# Patient Record
Sex: MALE | Race: WHITE | Employment: FULL TIME | URBAN - METROPOLITAN AREA
[De-identification: names, ages, dates, MRNs, and addresses within clinical notes are randomized per-mention and may not be internally consistent; named-entity substitution may affect disease eponyms.]

---

## 2017-02-09 ENCOUNTER — ANESTHESIA (OUTPATIENT)
Dept: CARDIOTHORACIC SURGERY | Age: 57
DRG: 219 | End: 2017-02-09
Payer: COMMERCIAL

## 2017-02-09 ENCOUNTER — APPOINTMENT (OUTPATIENT)
Dept: GENERAL RADIOLOGY | Age: 57
DRG: 219 | End: 2017-02-09
Attending: NURSE PRACTITIONER
Payer: COMMERCIAL

## 2017-02-09 ENCOUNTER — ANESTHESIA EVENT (OUTPATIENT)
Dept: CARDIOTHORACIC SURGERY | Age: 57
DRG: 219 | End: 2017-02-09
Payer: COMMERCIAL

## 2017-02-09 ENCOUNTER — APPOINTMENT (OUTPATIENT)
Dept: CT IMAGING | Age: 57
DRG: 219 | End: 2017-02-09
Attending: EMERGENCY MEDICINE
Payer: COMMERCIAL

## 2017-02-09 ENCOUNTER — HOSPITAL ENCOUNTER (INPATIENT)
Age: 57
LOS: 18 days | Discharge: HOME HEALTH | DRG: 219 | End: 2017-02-27
Attending: EMERGENCY MEDICINE | Admitting: THORACIC SURGERY (CARDIOTHORACIC VASCULAR SURGERY)
Payer: COMMERCIAL

## 2017-02-09 DIAGNOSIS — I71.03 AORTIC DISSECTION, THORACOABDOMINAL (HCC): Primary | ICD-10-CM

## 2017-02-09 PROBLEM — I71.010 ASCENDING AORTIC DISSECTION: Status: ACTIVE | Noted: 2017-02-09

## 2017-02-09 LAB
ALBUMIN SERPL BCP-MCNC: 2.9 G/DL (ref 3.5–5)
ALBUMIN SERPL BCP-MCNC: 3.7 G/DL (ref 3.5–5)
ALBUMIN/GLOB SERPL: 1.4 {RATIO} (ref 1.1–2.2)
ALBUMIN/GLOB SERPL: 1.5 {RATIO} (ref 1.1–2.2)
ALP SERPL-CCNC: 44 U/L (ref 45–117)
ALP SERPL-CCNC: 65 U/L (ref 45–117)
ALT SERPL-CCNC: 45 U/L (ref 12–78)
ALT SERPL-CCNC: 55 U/L (ref 12–78)
ANION GAP BLD CALC-SCNC: 11 MMOL/L (ref 5–15)
ANION GAP BLD CALC-SCNC: 15 MMOL/L (ref 5–15)
APTT PPP: 25.1 SEC (ref 22.1–32.5)
ARTERIAL PATENCY WRIST A: ABNORMAL
AST SERPL W P-5'-P-CCNC: 103 U/L (ref 15–37)
AST SERPL W P-5'-P-CCNC: 27 U/L (ref 15–37)
ATRIAL RATE: 71 BPM
BASE DEFICIT BLDV-SCNC: 6 MMOL/L
BASOPHILS # BLD AUTO: 0 K/UL (ref 0–0.1)
BASOPHILS # BLD AUTO: 0 K/UL (ref 0–0.1)
BASOPHILS # BLD: 0 % (ref 0–1)
BASOPHILS # BLD: 0 % (ref 0–1)
BDY SITE: ABNORMAL
BILIRUB SERPL-MCNC: 0.9 MG/DL (ref 0.2–1)
BILIRUB SERPL-MCNC: 1.1 MG/DL (ref 0.2–1)
BUN SERPL-MCNC: 14 MG/DL (ref 6–20)
BUN SERPL-MCNC: 16 MG/DL (ref 6–20)
BUN/CREAT SERPL: 11 (ref 12–20)
BUN/CREAT SERPL: 11 (ref 12–20)
CALCIUM SERPL-MCNC: 7.3 MG/DL (ref 8.5–10.1)
CALCIUM SERPL-MCNC: 8.4 MG/DL (ref 8.5–10.1)
CALCULATED P AXIS, ECG09: 57 DEGREES
CALCULATED R AXIS, ECG10: 11 DEGREES
CALCULATED T AXIS, ECG11: 32 DEGREES
CHLORIDE SERPL-SCNC: 106 MMOL/L (ref 97–108)
CHLORIDE SERPL-SCNC: 110 MMOL/L (ref 97–108)
CK SERPL-CCNC: 102 U/L (ref 39–308)
CO2 SERPL-SCNC: 20 MMOL/L (ref 21–32)
CO2 SERPL-SCNC: 23 MMOL/L (ref 21–32)
CREAT SERPL-MCNC: 1.25 MG/DL (ref 0.7–1.3)
CREAT SERPL-MCNC: 1.5 MG/DL (ref 0.7–1.3)
DIAGNOSIS, 93000: NORMAL
DIFFERENTIAL METHOD BLD: ABNORMAL
EOSINOPHIL # BLD: 0 K/UL (ref 0–0.4)
EOSINOPHIL # BLD: 0.1 K/UL (ref 0–0.4)
EOSINOPHIL NFR BLD: 0 % (ref 0–7)
EOSINOPHIL NFR BLD: 1 % (ref 0–7)
ERYTHROCYTE [DISTWIDTH] IN BLOOD BY AUTOMATED COUNT: 12.5 % (ref 11.5–14.5)
ERYTHROCYTE [DISTWIDTH] IN BLOOD BY AUTOMATED COUNT: 12.5 % (ref 11.5–14.5)
EST. AVERAGE GLUCOSE BLD GHB EST-MCNC: 103 MG/DL
GAS FLOW.O2 O2 DELIVERY SYS: ABNORMAL L/MIN
GAS FLOW.O2 SETTING OXYMISER: 15 BPM
GLOBULIN SER CALC-MCNC: 1.9 G/DL (ref 2–4)
GLOBULIN SER CALC-MCNC: 2.7 G/DL (ref 2–4)
GLUCOSE BLD STRIP.AUTO-MCNC: 100 MG/DL (ref 65–100)
GLUCOSE BLD STRIP.AUTO-MCNC: 113 MG/DL (ref 65–100)
GLUCOSE BLD STRIP.AUTO-MCNC: 114 MG/DL (ref 65–100)
GLUCOSE BLD STRIP.AUTO-MCNC: 125 MG/DL (ref 65–100)
GLUCOSE SERPL-MCNC: 109 MG/DL (ref 65–100)
GLUCOSE SERPL-MCNC: 130 MG/DL (ref 65–100)
HBA1C MFR BLD: 5.2 % (ref 4.2–6.3)
HCO3 BLDV-SCNC: 20.9 MMOL/L (ref 23–28)
HCT VFR BLD AUTO: 35.3 % (ref 36.6–50.3)
HCT VFR BLD AUTO: 43.1 % (ref 36.6–50.3)
HGB BLD-MCNC: 12.2 G/DL (ref 12.1–17)
HGB BLD-MCNC: 15.1 G/DL (ref 12.1–17)
INR PPP: 1 (ref 0.9–1.1)
INR PPP: 1.3 (ref 0.9–1.1)
LYMPHOCYTES # BLD AUTO: 31 % (ref 12–49)
LYMPHOCYTES # BLD AUTO: 4 % (ref 12–49)
LYMPHOCYTES # BLD: 0.5 K/UL (ref 0.8–3.5)
LYMPHOCYTES # BLD: 3.1 K/UL (ref 0.8–3.5)
MAGNESIUM SERPL-MCNC: 2.3 MG/DL (ref 1.6–2.4)
MCH RBC QN AUTO: 31.4 PG (ref 26–34)
MCH RBC QN AUTO: 31.7 PG (ref 26–34)
MCHC RBC AUTO-ENTMCNC: 34.6 G/DL (ref 30–36.5)
MCHC RBC AUTO-ENTMCNC: 35 G/DL (ref 30–36.5)
MCV RBC AUTO: 90.4 FL (ref 80–99)
MCV RBC AUTO: 91 FL (ref 80–99)
MONOCYTES # BLD: 0.8 K/UL (ref 0–1)
MONOCYTES # BLD: 1 K/UL (ref 0–1)
MONOCYTES NFR BLD AUTO: 8 % (ref 5–13)
MONOCYTES NFR BLD AUTO: 8 % (ref 5–13)
NEUTS SEG # BLD: 11.1 K/UL (ref 1.8–8)
NEUTS SEG # BLD: 6 K/UL (ref 1.8–8)
NEUTS SEG NFR BLD AUTO: 60 % (ref 32–75)
NEUTS SEG NFR BLD AUTO: 88 % (ref 32–75)
O2/TOTAL GAS SETTING VFR VENT: 100 %
P-R INTERVAL, ECG05: 156 MS
PCO2 BLDV: 44.5 MMHG (ref 41–51)
PEEP RESPIRATORY: 5 CMH2O
PH BLDV: 7.28 [PH] (ref 7.32–7.42)
PLATELET # BLD AUTO: 106 K/UL (ref 150–400)
PLATELET # BLD AUTO: 205 K/UL (ref 150–400)
PO2 BLDV: 55 MMHG (ref 25–40)
POTASSIUM SERPL-SCNC: 3.5 MMOL/L (ref 3.5–5.1)
POTASSIUM SERPL-SCNC: 4.3 MMOL/L (ref 3.5–5.1)
PROT SERPL-MCNC: 4.8 G/DL (ref 6.4–8.2)
PROT SERPL-MCNC: 6.4 G/DL (ref 6.4–8.2)
PROTHROMBIN TIME: 10.5 SEC (ref 9–11.1)
PROTHROMBIN TIME: 12.8 SEC (ref 9–11.1)
Q-T INTERVAL, ECG07: 410 MS
QRS DURATION, ECG06: 94 MS
QTC CALCULATION (BEZET), ECG08: 445 MS
RBC # BLD AUTO: 3.88 M/UL (ref 4.1–5.7)
RBC # BLD AUTO: 4.77 M/UL (ref 4.1–5.7)
RBC MORPH BLD: ABNORMAL
SAO2 % BLDV: 84 % (ref 65–88)
SERVICE CMNT-IMP: ABNORMAL
SERVICE CMNT-IMP: NORMAL
SODIUM SERPL-SCNC: 141 MMOL/L (ref 136–145)
SODIUM SERPL-SCNC: 144 MMOL/L (ref 136–145)
SPECIMEN TYPE: ABNORMAL
THERAPEUTIC RANGE,PTTT: NORMAL SECS (ref 58–77)
TOTAL RESP. RATE, ITRR: 15
TROPONIN I SERPL-MCNC: <0.04 NG/ML
TSH SERPL DL<=0.05 MIU/L-ACNC: 2.5 UIU/ML (ref 0.36–3.74)
VENTILATION MODE VENT: ABNORMAL
VENTRICULAR RATE, ECG03: 71 BPM
VT SETTING VENT: 600 ML
WBC # BLD AUTO: 10 K/UL (ref 4.1–11.1)
WBC # BLD AUTO: 12.6 K/UL (ref 4.1–11.1)

## 2017-02-09 PROCEDURE — 77030018835 HC SOL IRR LR ICUM -A: Performed by: THORACIC SURGERY (CARDIOTHORACIC VASCULAR SURGERY)

## 2017-02-09 PROCEDURE — 74011250636 HC RX REV CODE- 250/636: Performed by: NURSE PRACTITIONER

## 2017-02-09 PROCEDURE — 74011000250 HC RX REV CODE- 250: Performed by: NURSE PRACTITIONER

## 2017-02-09 PROCEDURE — 74011000258 HC RX REV CODE- 258: Performed by: EMERGENCY MEDICINE

## 2017-02-09 PROCEDURE — 93041 RHYTHM ECG TRACING: CPT

## 2017-02-09 PROCEDURE — 74011250636 HC RX REV CODE- 250/636

## 2017-02-09 PROCEDURE — 77030020263 HC SOL INJ SOD CL0.9% LFCR 1000ML: Performed by: THORACIC SURGERY (CARDIOTHORACIC VASCULAR SURGERY)

## 2017-02-09 PROCEDURE — 96375 TX/PRO/DX INJ NEW DRUG ADDON: CPT

## 2017-02-09 PROCEDURE — 77030019702 HC WRP THER MENM -C: Performed by: THORACIC SURGERY (CARDIOTHORACIC VASCULAR SURGERY)

## 2017-02-09 PROCEDURE — 74011636320 HC RX REV CODE- 636/320: Performed by: EMERGENCY MEDICINE

## 2017-02-09 PROCEDURE — P9035 PLATELET PHERES LEUKOREDUCED: HCPCS | Performed by: NURSE PRACTITIONER

## 2017-02-09 PROCEDURE — 77030012407 HC DRN WND BARD -B: Performed by: THORACIC SURGERY (CARDIOTHORACIC VASCULAR SURGERY)

## 2017-02-09 PROCEDURE — 82550 ASSAY OF CK (CPK): CPT | Performed by: EMERGENCY MEDICINE

## 2017-02-09 PROCEDURE — 77030018846 HC SOL IRR STRL H20 ICUM -A: Performed by: THORACIC SURGERY (CARDIOTHORACIC VASCULAR SURGERY)

## 2017-02-09 PROCEDURE — 76010000116 HC CV SURG 5 TO 5.5 HR: Performed by: THORACIC SURGERY (CARDIOTHORACIC VASCULAR SURGERY)

## 2017-02-09 PROCEDURE — 74011000250 HC RX REV CODE- 250

## 2017-02-09 PROCEDURE — 77030008771 HC TU NG SALEM SUMP -A: Performed by: ANESTHESIOLOGY

## 2017-02-09 PROCEDURE — 84443 ASSAY THYROID STIM HORMONE: CPT | Performed by: NURSE PRACTITIONER

## 2017-02-09 PROCEDURE — 77030002524 HC INSTR CLMP FGRTY EDWD -B: Performed by: THORACIC SURGERY (CARDIOTHORACIC VASCULAR SURGERY)

## 2017-02-09 PROCEDURE — 65610000003 HC RM ICU SURGICAL

## 2017-02-09 PROCEDURE — 77030010516 HC APPL HEMA CLP TELE -B: Performed by: THORACIC SURGERY (CARDIOTHORACIC VASCULAR SURGERY)

## 2017-02-09 PROCEDURE — 74011250636 HC RX REV CODE- 250/636: Performed by: THORACIC SURGERY (CARDIOTHORACIC VASCULAR SURGERY)

## 2017-02-09 PROCEDURE — 83036 HEMOGLOBIN GLYCOSYLATED A1C: CPT | Performed by: NURSE PRACTITIONER

## 2017-02-09 PROCEDURE — 74011000250 HC RX REV CODE- 250: Performed by: THORACIC SURGERY (CARDIOTHORACIC VASCULAR SURGERY)

## 2017-02-09 PROCEDURE — 77030013798 HC KT TRNSDUC PRSSR EDWD -B: Performed by: THORACIC SURGERY (CARDIOTHORACIC VASCULAR SURGERY)

## 2017-02-09 PROCEDURE — 70450 CT HEAD/BRAIN W/O DYE: CPT

## 2017-02-09 PROCEDURE — 74011000272 HC RX REV CODE- 272: Performed by: THORACIC SURGERY (CARDIOTHORACIC VASCULAR SURGERY)

## 2017-02-09 PROCEDURE — 86923 COMPATIBILITY TEST ELECTRIC: CPT | Performed by: NURSE PRACTITIONER

## 2017-02-09 PROCEDURE — 99285 EMERGENCY DEPT VISIT HI MDM: CPT

## 2017-02-09 PROCEDURE — 76060000041 HC ANESTHESIA 5 TO 5.5 HR: Performed by: THORACIC SURGERY (CARDIOTHORACIC VASCULAR SURGERY)

## 2017-02-09 PROCEDURE — 30233P1 TRANSFUSION OF NONAUTOLOGOUS FROZEN RED CELLS INTO PERIPHERAL VEIN, PERCUTANEOUS APPROACH: ICD-10-PCS | Performed by: THORACIC SURGERY (CARDIOTHORACIC VASCULAR SURGERY)

## 2017-02-09 PROCEDURE — 77030010389 HC WRE ATR PACE AEMC -B: Performed by: THORACIC SURGERY (CARDIOTHORACIC VASCULAR SURGERY)

## 2017-02-09 PROCEDURE — 74011000258 HC RX REV CODE- 258: Performed by: NURSE PRACTITIONER

## 2017-02-09 PROCEDURE — P9059 PLASMA, FRZ BETWEEN 8-24HOUR: HCPCS | Performed by: THORACIC SURGERY (CARDIOTHORACIC VASCULAR SURGERY)

## 2017-02-09 PROCEDURE — 77030007667 HC INSRT SUT HLD MEDT -B: Performed by: THORACIC SURGERY (CARDIOTHORACIC VASCULAR SURGERY)

## 2017-02-09 PROCEDURE — C1751 CATH, INF, PER/CENT/MIDLINE: HCPCS

## 2017-02-09 PROCEDURE — 77030010821: Performed by: THORACIC SURGERY (CARDIOTHORACIC VASCULAR SURGERY)

## 2017-02-09 PROCEDURE — 77030010505 HC ADH BIOGLU CRYO -F: Performed by: THORACIC SURGERY (CARDIOTHORACIC VASCULAR SURGERY)

## 2017-02-09 PROCEDURE — C1768 GRAFT, VASCULAR: HCPCS | Performed by: THORACIC SURGERY (CARDIOTHORACIC VASCULAR SURGERY)

## 2017-02-09 PROCEDURE — 041L0JH BYPASS LEFT FEMORAL ARTERY TO RIGHT FEMORAL ARTERY WITH SYNTHETIC SUBSTITUTE, OPEN APPROACH: ICD-10-PCS

## 2017-02-09 PROCEDURE — 80053 COMPREHEN METABOLIC PANEL: CPT | Performed by: EMERGENCY MEDICINE

## 2017-02-09 PROCEDURE — 77030020256 HC SOL INJ NACL 0.9%  500ML: Performed by: THORACIC SURGERY (CARDIOTHORACIC VASCULAR SURGERY)

## 2017-02-09 PROCEDURE — 85390 FIBRINOLYSINS SCREEN I&R: CPT | Performed by: THORACIC SURGERY (CARDIOTHORACIC VASCULAR SURGERY)

## 2017-02-09 PROCEDURE — 83735 ASSAY OF MAGNESIUM: CPT | Performed by: NURSE PRACTITIONER

## 2017-02-09 PROCEDURE — 77030011235 HC DRN PERCRD SUMP MEDT -B: Performed by: THORACIC SURGERY (CARDIOTHORACIC VASCULAR SURGERY)

## 2017-02-09 PROCEDURE — P9047 ALBUMIN (HUMAN), 25%, 50ML: HCPCS

## 2017-02-09 PROCEDURE — 77030006247 HC LD PCMKR MYOCRD BPLR TEMP MEDT -B: Performed by: THORACIC SURGERY (CARDIOTHORACIC VASCULAR SURGERY)

## 2017-02-09 PROCEDURE — 74011250636 HC RX REV CODE- 250/636: Performed by: EMERGENCY MEDICINE

## 2017-02-09 PROCEDURE — 36430 TRANSFUSION BLD/BLD COMPNT: CPT

## 2017-02-09 PROCEDURE — 02RF0JZ REPLACEMENT OF AORTIC VALVE WITH SYNTHETIC SUBSTITUTE, OPEN APPROACH: ICD-10-PCS | Performed by: THORACIC SURGERY (CARDIOTHORACIC VASCULAR SURGERY)

## 2017-02-09 PROCEDURE — 71010 XR CHEST PORT: CPT

## 2017-02-09 PROCEDURE — 77030008684 HC TU ET CUF COVD -B: Performed by: ANESTHESIOLOGY

## 2017-02-09 PROCEDURE — 86900 BLOOD TYPING SEROLOGIC ABO: CPT | Performed by: NURSE PRACTITIONER

## 2017-02-09 PROCEDURE — 77030004870 HC CATH CV SWN EDWD -C

## 2017-02-09 PROCEDURE — 77030002933 HC SUT MCRYL J&J -A: Performed by: THORACIC SURGERY (CARDIOTHORACIC VASCULAR SURGERY)

## 2017-02-09 PROCEDURE — 77030019576 HC CAUT BTRY -A: Performed by: THORACIC SURGERY (CARDIOTHORACIC VASCULAR SURGERY)

## 2017-02-09 PROCEDURE — 85025 COMPLETE CBC W/AUTO DIFF WBC: CPT | Performed by: EMERGENCY MEDICINE

## 2017-02-09 PROCEDURE — 93005 ELECTROCARDIOGRAM TRACING: CPT

## 2017-02-09 PROCEDURE — 74174 CTA ABD&PLVS W/CONTRAST: CPT

## 2017-02-09 PROCEDURE — 74011250637 HC RX REV CODE- 250/637: Performed by: NURSE PRACTITIONER

## 2017-02-09 PROCEDURE — 71275 CT ANGIOGRAPHY CHEST: CPT

## 2017-02-09 PROCEDURE — 5A1221Z PERFORMANCE OF CARDIAC OUTPUT, CONTINUOUS: ICD-10-PCS | Performed by: THORACIC SURGERY (CARDIOTHORACIC VASCULAR SURGERY)

## 2017-02-09 PROCEDURE — 77030027887 HC CATH PERF VENT MEDT -B: Performed by: THORACIC SURGERY (CARDIOTHORACIC VASCULAR SURGERY)

## 2017-02-09 PROCEDURE — P9045 ALBUMIN (HUMAN), 5%, 250 ML: HCPCS | Performed by: NURSE PRACTITIONER

## 2017-02-09 PROCEDURE — 77030002986 HC SUT PROL J&J -A: Performed by: THORACIC SURGERY (CARDIOTHORACIC VASCULAR SURGERY)

## 2017-02-09 PROCEDURE — 80053 COMPREHEN METABOLIC PANEL: CPT | Performed by: NURSE PRACTITIONER

## 2017-02-09 PROCEDURE — 88305 TISSUE EXAM BY PATHOLOGIST: CPT | Performed by: THORACIC SURGERY (CARDIOTHORACIC VASCULAR SURGERY)

## 2017-02-09 PROCEDURE — 74011000272 HC RX REV CODE- 272

## 2017-02-09 PROCEDURE — C1894 INTRO/SHEATH, NON-LASER: HCPCS

## 2017-02-09 PROCEDURE — 85347 COAGULATION TIME ACTIVATED: CPT | Performed by: THORACIC SURGERY (CARDIOTHORACIC VASCULAR SURGERY)

## 2017-02-09 PROCEDURE — 85384 FIBRINOGEN ACTIVITY: CPT | Performed by: THORACIC SURGERY (CARDIOTHORACIC VASCULAR SURGERY)

## 2017-02-09 PROCEDURE — 84484 ASSAY OF TROPONIN QUANT: CPT | Performed by: EMERGENCY MEDICINE

## 2017-02-09 PROCEDURE — 77030011640 HC PAD GRND REM COVD -A: Performed by: THORACIC SURGERY (CARDIOTHORACIC VASCULAR SURGERY)

## 2017-02-09 PROCEDURE — 77030003020 HC SUT TICRN COVD -A: Performed by: THORACIC SURGERY (CARDIOTHORACIC VASCULAR SURGERY)

## 2017-02-09 PROCEDURE — 77030005537 HC CATH URETH BARD -A: Performed by: THORACIC SURGERY (CARDIOTHORACIC VASCULAR SURGERY)

## 2017-02-09 PROCEDURE — 74011000250 HC RX REV CODE- 250: Performed by: EMERGENCY MEDICINE

## 2017-02-09 PROCEDURE — 77030033069 HC RLD QLC SGL COR-KNOT LSIS -B: Performed by: THORACIC SURGERY (CARDIOTHORACIC VASCULAR SURGERY)

## 2017-02-09 PROCEDURE — 77030018836 HC SOL IRR NACL ICUM -A: Performed by: THORACIC SURGERY (CARDIOTHORACIC VASCULAR SURGERY)

## 2017-02-09 PROCEDURE — 96365 THER/PROPH/DIAG IV INF INIT: CPT

## 2017-02-09 PROCEDURE — 74011000258 HC RX REV CODE- 258

## 2017-02-09 PROCEDURE — 36415 COLL VENOUS BLD VENIPUNCTURE: CPT | Performed by: NURSE PRACTITIONER

## 2017-02-09 PROCEDURE — 77030013797 HC KT TRNSDUC PRSSR EDWD -A

## 2017-02-09 PROCEDURE — 77030031139 HC SUT VCRL2 J&J -A: Performed by: THORACIC SURGERY (CARDIOTHORACIC VASCULAR SURGERY)

## 2017-02-09 PROCEDURE — 77030012390 HC DRN CHST BTL GTNG -B: Performed by: THORACIC SURGERY (CARDIOTHORACIC VASCULAR SURGERY)

## 2017-02-09 PROCEDURE — 82962 GLUCOSE BLOOD TEST: CPT

## 2017-02-09 PROCEDURE — 74011636637 HC RX REV CODE- 636/637

## 2017-02-09 PROCEDURE — 77030034936 HC DEV MIN COR-KNOT KT LSIS -F: Performed by: THORACIC SURGERY (CARDIOTHORACIC VASCULAR SURGERY)

## 2017-02-09 PROCEDURE — 77030003029 HC SUT VCRL J&J -B: Performed by: THORACIC SURGERY (CARDIOTHORACIC VASCULAR SURGERY)

## 2017-02-09 PROCEDURE — 30233K1 TRANSFUSION OF NONAUTOLOGOUS FROZEN PLASMA INTO PERIPHERAL VEIN, PERCUTANEOUS APPROACH: ICD-10-PCS | Performed by: THORACIC SURGERY (CARDIOTHORACIC VASCULAR SURGERY)

## 2017-02-09 PROCEDURE — 85576 BLOOD PLATELET AGGREGATION: CPT | Performed by: THORACIC SURGERY (CARDIOTHORACIC VASCULAR SURGERY)

## 2017-02-09 PROCEDURE — 85730 THROMBOPLASTIN TIME PARTIAL: CPT | Performed by: NURSE PRACTITIONER

## 2017-02-09 PROCEDURE — 77030019579 HC CBL PACE DISP REMG -B: Performed by: THORACIC SURGERY (CARDIOTHORACIC VASCULAR SURGERY)

## 2017-02-09 PROCEDURE — 94002 VENT MGMT INPAT INIT DAY: CPT

## 2017-02-09 PROCEDURE — 74011636637 HC RX REV CODE- 636/637: Performed by: NURSE PRACTITIONER

## 2017-02-09 PROCEDURE — 77030011255 HC DSG AQUACEL AG BMS -A: Performed by: THORACIC SURGERY (CARDIOTHORACIC VASCULAR SURGERY)

## 2017-02-09 PROCEDURE — 77030020089 HC KT PERC FEM ART MEDT -C: Performed by: THORACIC SURGERY (CARDIOTHORACIC VASCULAR SURGERY)

## 2017-02-09 PROCEDURE — 82803 BLOOD GASES ANY COMBINATION: CPT

## 2017-02-09 PROCEDURE — 77030002973 HC SUT PLEDG CV SFT OVL TELE -B: Performed by: THORACIC SURGERY (CARDIOTHORACIC VASCULAR SURGERY)

## 2017-02-09 PROCEDURE — 77030020269 HC MISC IMPL: Performed by: THORACIC SURGERY (CARDIOTHORACIC VASCULAR SURGERY)

## 2017-02-09 PROCEDURE — 85610 PROTHROMBIN TIME: CPT | Performed by: EMERGENCY MEDICINE

## 2017-02-09 PROCEDURE — 77030026438 HC STYL ET INTUB CARD -A: Performed by: ANESTHESIOLOGY

## 2017-02-09 PROCEDURE — 77030010507 HC ADH SKN DERMBND J&J -B: Performed by: THORACIC SURGERY (CARDIOTHORACIC VASCULAR SURGERY)

## 2017-02-09 PROCEDURE — 77030003010 HC SUT SURG STL J&J -B: Performed by: THORACIC SURGERY (CARDIOTHORACIC VASCULAR SURGERY)

## 2017-02-09 DEVICE — GRAFT HEMSHLD WVN STR 8MMX30CM -- HEMASHIELD PLATINUM: Type: IMPLANTABLE DEVICE | Site: GROIN | Status: FUNCTIONAL

## 2017-02-09 DEVICE — FELT SURG W6XL6IN THK1.65MM PTFE FOR THE REP OF SEPT DEFCT: Type: IMPLANTABLE DEVICE | Site: AORTIC VALVE | Status: FUNCTIONAL

## 2017-02-09 RX ORDER — BACITRACIN 500 UNIT/G
1 PACKET (EA) TOPICAL AS NEEDED
Status: DISCONTINUED | OUTPATIENT
Start: 2017-02-09 | End: 2017-02-27 | Stop reason: HOSPADM

## 2017-02-09 RX ORDER — CEFAZOLIN SODIUM IN 0.9 % NACL 2 G/50 ML
2 INTRAVENOUS SOLUTION, PIGGYBACK (ML) INTRAVENOUS EVERY 6 HOURS
Status: COMPLETED | OUTPATIENT
Start: 2017-02-10 | End: 2017-02-11

## 2017-02-09 RX ORDER — FACIAL-BODY WIPES
10 EACH TOPICAL DAILY PRN
Status: DISCONTINUED | OUTPATIENT
Start: 2017-02-09 | End: 2017-02-27 | Stop reason: HOSPADM

## 2017-02-09 RX ORDER — SUFENTANIL CITRATE 50 UG/ML
INJECTION EPIDURAL; INTRAVENOUS
Status: DISCONTINUED | OUTPATIENT
Start: 2017-02-09 | End: 2017-02-09 | Stop reason: HOSPADM

## 2017-02-09 RX ORDER — HYDROMORPHONE HYDROCHLORIDE 1 MG/ML
INJECTION, SOLUTION INTRAMUSCULAR; INTRAVENOUS; SUBCUTANEOUS
Status: COMPLETED
Start: 2017-02-09 | End: 2017-02-09

## 2017-02-09 RX ORDER — DEXMEDETOMIDINE HYDROCHLORIDE 4 UG/ML
INJECTION, SOLUTION INTRAVENOUS
Status: DISCONTINUED | OUTPATIENT
Start: 2017-02-09 | End: 2017-02-09 | Stop reason: HOSPADM

## 2017-02-09 RX ORDER — INSULIN GLARGINE 100 [IU]/ML
1-50 INJECTION, SOLUTION SUBCUTANEOUS
Status: COMPLETED | OUTPATIENT
Start: 2017-02-09 | End: 2017-02-12

## 2017-02-09 RX ORDER — FENTANYL CITRATE 50 UG/ML
INJECTION, SOLUTION INTRAMUSCULAR; INTRAVENOUS AS NEEDED
Status: DISCONTINUED | OUTPATIENT
Start: 2017-02-09 | End: 2017-02-09 | Stop reason: HOSPADM

## 2017-02-09 RX ORDER — NICARDIPINE HYDROCHLORIDE 0.2 MG/ML
INJECTION INTRAVENOUS
Status: DISCONTINUED | OUTPATIENT
Start: 2017-02-09 | End: 2017-02-09 | Stop reason: HOSPADM

## 2017-02-09 RX ORDER — SODIUM CHLORIDE 0.9 % (FLUSH) 0.9 %
5-10 SYRINGE (ML) INJECTION AS NEEDED
Status: DISCONTINUED | OUTPATIENT
Start: 2017-02-09 | End: 2017-02-09

## 2017-02-09 RX ORDER — HYDROMORPHONE HYDROCHLORIDE 1 MG/ML
1 INJECTION, SOLUTION INTRAMUSCULAR; INTRAVENOUS; SUBCUTANEOUS
Status: DISCONTINUED | OUTPATIENT
Start: 2017-02-09 | End: 2017-02-09

## 2017-02-09 RX ORDER — INSULIN LISPRO 100 [IU]/ML
INJECTION, SOLUTION INTRAVENOUS; SUBCUTANEOUS
Status: DISCONTINUED | OUTPATIENT
Start: 2017-02-09 | End: 2017-02-13

## 2017-02-09 RX ORDER — SODIUM CHLORIDE 9 MG/ML
9 INJECTION, SOLUTION INTRAVENOUS CONTINUOUS
Status: DISCONTINUED | OUTPATIENT
Start: 2017-02-09 | End: 2017-02-12

## 2017-02-09 RX ORDER — MUPIROCIN 20 MG/G
OINTMENT TOPICAL 2 TIMES DAILY
Status: COMPLETED | OUTPATIENT
Start: 2017-02-09 | End: 2017-02-14

## 2017-02-09 RX ORDER — DESMOPRESSIN ACETATE 4 UG/ML
INJECTION, SOLUTION INTRAVENOUS; SUBCUTANEOUS AS NEEDED
Status: DISCONTINUED | OUTPATIENT
Start: 2017-02-09 | End: 2017-02-09 | Stop reason: HOSPADM

## 2017-02-09 RX ORDER — SUCCINYLCHOLINE CHLORIDE 20 MG/ML
INJECTION INTRAMUSCULAR; INTRAVENOUS AS NEEDED
Status: DISCONTINUED | OUTPATIENT
Start: 2017-02-09 | End: 2017-02-09

## 2017-02-09 RX ORDER — HEPARIN SODIUM 1000 [USP'U]/ML
2000 INJECTION, SOLUTION INTRAVENOUS; SUBCUTANEOUS ONCE
Status: DISPENSED | OUTPATIENT
Start: 2017-02-09 | End: 2017-02-10

## 2017-02-09 RX ORDER — NALOXONE HYDROCHLORIDE 0.4 MG/ML
0.4 INJECTION, SOLUTION INTRAMUSCULAR; INTRAVENOUS; SUBCUTANEOUS AS NEEDED
Status: DISCONTINUED | OUTPATIENT
Start: 2017-02-09 | End: 2017-02-12

## 2017-02-09 RX ORDER — MAGNESIUM SULFATE 100 %
4 CRYSTALS MISCELLANEOUS AS NEEDED
Status: DISCONTINUED | OUTPATIENT
Start: 2017-02-09 | End: 2017-02-27 | Stop reason: HOSPADM

## 2017-02-09 RX ORDER — ACETAMINOPHEN 325 MG/1
650 TABLET ORAL
Status: DISCONTINUED | OUTPATIENT
Start: 2017-02-09 | End: 2017-02-12

## 2017-02-09 RX ORDER — AMIODARONE HYDROCHLORIDE 200 MG/1
400 TABLET ORAL EVERY 12 HOURS
Status: DISCONTINUED | OUTPATIENT
Start: 2017-02-10 | End: 2017-02-10

## 2017-02-09 RX ORDER — SODIUM CHLORIDE 9 MG/ML
250 INJECTION, SOLUTION INTRAVENOUS AS NEEDED
Status: DISCONTINUED | OUTPATIENT
Start: 2017-02-09 | End: 2017-02-12

## 2017-02-09 RX ORDER — SUCCINYLCHOLINE CHLORIDE 20 MG/ML
INJECTION INTRAMUSCULAR; INTRAVENOUS AS NEEDED
Status: DISCONTINUED | OUTPATIENT
Start: 2017-02-09 | End: 2017-02-09 | Stop reason: HOSPADM

## 2017-02-09 RX ORDER — LANOLIN ALCOHOL/MO/W.PET/CERES
400 CREAM (GRAM) TOPICAL EVERY 12 HOURS
Status: DISCONTINUED | OUTPATIENT
Start: 2017-02-10 | End: 2017-02-12

## 2017-02-09 RX ORDER — MORPHINE SULFATE 4 MG/ML
4 INJECTION, SOLUTION INTRAMUSCULAR; INTRAVENOUS
Status: DISCONTINUED | OUTPATIENT
Start: 2017-02-09 | End: 2017-02-12

## 2017-02-09 RX ORDER — HYDROMORPHONE HYDROCHLORIDE 1 MG/ML
0.5 INJECTION, SOLUTION INTRAMUSCULAR; INTRAVENOUS; SUBCUTANEOUS
Status: COMPLETED | OUTPATIENT
Start: 2017-02-09 | End: 2017-02-09

## 2017-02-09 RX ORDER — SODIUM CHLORIDE 9 MG/ML
250 INJECTION, SOLUTION INTRAVENOUS AS NEEDED
Status: DISCONTINUED | OUTPATIENT
Start: 2017-02-09 | End: 2017-02-11 | Stop reason: HOSPADM

## 2017-02-09 RX ORDER — CEFAZOLIN SODIUM 1 G/3ML
INJECTION, POWDER, FOR SOLUTION INTRAMUSCULAR; INTRAVENOUS AS NEEDED
Status: DISCONTINUED | OUTPATIENT
Start: 2017-02-09 | End: 2017-02-09 | Stop reason: HOSPADM

## 2017-02-09 RX ORDER — DEXTROSE 50 % IN WATER (D50W) INTRAVENOUS SYRINGE
12.5-25 AS NEEDED
Status: DISCONTINUED | OUTPATIENT
Start: 2017-02-09 | End: 2017-02-12

## 2017-02-09 RX ORDER — PROPOFOL 10 MG/ML
INJECTION, EMULSION INTRAVENOUS AS NEEDED
Status: DISCONTINUED | OUTPATIENT
Start: 2017-02-09 | End: 2017-02-09 | Stop reason: HOSPADM

## 2017-02-09 RX ORDER — MORPHINE SULFATE 2 MG/ML
2 INJECTION, SOLUTION INTRAMUSCULAR; INTRAVENOUS
Status: DISCONTINUED | OUTPATIENT
Start: 2017-02-09 | End: 2017-02-12

## 2017-02-09 RX ORDER — SODIUM CHLORIDE 0.9 % (FLUSH) 0.9 %
10 SYRINGE (ML) INJECTION AS NEEDED
Status: DISCONTINUED | OUTPATIENT
Start: 2017-02-09 | End: 2017-02-27 | Stop reason: HOSPADM

## 2017-02-09 RX ORDER — CHLORHEXIDINE GLUCONATE 1.2 MG/ML
10 RINSE ORAL 2 TIMES DAILY
Status: DISPENSED | OUTPATIENT
Start: 2017-02-09 | End: 2017-02-14

## 2017-02-09 RX ORDER — ROCURONIUM BROMIDE 10 MG/ML
INJECTION, SOLUTION INTRAVENOUS AS NEEDED
Status: DISCONTINUED | OUTPATIENT
Start: 2017-02-09 | End: 2017-02-09 | Stop reason: HOSPADM

## 2017-02-09 RX ORDER — GUAIFENESIN 100 MG/5ML
81 LIQUID (ML) ORAL DAILY
Status: DISCONTINUED | OUTPATIENT
Start: 2017-02-10 | End: 2017-02-27 | Stop reason: HOSPADM

## 2017-02-09 RX ORDER — SODIUM CHLORIDE 9 MG/ML
INJECTION, SOLUTION INTRAVENOUS
Status: DISCONTINUED | OUTPATIENT
Start: 2017-02-09 | End: 2017-02-09 | Stop reason: HOSPADM

## 2017-02-09 RX ORDER — CEFAZOLIN SODIUM 1 G/3ML
1 INJECTION, POWDER, FOR SOLUTION INTRAMUSCULAR; INTRAVENOUS ONCE
Status: COMPLETED | OUTPATIENT
Start: 2017-02-09 | End: 2017-02-09

## 2017-02-09 RX ORDER — HEPARIN SODIUM 1000 [USP'U]/ML
INJECTION, SOLUTION INTRAVENOUS; SUBCUTANEOUS AS NEEDED
Status: DISCONTINUED | OUTPATIENT
Start: 2017-02-09 | End: 2017-02-09 | Stop reason: HOSPADM

## 2017-02-09 RX ORDER — SODIUM CHLORIDE 0.9 % (FLUSH) 0.9 %
10 SYRINGE (ML) INJECTION EVERY 24 HOURS
Status: DISCONTINUED | OUTPATIENT
Start: 2017-02-09 | End: 2017-02-20

## 2017-02-09 RX ORDER — AMIODARONE HYDROCHLORIDE 150 MG/3ML
INJECTION, SOLUTION INTRAVENOUS AS NEEDED
Status: DISCONTINUED | OUTPATIENT
Start: 2017-02-09 | End: 2017-02-09 | Stop reason: HOSPADM

## 2017-02-09 RX ORDER — SODIUM CHLORIDE 0.9 % (FLUSH) 0.9 %
10 SYRINGE (ML) INJECTION
Status: COMPLETED | OUTPATIENT
Start: 2017-02-09 | End: 2017-02-09

## 2017-02-09 RX ORDER — SODIUM CHLORIDE, SODIUM LACTATE, POTASSIUM CHLORIDE, CALCIUM CHLORIDE 600; 310; 30; 20 MG/100ML; MG/100ML; MG/100ML; MG/100ML
INJECTION, SOLUTION INTRAVENOUS
Status: DISCONTINUED | OUTPATIENT
Start: 2017-02-09 | End: 2017-02-09 | Stop reason: HOSPADM

## 2017-02-09 RX ORDER — SODIUM CHLORIDE 0.9 % (FLUSH) 0.9 %
10 SYRINGE (ML) INJECTION EVERY 8 HOURS
Status: DISCONTINUED | OUTPATIENT
Start: 2017-02-09 | End: 2017-02-27 | Stop reason: HOSPADM

## 2017-02-09 RX ORDER — ESMOLOL HYDROCHLORIDE 10 MG/ML
500 INJECTION INTRAVENOUS ONCE
Status: COMPLETED | OUTPATIENT
Start: 2017-02-09 | End: 2017-02-09

## 2017-02-09 RX ORDER — INSULIN LISPRO 100 [IU]/ML
INJECTION, SOLUTION INTRAVENOUS; SUBCUTANEOUS
Status: DISCONTINUED | OUTPATIENT
Start: 2017-02-10 | End: 2017-02-12

## 2017-02-09 RX ORDER — ESMOLOL HYDROCHLORIDE 10 MG/ML
50-200 INJECTION, SOLUTION INTRAVENOUS
Status: DISCONTINUED | OUTPATIENT
Start: 2017-02-09 | End: 2017-02-09

## 2017-02-09 RX ORDER — OXYCODONE AND ACETAMINOPHEN 5; 325 MG/1; MG/1
2 TABLET ORAL
Status: DISCONTINUED | OUTPATIENT
Start: 2017-02-09 | End: 2017-02-15

## 2017-02-09 RX ORDER — ALBUMIN HUMAN 50 G/1000ML
12.5 SOLUTION INTRAVENOUS
Status: COMPLETED | OUTPATIENT
Start: 2017-02-09 | End: 2017-02-10

## 2017-02-09 RX ORDER — POTASSIUM CHLORIDE 29.8 MG/ML
20 INJECTION INTRAVENOUS
Status: ACTIVE | OUTPATIENT
Start: 2017-02-09 | End: 2017-02-10

## 2017-02-09 RX ORDER — HEPARIN SODIUM 1000 [USP'U]/ML
INJECTION, SOLUTION INTRAVENOUS; SUBCUTANEOUS AS NEEDED
Status: DISCONTINUED | OUTPATIENT
Start: 2017-02-09 | End: 2017-02-11 | Stop reason: HOSPADM

## 2017-02-09 RX ORDER — ALBUTEROL SULFATE 0.83 MG/ML
2.5 SOLUTION RESPIRATORY (INHALATION)
Status: DISCONTINUED | OUTPATIENT
Start: 2017-02-09 | End: 2017-02-19

## 2017-02-09 RX ORDER — MIDAZOLAM HYDROCHLORIDE 1 MG/ML
1 INJECTION, SOLUTION INTRAMUSCULAR; INTRAVENOUS
Status: DISCONTINUED | OUTPATIENT
Start: 2017-02-09 | End: 2017-02-10

## 2017-02-09 RX ORDER — CEFAZOLIN SODIUM IN 0.9 % NACL 2 G/50 ML
2 INTRAVENOUS SOLUTION, PIGGYBACK (ML) INTRAVENOUS
Status: DISCONTINUED | OUTPATIENT
Start: 2017-02-09 | End: 2017-02-09

## 2017-02-09 RX ORDER — ESMOLOL HYDROCHLORIDE 10 MG/ML
INJECTION, SOLUTION INTRAVENOUS
Status: DISCONTINUED | OUTPATIENT
Start: 2017-02-09 | End: 2017-02-09 | Stop reason: HOSPADM

## 2017-02-09 RX ORDER — PROPOFOL 10 MG/ML
5-50 VIAL (ML) INTRAVENOUS
Status: DISCONTINUED | OUTPATIENT
Start: 2017-02-09 | End: 2017-02-10

## 2017-02-09 RX ORDER — SODIUM CHLORIDE 450 MG/100ML
10 INJECTION, SOLUTION INTRAVENOUS CONTINUOUS
Status: DISCONTINUED | OUTPATIENT
Start: 2017-02-09 | End: 2017-02-12

## 2017-02-09 RX ORDER — SODIUM CHLORIDE 0.9 % (FLUSH) 0.9 %
5-10 SYRINGE (ML) INJECTION AS NEEDED
Status: DISCONTINUED | OUTPATIENT
Start: 2017-02-09 | End: 2017-02-27 | Stop reason: HOSPADM

## 2017-02-09 RX ORDER — SODIUM CHLORIDE 0.9 % (FLUSH) 0.9 %
5-10 SYRINGE (ML) INJECTION EVERY 8 HOURS
Status: DISCONTINUED | OUTPATIENT
Start: 2017-02-09 | End: 2017-02-23

## 2017-02-09 RX ORDER — ESMOLOL HYDROCHLORIDE 10 MG/ML
1000 INJECTION INTRAVENOUS ONCE
Status: COMPLETED | OUTPATIENT
Start: 2017-02-09 | End: 2017-02-09

## 2017-02-09 RX ORDER — ASPIRIN 81 MG/1
81 TABLET ORAL DAILY
Status: ON HOLD | COMMUNITY
End: 2021-07-09

## 2017-02-09 RX ORDER — MAGNESIUM SULFATE 1 G/100ML
1 INJECTION INTRAVENOUS AS NEEDED
Status: DISCONTINUED | OUTPATIENT
Start: 2017-02-09 | End: 2017-02-12

## 2017-02-09 RX ORDER — FAMOTIDINE 20 MG/1
20 TABLET, FILM COATED ORAL EVERY 12 HOURS
Status: DISCONTINUED | OUTPATIENT
Start: 2017-02-10 | End: 2017-02-10

## 2017-02-09 RX ORDER — AMOXICILLIN 250 MG
1 CAPSULE ORAL 2 TIMES DAILY
Status: DISCONTINUED | OUTPATIENT
Start: 2017-02-10 | End: 2017-02-12

## 2017-02-09 RX ORDER — SODIUM CHLORIDE 0.9 % (FLUSH) 0.9 %
20 SYRINGE (ML) INJECTION AS NEEDED
Status: DISCONTINUED | OUTPATIENT
Start: 2017-02-09 | End: 2017-02-27 | Stop reason: HOSPADM

## 2017-02-09 RX ORDER — ONDANSETRON 2 MG/ML
4 INJECTION INTRAMUSCULAR; INTRAVENOUS
Status: DISCONTINUED | OUTPATIENT
Start: 2017-02-09 | End: 2017-02-27 | Stop reason: HOSPADM

## 2017-02-09 RX ORDER — SODIUM CHLORIDE 0.9 % (FLUSH) 0.9 %
5-10 SYRINGE (ML) INJECTION EVERY 8 HOURS
Status: DISCONTINUED | OUTPATIENT
Start: 2017-02-09 | End: 2017-02-09

## 2017-02-09 RX ORDER — LIDOCAINE HYDROCHLORIDE 20 MG/ML
INJECTION, SOLUTION EPIDURAL; INFILTRATION; INTRACAUDAL; PERINEURAL AS NEEDED
Status: DISCONTINUED | OUTPATIENT
Start: 2017-02-09 | End: 2017-02-09 | Stop reason: HOSPADM

## 2017-02-09 RX ORDER — VECURONIUM BROMIDE FOR INJECTION 1 MG/ML
INJECTION, POWDER, LYOPHILIZED, FOR SOLUTION INTRAVENOUS AS NEEDED
Status: DISCONTINUED | OUTPATIENT
Start: 2017-02-09 | End: 2017-02-09 | Stop reason: HOSPADM

## 2017-02-09 RX ADMIN — VECURONIUM BROMIDE FOR INJECTION 10 MG: 1 INJECTION, POWDER, LYOPHILIZED, FOR SOLUTION INTRAVENOUS at 15:24

## 2017-02-09 RX ADMIN — IOPAMIDOL 100 ML: 755 INJECTION, SOLUTION INTRAVENOUS at 13:58

## 2017-02-09 RX ADMIN — PROPOFOL 50 MG: 10 INJECTION, EMULSION INTRAVENOUS at 18:38

## 2017-02-09 RX ADMIN — SUCCINYLCHOLINE CHLORIDE 140 MG: 20 INJECTION INTRAMUSCULAR; INTRAVENOUS at 15:16

## 2017-02-09 RX ADMIN — CEFAZOLIN SODIUM 2 G: 1 INJECTION, POWDER, FOR SOLUTION INTRAMUSCULAR; INTRAVENOUS at 15:43

## 2017-02-09 RX ADMIN — ESMOLOL HYDROCHLORIDE 50 MCG/KG/MIN: 10 INJECTION, SOLUTION INTRAVENOUS at 16:02

## 2017-02-09 RX ADMIN — ESMOLOL HYDROCHLORIDE 50.4 MG: 10 INJECTION, SOLUTION INTRAVENOUS at 14:11

## 2017-02-09 RX ADMIN — SODIUM CHLORIDE 1000 ML: 900 INJECTION, SOLUTION INTRAVENOUS at 14:00

## 2017-02-09 RX ADMIN — ESMOLOL HYDROCHLORIDE 100.7 MG: 10 INJECTION, SOLUTION INTRAVENOUS at 14:40

## 2017-02-09 RX ADMIN — ROCURONIUM BROMIDE 5 MG: 10 INJECTION, SOLUTION INTRAVENOUS at 15:16

## 2017-02-09 RX ADMIN — FENTANYL CITRATE 200 MCG: 50 INJECTION, SOLUTION INTRAMUSCULAR; INTRAVENOUS at 18:27

## 2017-02-09 RX ADMIN — HYDROMORPHONE HYDROCHLORIDE 1 MG: 1 INJECTION, SOLUTION INTRAMUSCULAR; INTRAVENOUS; SUBCUTANEOUS at 14:07

## 2017-02-09 RX ADMIN — SODIUM CHLORIDE 100 ML: 900 INJECTION, SOLUTION INTRAVENOUS at 13:58

## 2017-02-09 RX ADMIN — DESMOPRESSIN ACETATE 30 MCG: 4 INJECTION, SOLUTION INTRAVENOUS; SUBCUTANEOUS at 19:03

## 2017-02-09 RX ADMIN — NICARDIPINE HYDROCHLORIDE 10 MG/HR: 0.2 INJECTION INTRAVENOUS at 15:52

## 2017-02-09 RX ADMIN — SUFENTANIL CITRATE 0.3 MCG/KG/HR: 50 INJECTION EPIDURAL; INTRAVENOUS at 15:03

## 2017-02-09 RX ADMIN — SODIUM CHLORIDE: 9 INJECTION, SOLUTION INTRAVENOUS at 15:01

## 2017-02-09 RX ADMIN — Medication 2 MG: at 23:40

## 2017-02-09 RX ADMIN — Medication 10 ML: at 23:07

## 2017-02-09 RX ADMIN — HEPARIN SODIUM 40000 UNITS: 1000 INJECTION, SOLUTION INTRAVENOUS; SUBCUTANEOUS at 15:58

## 2017-02-09 RX ADMIN — CEFAZOLIN SODIUM 2 G: 1 INJECTION, POWDER, FOR SOLUTION INTRAMUSCULAR; INTRAVENOUS at 19:42

## 2017-02-09 RX ADMIN — SODIUM CHLORIDE 2 UNITS/HR: 900 INJECTION, SOLUTION INTRAVENOUS at 20:30

## 2017-02-09 RX ADMIN — SODIUM CHLORIDE, SODIUM LACTATE, POTASSIUM CHLORIDE, CALCIUM CHLORIDE: 600; 310; 30; 20 INJECTION, SOLUTION INTRAVENOUS at 15:03

## 2017-02-09 RX ADMIN — AMIODARONE HYDROCHLORIDE 150 MG: 150 INJECTION, SOLUTION INTRAVENOUS at 18:21

## 2017-02-09 RX ADMIN — FENTANYL CITRATE 50 MCG: 50 INJECTION, SOLUTION INTRAMUSCULAR; INTRAVENOUS at 19:33

## 2017-02-09 RX ADMIN — VECURONIUM BROMIDE FOR INJECTION 10 MG: 1 INJECTION, POWDER, LYOPHILIZED, FOR SOLUTION INTRAVENOUS at 17:10

## 2017-02-09 RX ADMIN — CEFAZOLIN 2 G: 1 INJECTION, POWDER, FOR SOLUTION INTRAMUSCULAR; INTRAVENOUS; PARENTERAL at 21:00

## 2017-02-09 RX ADMIN — PROPOFOL 100 MG: 10 INJECTION, EMULSION INTRAVENOUS at 15:38

## 2017-02-09 RX ADMIN — FAMOTIDINE 20 MG: 10 INJECTION, SOLUTION INTRAVENOUS at 23:09

## 2017-02-09 RX ADMIN — PROPOFOL 25 MCG/KG/MIN: 10 INJECTION, EMULSION INTRAVENOUS at 21:09

## 2017-02-09 RX ADMIN — FENTANYL CITRATE 250 MCG: 50 INJECTION, SOLUTION INTRAMUSCULAR; INTRAVENOUS at 15:16

## 2017-02-09 RX ADMIN — PROPOFOL 50 MG: 10 INJECTION, EMULSION INTRAVENOUS at 18:28

## 2017-02-09 RX ADMIN — CHLORHEXIDINE GLUCONATE 10 ML: 1.2 RINSE ORAL at 23:10

## 2017-02-09 RX ADMIN — Medication 10 ML: at 13:58

## 2017-02-09 RX ADMIN — HEPARIN SODIUM 20000 UNITS: 1000 INJECTION, SOLUTION INTRAVENOUS; SUBCUTANEOUS at 16:12

## 2017-02-09 RX ADMIN — DEXMEDETOMIDINE HYDROCHLORIDE 0.3 MCG/KG/HR: 4 INJECTION, SOLUTION INTRAVENOUS at 18:24

## 2017-02-09 RX ADMIN — SODIUM CHLORIDE 10 ML/HR: 450 INJECTION, SOLUTION INTRAVENOUS at 20:59

## 2017-02-09 RX ADMIN — ESMOLOL HYDROCHLORIDE 100 MCG/KG/MIN: 10 INJECTION INTRAVENOUS at 14:15

## 2017-02-09 RX ADMIN — ALBUMIN (HUMAN) 12.5 G: 12.5 INJECTION, SOLUTION INTRAVENOUS at 23:06

## 2017-02-09 RX ADMIN — PROPOFOL 200 MG: 10 INJECTION, EMULSION INTRAVENOUS at 15:16

## 2017-02-09 RX ADMIN — MUPIROCIN: 20 OINTMENT TOPICAL at 23:09

## 2017-02-09 RX ADMIN — HYDROMORPHONE HYDROCHLORIDE 1 MG: 1 INJECTION, SOLUTION INTRAMUSCULAR; INTRAVENOUS; SUBCUTANEOUS at 13:37

## 2017-02-09 RX ADMIN — FENTANYL CITRATE 250 MCG: 50 INJECTION, SOLUTION INTRAMUSCULAR; INTRAVENOUS at 15:51

## 2017-02-09 RX ADMIN — LIDOCAINE HYDROCHLORIDE 100 MG: 20 INJECTION, SOLUTION EPIDURAL; INFILTRATION; INTRACAUDAL; PERINEURAL at 15:16

## 2017-02-09 NOTE — PROGRESS NOTES
Spiritual Care Assessment/Progress Notes    Alexander Prince [id number]  [id number]    1960  61 y.o.  male    Patient Telephone Number: [phone] (home)   Holiness Affiliation: Unknown   Language: English   No emergency contact information on file. Patient Active Problem List    Diagnosis Date Noted    Ascending aortic dissection ([facility] [address] 02/09/2017    Family history of colon cancer     Seborrheic dermatitis 06/08/2010    History of colonoscopy 06/08/2010    Family history of early CAD 06/08/2010    Family history of diabetes mellitus (DM) 06/08/2010        Date: 2/9/2017       Level of Holiness/Spiritual Activity:  []         Involved in sean tradition/spiritual practice    []         Not involved in sean tradition/spiritual practice  [x]         Spiritually oriented    []         Claims no spiritual orientation    []         seeking spiritual identity  []         Feels alienated from Yazidism practice/tradition  []         Feels angry about Yazidism practice/tradition  [x]         Spirituality is a resource for coping at this time.   []         Not able to assess due to medical condition    Services Provided Today:  [x]         crisis intervention    []         reading Scriptures  [x]         spiritual assessment    [x]         prayer  [x]         empathic listening/emotional support  []         rites and rituals (cite in comments)  []         life review     []         Yazidism support  []         theological development   []         advocacy  []         ethical dialog     []         blessing  []         bereavement support    [x]         support to family  []         anticipatory grief support   []         help with AMD  []         spiritual guidance    []         meditation      Spiritual Care Needs  [x]         Emotional Support  [x]         Spiritual/Holiness Care  [x]         Loss/Adjustment  []         Advocacy/Referral                /Ethics  []         No needs expressed at this time  []         Other: (note in               comments)  Spiritual Care Plan  [x]         Follow up visits with               pt/family  []         Provide materials  []         Schedule sacraments  []         Contact Community               Clergy  []         Follow up as needed  []         Other: (note in               comments)       Initial spiritual assessment for family of patient in ER26. Paged to visit by Nurse Supervisor Angie Ramirez to support family of Mr. Candi Dakins. Daughter Leilani Encinas is nurse at [employer] [place]. Family was gathered in quiet room---Wife Bashir Baum, daughter Leilani Encinas, son West Conley, and [employer]. Family has solid spiritual foundation and are actively involved at [facility]. Chuyita Matute was immediately notified, as were extended family members. Maintained pastoral presence while family gathered medical information from clinical staff and sought to fully comprehend Mr. Bayron Monae condition. Provided practical and spiritual support during lengthy visit, including prayer. Noted solid intra-family communication and mutual recognition and support. Understandably shaken by the sudden event--give Mr. Bayron Monae generally good health. Assisted son [place] Conley in registering with volunteer desk in order to be notified of surgery updates. [facility]'s Staff  (Eli Sevilla Patient Care Specialist)   Paging Service [facility]([phone])

## 2017-02-09 NOTE — ANESTHESIA PROCEDURE NOTES
Arterial Line Placement    Start time: 2/9/2017 2:33 PM  End time: 2/9/2017 2:37 PM  Performed by: Sofi Rain  Authorized by: Yoli JONES     Pre-Procedure  Indications:  Arterial pressure monitoring  Preanesthetic Checklist: patient identified, risks and benefits discussed, patient being monitored and patient being monitored      Procedure:   Prep:  Chlorhexidine  Seldinger Technique?: Yes    Orientation:  Right  Location:  Radial artery  Catheter size:  20 G  Number of attempts:  1  Cont Cardiac Output Sensor: No      Assessment:   Post-procedure:  Line secured and sterile dressing applied  Patient Tolerance:  Patient tolerated the procedure well with no immediate complications

## 2017-02-09 NOTE — PROGRESS NOTES
Cardiac Surgery Care Coordinator-Met with the family of Treasure Burkett, introduced role of the Cardiac Surgery Co- Nurse. Reviewed plan of care and day of surgery expectations. Provided family with a contact number and an update from OR. Encouraged family to verbalize and emotional support given. Communicated patient information with nurses in CVICU.    Danitza Pizarro RN

## 2017-02-09 NOTE — ANESTHESIA PROCEDURE NOTES
Central Line and Pulmonary Artery Catheter Placement    Start time: 2/9/2017 2:44 PM  End time: 2/9/2017 2:55 PM  Performed by: Derek Vasquez  Authorized by: Derek Vasquez     Indications: vascular access, central pressure monitoring and need for vasopressors  Preanesthetic Checklist: patient identified, risks and benefits discussed, site marked, patient being monitored and timeout performed      Pre-procedure: All elements of maximal sterile barrier technique followed?  Yes    Maximal barrier precautions followed, 2% Chlorhexidine for cutaneous antisepsis, Hand hygiene performed prior to catheter insertion and Ultrasound guidance    Sterile Ultrasound Technique followed?: Yes          Procedure:   Prep:  Chlorhexidine  Location:  Internal jugular  Orientation:  Right  Patient position:  Flat  Catheter type:  Double lumen  Catheter size:  9 Fr  Catheter length:  20 cm  Number of attempts:  1  Successful placement: Yes      Assessment:   Post-procedure:  Catheter secured, sterile dressing applied and sterile dressing with CHG applied  Assessment:  Blood return through all ports and free fluid flow  Insertion:  Uncomplicated  Patient tolerance:  Patient tolerated the procedure well with no immediate complications  9 Fr MAC and 8 Fr CCO PAC

## 2017-02-09 NOTE — CONSULTS
CSS   History and Physical    Subjective:      Susanna Vasquez is a 61 y.o. male who was referred for cardiac evaluation of aortic dissection, referred by Dr. Mitzi Barksdale in the ED. Pt's PMH includes seborrheic dermatitis who presents via EMS accompanied by wife with c/o chest pain. Pt had sudden onset of CP starting early this afternoon. Pt also c/o of right leg pain and up into R hip region, including a \"numb\" feeling, and severe headache. Pt denies radiation of pain, SOB, orthopnea, syncope. Pt last ate breakfast today. Pt denies any recent health issues, or history of hypertension. Never smoked, only social etoh use. Family history of early CAD. Pt was started on esmolol drip in the ER. Pt on exam seems to have trouble with his speech, \"jumbling words\" prior to narcotic administration for pain in leg. Full history was not obtained due to urgency of case, Anesthesia at bedside to place lines. Cardiac Testing  CT chest/abd/pelvis 2/9/17:   1. Type I aortic dissection extending from the root of the aorta at the aortic  valve to the common iliac arteries bilaterally. 2. 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 3. Mesenteric and renal arteries patent and probably supplied by the false  lumen. 4. 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 Past Medical History   Diagnosis Date    Family history of colon cancer     Family history of diabetes mellitus (DM) 6/8/2010    Family history of early CAD 6/8/2010    Seborrheic dermatitis 6/8/2010     History reviewed. No pertinent past surgical history.    Social History   Substance Use Topics    Smoking status: Never Smoker    Smokeless tobacco: Never Used    Alcohol use Yes      Comment: social      Family History   Problem Relation Age of Onset    Diabetes Mother     Heart Disease Mother     Cancer Father      colon    Hypertension Sister     Stroke Maternal Grandfather      Prior to Admission medications    Medication Sig Start Date End Date Taking? Authorizing Provider   aspirin delayed-release 81 mg tablet Take 81 mg by mouth daily. Yes Historical Provider   MULTIVITAMINS (MULTIVITAMIN PO) Take 1 Tab by mouth daily. Yes Historical Provider       No Known Allergies    Review of Systems:   Consititutional: Denies fever or chills. Eyes:  Denies use of glasses or vision problems(cataracts). ENT:  Denies hearing or swallowing difficulty. CV: Denies claudication. ++ CP   Resp: Denies dyspnea, productive cough. : Denies dialysis or kidney problems. GI: Denies ulcers, esophageal strictures, liver problems. M/S: Denies joint or bone problems, or implanted artificial hardware. Skin: Denies varicose veins, edema. Neuro: Denies strokes, or TIAs. Psych: Denies anxiety or depression. Endocrine: Denies thyroid problems or diabetes. Heme/Lymphatic: Denies easy bruising or lymphedema. Objective:     VS:    Visit Vitals    /50    Pulse 70    Temp 97.6 °F (36.4 °C)    Resp 15    Ht 5' 11\" (1.803 m)    Wt 222 lb (100.7 kg)    SpO2 100%    BMI 30.96 kg/m2         Physical Exam:    General appearance: alert, cooperative, no distress. Interactive, jumbled speech  Head: normocephalic, without obvious abnormality; atraumatic  Eyes: conjunctivae/corneas clear; EOM's intact. Nose: nares normal; no drainage. Neck: no carotid bruit and no JVD  Lungs: clear to auscultation bilaterally  Heart: regular rate and rhythm; no murmur  Abdomen: soft, non-tender; bowel sounds normal  Extremities: moves all extremities; ++ pain to R LE   Skin: Skin color normal; No varicose veins or edema. R leg cool to touch  Neurologic: Grossly normal  -- interactive.       Labs:   Recent Labs      02/09/17   1249   WBC  10.0   HGB  15.1   HCT  43.1   PLT  205   NA  141   K  3.5   BUN  14   CREA  1.25   GLU  130*   INR  1.0     Assessment:     Active Problems:    Ascending aortic dissection ([facility] [address] (2/9/2017)        Plan:   1. Type A aortic dissection:  Place lines, type and cross. Emergent case to the OR w/ Dr. Prashanth Marquez.        Signed By: Bailey Dodson NP     February 9, 2017

## 2017-02-09 NOTE — ED TRIAGE NOTES
Triage Note: Pt arrives via EMS for sudden onset of chest pain and R leg pain/numbness. Currently pt is complaining of 2/10 chest pain and 10/10 R leg pain from his hip down.

## 2017-02-09 NOTE — PROGRESS NOTES
Admission Medication Reconciliation:    Information obtained from: Wife    Significant PMH/Disease States:   Past Medical History   Diagnosis Date    Family history of colon cancer     Family history of diabetes mellitus (DM) 6/8/2010    Family history of early CAD 6/8/2010    Seborrheic dermatitis 6/8/2010       Chief Complaint for this Admission:    Chief Complaint   Patient presents with    Chest Pain         Allergies:  Review of patient's allergies indicates no known allergies. Prior to Admission Medications:   Prior to Admission Medications   Prescriptions Last Dose Informant Patient Reported? Taking? MULTIVITAMINS (MULTIVITAMIN PO) 2/9/2017 at 6am  Yes Yes   Sig: Take 1 Tab by mouth daily. aspirin delayed-release 81 mg tablet 2/9/2017 at 6am  Yes Yes   Sig: Take 81 mg by mouth daily. Facility-Administered Medications: None         Comments/Recommendations: Updated PTA meds and confirmed NKDA. 1. Deleted betamethasone lotion  2. Updated directions for aspirin and multivitamin.

## 2017-02-09 NOTE — IP AVS SNAPSHOT
[address] [facility] [address] [phone] Patient: Jany Cao MRN: [id number] DVM:0/3/2789 You are allergic to the following No active allergies Recent Documentation Height  
  
  
  
  
  
 1.803 m Unresulted Labs Order Current Status SAMPLE TO BLOOD BANK In process About your hospitalization You were admitted on:  February 9, 2017 You last received care in the:  [facility] 4 CV SERVICES UNIT You were discharged on:  February 27, 2017 Unit phone number:  [phone] Why you were hospitalized Your primary diagnosis was:  Not on File Your diagnoses also included:  Ascending Aortic Dissection (Hcc) Providers Seen During Your Hospitalizations Provider Role Specialty Primary office phone Ed Oden DO Attending Provider Emergency Medicine [phone] Aster Carreno MD Attending Provider Cardiothoracic Surgery [phone] Your Primary Care Physician (PCP) Primary Care Physician Office Phone Office Fax BRYON Judd [phone] ** None ** Follow-up Information Follow up With Details Comments Contact Info Lynn Powers 227 On 2/28/2017 skilled nursing and physical therapy, wound care and PT/INR drawn by noon, please call if you have not heard from agency by 10 am on day after hospital discharge [address] [facility] [address] 
[phone] Alcides Wells MD   222 [facility] 57 
[phone] [facility] - [facility] On 3/6/2017 11 AM [address] [place] [address], [address] [place] [id number] [phone] [facility] - [facility] On 3/14/2017 1:15 PM [address] [place] [address], [address] [place] [id number] 
[phone] Glenn Gardner MD Go on 3/13/2017 9:30 AM Iraida Tavera 16[address] 
[phone] Your Appointments Monday March 06, 2017 11:00 AM EST POST OP with Taras Nice MD  
[facility] - [address] [place] [address] ([facility]) [address] [address] [place] [phone]  
[phone] Tuesday March 14, 2017  1:15 PM EDT  
POST OP with Taras Nice MD  
[facility] - [address] [place] [address] ([facility]) [address] [address] [place] [phone]  
[phone] Current Discharge Medication List  
  
START taking these medications Dose & Instructions Dispensing Information Comments Morning Noon Evening Bedtime  
 enoxaparin 120 mg/0.8 mL injection Commonly known as:  LOVENOX Your next dose is: Today Dose:  105 mg  
105 mg by SubCUTAneous route every twelve (12) hours every twelve (12) hours. Quantity:  10 Syringe Refills:  1  
     
   
   
   
  
  
 furosemide 40 mg tablet Commonly known as:  LASIX Your next dose is: Today Dose:  80 mg Take 2 Tabs by mouth two (2) times a day. Quantity:  60 Tab Refills:  1  
     
   
   
  
   
  
 levoFLOXacin 500 mg tablet Commonly known as:  Charlcie Vicky Your next dose is:  Tomorrow Dose:  500 mg Take 1 Tab by mouth every twenty-four (24) hours for 8 days. Quantity:  8 Tab Refills:  0  
     
   
  
   
   
  
 metoprolol tartrate 25 mg tablet Commonly known as:  LOPRESSOR Your next dose is: Today Dose:  25 mg Take 1 Tab by mouth two (2) times a day. Quantity:  60 Tab Refills:  1  
     
   
   
  
   
  
 metroNIDAZOLE 500 mg tablet Commonly known as:  FLAGYL Your next dose is: Today Dose:  500 mg Take 1 Tab by mouth three (3) times daily for 8 days. Quantity:  24 Tab Refills:  0  
     
   
   
   
  
  
 potassium chloride SR 20 mEq tablet Commonly known as:  K-TAB Your next dose is:  Tomorrow Dose:  60 mEq Take 3 Tabs by mouth two (2) times a day. Quantity:  90 Tab Refills:  1 senna-docusate 8.6-50 mg per tablet Commonly known as:  Rena Valle Your next dose is: Today Dose:  1 Tab Take 1 Tab by mouth two (2) times a day. Quantity:  30 Tab Refills:  0  
     
   
   
  
   
  
 traMADol 50 mg tablet Commonly known as:  ULTRAM  
   
 Dose:   mg Take 1-2 Tabs by mouth every four (4) hours as needed. Max Daily Amount: 600 mg. Indications: Pain Quantity:  40 Tab Refills:  0  
     
   
   
   
  
 warfarin 2.5 mg tablet Commonly known as:  COUMADIN Your next dose is: Today Take 2.5 mg daily by mouth at dinner time. Your daily dose will be determined by MD/NP/PA team.  
 Quantity:  30 Tab Refills:  11 CONTINUE these medications which have NOT CHANGED Dose & Instructions Dispensing Information Comments Morning Noon Evening Bedtime  
 aspirin delayed-release 81 mg tablet Your next dose is:  Tomorrow Dose:  81 mg Take 81 mg by mouth daily. Refills:  0 MULTIVITAMIN PO Your next dose is:  Tomorrow Dose:  1 Tab Take 1 Tab by mouth daily. Refills:  0 Where to Get Your Medications These medications were sent to [facility] [address] [place] [address],  [address] Hours:  24-hours Phone:  [phone]  
  enoxaparin 120 mg/0.8 mL injection  
 furosemide 40 mg tablet  
 levoFLOXacin 500 mg tablet  
 metoprolol tartrate 25 mg tablet  
 metroNIDAZOLE 500 mg tablet  
 potassium chloride SR 20 mEq tablet  
 senna-docusate 8.6-50 mg per tablet  
 warfarin 2.5 mg tablet Information on where to get these meds will be given to you by the nurse or doctor. ! Ask your nurse or doctor about these medications  
  traMADol 50 mg tablet Discharge Instructions   [facility] 
 
 [address] [place] [address] [place] [address]                           [facility], [place] [address] [address] Office- [phone]  Fax- [phone]       Office- [phone]  Fax- [phone] 
_____________________________________________________________ Dr. Juan F Coelho P      Suly Rojas CSA DESTINEE Romo PA-C, PA-C Name:Shawn Funk Surgery & Date: Procedure(s): FASCIOTOMY RIGHT  LEG ANTERIOR COMPARTMENT Discharge Date: 2/27/17 MEDICATIONS: 
Please refer to your After Visit Summary for your medication list. If you do not have a prescription for a new medication, you may purchase the medication over the counter. DO NOT TAKE ANY MEDICATIONS THAT ARE NOT ON THAT LIST INR TARGET- 2.5-3 INR LEVEL [facility] per Dr Angelic Singh office NP/PA ###take 2.5 mg Coumadin by mouth at dinner time. Your INR will be checked by [address] [place] [address] before Noon on 2/28/17. INSTRUCTIONS: 
NO SMOKING OR TOBACCO PRODUCTS Follow all the instructions in your discharge book You may shower. Wash all incisions twice daily with mild soap and water. No lotions, ointments or powder. Call the office immediately for any redness, swelling, or drainage from your incision. Take your temperature daily and call for a temperature of 101 degrees or higher or for any symptoms that make you think you have and infection. Weigh yourself each morning. Call if you gain more than 5 pounds in 48 hours. Use the incentive spirometer 6-8 times a day-10 breaths each time. Use a pillow or your bear to splint your breastbone when coughing or sneezing. If you feel your breast bone clicking or popping, notify the office immediately. Walk several hundred feet several times daily. DIET Eat an American Heart Association diet. If you are having trouble with your appetite, eat what you can. Try eating small, frequent meals throughout the day. ACTIVITY 
NO DRIVINGyou will be evaluated to drive at your follow up visit. Increase your activity by walking several times a day. Stay out of bed most of the day. When sitting, keep your legs elevated. You may ride in a car, but you must get out every hour and walk around. If you ride in a car with an airbag that can not be switched off, put the seat ALL the way back or ride in the back seat. NO LIFTING MORE THAN 5 POUND FOR 1 MONTH, THEN YOU CAN INCREASE TO NO MORE THAN 10 LBS FOR THE 2nd MONTH AND NO MORE THAN 15 LBS FOR THE 3rd MONTH.  YOU WILL NO LONGER HAVE ANY LIFTING RESTRICTIONS AFTER 3 MONTHS. FOLLOW UP 
1. Your first follow up appointment will be on 3/6 at 11am. Our office is located in [address] [place] [address] on floor G-5. Your second follow up appointment will be in four weeks, on 3/14 at 1:15pm. Please call our office at [phone] if you are unable to make either one of these appointments. 2. You will be receiving a call before your 5 day appointment to begin cardiac rehab. They are located in the [address] [place] [address] on [facility]. Their phone number is [phone]. Please call if you have not been contacted 2-3 weeks after discharge from the hospital. 
3. We will make an appointment with your cardiologist at your last appointment. 4. Consult you primary care physician regarding your influenza &  
pneumovax vaccines. 5.   Please bring all medications with you to your appointment. Signature:___________________________________________________ Wound Care / Dressing Changes Daily to right lateral lower leg fasciotomy: - Remove old dressing, moistening with saline as needed - Clean with Normal Saline - Spray with Carrasyn spray - Apply moist gauze impregnated with Carrasyn gel keeping within wound margins - Cover with fluffed dry gauze - Top with ABD dressings 
 - Secure with roll stretch gauze (Belkys) and tape - Apply foot brace and velcro on foot not mid lower leg. 
  
 
 
 
 
 
 
   
 
 
 
Discharge Instructions Attachments/References WARFARIN (BY MOUTH) (ENGLISH) WARFARIN SAFETY TIPS (ENGLISH) Discharge Orders None [facility] Announcement We are excited to announce that we are making your provider's discharge notes available to you in Holy[employer]. You will see these notes when they are completed and signed by the physician that discharged you from your recent hospital stay. If you have any questions or concerns about any information you see in HolyTransaction, please call the Health Information Department where you were seen or reach out to your Primary Care Provider for more information about your plan of care. Introducing \[facility] [place] [facility]\"" & HEALTH SERVICES! [facility] introduces HolyTransaction patient portal. Now you can access parts of your medical record, email your doctor's office, and request medication refills online. 1. In your internet browser, go to [email or web address] 2. Click on the First Time User? Click Here link in the Sign In box. You will see the New Member Sign Up page. 3. Enter your [employer]Transaction Access Code exactly as it appears below. You will not need to use this code after youve completed the sign-up process. If you do not sign up before the expiration date, you must request a new code. · Holy[employer] Access Code: [email or web address] Expires: 5/10/2017  1:14 PM 
 
4. Enter the last four digits of your Social Security Number (xxxx) and Date of Birth (mm/dd/yyyy) as indicated and click Submit. You will be taken to the next sign-up page. 5. Create a [employer]Transaction ID. This will be your [employer]Transaction login ID and cannot be changed, so think of one that is secure and easy to remember. 6. Create a [employer] password. You can change your password at any time. 7. Enter your Password Reset Question and Answer. This can be used at a later time if you forget your password. 8. Enter your e-mail address. You will receive e-mail notification when new information is available in [address] 9. Click Sign Up. You can now view and download portions of your medical record. 10. Click the Download Summary menu link to download a portable copy of your medical information. If you have questions, please visit the Frequently Asked Questions section of the [employer] website. Remember, [employer] is NOT to be used for urgent needs. For medical emergencies, dial 911. Now available from your iPhone and Android! General Information Please provide this summary of care documentation to your next provider. Patient Signature:  ____________________________________________________________ Date:  ____________________________________________________________  
  
[facility] Provider Signature:  ____________________________________________________________ Date:  ____________________________________________________________ More Information Warfarin (By mouth) Warfarin (WAR-far-in) Prevents and treats blood clots. May lower the risk of serious complications after a heart attack. This medicine is a blood thinner. Brand Name(s):Coumadin, [employer] There may be other brand names for this medicine. When This Medicine Should Not Be Used: This medicine is not right for everyone. Do not use it if you had an allergic reaction to warfarin, if you are pregnant, or if you have health problems that could cause bleeding. How to Use This Medicine:  
Tablet · Take your medicine as directed. Your dose may need to be changed several times to find what works best for you. · This medicine should come with a Medication Guide.  Ask your pharmacist for a copy if you do not have one. · Missed dose: Take a dose as soon as you remember. If it is almost time for your next dose, wait until then and take a regular dose. Do not take extra medicine to make up for a missed dose. · Store the medicine in a closed container at room temperature, away from heat, moisture, and direct light. Drugs and Foods to Avoid: Ask your doctor or pharmacist before using any other medicine, including over-the-counter medicines, vitamins, and herbal products. · Many medicines and foods can affect how warfarin works and may affect the PT/INR test results. Tell your doctor before you start or stop any medicine, especially the following: ¨ Ginkgo, echinacea, goldenseal, or Clara's wort ¨ Another blood thinner, including apixaban, argatroban, bivalirudin, cilostazol, clopidogrel, dabigatran, desirudin, dipyridamole, heparin, lepirudin, prasugrel, rivaroxaban, ticlopidine ¨ Medicine to treat depression or anxiety, including citalopram, desvenlafaxine, duloxetine, escitalopram, fluoxetine, fluvoxamine, milnacipran, paroxetine, sertraline, venlafaxine, vilazodone ¨ Medicine to treat an infection ¨ NSAID pain or arthritis medicine, including aspirin, celecoxib, diclofenac, diflunisal, fenoprofen, ibuprofen, indomethacin, ketoprofen, ketorolac, mefenamic acid, naproxen, oxaprozin, piroxicam, sulindac. Check labels for over-the-counter medicines to find out if they contain an NSAID. ¨ Steroid medicine, including dexamethasone, hydrocortisone, methylprednisolone, prednisolone, prednisone · Warfarin works best if you eat about the same amount of vitamin K every day. Foods high in vitamin K include asparagus, broccoli, brussels sprouts, cabbage, green leafy vegetables, plums, rhubarb, and canola oil. Talk to your doctor before you make changes to your normal diet. Warnings While Using This Medicine: · It is not safe to take this medicine during pregnancy.  It could harm an unborn baby. Tell your doctor right away if you become pregnant. Use an effective form of birth control to keep from getting pregnant during treatment and for at least 1 month after your last dose. · Tell your doctor if you are breastfeeding, or if you have kidney disease, liver disease, diabetes, heart disease, heart failure, high blood pressure, an infection, a stomach ulcer, or protein C deficiency. Also tell your doctor if you had recent surgery or an injury, or a history of stroke, anemia, severe bleeding or bruising, or problems caused by heparin use. · This medicine may cause the following problems: ¨ Bleeding, which may be life-threatening ¨ Gangrene (skin or tissue damage) ¨ Purple toes syndrome · You must have a PT/INR blood test while you use this medicine to check how well your blood is clotting. Your doctor will use the test results to make sure the medicine is working properly. Keep all appointments for the PT/INR blood tests. · You may bleed or bruise more easily with warfarin. To prevent injury or cuts, do not play rough sports, be careful with sharp objects, and brush and floss your teeth gently. Evone Bread your nose gently, and do not pick your nose. · Carry an ID card or wear a medical alert bracelet to let emergency caregivers know that you use warfarin. · Tell any doctor or dentist who treats you that you are using this medicine. You may need to stop using this medicine several days before you have surgery or medical tests. · Keep all medicine out of the reach of children. Never share your medicine with anyone. Possible Side Effects While Using This Medicine:  
Call your doctor right away if you notice any of these side effects: · Allergic reaction: Itching or hives, swelling in your face or hands, swelling or tingling in your mouth or throat, chest tightness, trouble breathing · Bleeding from your gums or nose, coughing up blood, heavy monthly periods · Bleeding that does not stop, bruising, or weakness · Dizziness, fainting, or lightheadedness · Pain, brown or black skin, or skin that is cool to the touch · Purple toes or feet, or new pain in your leg, foot, or toes · Red or dark brown urine, or red or black, tarry stools · Vomiting blood or material that looks like coffee grounds If you notice other side effects that you think are caused by this medicine, tell your doctor. Call your doctor for medical advice about side effects. You may report side effects to FDA at 2-162-DFR-6081 © 2016 [address] is for End User's use only and may not be sold, redistributed or otherwise used for commercial purposes. The above information is an  only. It is not intended as medical advice for individual conditions or treatments. Talk to your doctor, nurse or pharmacist before following any medical regimen to see if it is safe and effective for you. Taking Warfarin Safely: Care Instructions Your Care Instructions Warfarin is a medicine that you take to prevent blood clots. It is often called a blood thinner. Doctors give warfarin (such as Coumadin) to reduce the risk of blood clots. You may be at risk for blood clots if you have atrial fibrillation or deep vein thrombosis. Some other health problems may also put you at risk. Warfarin slows the amount of time it takes for your blood to clot. It can cause bleeding problems. Even if you've been taking warfarin for a while, it's important to know how to take it safely. Foods and other medicines can affect the way warfarin works. Some can make warfarin work too well. This can cause bleeding problems. And some can make it work poorly, so that it does not prevent blood clots very well. You will need regular blood tests to check how long it takes for your blood to form a clot.  This test is called a PT or prothrombin time test. The result of the test is called an INR level. Depending on the test results, your doctor or anticoagulation clinic may adjust your dose of warfarin. Follow-up care is a key part of your treatment and safety. Be sure to make and go to all appointments, and call your doctor if you are having problems. It's also a good idea to know your test results and keep a list of the medicines you take. How can you care for yourself at home? Take warfarin safely · Take your warfarin at the same time each day. · If you miss a dose of warfarin, don't take an extra dose to make up for it. Your doctor can tell you exactly what to do so you don't take too much or too little. · Wear medical alert jewelry that lets others know that you take warfarin. You can buy this at most drugstores. · Don't take warfarin if you are pregnant or planning to get pregnant. Talk to your doctor about how you can prevent getting pregnant while you are taking it. · Don't change your dose or stop taking warfarin unless your doctor tells you to. Effects of medicines and food on warfarin · Don't start or stop taking any medicines, vitamins, or natural remedies unless you first talk to your doctor. Many medicines can affect how warfarin works. These include aspirin and other pain relievers, over-the-counter medicines, multivitamins, dietary supplements, and herbal products. · Tell all of your doctors and pharmacists that you take warfarin. Some prescription medicines can affect how warfarin works. · Keep the amount of vitamin K in your diet about the same from day to day. Do not suddenly eat a lot more or a lot less food that is rich in vitamin K than you usually do. Vitamin K affects how warfarin works and how your blood clots. Talk with your doctor before making big changes in your diet. Vitamin K is in many foods, such as: 
¨ Leafy greens, such as kale, cabbage, spinach, Swiss chard, and lettuce. ¨ Canola and soybean oils. ¨ Green vegetables, such as asparagus, broccoli, and V[place]aman sprouts. ¨ Vegetable drinks, green tea leaves, and some dietary supplement drinks. · Avoid cranberry juice and other cranberry products. They can increase the effects of warfarin. · Limit your use of alcohol. Avoid bleeding by preventing falls and injuries · Wear slippers or shoes with nonskid soles. · Remove throw rugs and clutter. · Rearrange furniture and electrical cords to keep them out of walking paths. · Keep stairways, porches, and outside walkways well lit. Use night-lights in hallways and bathrooms. · Be extra careful when you work with sharp tools or knives. When should you call for help? Call 911 anytime you think you may need emergency care. For example, call if: 
· You have a sudden, severe headache that is different from past headaches. Call your doctor now or seek immediate medical care if: 
· You have any abnormal bleeding, such as: 
¨ Nosebleeds. ¨ Vaginal bleeding that is different (heavier, more frequent, at a different time of the month) than what you are used to. ¨ Bloody or black stools, or rectal bleeding. ¨ Bloody or pink urine. Watch closely for changes in your health, and be sure to contact your doctor if you have any problems. Where can you learn more? Go to http://drew-avery.info/. Enter S396 in the search box to learn more about \"Taking Warfarin Safely: Care Instructions. \" Current as of: January 27, 2016 Content Version: 11.1 © 4725-0875 [employer]. Care instructions adapted under license by [employer] (which disclaims liability or warranty for this information). If you have questions about a medical condition or this instruction, always ask your healthcare professional. [place] [address] any warranty or liability for your use of this information.

## 2017-02-09 NOTE — ED PROVIDER NOTES
HPI Comments: 61 y.o. male with past medical history significant for seborrheic dermatitis who presents via EMS accompanied by wife with chief complaint of chest pain. Pt had sudden onset of CP when on the phone with his boss. Wife reports that she heard the EMS report that the pt had been c/o chest tightening and no mention of radiation to jaw or back. Pt has also been c/o his right leg being \"hugely\" painful per wife. Pt has never had similar pain. Pt's wife reports that she last saw the pt well when she left for work this morning. He has just now begun having difficulty with speech while in the ED bed. Pt currently reports CP, \"a little\" abdominal pain, numbness, and a \"really bad\" HA. No alcohol use today. No allergies and no other personal past medical hx. There are no other acute medical concerns at this time. Social hx: Never smoker. Social alcohol use. Significant FMHx: Early CAD, early, DM, and colon cancer. PCP: Ashlyn Pearson MD    Full history, physical exam, and ROS unable to be obtained due to: AMS and critical illness. Note written by Gilbert Valdivia, as dictated by [facility] DO 1:30 PM    The history is provided by the patient and the spouse. Past Medical History:   Diagnosis Date    Family history of colon cancer     Family history of diabetes mellitus (DM) 6/8/2010    Family history of early CAD 6/8/2010    Seborrheic dermatitis 6/8/2010       History reviewed. No pertinent past surgical history. Family History:   Problem Relation Age of Onset    Diabetes Mother     Heart Disease Mother     Cancer Father      colon    Hypertension Sister     Stroke Maternal Grandfather        Social History     Social History    Marital status:      Spouse name: N/A    Number of children: N/A    Years of education: N/A     Occupational History    Not on file.      Social History Main Topics    Smoking status: Never Smoker    Smokeless tobacco: Never Used    Alcohol use Yes      Comment: social    Drug use: No    Sexual activity: Not on file     Other Topics Concern    Not on file     Social History Narrative         ALLERGIES: Review of patient's allergies indicates no known allergies. Review of Systems   Unable to perform ROS: Acuity of condition       Vitals:    02/09/17 1314   BP: 139/75   Pulse: 67   Resp: 22   Temp: 97.6 °F (36.4 °C)   SpO2: 100%   Weight: 100.7 kg (222 lb)   Height: 5' 11\" (1.803 m)            Physical Exam   Constitutional: He appears well-developed and well-nourished. He appears distressed. Extremely uncomfortable. Moaning. HENT:   Head: Normocephalic. Nose: Nose normal.   Mouth/Throat: Oropharynx is clear and moist.   Eyes: Conjunctivae are normal. Pupils are equal, round, and reactive to light. Neck: No tracheal deviation present. Cardiovascular: Normal rate, regular rhythm, normal heart sounds and intact distal pulses. Pulmonary/Chest: Effort normal and breath sounds normal.   Abdominal: Soft. There is no tenderness. Musculoskeletal:   RLE cool. Can't find a femoral/popliteal/DP/PT pulse on palpation. Neurological: He is alert. Had some trouble with finding words and slurred speech. Seems to move all 4 ext. Skin: Skin is warm and dry. He is not diaphoretic. MDM  Number of Diagnoses or Management Options  Critical Care  Total time providing critical care: 30-74 minutes        45 minutes      ED Course       Procedures        ED EKG interpretation: # 1  Rhythm: normal sinus rhythm; and regular . Rate (approx.): 70; Axis: normal; P wave: normal; QRS interval: normal ; ST/T wave: normal;  Lots of artifact. Needs to be repeated. This EKG was interpreted by Chirag Dodd DO,ED Provider. ED EKG interpretation: # 2  Rhythm: normal sinus rhythm; and regular . Rate (approx.): 71;  Axis: normal; P wave: normal; QRS interval: normal ; ST/T wave: normal; This EKG was interpreted by Kennedy Dodd DO [place],ED Provider. PROGRESS NOTE:  1:59 PM  Rivera Storey DO reviewed CT head and chest/abd/pelvis images in the CT suite. Have paged cardiothoracic surgery. CONSULT NOTE:  2:07 PM Rivera Storey DO spoke with Dr. Thaddeus Euceda, Consult for Cardiothoracic surgery. Discussed available diagnostic tests and clinical findings. He is in agreement with care plans as outlined. Dr. Thaddeus Euceda is on his way to evaluate the pt in the ED.     2:17 PM  Rivera Stoery DO discussed results of CT with Dr. Luiza Hoffmann. aggressive BP mgmt with esmolol  Going emergently to the OR  I have discussed critical illness with the wife and daughter (she is a nurse here at [facility])  I accompanied the pt to CT and reviewed the images there. Pt is critically ill        Recent Results (from the past 12 hour(s))   EKG, 12 LEAD, INITIAL    Collection Time: 02/09/17 12:46 PM   Result Value Ref Range    Ventricular Rate 71 BPM    Atrial Rate 71 BPM    P-R Interval 156 ms    QRS Duration 94 ms    Q-T Interval 410 ms    QTC Calculation (Bezet) 445 ms    Calculated P Axis 57 degrees    Calculated R Axis 11 degrees    Calculated T Axis 32 degrees    Diagnosis Normal sinus rhythm  No previous ECGs available      CBC WITH AUTOMATED DIFF    Collection Time: 02/09/17 12:49 PM   Result Value Ref Range    WBC 10.0 4.1 - 11.1 K/uL    RBC 4.77 4.10 - 5.70 M/uL    HGB 15.1 12.1 - 17.0 g/dL    HCT 43.1 36.6 - 50.3 %    MCV 90.4 80.0 - 99.0 FL    MCH 31.7 26.0 - 34.0 PG    MCHC 35.0 30.0 - 36.5 g/dL    RDW 12.5 11.5 - 14.5 %    PLATELET 900 575 - 897 K/uL    NEUTROPHILS 60 32 - 75 %    LYMPHOCYTES 31 12 - 49 %    MONOCYTES 8 5 - 13 %    EOSINOPHILS 1 0 - 7 %    BASOPHILS 0 0 - 1 %    ABS. NEUTROPHILS 6.0 1.8 - 8.0 K/UL    ABS. LYMPHOCYTES 3.1 0.8 - 3.5 K/UL    ABS. MONOCYTES 0.8 0.0 - 1.0 K/UL    ABS. EOSINOPHILS 0.1 0.0 - 0.4 K/UL    ABS.  BASOPHILS 0.0 0.0 - 0.1 K/UL   METABOLIC PANEL, COMPREHENSIVE    Collection Time: 02/09/17 12:49 PM   Result Value Ref Range    Sodium 141 136 - 145 mmol/L    Potassium 3.5 3.5 - 5.1 mmol/L    Chloride 106 97 - 108 mmol/L    CO2 20 (L) 21 - 32 mmol/L    Anion gap 15 5 - 15 mmol/L    Glucose 130 (H) 65 - 100 mg/dL    BUN 14 6 - 20 MG/DL    Creatinine 1.25 0.70 - 1.30 MG/DL    BUN/Creatinine ratio 11 (L) 12 - 20      GFR est AA >60 >60 ml/min/1.73m2    GFR est non-AA 60 (L) >60 ml/min/1.73m2    Calcium 8.4 (L) 8.5 - 10.1 MG/DL    Bilirubin, total 0.9 0.2 - 1.0 MG/DL    ALT (SGPT) 45 12 - 78 U/L    AST (SGOT) 27 15 - 37 U/L    Alk.  phosphatase 65 45 - 117 U/L    Protein, total 6.4 6.4 - 8.2 g/dL    Albumin 3.7 3.5 - 5.0 g/dL    Globulin 2.7 2.0 - 4.0 g/dL    A-G Ratio 1.4 1.1 - 2.2     TROPONIN I    Collection Time: 02/09/17 12:49 PM   Result Value Ref Range    Troponin-I, Qt. <0.04 <0.05 ng/mL   CK W/ REFLX CKMB    Collection Time: 02/09/17 12:49 PM   Result Value Ref Range     39 - 308 U/L   PROTHROMBIN TIME + INR    Collection Time: 02/09/17 12:49 PM   Result Value Ref Range    INR 1.0 0.9 - 1.1      Prothrombin time 10.5 9.0 - 11.1 sec   ECG RHYTHM ANALYSIS ADULT    Collection Time: 02/09/17  1:30 PM   Result Value Ref Range    Ventricular Rate 70 BPM    Atrial Rate 70 BPM    P-R Interval 168 ms    QRS Duration 96 ms    Q-T Interval 398 ms    QTC Calculation (Bezet) 429 ms    Calculated R Axis -103 degrees    Calculated T Axis -109 degrees    Diagnosis       Normal sinus rhythm  Inferior infarct , age undetermined  When compared with ECG of 09-FEB-2017 12:46,  MANUAL COMPARISON REQUIRED, DATA IS UNCONFIRMED     TYPE & CROSSMATCH    Collection Time: 02/09/17  1:59 PM   Result Value Ref Range    Crossmatch Expiration 02/12/2017     ABO/Rh(D) A POSITIVE     Antibody screen NEG     Unit number [id number]     Blood component type RC LR AS1     Unit division 00     Status of unit REL FROM [facility]     Crossmatch result Not required     Unit number [id number]     Blood component type RC LR AS3,2     Unit division 00 Status of unit REL FROM [facility]     Crossmatch result Not required     Unit number [id number]     Blood component type RC LR AS3     Unit division 00     Status of unit ALLOCATED     Crossmatch result Compatible     Unit number [id number]     Blood component type RC LR AS3     Unit division 00     Status of unit ALLOCATED     Crossmatch result Compatible     Unit number [id number]     Blood component type RC LR AS3     Unit division 00     Status of unit ISSUED     Crossmatch result Compatible     Unit number [id number]     Blood component type RC LR AS3     Unit division 00     Status of unit ISSUED     Crossmatch result Compatible    PLATELETS, ALLOCATE    Collection Time: 02/09/17  2:30 PM   Result Value Ref Range    Unit number [id number]     Blood component type PLTPH LR,1     Unit division 00     Status of unit ALLOCATED

## 2017-02-09 NOTE — ED NOTES
MD at bedside. Pt has noted dysphasia. Pt not speaking in full sentences. Pt's wife reports to MD that this is the first episode of dysphasia. Pt c/o chest pain and is restless and also c/o right leg pain. Pt requesting to reposition on stretcher.

## 2017-02-09 NOTE — ANESTHESIA PREPROCEDURE EVALUATION
Anesthetic History   No history of anesthetic complications            Review of Systems / Medical History  Patient summary reviewed, nursing notes reviewed and pertinent labs reviewed    Pulmonary  Within defined limits                 Neuro/Psych   Within defined limits           Cardiovascular                  Exercise tolerance: <4 METS  Comments: [place] type A aortic dissection   GI/Hepatic/Renal  Within defined limits              Endo/Other  Within defined limits           Other Findings              Physical Exam    Airway  Mallampati: III  TM Distance: 4 - 6 cm  Neck ROM: normal range of motion   Mouth opening: Normal     Cardiovascular    Rhythm: regular  Rate: normal         Dental    Dentition: Lower dentition intact, Upper dentition intact and Caps/crowns     Pulmonary  Breath sounds clear to auscultation               Abdominal  GI exam deferred       Other Findings            Anesthetic Plan    ASA: 4, emergent  Anesthesia type: general    Monitoring Plan: Arterial line, BIS, CVP, [place]-Ronaldo and JOSE    Post procedure ventilation   Induction: Intravenous  Anesthetic plan and risks discussed with: Patient and Family

## 2017-02-09 NOTE — PERIOP NOTES
Patient interviewed in ED ROOM 32 with Wife and Daughter present, Patient voiced complaints of pain in Right leg and chest. Patient identifiers verified with name and date of birth. Procedure verified with patient and wife. Consent forms verified. Allergies verified. Patient informed of procedure and plan of care. Questions answered with review. Patient's wife and daughter voiced understanding of procedure and plan of care. Patient arrived to the operating room via stretcher. All wheels locked and patient transferred to the OR table with the assistance of four people. MTRE warming wrap present on OR table. Temp set at 37 C and monitored by perfusion. Unit # [id number]. After the induction of anesthesia and intubation, a 16 fr temp sensing coburn catheter was placed without difficulty. 10 ml of sterile water was used to inflate the balloon. Clear, yellow urine return noted. Urine output monitored by anesthesia. Fluids:   0.9 % Sodium Chloride:   PRN irrigation    Sterile water:   1000 ml in splash basin for instrument care.

## 2017-02-09 NOTE — ED NOTES
Pt reports CP 4/10 and reports pain as \"numb\". Pt reports \"burning\" starting at right hip and descending to right foot, pain is worse at foot. PMS intact. Pt denies SOB or difficulty breathing. Pt denies cardiac history. Pt denies back pain. Pt reports right knee pain and pain is worse with movement. Pt denies injury and there is no obvious deformity.

## 2017-02-10 ENCOUNTER — APPOINTMENT (OUTPATIENT)
Dept: GENERAL RADIOLOGY | Age: 57
DRG: 219 | End: 2017-02-10
Attending: NURSE PRACTITIONER
Payer: COMMERCIAL

## 2017-02-10 LAB
ABO + RH BLD: NORMAL
ALBUMIN SERPL BCP-MCNC: 3.4 G/DL (ref 3.5–5)
ALBUMIN/GLOB SERPL: 1.6 {RATIO} (ref 1.1–2.2)
ALP SERPL-CCNC: 43 U/L (ref 45–117)
ALT SERPL-CCNC: 80 U/L (ref 12–78)
ANION GAP BLD CALC-SCNC: 9 MMOL/L (ref 5–15)
AST SERPL W P-5'-P-CCNC: 237 U/L (ref 15–37)
ATRIAL RATE: 50 BPM
ATRIAL RATE: 70 BPM
ATRIAL RATE: 89 BPM
BILIRUB SERPL-MCNC: 1.9 MG/DL (ref 0.2–1)
BLD PROD TYP BPU: NORMAL
BLOOD GROUP ANTIBODIES SERPL: NORMAL
BPU ID: NORMAL
BUN SERPL-MCNC: 19 MG/DL (ref 6–20)
BUN/CREAT SERPL: 13 (ref 12–20)
CALCIUM SERPL-MCNC: 8 MG/DL (ref 8.5–10.1)
CALCULATED P AXIS, ECG09: 56 DEGREES
CALCULATED P AXIS, ECG09: 73 DEGREES
CALCULATED R AXIS, ECG10: -103 DEGREES
CALCULATED R AXIS, ECG10: -27 DEGREES
CALCULATED R AXIS, ECG10: 44 DEGREES
CALCULATED T AXIS, ECG11: -109 DEGREES
CALCULATED T AXIS, ECG11: 15 DEGREES
CALCULATED T AXIS, ECG11: 82 DEGREES
CHLORIDE SERPL-SCNC: 111 MMOL/L (ref 97–108)
CO2 SERPL-SCNC: 23 MMOL/L (ref 21–32)
CREAT SERPL-MCNC: 1.45 MG/DL (ref 0.7–1.3)
CROSSMATCH RESULT,%XM: NORMAL
DIAGNOSIS, 93000: NORMAL
ERYTHROCYTE [DISTWIDTH] IN BLOOD BY AUTOMATED COUNT: 12.9 % (ref 11.5–14.5)
GLOBULIN SER CALC-MCNC: 2.1 G/DL (ref 2–4)
GLUCOSE BLD STRIP.AUTO-MCNC: 103 MG/DL (ref 65–100)
GLUCOSE BLD STRIP.AUTO-MCNC: 109 MG/DL (ref 65–100)
GLUCOSE BLD STRIP.AUTO-MCNC: 111 MG/DL (ref 65–100)
GLUCOSE BLD STRIP.AUTO-MCNC: 111 MG/DL (ref 65–100)
GLUCOSE BLD STRIP.AUTO-MCNC: 115 MG/DL (ref 65–100)
GLUCOSE BLD STRIP.AUTO-MCNC: 115 MG/DL (ref 65–100)
GLUCOSE BLD STRIP.AUTO-MCNC: 119 MG/DL (ref 65–100)
GLUCOSE BLD STRIP.AUTO-MCNC: 119 MG/DL (ref 65–100)
GLUCOSE BLD STRIP.AUTO-MCNC: 120 MG/DL (ref 65–100)
GLUCOSE BLD STRIP.AUTO-MCNC: 121 MG/DL (ref 65–100)
GLUCOSE BLD STRIP.AUTO-MCNC: 125 MG/DL (ref 65–100)
GLUCOSE BLD STRIP.AUTO-MCNC: 126 MG/DL (ref 65–100)
GLUCOSE BLD STRIP.AUTO-MCNC: 129 MG/DL (ref 65–100)
GLUCOSE BLD STRIP.AUTO-MCNC: 130 MG/DL (ref 65–100)
GLUCOSE BLD STRIP.AUTO-MCNC: 130 MG/DL (ref 65–100)
GLUCOSE BLD STRIP.AUTO-MCNC: 132 MG/DL (ref 65–100)
GLUCOSE BLD STRIP.AUTO-MCNC: 155 MG/DL (ref 65–100)
GLUCOSE BLD STRIP.AUTO-MCNC: 82 MG/DL (ref 65–100)
GLUCOSE BLD STRIP.AUTO-MCNC: 90 MG/DL (ref 65–100)
GLUCOSE SERPL-MCNC: 110 MG/DL (ref 65–100)
HCT VFR BLD AUTO: 33.1 % (ref 36.6–50.3)
HGB BLD-MCNC: 11.3 G/DL (ref 12.1–17)
MAGNESIUM SERPL-MCNC: 2.3 MG/DL (ref 1.6–2.4)
MCH RBC QN AUTO: 31 PG (ref 26–34)
MCHC RBC AUTO-ENTMCNC: 34.1 G/DL (ref 30–36.5)
MCV RBC AUTO: 90.9 FL (ref 80–99)
P-R INTERVAL, ECG05: 162 MS
P-R INTERVAL, ECG05: 168 MS
P-R INTERVAL, ECG05: 324 MS
PLATELET # BLD AUTO: 88 K/UL (ref 150–400)
POTASSIUM SERPL-SCNC: 3.9 MMOL/L (ref 3.5–5.1)
PROT SERPL-MCNC: 5.5 G/DL (ref 6.4–8.2)
Q-T INTERVAL, ECG07: 398 MS
Q-T INTERVAL, ECG07: 402 MS
Q-T INTERVAL, ECG07: 482 MS
QRS DURATION, ECG06: 194 MS
QRS DURATION, ECG06: 92 MS
QRS DURATION, ECG06: 96 MS
QTC CALCULATION (BEZET), ECG08: 366 MS
QTC CALCULATION (BEZET), ECG08: 429 MS
QTC CALCULATION (BEZET), ECG08: 586 MS
RBC # BLD AUTO: 3.64 M/UL (ref 4.1–5.7)
SERVICE CMNT-IMP: ABNORMAL
SERVICE CMNT-IMP: NORMAL
SERVICE CMNT-IMP: NORMAL
SODIUM SERPL-SCNC: 143 MMOL/L (ref 136–145)
SPECIMEN EXP DATE BLD: NORMAL
STATUS OF UNIT,%ST: NORMAL
UNIT DIVISION, %UDIV: 0
VENTRICULAR RATE, ECG03: 50 BPM
VENTRICULAR RATE, ECG03: 70 BPM
VENTRICULAR RATE, ECG03: 89 BPM
WBC # BLD AUTO: 9.8 K/UL (ref 4.1–11.1)

## 2017-02-10 PROCEDURE — 97530 THERAPEUTIC ACTIVITIES: CPT

## 2017-02-10 PROCEDURE — 77030036589

## 2017-02-10 PROCEDURE — 71010 XR CHEST PORT: CPT

## 2017-02-10 PROCEDURE — 65610000003 HC RM ICU SURGICAL

## 2017-02-10 PROCEDURE — 36415 COLL VENOUS BLD VENIPUNCTURE: CPT | Performed by: NURSE PRACTITIONER

## 2017-02-10 PROCEDURE — P9045 ALBUMIN (HUMAN), 5%, 250 ML: HCPCS | Performed by: NURSE PRACTITIONER

## 2017-02-10 PROCEDURE — 74011250636 HC RX REV CODE- 250/636: Performed by: NURSE PRACTITIONER

## 2017-02-10 PROCEDURE — 93005 ELECTROCARDIOGRAM TRACING: CPT

## 2017-02-10 PROCEDURE — 80053 COMPREHEN METABOLIC PANEL: CPT | Performed by: NURSE PRACTITIONER

## 2017-02-10 PROCEDURE — 97535 SELF CARE MNGMENT TRAINING: CPT

## 2017-02-10 PROCEDURE — 94003 VENT MGMT INPAT SUBQ DAY: CPT

## 2017-02-10 PROCEDURE — 74011000258 HC RX REV CODE- 258: Performed by: THORACIC SURGERY (CARDIOTHORACIC VASCULAR SURGERY)

## 2017-02-10 PROCEDURE — 97165 OT EVAL LOW COMPLEX 30 MIN: CPT

## 2017-02-10 PROCEDURE — 74011000258 HC RX REV CODE- 258: Performed by: NURSE PRACTITIONER

## 2017-02-10 PROCEDURE — 77030027138 HC INCENT SPIROMETER -A

## 2017-02-10 PROCEDURE — 74011636637 HC RX REV CODE- 636/637: Performed by: NURSE PRACTITIONER

## 2017-02-10 PROCEDURE — 85027 COMPLETE CBC AUTOMATED: CPT | Performed by: NURSE PRACTITIONER

## 2017-02-10 PROCEDURE — 74011250637 HC RX REV CODE- 250/637: Performed by: NURSE PRACTITIONER

## 2017-02-10 PROCEDURE — 83735 ASSAY OF MAGNESIUM: CPT | Performed by: NURSE PRACTITIONER

## 2017-02-10 PROCEDURE — 74011250636 HC RX REV CODE- 250/636: Performed by: THORACIC SURGERY (CARDIOTHORACIC VASCULAR SURGERY)

## 2017-02-10 PROCEDURE — 82962 GLUCOSE BLOOD TEST: CPT

## 2017-02-10 PROCEDURE — 74011000250 HC RX REV CODE- 250: Performed by: THORACIC SURGERY (CARDIOTHORACIC VASCULAR SURGERY)

## 2017-02-10 RX ORDER — PANTOPRAZOLE SODIUM 40 MG/1
40 TABLET, DELAYED RELEASE ORAL
Status: DISCONTINUED | OUTPATIENT
Start: 2017-02-10 | End: 2017-02-12

## 2017-02-10 RX ORDER — ALBUMIN HUMAN 50 G/1000ML
12.5 SOLUTION INTRAVENOUS ONCE
Status: COMPLETED | OUTPATIENT
Start: 2017-02-10 | End: 2017-02-10

## 2017-02-10 RX ORDER — POTASSIUM CHLORIDE 29.8 MG/ML
20 INJECTION INTRAVENOUS ONCE
Status: COMPLETED | OUTPATIENT
Start: 2017-02-10 | End: 2017-02-10

## 2017-02-10 RX ORDER — WARFARIN SODIUM 5 MG/1
5 TABLET ORAL ONCE
Status: COMPLETED | OUTPATIENT
Start: 2017-02-10 | End: 2017-02-10

## 2017-02-10 RX ADMIN — Medication 10 ML: at 21:39

## 2017-02-10 RX ADMIN — INSULIN LISPRO 2 UNITS: 100 INJECTION, SOLUTION INTRAVENOUS; SUBCUTANEOUS at 12:49

## 2017-02-10 RX ADMIN — SODIUM CHLORIDE 0.7 MCG/KG/HR: 900 INJECTION, SOLUTION INTRAVENOUS at 05:25

## 2017-02-10 RX ADMIN — Medication 2 MG: at 02:35

## 2017-02-10 RX ADMIN — CHLORHEXIDINE GLUCONATE 10 ML: 1.2 RINSE ORAL at 08:36

## 2017-02-10 RX ADMIN — MUPIROCIN: 20 OINTMENT TOPICAL at 19:52

## 2017-02-10 RX ADMIN — AMIODARONE HYDROCHLORIDE 400 MG: 200 TABLET ORAL at 08:55

## 2017-02-10 RX ADMIN — OXYCODONE HYDROCHLORIDE AND ACETAMINOPHEN 1 TABLET: 5; 325 TABLET ORAL at 15:46

## 2017-02-10 RX ADMIN — Medication 10 ML: at 05:39

## 2017-02-10 RX ADMIN — Medication 10 ML: at 15:18

## 2017-02-10 RX ADMIN — SODIUM CHLORIDE 1.8 UNITS/HR: 900 INJECTION, SOLUTION INTRAVENOUS at 06:56

## 2017-02-10 RX ADMIN — ALBUMIN (HUMAN) 12.5 G: 12.5 INJECTION, SOLUTION INTRAVENOUS at 01:19

## 2017-02-10 RX ADMIN — CEFAZOLIN 2 G: 1 INJECTION, POWDER, FOR SOLUTION INTRAMUSCULAR; INTRAVENOUS; PARENTERAL at 01:19

## 2017-02-10 RX ADMIN — DOCUSATE SODIUM AND SENNOSIDES 1 TABLET: 8.6; 5 TABLET, FILM COATED ORAL at 17:56

## 2017-02-10 RX ADMIN — OXYCODONE HYDROCHLORIDE AND ACETAMINOPHEN 1 TABLET: 5; 325 TABLET ORAL at 21:38

## 2017-02-10 RX ADMIN — MUPIROCIN: 20 OINTMENT TOPICAL at 08:36

## 2017-02-10 RX ADMIN — CEFAZOLIN 2 G: 1 INJECTION, POWDER, FOR SOLUTION INTRAMUSCULAR; INTRAVENOUS; PARENTERAL at 20:28

## 2017-02-10 RX ADMIN — CEFAZOLIN 2 G: 1 INJECTION, POWDER, FOR SOLUTION INTRAMUSCULAR; INTRAVENOUS; PARENTERAL at 08:36

## 2017-02-10 RX ADMIN — AMIODARONE HYDROCHLORIDE 0.5 MG/MIN: 50 INJECTION, SOLUTION INTRAVENOUS at 04:53

## 2017-02-10 RX ADMIN — ALBUMIN (HUMAN) 12.5 G: 12.5 INJECTION, SOLUTION INTRAVENOUS at 15:15

## 2017-02-10 RX ADMIN — PANTOPRAZOLE SODIUM 40 MG: 40 TABLET, DELAYED RELEASE ORAL at 09:08

## 2017-02-10 RX ADMIN — ALBUMIN (HUMAN) 12.5 G: 12.5 INJECTION, SOLUTION INTRAVENOUS at 11:16

## 2017-02-10 RX ADMIN — ALBUMIN (HUMAN) 12.5 G: 12.5 INJECTION, SOLUTION INTRAVENOUS at 04:10

## 2017-02-10 RX ADMIN — CEFAZOLIN 2 G: 1 INJECTION, POWDER, FOR SOLUTION INTRAMUSCULAR; INTRAVENOUS; PARENTERAL at 14:08

## 2017-02-10 RX ADMIN — Medication 4 MG: at 05:25

## 2017-02-10 RX ADMIN — POTASSIUM CHLORIDE 20 MEQ: 400 INJECTION, SOLUTION INTRAVENOUS at 06:19

## 2017-02-10 RX ADMIN — Medication 400 MG: at 21:38

## 2017-02-10 RX ADMIN — PROPOFOL 25 MCG/KG/MIN: 10 INJECTION, EMULSION INTRAVENOUS at 03:32

## 2017-02-10 RX ADMIN — DOCUSATE SODIUM AND SENNOSIDES 1 TABLET: 8.6; 5 TABLET, FILM COATED ORAL at 08:57

## 2017-02-10 RX ADMIN — ASPIRIN 81 MG CHEWABLE TABLET 81 MG: 81 TABLET CHEWABLE at 08:56

## 2017-02-10 RX ADMIN — Medication 400 MG: at 08:57

## 2017-02-10 RX ADMIN — OXYCODONE HYDROCHLORIDE AND ACETAMINOPHEN 2 TABLET: 5; 325 TABLET ORAL at 08:55

## 2017-02-10 RX ADMIN — PHENYLEPHRINE HYDROCHLORIDE 10 MCG/MIN: 10 INJECTION INTRAVENOUS at 12:15

## 2017-02-10 RX ADMIN — OXYCODONE HYDROCHLORIDE AND ACETAMINOPHEN 1 TABLET: 5; 325 TABLET ORAL at 18:18

## 2017-02-10 RX ADMIN — WARFARIN SODIUM 5 MG: 5 TABLET ORAL at 17:56

## 2017-02-10 RX ADMIN — Medication 10 ML: at 22:59

## 2017-02-10 NOTE — ANESTHESIA POSTPROCEDURE EVALUATION
Post-Anesthesia Evaluation and Assessment    Patient: Wyatt Cook MRN: [id number]  SSN: [id number]    YOB: 1960  Age: 61 y.o. Sex: male       Cardiovascular Function/Vital Signs  Visit Vitals    BP (!) 93/39 (BP Patient Position: Sitting)    Pulse 91    Temp 37.4 °C (99.4 °F)    Resp 15    Ht 5' 11\" (1.803 m)    Wt 98 kg (216 lb 0.8 oz)    SpO2 98%    BMI 30.13 kg/m2       Patient is status post general anesthesia for Procedure(s):  ASCENDING AORTIC DISSECTION, AORTIC ROOT REPLACEMENT, 29MM MECHANICAL VALVE, FEM-FEM BYPASS BY DR Rafiq Boyer. ECC, JOSE BY DR LEO. .    Nausea/Vomiting: None    Postoperative hydration reviewed and adequate. Pain:  Pain Scale 1: Numeric (0 - 10) (02/10/17 1000)  Pain Intensity 1: 0 (02/10/17 1000)   Managed    Neurological Status:   Neuro  Neurologic State: Drowsy (02/10/17 0800)  Orientation Level: Oriented to person;Oriented to place;Oriented to situation (02/10/17 0800)  Cognition: Appropriate decision making; Appropriate for age attention/concentration; Appropriate safety awareness; Follows commands (02/10/17 0800)  Speech: Clear (02/10/17 0800)  Assessment L Pupil: Round;Sluggish (02/10/17 0800)  Size L Pupil (mm): 3 (02/10/17 0800)  Assessment R Pupil: Round;Sluggish (02/10/17 0800)  Size R Pupil (mm): 3 (02/10/17 0800)  LUE Motor Response: Purposeful (02/10/17 0800)  LLE Motor Response: Purposeful (02/10/17 0800)  RUE Motor Response: Purposeful (02/10/17 0800)  RLE Motor Response: Purposeful (02/10/17 0800)   At baseline    Mental Status and Level of Consciousness: Arousable    Pulmonary Status:   O2 Device: Nasal cannula (02/10/17 1200)   Adequate oxygenation and airway patent    Complications related to anesthesia: None    Post-anesthesia assessment completed.  No concerns    Signed By: Jeri Acosta MD     February 10, 2017

## 2017-02-10 NOTE — PROGRESS NOTES
Physical Therapy    Attempted to see patient x2 toay. Initial attempt RN reported pt has requested to nap and f/u later. Followed up with patient in pm [facility], entered room and began family education and then MD came in to discuss and educate patient on the surgery. Will follow up with patient as time allows.      Thank Jeff Jorge PT,DPT

## 2017-02-10 NOTE — OP NOTES
[address] [facility] [address], [address]   OP NOTE       Name:  Soha Avila   MR#:  [id number]   :  1960   Account #:  [de-identified]    Surgery Date:  02/10/2017   Date of Adm:  2017       PREOPERATIVE DIAGNOSIS: Acute [place] A, DeBakey type 2   ascending and descending aortic dissection. POSTOPERATIVE DIAGNOSIS: Acute [place] A, Debakey type 2   ascending and descending aortic dissection. PROCEDURES PERFORMED:   1. Aortic root replacement using a 29 mm mechanical valved conduit,   CPT code 07381.   2. Left groin exploration and cutdown for institution of cardiopulmonary   Bypass through the left common femoral artery, CPT code 03168.   3. Right groin cut down for exposure of the right common femoral   artery for a femoral to femoral 8-mm bypass graft, to be dictated by   vascular surgeon on-call, Ruba Maxwell. COMPLICATIONS: None. SPECIMENS REMOVED: None. ANESTHESIA: General endotracheal anesthesia. TOTAL CARDIOPULMONARY BYPASS TIME: 120 minutes. TOTAL CROSS-CLAMP TIME: 105 minutes. ESTIMATED BLOOD LOSS: 50 mL. ASSISTANT: Bobo Gabriel. DESCRIPTION OF PROCEDURE: The patient is a very pleasant 61  year-old gentleman who presented with acute chest pain to the   emergency room today and was found to have a [place] type A,   DeBakey type 2 ascending aortic dissection that dissected down to the   level of his common iliac arteries. The patient is now being brought to   the operating room to undergo ascending aortic dissection repair. The   patient was brought to the operating room and had a right radial A line   placed without complication. He had a [place]-Ronaldo catheter placed   without complication. The patient had general endotracheal anesthesia   without complication. Next, the patient had his chest, abdomen and   lower extremities prepped and draped in the usual sterile fashion.  A   cutdown incision was made on the left groin and eventually accessed   this with a 21-Serbian arterial perfuser. It was hooked up to   cardiopulmonary bypass circuit. A midline incision was made, cautery   dissection used to dissect down to the sternal bone. The sternal bone   was opened with a sternal saw and left pleural space entered and the   pericardium was opened. Pericardial sutures were applied. We then   gave an appropriate heparin and IVC and SVC cannulas were placed   for venous drainage. A retrograde cardioplegia catheter was also   placed. We also placed a vent through the right superior pulmonary   vein. We underwent cardiopulmonary bypass and clamped the most   distal portion of the ascending aorta and gave 850 mL of cardioplegia. Heart went quiescent after approximately 400 mL. We then opened up   the dissected aorta and the aortic sinuses were extremely dilated out,   measuring about 6 cm. Given the severe nature of the aortic root   aneurysm, we decided to replace the valve and resect the aneurysm   with replacement using a 29 mm mechanical valved conduit. Tri-Con 2-  0 sutures with pledgets were placed circumferentially around the aortic   valve annulus, were brought out through the valve conduit, and tied it   in. We then reconstructed the distal aorta using 2 felt pledgets and   glue. We then used a running 4-0 Prolene suture to tie this in. We then   de-aired appropriately, came off cardiopulmonary bypass, placed A   and V wires as well as Michael drains. Of note, the patient had a very   weak pulse on the right side and was complaining of right leg pain. I   performed a cut down on the right groin to expose the common femoral   artery. Eventually Dr. Orlando Arevalo came in and performed a femoral to   femoral bypass graft using 8-mm Hemashield graft. Vicryl sutures used   to close both groins and Vicryl sutures used to close the subcutaneous   tissue at the site of the sternotomy. Overall, the patient tolerated the   procedure well.  The patient was transferred to intensive care unit in   stable condition. I was present for the entire procedure.         MD Lily Henao / Quyen.Cromwell   D:  02/10/2017   11:06   T:  02/10/2017   11:41   Job #:  [id number]

## 2017-02-10 NOTE — PROGRESS NOTES
Vascular Surgery  --seen earlier this am  --both feet look good  --right foot with palpable pulses; dorsiflexion a little weak  --will eventually need PT eval to assist with this

## 2017-02-10 NOTE — PROCEDURES
[address]   JOSE       Name:  Harriett Meigs   MR#:  [id number]   :  1960   Account #:  [de-identified]    Date of Procedure:     Date of Adm:  2017       DATE: 2017     PROCEDURE PERFORMED: Diagnostic intraoperative   transesophageal echocardiogram.    DEMOGRAPHIC INFORMATION: The patient is a 27-year-old   gentleman who presented to Dr. Darlene Daniel for an emergency type A   aortic dissection repair and possible aortic valve replacement. TECHNIQUE: Multiplane transesophageal echo probe was easily   placed following the induction of general endotracheal anesthesia. The   echocardiographic modalities employed include 2-dimensional   transesophageal echo, color-flow Doppler, pulse-wave Doppler, and   continuous-wave Doppler. ECHOCARDIOGRAM EXAMINATION      AORTA: The ascending aorta, aortic root, and ascending aorta are   aneurysmal, measuring 6 cm in maximum diameter. The aortic arch   and descending aorta are normal in size. There is an extensive   [place] type A dissection, which begins in the aortic root, just distal to   the aortic valve, and extends throughout the aortic arch   and descending thoracic aorta, as far as I am able to see with the   transesophageal echo. There is a large entry point into the dissection in   the aortic root. There is a second reentry point noted approximately at   the level of the diaphragm in the thoracic descending aorta. VALVES      AORTIC VALVE: The aortic annulus is dilated. There is no stenosis. There is severe aortic insufficiency with normal leaflet morphology. MITRAL VALVE: The mitral annulus is normal. There is no stenosis. There is trace to mild mitral regurgitation with normal leaflet   morphology and motion. TRICUSPID VALVE: The tricuspid annulus is normal. There is mild   tricuspid regurgitation with normal leaflet morphology and motion.     ATRIA: The right and left atrium are both normal in size. There is no   evidence of smoke, thrombus, or tumor. The left atrial appendage is   free of clot. The interatrial septum is normal.    VENTRICLES: The interventricular septum is normal.    RIGHT VENTRICLE: The right ventricular cavity size is mildly dilated. There is no hypertrophy or thrombus, and there is normal right   ventricular systolic function. LEFT VENTRICLE: The left ventricular cavity size is normal. There is   no hypertrophy or thrombus, and there is normal left ventricular systolic   function. Ejection fraction is approximately 55% to 60%. REGIONAL FUNCTION: There is are no regional wall motion   abnormalities. PERICARDIUM: Normal.     PLEURA: Normal.     POSTINTERVENTION FOLLOWUP STUDY: The intervention   performed is a 29 mm bileaflet mechanical valve conduit with coronary   reimplantation. There is normal mechanical valve function. Both leaflets   are seen functioning well. There are small washing jets noted. There is   a peak gradient of 5, mean gradient of 2 across the valve.  There are   no other changes in the postintervention echo exam.        MD ISIDRO Gee / DANE   D:  02/09/2017   20:27   T:  02/10/2017   01:24   Job #:  [id number]

## 2017-02-10 NOTE — CARDIO/PULMONARY
Cardiac Wellness: Valve education folder to bedside. Reinforced IS use, use of bear/pillow to splint, and 5 lb wt lifiting restriction. Will follow for further teaching.

## 2017-02-10 NOTE — PROGRESS NOTES
2010 patient arrived to unit with surgical team  2015 report received from [place], [place]; Dr. Trace Moreland, anesthesiologist and [facility]; patient to stay intubated over night with plans for extubation in the morning; keep systolic BP <478 with MAP >65 for fem/fem perfusion; two of FFP hung per orders; patient on amio/insulin/precedex, with vent setting AC 15 Vt 600 and 5 PEEP per CRNA;  FiO2 currently 100%, will wean per VIA once hooked up (currently calibrating); BLE warm with palpable femoral, pedal and posterior tibial pulses all palpable, bilat groins soft and incisions intact    Primary Nurse Jaime Vora RN and [place] , RN performed a dual skin assessment on this patient Impairment noted- see wound doc flow sheet (sternal incision, x2 CT exit site, bilat groin access sites; all C/D/I)  Elvin score is 10    2045 paced currently DDD paced at 86 (settings from OR); underlying rhythm slow sinus rhythm in the 50s with a significant 1st degree AVB; attempts to a-pace result in innapropriate pacing due to large 1st degree block; set back to DD pacing  2105 family back to see patient, time for questions and concerns to be heard   2145 patient switched to propofol from precedex to remain intubated  2200 first via result: pO2 575, FiO2 reduced to 75%  2215 pO2 per VIA: 314, FiO2 decreased to 55%  2245 pO2 211, FiO2 down to 40%  2306 1st albumin hung for CVP 3, decreasing UOP, decreasing CI from 3 to 2.4 and BP in the 90s/60  0030 during turning/ chg bath, patient woke up; moved all extremities and opened eyes; did not follow commands; prn morphine administered and patient calmed back down  0120 2nd albumin hung for CVP 2, and decreasing BP/ CI and UOP  0245 during turning patient again woke up, moving all extremities; once again did not follow commands to squeeze RN's hand  0410 CVP 2, PAD 10, CI 2.1 and urine darkening in color; 3rd albumin hung  0425 CVP 4, PAD 12, CI 3.1 after albumin  0500 transition back to precedex started by beginning precedex gtt at 0.7 and reducing propofol to 15  0540 underlying rhythm check reveals sinus mayo with a 1st degree AVB, rate in the 40s  0600 on 0.7 precedex and 5 propofol, patient waking up, able to move all extremities and responding to commands to move toes/ hands; nodding yes to questions; vent rate cut to 8 to prepare for possible extubation  0700 propofol stopped, precedex cut to 0.2; RT placed patient on CPAP mode for SBT; patient wide awake, following all commands  0725 CI dropped to 1.9 as patient is more awake/ moving around in bed  0730 patient extubated without issue, A/O x4, moving all extremities, VSS    Bedside and Verbal shift change report given to [facility] RN by Basia Jaramillo RN. Report included the following information SBAR, Kardex, Intake/Output, MAR, Accordion, Recent Results and Cardiac Rhythm paced. Call bell and personal items within patient's reach. Problem: Ventilator Management  Goal: *Adequate oxygenation and ventilation  Outcome: Progressing Towards Goal  FiO2 quickly weaned down to 40% per ABG readouts; pO2 maintained >100 on 5 PEEP and FiO2 40%;  SvO2 >60  Goal: *Patient maintains clear airway/free of aspiration  Outcome: Progressing Towards Goal  Patency of ETT maintained  Goal: *Absence of infection signs and symptoms  Outcome: Progressing Towards Goal  No s/s of infection at this time  Goal: *Normal spontaneous ventilation  Outcome: Progressing Towards Goal  Sedated with propofol, not breathing over vent at this time    Problem: Falls - Risk of  Goal: *Absence of falls  Outcome: Progressing Towards Goal  Bed in low and locked position; call bell and personal belongings within reach, side rails up x3, nonskid footwear, patient intubated, restrained and sedated and not a fall risk at this time  Goal: *Knowledge of fall prevention  Outcome: Progressing Towards Goal  Unable to provide education on fall prevention due to sedation; will reassess as appropriate    Problem: Pressure Ulcer - Risk of  Goal: *Prevention of pressure ulcer  Outcome: Progressing Towards Goal  Pressure ulcer risk assessment tool in use; Patient turned q2, pillows and pressure reducing mattress in use; blood glucose monitored per glucostabilizer; bony prominences monitored for breakdown        Problem:  Aortic Aneurysm Repair: Day of Surgery (Initiate SCIP Measures for Post-Op Care)  Goal: *Hemodynamically stable  Outcome: Progressing Towards Goal  Hemodynamics and VS monitored per protocol; SvO2 >60 abd CI >2 since admission to unit; CVP 2-8 x2 albumin given for CVP < 4 and decreased UOP; PA pressures low 20s/teens; ABP maintained <870 systolic  Goal: *Lungs clear or at baseline  Outcome: Progressing Towards Goal  Lungs clear, diminished in the bases  Goal: *Stable cardiac rhythm  Outcome: Progressing Towards Goal  DDD paced at 86; underlying rhythm a slow sinus rhythm with a first degree AVB  Goal: *Afebrile  Outcome: Progressing Towards Goal  Afebrile  Goal: *Follows simple commands post anesthesia  Outcome: Not Progressing Towards Goal  Woke up once from under propofol, did not follow any commands  Goal: *Orients easily following extubation  Outcome: Not Progressing Towards Goal  Still intubated  Goal: *Optimal pain control at patients stated goal  Outcome: Progressing Towards Goal  Behavioral pain scale in use; prn morphine administered as needed  Goal: *Absence of infection signs and symptoms  Outcome: Progressing Towards Goal  No s/s of infection noted at this time  Goal: *Absence of signs and symptoms of DVT  Outcome: Progressing Towards Goal  LE monitored for signs of malperfusion

## 2017-02-10 NOTE — PROGRESS NOTES
Problem: Self Care Deficits Care Plan (Adult)  Goal: *Acute Goals and Plan of Care (Insert Text)  Occupational Therapy Goals  Initiated 2/10/2017  1. Patient will perform ADLs standing 5 mins without fatigue or LOB with modified independence within 7 day(s). 2. Patient will perform lower body ADLs with modified independence within 7 day(s). 3. Patient will perform bathing with modified independence within 7 day(s). 4. Patient will perform toilet transfers with modified independence within 7 day(s). 5. Patient will perform all aspects of toileting with modified independence within 7 day(s). 6. Patient will participate in cardiac/sternal upper extremity therapeutic exercise/activities to increase independence with ADLs with modified independence for 5 minutes within 7 day(s). OCCUPATIONAL THERAPY EVALUATION  Patient: Natanael Salas (3 y.o. male)  Date: 2/10/2017  Primary Diagnosis: unknown  Ascending aortic dissection (HCC)  Procedure(s) (LRB):  ASCENDING AORTIC DISSECTION, AORTIC ROOT REPLACEMENT, 29MM MECHANICAL VALVE, FEM-FEM BYPASS BY DR Tonio Crowder. ECC, JOSE BY DR LEO.  (N/A) 1 Day Post-Op   Precautions:   Sternal      ASSESSMENT :  Based on the objective data described below, the patient presents with independence to total A ADLs. ADLs limited by active ROM (B UEs and R LE), strength(B UEs and R LE), sternal precautions, cardiopulmonary tolerance (initial orthostatic hypotension, 2L NC) and edema R LE. Education completed. Patient to discharge home with A.     Recommend with nursing patient to complete as able in order to maintain strength, endurance and independence: ADLs with supervision/setup, OOB to chair 3x/day and mobilizing to the bathroom for toileting with 1 assist. Thank you for your assistance. Patient will benefit from skilled intervention to address the above impairments.   Patients rehabilitation potential is considered to be Good  Factors which may influence rehabilitation potential include:   [X]             None noted  [ ]             Mental ability/status  [ ]             Medical condition  [ ]             Home/family situation and support systems  [ ]             Safety awareness  [ ]             Pain tolerance/management  [ ]             Other:        PLAN :  Recommendations and Planned Interventions:  [X]               Self Care Training                  [X]        Therapeutic Activities  [X]               Functional Mobility Training    [ ]        Cognitive Retraining  [X]               Therapeutic Exercises           [X]        Endurance Activities  [X]               Balance Training                   [ ]        Neuromuscular Re-Education  [ ]               Visual/Perceptual Training     [X]   Home Safety Training  [X]               Patient Education                 [X]        Family Training/Education  [ ]               Other (comment):     Frequency/Duration: Patient will be followed by occupational therapy 5 times a week to address goals. Discharge Recommendations: None  Further Equipment Recommendations for Discharge: none noted       SUBJECTIVE:   Patient stated I work out all of the time so I will be fine.       OBJECTIVE DATA SUMMARY:   HISTORY:   Past Medical History   Diagnosis Date    Family history of colon cancer      Family history of diabetes mellitus (DM) 6/8/2010    Family history of early CAD 6/8/2010    Seborrheic dermatitis 6/8/2010   History reviewed. No pertinent past surgical history. 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    Expanded or extensive additional review of patient history:      Home Situation  Home Environment: Private residence  One/Two Story Residence: Two story  Living Alone: No  Support Systems: Child(ronaldo), Family member(s), Friends \ neighbors, Spouse/Significant Other/Partner  Patient Expects to be Discharged to[employer] residence  Current DME Used/Available at Home: None  Tub or Shower Type: Shower  [X]  Right hand dominant             [ ]  Left hand dominant     EXAMINATION OF PERFORMANCE DEFICITS:  Cognitive/Behavioral Status:  Neurologic State: Drowsy  Orientation Level: Oriented to person;Oriented to place;Oriented to situation  Cognition: Appropriate decision making; Appropriate for age attention/concentration; Appropriate safety awareness; Follows commands  Perception: Appears intact  Perseveration: No perseveration noted  Safety/Judgement: Awareness of environment;Good awareness of safety precautions  Skin: intact as seen   Edema: intact as seen  Vision/Perceptual:                           Acuity: Impaired near vision; Impaired far vision    Corrective Lenses: Contacts  Range of Motion:  Shoulder flexion 90*  AROM: Generally decreased, functional                       Strength:  -3/5 during ADLs not formally tested  Strength: Generally decreased, functional              Coordination:  Coordination: Within functional limits  Fine Motor Skills-Upper: Left Intact; Right Intact    Gross Motor Skills-Upper: Left Intact; Right Intact  Tone & Sensation:        Sensation: Impaired (R thumb & 1st digit numb baseline; shin tingling R)                       Balance:  Sitting: Intact; Without support     Functional Mobility and Transfers for ADLs:  Bed Mobility:        Transfers:        ADL Assessment:  Feeding: Independent     Oral Facial Hygiene/Grooming: Supervision sitting, setup on tray     Bathing: Total assistance     Upper Body Dressing: Total assistance infer setup     Lower Body Dressing: Maximum assistance tailor sitting fair, R LE limiting     Toileting: Total assistance                 ADL Intervention and task modifications:     Patient instructed and indicated understanding the benefits of maintaining activity tolerance, functional mobility, and independence with self care tasks during acute stay  to ensure safe return home and to baseline. Encouraged patient to increase frequency and duration OOB, be out of bed for all meals, perform daily ADLs (as approved by RN/MD regarding bathing etc), and performing functional mobility to/from bathroom. Patient instructed no asymmetrical reaching over head to ensure B UEs when shoulders >90*, prevent deep bending and valsalva maneuvers. May have to adjust home setup to increase ease with items closer to waist height, don clothing tailor sitting and don all clothing while sitting prior to standing. Increase activity tolerance for home, work, and sexual intercourse by pacing self with increasing the arm exercises, sitting duration, frequency OOB, walking, standing, ADLs. Instructed and indicated understanding of s/s of too much activity, how to respond to s/s safely. Cognitive Retraining  Safety/Judgement: Awareness of environment;Good awareness of safety precautions     Therapeutic Exercise:  Patient instructed on the benefits and demonstrated cardiac exercises while sitting with supervision. Instructed and indicated understanding on how to progress reps and sets against gravity, working up to 5 lbs and so on based on surgeon clearance for more weight in prep for basic and instrumental ADL. Can use household items for weights.       CARDIAC   EXERCISE    Sets    Reps    Active  Active Assist    Passive  Self ROM    Comments    Shoulder flexion  1  5   [X]                            [ ]                             [ ]                             [ ]                                 Shoulder abduction  1  5  [X]                             [ ]                             [ ]                             [ ]                                 Scapular elevation  1  5  [X]                             [ ]                              [ ]                             [ ]                                 Scapular retraction  1  5  [X]                             [ ] [ ]                             [ ]                                 Trunk rotation      [X]                             [ ]                             [ ]                             [ ]                                 Trunk sidebending      [X]                             [ ]                              [ ]                             [ ]                                               Pain:  Pain Scale 1: Numeric (0 - 10)  Pain Intensity 1: 4  Pain Location 1: Chest  Pain Orientation 1: Anterior  Pain Description 1: Aching  Pain Intervention(s) 1: Medication (see MAR)  Activity Tolerance:   initial orthostatic hypotension, 2L NC  Please refer to the flowsheet for vital signs taken during this treatment. After treatment:   [X] Patient left in no apparent distress sitting up in bed/chair  [ ] Patient left in no apparent distress in bed  [X] Call bell left within reach  [X] Nursing notified  [ ] Caregiver present  [ ] Bed alarm activated      COMMUNICATION/EDUCATION:   The patients plan of care was discussed with: Physical Therapist and Registered Nurse.  [X] Home safety education was provided and the patient/caregiver indicated understanding. [X] Patient/family have participated as able in goal setting and plan of care. [X] Patient/family agree to work toward stated goals and plan of care. [ ] Patient understands intent and goals of therapy, but is neutral about his/her participation. [ ] Patient is unable to participate in goal setting and plan of care. This patients plan of care is appropriate for delegation to [facility].      Thank you for this referral.  Gabe Watson  Time Calculation: 25 mins

## 2017-02-10 NOTE — PROGRESS NOTES
[facility] ICU Progress Note    Admit Date: 2017  POD:  1 Day Post-Op    Procedure:  Procedure(s):  ASCENDING AORTIC DISSECTION, AORTIC ROOT REPLACEMENT, 29MM MECHANICAL VALVE, FEM-FEM BYPASS BY DR Erich Mckenna. ECC, JOSE BY DR LEO. Subjective:   Pt seen with Dr. Annamaria Hercules. On amio 0.5, precedex, insulin and propofol. Wean vent overnight, ready to extubate this morning. Tmax 99.3. Interactive & appropriate. Paced AAI -- mayo 40-50s underneath. Objective:   Vitals:  Blood pressure 102/52, pulse 91, temperature 99.4 °F (37.4 °C), resp. rate 16, height 5' 11\" (1.803 m), weight 216 lb 0.8 oz (98 kg), SpO2 99 %. Temp (24hrs), Av °F (36.7 °C), Min:96.5 °F (35.8 °C), Max:99.4 °F (37.4 °C)    Hemodynamics:   CO: CO (l/min): 7 l/min   CI: CI (l/min/m2): 3.2 l/min/m2   CVP: CVP (mmHg): 4 mmHg (02/10/17 0900)   SVR: SVR (dyne*sec)/cm5: 895 (dyne*sec)/cm5 (02/10/17 5331)   PAP Systolic: PAP Systolic: 19 (94/96/89 6600)   PAP Diastolic: PAP Diastolic: 12 (50/37/86 9676)   PVR:     SV02: SVO2 (%): 66 % (02/10/17 0900)   SCV02:      EKG/Rhythm:  SB 40-50s     Extubation Date / Time: 17 at 0730 am     CT Output: 480 ml     Ventilator:  Ventilator Volumes  Vt Set (ml): 600 ml (02/10/17 0600)  Vt Exhaled (Machine Breath) (ml): 700 ml (02/10/17 0600)  Vt Spont (ml): 829 ml (02/10/17 07)  Ve Observed (l/min): 10 l/min (02/10/17 0600)    Oxygen Therapy:  Oxygen Therapy  O2 Sat (%): 99 % (02/10/17 0900)  Pulse via Oximetry: 89 beats per minute (02/10/17 0900)  O2 Device: Nasal cannula (02/10/17 0800)  O2 Flow Rate (L/min): 3 l/min (02/10/17 0800)  FIO2 (%): 40 % (02/10/17 0722)    CXR:  The cardiomediastinal silhouette is stable. There is no new airspace disease.       Admission Weight: Last Weight   Weight: 222 lb (100.7 kg) Weight: 216 lb 0.8 oz (98 kg)     Intake / Output / Drain:  Current Shift: 02/10 0701 - 02/10 1900  In: 336.3 [P.O.:240;  I.V.:96.3]  Out: 170 [Urine:120; Drains:50]  Last 24 hrs.: Intake/Output Summary (Last 24 hours) at 02/10/17 0950  Last data filed at 02/10/17 0900   Gross per 24 hour   Intake          6580.19 ml   Output             2690 ml   Net          3890.19 ml       EXAM:  General:  Awake, in no obvious distress. Lungs:   Clear to auscultation bilaterally. Incision:  Drs C/D/I. Heart:  Regular rate and rhythm, S1, S2 normal, no murmur, click, rub or gallop. Abdomen:   Soft, non-tender. Bowel sounds normal. No masses,  No organomegaly. Extremities:  No edema. PPP. Neurologic:  Gross motor and sensory apparatus intact. Labs:   Recent Labs      02/10/17   0905   02/10/17   0348   174   WBC   --    --   9.8   --   12.6*   HGB   --    --   11.3*   --   12.2   HCT   --    --   33.1*   --   35.3*   PLT   --    --   88*   --   106*   NA   --    --   143   --   144   K   --    --   3.9   --   4.3   BUN   --    --   19   --   16   CREA   --    --   1.45*   --   1.50*   GLU   --    --   110*   --   109*   GLUCPOC  115*   < >   --    < >   --    INR   --    --    --    --   1.3*    < > = values in this interval not displayed. Assessment:     Active Problems:    Ascending aortic dissection (HCC) (2017)      Overview: AORTIC ROOT REPLACEMENT USING A 29 MM ST. TOSHA VALVED CONDUIT, ECC VIA       LEFT FEMORAL ARTERIAL CANNULATION      2. LEFT TO RIGHT FEM FEM BYPASS GRAFT         Plan/Recommendations/Medical Decision Makin. S/p Aortic root replacement w/ AVR mechanical valve & L to R fem fem bypass graft: Strict BP control SBP < 120. On asa, will start coumadin tonight. Start coumadin education. 2. Atelectasis: Wean oxygen for 02 sat> 92%. Increase IS and activity as tolerated. 3. Postop anemia s/t acute blood loss: Monitor H/H and CT output. 4. Thrombocytopenia:  Cont asa, change pepcid to protonix. Monitor. 5. Elevated transaminases:  Monitor.    6. Bradycardia: Hold further amio. Monitor. 7. Dispo: PT/OT eval.  Remain in CVI today.      Signed By: Pepe Parkinson NP

## 2017-02-10 NOTE — BRIEF OP NOTE
BRIEF OP NOTE    Pre-Op Diagnosis: ASCENDING AORTIC DISSECTION    Post-Op Diagnosis: ASCENDING AORTIC DISSECTION     Procedure: 1. AORTIC ROOT REPLACEMENT USING A 29 MM ST. TOSHA VALVED CONDUIT, ECC VIA LEFT FEMORAL ARTERIAL CANNULATION   2. LEFT TO RIGHT FEM FEM BYPASS GRAFT   3. JOSE BY DR. Nazanin Nielson      Surgeon:  Samantha Madden MD,  Den Tavares MD    Assistant(s): Marcela Osler, SA    Perfusion/Cross Clamp Times: 120\"/105\"    Anesthesia: General     Infusions: Amiodarone, precedex, insulin,     Estimated Blood Loss: 400 ml    Cell Saver: 900 ml    Specimens:   ID Type Source Tests Collected by Time Destination   1 : AORTIC VALVE AND AORTA Fresh Aorta  Warren June MD 2/9/2017 1821 Pathology       Drains and pacing wires: 2 atrial wires, 1 bipolar ventricular wire, 2 william drains    Complications: none    Findings: ACUTE TYPE I AORTIC DISSECTION    Implants:   Implant Name Type Inv.  Item Serial No.  Lot No. LRB No. Used Action   PATCH VASC FLT 1.65MM [id number] --  - SN/A  PATCH VASC FLT 1.65MM [id number] --  N/A [employer] [id number] N/A 1 Implanted   [facility] MAST[employer] HP SERIES VALVED GRAFT Other  [id number] 229 [facility] N/A N/A 1 Implanted   GRAFT [facility] W[place]N STR [id number] -- Raejean Ra - [id number]   GRAFT HEMSHLD W[place]N STR [id number] -- HEMASHIELD PLATINUM [id number] GETINGE AB MAQUET CARDIOVASCLR [id number] Right 1 Implanted     Blood products in OR include: 1 platelet pack  To CVICU in stable condtion  Marcela Osler  2/9/2017  9:02 PM

## 2017-02-10 NOTE — PROGRESS NOTES
0800 - Bedside report received from FILIPE Jane  0900 - PRN Percocet given for pain. 0930 - Pt underlying rhythm 50's with 1st degree. Amio gtt stopped per Dr. Lamont Liang. 1115 - CVP - 6, SBP - 80's, updated Mordecai Jeans, NP. Orders to give 1 Albumin. 1200 - Pt underlying rhythm 60-70's. Chris restarted at 10 mcg/min  1500 - Orders received by Mordecai Jeans, NP to give 1 albumin. 1515 - Chris gtt stopped. 2000 - Bedside shift change report given to Leigh Dela Cruz RN (oncoming nurse) by Tracy Diane RN (offgoing nurse). Report included the following information SBAR, Kardex, Procedure Summary, Intake/Output, MAR, Recent Results and Cardiac Rhythm NSR.

## 2017-02-10 NOTE — PROGRESS NOTES
Cardiac Surgery Care Coordinator- Met with Rao Cardenas wife, sisters, daughter and son. Reviewed plan of care and day of surgery expectations for the family. Answered multiple questions and offered emotional support. Family information provided to the OR and CVICU nurses. Will continue to follow.  Bill Mejía RN

## 2017-02-10 NOTE — OP NOTES
[address] [place] [facility] [address] [place] [address], [address]   OP NOTE       Name:  Liya Tuttle   MR#:  [id number]   :  1960   Account #:  [de-identified]    Surgery Date:  2017   Date of Adm:  2017       POSTOPERATIVE DIAGNOSIS: Ischemic right leg. POSTOPERATIVE DIAGNOSIS: Ischemic right leg. PROCEDURES PERFORMED:  Left-to-right femoral-femoral bypass. SURGEON: Saray Dodson. Day Palmer MD.    ANESTHESIA: General anesthesia. COMPLICATIONS: None. ESTIMATED BLOOD LOSS: Minimal.    SPECIMENS REMOVED: None. INDICATIONS FOR PROCEDURE: The patient is a 79-year-old male   with an ascending aortic dissection undergoing valve replacement and   arch replacement and repair of the dissection. He was noted to have no   right femoral pulse and decision was made to perform a fem-fem   bypass coincident with the root replacement. DESCRIPTION OF PROCEDURE: The patient remained in a   recumbent position. Groins were opened and the common femoral   artery exposed bilaterally. An 8-mm Dacron graft was sewn end-to-side   to the left common femoral artery with 4-0 Prolene suture. It was then   tunneled subcutaneously and sewed end-to-side to the right common   femoral artery. Flow was restored through the graft. There was   excellent flow throughout, palpable pulse present in the right groin and   hemostasis controlled. Wounds were irrigated thoroughly and closed in   multiple layers. He tolerated this portion of the  well and was   transferred to the ICU in critical, but stable condition.         Elsie Olson MD      MW / Azeb Pump   D:  02/10/2017   08:50   T:  02/10/2017   10:12   Job #:  [id number]

## 2017-02-11 ENCOUNTER — ANESTHESIA EVENT (OUTPATIENT)
Dept: SURGERY | Age: 57
DRG: 219 | End: 2017-02-11
Payer: COMMERCIAL

## 2017-02-11 ENCOUNTER — APPOINTMENT (OUTPATIENT)
Dept: GENERAL RADIOLOGY | Age: 57
DRG: 219 | End: 2017-02-11
Attending: NURSE PRACTITIONER
Payer: COMMERCIAL

## 2017-02-11 ENCOUNTER — ANESTHESIA (OUTPATIENT)
Dept: SURGERY | Age: 57
DRG: 219 | End: 2017-02-11
Payer: COMMERCIAL

## 2017-02-11 LAB
ALBUMIN SERPL BCP-MCNC: 3.3 G/DL (ref 3.5–5)
ALBUMIN/GLOB SERPL: 1.6 {RATIO} (ref 1.1–2.2)
ALP SERPL-CCNC: 35 U/L (ref 45–117)
ALT SERPL-CCNC: 95 U/L (ref 12–78)
ANION GAP BLD CALC-SCNC: 9 MMOL/L (ref 5–15)
AST SERPL W P-5'-P-CCNC: 456 U/L (ref 15–37)
BILIRUB SERPL-MCNC: 1 MG/DL (ref 0.2–1)
BUN SERPL-MCNC: 26 MG/DL (ref 6–20)
BUN/CREAT SERPL: 17 (ref 12–20)
CALCIUM SERPL-MCNC: 8 MG/DL (ref 8.5–10.1)
CHLORIDE SERPL-SCNC: 104 MMOL/L (ref 97–108)
CO2 SERPL-SCNC: 23 MMOL/L (ref 21–32)
CREAT SERPL-MCNC: 1.57 MG/DL (ref 0.7–1.3)
ERYTHROCYTE [DISTWIDTH] IN BLOOD BY AUTOMATED COUNT: 13.3 % (ref 11.5–14.5)
GLOBULIN SER CALC-MCNC: 2.1 G/DL (ref 2–4)
GLUCOSE BLD STRIP.AUTO-MCNC: 101 MG/DL (ref 65–100)
GLUCOSE BLD STRIP.AUTO-MCNC: 104 MG/DL (ref 65–100)
GLUCOSE BLD STRIP.AUTO-MCNC: 105 MG/DL (ref 65–100)
GLUCOSE BLD STRIP.AUTO-MCNC: 107 MG/DL (ref 65–100)
GLUCOSE BLD STRIP.AUTO-MCNC: 116 MG/DL (ref 65–100)
GLUCOSE BLD STRIP.AUTO-MCNC: 118 MG/DL (ref 65–100)
GLUCOSE BLD STRIP.AUTO-MCNC: 123 MG/DL (ref 65–100)
GLUCOSE BLD STRIP.AUTO-MCNC: 127 MG/DL (ref 65–100)
GLUCOSE BLD STRIP.AUTO-MCNC: 128 MG/DL (ref 65–100)
GLUCOSE BLD STRIP.AUTO-MCNC: 128 MG/DL (ref 65–100)
GLUCOSE BLD STRIP.AUTO-MCNC: 142 MG/DL (ref 65–100)
GLUCOSE BLD STRIP.AUTO-MCNC: 180 MG/DL (ref 65–100)
GLUCOSE BLD STRIP.AUTO-MCNC: 95 MG/DL (ref 65–100)
GLUCOSE BLD STRIP.AUTO-MCNC: 97 MG/DL (ref 65–100)
GLUCOSE SERPL-MCNC: 120 MG/DL (ref 65–100)
HCT VFR BLD AUTO: 29.2 % (ref 36.6–50.3)
HGB BLD-MCNC: 9.9 G/DL (ref 12.1–17)
INR PPP: 1.2 (ref 0.9–1.1)
MAGNESIUM SERPL-MCNC: 2.4 MG/DL (ref 1.6–2.4)
MCH RBC QN AUTO: 31.3 PG (ref 26–34)
MCHC RBC AUTO-ENTMCNC: 33.9 G/DL (ref 30–36.5)
MCV RBC AUTO: 92.4 FL (ref 80–99)
PLATELET # BLD AUTO: 86 K/UL (ref 150–400)
POTASSIUM SERPL-SCNC: 4.1 MMOL/L (ref 3.5–5.1)
PROT SERPL-MCNC: 5.4 G/DL (ref 6.4–8.2)
PROTHROMBIN TIME: 12.1 SEC (ref 9–11.1)
RBC # BLD AUTO: 3.16 M/UL (ref 4.1–5.7)
SERVICE CMNT-IMP: ABNORMAL
SERVICE CMNT-IMP: NORMAL
SERVICE CMNT-IMP: NORMAL
SODIUM SERPL-SCNC: 136 MMOL/L (ref 136–145)
WBC # BLD AUTO: 15.6 K/UL (ref 4.1–11.1)

## 2017-02-11 PROCEDURE — 76010000138 HC OR TIME 0.5 TO 1 HR: Performed by: SURGERY

## 2017-02-11 PROCEDURE — 74011250636 HC RX REV CODE- 250/636

## 2017-02-11 PROCEDURE — 76210000006 HC OR PH I REC 0.5 TO 1 HR: Performed by: SURGERY

## 2017-02-11 PROCEDURE — 0KNS0ZZ RELEASE RIGHT LOWER LEG MUSCLE, OPEN APPROACH: ICD-10-PCS | Performed by: SURGERY

## 2017-02-11 PROCEDURE — 74011250636 HC RX REV CODE- 250/636: Performed by: NURSE PRACTITIONER

## 2017-02-11 PROCEDURE — 77010033678 HC OXYGEN DAILY

## 2017-02-11 PROCEDURE — 77030026438 HC STYL ET INTUB CARD -A: Performed by: ANESTHESIOLOGY

## 2017-02-11 PROCEDURE — 74011000250 HC RX REV CODE- 250

## 2017-02-11 PROCEDURE — 51798 US URINE CAPACITY MEASURE: CPT

## 2017-02-11 PROCEDURE — 85027 COMPLETE CBC AUTOMATED: CPT | Performed by: NURSE PRACTITIONER

## 2017-02-11 PROCEDURE — 65610000003 HC RM ICU SURGICAL

## 2017-02-11 PROCEDURE — 36415 COLL VENOUS BLD VENIPUNCTURE: CPT | Performed by: NURSE PRACTITIONER

## 2017-02-11 PROCEDURE — 74011250636 HC RX REV CODE- 250/636: Performed by: SURGERY

## 2017-02-11 PROCEDURE — 80053 COMPREHEN METABOLIC PANEL: CPT | Performed by: NURSE PRACTITIONER

## 2017-02-11 PROCEDURE — 74011636637 HC RX REV CODE- 636/637: Performed by: NURSE PRACTITIONER

## 2017-02-11 PROCEDURE — 77030011640 HC PAD GRND REM COVD -A: Performed by: SURGERY

## 2017-02-11 PROCEDURE — 82962 GLUCOSE BLOOD TEST: CPT

## 2017-02-11 PROCEDURE — 85610 PROTHROMBIN TIME: CPT | Performed by: NURSE PRACTITIONER

## 2017-02-11 PROCEDURE — 76060000032 HC ANESTHESIA 0.5 TO 1 HR: Performed by: SURGERY

## 2017-02-11 PROCEDURE — 74011250637 HC RX REV CODE- 250/637: Performed by: NURSE PRACTITIONER

## 2017-02-11 PROCEDURE — 77030008684 HC TU ET CUF COVD -B: Performed by: ANESTHESIOLOGY

## 2017-02-11 PROCEDURE — 83735 ASSAY OF MAGNESIUM: CPT | Performed by: NURSE PRACTITIONER

## 2017-02-11 PROCEDURE — 74011000258 HC RX REV CODE- 258: Performed by: NURSE PRACTITIONER

## 2017-02-11 PROCEDURE — 71010 XR CHEST PORT: CPT

## 2017-02-11 PROCEDURE — 74011250637 HC RX REV CODE- 250/637: Performed by: THORACIC SURGERY (CARDIOTHORACIC VASCULAR SURGERY)

## 2017-02-11 PROCEDURE — 77030018836 HC SOL IRR NACL ICUM -A: Performed by: SURGERY

## 2017-02-11 RX ORDER — WARFARIN SODIUM 5 MG/1
5 TABLET ORAL ONCE
Status: DISCONTINUED | OUTPATIENT
Start: 2017-02-11 | End: 2017-02-11

## 2017-02-11 RX ORDER — MIDAZOLAM HYDROCHLORIDE 1 MG/ML
INJECTION, SOLUTION INTRAMUSCULAR; INTRAVENOUS AS NEEDED
Status: DISCONTINUED | OUTPATIENT
Start: 2017-02-11 | End: 2017-02-11 | Stop reason: HOSPADM

## 2017-02-11 RX ORDER — SUCCINYLCHOLINE CHLORIDE 20 MG/ML
INJECTION INTRAMUSCULAR; INTRAVENOUS AS NEEDED
Status: DISCONTINUED | OUTPATIENT
Start: 2017-02-11 | End: 2017-02-11 | Stop reason: HOSPADM

## 2017-02-11 RX ORDER — ROCURONIUM BROMIDE 10 MG/ML
INJECTION, SOLUTION INTRAVENOUS AS NEEDED
Status: DISCONTINUED | OUTPATIENT
Start: 2017-02-11 | End: 2017-02-11 | Stop reason: HOSPADM

## 2017-02-11 RX ORDER — OXYCODONE AND ACETAMINOPHEN 5; 325 MG/1; MG/1
1 TABLET ORAL
Status: DISCONTINUED | OUTPATIENT
Start: 2017-02-11 | End: 2017-02-12

## 2017-02-11 RX ORDER — PHENYLEPHRINE HCL IN 0.9% NACL 0.4MG/10ML
SYRINGE (ML) INTRAVENOUS AS NEEDED
Status: DISCONTINUED | OUTPATIENT
Start: 2017-02-11 | End: 2017-02-11 | Stop reason: HOSPADM

## 2017-02-11 RX ORDER — ONDANSETRON 2 MG/ML
INJECTION INTRAMUSCULAR; INTRAVENOUS AS NEEDED
Status: DISCONTINUED | OUTPATIENT
Start: 2017-02-11 | End: 2017-02-11 | Stop reason: HOSPADM

## 2017-02-11 RX ORDER — FENTANYL CITRATE 50 UG/ML
INJECTION, SOLUTION INTRAMUSCULAR; INTRAVENOUS AS NEEDED
Status: DISCONTINUED | OUTPATIENT
Start: 2017-02-11 | End: 2017-02-11 | Stop reason: HOSPADM

## 2017-02-11 RX ORDER — PROPOFOL 10 MG/ML
INJECTION, EMULSION INTRAVENOUS AS NEEDED
Status: DISCONTINUED | OUTPATIENT
Start: 2017-02-11 | End: 2017-02-11 | Stop reason: HOSPADM

## 2017-02-11 RX ORDER — CEFAZOLIN SODIUM IN 0.9 % NACL 2 G/50 ML
2 INTRAVENOUS SOLUTION, PIGGYBACK (ML) INTRAVENOUS
Status: COMPLETED | OUTPATIENT
Start: 2017-02-11 | End: 2017-02-11

## 2017-02-11 RX ORDER — SODIUM CHLORIDE 9 MG/ML
INJECTION, SOLUTION INTRAVENOUS
Status: DISCONTINUED | OUTPATIENT
Start: 2017-02-11 | End: 2017-02-11 | Stop reason: HOSPADM

## 2017-02-11 RX ORDER — LIDOCAINE HYDROCHLORIDE 20 MG/ML
INJECTION, SOLUTION EPIDURAL; INFILTRATION; INTRACAUDAL; PERINEURAL AS NEEDED
Status: DISCONTINUED | OUTPATIENT
Start: 2017-02-11 | End: 2017-02-11 | Stop reason: HOSPADM

## 2017-02-11 RX ADMIN — Medication 80 MCG: at 10:55

## 2017-02-11 RX ADMIN — Medication 80 MCG: at 11:05

## 2017-02-11 RX ADMIN — PROPOFOL 150 MG: 10 INJECTION, EMULSION INTRAVENOUS at 10:51

## 2017-02-11 RX ADMIN — LIDOCAINE HYDROCHLORIDE 40 MG: 20 INJECTION, SOLUTION EPIDURAL; INFILTRATION; INTRACAUDAL; PERINEURAL at 10:51

## 2017-02-11 RX ADMIN — CHLORHEXIDINE GLUCONATE 10 ML: 1.2 RINSE ORAL at 08:53

## 2017-02-11 RX ADMIN — SODIUM CHLORIDE 2.6 UNITS/HR: 900 INJECTION, SOLUTION INTRAVENOUS at 11:33

## 2017-02-11 RX ADMIN — ROCURONIUM BROMIDE 5 MG: 10 INJECTION, SOLUTION INTRAVENOUS at 10:51

## 2017-02-11 RX ADMIN — MUPIROCIN: 20 OINTMENT TOPICAL at 08:45

## 2017-02-11 RX ADMIN — ASPIRIN 81 MG CHEWABLE TABLET 81 MG: 81 TABLET CHEWABLE at 08:43

## 2017-02-11 RX ADMIN — SODIUM CHLORIDE 9 ML/HR: 900 INJECTION, SOLUTION INTRAVENOUS at 06:03

## 2017-02-11 RX ADMIN — Medication 10 ML: at 06:25

## 2017-02-11 RX ADMIN — Medication 400 MG: at 08:49

## 2017-02-11 RX ADMIN — SODIUM CHLORIDE 2.1 UNITS/HR: 900 INJECTION, SOLUTION INTRAVENOUS at 13:44

## 2017-02-11 RX ADMIN — SODIUM CHLORIDE: 9 INJECTION, SOLUTION INTRAVENOUS at 10:39

## 2017-02-11 RX ADMIN — DOCUSATE SODIUM AND SENNOSIDES 1 TABLET: 8.6; 5 TABLET, FILM COATED ORAL at 08:44

## 2017-02-11 RX ADMIN — Medication 400 MG: at 21:12

## 2017-02-11 RX ADMIN — SUCCINYLCHOLINE CHLORIDE 140 MG: 20 INJECTION INTRAMUSCULAR; INTRAVENOUS at 10:51

## 2017-02-11 RX ADMIN — INSULIN LISPRO 2 UNITS: 100 INJECTION, SOLUTION INTRAVENOUS; SUBCUTANEOUS at 09:14

## 2017-02-11 RX ADMIN — MUPIROCIN: 20 OINTMENT TOPICAL at 18:55

## 2017-02-11 RX ADMIN — CHLORHEXIDINE GLUCONATE 10 ML: 1.2 RINSE ORAL at 18:55

## 2017-02-11 RX ADMIN — MIDAZOLAM HYDROCHLORIDE 2 MG: 1 INJECTION, SOLUTION INTRAMUSCULAR; INTRAVENOUS at 10:39

## 2017-02-11 RX ADMIN — Medication 10 ML: at 06:26

## 2017-02-11 RX ADMIN — Medication 80 MCG: at 10:59

## 2017-02-11 RX ADMIN — ONDANSETRON 4 MG: 2 INJECTION INTRAMUSCULAR; INTRAVENOUS at 11:10

## 2017-02-11 RX ADMIN — Medication 80 MCG: at 10:53

## 2017-02-11 RX ADMIN — Medication 10 ML: at 21:12

## 2017-02-11 RX ADMIN — OXYCODONE HYDROCHLORIDE AND ACETAMINOPHEN 1 TABLET: 5; 325 TABLET ORAL at 02:14

## 2017-02-11 RX ADMIN — CEFAZOLIN 2 G: 1 INJECTION, POWDER, FOR SOLUTION INTRAMUSCULAR; INTRAVENOUS; PARENTERAL at 02:25

## 2017-02-11 RX ADMIN — Medication 10 ML: at 21:13

## 2017-02-11 RX ADMIN — CEFAZOLIN 2 G: 1 INJECTION, POWDER, FOR SOLUTION INTRAMUSCULAR; INTRAVENOUS; PARENTERAL at 10:56

## 2017-02-11 RX ADMIN — OXYCODONE HYDROCHLORIDE AND ACETAMINOPHEN 1 TABLET: 5; 325 TABLET ORAL at 08:44

## 2017-02-11 RX ADMIN — PANTOPRAZOLE SODIUM 40 MG: 40 TABLET, DELAYED RELEASE ORAL at 08:43

## 2017-02-11 RX ADMIN — FENTANYL CITRATE 50 MCG: 50 INJECTION, SOLUTION INTRAMUSCULAR; INTRAVENOUS at 10:51

## 2017-02-11 RX ADMIN — SODIUM CHLORIDE 10 ML/HR: 450 INJECTION, SOLUTION INTRAVENOUS at 07:22

## 2017-02-11 NOTE — PROGRESS NOTES
Problem: Falls - Risk of  Goal: *Knowledge of fall prevention  Outcome: Progressing Towards Goal  Pt. Knows not to get out of bed without assistance. Will monitor closely due to pain medication in the OR. Problem: Pressure Ulcer - Risk of  Goal: *Prevention of pressure ulcer  Outcome: Progressing Towards Goal  Turn and reposition Q2hr    Problem:  Aortic Aneurysm Repair: Post Op Day 2  Goal: *Hemodynamically stable  Outcome: Progressing Towards Goal  SWAN discontinued before OR for fasciotomy   Goal: *Lungs clear or at baseline  Outcome: Progressing Towards Goal  RA prior to surgery, 2L NC while drowsy after OR procedure  Goal: *Optimal pain control at patients stated goal  Outcome: Progressing Towards Goal  Percocet for pain control  Goal: *Demonstrates progressive activity  Outcome: Not Progressing Towards Goal  Bedrest due to fasciotomy on R anterior lower leg  Goal: *Tolerating diet  Outcome: Progressing Towards Goal  Was NPO due to surgery today

## 2017-02-11 NOTE — PROGRESS NOTES
Problem:  Aortic Aneurysm Repair: Post-Op Day 1  Goal: *Optimal pain control at patients stated goal  Outcome: Progressing Towards Goal  PRN Percocet Q4hr

## 2017-02-11 NOTE — PROGRESS NOTES
Bedside and Verbal shift change report given to Mary Castillo (oncoming nurse) . Report included the following information SBAR, Kardex, OR Summary, Intake/Output, MAR, Recent Results and Cardiac Rhythm SR with first degree AV block and ST elevation.

## 2017-02-11 NOTE — PROGRESS NOTES
Attempted follow up visit with patient post surgery at least three times. Was not able to speak with patient due to clinical activity taking place in room. Will follow as able/needed. CIARAN Dominguez

## 2017-02-11 NOTE — PERIOP NOTES
TRANSFER - OUT REPORT:    Verbal report given to nurse on Alexandra Porras  being transferred to CVICU 35 for routine progression of care       Report consisted of patients Situation, Background, Assessment and   Recommendations(SBAR). Time Pre op antibiotic given:1056  Anesthesia Stop time: 7161    Information from the following report(s) OR Summary, Procedure Summary, Intake/Output, MAR, Accordion and Recent Results was reviewed with the receiving nurse. Opportunity for questions and clarification was provided. Is the patient on 02? YES       L/Min 1   nasal cannula    Is the patient on a monitor? YES    Is the nurse transporting with the patient? YES    Surgical Waiting Area notified of patient's transfer from PACU? YES      The following personal items collected during your admission accompanied patient upon transfer:   Dental Appliance: Dental Appliances: None  Vision: Visual Aid: At bedside  Hearing Aid:    Jewelry: Jewelry: None  Clothing: Clothing: None  Other Valuables:  Other Valuables: None  Valuables sent to safe:

## 2017-02-11 NOTE — PROGRESS NOTES
Doing well  NSR BP stable  C/O right foot numbness and mild pain in foot  Has good PT and DP palpable pulses  Plan to remove lines and transfer to stepdown

## 2017-02-11 NOTE — PERIOP NOTES
TRANSFER - OUT REPORT:    Verbal report given to Sandra on Judy Liang  being transferred to CVICU 35 for routine post - op       Report consisted of patients Situation, Background, Assessment and   Recommendations(SBAR). Time Pre op antibiotic given:1056  Anesthesia Stop OVUD:4518    Information from the following report(s) OR Summary, Procedure Summary, Intake/Output, MAR, Accordion and Recent Results was reviewed with the receiving nurse. Opportunity for questions and clarification was provided. Is the patient on 02? YES       L/Min 2   nasal cannula    Is the patient on a monitor? YES    Is the nurse transporting with the patient? YES    Surgical Waiting Area notified of patient's transfer from PACU? YES      The following personal items collected during your admission accompanied patient upon transfer:   Dental Appliance: Dental Appliances: None  Vision: Visual Aid: At bedside  Hearing Aid:    Jewelry: Jewelry: None  Clothing: Clothing: None  Other Valuables:  Other Valuables: None  Valuables sent to safe:

## 2017-02-11 NOTE — PERIOP NOTES
Glucose Stabilizer continuous. Patient insulin gtt adjusted in CVICU prior to roll out.  Arrived in pacu for preop

## 2017-02-11 NOTE — PROGRESS NOTES
Problem: Aortic Aneurysm Repair: Post Op Day 2  Goal: *Afebrile  Outcome: Not Progressing Towards Goal  Variance: Patient Condition  Comments: Low grade temp tonight, by pulmonary artery catheter  Goal: *Demonstrates progressive activity  Outcome: Not Progressing Towards Goal  Variance: Patient Condition  Comments: Unable to get OOB today due to scheduled right lower leg fasciotomy later today.

## 2017-02-11 NOTE — ANESTHESIA POSTPROCEDURE EVALUATION
Post-Anesthesia Evaluation and Assessment    Patient: Treasure Burkett MRN: [id number]  SSN: [id number]    YOB: 1960  Age: 61 y.o. Sex: male       Cardiovascular Function/Vital Signs  Visit Vitals    /59 (BP 1 Location: Left arm, BP Patient Position: At rest;Supine)    Pulse 83    Temp 37.4 °C (99.3 °F)    Resp 16    Ht 5' 11\" (1.803 m)    Wt 98.8 kg (217 lb 13 oz)    SpO2 96%    BMI 30.38 kg/m2       Patient is status post general anesthesia for Procedure(s):  FASCIOTOMY RIGHT  LEG ANTERIOR COMPARTMENT. Nausea/Vomiting: None    Postoperative hydration reviewed and adequate. Pain:  Pain Scale 1: Numeric (0 - 10) (02/11/17 1600)  Pain Intensity 1: 0 (02/11/17 1600)   Managed    Neurological Status:   Neuro (WDL): Within Defined Limits (02/11/17 1150)  Neuro  Neurologic State: Drowsy (02/11/17 1242)  Orientation Level: Oriented X4 (02/11/17 1242)  Cognition: Decreased attention/concentration (02/11/17 1242)  Speech: Clear (02/11/17 1150)  Assessment L Pupil: Round;Brisk (02/11/17 0800)  Size L Pupil (mm): 3 (02/11/17 0800)  Assessment R Pupil: Brisk;Round (02/11/17 0800)  Size R Pupil (mm): 3 (02/11/17 0800)  LUE Motor Response: Purposeful (02/11/17 1200)  LLE Motor Response: Purposeful (02/11/17 1200)  RUE Motor Response: Purposeful (02/11/17 1200)  RLE Motor Response: Purposeful (02/11/17 1200)   At baseline    Mental Status and Level of Consciousness: Arousable    Pulmonary Status:   O2 Device: Nasal cannula (02/11/17 1600)   Adequate oxygenation and airway patent    Complications related to anesthesia: None    Post-anesthesia assessment completed.  No concerns    Signed By: Caryl Sim MD     February 11, 2017

## 2017-02-11 NOTE — PROGRESS NOTES
Physical Therapy    Pt currently down in OR for R LE fasciotomy. Will hold patient and follow up with him tomorrow.     Thank Syed Taylor PT,DPT

## 2017-02-11 NOTE — PROGRESS NOTES
Vascular  Nice right DP pulse but total foot drop on the right   Anterior compartment tender  Needs fasciotomy  Will post for later today as he just ate

## 2017-02-11 NOTE — ANESTHESIA PREPROCEDURE EVALUATION
Anesthetic History   No history of anesthetic complications            Review of Systems / Medical History  Patient summary reviewed, nursing notes reviewed and pertinent labs reviewed    Pulmonary  Within defined limits                 Neuro/Psych   Within defined limits           Cardiovascular              CAD and PAD    Exercise tolerance: >4 METS  Comments: asc aortic dissection   GI/Hepatic/Renal  Within defined limits              Endo/Other  Within defined limits           Other Findings            Physical Exam    Airway  Mallampati: II  TM Distance: 4 - 6 cm  Neck ROM: normal range of motion   Mouth opening: Normal     Cardiovascular  Regular rate and rhythm,  S1 and S2 normal,  no murmur, click, rub, or gallop             Dental  No notable dental hx       Pulmonary  Breath sounds clear to auscultation               Abdominal  GI exam deferred       Other Findings            Anesthetic Plan    ASA: 3, emergent  Anesthesia type: general    Monitoring Plan: Arterial line and CVP      Induction: Intravenous  Anesthetic plan and risks discussed with: Patient

## 2017-02-11 NOTE — BRIEF OP NOTE
BRIEF OPERATIVE NOTE    Date of Procedure: 2/11/2017   Preoperative Diagnosis: right foot drop  Postoperative Diagnosis: *compartment syndrome right anterior compartment *    Procedure(s):  FASCIOTOMY RIGHT ANTERIOR COMPARTMENT  Surgeon(s) and Role:     Jacob Kennedy MD - Primary            Surgical Staff:  Circ-1: Herma Fleischer, RN  Scrub RN-1: Marleen Helton RN  Surg Asst-1: Naima Santos  Event Time In   Incision Start 1100   Incision Close 1103     Anesthesia: General   Estimated Blood Loss: *negligible**  Specimens: * None *   Findings: **ischemic muscle anterior compartment*   Complications: **none*  Implants: * No implants in log *

## 2017-02-11 NOTE — PROGRESS NOTES
0800:  Patient sitting in bed, needing pain medication and eating breakfast.  R foot is edematous and is numb and cold. Pulses are palpable. 0845:  Dr. Aleida Canales at bedside assessing pulses. Scheduled a R lower leg anterior fasciotomy. 0915:  Dr. Chelsea Patel at bedside to talk to family. 1000:  Down to OR with OR team on monitor. SWAN removed before transfer. 1235:  Arrived on unit with OR team on monitor. 2L NC, A-line, VSS, fasciotomy dressing on R leg clean, dry and intact. +1 pulses on R foot, +1 edema. Range of motion has improved, extremity warm. Dysphagia screen done. 1243:  Primary Nurse Inga Islas, FILIPE and Rosa Torres RN performed a dual skin assessment on this patient Impairment noted in wounds. Elvin score is 15    1845:  MAC and A-line d/c'd. Temporary pacer wires capped and taped to chest.    2000:  Bedside and Verbal shift change report given to Jodee Sheppard RN by Marko Severin, RN. Report included the following information SBAR, Kardex, Intake/Output, MAR and Recent Results.

## 2017-02-11 NOTE — ROUTINE PROCESS
Patient: Nba Patterson MRN: [id number]  SSN: [id number]   YOB: 1960  Age: 61 y.o. Sex: male     Patient is status post Procedure(s):  FASCIOTOMY RIGHT  LEG ANTERIOR COMPARTMENT. Surgeon(s) and Role:     * Gera Atkins MD - Primary    Local/Dose/Irrigation:                Double Lumen 02/09/17 Right Internal jugular (Active)   Central Line Being Utilized Yes 2/11/2017 10:19 AM   Criteria for Appropriate Use Limited/no vessel suitable for conventional peripheral access 2/11/2017 10:19 AM   Site Assessment Clean, dry, & intact 2/11/2017 10:19 AM   Infiltration Assessment 0 2/11/2017  8:00 AM   Affected Extremity/Extremities Color distal to insertion site pink (or appropriate for race) 2/11/2017  8:00 AM   Date of Last Dressing Change 02/09/17 2/10/2017  8:00 PM   Dressing Status Clean, dry, & intact 2/11/2017  8:00 AM   Dressing Type Disk with Chlorhexadine Gluconate (CHG); Transparent;Tape 2/11/2017  8:00 AM   Action Taken Open ports on tubing capped 2/11/2017  8:00 AM   Proximal Hub Color/Line Status White; Infusing 2/11/2017 10:19 AM   Positive Blood Return (Medial Site) Yes 2/10/2017  8:00 PM   Distal Hub Color/Line Status Capped;Brown 2/11/2017 10:19 AM   Positive Blood Return (Lateral Site) Yes 2/11/2017  8:00 AM   Alcohol Cap Used Yes 2/11/2017 10:19 AM        Peripheral IV 02/09/17 Left Antecubital (Active)   Site Assessment Clean, dry, & intact 2/11/2017 10:19 AM   Phlebitis Assessment 0 2/11/2017 10:19 AM   Infiltration Assessment 0 2/11/2017 10:19 AM   Dressing Status Clean, dry, & intact; Occlusive 2/11/2017 10:19 AM   Dressing Type Tape;Transparent 2/11/2017 10:19 AM   Hub Color/Line Status Green;Capped 2/11/2017 10:19 AM   Action Taken Open ports on tubing capped 2/11/2017 10:19 AM   Alcohol Cap Used Yes 2/11/2017 10:19 AM       Peripheral IV 02/09/17 Right Antecubital (Active)   Site Assessment Clean, dry, & intact 2/11/2017 10:19 AM   Phlebitis Assessment 0 2/11/2017 10:19 AM Infiltration Assessment 0 2/11/2017 10:19 AM   Dressing Status Clean, dry, & intact; Occlusive 2/11/2017 10:19 AM   Dressing Type Transparent;Tape 2/11/2017 10:19 AM   Hub Color/Line Status Pink;Capped 2/11/2017 10:19 AM   Action Taken Open ports on tubing capped 2/11/2017 10:19 AM   Alcohol Cap Used Yes 2/11/2017 10:19 AM       Peripheral IV 02/09/17 Left Hand (Active)   Site Assessment Clean, dry, & intact 2/11/2017 10:19 AM   Phlebitis Assessment 0 2/11/2017 10:19 AM   Infiltration Assessment 0 2/11/2017 10:19 AM   Dressing Status Clean, dry, & intact; Occlusive 2/11/2017 10:19 AM   Dressing Type Tape;Transparent 2/11/2017 10:19 AM   Hub Color/Line Status Green;Capped 2/11/2017 10:19 AM   Action Taken Open ports on tubing capped 2/11/2017 10:19 AM   Alcohol Cap Used Yes 2/11/2017 10:19 AM      Arterial Line 02/09/17 Right Radial artery (Active)   Site Assessment Clean, dry, & intact 2/11/2017 10:19 AM   Dressing Status Clean, dry, & intact 2/11/2017 10:19 AM   Dressing Type Tape;Transparent 2/11/2017 10:19 AM   Line Status Intact and in place 2/11/2017 10:19 AM   Treatment Arm board on;Zeroed or re-zeroed 2/11/2017  8:00 AM   Affected Extremity/Extremities Color distal to insertion site pink (or appropriate for race) 2/11/2017 10:19 AM        Taylor Hernandez #1 02/09/17 Lower;Mid Chest (Active)   Site Assessment Other (Comment) 2/11/2017  8:00 AM   Dressing Status Clean, dry, & intact 2/11/2017  8:00 AM   Drainage Description Serosanguinous 2/11/2017  8:00 AM   Michael Drain Airleak No 2/11/2017  8:00 AM   Status Patent;Draining;Suction (specify 2/11/2017  8:00 AM   Y Connector Used Yes 2/11/2017  8:00 AM       Ami Soaicha #2 02/09/17 Lower;Mid Chest (Active)   Site Assessment Other (Comment) 2/11/2017  8:00 AM   Dressing Status Clean, dry, & intact 2/11/2017  8:00 AM   Drainage Description Serosanguinous 2/11/2017  8:00 AM   Michael Drain Airleak No 2/11/2017  8:00 AM   Status Patent;Draining;Suction (specify 2/11/2017 8:00 AM   Y Connector Used Yes 2/11/2017  8:00 AM   Drainage Chamber Level (ml) 1350 ml 2/11/2017 10:19 AM   Output (ml) 25 ml 2/11/2017 10:19 AM      Airway - Endotracheal Tube 02/11/17 Oral (Active)       Airway - Endotracheal Tube 02/11/17 (Active)                   Dressing/Packing:  Wound Leg Right-DRESSING TYPE: 4 x 4;ABD pad; Other (Comment) (medipore tape) (02/11/17 1100)  Wound Chest Mid-DRESSING TYPE: 4 x 4;Non-adherent (02/11/17 0800)  Wound Chest Lower;Mid-DRESSING TYPE: 4 x 4;Non-adherent (02/11/17 0800)  Wound Groin Right-DRESSING TYPE: Topical skin adhesive/glue (02/11/17 0800)  Wound Groin Left-DRESSING TYPE: Topical skin adhesive/glue (02/11/17 0800)  Splint/Cast:  ]    Other:

## 2017-02-11 NOTE — OP NOTES
[address]   [facility] [address] [place] [address], [address]   OP NOTE       Name:  Marguerite Blas   MR#:  [id number]   :  1960   Account #:  [de-identified]    Surgery Date:  2017   Date of Adm:  2017       PREOPERATIVE DIAGNOS:     POSTOPERATIVE DIAGNOSIS: Footdrop on the right. PROCEDURES PERFORMED: Right anterior compartment   fasciotomy. SURGEON: Chidi [place]. Debra Huose MD    ESTIMATED BLOOD LOSS: Negligible. COMPLICATIONS: None. ANESTHESIA: General.    SPECIMENS REMOVED: None. INDICATIONS: A 24-year-old gentleman admitted emergently on the    with a type 1 aortic dissection. He underwent a sleeve repair of his   proximal ascending aorta by Dr. Thaddeus Euceda. Dr. Mattie Jeronimo, at the conclusion of   the procedure, because of no femoral pulse on the right, did a left-to-  right femoral-femoral bypass. He has had a booming pulses in his right   foot. Apparently, he had minimal amount of problems with dorsiflexion   in his right foot yesterday. This morning, he had a complete footdrop. His anterior compartment was quite tight. It was opted to proceed with   urgent anterior compartment fasciotomy. PROCEDURE: After informed consent, the patient was taken to the   operating room. After general anesthesia, a thorough time-out was   done. After prepping and draping, an incision was made longitudinally   in the anterior compartment through skin and fascia. Long scissors   were used to go to the knee proximally and to the ankle distally. The   muscle sprung out and was ischemic-appearing, but looked better   quickly thereafter, there was very little bleeding. Hemostasis was   obtained. The wound was packed with moistened normal saline and   redressed. The patient tolerated the procedure well and was taken   back to the intensive care unit in guarded condition.         Rachel Carpenter MD      [facility] / [facility] JOSE MIGUEL   D:  2017   11:13   T:  2017   11:29   Job #:  [id number]

## 2017-02-12 ENCOUNTER — APPOINTMENT (OUTPATIENT)
Dept: GENERAL RADIOLOGY | Age: 57
DRG: 219 | End: 2017-02-12
Attending: NURSE PRACTITIONER
Payer: COMMERCIAL

## 2017-02-12 LAB
ALBUMIN SERPL BCP-MCNC: 3 G/DL (ref 3.5–5)
ALBUMIN/GLOB SERPL: 1.3 {RATIO} (ref 1.1–2.2)
ALP SERPL-CCNC: 53 U/L (ref 45–117)
ALT SERPL-CCNC: 67 U/L (ref 12–78)
ANION GAP BLD CALC-SCNC: 10 MMOL/L (ref 5–15)
APTT PPP: 29.6 SEC (ref 22.1–32.5)
APTT PPP: 36.1 SEC (ref 22.1–32.5)
AST SERPL W P-5'-P-CCNC: 307 U/L (ref 15–37)
ATRIAL RATE: 357 BPM
BASOPHILS # BLD AUTO: 0 K/UL (ref 0–0.1)
BASOPHILS # BLD: 0 % (ref 0–1)
BILIRUB SERPL-MCNC: 1.1 MG/DL (ref 0.2–1)
BLD PROD TYP BPU: NORMAL
BLD PROD TYP BPU: NORMAL
BPU ID: NORMAL
BPU ID: NORMAL
BUN SERPL-MCNC: 25 MG/DL (ref 6–20)
BUN/CREAT SERPL: 20 (ref 12–20)
CALCIUM SERPL-MCNC: 7.8 MG/DL (ref 8.5–10.1)
CALCULATED R AXIS, ECG10: 36 DEGREES
CALCULATED T AXIS, ECG11: 17 DEGREES
CHLORIDE SERPL-SCNC: 104 MMOL/L (ref 97–108)
CO2 SERPL-SCNC: 24 MMOL/L (ref 21–32)
CREAT SERPL-MCNC: 1.24 MG/DL (ref 0.7–1.3)
DIAGNOSIS, 93000: NORMAL
EOSINOPHIL # BLD: 0 K/UL (ref 0–0.4)
EOSINOPHIL NFR BLD: 0 % (ref 0–7)
ERYTHROCYTE [DISTWIDTH] IN BLOOD BY AUTOMATED COUNT: 13.3 % (ref 11.5–14.5)
ERYTHROCYTE [DISTWIDTH] IN BLOOD BY AUTOMATED COUNT: 13.4 % (ref 11.5–14.5)
GLOBULIN SER CALC-MCNC: 2.4 G/DL (ref 2–4)
GLUCOSE BLD STRIP.AUTO-MCNC: 102 MG/DL (ref 65–100)
GLUCOSE BLD STRIP.AUTO-MCNC: 104 MG/DL (ref 65–100)
GLUCOSE BLD STRIP.AUTO-MCNC: 111 MG/DL (ref 65–100)
GLUCOSE BLD STRIP.AUTO-MCNC: 116 MG/DL (ref 65–100)
GLUCOSE BLD STRIP.AUTO-MCNC: 119 MG/DL (ref 65–100)
GLUCOSE BLD STRIP.AUTO-MCNC: 125 MG/DL (ref 65–100)
GLUCOSE BLD STRIP.AUTO-MCNC: 148 MG/DL (ref 65–100)
GLUCOSE BLD STRIP.AUTO-MCNC: 212 MG/DL (ref 65–100)
GLUCOSE BLD STRIP.AUTO-MCNC: 92 MG/DL (ref 65–100)
GLUCOSE BLD STRIP.AUTO-MCNC: 94 MG/DL (ref 65–100)
GLUCOSE BLD STRIP.AUTO-MCNC: 95 MG/DL (ref 65–100)
GLUCOSE BLD STRIP.AUTO-MCNC: 97 MG/DL (ref 65–100)
GLUCOSE BLD STRIP.AUTO-MCNC: 97 MG/DL (ref 65–100)
GLUCOSE BLD STRIP.AUTO-MCNC: 98 MG/DL (ref 65–100)
GLUCOSE SERPL-MCNC: 91 MG/DL (ref 65–100)
HCT VFR BLD AUTO: 28.5 % (ref 36.6–50.3)
HCT VFR BLD AUTO: 28.9 % (ref 36.6–50.3)
HGB BLD-MCNC: 9.4 G/DL (ref 12.1–17)
HGB BLD-MCNC: 9.7 G/DL (ref 12.1–17)
LYMPHOCYTES # BLD AUTO: 10 % (ref 12–49)
LYMPHOCYTES # BLD: 1.2 K/UL (ref 0.8–3.5)
MAGNESIUM SERPL-MCNC: 2.2 MG/DL (ref 1.6–2.4)
MCH RBC QN AUTO: 30.9 PG (ref 26–34)
MCH RBC QN AUTO: 31.3 PG (ref 26–34)
MCHC RBC AUTO-ENTMCNC: 33 G/DL (ref 30–36.5)
MCHC RBC AUTO-ENTMCNC: 33.6 G/DL (ref 30–36.5)
MCV RBC AUTO: 93.2 FL (ref 80–99)
MCV RBC AUTO: 93.8 FL (ref 80–99)
MONOCYTES # BLD: 1.6 K/UL (ref 0–1)
MONOCYTES NFR BLD AUTO: 13 % (ref 5–13)
NEUTS SEG # BLD: 9.4 K/UL (ref 1.8–8)
NEUTS SEG NFR BLD AUTO: 77 % (ref 32–75)
PLATELET # BLD AUTO: 90 K/UL (ref 150–400)
PLATELET # BLD AUTO: 99 K/UL (ref 150–400)
POTASSIUM SERPL-SCNC: 4.3 MMOL/L (ref 3.5–5.1)
PROT SERPL-MCNC: 5.4 G/DL (ref 6.4–8.2)
Q-T INTERVAL, ECG07: 428 MS
QRS DURATION, ECG06: 94 MS
QTC CALCULATION (BEZET), ECG08: 420 MS
RBC # BLD AUTO: 3.04 M/UL (ref 4.1–5.7)
RBC # BLD AUTO: 3.1 M/UL (ref 4.1–5.7)
SERVICE CMNT-IMP: ABNORMAL
SERVICE CMNT-IMP: NORMAL
SODIUM SERPL-SCNC: 138 MMOL/L (ref 136–145)
STATUS OF UNIT,%ST: NORMAL
STATUS OF UNIT,%ST: NORMAL
THERAPEUTIC RANGE,PTTT: ABNORMAL SECS (ref 58–77)
THERAPEUTIC RANGE,PTTT: NORMAL SECS (ref 58–77)
UNIT DIVISION, %UDIV: 0
UNIT DIVISION, %UDIV: 0
VENTRICULAR RATE, ECG03: 58 BPM
WBC # BLD AUTO: 12.3 K/UL (ref 4.1–11.1)
WBC # BLD AUTO: 13.8 K/UL (ref 4.1–11.1)

## 2017-02-12 PROCEDURE — 93005 ELECTROCARDIOGRAM TRACING: CPT

## 2017-02-12 PROCEDURE — 65660000000 HC RM CCU STEPDOWN

## 2017-02-12 PROCEDURE — 80053 COMPREHEN METABOLIC PANEL: CPT | Performed by: NURSE PRACTITIONER

## 2017-02-12 PROCEDURE — 77010033678 HC OXYGEN DAILY

## 2017-02-12 PROCEDURE — G8979 MOBILITY GOAL STATUS: HCPCS | Performed by: PHYSICAL THERAPIST

## 2017-02-12 PROCEDURE — G8978 MOBILITY CURRENT STATUS: HCPCS | Performed by: PHYSICAL THERAPIST

## 2017-02-12 PROCEDURE — 85730 THROMBOPLASTIN TIME PARTIAL: CPT | Performed by: THORACIC SURGERY (CARDIOTHORACIC VASCULAR SURGERY)

## 2017-02-12 PROCEDURE — 97116 GAIT TRAINING THERAPY: CPT | Performed by: PHYSICAL THERAPIST

## 2017-02-12 PROCEDURE — 97530 THERAPEUTIC ACTIVITIES: CPT | Performed by: PHYSICAL THERAPIST

## 2017-02-12 PROCEDURE — 83735 ASSAY OF MAGNESIUM: CPT | Performed by: NURSE PRACTITIONER

## 2017-02-12 PROCEDURE — 85027 COMPLETE CBC AUTOMATED: CPT | Performed by: NURSE PRACTITIONER

## 2017-02-12 PROCEDURE — 74011250637 HC RX REV CODE- 250/637: Performed by: THORACIC SURGERY (CARDIOTHORACIC VASCULAR SURGERY)

## 2017-02-12 PROCEDURE — L4396 STATIC OR DYNAMI AFO PRE CST: HCPCS

## 2017-02-12 PROCEDURE — 36415 COLL VENOUS BLD VENIPUNCTURE: CPT | Performed by: NURSE PRACTITIONER

## 2017-02-12 PROCEDURE — 74011250636 HC RX REV CODE- 250/636: Performed by: THORACIC SURGERY (CARDIOTHORACIC VASCULAR SURGERY)

## 2017-02-12 PROCEDURE — 97162 PT EVAL MOD COMPLEX 30 MIN: CPT | Performed by: PHYSICAL THERAPIST

## 2017-02-12 PROCEDURE — 85025 COMPLETE CBC W/AUTO DIFF WBC: CPT | Performed by: THORACIC SURGERY (CARDIOTHORACIC VASCULAR SURGERY)

## 2017-02-12 PROCEDURE — 74011250637 HC RX REV CODE- 250/637: Performed by: NURSE PRACTITIONER

## 2017-02-12 PROCEDURE — 77030018836 HC SOL IRR NACL ICUM -A

## 2017-02-12 PROCEDURE — 71010 XR CHEST PORT: CPT

## 2017-02-12 PROCEDURE — 74011636637 HC RX REV CODE- 636/637: Performed by: NURSE PRACTITIONER

## 2017-02-12 PROCEDURE — 82962 GLUCOSE BLOOD TEST: CPT

## 2017-02-12 RX ORDER — FUROSEMIDE 20 MG/1
20 TABLET ORAL DAILY
Status: DISCONTINUED | OUTPATIENT
Start: 2017-02-13 | End: 2017-02-15

## 2017-02-12 RX ORDER — HEPARIN SODIUM 5000 [USP'U]/ML
4000 INJECTION, SOLUTION INTRAVENOUS; SUBCUTANEOUS ONCE
Status: COMPLETED | OUTPATIENT
Start: 2017-02-12 | End: 2017-02-12

## 2017-02-12 RX ORDER — METOPROLOL TARTRATE 25 MG/1
25 TABLET, FILM COATED ORAL EVERY 12 HOURS
Status: DISCONTINUED | OUTPATIENT
Start: 2017-02-12 | End: 2017-02-13

## 2017-02-12 RX ORDER — HEPARIN SODIUM 10000 [USP'U]/100ML
10-25 INJECTION, SOLUTION INTRAVENOUS
Status: DISCONTINUED | OUTPATIENT
Start: 2017-02-12 | End: 2017-02-15

## 2017-02-12 RX ORDER — WARFARIN SODIUM 5 MG/1
5 TABLET ORAL ONCE
Status: COMPLETED | OUTPATIENT
Start: 2017-02-12 | End: 2017-02-12

## 2017-02-12 RX ADMIN — OXYCODONE HYDROCHLORIDE AND ACETAMINOPHEN 1 TABLET: 5; 325 TABLET ORAL at 16:39

## 2017-02-12 RX ADMIN — HEPARIN SODIUM AND DEXTROSE 10 UNITS/KG/HR: 10000; 5 INJECTION INTRAVENOUS at 09:19

## 2017-02-12 RX ADMIN — DOCUSATE SODIUM AND SENNOSIDES 1 TABLET: 8.6; 5 TABLET, FILM COATED ORAL at 08:48

## 2017-02-12 RX ADMIN — MUPIROCIN: 20 OINTMENT TOPICAL at 08:57

## 2017-02-12 RX ADMIN — Medication 10 ML: at 22:10

## 2017-02-12 RX ADMIN — CHLORHEXIDINE GLUCONATE 10 ML: 1.2 RINSE ORAL at 18:00

## 2017-02-12 RX ADMIN — WARFARIN SODIUM 5 MG: 5 TABLET ORAL at 16:32

## 2017-02-12 RX ADMIN — OXYCODONE HYDROCHLORIDE AND ACETAMINOPHEN 2 TABLET: 5; 325 TABLET ORAL at 22:09

## 2017-02-12 RX ADMIN — OXYCODONE HYDROCHLORIDE AND ACETAMINOPHEN 1 TABLET: 5; 325 TABLET ORAL at 04:00

## 2017-02-12 RX ADMIN — INSULIN GLARGINE 11 UNITS: 100 INJECTION, SOLUTION SUBCUTANEOUS at 12:40

## 2017-02-12 RX ADMIN — ASPIRIN 81 MG CHEWABLE TABLET 81 MG: 81 TABLET CHEWABLE at 08:48

## 2017-02-12 RX ADMIN — METOPROLOL TARTRATE 25 MG: 25 TABLET ORAL at 14:03

## 2017-02-12 RX ADMIN — Medication 10 ML: at 08:48

## 2017-02-12 RX ADMIN — Medication 10 ML: at 14:03

## 2017-02-12 RX ADMIN — Medication 400 MG: at 08:48

## 2017-02-12 RX ADMIN — MUPIROCIN: 20 OINTMENT TOPICAL at 18:00

## 2017-02-12 RX ADMIN — PANTOPRAZOLE SODIUM 40 MG: 40 TABLET, DELAYED RELEASE ORAL at 08:02

## 2017-02-12 RX ADMIN — HEPARIN SODIUM AND DEXTROSE 14 UNITS/KG/HR: 10000; 5 INJECTION INTRAVENOUS at 18:12

## 2017-02-12 RX ADMIN — OXYCODONE HYDROCHLORIDE AND ACETAMINOPHEN 1 TABLET: 5; 325 TABLET ORAL at 10:50

## 2017-02-12 RX ADMIN — Medication 10 ML: at 00:07

## 2017-02-12 RX ADMIN — INSULIN LISPRO 2 UNITS: 100 INJECTION, SOLUTION INTRAVENOUS; SUBCUTANEOUS at 12:39

## 2017-02-12 RX ADMIN — Medication 10 ML: at 08:49

## 2017-02-12 RX ADMIN — METOPROLOL TARTRATE 25 MG: 25 TABLET ORAL at 22:09

## 2017-02-12 RX ADMIN — HEPARIN SODIUM 4000 UNITS: 5000 INJECTION, SOLUTION INTRAVENOUS; SUBCUTANEOUS at 18:12

## 2017-02-12 RX ADMIN — CHLORHEXIDINE GLUCONATE 10 ML: 1.2 RINSE ORAL at 08:49

## 2017-02-12 NOTE — PROGRESS NOTES
2000 rcd report from [address] is asleep easily arousable, AOX3  Denies pain at this time  Chest dressings dry and intact  Rt leg dressing dry and intact, pulses present   Unable to perform dorsiflexionrt foot    0444 pt went to slow Afib HR 50's on monitor, w/ occasional > 0.2 sec pauses  BP 'T systolic  AOX3 denies any dizziness  12L EKG done    0550 Dr Angela Salmon called and notified of above w/ order  Pt connected to temp pacemaker on VVI rate 50 MA 10        0800 Bedside and Verbal shift change report given to [address] [place] [address] (oncoming nurse) by Christina Burgess (offgoing nurse). Report included the following information SBAR, Kardex, OR Summary, Procedure Summary, Intake/Output, MAR, Recent Results and Cardiac Rhythm 1st degree AVB.

## 2017-02-12 NOTE — PROGRESS NOTES
I have read and agree with Cira Lay RN's documentation and care. I have reviewed the MAR and all flow sheets associated with Patient's care today.

## 2017-02-12 NOTE — PROGRESS NOTES
0730: Bedside and Verbal shift change report given to DEREK RN (oncoming nurse) by FILIPE Davis (offgoing nurse). Report included the following information SBAR, Kardex, Intake/Output, MAR, Recent Results, Med Rec Status and Cardiac Rhythm NSR with 1st degree AVB. [facility] Confer with Denice Mendiola MD regarding low platelet count of 90 prior to initiating heparin gtt. Orders received to initiate drip. Also spoke with MD about 1st degree AVB. MD aware. No new EKG needed. No new orders received. 0919: Heparin gtt initiated. 1050: 1 tab of Percocet given for 2/10 pain. 1105: Spoke with Denice Mendiola MD regarding pt voiding tea-colored urine, when previously it was more domi. No new orders received. 1145: Pt's R leg fasciotomy dressing site changed. Moderate sanguinous drainage noted at site. Site looks healthy with red tissue. Cleaned site with sterile water. Covered site using wet to dry gauze, abd pads, and elian wrap. Will continue to monitor. 1205: Spoke with Katie Hurley MD regarding pt's weight bearing status and activity restrictions. Received orders that patient can get out of bed with assistance as tolerated and weight bearing as tolerated as well. PT here to work with patient. 1240: 11 units of Lantus given to wean pt off of insulin gtt. Pt has eaten greater than 50% of both breakfast and lunch. 1440: Insulin gtt stopped and dced. 1600: TRANSFER - OUT REPORT:     Natanael Salas  being transferred to CVSU (unit) for routine progression of care       Report consisted of patients Situation, Background, Assessment and   Recommendations(SBAR). Information from the following report(s) SBAR, Kardex, Intake/Output, MAR, Recent Results, Med Rec Status and Cardiac Rhythm NSR with 1st degree AVB was reviewed with the receiving nurse.     Lines:   Peripheral IV 02/09/17 Left Antecubital (Active)   Site Assessment Clean, dry, & intact 2/12/2017  3:00 PM   Phlebitis Assessment 0 2/12/2017  3:00 PM   Infiltration Assessment 0 2/12/2017  3:00 PM   Dressing Status Clean, dry, & intact 2/12/2017  3:00 PM   Dressing Type Transparent;Tape 2/12/2017  3:00 PM   Hub Color/Line Status Green;Capped 2/12/2017  3:00 PM   Action Taken Open ports on tubing capped 2/12/2017  3:00 PM   Alcohol Cap Used Yes 2/12/2017  3:00 PM       Peripheral IV 02/09/17 Right Antecubital (Active)   Site Assessment Clean, dry, & intact 2/12/2017  3:00 PM   Phlebitis Assessment 0 2/12/2017  3:00 PM   Infiltration Assessment 0 2/12/2017  3:00 PM   Dressing Status Clean, dry, & intact 2/12/2017  3:00 PM   Dressing Type Transparent;Tape 2/12/2017  3:00 PM   Hub Color/Line Status Pink; Infusing;Flushed;Patent 2/12/2017  3:00 PM   Action Taken Open ports on tubing capped 2/12/2017  3:00 PM   Alcohol Cap Used Yes 2/12/2017  3:00 PM       Peripheral IV 02/09/17 Left Hand (Active)   Site Assessment Clean, dry, & intact 2/12/2017  3:00 PM   Phlebitis Assessment 0 2/12/2017  3:00 PM   Infiltration Assessment 0 2/12/2017  3:00 PM   Dressing Status Clean, dry, & intact 2/12/2017  3:00 PM   Dressing Type Transparent;Tape 2/12/2017  3:00 PM   Hub Color/Line Status Green;Capped;Flushed;Patent 2/12/2017  3:00 PM   Action Taken Open ports on tubing capped 2/12/2017  3:00 PM   Alcohol Cap Used Yes 2/12/2017  3:00 PM        Opportunity for questions and clarification was provided. Patient transported with:   Monitor  Registered Nurse   8837: Transferred pt over with PCT on monitor. This RN followed pt onto CVSU and remained his nurse. Tele applied. VSS. 1930: Bedside and Verbal shift change report given to Jorge Barlow RN (oncoming nurse) by FILIPE REED (offgoing nurse). Report included the following information SBAR, Kardex, Intake/Output, MAR, Recent Results, Med Rec Status and Cardiac Rhythm NSR with 1st degree AVB. Problem: Falls - Risk of  Goal: *Absence of falls  Outcome: Progressing Towards Goal  Pt's bed in low/locked position.  All personal items and call bell within reach. Side rails up x 3. Goal: *Knowledge of fall prevention  Outcome: Progressing Towards Goal  Pt aware to call out when in need of assistance. Problem: Pressure Ulcer - Risk of  Goal: *Prevention of pressure ulcer  Outcome: Progressing Towards Goal  Pt turned and repositioned q2h for pressure ulcer prevention. Problem: Aortic Aneurysm Repair: Post Op Day 3  Goal: *Hemodynamically stable  Outcome: Progressing Towards Goal  Pt's VSS. Pt in NSR in the 70s with 1st degree AVB (patient's baseline). Goal: *Lungs clear or at baseline  Outcome: Progressing Towards Goal  Pt's lungs are clear to auscultation bilaterally in the upper lobes but diminished in the lower bases. Goal: *Afebrile  Outcome: Not Progressing Towards Goal  Pt had low grade fever overnight. Pt afebrile at 97.9 currently. Goal: *Optimal pain control at patients stated goal  Outcome: Progressing Towards Goal  Pt's pain controlled with PRN percocet. Goal: *Absence of signs and symptoms of DVT  Outcome: Progressing Towards Goal  Pt started on heparin gtt. No s/sx of DVT noted.

## 2017-02-12 NOTE — PROGRESS NOTES
Problem: Mobility Impaired (Adult and Pediatric)  Goal: *Acute Goals and Plan of Care (Insert Text)  Physical Therapy Goals  Initiated 2/12/2017  1. Patient will move from supine to sit and sit to supine in bed with modified independence within 7 day(s). 2. Patient will transfer from bed to chair and chair to bed with modified independence using the least restrictive device within 7 day(s). 3. Patient will perform sit to stand with modified independence within 7 day(s). 4. Patient will ambulate with independence for 300 feet with the least restrictive device within 7 day(s). 5. Patient will ascend/descend 5 stairs with 1 handrail(s) with supervision/set-up within 7 day(s). PHYSICAL THERAPY EVALUATION  Patient: Susanna Vasquez (89 y.o. male)  Date: 2/12/2017  Primary Diagnosis: unknown  Ascending aortic dissection (HCC)  right foot drop  Procedure(s) (LRB):  FASCIOTOMY RIGHT  LEG ANTERIOR COMPARTMENT (Right) 1 Day Post-Op   Precautions:   WBAT, Sternal, Fall      ASSESSMENT :  Based on the objective data described below, the patient presents with significantly impaired functional mobility but with excellent motivation. Patient has surgery for ascending aortic dissection repair and then left to right femoral femoral bypass graft. Patient then had to have emergent right anterior compartment fasciotomy. Of note, patient currently has 2/5 R dorsiflexion strength. Patient is cleared for OOB and is WBAT RLE with sternal precautions. Patients daughter a [facility] nurse is present during evaluation. Patient requires min A for supine to sit via log roll technique. Patient required mod A for sit to stand x 2 reps and min A for stand to sit x 2 reps. Patient was able to weight shift laterally and A-P with CGA. Patient ambulates few steps forward and few steps backwards repeatedly with minimal assistance, decreased step length/clearance, RLE foot drop, decreased stance time RLE.   Patient reports tingling and numbness sensation with pain 2/10 in RLE. Patient able to perform pre-gait activities as well such as target stepping with LLE for weight shifting over RLE and attempted R heel taps to encourage RLE DF during knee extension. Patient educated on sternal precautions and verbalizes understanding. Patient is highly motivated and was completely independent prior to admission. Anticipate excellent progress with skilled PT services. .     Patient will benefit from skilled intervention to address the above impairments. Patients rehabilitation potential is considered to be Excellent  Factors which may influence rehabilitation potential include:   [X]         None noted  [ ]         Mental ability/status  [ ]         Medical condition  [ ]         Home/family situation and support systems  [ ]         Safety awareness  [ ]         Pain tolerance/management  [ ]         Other:        PLAN :  Recommendations and Planned Interventions:  [X]           Bed Mobility Training             [X]    Neuromuscular Re-Education  [X]           Transfer Training                   [X]    Orthotic/Prosthetic Training  [X]           Gait Training                         [ ]    Modalities  [X]           Therapeutic Exercises           [ ]    Edema Management/Control  [X]           Therapeutic Activities            [X]    Patient and Family Training/Education  [ ]           Other (comment):     Frequency/Duration: Patient will be followed by physical therapy  daily to address goals. Discharge Recommendations: To Be Determined  Further Equipment Recommendations for Discharge: none       SUBJECTIVE:   Patient stated it doesn't hurt as bad as i thought it would.       OBJECTIVE DATA SUMMARY:   HISTORY:    Past Medical History   Diagnosis Date    Family history of colon cancer      Family history of diabetes mellitus (DM) 6/8/2010    Family history of early CAD 6/8/2010    Seborrheic dermatitis 6/8/2010   History reviewed.  No pertinent past surgical history. Prior Level of Function/Home Situation: independent and active  Personal factors and/or comorbidities impacting plan of care:      Home Situation  Home Environment: Private residence  One/Two Story Residence: Two story  Living Alone: No  Support Systems: Child(ronaldo), Spouse/Significant Other/Partner, Friends \ neighbors, Family member(s)  Patient Expects to be Discharged to[de-identified] Private residence  Current DME Used/Available at Home: None  Tub or Shower Type: Shower     EXAMINATION/PRESENTATION/DECISION MAKING:   Critical Behavior:  Neurologic State: Alert  Orientation Level: Oriented X4  Cognition: Appropriate decision making, Appropriate for age attention/concentration, Appropriate safety awareness, Follows commands  Safety/Judgement: Awareness of environment, Good awareness of safety precautions     Strength:    Strength:  (2/5 R dorsiflexion, otherwise WNL)     Functional Mobility:  Bed Mobility:  Rolling: Additional time;Contact guard assistance  Supine to Sit: Minimum assistance; Additional time        Transfers:  Sit to Stand: Moderate assistance; Additional time  Stand to Sit: Minimum assistance                       Balance:      Ambulation/Gait Training:  Distance (ft):  (few steps forward/backwards over and over)     Ambulation - Level of Assistance: Minimal assistance     Gait Description (WDL): Exceptions to WDL  Gait Abnormalities: Foot drop        Base of Support: Shift to left  Stance: Right decreased     Step Length: Left shortened;Right shortened              Therapeutic Exercises:   Reviewed RLE therex such as seated marching, seated long arc quad and 5 reps ankle pumps every couple of hours throughout the day      Functional Measure:  Functional Outcome Measures/Special Tests:  Functional Reach  Trial 1: 0 inches  Trial 2: 0 inches  Trial 3: 0 inches  Average: 0        G codes:   In compliance with CMSs Claims Based Outcome Reporting, the following G-code set was chosen for this patient based on their primary functional limitation being treated: The outcome measure chosen to determine the severity of the functional limitation was the functional reach with a score of 0 inches which was correlated with the impairment scale. · Mobility - Walking and Moving Around:               - CURRENT STATUS:    CN - 100% impaired, limited or restricted               - GOAL STATUS:           CL - 60%-79% impaired, limited or restricted               - D/C STATUS:                       ---------------To be determined---------------      Physical Therapy Evaluation Charge Determination   History Examination Presentation Decision-Making   MEDIUM  Complexity : 1-2 comorbidities / personal factors will impact the outcome/ POC  HIGH Complexity : 4+ Standardized tests and measures addressing body structure, function, activity limitation and / or participation in recreation  MEDIUM Complexity : Evolving with changing characteristics  MEDIUM Complexity : FOTO score of 26-74      Based on the above components, the patient evaluation is determined to be of the following complexity level: MEDIUM     Pain:  Pain Scale 1: Numeric (0 - 10)  Pain Intensity 1: 0  Pain Location 1: Chest;Incisional  Pain Orientation 1: Anterior  Pain Description 1: Aching  Pain Intervention(s) 1: Medication (see MAR)  Activity Tolerance:   SpO2 93% on room air  HR 90's bpm with activity  BP readings during PT: 113/56, 128/56, 125/62  Please refer to the flowsheet for vital signs taken during this treatment. After treatment:   [X]         Patient left in no apparent distress sitting up in chair  [ ]         Patient left in no apparent distress in bed  [X]         Call bell left within reach  [X]         Nursing notified  [X]         Caregiver present  [ ]         Bed alarm activated      COMMUNICATION/EDUCATION:   The patients plan of care was discussed with: Registered Nurse and Rehabilitation Attendant.   [X] Fall prevention education was provided and the patient/caregiver indicated understanding. [X]         Patient/family have participated as able in goal setting and plan of care. [ ]         Patient/family agree to work toward stated goals and plan of care. [ ]         Patient understands intent and goals of therapy, but is neutral about his/her participation. [ ]         Patient is unable to participate in goal setting and plan of care.      Thank you for this referral.  Renata Perez, PT, DPT   Time Calculation: 40 mins

## 2017-02-12 NOTE — PROGRESS NOTES
Comfortable brief episode of atrial fibrillation  Now in NSR  Some movement of R foot  Will start heparin and coumadin

## 2017-02-13 LAB
ALBUMIN SERPL BCP-MCNC: 2.8 G/DL (ref 3.5–5)
ALBUMIN/GLOB SERPL: 1 {RATIO} (ref 1.1–2.2)
ALP SERPL-CCNC: 105 U/L (ref 45–117)
ALT SERPL-CCNC: 66 U/L (ref 12–78)
ANION GAP BLD CALC-SCNC: 10 MMOL/L (ref 5–15)
APTT PPP: 47.6 SEC (ref 22.1–32.5)
APTT PPP: 48.9 SEC (ref 22.1–32.5)
APTT PPP: 49.6 SEC (ref 22.1–32.5)
AST SERPL W P-5'-P-CCNC: 159 U/L (ref 15–37)
ATRIAL RATE: 88 BPM
BASOPHILS # BLD AUTO: 0 K/UL (ref 0–0.1)
BASOPHILS # BLD: 0 % (ref 0–1)
BILIRUB SERPL-MCNC: 1 MG/DL (ref 0.2–1)
BUN SERPL-MCNC: 29 MG/DL (ref 6–20)
BUN/CREAT SERPL: 24 (ref 12–20)
CALCIUM SERPL-MCNC: 7.9 MG/DL (ref 8.5–10.1)
CALCULATED R AXIS, ECG10: 45 DEGREES
CALCULATED T AXIS, ECG11: 26 DEGREES
CHLORIDE SERPL-SCNC: 100 MMOL/L (ref 97–108)
CO2 SERPL-SCNC: 25 MMOL/L (ref 21–32)
CREAT SERPL-MCNC: 1.2 MG/DL (ref 0.7–1.3)
DIAGNOSIS, 93000: NORMAL
EOSINOPHIL # BLD: 0.1 K/UL (ref 0–0.4)
EOSINOPHIL NFR BLD: 1 % (ref 0–7)
ERYTHROCYTE [DISTWIDTH] IN BLOOD BY AUTOMATED COUNT: 13 % (ref 11.5–14.5)
GLOBULIN SER CALC-MCNC: 2.8 G/DL (ref 2–4)
GLUCOSE BLD STRIP.AUTO-MCNC: 111 MG/DL (ref 65–100)
GLUCOSE SERPL-MCNC: 121 MG/DL (ref 65–100)
HCT VFR BLD AUTO: 28 % (ref 36.6–50.3)
HGB BLD-MCNC: 9.1 G/DL (ref 12.1–17)
INR PPP: 1.3 (ref 0.9–1.1)
LYMPHOCYTES # BLD AUTO: 14 % (ref 12–49)
LYMPHOCYTES # BLD: 1.7 K/UL (ref 0.8–3.5)
MAGNESIUM SERPL-MCNC: 2.3 MG/DL (ref 1.6–2.4)
MCH RBC QN AUTO: 30.5 PG (ref 26–34)
MCHC RBC AUTO-ENTMCNC: 32.5 G/DL (ref 30–36.5)
MCV RBC AUTO: 94 FL (ref 80–99)
MONOCYTES # BLD: 1.9 K/UL (ref 0–1)
MONOCYTES NFR BLD AUTO: 15 % (ref 5–13)
NEUTS SEG # BLD: 8.8 K/UL (ref 1.8–8)
NEUTS SEG NFR BLD AUTO: 70 % (ref 32–75)
PLATELET # BLD AUTO: 102 K/UL (ref 150–400)
POTASSIUM SERPL-SCNC: 4 MMOL/L (ref 3.5–5.1)
PROT SERPL-MCNC: 5.6 G/DL (ref 6.4–8.2)
PROTHROMBIN TIME: 13.3 SEC (ref 9–11.1)
Q-T INTERVAL, ECG07: 390 MS
QRS DURATION, ECG06: 102 MS
QTC CALCULATION (BEZET), ECG08: 471 MS
RBC # BLD AUTO: 2.98 M/UL (ref 4.1–5.7)
SERVICE CMNT-IMP: ABNORMAL
SODIUM SERPL-SCNC: 135 MMOL/L (ref 136–145)
THERAPEUTIC RANGE,PTTT: ABNORMAL SECS (ref 58–77)
VENTRICULAR RATE, ECG03: 88 BPM
WBC # BLD AUTO: 12.5 K/UL (ref 4.1–11.1)

## 2017-02-13 PROCEDURE — 74011250636 HC RX REV CODE- 250/636: Performed by: THORACIC SURGERY (CARDIOTHORACIC VASCULAR SURGERY)

## 2017-02-13 PROCEDURE — 97110 THERAPEUTIC EXERCISES: CPT

## 2017-02-13 PROCEDURE — 85730 THROMBOPLASTIN TIME PARTIAL: CPT | Performed by: THORACIC SURGERY (CARDIOTHORACIC VASCULAR SURGERY)

## 2017-02-13 PROCEDURE — 74011250637 HC RX REV CODE- 250/637: Performed by: THORACIC SURGERY (CARDIOTHORACIC VASCULAR SURGERY)

## 2017-02-13 PROCEDURE — 36415 COLL VENOUS BLD VENIPUNCTURE: CPT | Performed by: THORACIC SURGERY (CARDIOTHORACIC VASCULAR SURGERY)

## 2017-02-13 PROCEDURE — 80053 COMPREHEN METABOLIC PANEL: CPT | Performed by: NURSE PRACTITIONER

## 2017-02-13 PROCEDURE — 93005 ELECTROCARDIOGRAM TRACING: CPT

## 2017-02-13 PROCEDURE — 85610 PROTHROMBIN TIME: CPT | Performed by: NURSE PRACTITIONER

## 2017-02-13 PROCEDURE — 97535 SELF CARE MNGMENT TRAINING: CPT

## 2017-02-13 PROCEDURE — 82962 GLUCOSE BLOOD TEST: CPT

## 2017-02-13 PROCEDURE — 74011250637 HC RX REV CODE- 250/637: Performed by: NURSE PRACTITIONER

## 2017-02-13 PROCEDURE — 65660000000 HC RM CCU STEPDOWN

## 2017-02-13 PROCEDURE — 83735 ASSAY OF MAGNESIUM: CPT | Performed by: NURSE PRACTITIONER

## 2017-02-13 PROCEDURE — 85025 COMPLETE CBC W/AUTO DIFF WBC: CPT | Performed by: THORACIC SURGERY (CARDIOTHORACIC VASCULAR SURGERY)

## 2017-02-13 PROCEDURE — 97116 GAIT TRAINING THERAPY: CPT

## 2017-02-13 RX ORDER — ACETAMINOPHEN 325 MG/1
650 TABLET ORAL
Status: DISCONTINUED | OUTPATIENT
Start: 2017-02-13 | End: 2017-02-17

## 2017-02-13 RX ORDER — SENNOSIDES 8.6 MG/1
1 TABLET ORAL DAILY
Status: DISCONTINUED | OUTPATIENT
Start: 2017-02-13 | End: 2017-02-13

## 2017-02-13 RX ORDER — HEPARIN SODIUM 1000 [USP'U]/ML
2000 INJECTION, SOLUTION INTRAVENOUS; SUBCUTANEOUS ONCE
Status: COMPLETED | OUTPATIENT
Start: 2017-02-13 | End: 2017-02-13

## 2017-02-13 RX ORDER — FACIAL-BODY WIPES
10 EACH TOPICAL DAILY PRN
Status: DISCONTINUED | OUTPATIENT
Start: 2017-02-13 | End: 2017-02-13 | Stop reason: SDUPTHER

## 2017-02-13 RX ORDER — HYDRALAZINE HYDROCHLORIDE 20 MG/ML
20 INJECTION INTRAMUSCULAR; INTRAVENOUS
Status: DISCONTINUED | OUTPATIENT
Start: 2017-02-13 | End: 2017-02-27 | Stop reason: HOSPADM

## 2017-02-13 RX ORDER — WARFARIN SODIUM 5 MG/1
5 TABLET ORAL ONCE
Status: COMPLETED | OUTPATIENT
Start: 2017-02-13 | End: 2017-02-13

## 2017-02-13 RX ORDER — PANTOPRAZOLE SODIUM 40 MG/1
40 TABLET, DELAYED RELEASE ORAL
Status: DISCONTINUED | OUTPATIENT
Start: 2017-02-13 | End: 2017-02-27 | Stop reason: HOSPADM

## 2017-02-13 RX ORDER — AMOXICILLIN 250 MG
1 CAPSULE ORAL 2 TIMES DAILY
Status: DISCONTINUED | OUTPATIENT
Start: 2017-02-14 | End: 2017-02-21

## 2017-02-13 RX ADMIN — HEPARIN SODIUM AND DEXTROSE 16 UNITS/KG/HR: 10000; 5 INJECTION INTRAVENOUS at 04:25

## 2017-02-13 RX ADMIN — MUPIROCIN: 20 OINTMENT TOPICAL at 09:03

## 2017-02-13 RX ADMIN — CHLORHEXIDINE GLUCONATE 10 ML: 1.2 RINSE ORAL at 09:02

## 2017-02-13 RX ADMIN — Medication 10 ML: at 21:11

## 2017-02-13 RX ADMIN — OXYCODONE HYDROCHLORIDE AND ACETAMINOPHEN 2 TABLET: 5; 325 TABLET ORAL at 21:10

## 2017-02-13 RX ADMIN — METOPROLOL TARTRATE 25 MG: 25 TABLET ORAL at 09:02

## 2017-02-13 RX ADMIN — PANTOPRAZOLE SODIUM 40 MG: 40 TABLET, DELAYED RELEASE ORAL at 11:07

## 2017-02-13 RX ADMIN — WARFARIN SODIUM 5 MG: 5 TABLET ORAL at 17:59

## 2017-02-13 RX ADMIN — ASPIRIN 81 MG CHEWABLE TABLET 81 MG: 81 TABLET CHEWABLE at 08:58

## 2017-02-13 RX ADMIN — CHLORHEXIDINE GLUCONATE 10 ML: 1.2 RINSE ORAL at 18:00

## 2017-02-13 RX ADMIN — Medication 10 ML: at 17:59

## 2017-02-13 RX ADMIN — ACETAMINOPHEN 650 MG: 325 TABLET, FILM COATED ORAL at 19:49

## 2017-02-13 RX ADMIN — MUPIROCIN: 20 OINTMENT TOPICAL at 18:00

## 2017-02-13 RX ADMIN — FUROSEMIDE 20 MG: 20 TABLET ORAL at 09:01

## 2017-02-13 RX ADMIN — HEPARIN SODIUM 2000 UNITS: 1000 INJECTION, SOLUTION INTRAVENOUS; SUBCUTANEOUS at 12:42

## 2017-02-13 RX ADMIN — Medication 10 ML: at 07:08

## 2017-02-13 RX ADMIN — HEPARIN SODIUM 2000 UNITS: 1000 INJECTION, SOLUTION INTRAVENOUS; SUBCUTANEOUS at 03:53

## 2017-02-13 RX ADMIN — Medication 10 ML: at 21:10

## 2017-02-13 NOTE — PROGRESS NOTES
Cardiac Surgery Care Coordinator- Met with Jill Reardon his wife and daughter,  reviewed plan of care and discussed potential discharge plan. Reinforced sternal precautions and encouraged continued use of the incentive spirometer. Reviewed goals for the day and emphasized the importance of increased activity to meet discharge goals. Discussed potential psychological impact and offered emotional support for patient and family. Will continue to follow for educational and emotional needs.  Christiana Nesbitt RN

## 2017-02-13 NOTE — CARDIO/PULMONARY
Cardiac Wellness: Valve surgery education folder at bedside. Met with Susan Chaidez and his wife to review cardiac surgery post discharge instructions and to discuss participation in [address] [place] [address].    Educated using teach back method. Reviewed 5-lb weight lifting restrictions, use of bear for sternal support, daily weights and temperatures, showering restrictions, signs and symptoms of infection at surgery sites, daily walking and arm exercises, and use of incentive spirometer. Susan Chaidez was able to demonstrate proper use of incentive spirometer, achieving 1700  ml. Smoking history assessed. Patient is a non smoker. Encouraged heart healthy, low sodium diet. Placed a  red reminder bracelet and reviewed when to call surgeons office. Discussed Cardiac Wellness Program format and encouraged participation. Due to the lower leg fasciotomy will check with CTS team as to the timing of enrollment to Cardiac Wellness  . General questions answered. Susan Chaidez  and his wife verbalized understanding.      Josseline Azul RN

## 2017-02-13 NOTE — PROGRESS NOTES
Problem: Self Care Deficits Care Plan (Adult)  Goal: *Acute Goals and Plan of Care (Insert Text)  Occupational Therapy Goals  Initiated 2/10/2017  1. Patient will perform ADLs standing 5 mins without fatigue or LOB with modified independence within 7 day(s). 2. Patient will perform lower body ADLs with modified independence within 7 day(s). 3. Patient will perform bathing with modified independence within 7 day(s). 4. Patient will perform toilet transfers with modified independence within 7 day(s). 5. Patient will perform all aspects of toileting with modified independence within 7 day(s). 6. Patient will participate in cardiac/sternal upper extremity therapeutic exercise/activities to increase independence with ADLs with modified independence for 5 minutes within 7 day(s). OCCUPATIONAL THERAPY TREATMENT  Patient: Gavino Sandhu (1 y.o. male)  Date: 2/13/2017  Diagnosis: unknown  Ascending aortic dissection (HCC)  right foot drop <principal problem not specified>  Procedure(s) (LRB):  FASCIOTOMY RIGHT  LEG ANTERIOR COMPARTMENT (Right) 2 Days Post-Op  Precautions: WBAT, Sternal, Fall      ASSESSMENT:  Patient progressing in all areas with overall setup to min A ADLs. POD#2 R LE fasciotomy. ADLs limited by active ROM UEs and R LE, standing tolerance, balance, sensation R LE, sternal precautions, pain management, lines and leads as patient still has external pacemaker and chest tube. Patient to discharge home with wife A PRN and daughter who was a [employer] nurse night shift a long time ago. Next session: shower transfer. Recommend with nursing patient to complete as able in order to maintain strength, endurance and independence: ADLs with supervision/setup, OOB to chair 3x/day and mobilizing to the bathroom for toileting with 1 assist. Thank you for your assistance.       Progression toward goals:  [X]       Improving appropriately and progressing toward goals  [ ]       Improving slowly and progressing toward goals  [ ]       Not making progress toward goals and plan of care will be adjusted       PLAN:  Patient continues to benefit from skilled intervention to address the above impairments. Continue treatment per established plan of care. Discharge Recommendations:  None  Further Equipment Recommendations for Discharge:  None noted       SUBJECTIVE:   Patient stated I do not remember even meeting you. .. Oh yeah I remember telling someone about my shower, must have been you then.  Patient with talkative and upbeat nature. OBJECTIVE DATA SUMMARY:   Cognitive/Behavioral Status:  Neurologic State: Alert  Orientation Level: Oriented X4  Cognition: Appropriate decision making; Appropriate for age attention/concentration; Appropriate safety awareness; Follows commands              Functional Mobility and Transfers for ADLs:  Bed Mobility:  Rolling: Supervision  Sit to Supine: Supervision (instruction technique, bed flat)  Scooting: Supervision (to edge of chair, edge of bed and in bed bridging & scoot)     Transfers:  Sit to Stand: Minimum assistance (chair, slight LOB, self corrected)  Bed to Chair: Supervision  Toilet Transfer : Supervision (per report and infer from chair)     Balance:  Sitting: Intact; Without support  Standing: Impaired; Without support  Standing - Static: Good  Standing - Dynamic : Fair (slight sway, self corrected)     ADL Intervention:                                Lower Body Dressing Assistance  Socks: Supervision/set-up (tailor sitting, sitting in chair) Patient noted SOB. Instruction pacing. Patient demonstrated reaching in cabinets high with supervision additional cue B UEs reaching >90* and not to reach to bottom drawer 2* SOB noted with knee height drawer.       Toileting  Bladder Hygiene: Supervision/set-up  Bowel Hygiene: Supervision/set-up (mock) instruction can use wet wipes if pain limiting  Clothing Management: Supervision/set-up (mock)   Wife and daughter present so all education provided 2* patient with minimal recall from evaluation. Patient instructed no asymmetrical reaching over head to ensure B UEs when shoulders >90*, prevent deep bending and valsalva maneuvers. May have to adjust home setup to increase ease with items closer to waist height, don clothing tailor sitting and don all clothing while sitting prior to standing. Patient demonstrated lower body dressing via tailor sitting with supervision. Increase activity tolerance for home, work, and sexual intercourse by pacing self with increasing the arm exercises, sitting duration, frequency OOB, walking, standing, ADLs. Instructed and indicated understanding of s/s of too much activity, how to respond to s/s safely. Patient instruction shower transfer on how to perform safely at home 2* R LE weakness and sternal precautions. Instruction on benefits increasing exercises from sitting to then standing in prep for standing ADLs like showering. Asked about shower chair, instruction can shower then sit on commode to rest before dressing. Neuro Re-Education:           Therapeutic Exercises:   Patient instructed on the benefits and demonstrated cardiac exercises while 10 with supervision to breath while supine, v gravity. Instructed and indicated understanding on how to progress reps and sets against gravity, working up to 5 lbs and so on based on surgeon clearance for more weight in prep for basic and instrumental ADL. Can use household items for weights. Daughter noted page in cardiac book.       CARDIAC   EXERCISE    Sets    Reps    Active  Active Assist    Passive  Self ROM    Comments    Shoulder flexion  1  10   [X]                            [ ]                             [ ]                             [ ]                                 Shoulder abduction  1  10 [X]                             [ ]                             [ ]                             [ ]                                 Scapular elevation  1 10  [X]                             [ ]                              [ ]                             [ ]                                 Scapular retraction  1    [X]                             [ ]                             [ ]                             [ ]                                 Trunk rotation  1  5 [X]                             [ ]                             [ ]                             [ ]                                 Trunk sidebending  1    [X]                             [ ]                              [ ]                             [ ]                                             Pain:  Pain Scale 1: Numeric (0 - 10)  Pain Intensity 1: 1  Pain Location 1: Incisional  Pain Orientation 1: Anterior  Pain Description 1: Aching  Pain Intervention(s) 1: Declines  Activity Tolerance:   VSS HR 80  Please refer to the flowsheet for vital signs taken during this treatment.   After treatment:   [ ] Patient left in no apparent distress sitting up in chair  [X] Patient left in no apparent distress in bed  [X] Call bell left within reach  [X] Nursing notified  [X] Caregiver present  [ ] Bed alarm activated      COMMUNICATION/COLLABORATION:   The patients plan of care was discussed with: Physical Therapist and Registered Nurse     Holden Carney  Time Calculation: 40 mins

## 2017-02-13 NOTE — PROGRESS NOTES
Problem: Mobility Impaired (Adult and Pediatric)  Goal: *Acute Goals and Plan of Care (Insert Text)  Physical Therapy Goals  Initiated 2/12/2017  1. Patient will move from supine to sit and sit to supine in bed with modified independence within 7 day(s). 2. Patient will transfer from bed to chair and chair to bed with modified independence using the least restrictive device within 7 day(s). 3. Patient will perform sit to stand with modified independence within 7 day(s). 4. Patient will ambulate with independence for 300 feet with the least restrictive device within 7 day(s). 5. Patient will ascend/descend 5 stairs with 1 handrail(s) with supervision/set-up within 7 day(s). PHYSICAL THERAPY TREATMENT  Patient: Alexandra Porras (11 y.o. male)  Date: 2/13/2017  Diagnosis: unknown  Ascending aortic dissection (HCC)  right foot drop <principal problem not specified>  Procedure(s) (LRB):  FASCIOTOMY RIGHT  LEG ANTERIOR COMPARTMENT (Right) 2 Days Post-Op  Precautions: WBAT, Sternal, Fall      ASSESSMENT:  Pt recd in supine, agreeable to therapy. Pt continues to present with 0/5 on R anterior tib. He tries to activate DF but tends to press into knee extension. Min assist for bed mobility this afternoon mostly for ensuring R foot cleared foot board. Pt able to stand with min assist and gait trained x50ft CGA with R circumduction, and R foot drag. Worked on target stepping to improve accuracy of placement of R foot which he completed with fair accuracy (tends to abduct and slide foot to target). Worked on heel taps onto target on the R but with no activation noted. Pt returned to supine with all needs in reach per patients request. Pt provided with a PRAFO boot to prevent PF contractures occurring. Wear schedule placed in patients room and patient, family and RN educated on importance of wearing boot throughout the day.  At this time would avoid prolong splinting to allow adequate wound healing, too early to tell how much function he will regain in the R, suspect as his pain decreases with muscle activation and healing he will progress well. He will most likely benefit from outpatient rehab at discharge. Progression toward goals:  [X]    Improving appropriately and progressing toward goals  [ ]    Improving slowly and progressing toward goals  [ ]    Not making progress toward goals and plan of care will be adjusted       PLAN:  Patient continues to benefit from skilled intervention to address the above impairments. Continue treatment per established plan of care. Discharge Recommendations:  Outpatient  Further Equipment Recommendations for Discharge:  tbd       SUBJECTIVE:   Patient stated I feel all right, it just hurts. Emil Dominguez      OBJECTIVE DATA SUMMARY:   Critical Behavior:  Neurologic State: Alert  Orientation Level: Oriented X4  Cognition: Appropriate decision making, Appropriate for age attention/concentration, Appropriate safety awareness, Follows commands  Safety/Judgement: Awareness of environment, Good awareness of safety precautions  Functional Mobility Training:  Bed Mobility:  Rolling: Supervision     Sit to Supine: Minimum assistance; Additional time (with LE and additional time)  Scooting: Supervision        Transfers:  Sit to Stand: Minimum assistance  Stand to Sit: Contact guard assistance        Bed to Chair: Supervision                    Balance:  Sitting: Intact; Without support  Standing: Impaired; Without support  Standing - Static: Good  Standing - Dynamic : Fair (slight sway, self corrected)  Ambulation/Gait Training:  Distance (ft): 50 Feet (ft)  Assistive Device: Gait belt  Ambulation - Level of Assistance: Contact guard assistance     Gait Description (WDL): Exceptions to WDL  Gait Abnormalities: Decreased step clearance; Foot drop  Right Side Weight Bearing: As tolerated                 Swing Pattern: Right asymmetrical         Neuro Re-Education:  Pre gait activities as above        Pain:  Pain Scale 1: Numeric (0 - 10)  Pain Intensity 1: 0  Pain Location 1: Incisional  Pain Orientation 1: Anterior  Pain Description 1: Aching  Pain Intervention(s) 1: Declines  Activity Tolerance:   good  Please refer to the flowsheet for vital signs taken during this treatment.   After treatment:   [X]    Patient left in no apparent distress sitting up in chair  [ ]    Patient left in no apparent distress in bed  [X]    Call bell left within reach  [X]    Nursing notified  [X]    Caregiver present  [ ]    Bed alarm activated      COMMUNICATION/COLLABORATION:   The patients plan of care was discussed with: Registered Nurse     Rodney Padgett, PT   Time Calculation: 28 mins

## 2017-02-13 NOTE — PROGRESS NOTES
Vascular  VSS  hgb 9  BP and pulse stable  Feet cool but has signals  Needs food, out of bed, laxative  To telemetry

## 2017-02-13 NOTE — PROGRESS NOTES
2330: Bedside shift change report given to [address] [place] [address] [address] (oncoming nurse) by Robert Dhaliwal (offgoing nurse). Report included the following information SBAR, Procedure Summary, Intake/Output, MAR, Recent Results and Cardiac Rhythm NSR with 1st degree AVB. 0354: VSS. Pt ambulated with 2 assist to bathroom. Voided in toilet x1. Pt ambulated back to bed, readjusted in bed for comfort. R lower leg dressing moderately saturated,changed per order. Pt tolerated well. Lights dimmed, new bag heparin hung. Will continue to monitor. 0645: Pt ambulated to bathroom, voided in commode. Returned to chair. CHG bath completed, linen changed. Sternotomy open to air, swabbed with CHG. R leg dressing changed since it was saturated. 0730: Bedside shift change report given to [address] [place] [address] (oncoming nurse) [facility] [address] [facility] Report included the following information SBAR, Procedure Summary, Intake/Output, MAR, Recent Results and Cardiac Rhythm NSR with 1st degree AVB.

## 2017-02-13 NOTE — PROGRESS NOTES
[facility] ICU Progress Note    Admit Date: 2017  POD:  4 Day Post-Op    Procedure:  Procedure(s):  ASCENDING AORTIC DISSECTION, AORTIC ROOT REPLACEMENT, 29MM MECHANICAL VALVE, FEM-FEM BYPASS BY DR Gallito Edmondson. ECC, JOSE BY DR LEO. Subjective:   Pt seen with Dr. Lamont Liang. Afebrile, on RA. On heparin gtt. Pacer on backup. In SR 90s. Drs to R leg CDI - some dorsiflexion. Objective:   Vitals:  Blood pressure 127/59, pulse 89, temperature 98 °F (36.7 °C), resp. rate 18, height 5' 11\" (1.803 m), weight 217 lb 9.5 oz (98.7 kg), SpO2 99 %. Temp (24hrs), Av.4 °F (36.9 °C), Min:98 °F (36.7 °C), Max:98.9 °F (37.2 °C)    EKG/Rhythm:  SR    CT Output: 150 ml    Oxygen Therapy:  Oxygen Therapy  O2 Sat (%): 99 % (17 0843)  Pulse via Oximetry: 82 beats per minute (17 1500)  O2 Device: Room air (17 0354)  O2 Flow Rate (L/min): 1 l/min (17 0810)  FIO2 (%): 40 % (02/10/17 0722)    CXR:  None today       Admission Weight: Last Weight   Weight: 222 lb (100.7 kg) Weight: 217 lb 9.5 oz (98.7 kg)     Intake / Output / Drain:  Current Shift:    Last 24 hrs.:     Intake/Output Summary (Last 24 hours) at 17  Last data filed at 17   Gross per 24 hour   Intake           930.61 ml   Output              545 ml   Net           385.61 ml       EXAM:  General:  Awake, in no obvious distress. Lungs:   Clear to auscultation bilaterally. Incision:  Drs C/D/I. Including R LE. Heart:  Regular rate and rhythm, S1, S2 normal, no murmur, click, rub or gallop. Abdomen:   Soft, non-tender. Bowel sounds normal. No masses,  No organomegaly. Extremities:  No edema. PPP. Neurologic:  Gross motor and sensory apparatus intact.      Labs:   Recent Labs      17   0711  17   0020   17   0410   17   0439   WBC   --   12.5*   < >  13.8*   --   15.6*   HGB   --   9.1*   < >  9.7*   -- 9.9*   HCT   --   28.0*   < >  28.9*   --   29.2*   PLT   --   102*   < >  90*   --   86*   NA   --    --    --   138   --   136   K   --    --    --   4.3   --   4.1   BUN   --    --    --   25*   --   26*   CREA   --    --    --   1.24   --   1.57*   GLU   --    --    --   91   --   120*   GLUCPOC  111*   --    < >   --    < >   --    INR   --    --    --    --    --   1.2*    < > = values in this interval not displayed. Assessment:     Active Problems:    Ascending aortic dissection (HCC) (2017)      Overview: AORTIC ROOT REPLACEMENT USING A 29 MM [employer] TOSHA VALVED CONDUIT, ECC VIA       LEFT FEMORAL ARTERIAL CANNULATION      2. LEFT TO RIGHT FEM FEM BYPASS GRAFT            Right anterior compartment fasciotomy.--Dr. Duncan Leon 17         Plan/Recommendations/Medical Decision Makin. S/p Aortic root replacement w/ AVR mechanical valve & L to R fem fem bypass graft: Strict BP control SBP < 120. On asa, cont coumadin tonight--cont Heparin gtt until INR therapeutic. Cont coumadin education. INR in am.   2. Atelectasis: Increase IS and activity as tolerated. PA/lat in am.   3. Postop anemia s/t acute blood loss: Monitor H/H and CT output. 4. Thrombocytopenia: Improving, Cont asa, change pepcid to protonix. Monitor. 5. Elevated transaminases:  Check CMP today. 6. Postop afib & Bradycardia:  SR now. No amio at this time. Cont metoprolol. Monitor. 7. HTN:  Cont metoprolol, lasix. Monitor. 8. Constipation: Add pericolace, PRN bisacodyl. 9. R compartment fasciotomy:  Vascular following, cont PT/OT. 7. Dispo: PT/OT. Dispo plan TBD. D/c CT today.      Signed By: Camille Barnes NP

## 2017-02-13 NOTE — PROGRESS NOTES
0730: Bedside shift change report given to Jonah Greene RN  (oncoming nurse) by Renata Albarran RN  (offgoing nurse). Report included the following information SBAR, Kardex, MAR, Accordion, Recent Results and Med Rec Status. NP Amari Sports aware that patient appears to be in 2 Degree Type 1. RN unable to capture heart rhythm on EKG . NP aware. Problem: Falls - Risk of  Goal: *Absence of falls  Outcome: Progressing Towards Goal  Patient ambulating with one assist without fall during shift. Patient instructed to use call bell when assistance is needed, personal belongings kept within reach    Problem: Pressure Ulcer - Risk of  Goal: *Prevention of pressure ulcer  Outcome: Progressing Towards Goal  Pt skin being assessed per unit protocol. No evidence of skin breakdown. Problem:  Aortic Aneurysm Repair: Post Op Day 4  Goal: Nutrition/Diet  Outcome: Progressing Towards Goal  Patient tolerating diet  Goal: Respiratory  Outcome: Progressing Towards Goal  Patient lungs clear and using incentive spirometry

## 2017-02-14 ENCOUNTER — APPOINTMENT (OUTPATIENT)
Dept: GENERAL RADIOLOGY | Age: 57
DRG: 219 | End: 2017-02-14
Attending: NURSE PRACTITIONER
Payer: COMMERCIAL

## 2017-02-14 ENCOUNTER — HOME HEALTH ADMISSION (OUTPATIENT)
Dept: HOME HEALTH SERVICES | Facility: HOME HEALTH | Age: 57
End: 2017-02-14
Payer: COMMERCIAL

## 2017-02-14 LAB
ALBUMIN SERPL BCP-MCNC: 2.8 G/DL (ref 3.5–5)
ALBUMIN/GLOB SERPL: 1.1 {RATIO} (ref 1.1–2.2)
ALP SERPL-CCNC: 133 U/L (ref 45–117)
ALT SERPL-CCNC: 70 U/L (ref 12–78)
ANION GAP BLD CALC-SCNC: 8 MMOL/L (ref 5–15)
APTT PPP: 42.3 SEC (ref 22.1–32.5)
APTT PPP: 72.2 SEC (ref 22.1–32.5)
APTT PPP: 75.2 SEC (ref 22.1–32.5)
AST SERPL W P-5'-P-CCNC: 127 U/L (ref 15–37)
ATRIAL RATE: 90 BPM
ATRIAL RATE: 90 BPM
ATRIAL RATE: 92 BPM
BILIRUB SERPL-MCNC: 1 MG/DL (ref 0.2–1)
BUN SERPL-MCNC: 27 MG/DL (ref 6–20)
BUN/CREAT SERPL: 22 (ref 12–20)
CALCIUM SERPL-MCNC: 7.7 MG/DL (ref 8.5–10.1)
CALCULATED P AXIS, ECG09: 27 DEGREES
CALCULATED R AXIS, ECG10: 42 DEGREES
CALCULATED R AXIS, ECG10: 55 DEGREES
CALCULATED R AXIS, ECG10: 56 DEGREES
CALCULATED T AXIS, ECG11: 35 DEGREES
CALCULATED T AXIS, ECG11: 42 DEGREES
CALCULATED T AXIS, ECG11: 43 DEGREES
CHLORIDE SERPL-SCNC: 100 MMOL/L (ref 97–108)
CO2 SERPL-SCNC: 28 MMOL/L (ref 21–32)
CREAT SERPL-MCNC: 1.22 MG/DL (ref 0.7–1.3)
DIAGNOSIS, 93000: NORMAL
GLOBULIN SER CALC-MCNC: 2.6 G/DL (ref 2–4)
GLUCOSE SERPL-MCNC: 109 MG/DL (ref 65–100)
INR PPP: 1.3 (ref 0.9–1.1)
MAGNESIUM SERPL-MCNC: 2.3 MG/DL (ref 1.6–2.4)
P-R INTERVAL, ECG05: 392 MS
P-R INTERVAL, ECG05: 424 MS
POTASSIUM SERPL-SCNC: 3.8 MMOL/L (ref 3.5–5.1)
PROT SERPL-MCNC: 5.4 G/DL (ref 6.4–8.2)
PROTHROMBIN TIME: 13.6 SEC (ref 9–11.1)
Q-T INTERVAL, ECG07: 386 MS
Q-T INTERVAL, ECG07: 390 MS
Q-T INTERVAL, ECG07: 416 MS
QRS DURATION, ECG06: 102 MS
QRS DURATION, ECG06: 90 MS
QRS DURATION, ECG06: 94 MS
QTC CALCULATION (BEZET), ECG08: 458 MS
QTC CALCULATION (BEZET), ECG08: 474 MS
QTC CALCULATION (BEZET), ECG08: 477 MS
SODIUM SERPL-SCNC: 136 MMOL/L (ref 136–145)
THERAPEUTIC RANGE,PTTT: ABNORMAL SECS (ref 58–77)
VENTRICULAR RATE, ECG03: 73 BPM
VENTRICULAR RATE, ECG03: 90 BPM
VENTRICULAR RATE, ECG03: 91 BPM

## 2017-02-14 PROCEDURE — 83735 ASSAY OF MAGNESIUM: CPT | Performed by: NURSE PRACTITIONER

## 2017-02-14 PROCEDURE — 65660000000 HC RM CCU STEPDOWN

## 2017-02-14 PROCEDURE — 74011250637 HC RX REV CODE- 250/637: Performed by: THORACIC SURGERY (CARDIOTHORACIC VASCULAR SURGERY)

## 2017-02-14 PROCEDURE — 71010 XR CHEST PORT: CPT

## 2017-02-14 PROCEDURE — 36415 COLL VENOUS BLD VENIPUNCTURE: CPT | Performed by: THORACIC SURGERY (CARDIOTHORACIC VASCULAR SURGERY)

## 2017-02-14 PROCEDURE — 77030018836 HC SOL IRR NACL ICUM -A

## 2017-02-14 PROCEDURE — 97116 GAIT TRAINING THERAPY: CPT

## 2017-02-14 PROCEDURE — 85610 PROTHROMBIN TIME: CPT | Performed by: NURSE PRACTITIONER

## 2017-02-14 PROCEDURE — 80053 COMPREHEN METABOLIC PANEL: CPT | Performed by: NURSE PRACTITIONER

## 2017-02-14 PROCEDURE — 85730 THROMBOPLASTIN TIME PARTIAL: CPT | Performed by: NURSE PRACTITIONER

## 2017-02-14 PROCEDURE — 74011250636 HC RX REV CODE- 250/636: Performed by: THORACIC SURGERY (CARDIOTHORACIC VASCULAR SURGERY)

## 2017-02-14 PROCEDURE — 97530 THERAPEUTIC ACTIVITIES: CPT

## 2017-02-14 PROCEDURE — 74011250637 HC RX REV CODE- 250/637: Performed by: NURSE PRACTITIONER

## 2017-02-14 PROCEDURE — 85730 THROMBOPLASTIN TIME PARTIAL: CPT | Performed by: THORACIC SURGERY (CARDIOTHORACIC VASCULAR SURGERY)

## 2017-02-14 RX ORDER — WARFARIN 7.5 MG/1
7.5 TABLET ORAL ONCE
Status: COMPLETED | OUTPATIENT
Start: 2017-02-14 | End: 2017-02-14

## 2017-02-14 RX ORDER — HEPARIN SODIUM 1000 [USP'U]/ML
2000 INJECTION, SOLUTION INTRAVENOUS; SUBCUTANEOUS ONCE
Status: COMPLETED | OUTPATIENT
Start: 2017-02-14 | End: 2017-02-14

## 2017-02-14 RX ADMIN — FUROSEMIDE 20 MG: 20 TABLET ORAL at 08:40

## 2017-02-14 RX ADMIN — Medication 10 ML: at 13:09

## 2017-02-14 RX ADMIN — Medication 10 ML: at 07:36

## 2017-02-14 RX ADMIN — DOCUSATE SODIUM AND SENNOSIDES 1 TABLET: 8.6; 5 TABLET, FILM COATED ORAL at 17:15

## 2017-02-14 RX ADMIN — Medication 10 ML: at 21:39

## 2017-02-14 RX ADMIN — HEPARIN SODIUM 2000 UNITS: 1000 INJECTION, SOLUTION INTRAVENOUS; SUBCUTANEOUS at 03:41

## 2017-02-14 RX ADMIN — HEPARIN SODIUM AND DEXTROSE 21 UNITS/KG/HR: 10000; 5 INJECTION INTRAVENOUS at 23:42

## 2017-02-14 RX ADMIN — WARFARIN SODIUM 7.5 MG: 7.5 TABLET ORAL at 17:15

## 2017-02-14 RX ADMIN — DOCUSATE SODIUM AND SENNOSIDES 1 TABLET: 8.6; 5 TABLET, FILM COATED ORAL at 08:40

## 2017-02-14 RX ADMIN — HEPARIN SODIUM AND DEXTROSE 21 UNITS/KG/HR: 10000; 5 INJECTION INTRAVENOUS at 11:37

## 2017-02-14 RX ADMIN — OXYCODONE HYDROCHLORIDE AND ACETAMINOPHEN 2 TABLET: 5; 325 TABLET ORAL at 21:36

## 2017-02-14 RX ADMIN — ASPIRIN 81 MG CHEWABLE TABLET 81 MG: 81 TABLET CHEWABLE at 08:40

## 2017-02-14 RX ADMIN — CHLORHEXIDINE GLUCONATE 10 ML: 1.2 RINSE ORAL at 08:39

## 2017-02-14 RX ADMIN — PANTOPRAZOLE SODIUM 40 MG: 40 TABLET, DELAYED RELEASE ORAL at 07:32

## 2017-02-14 RX ADMIN — MUPIROCIN: 20 OINTMENT TOPICAL at 08:39

## 2017-02-14 NOTE — PROGRESS NOTES
Problem: Mobility Impaired (Adult and Pediatric)  Goal: *Acute Goals and Plan of Care (Insert Text)  Physical Therapy Goals  Initiated 2/12/2017  1. Patient will move from supine to sit and sit to supine in bed with modified independence within 7 day(s). 2. Patient will transfer from bed to chair and chair to bed with modified independence using the least restrictive device within 7 day(s). 3. Patient will perform sit to stand with modified independence within 7 day(s). 4. Patient will ambulate with independence for 300 feet with the least restrictive device within 7 day(s). 5. Patient will ascend/descend 5 stairs with 1 handrail(s) with supervision/set-up within 7 day(s). PHYSICAL THERAPY TREATMENT  Patient: Henri Laboy (31 y.o. male)  Date: 2/14/2017  Diagnosis: unknown  Ascending aortic dissection (HCC)  right foot drop <principal problem not specified>  Procedure(s) (LRB):  FASCIOTOMY RIGHT  LEG ANTERIOR COMPARTMENT (Right) 3 Days Post-Op  Precautions: WBAT, Sternal, Fall      ASSESSMENT:  Pt recd sitting up in chair, dressing draining and assisted daughter (who is an RN here at [facility]) with reinforcing dressing. Once dressing completed therapist used an ACE wrap to create a DF assist for gait training. Pt gait trained x90ft with significantly improved foot clearance today with ACE DF assist. Pts gait with less circumducted gait but does have an occasional steppage gait. At this time will continue to ambulate with DF assist, and will need outpatient rehab when able. Exercise program will be provided to patient prior to DC in addition to a comprehensive stretching program to ensure ROM is maintained. Noted at end of session pts right foot very cool and pale to touch, able to palpate DP pulse but PT difficult to feel d/t swelling.  RN is aware  Progression toward goals:  [ ]    Improving appropriately and progressing toward goals  [ ]    Improving slowly and progressing toward goals  [ ]    Not making progress toward goals and plan of care will be adjusted       PLAN:  Patient continues to benefit from skilled intervention to address the above impairments. Continue treatment per established plan of care. Discharge Recommendations:  Home Health and Outpatient  Further Equipment Recommendations for Discharge:  None at this time       SUBJECTIVE:   Patient stated I feel better.       OBJECTIVE DATA SUMMARY:   Critical Behavior:  Neurologic State: Alert  Orientation Level: Oriented X4  Cognition: Appropriate decision making, Appropriate for age attention/concentration, Appropriate safety awareness  Safety/Judgement: Awareness of environment, Good awareness of safety precautions  Functional Mobility Training:  Bed Mobility:        Sit to Supine: Contact guard assistance           Transfers:  Sit to Stand: Supervision  Stand to Sit: Supervision                             Balance:  Sitting: Intact; Without support  Standing: Impaired; Without support  Standing - Static: Good  Standing - Dynamic : Fair  Ambulation/Gait Training:  Distance (ft): 90 Feet (ft)  Assistive Device: Gait belt (ace wrap DF assist)  Ambulation - Level of Assistance: Contact guard assistance     Gait Description (WDL): Exceptions to WDL  Gait Abnormalities: Steppage gait        Base of Support: Widened     Speed/Joselin: Slow     Swing Pattern: Right asymmetrical (slight steppage)              Pain:  Pain Scale 1: Numeric (0 - 10)  Pain Intensity 1: 0              Activity Tolerance:   Good  Please refer to the flowsheet for vital signs taken during this treatment.   After treatment:   [ ]    Patient left in no apparent distress sitting up in chair  [X]    Patient left in no apparent distress in bed  [X]    Call bell left within reach  [X]    Nursing notified  [X]    Caregiver present  [ ]    Bed alarm activated      COMMUNICATION/COLLABORATION:   The patients plan of care was discussed with: Registered Nurse     Kalli Champagne, PT   Time Calculation: 14 mins

## 2017-02-14 NOTE — PROGRESS NOTES
[facility] Meryl [facility]. Mr. Rod Weinberg was not available at that time. Meryl Diaz expressed relief and gratitude at her father's improved condition. Provided assurance that family members have been coping well with his surgery and recovery. Meryl Diaz requested follow up visit with her father. Will attempt to revisit as able. [facility]'s Staff  ([place] [address] Patient Care Specialist)   Paging Service [phone])

## 2017-02-14 NOTE — PROGRESS NOTES
Vascular  Strong dorsiflexion this am  Wound dressed  Continue we to dry dressings  followup in my office in two weeks to decide re wound care or [facility]

## 2017-02-14 NOTE — PROGRESS NOTES
Problem: Self Care Deficits Care Plan (Adult)  Goal: *Acute Goals and Plan of Care (Insert Text)  Occupational Therapy Goals  Initiated 2/10/2017  1. Patient will perform ADLs standing 5 mins without fatigue or LOB with modified independence within 7 day(s). 2. Patient will perform lower body ADLs with modified independence within 7 day(s). 3. Patient will perform bathing with modified independence within 7 day(s). 4. Patient will perform toilet transfers with modified independence within 7 day(s). 5. Patient will perform all aspects of toileting with modified independence within 7 day(s). 6. Patient will participate in cardiac/sternal upper extremity therapeutic exercise/activities to increase independence with ADLs with modified independence for 5 minutes within 7 day(s). OCCUPATIONAL THERAPY TREATMENT  Patient: Alexandra Porras (87 y.o. male)  Date: 2/14/2017  Diagnosis: unknown  Ascending aortic dissection (HCC)  right foot drop <principal problem not specified>  Procedure(s) (LRB):  FASCIOTOMY RIGHT  LEG ANTERIOR COMPARTMENT (Right) 3 Days Post-Op  Precautions: WBAT, Sternal, Fall      ASSESSMENT:  Patient progressing with all ADLs. ADLs limited by R foot drop, sternotomy, and cardiopulmonary tolerance. HR 7l-80 All patient education provided, patient to discharge home with wife and daughter A PRN. Discharging further skilled acute OT services at this time, goals met. Progression toward goals:  [X]       Improving appropriately and progressing toward goals  [ ]       Improving slowly and progressing toward goals  [ ]       Not making progress toward goals and plan of care will be adjusted       Discharge Recommendations:  None  Further Equipment Recommendations for Discharge:  None noted       SUBJECTIVE:   Patient stated I feel like I stood for 4 mins.  Patient stood for six, so this is great progression of perceived exertion and ability.        OBJECTIVE DATA SUMMARY:   Cognitive/Behavioral Status:  Neurologic State: Alert  Orientation Level: Oriented X4  Cognition: Appropriate decision making; Appropriate for age attention/concentration; Appropriate safety awareness              Functional Mobility and Transfers for ADLs:  Bed Mobility:        Transfers:  Sit to Stand: Supervision  Shower Transfer: Supervision     Balance:  Sitting: Intact; Without support  Standing: Impaired; Without support  Standing - Static: Good  Standing - Dynamic : Fair     ADL Intervention:     Patient received and left in chair. Patient stated how bathroom setup at home. Patient instructed L UE on bar as guide only and demonstrated shower transfer technique of up with L LE and down with R LE without further instruction. All questions answered. Instruction as to pacing to regain basic and instrumental ADLs again. Neuro Re-Education:           Therapeutic Exercises:   Patient instructed on the benefits and demonstrated cardiac exercises while standing  with supervision techniques of PLB. Instructed and indicated understanding on how to progress reps and sets against gravity, working up to 5 lbs and so on based on surgeon clearance for more weight in prep for basic and instrumental ADL. Can use household items for weights.       CARDIAC   EXERCISE    Sets    Reps    Active  Active Assist    Passive  Self ROM    Comments    Shoulder flexion  1  5   [X]                            [ ]                             [ ]                             [ ]                                 Shoulder abduction  1  5  [X]                             [ ]                             [ ]                             [ ]                                 Scapular elevation  1  5  [X]                             [ ]                              [ ]                             [ ]                                 Scapular retraction  1  5  [X]                             [ ]                             [ ] [ ]                                 Trunk rotation  1  5  [X]                             [ ]                             [ ]                             [ ]                                 Trunk sidebending  1  5  [X]                             [ ]                              [ ]                             [ ]                                             Pain:  Pain Scale 1: Numeric (0 - 10)  Pain Intensity 1: 0              Activity Tolerance:   VSS  Please refer to the flowsheet for vital signs taken during this treatment.   After treatment:   [X] Patient left in no apparent distress sitting up in chair  [ ] Patient left in no apparent distress in bed  [X] Call bell left within reach  [X] Nursing notified  [ ] Caregiver present  [ ] Bed alarm activated      COMMUNICATION/COLLABORATION:   The patients plan of care was discussed with: Physical Therapist and Registered Nurse     Doug Hernandez  Time Calculation: 36 mins

## 2017-02-14 NOTE — PROGRESS NOTES
[facility] Floor Progress Note    Admit Date: 2017  POD:  5 Day Post-Op    Procedure:  Procedure(s):  ASCENDING AORTIC DISSECTION, AORTIC ROOT REPLACEMENT, 29MM MECHANICAL VALVE, FEM-FEM BYPASS BY DR Mendel Dunlap. ECC, JOSE BY DR LEO. Subjective:   Pt seen with Dr. Clement Wade. Afebrile, on RA. On heparin gtt. Pacer on backup. EKG changes throughout day -- appears accel. Junctional 80s, then 1-2nd deg block at times, and converted to Slow A fib 50s overnight. BP stable throughout. Improved dorsiflexision to R foot/leg. Objective:   Vitals:  Blood pressure 119/60, pulse (!) 57, temperature 98.2 °F (36.8 °C), resp. rate 18, height 5' 11\" (1.803 m), weight 217 lb 2.5 oz (98.5 kg), SpO2 97 %. Temp (24hrs), Av.5 °F (36.9 °C), Min:97.7 °F (36.5 °C), Max:99.9 °F (37.7 °C)    EKG/Rhythm:  SR    CT Output: 60 ml     Oxygen Therapy:  Oxygen Therapy  O2 Sat (%): 97 % (17 0300)  Pulse via Oximetry: 82 beats per minute (17 1500)  O2 Device: Room air (17 0300)  O2 Flow Rate (L/min): 1 l/min (17 0810)  FIO2 (%): 40 % (02/10/17 0722)    CXR: Pending        Admission Weight: Last Weight   Weight: 222 lb (100.7 kg) Weight: 217 lb 2.5 oz (98.5 kg)     Intake / Output / Drain:  Current Shift:    Last 24 hrs.:     Intake/Output Summary (Last 24 hours) at 17 0815  Last data filed at 17 0300   Gross per 24 hour   Intake           269.69 ml   Output               60 ml   Net           209.69 ml       EXAM:  General:  Awake, in no obvious distress. Lungs:   Clear to auscultation bilaterally. Incision:  Drs C/D/I. Including R LE. Heart:  Regular rate and rhythm, S1, S2 normal, no murmur, click, rub or gallop. Abdomen:   Soft, non-tender. Bowel sounds normal. No masses,  No organomegaly. Extremities:  No edema. PPP. Neurologic:  Gross motor and sensory apparatus intact.      Labs:   Recent Labs 17   0258   17   0711  17   0020   WBC   --    --    --   12.5*   HGB   --    --    --   9.1*   HCT   --    --    --   28.0*   PLT   --    --    --   102*   NA  136   < >   --    --    K  3.8   < >   --    --    BUN  27*   < >   --    --    CREA  1.22   < >   --    --    GLU  109*   < >   --    --    GLUCPOC   --    --   111*   --    INR  1.3*   < >   --    --     < > = values in this interval not displayed. Assessment:     Active Problems:    Ascending aortic dissection (HCC) (2017)      Overview: AORTIC ROOT REPLACEMENT USING A 29 MM [employer]. TOSHA VALVED CONDUIT, ECC VIA       LEFT FEMORAL ARTERIAL CANNULATION      2. LEFT TO RIGHT FEM FEM BYPASS GRAFT            Right anterior compartment fasciotomy.--Dr. Nancy Weller 17         Plan/Recommendations/Medical Decision Makin. S/p Aortic root replacement w/ AVR mechanical valve & L to R fem fem bypass graft: Strict BP control SBP < 120. On asa, cont coumadin tonight--cont Heparin gtt until INR therapeutic. Cont coumadin education. INR in am.   2. Atelectasis: Increase IS and activity as tolerated. 3. Postop anemia s/t acute blood loss: Monitor H/H and CT output. No CBC overnight - check now. 4. Thrombocytopenia:  Cont asa, change pepcid to protonix. Monitor. 5. Elevated transaminases: Stable, monitor. 6. Postop afib & Varying heart block:  No rhythm meds. PRN hydralazine. Monitor. 7. HTN:  Cont  Lasix. PRN hydralazine. No amio/BB. Monitor. 8. Constipation: cont pericolace, PRN bisacodyl. 9. R compartment fasciotomy:  Vascular following, cont PT/OT. 7. Dispo: PT/OT. Dispo plan TBD. D/c CT today.      Signed By: Magalys Alvarez NP

## 2017-02-14 NOTE — PROGRESS NOTES
11:37 PTT within therapeutic range; no heparin rate change needed. 16:40 Second PTT within therapeutic range; no change to heparin rate needed. 19:30 Bedside shift change report given to [address] [place] [address] (oncoming nurse) by Joyce Fraser RN (offgoing nurse).  Report included the following information SBAR. '

## 2017-02-14 NOTE — PROGRESS NOTES
1930: Bedside shift change report given to [address] (oncoming nurse) by Jonah Greene RN (offgoing nurse). Report included the following information SBAR, Procedure Summary, Intake/Output, MAR, Recent Results and Cardiac Rhythm Second degree, type 1 AVB. 2045: MT reported to RN that pt's rhythm didn't appear to be second degree, type 1 AVB. Appeared on monitor to be type 1 AVB. EKG completed. EKG read Junctional rhythm. Will continue to monitor. 2115: Paged out to MD on call to make aware of EKG results/rhythm change. 2121: MD aware. No new orders received. 2150: MT reported pt in A Fib, rate upper 50s- lower 60s. Paged MD.    Ammy Akbar: Repaged MD.     9980: MD made aware, discussed external pacer settings. No new orders received.     0300: Labs drawn, VSS. Pt still in A Fib, pt asymptomatic. Pt stated to nurse that he felt sweaty over night and like he \"had broke a fever, when you wake up feeling better. \" temperature normal. Ambulated with 1 assist to bathroom, voided in toilet. Pt brushed teeth at sink. Returned to bed with minimal assistance. 0341: Heparin gtt rate changed. Pt without complaints at this time, lying in bed using incentive spirometer independently. 0700: Pt ambulated to bathroom, returned to chair. CHG bath completed and linen changed. Dressing on R leg changed as well. 0730: Bedside shift change report given to [address] (oncoming nurse) by [address] (offgoing nurse). Report included the following information SBAR, Procedure Summary, Intake/Output, MAR, Recent Results and Cardiac Rhythm A Fib.

## 2017-02-14 NOTE — PROGRESS NOTES
CM met with patient, wife and daughter at bedside- confirmed address and phone number on file -independent with ADL's prior to admit- [place] [address] [place]- is agreeable to home care services for SN and PT - contact number for them would be wife- Khloe Deshpande at [phone]- choice obtained for home care is [address] - referral made via connect care- answered questions of family regarding disability/FMLA etc and encouraged them to go through their [address] - family to transport upon discharge to home. ESVIN Norton     [facility] can accept patient upon discharge. ESVIN Norton      Care Management Interventions  PCP Verified by CM:  Yes  Transition of Care Consult (CM Consult): [address]: Yes  MyChart Signup: No  Discharge Durable Medical Equipment: No  Physical Therapy Consult: Yes  Occupational Therapy Consult: Yes  Speech Therapy Consult: No  Current Support Network: Lives with Spouse  Confirm Follow Up Transport: Family  Plan discussed with Pt/Family/Caregiver: Yes  Freedom of Choice Offered: Yes  Discharge Location  Discharge Placement: Home with home health

## 2017-02-14 NOTE — PROGRESS NOTES
Problem: Discharge Planning  Goal: *Discharge to safe environment  Outcome: Progressing Towards Goal  See cm notes.  ESVIN Lei

## 2017-02-14 NOTE — PROGRESS NOTES
Problem: Aortic Aneurysm Repair: Day of Surgery (Initiate SCIP Measures for Post-Op Care)  Goal: Activity/Safety  Outcome: Progressing Towards Goal  Pt participating with PT, pt gets up with assistance, up in chair several times today. Goal: *Absence of infection signs and symptoms  No signs/symptoms of infection    Problem: Aortic Aneurysm Repair: Post Op Day 5  Goal: Activity/Safety  Outcome: Progressing Towards Goal  Pt participating with OT and PT, up in chair several times a day, ambulates occasionally with assistance  Goal: Nutrition/Diet  Outcome: Progressing Towards Goal  Pt consumes at least 50 percent of meals  Goal: Respiratory  Outcome: Progressing Towards Goal  Pt using incentive spirometer independently, reaching up to 2000. Pt maintains SpO2 within normal range on room air.

## 2017-02-15 ENCOUNTER — APPOINTMENT (OUTPATIENT)
Dept: GENERAL RADIOLOGY | Age: 57
DRG: 219 | End: 2017-02-15
Attending: NURSE PRACTITIONER
Payer: COMMERCIAL

## 2017-02-15 LAB
ALBUMIN SERPL BCP-MCNC: 2.8 G/DL (ref 3.5–5)
ALBUMIN/GLOB SERPL: 1 {RATIO} (ref 1.1–2.2)
ALP SERPL-CCNC: 189 U/L (ref 45–117)
ALT SERPL-CCNC: 372 U/L (ref 12–78)
AMYLASE SERPL-CCNC: 66 U/L (ref 25–115)
ANION GAP BLD CALC-SCNC: 14 MMOL/L (ref 5–15)
ANION GAP BLD CALC-SCNC: 17 MMOL/L (ref 5–15)
APTT PPP: 76.2 SEC (ref 22.1–32.5)
AST SERPL W P-5'-P-CCNC: 473 U/L (ref 15–37)
ATRIAL RATE: 300 BPM
ATRIAL RATE: 73 BPM
BILIRUB SERPL-MCNC: 1.7 MG/DL (ref 0.2–1)
BUN SERPL-MCNC: 30 MG/DL (ref 6–20)
BUN SERPL-MCNC: 50 MG/DL (ref 6–20)
BUN/CREAT SERPL: 17 (ref 12–20)
BUN/CREAT SERPL: 18 (ref 12–20)
CALCIUM SERPL-MCNC: 7.4 MG/DL (ref 8.5–10.1)
CALCIUM SERPL-MCNC: 8.2 MG/DL (ref 8.5–10.1)
CALCULATED P AXIS, ECG09: -80 DEGREES
CALCULATED R AXIS, ECG10: 31 DEGREES
CALCULATED R AXIS, ECG10: 53 DEGREES
CALCULATED T AXIS, ECG11: 44 DEGREES
CALCULATED T AXIS, ECG11: 55 DEGREES
CHLORIDE SERPL-SCNC: 97 MMOL/L (ref 97–108)
CHLORIDE SERPL-SCNC: 97 MMOL/L (ref 97–108)
CO2 SERPL-SCNC: 18 MMOL/L (ref 21–32)
CO2 SERPL-SCNC: 21 MMOL/L (ref 21–32)
CREAT SERPL-MCNC: 1.75 MG/DL (ref 0.7–1.3)
CREAT SERPL-MCNC: 2.72 MG/DL (ref 0.7–1.3)
DIAGNOSIS, 93000: NORMAL
DIAGNOSIS, 93000: NORMAL
ERYTHROCYTE [DISTWIDTH] IN BLOOD BY AUTOMATED COUNT: 12.9 % (ref 11.5–14.5)
GLOBULIN SER CALC-MCNC: 2.9 G/DL (ref 2–4)
GLUCOSE SERPL-MCNC: 145 MG/DL (ref 65–100)
GLUCOSE SERPL-MCNC: 169 MG/DL (ref 65–100)
HCT VFR BLD AUTO: 24.1 % (ref 36.6–50.3)
HCT VFR BLD AUTO: 26.7 % (ref 36.6–50.3)
HGB BLD-MCNC: 8 G/DL (ref 12.1–17)
HGB BLD-MCNC: 9 G/DL (ref 12.1–17)
INR PPP: 1.9 (ref 0.9–1.1)
LACTATE SERPL-SCNC: 6.4 MMOL/L (ref 0.4–2)
LACTATE SERPL-SCNC: 6.7 MMOL/L (ref 0.4–2)
LACTATE SERPL-SCNC: 7 MMOL/L (ref 0.4–2)
LIPASE SERPL-CCNC: 380 U/L (ref 73–393)
MAGNESIUM SERPL-MCNC: 2.3 MG/DL (ref 1.6–2.4)
MCH RBC QN AUTO: 31.7 PG (ref 26–34)
MCHC RBC AUTO-ENTMCNC: 33.7 G/DL (ref 30–36.5)
MCV RBC AUTO: 94 FL (ref 80–99)
P-R INTERVAL, ECG05: 196 MS
PLATELET # BLD AUTO: 207 K/UL (ref 150–400)
POTASSIUM SERPL-SCNC: 4 MMOL/L (ref 3.5–5.1)
POTASSIUM SERPL-SCNC: 4.5 MMOL/L (ref 3.5–5.1)
PROT SERPL-MCNC: 5.7 G/DL (ref 6.4–8.2)
PROTHROMBIN TIME: 19.3 SEC (ref 9–11.1)
Q-T INTERVAL, ECG07: 414 MS
Q-T INTERVAL, ECG07: 420 MS
QRS DURATION, ECG06: 90 MS
QRS DURATION, ECG06: 92 MS
QTC CALCULATION (BEZET), ECG08: 462 MS
QTC CALCULATION (BEZET), ECG08: 477 MS
RBC # BLD AUTO: 2.84 M/UL (ref 4.1–5.7)
SODIUM SERPL-SCNC: 132 MMOL/L (ref 136–145)
SODIUM SERPL-SCNC: 132 MMOL/L (ref 136–145)
THERAPEUTIC RANGE,PTTT: ABNORMAL SECS (ref 58–77)
TROPONIN I SERPL-MCNC: 1.06 NG/ML
VENTRICULAR RATE, ECG03: 73 BPM
VENTRICULAR RATE, ECG03: 80 BPM
WBC # BLD AUTO: 17.2 K/UL (ref 4.1–11.1)

## 2017-02-15 PROCEDURE — 85027 COMPLETE CBC AUTOMATED: CPT | Performed by: NURSE PRACTITIONER

## 2017-02-15 PROCEDURE — 74011250636 HC RX REV CODE- 250/636: Performed by: NURSE PRACTITIONER

## 2017-02-15 PROCEDURE — 85610 PROTHROMBIN TIME: CPT | Performed by: NURSE PRACTITIONER

## 2017-02-15 PROCEDURE — 83605 ASSAY OF LACTIC ACID: CPT | Performed by: THORACIC SURGERY (CARDIOTHORACIC VASCULAR SURGERY)

## 2017-02-15 PROCEDURE — 80048 BASIC METABOLIC PNL TOTAL CA: CPT | Performed by: THORACIC SURGERY (CARDIOTHORACIC VASCULAR SURGERY)

## 2017-02-15 PROCEDURE — 74011250636 HC RX REV CODE- 250/636: Performed by: THORACIC SURGERY (CARDIOTHORACIC VASCULAR SURGERY)

## 2017-02-15 PROCEDURE — 71020 XR CHEST PA LAT: CPT

## 2017-02-15 PROCEDURE — 83690 ASSAY OF LIPASE: CPT | Performed by: THORACIC SURGERY (CARDIOTHORACIC VASCULAR SURGERY)

## 2017-02-15 PROCEDURE — 74011250637 HC RX REV CODE- 250/637: Performed by: THORACIC SURGERY (CARDIOTHORACIC VASCULAR SURGERY)

## 2017-02-15 PROCEDURE — 51798 US URINE CAPACITY MEASURE: CPT

## 2017-02-15 PROCEDURE — 85018 HEMOGLOBIN: CPT | Performed by: THORACIC SURGERY (CARDIOTHORACIC VASCULAR SURGERY)

## 2017-02-15 PROCEDURE — 74011250637 HC RX REV CODE- 250/637: Performed by: NURSE PRACTITIONER

## 2017-02-15 PROCEDURE — 93005 ELECTROCARDIOGRAM TRACING: CPT

## 2017-02-15 PROCEDURE — 83735 ASSAY OF MAGNESIUM: CPT | Performed by: NURSE PRACTITIONER

## 2017-02-15 PROCEDURE — 85730 THROMBOPLASTIN TIME PARTIAL: CPT | Performed by: NURSE PRACTITIONER

## 2017-02-15 PROCEDURE — 65660000000 HC RM CCU STEPDOWN

## 2017-02-15 PROCEDURE — 74011250636 HC RX REV CODE- 250/636: Performed by: PHYSICIAN ASSISTANT

## 2017-02-15 PROCEDURE — 87040 BLOOD CULTURE FOR BACTERIA: CPT | Performed by: PHYSICIAN ASSISTANT

## 2017-02-15 PROCEDURE — 80053 COMPREHEN METABOLIC PANEL: CPT | Performed by: NURSE PRACTITIONER

## 2017-02-15 PROCEDURE — 83605 ASSAY OF LACTIC ACID: CPT | Performed by: PHYSICIAN ASSISTANT

## 2017-02-15 PROCEDURE — 77030018836 HC SOL IRR NACL ICUM -A

## 2017-02-15 PROCEDURE — 84484 ASSAY OF TROPONIN QUANT: CPT | Performed by: THORACIC SURGERY (CARDIOTHORACIC VASCULAR SURGERY)

## 2017-02-15 PROCEDURE — 82150 ASSAY OF AMYLASE: CPT | Performed by: THORACIC SURGERY (CARDIOTHORACIC VASCULAR SURGERY)

## 2017-02-15 PROCEDURE — 74011250637 HC RX REV CODE- 250/637: Performed by: PHYSICIAN ASSISTANT

## 2017-02-15 PROCEDURE — 36415 COLL VENOUS BLD VENIPUNCTURE: CPT | Performed by: NURSE PRACTITIONER

## 2017-02-15 RX ORDER — SODIUM CHLORIDE 9 MG/ML
1000 INJECTION, SOLUTION INTRAVENOUS ONCE
Status: COMPLETED | OUTPATIENT
Start: 2017-02-15 | End: 2017-02-23

## 2017-02-15 RX ORDER — TRAMADOL HYDROCHLORIDE 50 MG/1
50-100 TABLET ORAL
Status: DISCONTINUED | OUTPATIENT
Start: 2017-02-15 | End: 2017-02-27 | Stop reason: HOSPADM

## 2017-02-15 RX ORDER — WARFARIN 7.5 MG/1
7.5 TABLET ORAL EVERY EVENING
Status: COMPLETED | OUTPATIENT
Start: 2017-02-15 | End: 2017-02-15

## 2017-02-15 RX ORDER — SODIUM CHLORIDE 9 MG/ML
1000 INJECTION, SOLUTION INTRAVENOUS CONTINUOUS
Status: DISCONTINUED | OUTPATIENT
Start: 2017-02-15 | End: 2017-02-16

## 2017-02-15 RX ORDER — SODIUM CHLORIDE 9 MG/ML
250 INJECTION, SOLUTION INTRAVENOUS ONCE
Status: COMPLETED | OUTPATIENT
Start: 2017-02-15 | End: 2017-02-23

## 2017-02-15 RX ADMIN — Medication 10 ML: at 15:10

## 2017-02-15 RX ADMIN — ONDANSETRON 4 MG: 2 INJECTION INTRAMUSCULAR; INTRAVENOUS at 03:15

## 2017-02-15 RX ADMIN — SODIUM CHLORIDE 250 ML: 900 INJECTION, SOLUTION INTRAVENOUS at 11:46

## 2017-02-15 RX ADMIN — ACETAMINOPHEN 650 MG: 325 TABLET, FILM COATED ORAL at 12:46

## 2017-02-15 RX ADMIN — ASPIRIN 81 MG CHEWABLE TABLET 81 MG: 81 TABLET CHEWABLE at 09:48

## 2017-02-15 RX ADMIN — FUROSEMIDE 20 MG: 20 TABLET ORAL at 09:48

## 2017-02-15 RX ADMIN — DOCUSATE SODIUM AND SENNOSIDES 1 TABLET: 8.6; 5 TABLET, FILM COATED ORAL at 09:48

## 2017-02-15 RX ADMIN — SODIUM CHLORIDE 1000 ML: 900 INJECTION, SOLUTION INTRAVENOUS at 12:43

## 2017-02-15 RX ADMIN — WARFARIN SODIUM 7.5 MG: 7.5 TABLET ORAL at 17:49

## 2017-02-15 RX ADMIN — SODIUM CHLORIDE 1000 ML: 900 INJECTION, SOLUTION INTRAVENOUS at 17:47

## 2017-02-15 RX ADMIN — Medication 10 ML: at 23:45

## 2017-02-15 RX ADMIN — Medication 10 ML: at 06:40

## 2017-02-15 RX ADMIN — SODIUM CHLORIDE 1000 ML: 900 INJECTION, SOLUTION INTRAVENOUS at 15:34

## 2017-02-15 RX ADMIN — ACETAMINOPHEN 650 MG: 325 TABLET, FILM COATED ORAL at 21:59

## 2017-02-15 RX ADMIN — PANTOPRAZOLE SODIUM 40 MG: 40 TABLET, DELAYED RELEASE ORAL at 06:43

## 2017-02-15 RX ADMIN — DOCUSATE SODIUM AND SENNOSIDES 1 TABLET: 8.6; 5 TABLET, FILM COATED ORAL at 17:49

## 2017-02-15 NOTE — PROGRESS NOTES
Physical Therapy    Pt currently being taken off unit for testing. Spoke with patient and wife prior to leaving. Patient reports having a \"really bad day\" and feels \"like crap\". Pt requested to not work today and follow up with him tomorrow. Will follow up with patient tomorrow.      Thank Alana Sun PT,DPT

## 2017-02-15 NOTE — PROGRESS NOTES
Cardiac Surgery Care Coordinator-  Met with Treasure Burkett and his wife. Reviewed plan of care and discussed goals for the day. Treasure Burkett has a good understanding of his plan for the day. Mr Anthony Luciano continues to complain of nausea. Reviewed PT/INR results and reinforced Coumadin education. Reinforced sternal precautions and encouraged continued use of the incentive spirometer. Encouraged Mr Anthony Luciano to rest. Will continue to follow for educational and emotional needs.  Danitza Pizarro RN

## 2017-02-15 NOTE — PROGRESS NOTES
Problem: Falls - Risk of  Goal: *Absence of falls  Outcome: Progressing Towards Goal  Call bell within reach, personal belongings in reach, bed locked and in low position. Non skid footwear in placed. Goal: *Knowledge of fall prevention  Outcome: Progressing Towards Goal  Uses the call bell appropriately to call for assistance         Problem: Pressure Ulcer - Risk of  Goal: *Prevention of pressure ulcer  Outcome: Progressing Towards Goal  Q4H skin assessed, Turns self at appropriate intervals and blood glucose controlled         Problem: Aortic Aneurysm Repair: Post Op Day 5  Goal: *Hemodynamically stable  Outcome: Progressing Towards Goal  Patient Vitals for the past 12 hrs:    Temp Pulse Resp BP SpO2   02/14/17 2027 98 °F (36.7 °C) 73 16 145/49 99 %   02/14/17 1716 99.1 °F (37.3 °C) - - - -   02/14/17 1535 99.2 °F (37.3 °C) 66 16 120/46 100 %   02/14/17 1242 - 67 - 128/46 -   02/14/17 1230 - 86 - - -   02/14/17 1142 98.4 °F (36.9 °C) 61 16 144/48 97 %         Goal: *Incisions without signs and symptoms of wound complication  Outcome: Progressing Towards Goal  Pt incision site are clean, dry, intact and free of infection    Bedside and Verbal shift change report given to Lizz Velasco RN (oncoming nurse) by Nazario Gupta RN (offgoing nurse). Report included the following information SBAR, Kardex, ED Summary, OR Summary, Procedure Summary, Intake/Output, MAR, Recent Results, Med Rec Status and Cardiac Rhythm Afib.

## 2017-02-15 NOTE — PROGRESS NOTES
9811 Bedside shift change report given to Bj Harper RN (oncoming nurse) by Fawad Murillo RN (offgoing nurse). Report included the following information SBAR, Kardex, Procedure Summary, Intake/Output, MAR, Recent Results, Med Rec Status and Cardiac Rhythm Afib/flutter. [id number] Paged Dr. Mohamud Stevens per request from Wilmer Morales PA-C to clarify need for wound vac while in hospital.  Spoke to Dr. Mohamud Stevens immediately. Per Dr. Mohamud Stevens would like for wound care to consult and determine if vac is needed. 1154 Paged wound care nurse, Divya Sweeney, to notify of Dr. Sandra Melvin request.      [id number] Critical value for lactic acid, 6.7; notified Wilmer Morales PA-C. Orders to follow. 1225 Will bolus remained 250mL NS, run 1L NS at 500mL/hr, and draw paired blood cultures. [place] Per Josette Oropeza CVICU RN, pt remains hemodynamically stable; does not need to transfer to higher level of care as discussed with Dr. STEPHANIE Andrade. Pt to receive additional 2L of NS.    1400 EKG completed by Andrea Morales RN, as rhythm appeared to change. Aflutter with variable AV block, ST elevation. Notified Dr. STEPHANIE Andrade; orders to continue fluids and monitor. 1430 D/c'd heparin gtt, per JOAQUIN Mitchell PA-C. INR therapeutic; will administer ordered Coumadin at scheduled time. 1722 Critical value for lactic acid, 7.0; paged on call for cardiac surgery specialist, Dr. Angela Andrade. 1724  Return call from Dr. Angela Andrade. Received telephone orders with readback for CT abdominal.  Dr. Angela Andrade stated he \"will call Dr. STEPHANIE Andrade. \"    [phone] Call from Dr. STEPHANIE Andrade;  Notified of critical value for lactic acid. Received telephone orders with readback to d/c CT order, administer additional 1L NS at 500ml/hr, and continue to monitor. Pt remains hemodynamically stable.   Patient Vitals for the past 8 hrs:   Temp Pulse Resp BP SpO2   02/15/17 1700 - 79 - (!) 102/37 -   02/15/17 1518 97.7 °F (36.5 °C) 75 18 110/44 100 %   02/15/17 1420 - 72 - 106/49 -   02/15/17 1412 98.1 °F (36.7 °C) 74 16 98/47 96 %   02/15/17 1112 97.6 °F (36.4 °C) 73 17 108/40 95 %       1747 Hung third 1L bag NS. Pt stated \"hes feeling better. \"  Will continue to monitor. 707 [place] to Joie CCU RN, in regards to pt's elevated lactic acid. Discussed with Cassandra Morgan that Dr. Zuleima Lin is aware of pt's status and orders were received; pt's hemodynamics are stable and we are continuing to monitor for any changes. 1930 Bedside shift change report given to José Miguel Forbes RN (oncoming nurse) by Cassandra Morgan, RN (offgoing nurse). Report included the following information SBAR, Kardex, Procedure Summary, Intake/Output, MAR, Recent Results and Med Rec Status. Problem: Falls - Risk of  Goal: *Absence of falls  Outcome: Progressing Towards Goal  Pt has remained free of falls since admission. He is up with assistance. Goal: *Knowledge of fall prevention  Outcome: Progressing Towards Goal  Pt demonstrates appropriate safety awareness. He calls for assistance when needed. Problem: Pressure Ulcer - Risk of  Goal: *Prevention of pressure ulcer  Outcome: Progressing Towards Goal  No ulcer noted. Problem: Aortic Aneurysm Repair: Discharge Outcomes  Goal: *Hemodynamically stable  Outcome: Progressing Towards Goal  Patient Vitals for the past 12 hrs:    Temp Pulse Resp BP SpO2   02/15/17 1518 97.7 °F (36.5 °C) 75 18 110/44 100 %   02/15/17 1420 - 72 - 106/49 -   02/15/17 1412 98.1 °F (36.7 °C) 74 16 98/47 96 %   02/15/17 1112 97.6 °F (36.4 °C) 73 17 108/40 95 %   02/15/17 0857 97.6 °F (36.4 °C) 78 16 106/53 100 %         Goal: *Lungs clear or at baseline  Outcome: Progressing Towards Goal  Pt's lungs are clear to auscultation. Goal: *Optimal pain control at patients stated goal  Outcome: Progressing Towards Goal  Pt has rated pain 1, on a scale of 1-10. Pain managed with PRN medications.    Goal: *Weight is stable  Outcome: Progressing Towards Goal  Last 3 Recorded Weights in this Encounter     02/13/17 0354 02/14/17 0300 02/15/17 0306   Weight: 98.7 kg (217 lb 9.5 oz) 98.5 kg (217 lb 2.5 oz) 98.6 kg (217 lb 6 oz)         Goal: *No signs and symptoms of infection or wound complications  Outcome: Progressing Towards Goal  No s/sx of wound infection or complications.

## 2017-02-15 NOTE — WOUND CARE
Wound and Skin Consult / New Consult for assessment of right lateral lower leg/ fasciotomy wound- Wound VAC vs. Wound care- reviewed chart, assessed patient and spoke with nurse, Carmelita Tomas. Patient alert and oriented asking nurse Carmelita Tomas for tylenol prior to wound assessment. Tylenol given to patient. Wife at bedside and writer explained purpose and function of wound vac if needed. Pain level 0/10 and post wound assessment 2/10. Spoke with Dr. Elizabeth Diehl and wound care approved no wound VAC. Assessment-supine in bed with head of bed elevated. 1. Removed right lower foot brace. Removed kerlix, ABD, 4x4's and moist dressing. Wound is 100% pink, slightly dry. Measurin.5cm x 4.7cm x 0.1cm / surrounding skin is intact. Swelling noted to area. Cleaned with NS, applied      Moist NS gauze impregnated with Carrasyn Gel. Applied to defined parameters of wound. Dressed with dry 4x4's,       ABD pad and elian to secure. To be changed daily. 2. Sacrum and heels are pink and ole with no opened areas. Recommendations-    1. Patient is on a [address] [place] [address] Bed for  pressure redistribution. Use only Air Flow chuxs and draw sheet under patient. 2. Reposition:  Continue to turn every 1-2hrs to reposition for pressure relief, with pillows to protect bony prominences/float heels/ needs assistance of 1 to turn. 3. Not incontinent. 4. Continue to monitor pain, nutrition and skin assessment. 5. Wound Care-       - Daily to right lateral lower leg fasciotomy: clean with NS, apply Carrasyn Gel to moist NS kerlix and place on          Defined parameters of wound. Cover with dry 4x4, ABD pad and elian to secure. Apply foot brace and velcro on         Foot not mid lower leg. Dr. Elizabeth Diehl planning follow-up visit after discharge.     Radha Christianson RN/WOCN

## 2017-02-16 ENCOUNTER — APPOINTMENT (OUTPATIENT)
Dept: CT IMAGING | Age: 57
DRG: 219 | End: 2017-02-16
Attending: SURGERY
Payer: COMMERCIAL

## 2017-02-16 LAB
ALBUMIN SERPL BCP-MCNC: 2.8 G/DL (ref 3.5–5)
ALBUMIN/GLOB SERPL: 1 {RATIO} (ref 1.1–2.2)
ALP SERPL-CCNC: 156 U/L (ref 45–117)
ALT SERPL-CCNC: 1997 U/L (ref 12–78)
AMYLASE SERPL-CCNC: 85 U/L (ref 25–115)
ANION GAP BLD CALC-SCNC: 15 MMOL/L (ref 5–15)
APTT PPP: 30.4 SEC (ref 22.1–32.5)
AST SERPL W P-5'-P-CCNC: >2000 U/L (ref 15–37)
BILIRUB SERPL-MCNC: 1.9 MG/DL (ref 0.2–1)
BUN SERPL-MCNC: 53 MG/DL (ref 6–20)
BUN/CREAT SERPL: 19 (ref 12–20)
CALCIUM SERPL-MCNC: 7.6 MG/DL (ref 8.5–10.1)
CHLORIDE SERPL-SCNC: 98 MMOL/L (ref 97–108)
CO2 SERPL-SCNC: 18 MMOL/L (ref 21–32)
CREAT SERPL-MCNC: 2.85 MG/DL (ref 0.7–1.3)
ERYTHROCYTE [DISTWIDTH] IN BLOOD BY AUTOMATED COUNT: 13.3 % (ref 11.5–14.5)
GLOBULIN SER CALC-MCNC: 2.9 G/DL (ref 2–4)
GLUCOSE SERPL-MCNC: 141 MG/DL (ref 65–100)
HCT VFR BLD AUTO: 23.7 % (ref 36.6–50.3)
HGB BLD-MCNC: 8.1 G/DL (ref 12.1–17)
INR PPP: 3.4 (ref 0.9–1.1)
LACTATE SERPL-SCNC: 4.7 MMOL/L (ref 0.4–2)
LIPASE SERPL-CCNC: 467 U/L (ref 73–393)
MCH RBC QN AUTO: 31.3 PG (ref 26–34)
MCHC RBC AUTO-ENTMCNC: 34.2 G/DL (ref 30–36.5)
MCV RBC AUTO: 91.5 FL (ref 80–99)
PLATELET # BLD AUTO: 201 K/UL (ref 150–400)
POTASSIUM SERPL-SCNC: 4.8 MMOL/L (ref 3.5–5.1)
PROT SERPL-MCNC: 5.7 G/DL (ref 6.4–8.2)
PROTHROMBIN TIME: 34.6 SEC (ref 9–11.1)
RBC # BLD AUTO: 2.59 M/UL (ref 4.1–5.7)
SODIUM SERPL-SCNC: 131 MMOL/L (ref 136–145)
THERAPEUTIC RANGE,PTTT: NORMAL SECS (ref 58–77)
WBC # BLD AUTO: 22.2 K/UL (ref 4.1–11.1)

## 2017-02-16 PROCEDURE — 85610 PROTHROMBIN TIME: CPT | Performed by: PHYSICIAN ASSISTANT

## 2017-02-16 PROCEDURE — 80053 COMPREHEN METABOLIC PANEL: CPT | Performed by: PHYSICIAN ASSISTANT

## 2017-02-16 PROCEDURE — 82150 ASSAY OF AMYLASE: CPT | Performed by: PHYSICIAN ASSISTANT

## 2017-02-16 PROCEDURE — 83690 ASSAY OF LIPASE: CPT | Performed by: PHYSICIAN ASSISTANT

## 2017-02-16 PROCEDURE — 72191 CT ANGIOGRAPH PELV W/O&W/DYE: CPT

## 2017-02-16 PROCEDURE — 74011250637 HC RX REV CODE- 250/637: Performed by: NURSE PRACTITIONER

## 2017-02-16 PROCEDURE — 74011636320 HC RX REV CODE- 636/320: Performed by: THORACIC SURGERY (CARDIOTHORACIC VASCULAR SURGERY)

## 2017-02-16 PROCEDURE — 65660000000 HC RM CCU STEPDOWN

## 2017-02-16 PROCEDURE — 83605 ASSAY OF LACTIC ACID: CPT | Performed by: PHYSICIAN ASSISTANT

## 2017-02-16 PROCEDURE — 74175 CTA ABDOMEN W/CONTRAST: CPT

## 2017-02-16 PROCEDURE — 71275 CT ANGIOGRAPHY CHEST: CPT

## 2017-02-16 PROCEDURE — 51798 US URINE CAPACITY MEASURE: CPT

## 2017-02-16 PROCEDURE — 85027 COMPLETE CBC AUTOMATED: CPT | Performed by: PHYSICIAN ASSISTANT

## 2017-02-16 PROCEDURE — 97116 GAIT TRAINING THERAPY: CPT

## 2017-02-16 PROCEDURE — 74011250637 HC RX REV CODE- 250/637: Performed by: THORACIC SURGERY (CARDIOTHORACIC VASCULAR SURGERY)

## 2017-02-16 PROCEDURE — 74011250636 HC RX REV CODE- 250/636: Performed by: PHYSICIAN ASSISTANT

## 2017-02-16 PROCEDURE — 74011000258 HC RX REV CODE- 258: Performed by: THORACIC SURGERY (CARDIOTHORACIC VASCULAR SURGERY)

## 2017-02-16 PROCEDURE — 74011250636 HC RX REV CODE- 250/636: Performed by: INTERNAL MEDICINE

## 2017-02-16 PROCEDURE — 36415 COLL VENOUS BLD VENIPUNCTURE: CPT | Performed by: PHYSICIAN ASSISTANT

## 2017-02-16 PROCEDURE — 85730 THROMBOPLASTIN TIME PARTIAL: CPT | Performed by: PHYSICIAN ASSISTANT

## 2017-02-16 RX ORDER — SODIUM CHLORIDE 0.9 % (FLUSH) 0.9 %
10 SYRINGE (ML) INJECTION
Status: COMPLETED | OUTPATIENT
Start: 2017-02-16 | End: 2017-02-16

## 2017-02-16 RX ORDER — SODIUM CHLORIDE 9 MG/ML
1000 INJECTION, SOLUTION INTRAVENOUS ONCE
Status: COMPLETED | OUTPATIENT
Start: 2017-02-16 | End: 2017-02-16

## 2017-02-16 RX ORDER — SODIUM CHLORIDE 9 MG/ML
125 INJECTION, SOLUTION INTRAVENOUS CONTINUOUS
Status: DISCONTINUED | OUTPATIENT
Start: 2017-02-16 | End: 2017-02-17

## 2017-02-16 RX ADMIN — DOCUSATE SODIUM AND SENNOSIDES 1 TABLET: 8.6; 5 TABLET, FILM COATED ORAL at 18:08

## 2017-02-16 RX ADMIN — SODIUM CHLORIDE 1000 ML: 900 INJECTION, SOLUTION INTRAVENOUS at 08:45

## 2017-02-16 RX ADMIN — PANTOPRAZOLE SODIUM 40 MG: 40 TABLET, DELAYED RELEASE ORAL at 07:08

## 2017-02-16 RX ADMIN — SODIUM CHLORIDE 100 ML: 900 INJECTION, SOLUTION INTRAVENOUS at 10:31

## 2017-02-16 RX ADMIN — Medication 10 ML: at 15:49

## 2017-02-16 RX ADMIN — ACETAMINOPHEN 650 MG: 325 TABLET, FILM COATED ORAL at 18:39

## 2017-02-16 RX ADMIN — Medication 10 ML: at 15:50

## 2017-02-16 RX ADMIN — Medication 10 ML: at 07:04

## 2017-02-16 RX ADMIN — Medication 10 ML: at 20:31

## 2017-02-16 RX ADMIN — Medication 10 ML: at 05:03

## 2017-02-16 RX ADMIN — SODIUM CHLORIDE 125 ML/HR: 900 INJECTION, SOLUTION INTRAVENOUS at 21:50

## 2017-02-16 RX ADMIN — ACETAMINOPHEN 650 MG: 325 TABLET, FILM COATED ORAL at 11:34

## 2017-02-16 RX ADMIN — Medication 10 ML: at 10:31

## 2017-02-16 RX ADMIN — IOPAMIDOL 100 ML: 755 INJECTION, SOLUTION INTRAVENOUS at 10:31

## 2017-02-16 RX ADMIN — ASPIRIN 81 MG CHEWABLE TABLET 81 MG: 81 TABLET CHEWABLE at 08:49

## 2017-02-16 RX ADMIN — ACETAMINOPHEN 650 MG: 325 TABLET, FILM COATED ORAL at 05:02

## 2017-02-16 RX ADMIN — DOCUSATE SODIUM AND SENNOSIDES 1 TABLET: 8.6; 5 TABLET, FILM COATED ORAL at 08:49

## 2017-02-16 RX ADMIN — SODIUM CHLORIDE 125 ML/HR: 900 INJECTION, SOLUTION INTRAVENOUS at 14:46

## 2017-02-16 NOTE — PROGRESS NOTES
Vascular Surgery  --findings noted re: increased WBC, Cr, lactate, AST/ALT etc.  --has some mild abdominal symptoms (hiccups/nausea) as well as some SOB on exertion  --likely has change in perfusion to possibly celiac/renals  --will order stat CTA of chest/abd/pelvis  --have discussed risks of temporary/permanent dialysis with patient/family as a result of this  --have discussed with Radiology as well

## 2017-02-16 NOTE — PROGRESS NOTES
Cardiac Surgery Care Coordinator-  Met with Luke Bolaños, his wife and and daughter,  Reviewed plan of care and discussed goals for the day. Dr Evelio Taylor at the bedside, discussed plan and answered questions. Luke Bolaños has a good understanding of his plan for the day. Reinforced sternal precautions and encouraged continued use of the incentive spirometer. Offered emotional support and encouragement. Provided reference hand outs for the family. Will continue to follow for educational and emotional needs.  Aries Martin RN

## 2017-02-16 NOTE — PROGRESS NOTES
2145: Repeat lactic, BMP, and H&H drawn. 2200: Lactic 6.4, trending downward. 2140: Pt voided 150mL urine. Has only 330mL total since 0300 this am. Team aware on dayshift of decreased urine. Will continue to monitor. 0015: Right leg dressing saturated, serous drainage. Changed per order. 0045: Pt tried to void at bedside, unable to. Bladder scan 116mL. Will continue to monitor.    0315: AM labs drawn early due to upward jump in Creat/BUN

## 2017-02-16 NOTE — PROGRESS NOTES
CSS Floor Progress Note    Admit Date: 2017  POD:  7 Day Post-Op    Procedure:  Procedure(s):  ASCENDING AORTIC DISSECTION, AORTIC ROOT REPLACEMENT, 29MM MECHANICAL VALVE, FEM-FEM BYPASS BY DR Rafiq Boyer. ECC, JOSE BY DR LOE. Subjective:   Pt seen with Dr. Bess Bellamy. Afebrile, on RA. Remains feeling poor, although better than yesterday AM. Has received 3 L NaCl since yesterday afternoon. Poor U/O. Pacer on backup. EKG appears NSR vs A flutter  BP stable. Objective:   Vitals:  Blood pressure 118/48, pulse 78, temperature 97.6 °F (36.4 °C), resp. rate 18, height 5' 11\" (1.803 m), weight 224 lb 13.9 oz (102 kg), SpO2 98 %. Temp (24hrs), Av.7 °F (36.5 °C), Min:97.6 °F (36.4 °C), Max:98.1 °F (36.7 °C)    EKG/Rhythm:  SR/Aflutter    Oxygen Therapy:  Oxygen Therapy  O2 Sat (%): 98 % (17)  Pulse via Oximetry: 82 beats per minute (17 1500)  O2 Device: Room air (17)  O2 Flow Rate (L/min): 1 l/min (17 0810)  FIO2 (%): 40 % (02/10/17 0722)    CXR: enlarged cardiac sihlouette, small bilat effusions       Admission Weight: Last Weight   Weight: 222 lb (100.7 kg) Weight: 224 lb 13.9 oz (102 kg)     Intake / Output / Drain:  Current Shift:    Last 24 hrs.:     Intake/Output Summary (Last 24 hours) at 17 0815  Last data filed at 17 0505   Gross per 24 hour   Intake          4101.67 ml   Output              455 ml   Net          3646.67 ml       EXAM:  General:  Awake, appears more comfortable, nauseated                                                                                           Lungs:   Clear to auscultation bilaterally. Incision:  Drs C/D/I. Including R LE. Heart:  Regular rate and rhythm   Abdomen:   Soft, non-tender. Bowel sounds normal.    Extremities:  No edema. PPP. Neurologic:  Gross motor and sensory apparatus intact.      Labs:   Recent Labs      17   0310   WBC  22.2*   HGB  8.1*   HCT  23.7*   PLT  201   NA  131*   K  4.8   BUN 53*   CREA  2.85*   GLU  141*   INR  3.4*        Assessment:     Active Problems:    Ascending aortic dissection (HCC) (2017)      Overview: AORTIC ROOT REPLACEMENT USING A 29 MM [facility] VALVED CONDUIT, ECC VIA       LEFT FEMORAL ARTERIAL CANNULATION      2. LEFT TO RIGHT FEM FEM BYPASS GRAFT            Right anterior compartment fasciotomy.--Dr. Jimmy Sommers 17       Plan/Recommendations/Medical Decision Makin. S/p Aortic root replacement w/ AVR mechanical valve & L to R fem fem bypass graft: Strict BP control SBP < 120. Very concerning elevated Lactates, Cr, and Liver Enzymes. Poor U/O. Will d/w vascular surgery and possibly IR for possible fenestration procedure. 2. Atelectasis: Increase IS and activity as tolerated. 3. Postop anemia s/t acute blood loss: Hgb reduced due to 3L IV hydration and poor U/O  4. Anticoagulation for Mech AVR- INR 3.4 this AM, hold coumadin today. Plts 201. Cont asa.  5. Elevated transaminases: ALT /AST > Follow   6. Renal- Elevated Cr 2.8 (1.75) Poor U/O, possible renal consult but dissection needs to be treated as primary cause  7. Postop afib & Varying heart block: Rhythm now A flutter vs NSR   8. HTN:  BP labile, unable to start any BB at this point. 9. Constipation: cont pericolace, PRN bisacodyl. 10. R compartment fasciotomy:  Vascular following, cont PT/OT  11. Nausea- on ultram prn. Continue zofram PRN. Mildly improved. 12. Dispo: PT/OT. Dispo plan TBD.       Signed By: Modesto Mccauley PA-C

## 2017-02-16 NOTE — PROGRESS NOTES
Vascular Surgery  --reviewed CT-A: has remodeled since ascending repair and most vessels now arise from true lumen (dramatic change)  --right renal with partial occlusion  --discussed risks of types of repair with patient and family; unclear if benefits outweigh risks at this point   --have consulted renal due to dye load and have asked coburn to be placed  --will follow;   --? Try keeping SBP normotensive or relaxed parameters

## 2017-02-16 NOTE — PROGRESS NOTES
Spiritual Care Partner Volunteer visited patient in [place] [id number]. on 02/16/17. Documented by:  Asotn Osei M.S., M.Div.   [facility] ([id number])

## 2017-02-16 NOTE — PROGRESS NOTES
Met wife Bashir Baum outside room 456 where Mr. Candi Dakins continues to recover from surgery. She was also accompanied by Ashley, a Spiritual Care Partner. She appeared tired and engaged in a dialogue describing Mr. Candi Dakins recent post-surgery medical challenges. Expressed a sense of gratitude mixed with worry for his well being. Shared that there are many, many people praying for her  and her family--and revealed how meaningful the spiritual and practical support has been. Expressed how humbling it has been to be taken care of by colleagues, family, and friends. Became tearful when expressing those thoughts. Created space for sharing her feelings and reflected those feelings. Offered words of comfort and assurance---both of God's care and the desire of staff to be of support to her, her , and family. Will continue to follow as able. [id number] [facility]'s Staff  (Eli [place] Patient Care Specialist)   Paging Service [phone])

## 2017-02-16 NOTE — PROGRESS NOTES
Vascular  No wound vac needed as wound already shrinking  Nauseated  Urine output down  Abdomen soft and nontender  Wbc elevated to 22K and cr up to 2.8  Concerning  Following from the periphery

## 2017-02-16 NOTE — PROGRESS NOTES
physical Therapy TREATMENT  Patient: Stephen Kidd (5 y.o. male)  Date: 2/16/2017  Diagnosis: unknown  Ascending aortic dissection (HCC)  right foot drop <principal problem not specified>  Procedure(s) (LRB):  FASCIOTOMY RIGHT  LEG ANTERIOR COMPARTMENT (Right) 5 Days Post-Op  Precautions: WBAT, Sternal, Fall    ASSESSMENT:  Had an open discussion with patient about his current situation and his feelings about the events that have occurred over the past week. Pt expressed feelings of depression and self-pitty but is trying to keep it together. Explained to patient that we have spiritual care who can come talk with him and if this feelings continue he could discuss with his doctors about starting an anti-depressant. Pt very labile throughout session which is quite different compared to his jovial behavior shortly after surgery. He was agreeable to gait train today but wanted to have a \"goal\". Pt gait trained x60ft with ace wrap as DF assist, RW (cleared by surgery), and 3 standing rest breaks d/t patient feeling \"SOB\". Pt returned to EOB and therapist had another discussion about setting goals for the remainder of his stay. Suggested he sit up in chair for all 3 meals, and tomorrow walk with RW. Pt agreeable and requested to try 2 sessions walking tomorrow with plan to progress to ambulating 3x/day on Saturday. Goals were written on white board for visual reminders. At this time pt would GREATLY BENEFIT from an off unit experience. Spoke with family and discussed possibly seeing if he could go off the unit on Saturday since it will be nice outside and try to see if he could spend some time with his granddaughter whom he is VERY close with. Pt has expressed he does not want her to come to the room at this time as he does not want her to see him in a sick role. Daughter is agreeable to this and eager to do if deemed able by medical staff. Will follow up with MD tomorrow during rounds.      Progression toward goals:  [x]    Improving appropriately and progressing toward goals  []    Improving slowly and progressing toward goals  []    Not making progress toward goals and plan of care will be adjusted     PLAN:  Patient continues to benefit from skilled intervention to address the above impairments. Continue treatment per established plan of care. Discharge Recommendations:  Outpatient  Further Equipment Recommendations for Discharge:  TBD     SUBJECTIVE:   Patient stated I am feeling a little sorry for myself.     OBJECTIVE DATA SUMMARY:   Critical Behavior:  Neurologic State: Alert  Orientation Level: Oriented X4  Cognition: Appropriate decision making, Appropriate for age attention/concentration, Appropriate safety awareness  Safety/Judgement: Awareness of environment, Good awareness of safety precautions  Functional Mobility Training:  Bed Mobility:     Supine to Sit: Moderate assistance (with trunk)  Sit to Supine: Minimum assistance (with LE)           Transfers:  Sit to Stand: Minimum assistance  Stand to Sit: Minimum assistance                             Balance:  Sitting: Intact  Standing: Impaired  Standing - Static: Good  Standing - Dynamic : Fair  Ambulation/Gait Training:  Distance (ft): 60 Feet (ft)  Assistive Device: Walker, rolling;Gait belt  Ambulation - Level of Assistance: Contact guard assistance     Gait Description (WDL): Exceptions to WDL  Gait Abnormalities: Antalgic; Foot drop              Speed/Joselin: Slow  Step Length: Right shortened;Left shortened  Swing Pattern: Right asymmetrical                 Activity Tolerance:   God however tolerate less activity compared to prior sessions    Please refer to the flowsheet for vital signs taken during this treatment.   After treatment:   []    Patient left in no apparent distress sitting up in chair  [x]    Patient left in no apparent distress in bed  [x]    Call bell left within reach  [x]    Nursing notified  [x]    Caregiver present  []    Bed alarm activated    COMMUNICATION/COLLABORATION:   The patients plan of care was discussed with: Registered Nurse    Jasper Mauro, PT   Time Calculation: 21 mins

## 2017-02-16 NOTE — CONSULTS
NEPHROLOGY CONSULT NOTE     Patient: Robel Webster MRN: [id number]  PCP: Gallito Zuniga MD   :     1960  Age:   61 y.o. Sex:  male      Referring physician: Hayley Hernandes MD  Reason for consultation: 61 y.o. male with unknown  Ascending aortic dissection ([facility] [address]  right foot drop complicated by EDUARD   Admission Date: 2017 12:39 PM  LOS: 7 days      ASSESSMENT and PLAN :   Acute Kidney Injury:  - multifactorial including ATN from renal ischemia due to dissection + contrast nephropathy and post op hypotension  - will follow up official read of CTA  - he is at risk for further EDUARD due to contrast load  - cont hydration with IV NS   - maintain nomotension, attempt to keep -120  - place coburn and strict I/Os  - no urgent need for dialysis at this time - will assess on a daily basis  - repeat CPK in AM    HTN:  - BP controlled w/o antihypertensives    Volume:  - appears stable for now  - cont IVF, reduce rate to 125cc/hr    Ascending Aortic Dissection s/p aortic valve repair and left fem-fem bypass    Right ant compartment syndrome s/p fasciotomy     Active Problems / Assessment AAActive  : Active Problems:    Ascending aortic dissection (HCC) (2017)      Overview: AORTIC ROOT REPLACEMENT USING A 29 MM [employer]HA VALVED CONDUIT, ECC VIA       LEFT FEMORAL ARTERIAL CANNULATION      2. LEFT TO RIGHT FEM FEM BYPASS GRAFT            Right anterior compartment fasciotomy.--Dr. Dontae Mcnulty 17         Subjective:   HPI: Robel Webster is a 61 y.o.  male who has been admitted to the hospital for on  for sudden onset chest pain. His CTA of his chest, a/p on  showed a type 2 ascending aortic dissection. There was complete occlusion of the right external iliac artery but the mesenteric and renal arteries were patent and probably supplied by the false lumen. He underwent aortic root replacement and left to right fem-fem bypass graft on 2/10.   He developed right anterior compartment syndrome in he leg and required a fasciotomy on 2/11/17. His Cr at baseline appears to be normal back in 2015. His Cr had been stable around 1.2 and yuki to 1.7 on 2/15 and is 2.8 today. His BPs have been under reasonable control, without significant low readings. His lactic acid was rising and he was having some abd discomfort, so a repeat CTA was done today to evaluate for progression of his dissection. Per review with vascular, he appears to have remodeled and his vessels arise now from the true lumen with partial occlusion of his right renal artery. He feels well now. No cp, sob, n/v/d, fevers or chills reported. Past Medical Hx:   Past Medical History   Diagnosis Date    Family history of colon cancer     Family history of diabetes mellitus (DM) 6/8/2010    Family history of early CAD 6/8/2010    Seborrheic dermatitis 6/8/2010        Past Surgical Hx:   History reviewed. No pertinent past surgical history. Medications:  Prior to Admission medications    Medication Sig Start Date End Date Taking? Authorizing Provider   aspirin delayed-release 81 mg tablet Take 81 mg by mouth daily. Yes Historical Provider   MULTIVITAMINS (MULTIVITAMIN PO) Take 1 Tab by mouth daily. Yes Historical Provider       No Known Allergies    Social Hx:  reports that he has never smoked. He has never used smokeless tobacco. He reports that he drinks alcohol. He reports that he does not use illicit drugs. Family History   Problem Relation Age of Onset    Diabetes Mother     Heart Disease Mother     Cancer Father      colon    Hypertension Sister     Stroke Maternal Grandfather        Review of Systems:  A twelve point review of system was performed today. Pertinent positives and negatives are mentioned in the HPI. The reminder of the ROS is negative and noncontributory.      Objective:    Vitals:    Vitals:    02/16/17 0500 02/16/17 0837 02/16/17 1005 02/16/17 1047   BP:  98/60 113/67 122/58   Pulse:  81 75 79 Resp:  18  18   Temp:  97.6 °F (36.4 °C)  97.9 °F (36.6 °C)   SpO2:  100%  98%   Weight: 102 kg (224 lb 13.9 oz)      Height:         I&O's:  02/15 0701 - 02/16 0700  In: 4101.7 [P.O.:860; I.V.:3241.7]  Out: 455 [Urine:455]  Visit Vitals    /58    Pulse 79    Temp 97.9 °F (36.6 °C)    Resp 18    Ht 5' 11\" (1.803 m)    Wt 102 kg (224 lb 13.9 oz)    SpO2 98%    BMI 31.36 kg/m2       Physical Exam:  General:Alert, No distress,   HEENT: Eyes are PERRL. EMOI   Neck:Supple,no mass palpable  Lungs : Clears to auscultation Bilaterally, Normal respiratory effort  CVS: RRR, S1 S2 normal, No rub,  1+ LE edema left, right in bandage. Abdomen: Soft, Non tender, No hepatosplenomegaly, bowel sounds present  Extremities: No cyanosis, No clubbing  Skin: No rash or lesions.   Neurologic: non focal, AAO x 3      Laboratory Results:    Lab Results   Component Value Date    BUN 53 (H) 02/16/2017     (L) 02/16/2017    K 4.8 02/16/2017    CL 98 02/16/2017    CO2 18 (L) 02/16/2017       Lab Results   Component Value Date    BUN 53 (H) 02/16/2017    BUN 50 (H) 02/15/2017    BUN 30 (H) 02/15/2017    BUN 27 (H) 02/14/2017    BUN 29 (H) 02/13/2017    K 4.8 02/16/2017    K 4.5 02/15/2017    K 4.0 02/15/2017    K 3.8 02/14/2017    K 4.0 02/13/2017       Lab Results   Component Value Date    WBC 22.2 (H) 02/16/2017    RBC 2.59 (L) 02/16/2017    HGB 8.1 (L) 02/16/2017    HCT 23.7 (L) 02/16/2017    MCV 91.5 02/16/2017    MCH 31.3 02/16/2017    RDW 13.3 02/16/2017     02/16/2017       No results found for: PTH, PHOS    Urine dipstick:   Lab Results   Component Value Date/Time    Color Yellow 04/10/2012 03:22 PM    Appearance Clear 04/10/2012 03:22 PM    Specific gravity 1.015 10/08/2009 10:52 AM    pH (UA) 6.5 04/10/2012 03:22 PM    Protein NEGATIVE  10/08/2009 10:52 AM    Glucose NEGATIVE  10/08/2009 10:52 AM    Ketone Negative 04/10/2012 03:22 PM    Bilirubin Negative 04/10/2012 03:22 PM    Urobilinogen 0.2 10/08/2009 10:52 AM    Nitrites Negative 04/10/2012 03:22 PM    Leukocyte Esterase Negative 04/10/2012 03:22 PM    Epithelial cells 0-5 10/08/2009 10:52 AM    Bacteria None seen 04/10/2012 03:22 PM    WBC 0-5 04/10/2012 03:22 PM    RBC None seen 04/10/2012 03:22 PM       I have reviewed the following: All pertinent labs, microbiology data, radiology imaging for my assessment       Thank you for allowing us to participate in the care of this patient. We will follow patient.  Please dont hesitate to call with any questions    Rocky Leo MD  2/16/2017    [address] [place] [address]

## 2017-02-17 LAB
ALBUMIN SERPL BCP-MCNC: 2.6 G/DL (ref 3.5–5)
ALBUMIN/GLOB SERPL: 0.9 {RATIO} (ref 1.1–2.2)
ALP SERPL-CCNC: 162 U/L (ref 45–117)
ALT SERPL-CCNC: 1501 U/L (ref 12–78)
ANION GAP BLD CALC-SCNC: 14 MMOL/L (ref 5–15)
APTT PPP: 32.8 SEC (ref 22.1–32.5)
AST SERPL W P-5'-P-CCNC: 674 U/L (ref 15–37)
BILIRUB SERPL-MCNC: 2.2 MG/DL (ref 0.2–1)
BUN SERPL-MCNC: 62 MG/DL (ref 6–20)
BUN/CREAT SERPL: 28 (ref 12–20)
CALCIUM SERPL-MCNC: 7.4 MG/DL (ref 8.5–10.1)
CHLORIDE SERPL-SCNC: 98 MMOL/L (ref 97–108)
CK SERPL-CCNC: 668 U/L (ref 39–308)
CO2 SERPL-SCNC: 19 MMOL/L (ref 21–32)
CREAT SERPL-MCNC: 2.22 MG/DL (ref 0.7–1.3)
ERYTHROCYTE [DISTWIDTH] IN BLOOD BY AUTOMATED COUNT: 13.7 % (ref 11.5–14.5)
GLOBULIN SER CALC-MCNC: 3 G/DL (ref 2–4)
GLUCOSE SERPL-MCNC: 124 MG/DL (ref 65–100)
HCT VFR BLD AUTO: 23.5 % (ref 36.6–50.3)
HGB BLD-MCNC: 7.8 G/DL (ref 12.1–17)
INR PPP: 5.1 (ref 0.9–1.1)
LACTATE SERPL-SCNC: 2.1 MMOL/L (ref 0.4–2)
LACTATE SERPL-SCNC: 2.7 MMOL/L (ref 0.4–2)
MAGNESIUM SERPL-MCNC: 2.9 MG/DL (ref 1.6–2.4)
MCH RBC QN AUTO: 30.4 PG (ref 26–34)
MCHC RBC AUTO-ENTMCNC: 33.2 G/DL (ref 30–36.5)
MCV RBC AUTO: 91.4 FL (ref 80–99)
PHOSPHATE SERPL-MCNC: 5.1 MG/DL (ref 2.6–4.7)
PLATELET # BLD AUTO: 239 K/UL (ref 150–400)
POTASSIUM SERPL-SCNC: 4.4 MMOL/L (ref 3.5–5.1)
PROT SERPL-MCNC: 5.6 G/DL (ref 6.4–8.2)
PROTHROMBIN TIME: 52 SEC (ref 9–11.1)
RBC # BLD AUTO: 2.57 M/UL (ref 4.1–5.7)
SODIUM SERPL-SCNC: 131 MMOL/L (ref 136–145)
THERAPEUTIC RANGE,PTTT: ABNORMAL SECS (ref 58–77)
WBC # BLD AUTO: 22.4 K/UL (ref 4.1–11.1)

## 2017-02-17 PROCEDURE — 74011000250 HC RX REV CODE- 250: Performed by: THORACIC SURGERY (CARDIOTHORACIC VASCULAR SURGERY)

## 2017-02-17 PROCEDURE — 85610 PROTHROMBIN TIME: CPT | Performed by: NURSE PRACTITIONER

## 2017-02-17 PROCEDURE — 74011250636 HC RX REV CODE- 250/636: Performed by: NURSE PRACTITIONER

## 2017-02-17 PROCEDURE — 85027 COMPLETE CBC AUTOMATED: CPT | Performed by: NURSE PRACTITIONER

## 2017-02-17 PROCEDURE — 83735 ASSAY OF MAGNESIUM: CPT | Performed by: NURSE PRACTITIONER

## 2017-02-17 PROCEDURE — 74011250637 HC RX REV CODE- 250/637: Performed by: NURSE PRACTITIONER

## 2017-02-17 PROCEDURE — 74011250636 HC RX REV CODE- 250/636: Performed by: INTERNAL MEDICINE

## 2017-02-17 PROCEDURE — 97530 THERAPEUTIC ACTIVITIES: CPT

## 2017-02-17 PROCEDURE — 77030027138 HC INCENT SPIROMETER -A

## 2017-02-17 PROCEDURE — 74011000250 HC RX REV CODE- 250: Performed by: INTERNAL MEDICINE

## 2017-02-17 PROCEDURE — 82550 ASSAY OF CK (CPK): CPT | Performed by: NURSE PRACTITIONER

## 2017-02-17 PROCEDURE — 74011000258 HC RX REV CODE- 258: Performed by: INTERNAL MEDICINE

## 2017-02-17 PROCEDURE — 84100 ASSAY OF PHOSPHORUS: CPT | Performed by: NURSE PRACTITIONER

## 2017-02-17 PROCEDURE — 36415 COLL VENOUS BLD VENIPUNCTURE: CPT | Performed by: NURSE PRACTITIONER

## 2017-02-17 PROCEDURE — 74011250637 HC RX REV CODE- 250/637: Performed by: PHYSICIAN ASSISTANT

## 2017-02-17 PROCEDURE — 83605 ASSAY OF LACTIC ACID: CPT | Performed by: THORACIC SURGERY (CARDIOTHORACIC VASCULAR SURGERY)

## 2017-02-17 PROCEDURE — 65660000000 HC RM CCU STEPDOWN

## 2017-02-17 PROCEDURE — 85730 THROMBOPLASTIN TIME PARTIAL: CPT | Performed by: NURSE PRACTITIONER

## 2017-02-17 PROCEDURE — 80053 COMPREHEN METABOLIC PANEL: CPT | Performed by: NURSE PRACTITIONER

## 2017-02-17 PROCEDURE — 97116 GAIT TRAINING THERAPY: CPT

## 2017-02-17 RX ORDER — METOPROLOL TARTRATE 25 MG/1
25 TABLET, FILM COATED ORAL 2 TIMES DAILY
Status: DISCONTINUED | OUTPATIENT
Start: 2017-02-18 | End: 2017-02-18

## 2017-02-17 RX ORDER — METOPROLOL TARTRATE 5 MG/5ML
10 INJECTION INTRAVENOUS ONCE
Status: COMPLETED | OUTPATIENT
Start: 2017-02-18 | End: 2017-02-17

## 2017-02-17 RX ADMIN — TRAMADOL HYDROCHLORIDE 50 MG: 50 TABLET, FILM COATED ORAL at 08:35

## 2017-02-17 RX ADMIN — SODIUM CHLORIDE 125 ML/HR: 900 INJECTION, SOLUTION INTRAVENOUS at 05:41

## 2017-02-17 RX ADMIN — ASPIRIN 81 MG CHEWABLE TABLET 81 MG: 81 TABLET CHEWABLE at 08:35

## 2017-02-17 RX ADMIN — Medication 10 ML: at 07:30

## 2017-02-17 RX ADMIN — Medication 10 ML: at 23:00

## 2017-02-17 RX ADMIN — METOROPROLOL TARTRATE 5 MG: 5 INJECTION, SOLUTION INTRAVENOUS at 23:46

## 2017-02-17 RX ADMIN — Medication 10 ML: at 14:00

## 2017-02-17 RX ADMIN — SODIUM CHLORIDE: 450 INJECTION, SOLUTION INTRAVENOUS at 22:12

## 2017-02-17 RX ADMIN — TRAMADOL HYDROCHLORIDE 50 MG: 50 TABLET, FILM COATED ORAL at 00:07

## 2017-02-17 RX ADMIN — DOCUSATE SODIUM AND SENNOSIDES 1 TABLET: 8.6; 5 TABLET, FILM COATED ORAL at 08:35

## 2017-02-17 RX ADMIN — DOCUSATE SODIUM AND SENNOSIDES 1 TABLET: 8.6; 5 TABLET, FILM COATED ORAL at 18:31

## 2017-02-17 RX ADMIN — TRAMADOL HYDROCHLORIDE 50 MG: 50 TABLET, FILM COATED ORAL at 23:10

## 2017-02-17 RX ADMIN — PANTOPRAZOLE SODIUM 40 MG: 40 TABLET, DELAYED RELEASE ORAL at 07:29

## 2017-02-17 RX ADMIN — SODIUM CHLORIDE: 450 INJECTION, SOLUTION INTRAVENOUS at 12:02

## 2017-02-17 RX ADMIN — HYDRALAZINE HYDROCHLORIDE 20 MG: 20 INJECTION INTRAMUSCULAR; INTRAVENOUS at 21:04

## 2017-02-17 RX ADMIN — Medication 10 ML: at 23:45

## 2017-02-17 NOTE — PROGRESS NOTES
CSS Floor Progress Note    Admit Date: 2017  POD:  7 Day Post-Op    Procedure:  Procedure(s):  ASCENDING AORTIC DISSECTION, AORTIC ROOT REPLACEMENT, 29MM MECHANICAL VALVE, FEM-FEM BYPASS BY DR Lizz Stevens. ECC, JOSE BY DR LEO. Subjective:   Pt seen with Dr. Stanton Rodriguez. Afebrile, on RA. On NS at 125 ml/hr. Feels better today- still no appetite. Objective:   Vitals:  Blood pressure 126/52, pulse 70, temperature 97.5 °F (36.4 °C), resp. rate 18, height 5' 11\" (1.803 m), weight 224 lb 13.9 oz (102 kg), SpO2 100 %. Temp (24hrs), Av.7 °F (36.5 °C), Min:97.2 °F (36.2 °C), Max:97.9 °F (36.6 °C)    EKG/Rhythm:  SR/Aflutter    Oxygen Therapy:  Oxygen Therapy  O2 Sat (%): 100 % (17 08)  Pulse via Oximetry: 82 beats per minute (17 1500)  O2 Device: Room air (17 08)  O2 Flow Rate (L/min): 1 l/min (17 0810)  FIO2 (%): 40 % (02/10/17 0722)    CXR: None today        Admission Weight: Last Weight   Weight: 222 lb (100.7 kg) Weight: 224 lb 13.9 oz (102 kg)     Intake / Output / Drain:  Current Shift:    Last 24 hrs.:     Intake/Output Summary (Last 24 hours) at 17 0832  Last data filed at 17 0340   Gross per 24 hour   Intake          1712.51 ml   Output             1600 ml   Net           112.51 ml       EXAM:  General:  Awake, appears more comfortable, nauseated                                                                                           Lungs:   Clear to auscultation bilaterally. Incision:  Drs C/D/I. Including R LE. Heart:  Regular rate and rhythm   Abdomen:   Soft, non-tender. Bowel sounds normal.    Extremities:  No edema. PPP. Neurologic:  Gross motor and sensory apparatus intact.      Labs:   Recent Labs      17   0348   WBC  22.4*   HGB  7.8*   HCT  23.5*   PLT  239   NA  131*   K  4.4   BUN  62*   CREA  2.22*   GLU  124*   INR  5.1*        Assessment:     Active Problems:    Ascending aortic dissection (HCC) (2017)      Overview: AORTIC ROOT REPLACEMENT USING A 29 MM [employer] VALVED CONDUIT, ECC VIA       LEFT FEMORAL ARTERIAL CANNULATION      2. LEFT TO RIGHT FEM FEM BYPASS GRAFT            Right anterior compartment fasciotomy.--Dr. Bruce Hui 17       Plan/Recommendations/Medical Decision Makin. S/p Aortic root replacement w/ AVR mechanical valve & L to R fem fem bypass graft: Strict BP control SBP < 120. CTA chest/abd/pelvis reviewed. No treatment at this time-- cont to monitor. Lactic down to 2.1.    2. Atelectasis: Increase IS and activity as tolerated. 3. Postop anemia s/t acute blood loss: Monitor. Could start PO iron/vit C when tolerating PO. 4. [facility]-- hold coumadin today. Plts stable. Cont asa.  5. Elevated transaminases: Improving, caution with hepatotoxic meds. Tbili 2.2.   6. EDUARD: Creat better today. Renal following. D/c coburn. 7. Postop afib & Varying heart block: Rhythm now A flutter vs NSR   8. HTN:  BP labile, unable to start any BB at this point. 9. Constipation: cont pericolace, PRN bisacodyl. 10. R compartment fasciotomy:  Vascular following, cont PT/OT  11. Nausea- on ultram prn. Continue zofram PRN. Mildly improved. 12. Dispo: PT/OT. Dispo plan TBD.       Signed By: Filemon Morrison NP

## 2017-02-17 NOTE — PROGRESS NOTES
Vascular surgery  --seen earlier this am  --not too many complaints; overall feels better than yesterday  --labs are better (other than INR)  --no change in abdominal exam (relatively benign) and still has palpable pedal pulses  --would continue observation; d/w patient and wife

## 2017-02-17 NOTE — PROGRESS NOTES
Cardiac Surgery Care Coordinator- Met with Mrs Renetta Russell, reviewed plan of care and discussed goals for the day. Mr Renetta Russell is currently sleeping. Mrs Renetta Russell is without questions at this time.  Isra Rosas RN

## 2017-02-17 NOTE — PROGRESS NOTES
Problem: Mobility Impaired (Adult and Pediatric)  Goal: *Acute Goals and Plan of Care (Insert Text)  Physical Therapy Goals  Initiated 2/12/2017  1. Patient will move from supine to sit and sit to supine in bed with modified independence within 7 day(s). 2. Patient will transfer from bed to chair and chair to bed with modified independence using the least restrictive device within 7 day(s). 3. Patient will perform sit to stand with modified independence within 7 day(s). 4. Patient will ambulate with independence for 300 feet with the least restrictive device within 7 day(s). 5. Patient will ascend/descend 5 stairs with 1 handrail(s) with supervision/set-up within 7 day(s). PHYSICAL THERAPY TREATMENT  Patient: Jany Cao (89 y.o. male)  Date: 2/17/2017  Diagnosis: unknown  Ascending aortic dissection (HCC)  right foot drop <principal problem not specified>  Procedure(s) (LRB):  FASCIOTOMY RIGHT  LEG ANTERIOR COMPARTMENT (Right) 6 Days Post-Op  Precautions: WBAT, Sternal, Fall      ASSESSMENT:  Pt continues to make improvement with therapy. Mood appears to be improved and was able to joke around today. Pt does require EXTRA time with all mobility, as he needs \"time to prepare\" himself for the afternoon. Pt min assist with supine to sit mostly for trunk righting. Transferred to the commode and urinated. Pt gait trained a total of 150ft with CGA using ACE wrap for DF assist. He required 4 standing rest breaks throughout gait training. Patient is making good progress and eager to go outside tomorrow. Will continue to follow pt. Progression toward goals:  [X]    Improving appropriately and progressing toward goals  [ ]    Improving slowly and progressing toward goals  [ ]    Not making progress toward goals and plan of care will be adjusted       PLAN:  Patient continues to benefit from skilled intervention to address the above impairments. Continue treatment per established plan of care.   Discharge Recommendations:  Outpatient  Further Equipment Recommendations for Discharge:  TBD       SUBJECTIVE:   Patient stated I really have to do this.       OBJECTIVE DATA SUMMARY:   Critical Behavior:  Neurologic State: Alert  Orientation Level: Oriented X4  Cognition: Appropriate decision making, Appropriate for age attention/concentration, Appropriate safety awareness  Safety/Judgement: Awareness of environment, Good awareness of safety precautions  Functional Mobility Training:  Bed Mobility:     Supine to Sit: Minimum assistance              Transfers:  Sit to Stand: Minimum assistance (from commode, CGA from bed)  Stand to Sit: Contact guard assistance        Bed to Chair: Contact guard assistance                    Balance:     Ambulation/Gait Training:  Distance (ft): 60 Feet (ft) (total of 150ft with 4 standing rest breaks)  Assistive Device: Gait belt;Walker, rolling  Ambulation - Level of Assistance: Contact guard assistance     Gait Description (WDL): Exceptions to WDL  Gait Abnormalities: Antalgic; Foot drop (clears foot with DF assist ACE wrap)              Speed/Joselin: Slow  Step Length: Right shortened;Left shortened  Swing Pattern: Right asymmetrical                                        Pain:  Pain Scale 1: Numeric (0 - 10)  Pain Intensity 1: 0              Activity Tolerance:   Good  Please refer to the flowsheet for vital signs taken during this treatment.   After treatment:   [X]    Patient left in no apparent distress sitting up in chair  [ ]    Patient left in no apparent distress in bed  [X]    Call bell left within reach  [X]    Nursing notified  [X]    Caregiver present  [ ]    Bed alarm activated      COMMUNICATION/COLLABORATION:   The patients plan of care was discussed with: Physician, FILIPE Lai, PT   Time Calculation: 50 mins

## 2017-02-17 NOTE — PROGRESS NOTES
Problem: Falls - Risk of  Goal: *Absence of falls  Outcome: Progressing Towards Goal  Pt up to chair with one assist. Gripper socks on. Pt aware to call for assistance with ambulation. Problem: Aortic Aneurysm Repair: Discharge Outcomes  Goal: *Stable cardiac rhythm  Outcome: Progressing Towards Goal  Pt in varies between NSR and Aflutter. Goal: *Lungs clear or at baseline  Outcome: Progressing Towards Goal  Lungs clear, diminished in bases. Goal: *Demonstrates ability to perform prescribed activity without shortness of breath or discomfort  Outcome: Progressing Towards Goal  Pt SOB with minimal activity   Goal: *Optimal pain control at patients stated goal  Outcome: Progressing Towards Goal  Pt taking tylenol on dayshift for pain, given tramadol tonight. Goal: *Verbalizes understanding and describes prescribed diet  Outcome: Progressing Towards Goal  Pt not eating much of meals. Daughter said he ate a few pieces of orange and a bite of a granola bar on dayshift. Pt has Ensures available but no interest in trying one at this time.

## 2017-02-17 NOTE — PROGRESS NOTES
2240: Received phone call from CCU RN regarding lactate levels. Per protocol needs to be q4 until <2. Will draw now and with am labs to see further trending. 0500: Received critical INR level of 5.1. Dr Blossom Russell notified, no orders given - will assess in rounds. Notified MD of trending lactate levels most recent 2.1, given order to d/c lactate labs.

## 2017-02-17 NOTE — PROGRESS NOTES
0800 Bedside shift change report given to Mary Kate Parker RN (oncoming nurse) by Abe Sunshine RN (offgoing nurse). Report included the following information SBAR, Kardex, OR Summary, Procedure Summary, Intake/Output, MAR, Recent Results, Med Rec Status and Cardiac Rhythm Aflutter. 1000 Fisher removed        Problem: Falls - Risk of  Goal: *Absence of falls  Outcome: Progressing Towards Goal  Patient is free from falls. Problem: Pressure Ulcer - Risk of  Goal: *Prevention of pressure ulcer  Outcome: Progressing Towards Goal  Patient moves side to side with ease. Problem: Aortic Aneurysm Repair: Post op Day 6  Goal: Activity/Safety  Outcome: Progressing Towards Goal  Patient practices sternal precaution and is free from falls. Goal: Diagnostic Test/Procedures  Outcome: Progressing Towards Goal  Patients labs, VS, and rhythm are being monitored. Goal: Nutrition/Diet  Outcome: Not Progressing Towards Goal  Patient appetite is small. Family brought in a smoothie for him this morning. Goal: Respiratory  Outcome: Progressing Towards Goal  Patient is on room air.

## 2017-02-17 NOTE — PROGRESS NOTES
NUTRITION- DIETETIC tECHnICIAN    Pt seen for:       [x]                  Rescreen  []                  Food preferences/tolerances  []                  Food Allergies  []                  PO intake check  []                  Supplements  []                  Diet order clarification  []                  Education  []                  Other     Rescreen:    []                  Not at Nutrition Risk, rescreen per screening protocol  [x]                  May be at increased nutrition risk secondary to declining intake - see below for intervention         SUBJECTIVE/OBJECTIVE:     Information obtained from:  Chart      Diet:  Regular Cardiac NCS    Intake: unsatisfactory    Patient Vitals for the past 100 hrs:   % Diet Eaten   02/16/17 1222 0 %   02/16/17 0837 0 %   02/15/17 1911 10 %   02/15/17 1412 5 %   02/15/17 0857 5 %   02/14/17 1903 100 %   02/14/17 1247 50 %   02/14/17 0820 50 %   02/13/17 1818 50 %   02/13/17 1241 50 %       Weight Changes: Wt Readings from Last 3 Encounters:   02/17/17 104.9 kg (231 lb 4.2 oz)   05/12/15 90.2 kg (198 lb 12.8 oz)   10/01/13 87.1 kg (192 lb)     Problems Identified      [x]                  Patient sleeping soundly when visited and no family present. Note intake is declining x 2 days.   Suggest adding Glucerna with meals to help with kcal/protein intake while po is sub optimal.      PLAN:     [x]                   Continue current diet and encourage intake  [x]                   Suggest adding Glucerna with all meals as per above   [x]                   Monitor intake and tolerance to supplements         Brittany Gutierrez DTR

## 2017-02-18 LAB
ALBUMIN SERPL BCP-MCNC: 2.6 G/DL (ref 3.5–5)
ALBUMIN/GLOB SERPL: 0.9 {RATIO} (ref 1.1–2.2)
ALP SERPL-CCNC: 188 U/L (ref 45–117)
ALT SERPL-CCNC: 1187 U/L (ref 12–78)
ANION GAP BLD CALC-SCNC: 12 MMOL/L (ref 5–15)
ANION GAP BLD CALC-SCNC: 12 MMOL/L (ref 5–15)
AST SERPL W P-5'-P-CCNC: 479 U/L (ref 15–37)
BILIRUB SERPL-MCNC: 2.9 MG/DL (ref 0.2–1)
BUN SERPL-MCNC: 68 MG/DL (ref 6–20)
BUN SERPL-MCNC: 69 MG/DL (ref 6–20)
BUN/CREAT SERPL: 40 (ref 12–20)
BUN/CREAT SERPL: 41 (ref 12–20)
CALCIUM SERPL-MCNC: 7.3 MG/DL (ref 8.5–10.1)
CALCIUM SERPL-MCNC: 7.5 MG/DL (ref 8.5–10.1)
CHLORIDE SERPL-SCNC: 95 MMOL/L (ref 97–108)
CHLORIDE SERPL-SCNC: 96 MMOL/L (ref 97–108)
CO2 SERPL-SCNC: 19 MMOL/L (ref 21–32)
CO2 SERPL-SCNC: 22 MMOL/L (ref 21–32)
CREAT SERPL-MCNC: 1.7 MG/DL (ref 0.7–1.3)
CREAT SERPL-MCNC: 1.7 MG/DL (ref 0.7–1.3)
ERYTHROCYTE [DISTWIDTH] IN BLOOD BY AUTOMATED COUNT: 14 % (ref 11.5–14.5)
GLOBULIN SER CALC-MCNC: 3 G/DL (ref 2–4)
GLUCOSE SERPL-MCNC: 105 MG/DL (ref 65–100)
GLUCOSE SERPL-MCNC: 110 MG/DL (ref 65–100)
HCT VFR BLD AUTO: 23.1 % (ref 36.6–50.3)
HGB BLD-MCNC: 7.7 G/DL (ref 12.1–17)
INR PPP: 4.7 (ref 0.9–1.1)
MAGNESIUM SERPL-MCNC: 3 MG/DL (ref 1.6–2.4)
MCH RBC QN AUTO: 30.9 PG (ref 26–34)
MCHC RBC AUTO-ENTMCNC: 33.3 G/DL (ref 30–36.5)
MCV RBC AUTO: 92.8 FL (ref 80–99)
PLATELET # BLD AUTO: 251 K/UL (ref 150–400)
POTASSIUM SERPL-SCNC: 4.3 MMOL/L (ref 3.5–5.1)
POTASSIUM SERPL-SCNC: 4.4 MMOL/L (ref 3.5–5.1)
PROT SERPL-MCNC: 5.6 G/DL (ref 6.4–8.2)
PROTHROMBIN TIME: 47.6 SEC (ref 9–11.1)
RBC # BLD AUTO: 2.49 M/UL (ref 4.1–5.7)
SODIUM SERPL-SCNC: 127 MMOL/L (ref 136–145)
SODIUM SERPL-SCNC: 129 MMOL/L (ref 136–145)
WBC # BLD AUTO: 25.5 K/UL (ref 4.1–11.1)

## 2017-02-18 PROCEDURE — 74011250636 HC RX REV CODE- 250/636: Performed by: NURSE PRACTITIONER

## 2017-02-18 PROCEDURE — 85027 COMPLETE CBC AUTOMATED: CPT | Performed by: NURSE PRACTITIONER

## 2017-02-18 PROCEDURE — 74011000258 HC RX REV CODE- 258: Performed by: INTERNAL MEDICINE

## 2017-02-18 PROCEDURE — 74011250637 HC RX REV CODE- 250/637: Performed by: PHYSICIAN ASSISTANT

## 2017-02-18 PROCEDURE — 80048 BASIC METABOLIC PNL TOTAL CA: CPT | Performed by: INTERNAL MEDICINE

## 2017-02-18 PROCEDURE — 83735 ASSAY OF MAGNESIUM: CPT | Performed by: NURSE PRACTITIONER

## 2017-02-18 PROCEDURE — 74011250637 HC RX REV CODE- 250/637: Performed by: NURSE PRACTITIONER

## 2017-02-18 PROCEDURE — 85610 PROTHROMBIN TIME: CPT | Performed by: NURSE PRACTITIONER

## 2017-02-18 PROCEDURE — 65660000000 HC RM CCU STEPDOWN

## 2017-02-18 PROCEDURE — 80053 COMPREHEN METABOLIC PANEL: CPT | Performed by: NURSE PRACTITIONER

## 2017-02-18 PROCEDURE — 74011000250 HC RX REV CODE- 250: Performed by: INTERNAL MEDICINE

## 2017-02-18 PROCEDURE — 36415 COLL VENOUS BLD VENIPUNCTURE: CPT | Performed by: NURSE PRACTITIONER

## 2017-02-18 RX ORDER — METOPROLOL TARTRATE 50 MG/1
50 TABLET ORAL 2 TIMES DAILY
Status: DISCONTINUED | OUTPATIENT
Start: 2017-02-18 | End: 2017-02-18

## 2017-02-18 RX ORDER — METOPROLOL TARTRATE 25 MG/1
25 TABLET, FILM COATED ORAL 2 TIMES DAILY
Status: DISCONTINUED | OUTPATIENT
Start: 2017-02-18 | End: 2017-02-27 | Stop reason: HOSPADM

## 2017-02-18 RX ADMIN — Medication 10 ML: at 23:24

## 2017-02-18 RX ADMIN — Medication 10 ML: at 07:12

## 2017-02-18 RX ADMIN — HYDRALAZINE HYDROCHLORIDE 20 MG: 20 INJECTION INTRAMUSCULAR; INTRAVENOUS at 04:26

## 2017-02-18 RX ADMIN — DOCUSATE SODIUM AND SENNOSIDES 1 TABLET: 8.6; 5 TABLET, FILM COATED ORAL at 18:00

## 2017-02-18 RX ADMIN — Medication 10 ML: at 14:00

## 2017-02-18 RX ADMIN — SODIUM CHLORIDE: 450 INJECTION, SOLUTION INTRAVENOUS at 07:11

## 2017-02-18 RX ADMIN — ASPIRIN 81 MG CHEWABLE TABLET 81 MG: 81 TABLET CHEWABLE at 09:14

## 2017-02-18 RX ADMIN — PANTOPRAZOLE SODIUM 40 MG: 40 TABLET, DELAYED RELEASE ORAL at 07:12

## 2017-02-18 RX ADMIN — DOCUSATE SODIUM AND SENNOSIDES 1 TABLET: 8.6; 5 TABLET, FILM COATED ORAL at 09:14

## 2017-02-18 RX ADMIN — METOPROLOL TARTRATE 25 MG: 25 TABLET ORAL at 18:00

## 2017-02-18 RX ADMIN — Medication 10 ML: at 04:20

## 2017-02-18 RX ADMIN — METOPROLOL TARTRATE 25 MG: 25 TABLET ORAL at 09:14

## 2017-02-18 NOTE — PROGRESS NOTES
2000: /68, trending up from 1127. Pt given hydralazine IV.   2130: BP down to 132/56 will continue to monitor. 2200: Talked to pt regarding his current job. Pt gave long story spanning from high school to now, talking about his wife and various jobs etc and became tearful. Pt said \"it's been a good run, well. .. It's not done, it's not done\". Pt spoke about stress level of current job and the need to cut back after d/c. Wife in room and also tearful but supportive. 2310: /48. Called Dr Demi Jordan, order given for 10mg metoprolol IV now and 25mg PO BID include now. 2345: BP recheck, 144/61. Will give 5mg metop IV now and recheck. 0000: /51. Pt sleeping.   0400: /66, IV hydralazine given. 0631: /56   0700: Spoke to BJ regarding metoprolol order of 50mg and desired SBP parameters. Told keep SBP <120 and metoprolol changed to 25mg. 0730: Bedside and Verbal shift change report given to Zeyad Murray (oncoming nurse) by Carlyle Mena (offgoing nurse). Report included the following information SBAR, Kardex, Intake/Output, MAR, Recent Results and Cardiac Rhythm Aflutter.

## 2017-02-18 NOTE — PROGRESS NOTES
Feels OK  Greg po => flatus  Walking in halls  Both feet warm, well-perfused, and sensate  Creat better  WBC still up  abd soft    Current supportive care  folowing

## 2017-02-18 NOTE — PROGRESS NOTES
CSS Floor Progress Note    Admit Date: 2017  POD:  8 Day Post-Op    Procedure:  Procedure(s):  ASCENDING AORTIC DISSECTION, AORTIC ROOT REPLACEMENT, 29MM MECHANICAL VALVE, FEM-FEM BYPASS BY DR Nat Bailey. ECC, JOSE BY DR LEO. Subjective:   Pt seen with Dr. Farooq Mayers. Afebrile, on RA. On NS at 125 ml/hr. Feels a little better today- still no appetite. Objective:   Vitals:  Blood pressure 113/52, pulse 80, temperature 98.6 °F (37 °C), resp. rate 16, height 5' 11\" (1.803 m), weight 236 lb 12.4 oz (107.4 kg), SpO2 96 %. Temp (24hrs), Av.8 °F (36.6 °C), Min:97.5 °F (36.4 °C), Max:98.6 °F (37 °C)    EKG/Rhythm:  SR/Aflutter    Oxygen Therapy:  Oxygen Therapy  O2 Sat (%): 96 % (17)  Pulse via Oximetry: 82 beats per minute (17 1500)  O2 Device: Room air (17)  O2 Flow Rate (L/min): 1 l/min (17 0810)  FIO2 (%): 40 % (02/10/17 0722)       Admission Weight: Last Weight   Weight: 222 lb (100.7 kg) Weight: 236 lb 12.4 oz (107.4 kg)     Intake / Output / Drain:  Current Shift:  07 -  1900  In: 463.8 [P.O.:120; I.V.:343.8]  Out: 100 [Urine:100]  Last 24 hrs.:     Intake/Output Summary (Last 24 hours) at 17 1036  Last data filed at 17 0916   Gross per 24 hour   Intake          2994.16 ml   Output             1525 ml   Net          1469.16 ml       EXAM:  General:  Awake, appears more comfortable, nauseated                                                                                           Lungs:   Clear to auscultation bilaterally. Incision:  Drs C/D/I. Including R LE. Heart:  Regular rate and rhythm   Abdomen:   Soft, non-tender. Bowel sounds normal.    Extremities:  No edema. PPP. Neurologic:  Gross motor and sensory apparatus intact.      Labs:   Recent Labs      17   0432   WBC  25.5*   HGB  7.7*   HCT  23.1*   PLT  251   NA  127*   K  4.4   BUN  68*   CREA  1.70*   GLU  110*   INR  4.7*        Assessment:     Active Problems: Ascending aortic dissection (HCC) (2017)      Overview: AORTIC ROOT REPLACEMENT USING A 29 MM [employer] TOSHA VALVED CONDUIT, ECC VIA       LEFT FEMORAL ARTERIAL CANNULATION      2. LEFT TO RIGHT FEM FEM BYPASS GRAFT            Right anterior compartment fasciotomy.--Dr. Mila Heard 17       Plan/Recommendations/Medical Decision Makin. S/p Aortic root replacement w/ AVR mechanical valve & L to R fem fem bypass graft: Strict BP control SBP < 120. Start BB  2. Atelectasis: Increase IS and activity as tolerated. 3. Postop anemia s/t acute blood loss: Monitor. Could start PO iron/vit C when tolerating PO. 4. Anticoagulation for Mech AVR- INR 4.7-- hold coumadin today. Plts stable. Cont asa.  5. Elevated transaminases: Improving, caution with hepatotoxic meds. Tbili 2.9.   6. EDUADR: Creat better today. Renal following. D/c coburn. 7. Postop afib & Varying heart block: Rhythm now A flutter vs NSR   8. HTN:  BP labile, start low dose BB. 9. Constipation: cont pericolace, PRN bisacodyl. 10. R compartment fasciotomy:  Vascular following, cont PT/OT  11. Nausea- on ultram prn. Continue zofram PRN. Mildly improved. 12. Dispo: PT/OT. Dispo plan TBD.       Signed By: Rosa M Kendall PA-C

## 2017-02-18 NOTE — PROGRESS NOTES
Spoke with primary PT who reported that she does plan to come to the hospital today, is coordinating with pt's nurse for a planned outing outside today with pt and pt's nurse. Per that conversation, this PT will defer PT session for the day to the primary PT.  Thank you, Peter Munson, PT

## 2017-02-18 NOTE — PROGRESS NOTES
Nephrology Progress Note  Stephen Kidd  Date of Admission : 2/9/2017    CC: Follow up for EDUARD       Assessment and Plan     Acute Kidney Injury:  - multifactorial including ATN from renal ischemia due to dissection + contrast nephropathy and post op hypotension  - appears to have renal infarctions on the left, 20% of cortex; right appears ok on CTA from 2/16  - Cr improving  - Cont IVF for now    Hyponatremia - unclear why sodium decreased some on isotonic IVF  -recheck, further evaluation if persistently low    HTN:  - BP controlled w/o antihypertensives     Acidosis:  -stable  -continuet bicarb drip    Volume:  -I<O, continue present IVF and follow     Ascending Aortic Dissection s/p aortic valve repair and left fem-fem bypass     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 Good UOP. I<O ~ 1200 cc    Current Medications: all current  Medications have been eviewed in EPIC  Review of Systems: Pertinent items are noted in HPI. Objective:  Vitals:    Vitals:    02/18/17 0000 02/18/17 0400 02/18/17 0631 02/18/17 0916   BP: 106/51 135/66 127/56 113/52   Pulse: 73 70 78 80   Resp:  16 16   Temp:  97.6 °F (36.4 °C)  98.6 °F (37 °C)   SpO2:  97%  96%   Weight:   107.4 kg (236 lb 12.4 oz)    Height:         Intake and Output:  02/18 0701 - 02/18 1900  In: 463.8 [P.O.:120; I.V.:343.8]  Out: 100 [Urine:100]  02/16 1901 - 02/18 0700  In: 56941.5 [P.O.:320;  I.V.:47827.5]  Out: 0146 [Urine:2775]    Physical Examination:  General: NAD,Conversant   Neck:  Supple, no mass  Resp:  Lungs CTA B/L, no wheezing , normal respiratory effort  CV:  RRR,  no murmur or rub, left LE edema, R in bandage  GI:  Soft, NT, + Bowel sounds, no hepatosplenomegaly  Neurologic:  Non focal  Psych:             AAO x 3 appropriate affect   Skin:  No Rash      [x]    High complexity decision making was performed  [x]    Patient is at high-risk of decompensation with multiple organ involvement    Lab Data Personally Reviewed: I have reviewed all the pertinent labs, microbiology data and radiology studies during assessment.     Recent Labs      02/18/17 0432 02/17/17 0348 02/16/17   0310   NA  127*  131*  131*   K  4.4  4.4  4.8   CL  96*  98  98   CO2  19*  19*  18*   GLU  110*  124*  141*   BUN  68*  62*  53*   CREA  1.70*  2.22*  2.85*   CA  7.5*  7.4*  7.6*   MG  3.0*  2.9*   --    PHOS   --   5.1*   --    ALB  2.6*  2.6*  2.8*   SGOT  479*  674*  >2000*   ALT  1187*  1501*  1997*   INR  4.7*  5.1*  3.4*     Recent Labs      02/18/17 0432 02/17/17 0348 02/16/17 0310   WBC  25.5*  22.4*  22.2*   HGB  7.7*  7.8*  8.1*   HCT  23.1*  23.5*  23.7*   PLT  251  239  201     No results found for: [facility]  Lab Results   Component Value Date/Time    Culture result: NO GROWTH 3 DAYS 02/15/2017 01:35 PM     Recent Results (from the past 24 hour(s))   PROTHROMBIN TIME + INR    Collection Time: 02/18/17  4:32 AM   Result Value Ref Range    INR 4.7 (HH) 0.9 - 1.1      Prothrombin time 47.6 (H) 9.0 - 11.1 sec   CBC W/O DIFF    Collection Time: 02/18/17  4:32 AM   Result Value Ref Range    WBC 25.5 (H) 4.1 - 11.1 K/uL    RBC 2.49 (L) 4.10 - 5.70 M/uL    HGB 7.7 (L) 12.1 - 17.0 g/dL    HCT 23.1 (L) 36.6 - 50.3 %    MCV 92.8 80.0 - 99.0 FL    MCH 30.9 26.0 - 34.0 PG    MCHC 33.3 30.0 - 36.5 g/dL    RDW 14.0 11.5 - 14.5 %    PLATELET 977 204 - 337 K/uL   METABOLIC PANEL, COMPREHENSIVE    Collection Time: 02/18/17  4:32 AM   Result Value Ref Range    Sodium 127 (L) 136 - 145 mmol/L    Potassium 4.4 3.5 - 5.1 mmol/L    Chloride 96 (L) 97 - 108 mmol/L    CO2 19 (L) 21 - 32 mmol/L    Anion gap 12 5 - 15 mmol/L    Glucose 110 (H) 65 - 100 mg/dL    BUN 68 (H) 6 - 20 MG/DL    Creatinine 1.70 (H) 0.70 - 1.30 MG/DL    BUN/Creatinine ratio 40 (H) 12 - 20      GFR est AA 51 (L) >60 ml/min/1.73m2    GFR est non-AA 42 (L) >60 ml/min/1.73m2    Calcium 7.5 (L) 8.5 - 10.1 MG/DL    Bilirubin, total 2.9 (H) 0.2 - 1.0 MG/DL    ALT (SGPT) 1187 (H) 12 - 78 U/L    AST (SGOT) 479 (H) 15 - 37 U/L    Alk. phosphatase 188 (H) 45 - 117 U/L    Protein, total 5.6 (L) 6.4 - 8.2 g/dL    Albumin 2.6 (L) 3.5 - 5.0 g/dL    Globulin 3.0 2.0 - 4.0 g/dL    A-G Ratio 0.9 (L) 1.1 - 2.2     MAGNESIUM    Collection Time: 02/18/17  4:32 AM   Result Value Ref Range    Magnesium 3.0 (H) 1.6 - 2.4 mg/dL           Janelle Mcdonald MD  [facility] [address]  Phone - [phone]   Fax - [phone]  [email or web address]

## 2017-02-18 NOTE — PROGRESS NOTES
Problem: General Medical Care Plan  Goal: *Vital signs within specified parameters  Outcome: Progressing Towards Goal  BP elevated 130s-150s. Pt given IV hydralazine with little effect. IV metoprolol given to bring BP <120. PO metoprolol held for now. Will continue to monitor  Goal: *Absence of infection signs and symptoms  Outcome: Progressing Towards Goal  Sternotomy and fasciotomy sites clean, dry, and intact with no s/sxs of infection  Goal: *Optimal pain control at patients stated goal  Outcome: Progressing Towards Goal  Pt c/o of abd pressure. Ultram given with good coverage. Goal: *Fluid volume balance  Outcome: Progressing Towards Goal  Strict I/Os being monitored. Edema in arms and legs significantly increased over past 2 days. Pt says his legs feel very heavy. Goal: *Optimize nutritional status  Outcome: Progressing Towards Goal  Pt not eating much still. Bites of oranges periodically and sips of water. Goal: *Anxiety reduced or absent  Outcome: Progressing Towards Goal  Pt is calm and cooperative.

## 2017-02-18 NOTE — PROGRESS NOTES
0800  Bedside shift change report given to Viridiana Remy RN (oncoming nurse) by Kj Peralta RN (offgoing nurse). Report included the following information SBAR, Kardex, OR Summary, Procedure Summary, Intake/Output, MAR, Recent Results, Med Rec Status and Cardiac Rhythm AFlutter. 1200 Dressing changed. 1330 Patient off the floor with Holger from PT and family. 1440 Returned back to the floor. 1800 Dressing chagned        Problem: Falls - Risk of  Goal: *Absence of falls  Outcome: Progressing Towards Goal  Patient is free from falls. Problem: Pressure Ulcer - Risk of  Goal: *Prevention of pressure ulcer  Outcome: Progressing Towards Goal  Patient moves side to side with ease. Problem: Aortic Aneurysm Repair: Post op Day 6  Goal: Activity/Safety  Outcome: Progressing Towards Goal  Patient uses sternal precaution's appropriately. Goal: Diagnostic Test/Procedures  Outcome: Progressing Towards Goal  Patient's labs are being monitored. Goal: Nutrition/Diet  Outcome: Not Progressing Towards Goal  Patient has decreased appetite. Goal: Respiratory  Outcome: Progressing Towards Goal  Patient is on room air.

## 2017-02-19 LAB
ALBUMIN SERPL BCP-MCNC: 2.4 G/DL (ref 3.5–5)
ALBUMIN/GLOB SERPL: 0.8 {RATIO} (ref 1.1–2.2)
ALP SERPL-CCNC: 296 U/L (ref 45–117)
ALT SERPL-CCNC: 998 U/L (ref 12–78)
ANION GAP BLD CALC-SCNC: 10 MMOL/L (ref 5–15)
AST SERPL W P-5'-P-CCNC: 319 U/L (ref 15–37)
BILIRUB SERPL-MCNC: 2.7 MG/DL (ref 0.2–1)
BUN SERPL-MCNC: 61 MG/DL (ref 6–20)
BUN/CREAT SERPL: 37 (ref 12–20)
CALCIUM SERPL-MCNC: 7.3 MG/DL (ref 8.5–10.1)
CHLORIDE SERPL-SCNC: 95 MMOL/L (ref 97–108)
CO2 SERPL-SCNC: 26 MMOL/L (ref 21–32)
CREAT SERPL-MCNC: 1.63 MG/DL (ref 0.7–1.3)
ERYTHROCYTE [DISTWIDTH] IN BLOOD BY AUTOMATED COUNT: 14.2 % (ref 11.5–14.5)
GLOBULIN SER CALC-MCNC: 3 G/DL (ref 2–4)
GLUCOSE SERPL-MCNC: 104 MG/DL (ref 65–100)
HCT VFR BLD AUTO: 23.3 % (ref 36.6–50.3)
HGB BLD-MCNC: 7.6 G/DL (ref 12.1–17)
INR PPP: 3.5 (ref 0.9–1.1)
MAGNESIUM SERPL-MCNC: 3.2 MG/DL (ref 1.6–2.4)
MCH RBC QN AUTO: 31.1 PG (ref 26–34)
MCHC RBC AUTO-ENTMCNC: 32.6 G/DL (ref 30–36.5)
MCV RBC AUTO: 95.5 FL (ref 80–99)
PLATELET # BLD AUTO: 221 K/UL (ref 150–400)
POTASSIUM SERPL-SCNC: 4 MMOL/L (ref 3.5–5.1)
PROT SERPL-MCNC: 5.4 G/DL (ref 6.4–8.2)
PROTHROMBIN TIME: 35.4 SEC (ref 9–11.1)
RBC # BLD AUTO: 2.44 M/UL (ref 4.1–5.7)
SODIUM SERPL-SCNC: 131 MMOL/L (ref 136–145)
WBC # BLD AUTO: 22.4 K/UL (ref 4.1–11.1)

## 2017-02-19 PROCEDURE — 80053 COMPREHEN METABOLIC PANEL: CPT | Performed by: NURSE PRACTITIONER

## 2017-02-19 PROCEDURE — 85610 PROTHROMBIN TIME: CPT | Performed by: NURSE PRACTITIONER

## 2017-02-19 PROCEDURE — 65660000000 HC RM CCU STEPDOWN

## 2017-02-19 PROCEDURE — 74011250637 HC RX REV CODE- 250/637: Performed by: PHYSICIAN ASSISTANT

## 2017-02-19 PROCEDURE — 74011000258 HC RX REV CODE- 258: Performed by: INTERNAL MEDICINE

## 2017-02-19 PROCEDURE — 74011250637 HC RX REV CODE- 250/637: Performed by: NURSE PRACTITIONER

## 2017-02-19 PROCEDURE — 74011000250 HC RX REV CODE- 250: Performed by: INTERNAL MEDICINE

## 2017-02-19 PROCEDURE — 85027 COMPLETE CBC AUTOMATED: CPT | Performed by: NURSE PRACTITIONER

## 2017-02-19 PROCEDURE — 36415 COLL VENOUS BLD VENIPUNCTURE: CPT | Performed by: NURSE PRACTITIONER

## 2017-02-19 PROCEDURE — 83735 ASSAY OF MAGNESIUM: CPT | Performed by: NURSE PRACTITIONER

## 2017-02-19 PROCEDURE — 97116 GAIT TRAINING THERAPY: CPT

## 2017-02-19 RX ADMIN — SODIUM CHLORIDE: 450 INJECTION, SOLUTION INTRAVENOUS at 18:59

## 2017-02-19 RX ADMIN — PANTOPRAZOLE SODIUM 40 MG: 40 TABLET, DELAYED RELEASE ORAL at 06:50

## 2017-02-19 RX ADMIN — METOPROLOL TARTRATE 25 MG: 25 TABLET ORAL at 18:40

## 2017-02-19 RX ADMIN — METOPROLOL TARTRATE 25 MG: 25 TABLET ORAL at 08:28

## 2017-02-19 RX ADMIN — SODIUM CHLORIDE: 450 INJECTION, SOLUTION INTRAVENOUS at 02:07

## 2017-02-19 RX ADMIN — ASPIRIN 81 MG CHEWABLE TABLET 81 MG: 81 TABLET CHEWABLE at 08:28

## 2017-02-19 RX ADMIN — Medication 10 ML: at 18:59

## 2017-02-19 RX ADMIN — DOCUSATE SODIUM AND SENNOSIDES 1 TABLET: 8.6; 5 TABLET, FILM COATED ORAL at 18:40

## 2017-02-19 RX ADMIN — Medication 10 ML: at 06:30

## 2017-02-19 RX ADMIN — TRAMADOL HYDROCHLORIDE 50 MG: 50 TABLET, FILM COATED ORAL at 20:18

## 2017-02-19 RX ADMIN — TRAMADOL HYDROCHLORIDE 50 MG: 50 TABLET, FILM COATED ORAL at 02:16

## 2017-02-19 RX ADMIN — Medication 10 ML: at 22:48

## 2017-02-19 RX ADMIN — DOCUSATE SODIUM AND SENNOSIDES 1 TABLET: 8.6; 5 TABLET, FILM COATED ORAL at 08:28

## 2017-02-19 NOTE — PROGRESS NOTES
0800 Bedside shift change report given to Elie Voss RN (oncoming nurse) by Tello Croft RN (offgoing nurse). Report included the following information SBAR, Kardex, ED Summary, Procedure Summary, Intake/Output, MAR, Recent Results, Med Rec Status and Cardiac Rhythm AFlutter. 6202 Patient walled in the hallway with nurse. 1000 Patient is resting, wants to try Ensure [employer]. Problem: Falls - Risk of  Goal: *Absence of falls  Outcome: Progressing Towards Goal  Patient is free from falls. Problem: Pressure Ulcer - Risk of  Goal: *Prevention of pressure ulcer  Outcome: Progressing Towards Goal  Patient moves from side to side with ease. Problem: Aortic Aneurysm Repair: Post op Day 6  Goal: Activity/Safety  Outcome: Progressing Towards Goal  Patient ambulated in the hallway this morning. Goal: Diagnostic Test/Procedures  Outcome: Progressing Towards Goal  Patients labs, VS, and rhythm are being monitored. Labs are trending down. Goal: Nutrition/Diet  Outcome: Not Progressing Towards Goal  Patient has no appetite. Goal: Medications  Outcome: Progressing Towards Goal  Patient has knows the purpose of his medications. Goal: Respiratory  Outcome: Progressing Towards Goal  Patient is on room air. Problem: General Medical Care Plan  Goal: *Absence of infection signs and symptoms  Outcome: Progressing Towards Goal  Patient surgical site and wound are being monitored. No signs of infection.

## 2017-02-19 NOTE — PROGRESS NOTES
Problem: Mobility Impaired (Adult and Pediatric)  Goal: *Acute Goals and Plan of Care (Insert Text)  Physical Therapy Goals  Initiated 2/12/2017  1. Patient will move from supine to sit and sit to supine in bed with modified independence within 7 day(s). 2. Patient will transfer from bed to chair and chair to bed with modified independence using the least restrictive device within 7 day(s). 3. Patient will perform sit to stand with modified independence within 7 day(s). 4. Patient will ambulate with independence for 300 feet with the least restrictive device within 7 day(s). 5. Patient will ascend/descend 5 stairs with 1 handrail(s) with supervision/set-up within 7 day(s). PHYSICAL THERAPY TREATMENT  Patient: Susan Chaidez (85 y.o. male)  Date: 2/19/2017  Diagnosis: unknown  Ascending aortic dissection (HCC)  right foot drop <principal problem not specified>  Procedure(s) (LRB):  FASCIOTOMY RIGHT  LEG ANTERIOR COMPARTMENT (Right) 8 Days Post-Op  Precautions: WBAT, Sternal, Fall      ASSESSMENT:  Chart reviewed. Pt deemed appropriate for treatment by nursing staff. Pt continues to progress towards functional physical therapy goals. Pt continues to have increased scrotal swelling, causing increase GIANLUCA in standing. Pt sit to stand, CGA, adhering to sternal precautions. Initially walking with RW, however patient having difficulty keeping RLE within the base of the walker. After a standing rest break secondary to SOB (though O2>96% on RA), continued gait training without device. GIANLUCA narrowing as patient gains confidence as he is not so focused on the RW and \"how wide apart his legs are\". Though trunk sway is increased and continued decreased foot clearance on the right side, patient steady without device. Unable to get to the stairs today, but plan for stair training next session. Pt educated on safe standing exercises to perform while family is present with RW as necessary for support.  Recommend home with outpatient therapy pending progress. Progression toward goals:  [X]    Improving appropriately and progressing toward goals  [ ]    Improving slowly and progressing toward goals  [ ]    Not making progress toward goals and plan of care will be adjusted       PLAN:  Patient continues to benefit from skilled intervention to address the above impairments. Continue treatment per established plan of care. Discharge Recommendations:  outpatient  Further Equipment Recommendations for Discharge:  none       SUBJECTIVE:   Patient stated I know this is weird but can I do squats?       OBJECTIVE DATA SUMMARY:   Critical Behavior:  Neurologic State: Alert  Orientation Level: Oriented X4  Cognition: Appropriate decision making, Appropriate for age attention/concentration, Appropriate safety awareness  Safety/Judgement: Awareness of environment, Good awareness of safety precautions  Functional Mobility Training:  Transfers:  Sit to Stand: Contact guard assistance  Stand to Sit: Contact guard assistance  Bed to Chair: Contact guard assistance  Balance:  Sitting: Intact  Standing - Static: Good  Standing - Dynamic : Fair  Ambulation/Gait Training:  Distance (ft): 100 Feet (ft)  Assistive Device: Gait belt  Ambulation - Level of Assistance: Contact guard assistance  Gait Abnormalities: Trunk sway increased; Foot drop;Decreased step clearance  Base of Support: Widened (secondary to scrotal swelling)  Speed/Joselin: Slow  Step Length: Right shortened;Left shortened     Therapeutic Exercises:   Standing: mini squats, marching, hamstring curls 5 x 1  Pain:  Pain Scale 1: Numeric (0 - 10)  Pain Intensity 1: 0  Activity Tolerance: On RA. O2>93% throughout treatment  Please refer to the flowsheet for vital signs taken during this treatment.   After treatment:   [X]    Patient left in no apparent distress sitting up in chair  [ ]    Patient left in no apparent distress in bed  [X]    Call bell left within reach  [X]    Nursing notified  [X]    Caregiver present  [ ]    Bed alarm activated      COMMUNICATION/COLLABORATION:   The patients plan of care was discussed with: Registered Nurse     Rebeca Malin PT, DPT   Time Calculation: 18 mins

## 2017-02-19 NOTE — PROGRESS NOTES
CSS Floor Progress Note    Admit Date: 2017  POD:  9 Day Post-Op    Procedure:  Procedure(s):  ASCENDING AORTIC DISSECTION, AORTIC ROOT REPLACEMENT, 29MM MECHANICAL VALVE, FEM-FEM BYPASS BY DR Sandrine Tanner. ECC, JOSE BY DR LEO. Subjective:   Pt seen with Dr. Hunter Keita. Afebrile, on RA. On NS at 125 ml/hr. Feels better today- still no appetite. Objective:   Vitals:  Blood pressure 135/56, pulse 77, temperature 98 °F (36.7 °C), resp. rate 18, height 5' 11\" (1.803 m), weight 241 lb 6.5 oz (109.5 kg), SpO2 98 %. Temp (24hrs), Av °F (36.7 °C), Min:97.7 °F (36.5 °C), Max:98.3 °F (36.8 °C)    EKG/Rhythm:  SR/Aflutter    Oxygen Therapy:  Oxygen Therapy  O2 Sat (%): 98 % (17 0754)  Pulse via Oximetry: 82 beats per minute (17 1500)  O2 Device: Room air (17 0258)  O2 Flow Rate (L/min): 1 l/min (17 0810)  FIO2 (%): 40 % (02/10/17 0722)       Admission Weight: Last Weight   Weight: 222 lb (100.7 kg) Weight: 241 lb 6.5 oz (109.5 kg)     Intake / Output / Drain:  Current Shift:    Last 24 hrs.:     Intake/Output Summary (Last 24 hours) at 17 1004  Last data filed at 17 0258   Gross per 24 hour   Intake          1886.67 ml   Output             1525 ml   Net           361.67 ml       EXAM:  General:  Awake, appears more comfortable, nauseated                                                                                           Lungs:   Clear to auscultation bilaterally. Incision:  Drs C/D/I. Including R LE. Heart:  Regular rate and rhythm   Abdomen:   Softly distended , non-tender. Bowel sounds normal.    Extremities:  ++ edema. PPP. Neurologic:  Gross motor and sensory apparatus intact.      Labs:   Recent Labs      17   0238   WBC  22.4*   HGB  7.6*   HCT  23.3*   PLT  221   NA  131*   K  4.0   BUN  61*   CREA  1.63*   GLU  104*   INR  3.5*        Assessment:     Active Problems:    Ascending aortic dissection (HCC) (2017)      Overview: AORTIC ROOT REPLACEMENT USING A 29 MM [facility] VALVED CONDUIT, ECC VIA       LEFT FEMORAL ARTERIAL CANNULATION      2. LEFT TO RIGHT FEM FEM BYPASS GRAFT            Right anterior compartment fasciotomy.--Dr. Tin Perea 17       Plan/Recommendations/Medical Decision Makin. S/p Aortic root replacement w/ AVR mechanical valve & L to R fem fem bypass graft: Strict BP control SBP < 120. Start low dose BB  2. Atelectasis: Increase IS and activity as tolerated. 3. Postop anemia s/t acute blood loss: Monitor. Hgb 7.6 stable   4. Anticoagulation for Mech AVR- INR 3.5-- hold coumadin today. Plts stable. Cont asa.  5. Elevated transaminases: Improving, caution with hepatotoxic meds. Tbili 2.7, improving. 6. EDUARD: Creat better today 1.6. Renal following. Start diuresis? I>>O  7. Postop afib & Varying heart block: Rhythm now A flutter vs NSR   8. HTN:  BP labile, start low dose BB. 9. Constipation: cont pericolace, PRN bisacodyl. 10. R compartment fasciotomy:  Vascular following, cont PT/OT  11. Nausea- on ultram prn. Continue zofram PRN. Mildly improved. 12. Dispo: PT/OT. Dispo plan TBD.       Signed By: Jose Luis Davis PA-C

## 2017-02-19 NOTE — PROGRESS NOTES
1930: Bedside shift change report given to [address] [place], [place] [address] (oncoming nurse) by Andre Cordero (offgoing nurse). Report included the following information SBAR, Procedure Summary, Intake/Output, MAR, Recent Results and Cardiac Rhythm A Flutter. 2215: Hourly rounding completed, pt and wife sleeping quietly in bed. Dimmed lights. Will continue to monitor. 2324: VSS. Pt resting in bed. Alert when RN entered room. Pt had voided in urinal, emptied, returned to bedside. Pt asked for cup of ice. Leg dressing not completely saturated, will change with 3 am vitals. Will continue to monitor. 0230: New bag of bicarb started with fresh IV tubing. Dressing changed on R lower leg. Dressing saturated with serosanguinous drainage. Muscle tissue slightly dry. Dressing changed according to wound care orders. Pt given tramadol prior to. Labs obtained. Vitals signs stable. Will continue to monitor. 4098: CHG bath and linen change completed. Pt ambulated to bathroom with minimal assistance. Pt returned to chair, using electric razor to shave. Brushed teeth at sink. 0730: Bedside shift change report given to Andre Cordero (oncoming nurse) by [address] (offgoing nurse). Report included the following information SBAR, Procedure Summary, Intake/Output, MAR, Recent Results and Cardiac Rhythm A Flutter.

## 2017-02-20 ENCOUNTER — APPOINTMENT (OUTPATIENT)
Dept: ULTRASOUND IMAGING | Age: 57
DRG: 219 | End: 2017-02-20
Attending: NURSE PRACTITIONER
Payer: COMMERCIAL

## 2017-02-20 LAB
ALBUMIN SERPL BCP-MCNC: 2.5 G/DL (ref 3.5–5)
ALBUMIN/GLOB SERPL: 0.8 {RATIO} (ref 1.1–2.2)
ALP SERPL-CCNC: 301 U/L (ref 45–117)
ALT SERPL-CCNC: 813 U/L (ref 12–78)
AMYLASE SERPL-CCNC: 145 U/L (ref 25–115)
ANION GAP BLD CALC-SCNC: 10 MMOL/L (ref 5–15)
AST SERPL W P-5'-P-CCNC: 215 U/L (ref 15–37)
BACTERIA SPEC CULT: NORMAL
BILIRUB SERPL-MCNC: 2.9 MG/DL (ref 0.2–1)
BUN SERPL-MCNC: 54 MG/DL (ref 6–20)
BUN/CREAT SERPL: 39 (ref 12–20)
CALCIUM SERPL-MCNC: 7.5 MG/DL (ref 8.5–10.1)
CHLORIDE SERPL-SCNC: 92 MMOL/L (ref 97–108)
CO2 SERPL-SCNC: 27 MMOL/L (ref 21–32)
CREAT SERPL-MCNC: 1.38 MG/DL (ref 0.7–1.3)
ERYTHROCYTE [DISTWIDTH] IN BLOOD BY AUTOMATED COUNT: 14.5 % (ref 11.5–14.5)
GLOBULIN SER CALC-MCNC: 3.1 G/DL (ref 2–4)
GLUCOSE SERPL-MCNC: 109 MG/DL (ref 65–100)
HCT VFR BLD AUTO: 24 % (ref 36.6–50.3)
HGB BLD-MCNC: 7.8 G/DL (ref 12.1–17)
INR PPP: 2.8 (ref 0.9–1.1)
LACTATE SERPL-SCNC: 1.6 MMOL/L (ref 0.4–2)
LIPASE SERPL-CCNC: 1082 U/L (ref 73–393)
MAGNESIUM SERPL-MCNC: 3 MG/DL (ref 1.6–2.4)
MCH RBC QN AUTO: 31.1 PG (ref 26–34)
MCHC RBC AUTO-ENTMCNC: 32.5 G/DL (ref 30–36.5)
MCV RBC AUTO: 95.6 FL (ref 80–99)
PLATELET # BLD AUTO: 223 K/UL (ref 150–400)
POTASSIUM SERPL-SCNC: 3.8 MMOL/L (ref 3.5–5.1)
PROT SERPL-MCNC: 5.6 G/DL (ref 6.4–8.2)
PROTHROMBIN TIME: 28.5 SEC (ref 9–11.1)
RBC # BLD AUTO: 2.51 M/UL (ref 4.1–5.7)
SERVICE CMNT-IMP: NORMAL
SODIUM SERPL-SCNC: 129 MMOL/L (ref 136–145)
WBC # BLD AUTO: 23.2 K/UL (ref 4.1–11.1)

## 2017-02-20 PROCEDURE — 83690 ASSAY OF LIPASE: CPT | Performed by: NURSE PRACTITIONER

## 2017-02-20 PROCEDURE — 83605 ASSAY OF LACTIC ACID: CPT | Performed by: NURSE PRACTITIONER

## 2017-02-20 PROCEDURE — 76700 US EXAM ABDOM COMPLETE: CPT

## 2017-02-20 PROCEDURE — 36415 COLL VENOUS BLD VENIPUNCTURE: CPT | Performed by: NURSE PRACTITIONER

## 2017-02-20 PROCEDURE — 97116 GAIT TRAINING THERAPY: CPT

## 2017-02-20 PROCEDURE — 74011250637 HC RX REV CODE- 250/637: Performed by: PHYSICIAN ASSISTANT

## 2017-02-20 PROCEDURE — 83735 ASSAY OF MAGNESIUM: CPT | Performed by: NURSE PRACTITIONER

## 2017-02-20 PROCEDURE — 77030018836 HC SOL IRR NACL ICUM -A

## 2017-02-20 PROCEDURE — 74011000258 HC RX REV CODE- 258: Performed by: INTERNAL MEDICINE

## 2017-02-20 PROCEDURE — 74011250637 HC RX REV CODE- 250/637: Performed by: NURSE PRACTITIONER

## 2017-02-20 PROCEDURE — 85027 COMPLETE CBC AUTOMATED: CPT | Performed by: NURSE PRACTITIONER

## 2017-02-20 PROCEDURE — 74011000250 HC RX REV CODE- 250: Performed by: INTERNAL MEDICINE

## 2017-02-20 PROCEDURE — 65660000000 HC RM CCU STEPDOWN

## 2017-02-20 PROCEDURE — 85610 PROTHROMBIN TIME: CPT | Performed by: NURSE PRACTITIONER

## 2017-02-20 PROCEDURE — 82150 ASSAY OF AMYLASE: CPT | Performed by: NURSE PRACTITIONER

## 2017-02-20 PROCEDURE — 77030009526 HC GEL CARSYN MDII -A

## 2017-02-20 PROCEDURE — 80053 COMPREHEN METABOLIC PANEL: CPT | Performed by: NURSE PRACTITIONER

## 2017-02-20 RX ADMIN — Medication 10 ML: at 20:49

## 2017-02-20 RX ADMIN — ASPIRIN 81 MG CHEWABLE TABLET 81 MG: 81 TABLET CHEWABLE at 09:09

## 2017-02-20 RX ADMIN — Medication 10 ML: at 19:16

## 2017-02-20 RX ADMIN — METOPROLOL TARTRATE 25 MG: 25 TABLET ORAL at 09:09

## 2017-02-20 RX ADMIN — DOCUSATE SODIUM AND SENNOSIDES 1 TABLET: 8.6; 5 TABLET, FILM COATED ORAL at 19:14

## 2017-02-20 RX ADMIN — PANTOPRAZOLE SODIUM 40 MG: 40 TABLET, DELAYED RELEASE ORAL at 09:09

## 2017-02-20 RX ADMIN — Medication 10 ML: at 09:10

## 2017-02-20 RX ADMIN — TRAMADOL HYDROCHLORIDE 50 MG: 50 TABLET, FILM COATED ORAL at 20:57

## 2017-02-20 RX ADMIN — METOPROLOL TARTRATE 25 MG: 25 TABLET ORAL at 19:14

## 2017-02-20 RX ADMIN — TRAMADOL HYDROCHLORIDE 50 MG: 50 TABLET, FILM COATED ORAL at 01:12

## 2017-02-20 RX ADMIN — SODIUM CHLORIDE: 450 INJECTION, SOLUTION INTRAVENOUS at 02:43

## 2017-02-20 RX ADMIN — Medication 10 ML: at 19:18

## 2017-02-20 RX ADMIN — DOCUSATE SODIUM AND SENNOSIDES 1 TABLET: 8.6; 5 TABLET, FILM COATED ORAL at 09:10

## 2017-02-20 NOTE — PROGRESS NOTES
Vascular Surgery  --slowly improving in terms of abdomen/appetite  --edematous  --no change recommended from our end  --may benefit from wound vac to fasciotomy site

## 2017-02-20 NOTE — PROGRESS NOTES
Problem: Mobility Impaired (Adult and Pediatric)  Goal: *Acute Goals and Plan of Care (Insert Text)  Physical Therapy Goals  Goals reassessed on 2/20/2017 and all goals remain appropriate at this time, and carried over for the next 7 days. Initiated 2/12/2017  1. Patient will move from supine to sit and sit to supine in bed with modified independence within 7 day(s). 2. Patient will transfer from bed to chair and chair to bed with modified independence using the least restrictive device within 7 day(s). 3. Patient will perform sit to stand with modified independence within 7 day(s). 4. Patient will ambulate with independence for 300 feet with the least restrictive device within 7 day(s). 5. Patient will ascend/descend 5 stairs with 1 handrail(s) with supervision/set-up within 7 day(s). PHYSICAL THERAPY TREATMENT: WEEKLY REASSESSMENT  Patient: Susanna Vasquez (83 y.o. male)  Date: 2/20/2017  Diagnosis: unknown  Ascending aortic dissection (HCC)  right foot drop <principal problem not specified>  Procedure(s) (LRB):  FASCIOTOMY RIGHT  LEG ANTERIOR COMPARTMENT (Right) 9 Days Post-Op  Precautions: WBAT, Sternal, Fall      ASSESSMENT:  Pt recd sitting on commode, agreeable to therapy. This therapist placed a DF ace wrap on the R LE prior to standing from commode. Pt stood with CGA from commode. WBOS noted in standing 2* scrotal edema. Pt gait trained a total of 120' with CGA, intermittently used RW for \"rest periods\". Gait trained with a large GIANLUCA, and tended to \"waddle\". Pt returned to bed, placed in sidelying and tested for DF gravity eliminated. Pt had difficulty with this most likely d/t positioning (had hard time getting on to side as his edema in scrotum hurt when pressed on. Daughter educated and demonstrated on how to put on a DF wrap. Patient provided a DF Assist brace to progress to using when he can don shoes. Pt and family educated on how to don brace properly and how to attach to shoe.  Educated patient on how to adjust to provide appropriate tension. Will continue to re measure and adjust brace as able. Goal is for DC home sometime this week. Will complete stair trial as DC date nears. Pt able to demonstrate 2-/5 strength in DF, ironically had better movement when sitting than in sidelying. Patient's progression toward goals since last assessment: Pt continues to make good progress towards goals. He is limited by decreased endurance, and significant edema noted throughout the session. PLAN:  Goals have been updated based on progression since last assessment. Patient continues to benefit from skilled intervention to address the above impairments. Continue to follow the patient 6 times a week to address goals.   Planned Interventions:  [x              Bed Mobility Training             [x ]       Neuromuscular Re-Education  [x ]              Transfer Training                   [ ]       Orthotic/Prosthetic Training  [x ]              Gait Training                         [ ]       Modalities  [x ]              Therapeutic Exercises           [ ]       Edema Management/Control  [ x]              Therapeutic Activities            [ ]       Patient and Family Training/Education  [x ]              Other (comment):  Discharge Recommendations: Outpatient  Further Equipment Recommendations for Discharge: none       SUBJECTIVE:   Patient stated I walked yesterday      OBJECTIVE DATA SUMMARY:   Critical Behavior:  Neurologic State: Alert, Appropriate for age  Orientation Level: Oriented X4  Cognition: Appropriate decision making, Appropriate for age attention/concentration, Appropriate safety awareness  Safety/Judgement: Awareness of environment, Good awareness of safety precautions     Strength:                           Functional Mobility Training:  Bed Mobility:        Sit to Supine: Minimum assistance (with LE's, LIKE HOB elevated completely)           Transfers:  Sit to Stand: Contact guard assistance  Stand to Sit: Contact guard assistance                             Balance:     Ambulation/Gait Training:  Distance (ft):  (130)  Assistive Device: Crutches;Walker, rolling (intermittent use)  Ambulation - Level of Assistance: Contact guard assistance;Stand-by asssistance        Gait Abnormalities: Decreased step clearance; Step to gait; Shuffling gait        Base of Support: Widened     Speed/Joselin: Slow  Step Length: Right shortened;Left shortened                 Pain:  Pain Scale 1: Numeric (0 - 10)  Pain Intensity 1: 0              Activity Tolerance:   good  Please refer to the flowsheet for vital signs taken during this treatment.   After treatment:   [ ]  Patient left in no apparent distress sitting up in chair  [X]  Patient left in no apparent distress in bed  [X]  Call bell left within reach  [X]  Nursing notified  [X]  Caregiver present  [ ]  Bed alarm activated      COMMUNICATION/COLLABORATION:   The patients plan of care was discussed with: Registered Nurse     Reed Lobo, PT   Time Calculation: 27 mins

## 2017-02-20 NOTE — PROGRESS NOTES
Cardiac Surgery Care Coordinator-  Met with Jill Caron and his family reviewed plan of care and discussed goals for the day. Walnut Caron has a good understanding of his plan for the day. Reinforced sternal precautions and encouraged continued use of the incentive spirometer. He is without questions at this time   Will continue to follow for educational and emotional needs.  Christiana Nesbitt RN

## 2017-02-20 NOTE — PROGRESS NOTES
CSS Floor Progress Note    Admit Date: 2017  POD:  10 Day Post-Op    Procedure:  Procedure(s):  ASCENDING AORTIC DISSECTION, AORTIC ROOT REPLACEMENT, 29MM MECHANICAL VALVE, FEM-FEM BYPASS BY DR Holly Millard. ECC, JOSE BY DR LEO. Subjective:   Pt seen with Dr. Rock Zuniga. Afebrile, on RA. Bicarb gtt at 125ml/hr. Feels better today- still no appetite. Significant anasarca. Objective:   Vitals:  Blood pressure 112/56, pulse 66, temperature 98 °F (36.7 °C), resp. rate 18, height 5' 11\" (1.803 m), weight 242 lb 8.1 oz (110 kg), SpO2 100 %. Temp (24hrs), Av.9 °F (36.6 °C), Min:97.4 °F (36.3 °C), Max:98.2 °F (36.8 °C)    EKG/Rhythm:  SR/Aflutter    Oxygen Therapy:  Oxygen Therapy  O2 Sat (%): 100 % (17 0825)  Pulse via Oximetry: 82 beats per minute (17 1500)  O2 Device: Room air (17 0848)  O2 Flow Rate (L/min): 1 l/min (17 0810)  FIO2 (%): 40 % (02/10/17 0722)       Admission Weight: Last Weight   Weight: 222 lb (100.7 kg) Weight: 242 lb 8.1 oz (110 kg)     Intake / Output / Drain:  Current Shift:    Last 24 hrs.:     Intake/Output Summary (Last 24 hours) at 17 1002  Last data filed at 17 0244   Gross per 24 hour   Intake          2354.17 ml   Output              500 ml   Net          1854.17 ml       EXAM:  General:  Awake, appears more comfortable, nauseated                                                                                           Lungs:   Clear to auscultation bilaterally. Incision:  Drs C/D/I.   RLE dressed, but weeping significant amount of serous fluid. Heart:  Regular rate and rhythm   Abdomen:   Softly distended , non-tender. Bowel sounds normal.    Extremities:  ++ edema. PPP. Neurologic:  Gross motor and sensory apparatus intact.      Labs:   Recent Labs      17   0259   WBC  23.2*   HGB  7.8*   HCT  24.0*   PLT  223   NA  129*   K  3.8   BUN  54*   CREA  1.38*   GLU  109*   INR  2.8*        Assessment:     Active Problems: Ascending aortic dissection (HCC) (2017)      Overview: AORTIC ROOT REPLACEMENT USING A 29 MM [facility] VALVED CONDUIT, ECC VIA       LEFT FEMORAL ARTERIAL CANNULATION      2. LEFT TO RIGHT FEM FEM BYPASS GRAFT            Right anterior compartment fasciotomy.--Dr. Juli Pepe 17       Plan/Recommendations/Medical Decision Makin. S/p Aortic root replacement w/ AVR mechanical valve & L to R fem fem bypass graft: Continue low dose BB, on ASA, holding coumadin for now. 2. Atelectasis: Increase IS and activity as tolerated. 3. Postop anemia s/t acute blood loss: Monitor. Hgb 7.8 stable. Could start iron but concerned about nausea. 4. Anticoagulation for Mech AVR- INR 2.8-- hold coumadin today. Plts stable. Cont asa.  5. Elevated transaminases: Improving, caution with hepatotoxic meds. Tbili 2.9. Check abd US. 6. EDUARD: Creat better today 1.38. Renal following. Start diuresis? I>>O  7. Postop afib & Varying heart block: Rhythm now A flutter vs NSR   8. HTN:  BP labile, continue low dose BB. 9. Constipation: minimal po intake. +flatus. cont pericolace, PRN bisacodyl. 10. R compartment fasciotomy:  Vascular following, cont PT/OT  11. Nausea- on ultram prn. Continue zofram PRN. Mildly improved. 12. Nutrition - liberalize diet, calorie count. Concerned about healing with poor intake. 13. Hyponatremia - monitor. 14. Leukocytosis - afebrile. Nontoxic. Check lactate. Monitor. 15. GI/DVT prophylaxis - protonix. INR therapeutic. 16. Dispo: PT/OT. Dispo plan TBD.       Signed By: Baldo Bradford NP

## 2017-02-20 NOTE — PROGRESS NOTES
Nephrology Progress Note  Wyatt Cook  Date of Admission : 2/9/2017    CC: Follow up for EDUARD       Assessment and Plan     Acute Kidney Injury:  - multifactorial including ATN from renal ischemia due to dissection + contrast nephropathy and post op hypotension  - appears to have renal infarctions on the left, 20% of cortex; right appears ok on CTA from 2/16  - Cr improving  - d/c IVF and monitor    Hyponatremia:  - 2/2 to hypervolemia  - d/c IVF today  - repeat Na in AM    HTN:  - BP controlled w/o antihypertensives     Acidosis:  - resolved  - d/c bicarb drip    Volume:  - increasing edema  - d/c IVF today and monitor  - if Cr improving tomorrow, can start diuretics     Ascending Aortic Dissection s/p aortic valve repair and left fem-fem bypass     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 Feeling ok. More edema today. Stable UOP, Cr better. No cp, sob, n/v/d reported. Current Medications: all current  Medications have been eviewed in EPIC  Review of Systems: Pertinent items are noted in HPI. Objective:  Vitals:    Vitals:    02/19/17 2301 02/20/17 0244 02/20/17 0730 02/20/17 0825   BP: 123/54 133/55  112/56   Pulse: 75 66  66   Resp: 16 18 18   Temp: 97.9 °F (36.6 °C) 97.9 °F (36.6 °C)  98 °F (36.7 °C)   SpO2: 97% 98%  100%   Weight:   110 kg (242 lb 8.1 oz)    Height:         Intake and Output:  02/20 0701 - 02/20 1900  In: 908.3 [I.V.:908.3]  Out: 250 [Urine:250]  02/18 1901 - 02/20 0700  In: 4608.8 [P.O.:240;  I.V.:4368.8]  Out: 1100 [Urine:1100]    Physical Examination:  General: NAD,Conversant   Neck:  Supple, no mass  Resp:  Lungs CTA B/L, no wheezing , normal respiratory effort  CV:  RRR,  no murmur or rub, left LE edema, R in bandage  GI:  Soft, NT, + Bowel sounds, no hepatosplenomegaly  Neurologic:  Non focal  Psych:             AAO x 3 appropriate affect   Skin:  No Rash      [x]    High complexity decision making was performed  [x] Patient is at high-risk of decompensation with multiple organ involvement    Lab Data Personally Reviewed: I have reviewed all the pertinent labs, microbiology data and radiology studies during assessment.     Recent Labs      02/20/17   0259  02/19/17   0238  02/18/17   1100  02/18/17   0432   NA  129*  131*  129*  127*   K  3.8  4.0  4.3  4.4   CL  92*  95*  95*  96*   CO2  27  26  22  19*   GLU  109*  104*  105*  110*   BUN  54*  61*  69*  68*   CREA  1.38*  1.63*  1.70*  1.70*   CA  7.5*  7.3*  7.3*  7.5*   MG  3.0*  3.2*   --   3.0*   ALB  2.5*  2.4*   --   2.6*   SGOT  215*  319*   --   479*   ALT  813*  998*   --   1187*   INR  2.8*  3.5*   --   4.7*     Recent Labs      02/20/17   0259 02/19/17   0238  02/18/17   0432   WBC  23.2*  22.4*  25.5*   HGB  7.8*  7.6*  7.7*   HCT  24.0*  23.3*  23.1*   PLT  223  221  251     No results found for: SDES  Lab Results   Component Value Date/Time    Culture result: NO GROWTH 5 DAYS 02/15/2017 01:35 PM     Recent Results (from the past 24 hour(s))   PROTHROMBIN TIME + INR    Collection Time: 02/20/17  2:59 AM   Result Value Ref Range    INR 2.8 (H) 0.9 - 1.1      Prothrombin time 28.5 (H) 9.0 - 11.1 sec   CBC W/O DIFF    Collection Time: 02/20/17  2:59 AM   Result Value Ref Range    WBC 23.2 (H) 4.1 - 11.1 K/uL    RBC 2.51 (L) 4.10 - 5.70 M/uL    HGB 7.8 (L) 12.1 - 17.0 g/dL    HCT 24.0 (L) 36.6 - 50.3 %    MCV 95.6 80.0 - 99.0 FL    MCH 31.1 26.0 - 34.0 PG    MCHC 32.5 30.0 - 36.5 g/dL    RDW 14.5 11.5 - 14.5 %    PLATELET 892 857 - 088 K/uL   METABOLIC PANEL, COMPREHENSIVE    Collection Time: 02/20/17  2:59 AM   Result Value Ref Range    Sodium 129 (L) 136 - 145 mmol/L    Potassium 3.8 3.5 - 5.1 mmol/L    Chloride 92 (L) 97 - 108 mmol/L    CO2 27 21 - 32 mmol/L    Anion gap 10 5 - 15 mmol/L    Glucose 109 (H) 65 - 100 mg/dL    BUN 54 (H) 6 - 20 MG/DL    Creatinine 1.38 (H) 0.70 - 1.30 MG/DL    BUN/Creatinine ratio 39 (H) 12 - 20      GFR est AA >60 >60 ml/min/1.73m2 GFR est non-AA 53 (L) >60 ml/min/1.73m2    Calcium 7.5 (L) 8.5 - 10.1 MG/DL    Bilirubin, total 2.9 (H) 0.2 - 1.0 MG/DL    ALT (SGPT) 813 (H) 12 - 78 U/L    AST (SGOT) 215 (H) 15 - 37 U/L    Alk. phosphatase 301 (H) 45 - 117 U/L    Protein, total 5.6 (L) 6.4 - 8.2 g/dL    Albumin 2.5 (L) 3.5 - 5.0 g/dL    Globulin 3.1 2.0 - 4.0 g/dL    A-G Ratio 0.8 (L) 1.1 - 2.2     MAGNESIUM    Collection Time: 02/20/17  2:59 AM   Result Value Ref Range    Magnesium 3.0 (H) 1.6 - 2.4 mg/dL   LIPASE    Collection Time: 02/20/17 10:01 AM   Result Value Ref Range    Lipase 1082 (H) 73 - 393 U/L   AMYLASE    Collection Time: 02/20/17 10:01 AM   Result Value Ref Range    Amylase 145 (H) 25 - 115 U/L           Kash Martinez MD  [facility] Nephrology [facility]   [address] [facility] [address]  Phone - [phone]   Fax - [phone]  www. [facility] [employer] [facility]

## 2017-02-21 ENCOUNTER — APPOINTMENT (OUTPATIENT)
Dept: NUCLEAR MEDICINE | Age: 57
DRG: 219 | End: 2017-02-21
Attending: SURGERY
Payer: COMMERCIAL

## 2017-02-21 LAB
ALBUMIN SERPL BCP-MCNC: 2.4 G/DL (ref 3.5–5)
ALBUMIN/GLOB SERPL: 0.8 {RATIO} (ref 1.1–2.2)
ALP SERPL-CCNC: 380 U/L (ref 45–117)
ALT SERPL-CCNC: 630 U/L (ref 12–78)
ANION GAP BLD CALC-SCNC: 9 MMOL/L (ref 5–15)
AST SERPL W P-5'-P-CCNC: 131 U/L (ref 15–37)
BILIRUB SERPL-MCNC: 3 MG/DL (ref 0.2–1)
BUN SERPL-MCNC: 47 MG/DL (ref 6–20)
BUN/CREAT SERPL: 36 (ref 12–20)
CALCIUM SERPL-MCNC: 7.9 MG/DL (ref 8.5–10.1)
CHLORIDE SERPL-SCNC: 91 MMOL/L (ref 97–108)
CO2 SERPL-SCNC: 26 MMOL/L (ref 21–32)
CREAT SERPL-MCNC: 1.3 MG/DL (ref 0.7–1.3)
ERYTHROCYTE [DISTWIDTH] IN BLOOD BY AUTOMATED COUNT: 14.5 % (ref 11.5–14.5)
FERRITIN SERPL-MCNC: 664 NG/ML (ref 26–388)
GLOBULIN SER CALC-MCNC: 3.1 G/DL (ref 2–4)
GLUCOSE SERPL-MCNC: 104 MG/DL (ref 65–100)
HCT VFR BLD AUTO: 24.1 % (ref 36.6–50.3)
HGB BLD-MCNC: 7.9 G/DL (ref 12.1–17)
INR PPP: 2.4 (ref 0.9–1.1)
IRON SATN MFR SERPL: 10 % (ref 20–50)
IRON SERPL-MCNC: 25 UG/DL (ref 35–150)
MAGNESIUM SERPL-MCNC: 2.9 MG/DL (ref 1.6–2.4)
MCH RBC QN AUTO: 31.2 PG (ref 26–34)
MCHC RBC AUTO-ENTMCNC: 32.8 G/DL (ref 30–36.5)
MCV RBC AUTO: 95.3 FL (ref 80–99)
PHOSPHATE SERPL-MCNC: 3.3 MG/DL (ref 2.6–4.7)
PLATELET # BLD AUTO: 232 K/UL (ref 150–400)
POTASSIUM SERPL-SCNC: 3.9 MMOL/L (ref 3.5–5.1)
PROT SERPL-MCNC: 5.5 G/DL (ref 6.4–8.2)
PROTHROMBIN TIME: 24.8 SEC (ref 9–11.1)
RBC # BLD AUTO: 2.53 M/UL (ref 4.1–5.7)
SODIUM SERPL-SCNC: 126 MMOL/L (ref 136–145)
TIBC SERPL-MCNC: 242 UG/DL (ref 250–450)
WBC # BLD AUTO: 22.8 K/UL (ref 4.1–11.1)

## 2017-02-21 PROCEDURE — 36415 COLL VENOUS BLD VENIPUNCTURE: CPT | Performed by: NURSE PRACTITIONER

## 2017-02-21 PROCEDURE — 83735 ASSAY OF MAGNESIUM: CPT | Performed by: NURSE PRACTITIONER

## 2017-02-21 PROCEDURE — 74011000258 HC RX REV CODE- 258: Performed by: THORACIC SURGERY (CARDIOTHORACIC VASCULAR SURGERY)

## 2017-02-21 PROCEDURE — 85027 COMPLETE CBC AUTOMATED: CPT | Performed by: NURSE PRACTITIONER

## 2017-02-21 PROCEDURE — 97116 GAIT TRAINING THERAPY: CPT

## 2017-02-21 PROCEDURE — 77030009526 HC GEL CARSYN MDII -A

## 2017-02-21 PROCEDURE — 83516 IMMUNOASSAY NONANTIBODY: CPT | Performed by: NURSE PRACTITIONER

## 2017-02-21 PROCEDURE — 83540 ASSAY OF IRON: CPT | Performed by: NURSE PRACTITIONER

## 2017-02-21 PROCEDURE — 85610 PROTHROMBIN TIME: CPT | Performed by: NURSE PRACTITIONER

## 2017-02-21 PROCEDURE — 84100 ASSAY OF PHOSPHORUS: CPT | Performed by: NURSE PRACTITIONER

## 2017-02-21 PROCEDURE — 74011000258 HC RX REV CODE- 258: Performed by: NURSE PRACTITIONER

## 2017-02-21 PROCEDURE — 65660000000 HC RM CCU STEPDOWN

## 2017-02-21 PROCEDURE — 86038 ANTINUCLEAR ANTIBODIES: CPT | Performed by: NURSE PRACTITIONER

## 2017-02-21 PROCEDURE — 80074 ACUTE HEPATITIS PANEL: CPT | Performed by: NURSE PRACTITIONER

## 2017-02-21 PROCEDURE — A9537 TC99M MEBROFENIN: HCPCS

## 2017-02-21 PROCEDURE — 82103 ALPHA-1-ANTITRYPSIN TOTAL: CPT | Performed by: NURSE PRACTITIONER

## 2017-02-21 PROCEDURE — 74011250637 HC RX REV CODE- 250/637: Performed by: NURSE PRACTITIONER

## 2017-02-21 PROCEDURE — 82728 ASSAY OF FERRITIN: CPT | Performed by: NURSE PRACTITIONER

## 2017-02-21 PROCEDURE — 74011250637 HC RX REV CODE- 250/637: Performed by: PHYSICIAN ASSISTANT

## 2017-02-21 PROCEDURE — 74011250636 HC RX REV CODE- 250/636: Performed by: THORACIC SURGERY (CARDIOTHORACIC VASCULAR SURGERY)

## 2017-02-21 PROCEDURE — 80053 COMPREHEN METABOLIC PANEL: CPT | Performed by: NURSE PRACTITIONER

## 2017-02-21 PROCEDURE — 74011250636 HC RX REV CODE- 250/636: Performed by: INTERNAL MEDICINE

## 2017-02-21 PROCEDURE — 74011250636 HC RX REV CODE- 250/636: Performed by: NURSE PRACTITIONER

## 2017-02-21 RX ORDER — SODIUM CHLORIDE 0.9 % (FLUSH) 0.9 %
10 SYRINGE (ML) INJECTION
Status: COMPLETED | OUTPATIENT
Start: 2017-02-21 | End: 2017-02-21

## 2017-02-21 RX ORDER — FUROSEMIDE 10 MG/ML
40 INJECTION INTRAMUSCULAR; INTRAVENOUS DAILY
Status: DISCONTINUED | OUTPATIENT
Start: 2017-02-21 | End: 2017-02-22

## 2017-02-21 RX ORDER — FUROSEMIDE 10 MG/ML
40 INJECTION INTRAMUSCULAR; INTRAVENOUS ONCE
Status: ACTIVE | OUTPATIENT
Start: 2017-02-21 | End: 2017-02-22

## 2017-02-21 RX ORDER — SODIUM CHLORIDE 9 MG/ML
25 INJECTION, SOLUTION INTRAVENOUS
Status: COMPLETED | OUTPATIENT
Start: 2017-02-21 | End: 2017-02-23

## 2017-02-21 RX ORDER — WARFARIN 1 MG/1
1 TABLET ORAL ONCE
Status: COMPLETED | OUTPATIENT
Start: 2017-02-21 | End: 2017-02-21

## 2017-02-21 RX ORDER — LEVOFLOXACIN 5 MG/ML
750 INJECTION, SOLUTION INTRAVENOUS EVERY 24 HOURS
Status: DISCONTINUED | OUTPATIENT
Start: 2017-02-21 | End: 2017-02-21

## 2017-02-21 RX ORDER — METRONIDAZOLE 500 MG/100ML
500 INJECTION, SOLUTION INTRAVENOUS EVERY 12 HOURS
Status: DISCONTINUED | OUTPATIENT
Start: 2017-02-21 | End: 2017-02-21

## 2017-02-21 RX ORDER — WARFARIN 1 MG/1
2 TABLET ORAL ONCE
Status: DISCONTINUED | OUTPATIENT
Start: 2017-02-21 | End: 2017-02-21

## 2017-02-21 RX ADMIN — Medication 10 ML: at 14:00

## 2017-02-21 RX ADMIN — SODIUM CHLORIDE 25 ML: 900 INJECTION, SOLUTION INTRAVENOUS at 15:12

## 2017-02-21 RX ADMIN — TRAMADOL HYDROCHLORIDE 50 MG: 50 TABLET, FILM COATED ORAL at 23:16

## 2017-02-21 RX ADMIN — Medication 10 ML: at 07:11

## 2017-02-21 RX ADMIN — PIPERACILLIN SODIUM,TAZOBACTAM SODIUM 3.38 G: 3; .375 INJECTION, POWDER, FOR SOLUTION INTRAVENOUS at 12:06

## 2017-02-21 RX ADMIN — PIPERACILLIN SODIUM,TAZOBACTAM SODIUM 3.38 G: 3; .375 INJECTION, POWDER, FOR SOLUTION INTRAVENOUS at 21:08

## 2017-02-21 RX ADMIN — TRAMADOL HYDROCHLORIDE 50 MG: 50 TABLET, FILM COATED ORAL at 04:24

## 2017-02-21 RX ADMIN — METOPROLOL TARTRATE 25 MG: 25 TABLET ORAL at 08:23

## 2017-02-21 RX ADMIN — DOCUSATE SODIUM AND SENNOSIDES 1 TABLET: 8.6; 5 TABLET, FILM COATED ORAL at 08:23

## 2017-02-21 RX ADMIN — Medication 10 ML: at 21:13

## 2017-02-21 RX ADMIN — FUROSEMIDE 40 MG: 10 INJECTION, SOLUTION INTRAMUSCULAR; INTRAVENOUS at 12:06

## 2017-02-21 RX ADMIN — METOPROLOL TARTRATE 25 MG: 25 TABLET ORAL at 18:14

## 2017-02-21 RX ADMIN — PANTOPRAZOLE SODIUM 40 MG: 40 TABLET, DELAYED RELEASE ORAL at 07:11

## 2017-02-21 RX ADMIN — WARFARIN SODIUM 1 MG: 1 TABLET ORAL at 18:13

## 2017-02-21 RX ADMIN — SINCALIDE 2.17 MCG: 5 INJECTION, POWDER, LYOPHILIZED, FOR SOLUTION INTRAVENOUS at 15:00

## 2017-02-21 RX ADMIN — Medication 10 ML: at 15:12

## 2017-02-21 RX ADMIN — ASPIRIN 81 MG CHEWABLE TABLET 81 MG: 81 TABLET CHEWABLE at 08:23

## 2017-02-21 NOTE — PROGRESS NOTES
Nephrology Progress Note  Treasure Burkett  Date of Admission : 2/9/2017    CC: Follow up for EDUARD       Assessment and Plan     Acute Kidney Injury:  - multifactorial including ATN from renal ischemia due to dissection + contrast nephropathy and post op hypotension  - appears to have renal infarctions on the left, 20% of cortex; right appears ok on CTA from 2/16  - Cr improving  - start lasix 40mg IV daily for now    Hyponatremia:  - 2/2 to hypervolemia  - should improve with diuresis  - repeat Na in AM    HTN:  - BP controlled w/o antihypertensives     Acidosis:  - resolved    Hypervolemia:  - IV lasix to start today    Cholecystitis/Pancreatitis:  - plans noted for a HIDA scan today     Ascending Aortic Dissection s/p aortic valve repair and left fem-fem bypass     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 Feeling ok. More edema today. Stable UOP, Cr better. No cp, sob, n/v/d reported. Current Medications: all current  Medications have been eviewed in EPIC  Review of Systems: Pertinent items are noted in HPI.     Objective:  Vitals:    Vitals:    02/20/17 1923 02/20/17 2350 02/21/17 0318 02/21/17 0704   BP: 132/46 121/49 112/53 132/56   Pulse: 72 67 67 73   Resp: 16 16 18 15   Temp: 97.8 °F (36.6 °C) 98.2 °F (36.8 °C) 98 °F (36.7 °C) 97.7 °F (36.5 °C)   SpO2: 98% 98% 96% 97%   Weight:    108.3 kg (238 lb 12.1 oz)   Height:         Intake and Output:  02/21 0701 - 02/21 1900  In: -   Out: 225 [Urine:225]  02/19 1901 - 02/21 0700  In: 2591.3 [P.O.:360; I.V.:2231.3]  Out: 2125 [Urine:2125]    Physical Examination:  General: NAD,Conversant   Neck:  Supple, no mass  Resp:  Lungs CTA B/L, no wheezing , normal respiratory effort  CV:  RRR,  no murmur or rub, left LE edema, R in bandage  GI:  Soft, NT, + Bowel sounds, no hepatosplenomegaly  Neurologic:  Non focal  Psych:             AAO x 3 appropriate affect   Skin:  No Rash      [x]    High complexity decision making was performed  [x]    Patient is at high-risk of decompensation with multiple organ involvement    Lab Data Personally Reviewed: I have reviewed all the pertinent labs, microbiology data and radiology studies during assessment.     Recent Labs      02/21/17 0326 02/20/17 0259 02/19/17 0238   NA  126*  129*  131*   K  3.9  3.8  4.0   CL  91*  92*  95*   CO2  26  27  26   GLU  104*  109*  104*   BUN  47*  54*  61*   CREA  1.30  1.38*  1.63*   CA  7.9*  7.5*  7.3*   MG  2.9*  3.0*  3.2*   PHOS  3.3   --    --    ALB  2.4*  2.5*  2.4*   SGOT  131*  215*  319*   ALT  630*  813*  998*   INR  2.4*  2.8*  3.5*     Recent Labs      02/21/17 0326 02/20/17 0259 02/19/17 0238   WBC  22.8*  23.2*  22.4*   HGB  7.9*  7.8*  7.6*   HCT  24.1*  24.0*  23.3*   PLT  232  223  221     No results found for: SDES  Lab Results   Component Value Date/Time    Culture result: NO GROWTH 5 DAYS 02/15/2017 01:35 PM     Recent Results (from the past 24 hour(s))   PROTHROMBIN TIME + INR    Collection Time: 02/21/17  3:26 AM   Result Value Ref Range    INR 2.4 (H) 0.9 - 1.1      Prothrombin time 24.8 (H) 9.0 - 11.1 sec   MAGNESIUM    Collection Time: 02/21/17  3:26 AM   Result Value Ref Range    Magnesium 2.9 (H) 1.6 - 2.4 mg/dL   CBC W/O DIFF    Collection Time: 02/21/17  3:26 AM   Result Value Ref Range    WBC 22.8 (H) 4.1 - 11.1 K/uL    RBC 2.53 (L) 4.10 - 5.70 M/uL    HGB 7.9 (L) 12.1 - 17.0 g/dL    HCT 24.1 (L) 36.6 - 50.3 %    MCV 95.3 80.0 - 99.0 FL    MCH 31.2 26.0 - 34.0 PG    MCHC 32.8 30.0 - 36.5 g/dL    RDW 14.5 11.5 - 14.5 %    PLATELET 274 456 - 052 K/uL   METABOLIC PANEL, COMPREHENSIVE    Collection Time: 02/21/17  3:26 AM   Result Value Ref Range    Sodium 126 (L) 136 - 145 mmol/L    Potassium 3.9 3.5 - 5.1 mmol/L    Chloride 91 (L) 97 - 108 mmol/L    CO2 26 21 - 32 mmol/L    Anion gap 9 5 - 15 mmol/L    Glucose 104 (H) 65 - 100 mg/dL    BUN 47 (H) 6 - 20 MG/DL    Creatinine 1.30 0.70 - 1.30 MG/DL BUN/Creatinine ratio 36 (H) 12 - 20      GFR est AA >60 >60 ml/min/1.73m2    GFR est non-AA 57 (L) >60 ml/min/1.73m2    Calcium 7.9 (L) 8.5 - 10.1 MG/DL    Bilirubin, total 3.0 (H) 0.2 - 1.0 MG/DL    ALT (SGPT) 630 (H) 12 - 78 U/L    AST (SGOT) 131 (H) 15 - 37 U/L    Alk. phosphatase 380 (H) 45 - 117 U/L    Protein, total 5.5 (L) 6.4 - 8.2 g/dL    Albumin 2.4 (L) 3.5 - 5.0 g/dL    Globulin 3.1 2.0 - 4.0 g/dL    A-G Ratio 0.8 (L) 1.1 - 2.2     PHOSPHORUS    Collection Time: 02/21/17  3:26 AM   Result Value Ref Range    Phosphorus 3.3 2.6 - 4.7 MG/DL           nAita Carrizales MD  [place] [facility]   [address] [facility] [address]  Phone - [phone]   Fax - [phone]  [email or web address]. [email or web address]

## 2017-02-21 NOTE — CONSULTS
[address] [facility].   [address] [facility] [address]        GASTROENTEROLOGY CONSULTATION NOTE  [facility]  [phone]      NAME:  Rico Mccormick   :   1960   MRN:   [id number]       Referring Provider: Radha Dorman NP    Consult Date: 2017 10:39 AM    Chief Complaint: fatigue     History of Present Illness:  Patient is a 61 y.o. gentleman who is seen in consultation at the request of Radha Dorman for possible cholecystitis and pancreatitis. Mr Brant Carnes was admitted with ascending aortic dissection with aortic root replacement with mechanical valve and fem-fem bypass. He has been doing well but recently his WBC was increasing and he begin to show signs of sepsis. LFTs have been increasing and lipase > 1000. US suggests underlying  Liver disease with portal hypertension and possible cholecystitis. He does not have a h/o liver disease. His family has a significant h/o etoh liver disease and have  at an early age related to this. He drinks at most 2 drinks per week. There is a remote h/o possible gallbladder attack many years ago and since then he has had RUQ discomfort intermittently. He feels tight this am, no true pain. He is tolerating clears without n/v. He had a BM yesterday. PMH:  Past Medical History   Diagnosis Date    Family history of colon cancer     Family history of diabetes mellitus (DM) 2010    Family history of early CAD 2010    Seborrheic dermatitis 2010       PSH:  History reviewed. No pertinent past surgical history. Allergies:  No Known Allergies    Home Medications:  Prior to Admission Medications   Prescriptions Last Dose Informant Patient Reported? Taking? MULTIVITAMINS (MULTIVITAMIN PO) 2017 at 6am  Yes Yes   Sig: Take 1 Tab by mouth daily. aspirin delayed-release 81 mg tablet 2017 at 6am  Yes Yes   Sig: Take 81 mg by mouth daily.       Facility-Administered Medications: None       [facility] Medications:  Current Facility-Administered Medications   Medication Dose Route Frequency    levoFLOXacin (LEVAQUIN) 750 mg in D5W IVPB  750 mg IntraVENous Q24H    metroNIDAZOLE (FLAGYL) IVPB premix 500 mg  500 mg IntraVENous Q12H    warfarin (COUMADIN) tablet 2 mg  2 mg Oral ONCE    metoprolol tartrate (LOPRESSOR) tablet 25 mg  25 mg Oral BID    traMADol (ULTRAM) tablet  mg   mg Oral Q4H PRN    pantoprazole (PROTONIX) tablet 40 mg  40 mg Oral ACB    hydrALAZINE (APRESOLINE) 20 mg/mL injection 20 mg  20 mg IntraVENous Q6H PRN    Warfarin NP Dosing  1 Each Other PRN    sodium chloride (NS) flush 5-10 mL  5-10 mL IntraVENous Q8H    sodium chloride (NS) flush 5-10 mL  5-10 mL IntraVENous PRN    ondansetron (ZOFRAN) injection 4 mg  4 mg IntraVENous Q4H PRN    aspirin chewable tablet 81 mg  81 mg Oral DAILY    bisacodyl (DULCOLAX) suppository 10 mg  10 mg Rectal DAILY PRN    glucose chewable tablet 16 g  4 Tab Oral PRN    sodium chloride (NS) flush 20 mL  20 mL InterCATHeter PRN    sodium chloride (NS) flush 10 mL  10 mL InterCATHeter PRN    sodium chloride (NS) flush 10 mL  10 mL InterCATHeter Q8H    bacitracin 500 unit/gram packet 1 Packet  1 Packet Topical PRN    sodium chloride 0.9 % bolus infusion 250 mL  250 mL IntraVENous ONCE PRN       Social History:  Social History   Substance Use Topics    Smoking status: Never Smoker    Smokeless tobacco: Never Used    Alcohol use Yes      Comment: social       Family History:  Family History   Problem Relation Age of Onset    Diabetes Mother     Heart Disease Mother     Cancer Father      colon    Hypertension Sister     Stroke Maternal Grandfather        Review of Systems:  Constitutional: negative fever, negative chills, negative weight loss  Eyes:   negative visual changes  ENT:   negative sore throat, tongue or lip swelling  Respiratory:  mild dyspnea  Cards:  Chest pain from surgery  GI:   See HPI  :  negative for frequency, dysuria  Integument:  negative for rash and pruritus  Heme:  negative for easy bruising and gum/nose bleeding  Musculoskel: negative for myalgias,  back pain and muscle weakness  Neuro: negative for headaches, dizziness, vertigo  Psych:  negative for feelings of anxiety, depression     Objective:   Patient Vitals for the past 8 hrs:   BP Temp Pulse Resp SpO2 Weight   02/21/17 0704 132/56 97.7 °F (36.5 °C) 73 15 97 % 108.3 kg (238 lb 12.1 oz)   02/21/17 0318 112/53 98 °F (36.7 °C) 67 18 96 % -     02/21 0701 - 02/21 1900  In: -   Out: 225 [Urine:225]  02/19 1901 - 02/21 0700  In: 2591.3 [P.O.:360; I.V.:2231.3]  Out: 2125 [Urine:2125]    EXAM:  CONST- pleasant 61year old gentleman lying in bed   NEURO-alert and oriented x 3   HEENT-CRYSTAL, EOMI, no scleral icterus   NECK - supple, thyroid smooth non-tender, no lymphadenopathy   LUNGS-clear to ausculation, (-) wheeze   CARD-regular rate and rhythym, S1 S2   ABD-distended, tight, generalized tenderness, BS +, healing incisions   EXT-no edema, warm   PSYCH - full, not anxious     Data Review     Recent Labs      02/21/17   0326 02/20/17 0259   WBC  22.8*  23.2*   HGB  7.9*  7.8*   HCT  24.1*  24.0*   PLT  232  223     Recent Labs      02/21/17   0326  02/20/17   0259   NA  126*  129*   K  3.9  3.8   CL  91*  92*   CO2  26  27   BUN  47*  54*   CREA  1.30  1.38*   GLU  104*  109*   PHOS  3.3   --    CA  7.9*  7.5*     Recent Labs      02/21/17   0326  02/20/17   1001  02/20/17   0259   SGOT  131*   --   215*   AP  380*   --   301*   TP  5.5*   --   5.6*   ALB  2.4*   --   2.5*   GLOB  3.1   --   3.1   AML   --   145*   --    LPSE   --   1082*   --      Recent Labs      02/21/17   0326  02/20/17   0259   INR  2.4*  2.8*   PTP  24.8*  28.5*          Assessment:   · Possible cholecystitis with increased lipase and elevated WBC   · Steatosis with portal HTN seen on US- though CT scans showed normal liver on 2 different occasions.  Family h/o ETOH liver disease but he is not a drinker      Patient Active Problem List   Diagnosis Code    Seborrheic dermatitis L21.9    History of colonoscopy Z80.56    Family history of early CAD Z80.55    Family history of diabetes mellitus (DM) Z80.1    Family history of colon cancer Z80.0    Ascending aortic dissection ([facility] [address] I71.01     Plan:   · Surgery consult - agree with cardiac surgery that conservative management is best given his recent history  · Start zosyn  · Follow liver trends and lipase  · Consider repeat CT scan but kidney function has just returned to normal, MRI not an option at this time  · Full liver serologies  · NPO for now- ok for clears as tolerated after surgery sees    Thank you for the consult     Signed By: Neel Baum NP     2/21/2017  10:39 AM         GI Attending: Patient seen and examined. General Surgery team's assessment is that patient has chronic cholecystitis and would favor observation at this time. Elevated LFT's and evidence of steatosis by US. Will await results of elevated LFT's work up. If patient clinically declines and this is favored to be due to GB, we could consider percutaneous cholecystostomy tube placement by IR. Presently, he is recovering from vascular bypass for aortic dissection. His lipase is almost 3X ULN, which is concerning for chemical pancreatitis. If LFT's rise in a cholestatic pattern, we may need to proceed with MRCP. No acute indication for ERCP at this time. Will follow along with you. Handy Robb MD

## 2017-02-21 NOTE — PROGRESS NOTES
1930: Bedside shift change report given to [address] [place] [address] [address] (oncoming nurse) by Larissa Khan RN (offgoing nurse). Report included the following information SBAR, Procedure Summary, Intake/Output, MAR, Recent Results and Cardiac Rhythm A Flutter. 2030: R leg dressing has scant drainage noted, pt ok with leaving dressing intact until next time in room. Pt complained of about 3/10 pain in chest, the \"usually pressure,\" but states he thinks the pressure is less since the IV fluids were stopped. Pt given tramadol. Will continue to monitor. 2350: Pt ambulated to bathroom. Voided in urinal 250 cc and had a small bowel movement in commode. Pt returned to bed with 1 assist. Wound care dressing changed on RLE per order. Pt tolerated well. VSS. Lights dimmed for comfort. 4811: VSS. RLE dressing changed. Pt stated he was going to \"do my exercises. \" Pt appears in better spirit and motivated. 0400: Pt ambulated to bathroom to attempt a second BM. Brushed teeth standing at sink and returned to bed.    0700: Pt ambulated to bathroom, urinated in urinal. Weight obtained. Returned to chair for CHG bath and linen change. 0730: Bedside shift change report given to WILLIAMS Rivera (oncoming nurse) by Esteban Gutiérrez RN (offgoing nurse). Report included the following information SBAR, Procedure Summary, Intake/Output, MAR, Recent Results and Cardiac Rhythm A Flutter.

## 2017-02-21 NOTE — PROGRESS NOTES
NUTRITION- DIETETIC tECHnICIAN    Pt seen for:       [x]                  Rescreen  []                  Food preferences/tolerances  []                  Food Allergies  []                  PO intake check  []                  Supplements  []                  Diet order clarification  []                  Education  []                  Other     Rescreen:    []                  Not at Nutrition Risk, rescreen per screening protocol  [x]                  At Nutrition Risk- RD referral         SUBJECTIVE/OBJECTIVE:     Information obtained from:  spouse      Diet:  Regular with Ensure all meals    Intake: inadequate    Patient Vitals for the past 100 hrs:   % Diet Eaten   02/20/17 1813 40 %   02/19/17 0754 0 %   02/18/17 0916 0 %   02/17/17 1524 10 %       Weight Changes:       Wt Readings from Last 3 Encounters:   02/21/17 108.3 kg (238 lb 12.1 oz)   05/12/15 90.2 kg (198 lb 12.8 oz)   10/01/13 87.1 kg (192 lb)       Problems Identified      [x]                  Drinking the Ensure but not eating much other food    []                  Specified food preferences   []                  Dislikes supplements              []                  Allergies:   []                  Difficulty chewing      []                  Dentition    []                  Nausea/Vomiting   []                  Constipation   []                  Diarrhea    PLAN:     [x]                   Continue current diet and encourage intake  []                   Obtained/adjusted food preferences/tolerances and/or snacks options   []                   Dislikes supplements will try a substitution  []                   Modify diet for food allergies  []                   Adjust texture due to difficulty chewing   []                   Educated patient  [x]                   RD Referral  []                   Rescreen per screening protocol          Khalif Elizondo DTR

## 2017-02-21 NOTE — PROGRESS NOTES
0730 Bedside shift change report given to Adolfo Ayers, FILIPE (oncoming nurse) by Brenda Vallejo RN (offgoing nurse). Report included the following information SBAR, Kardex, ED Summary, Procedure Summary, Intake/Output, MAR, Recent Results, Med Rec Status and Cardiac Rhythm Aflutter. Jižní 80 changed per MD orders; saturated with serous fluid. 12 Pt [place] to nuclear med for scheduled procedure. Pt returned @1540. Pt stated he voided approximatley 200cc urine while [place].      1740 Received telephone order with read back from Daron Giron NP for warfarin dose, 1mg.      1745 Spoke to pharmacist, Toan Marte, regarding warfarin dosing; she will enter order for warfarin 1mg.    1930 Bedside shift change report given to Nina Pickering RN (oncoming nurse) by Darinel Crane RN (offgoing nurse). Report included the following information SBAR, Kardex, Procedure Summary, Intake/Output, MAR, Recent Results, Med Rec Status and Cardiac Rhythm Aflutter. Problem: Falls - Risk of  Goal: *Absence of falls  Outcome: Progressing Towards Goal  Pt has remained free of falls since admission. He is up with 1 assist.   Goal: *Knowledge of fall prevention  Outcome: Progressing Towards Goal  Pt demonstrates appropriate safety awareness. Problem: Pressure Ulcer - Risk of  Goal: *Prevention of pressure ulcer  Outcome: Progressing Towards Goal  No ulcer noted. Problem: Aortic Aneurysm Repair: Discharge Outcomes  Goal: *Hemodynamically stable  Outcome: Progressing Towards Goal  Patient Vitals for the past 12 hrs:    Temp Pulse Resp BP SpO2   02/21/17 0704 97.7 °F (36.5 °C) 73 15 132/56 97 %   02/21/17 0318 98 °F (36.7 °C) 67 18 112/53 96 %   02/20/17 2350 98.2 °F (36.8 °C) 67 16 121/49 98 %         Goal: *Stable cardiac rhythm  Outcome: Progressing Towards Goal  Pt is in Aflutter; pacing wires capped & tapped. Goal: *Lungs clear or at baseline  Outcome: Progressing Towards Goal  Pt lungs clear to auscultation.     Goal: *Optimal pain control at patients stated goal  Outcome: Progressing Towards Goal  Pt pain controlled with PRN medications; rating pain less than 3. Goal: *Weight is stable  Outcome: Progressing Towards Goal  Last 3 Recorded Weights in this Encounter     02/19/17 0704 02/20/17 0730 02/21/17 0704   Weight: 109.5 kg (241 lb 6.5 oz) 110 kg (242 lb 8.1 oz) 108.3 kg (238 lb 12.1 oz)         Goal: *No signs and symptoms of infection or wound complications  Outcome: Progressing Towards Goal  No s/sx of wound infection or complications.

## 2017-02-21 NOTE — CONSULTS
General Surgery Consult    Subjective:     Deidra Li is a 61 y.o.  male who is being seen per request of GI for cholecystitis found on ultrasound. Patient with current elevated LFT's, WBC, and amylase. Currently emily followed by GI, concern for underlining liver disease. Patient was admitted on February 9, 2017 for aortic dissection and underwent ascending aortic dissection with aortic root replacement with mechanical valve and fem-fem bypass. Patient denies any current nausea, vomiting, and no abdominal pain. Complaint of abdominal pressure. Patient stated that he did experience episode of nausea and vomiting last week. No issues with  bowel habit. +flatus. No heartburn/reflux. Fisher cath in place, being followed by nephrology for EDUARD. Past Medical History   Diagnosis Date    Family history of colon cancer     Family history of diabetes mellitus (DM) 6/8/2010    Family history of early CAD 6/8/2010    Seborrheic dermatitis 6/8/2010      History reviewed. No pertinent past surgical history.   Family History   Problem Relation Age of Onset    Diabetes Mother     Heart Disease Mother     Cancer Father      colon    Hypertension Sister     Stroke Maternal Grandfather       Social History   Substance Use Topics    Smoking status: Never Smoker    Smokeless tobacco: Never Used    Alcohol use Yes      Comment: social       Current Facility-Administered Medications   Medication Dose Route Frequency Provider Last Rate Last Dose    piperacillin-tazobactam (ZOSYN) 3.375 g in 0.9% sodium chloride (MBP/ADV) 100 mL  3.375 g IntraVENous Q8H Herbert Covert, NP        furosemide (LASIX) injection 40 mg  40 mg IntraVENous DAILY Anita Carrizales MD        metoprolol tartrate (LOPRESSOR) tablet 25 mg  25 mg Oral BID TERELL EastmanC   25 mg at 02/21/17 9906    traMADol (ULTRAM) tablet  mg   mg Oral Q4H PRN Reji Casiano PA-C   50 mg at 02/21/17 4503    pantoprazole (PROTONIX) tablet 40 mg  40 mg Oral ACB Ceola Comings, NP   40 mg at 02/21/17 4358    hydrALAZINE (APRESOLINE) 20 mg/mL injection 20 mg  20 mg IntraVENous Q6H PRN Ceola Comings, NP   20 mg at 02/18/17 0426    Warfarin NP Dosing  1 Each Other PRN Ceola Comings, NP        sodium chloride (NS) flush 5-10 mL  5-10 mL IntraVENous Q8H Ceola Comings, NP   10 mL at 02/21/17 0711    sodium chloride (NS) flush 5-10 mL  5-10 mL IntraVENous PRN Ceola Comings, NP        ondansetron [facility]) injection 4 mg  4 mg IntraVENous Q4H PRN Ceola Comings, NP   4 mg at 02/15/17 0315    aspirin chewable tablet 81 mg  81 mg Oral DAILY Ceola Comings, NP   81 mg at 02/21/17 2580    bisacodyl (DULCOLAX) suppository 10 mg  10 mg Rectal DAILY PRN Ceola Comings, NP        glucose chewable tablet 16 g  4 Tab Oral PRN Ceola Comings, NP        sodium chloride (NS) flush 20 mL  20 mL InterCATHeter PRN Ceola Comings, NP        sodium chloride (NS) flush 10 mL  10 mL InterCATHeter PRN Ceola Comings, NP        sodium chloride (NS) flush 10 mL  10 mL InterCATHeter Q8H Ceola Comings, NP   10 mL at 02/21/17 0711    bacitracin 500 unit/gram packet 1 Packet  1 Packet Topical PRN Ceola Comings, NP        sodium chloride 0.9 % bolus infusion 250 mL  250 mL IntraVENous ONCE PRN Ceola Comings, NP            No Known Allergies     Review of Systems:  A comprehensive review of systems was negative except for that written in the History of Present Illness. Objective:      Intake and Output:    02/21 0701 - 02/21 1900  In: -   Out: 225 [Urine:225]  02/19 1901 - 02/21 0700  In: 2591.3 [P.O.:360; I.V.:2231.3]  Out: 2125 [Urine:2125]    Physical Exam:   Visit Vitals    /52 (BP 1 Location: Left arm, BP Patient Position: Sitting)    Pulse 69    Temp 97.9 °F (36.6 °C)    Resp 16    Ht 5' 11\" (1.803 m)    Wt 238 lb 12.1 oz (108.3 kg)    SpO2 100%    BMI 33.3 kg/m2     General appearance: alert, cooperative, comfortable, no distress, appears stated age  Lungs: clear to auscultation bilaterally  Heart: regular rate and rhythm, S1, S2 normal, no murmur, click, rub or gallop  Abdomen: soft, distended, generalized tenderness with deep palpation. No point tenderness. To epigastrium or right upper quadrant. Bowel sounds present. Presence of ecchymosis/ bruising to abdomen. Previous surgical incision dry and intact. Extremities warm to touch. Data Review:   Recent Results (from the past 24 hour(s))   PROTHROMBIN TIME + INR    Collection Time: 02/21/17  3:26 AM   Result Value Ref Range    INR 2.4 (H) 0.9 - 1.1      Prothrombin time 24.8 (H) 9.0 - 11.1 sec   MAGNESIUM    Collection Time: 02/21/17  3:26 AM   Result Value Ref Range    Magnesium 2.9 (H) 1.6 - 2.4 mg/dL   CBC W/O DIFF    Collection Time: 02/21/17  3:26 AM   Result Value Ref Range    WBC 22.8 (H) 4.1 - 11.1 K/uL    RBC 2.53 (L) 4.10 - 5.70 M/uL    HGB 7.9 (L) 12.1 - 17.0 g/dL    HCT 24.1 (L) 36.6 - 50.3 %    MCV 95.3 80.0 - 99.0 FL    MCH 31.2 26.0 - 34.0 PG    MCHC 32.8 30.0 - 36.5 g/dL    RDW 14.5 11.5 - 14.5 %    PLATELET 588 837 - 486 K/uL   METABOLIC PANEL, COMPREHENSIVE    Collection Time: 02/21/17  3:26 AM   Result Value Ref Range    Sodium 126 (L) 136 - 145 mmol/L    Potassium 3.9 3.5 - 5.1 mmol/L    Chloride 91 (L) 97 - 108 mmol/L    CO2 26 21 - 32 mmol/L    Anion gap 9 5 - 15 mmol/L    Glucose 104 (H) 65 - 100 mg/dL    BUN 47 (H) 6 - 20 MG/DL    Creatinine 1.30 0.70 - 1.30 MG/DL    BUN/Creatinine ratio 36 (H) 12 - 20      GFR est AA >60 >60 ml/min/1.73m2    GFR est non-AA 57 (L) >60 ml/min/1.73m2    Calcium 7.9 (L) 8.5 - 10.1 MG/DL    Bilirubin, total 3.0 (H) 0.2 - 1.0 MG/DL    ALT (SGPT) 630 (H) 12 - 78 U/L    AST (SGOT) 131 (H) 15 - 37 U/L    Alk.  phosphatase 380 (H) 45 - 117 U/L    Protein, total 5.5 (L) 6.4 - 8.2 g/dL    Albumin 2.4 (L) 3.5 - 5.0 g/dL    Globulin 3.1 2.0 - 4.0 g/dL    A-G Ratio 0.8 (L) 1.1 - 2.2 PHOSPHORUS    Collection Time: 02/21/17  3:26 AM   Result Value Ref Range    Phosphorus 3.3 2.6 - 4.7 MG/DL         Assessment:     Active Problems:    Ascending aortic dissection (HCC) (2/9/2017)      Overview: AORTIC ROOT REPLACEMENT USING A 29 MM [facility] VALVED CONDUIT, ECC VIA       LEFT FEMORAL ARTERIAL CANNULATION      2. LEFT TO RIGHT FEM FEM BYPASS GRAFT            Right anterior compartment fasciotomy.--Dr. Carol Boswell 2/11/17        Plan:     HIDA Scan ordered to evaluate functioning of gallbladder. Preference of conservative management with recent cardiac surgery and history. Agree with start of IV Zosyn. Continue liver work up. Will discuss with Dr. Mckenna Blair. Signed By: Carlotta Christianson NP     February 21, 2017     Pt seen and examined   Has a remote of history of ruq pain about 20 years ago. Has been able to eat usually without problems  He has had elevated LFT's - so ultrasound ordered that showed thickened gallbladder. No specific RUQ complaints. Gen- Alert in NAD  Lungs- CTA  H- RRR  Abd- s/nt/nd there is ecchymosis along the chest    HIDA scan- no acute cholecystitis- GB Ef is 0    A/p Chronic cholecysitis  Does not have acute cholecystitis- He does not require intervention at this time. Would keep on a low fat diet. If he should become symptomatic may require intervention in the future  Continue GI workup of the liver.

## 2017-02-21 NOTE — PROGRESS NOTES
Day #1 of Zosyn  Indication:  Intra-abdominal infection  Current regimen:  Zosyn 3.375 gm IV q 6 hours  Abx regimen:  Zosyn  ID Following ?: NO  Frequency of BMP?: daily  Recent Labs      17   0326  17   0259  17   0238   WBC  22.8*  23.2*  22.4*   CREA  1.30  1.38*  1.63*   BUN  47*  54*  61*     Est CrCl: ~80 ml/min; UO: ? ml/kg/hr (unmeasured OP)  Temp (24hrs), Av °F (36.7 °C), Min:97.7 °F (36.5 °C), Max:98.3 °F (36.8 °C)    Cultures:   2/15 blood - NG, final    Plan:   Dose adjusted to 3.375g IV q8h (4 hour infusion) for CrCl > 20 mL/min per extended interval protocol.

## 2017-02-21 NOTE — PROGRESS NOTES
Cardiac Surgery Care Coordinator-  Met with Stephen Kidd. Reviewed plan of care and discussed goals for the day. Stephen Kidd has a good understanding of his plan for the day. Reinforced sternal precautions and encouraged continued use of the incentive spirometer. Met with Mr Ramon Freeman daughter, answered multiple questions and offered emotional support. Will continue to follow for educational and emotional needs.  Lizette Hawk RN

## 2017-02-21 NOTE — PROGRESS NOTES
CSS Floor Progress Note    Admit Date: 2017  POD:  11 Day Post-Op    Procedure:  Procedure(s):  ASCENDING AORTIC DISSECTION, AORTIC ROOT REPLACEMENT, 29MM MECHANICAL VALVE, FEM-FEM BYPASS BY DR Rafiq Boyer. ECC, JOSE BY DR LEO. Subjective:   Pt seen with Dr. Bess Bellamy. Afebrile, on RA. Feels better today- still no appetite.  + BM      Objective:   Vitals:  Blood pressure 132/56, pulse 73, temperature 97.7 °F (36.5 °C), resp. rate 15, height 5' 11\" (1.803 m), weight 238 lb 12.1 oz (108.3 kg), SpO2 97 %. Temp (24hrs), Av.9 °F (36.6 °C), Min:97.6 °F (36.4 °C), Max:98.3 °F (36.8 °C)    EKG/Rhythm:  SR/Aflutter    Oxygen Therapy:  Oxygen Therapy  O2 Sat (%): 97 % (17 0704)  Pulse via Oximetry: 82 beats per minute (17 1500)  O2 Device: Room air (17 0815)  O2 Flow Rate (L/min): 1 l/min (17 0810)  FIO2 (%): 40 % (02/10/17 0722)       ABD US: 17  1. . Gallbladder distention and sludge with pain on insonation and 9 mm thick  wall with pericholecystic fluid concerning for cholecystitis. 2. Diffuse increase in hepatic echogenicity compatible with diffuse  hepatocellular process, most commonly seen with steatosis. 3. Small ascites. 4. Small pleural effusions. 5. Diffuse increase in renal echogenicity likely reflective of medical renal  disease.   6. Pulsatile hepatopedal portal flow is reflect portal hypertension.         Admission Weight: Last Weight   Weight: 222 lb (100.7 kg) Weight: 238 lb 12.1 oz (108.3 kg)     Intake / Output / Drain:  Current Shift: 701 - 1900  In: -   Out: 225 [Urine:225]  Last 24 hrs.:     Intake/Output Summary (Last 24 hours) at 17 0915  Last data filed at 17 0704   Gross per 24 hour   Intake          1268.33 ml   Output             1850 ml   Net          -581.67 ml       EXAM:  General:  Awake, appears more comfortable,                                                                                          Lungs:   Clear to auscultation bilaterally. Incision:  Drs C/D/I.   RLE dressed, but weeping serous fluid. Heart:  Regular rate and rhythm   Abdomen:   Firm, distended , + tender on palpation. Bowel sounds hypoactive. Extremities:  ++ edema. PPP. Neurologic:  Gross motor and sensory apparatus intact. Labs:   Recent Labs      17   0326   WBC  22.8*   HGB  7.9*   HCT  24.1*   PLT  232   NA  126*   K  3.9   BUN  47*   CREA  1.30   GLU  104*   INR  2.4*        Assessment:     Active Problems:    Ascending aortic dissection (HCC) (2017)      Overview: AORTIC ROOT REPLACEMENT USING A 29 MM [employer] TOSHA VALVED CONDUIT, ECC VIA       LEFT FEMORAL ARTERIAL CANNULATION      2. LEFT TO RIGHT FEM FEM BYPASS GRAFT            Right anterior compartment fasciotomy.--Dr. Sherry Webster 17       Plan/Recommendations/Medical Decision Makin. S/p Aortic root replacement w/ AVR mechanical valve & L to R fem fem bypass graft: Continue low dose BB, on ASA, give low dose coumadin tonight. 2. Atelectasis: Increase IS and activity as tolerated. 3. Postop anemia s/t acute blood loss: Monitor. Hgb 7.9 stable. 4. Anticoagulation for Mech AVR- INR 2.4-- low dose coumadin tonight. Plts stable. Cont asa.  5. Elevated transaminases: Improving, caution with hepatotoxic meds. 6. Cholecystitis/possible pancreatitis:  Lipase > 1000,  tbili 3, alk phosp 380 -trending up,   Abd. US + cholyecystitis, can't visualize pancreas. Bowel rest-NPO except meds. Start antibiotics -- Levaquin 750 mg IV q24h and flagyl 500 mg IV q12 hrs. Consult GI. Pt high risk for surgery, would like to avoid! 6. EDUARD: Creat better today 1.3. Renal following. Start diuresis per renal today. 7. Postop afib & Varying heart block: Rhythm now A flutter vs NSR. On coumadin. 8. HTN: continue low dose BB. 9. Constipation: + BM small . 10. R compartment fasciotomy:  Vascular following, cont PT/OT. May need wound vac? 11. Nausea- on ultram prn. Continue zofram PRN. Mildly improved. 12. Nutrition - NPO for bowel rest. Concerned about healing with poor intake. 13. Hyponatremia - s/t hypovolemia, monitor. 14. Leukocytosis - afebrile. Nontoxic. Start antibiotics for cholecystitis. Monitor. 15. GI/DVT prophylaxis - protonix. INR therapeutic. 16. Dispo: PT/OT. Dispo plan TBD.       Signed By: Meseret Bo NP

## 2017-02-21 NOTE — PROGRESS NOTES
Problem: Mobility Impaired (Adult and Pediatric)  Goal: *Acute Goals and Plan of Care (Insert Text)  Physical Therapy Goals  Goals reassessed on 2/20/2017 and all goals remain appropriate at this time, and carried over for the next 7 days. Initiated 2/12/2017  1. Patient will move from supine to sit and sit to supine in bed with modified independence within 7 day(s). 2. Patient will transfer from bed to chair and chair to bed with modified independence using the least restrictive device within 7 day(s). 3. Patient will perform sit to stand with modified independence within 7 day(s). 4. Patient will ambulate with independence for 300 feet with the least restrictive device within 7 day(s). 5. Patient will ascend/descend 5 stairs with 1 handrail(s) with supervision/set-up within 7 day(s). PHYSICAL THERAPY TREATMENT     Patient: Ramirez Soto (69 y.o. male)  Date: 2/21/2017  Diagnosis: unknown  Ascending aortic dissection (HCC)  right foot drop <principal problem not specified>  Procedure(s) (LRB):  FASCIOTOMY RIGHT  LEG ANTERIOR COMPARTMENT (Right) 10 Days Post-Op  Precautions: WBAT, Sternal, Fall  Chart, physical therapy assessment, plan of care and goals were reviewed. ASSESSMENT:  Pt agreeable to treatment. Pt is limited by edema in LE and scrotum. Utilized an ace wrap DF assist due to foot drop. Pt unable to carmen AFO due to foot and calf edema. Pt was able to ambulate without A.D. And rest standing. Pt requesting to bring the rolling walker \"just incase\" Pt had a very supportive family who is performing cardiac exercises and seated exercises. Discussed with family and nurse to ambulate in the hallway  This PM. Will continue to progress as able. During gait IV came loose dripping on the floor. Notified nurse. The medicine was his antibiotic.      Progression toward goals:  [X]      Improving appropriately and progressing toward goals  [ ]      Improving slowly and progressing toward goals  [ ] Not making progress toward goals and plan of care will be adjusted       PLAN:  Patient continues to benefit from skilled intervention to address the above impairments. Continue treatment per established plan of care. Discharge Recommendations:  Outpatient  Further Equipment Recommendations for Discharge:  TBD       SUBJECTIVE:   Patient stated I need to concentrate.    The patient stated 3/3 sternal precautions. Reviewed all 3 with patient. OBJECTIVE DATA SUMMARY:   Patient mobilized on continuous portable monitor/telemetry. Critical Behavior:  Neurologic State: Alert, Appropriate for age  Orientation Level: Oriented X4  Cognition: Appropriate decision making, Appropriate for age attention/concentration, Appropriate safety awareness, Follows commands  Safety/Judgement: Awareness of environment, Good awareness of safety precautions  Functional Mobility Training:  Bed Mobility:  Log                               Transfers:  Sit to Stand: Contact guard assistance  Stand to Sit: Stand-by asssistance                             Balance:  Sitting: Intact  Standing: Impaired  Standing - Static: Good  Standing - Dynamic : Fair  Ambulation/Gait Training:  Distance (ft): 130 Feet (ft)  Assistive Device:  (Rolling walker brought but not used)  Ambulation - Level of Assistance: Stand-by asssistance;Contact guard assistance        Gait Abnormalities: Decreased step clearance        Base of Support: Widened     Speed/Joselin: Pace decreased (<100 feet/min); Slow  Step Length: Left shortened;Right shortened                               Stairs:               Therapeutic Exercises: pt is performing independently with daughter      CARDIAC  EXERCISE   Sets   Reps   Active Active Assist   Passive Self ROM   Comments   Shoulder flexion     [ ]                         [ ]                                  [ ]                                  [ ]                Shoulder abduction     [ ]                                  [ ] [ ]                                  [ ]                                      Scapular elevation     [ ]                                  [ ]                                  [ ]                                  [ ]                                      Scapular retraction     [ ]                                  [ ]                                  [ ]                                  [ ]                                      Trunk rotation     [ ]                                  [ ]                                  [ ]                                  [ ]                                      Vicki Estevez     [ ]                                  [ ]                                  [ ]                                  [ ]                                            [ ]                                  [ ]                                  [ ]                                  [ ]                                            Pain:  Pain Scale 1: Numeric (0 - 10)  Pain Intensity 1: 0  Pain Location 1: Chest  Pain Orientation 1: Anterior  Pain Description 1: Pressure  Pain Intervention(s) 1: Declines  Activity Tolerance:   Limited   Please refer to the flowsheet for vital signs taken during this treatment.   After treatment:   [ ]  Patient left in no apparent distress in chair  [X]  Patient left in no apparent distress in bed  [X]  Call bell left within reach  [X]  Nursing notified  [X]  Caregiver present  [ ]  Bed alarm activated      COMMUNICATION/COLLABORATION:   The patients plan of care was discussed with: Registered Nurse     Merary Davis PTA   Time Calculation: 19 mins

## 2017-02-21 NOTE — WOUND CARE
Wound re-assessment of right lateral lower leg/ fasciotomy wound- reviewed chart, assessed patient and spoke with nurse, Lynae Favre. Patient alert and oriented with daughter and other staff at bedside as well as physicians and NP's; patient experienced discomfort with putting sock back on that resolved;      Assessment-supine in bed with head of bed elevated.     1. Removed right lower foot brace. Removed dressing that had recently been applied; wound measured 14.8cm x 5.3cm  0.6cm, 100% moist, beefy red muscle, wound edges open, periwound skin without erythema; generalized edema from fluid overload; applied gauze impregnated with Carrasyn gel keeping within wound margins, covered with dry fluffed gauze, topped with ABD dressings, secured with roll stretch gauze and tape to secure;        Recommendations-     1. Patient is on a [address] [place] [address] Bed for  pressure redistribution. Use only Air Flow chuxs and draw sheet under patient. 2. Continue to turn every 1-2hrs to reposition for pressure relief, with pillows to protect bony prominences/float heels/ needs assistance of 1 to turn. 3. Not incontinent. 4. Continue to monitor pain, nutrition and skin assessment. 5. Wound Care-  - Daily to right lateral lower leg fasciotomy: clean with NS, apply gauze impregnated with Carrasyn gel keeping within wound margins, cover with fluffed dry gauze, top with ABD dressings, secure with roll stretch gauze and tape; Apply foot brace and velcro on; Foot not mid lower leg.     Discussed with Joie DENT and Htiesh Silva NP;     DAY Anthony, RN, [facility]  PAGER [phone] [facility]  Josias Mehta RN

## 2017-02-22 LAB
A1AT SERPL-MCNC: 247 MG/DL (ref 90–200)
ACTIN IGG SERPL-ACNC: 10 UNITS (ref 0–19)
ALBUMIN SERPL BCP-MCNC: 2.4 G/DL (ref 3.5–5)
ALBUMIN/GLOB SERPL: 0.8 {RATIO} (ref 1.1–2.2)
ALP SERPL-CCNC: 371 U/L (ref 45–117)
ALT SERPL-CCNC: 475 U/L (ref 12–78)
AMYLASE SERPL-CCNC: 133 U/L (ref 25–115)
ANA SER QL: NEGATIVE
ANION GAP BLD CALC-SCNC: 8 MMOL/L (ref 5–15)
AST SERPL W P-5'-P-CCNC: 80 U/L (ref 15–37)
BILIRUB DIRECT SERPL-MCNC: 1.6 MG/DL (ref 0–0.2)
BILIRUB SERPL-MCNC: 2.9 MG/DL (ref 0.2–1)
BUN SERPL-MCNC: 40 MG/DL (ref 6–20)
BUN/CREAT SERPL: 30 (ref 12–20)
CALCIUM SERPL-MCNC: 7.9 MG/DL (ref 8.5–10.1)
CHLORIDE SERPL-SCNC: 91 MMOL/L (ref 97–108)
CO2 SERPL-SCNC: 27 MMOL/L (ref 21–32)
CREAT SERPL-MCNC: 1.33 MG/DL (ref 0.7–1.3)
ERYTHROCYTE [DISTWIDTH] IN BLOOD BY AUTOMATED COUNT: 14.6 % (ref 11.5–14.5)
GLOBULIN SER CALC-MCNC: 3.2 G/DL (ref 2–4)
GLUCOSE SERPL-MCNC: 97 MG/DL (ref 65–100)
HAV IGM SERPL QL IA: NONREACTIVE
HBV CORE IGM SER QL: NONREACTIVE
HBV SURFACE AG SER QL: 0.11 INDEX
HBV SURFACE AG SER QL: NEGATIVE
HCT VFR BLD AUTO: 23.9 % (ref 36.6–50.3)
HCV AB SERPL QL IA: NONREACTIVE
HCV COMMENT,HCGAC: NORMAL
HGB BLD-MCNC: 7.8 G/DL (ref 12.1–17)
INR PPP: 1.8 (ref 0.9–1.1)
LIPASE SERPL-CCNC: 807 U/L (ref 73–393)
MAGNESIUM SERPL-MCNC: 2.7 MG/DL (ref 1.6–2.4)
MCH RBC QN AUTO: 30.8 PG (ref 26–34)
MCHC RBC AUTO-ENTMCNC: 32.6 G/DL (ref 30–36.5)
MCV RBC AUTO: 94.5 FL (ref 80–99)
MITOCHONDRIA M2 IGG SER-ACNC: 3.9 UNITS (ref 0–20)
PHOSPHATE SERPL-MCNC: 3.4 MG/DL (ref 2.6–4.7)
PLATELET # BLD AUTO: 234 K/UL (ref 150–400)
POTASSIUM SERPL-SCNC: 3.5 MMOL/L (ref 3.5–5.1)
PROT SERPL-MCNC: 5.6 G/DL (ref 6.4–8.2)
PROTHROMBIN TIME: 18.5 SEC (ref 9–11.1)
RBC # BLD AUTO: 2.53 M/UL (ref 4.1–5.7)
SODIUM SERPL-SCNC: 126 MMOL/L (ref 136–145)
SP1: NORMAL
SP2: NORMAL
SP3: NORMAL
WBC # BLD AUTO: 19.4 K/UL (ref 4.1–11.1)

## 2017-02-22 PROCEDURE — 77030018836 HC SOL IRR NACL ICUM -A

## 2017-02-22 PROCEDURE — 74011250636 HC RX REV CODE- 250/636: Performed by: PHYSICIAN ASSISTANT

## 2017-02-22 PROCEDURE — 74011000258 HC RX REV CODE- 258: Performed by: NURSE PRACTITIONER

## 2017-02-22 PROCEDURE — 83735 ASSAY OF MAGNESIUM: CPT | Performed by: NURSE PRACTITIONER

## 2017-02-22 PROCEDURE — 74011250637 HC RX REV CODE- 250/637: Performed by: PHYSICIAN ASSISTANT

## 2017-02-22 PROCEDURE — 85610 PROTHROMBIN TIME: CPT | Performed by: NURSE PRACTITIONER

## 2017-02-22 PROCEDURE — 74011250636 HC RX REV CODE- 250/636: Performed by: NURSE PRACTITIONER

## 2017-02-22 PROCEDURE — 36415 COLL VENOUS BLD VENIPUNCTURE: CPT | Performed by: NURSE PRACTITIONER

## 2017-02-22 PROCEDURE — 80053 COMPREHEN METABOLIC PANEL: CPT | Performed by: NURSE PRACTITIONER

## 2017-02-22 PROCEDURE — 85027 COMPLETE CBC AUTOMATED: CPT | Performed by: NURSE PRACTITIONER

## 2017-02-22 PROCEDURE — 83690 ASSAY OF LIPASE: CPT | Performed by: NURSE PRACTITIONER

## 2017-02-22 PROCEDURE — 84100 ASSAY OF PHOSPHORUS: CPT | Performed by: NURSE PRACTITIONER

## 2017-02-22 PROCEDURE — 65660000000 HC RM CCU STEPDOWN

## 2017-02-22 PROCEDURE — 82150 ASSAY OF AMYLASE: CPT | Performed by: NURSE PRACTITIONER

## 2017-02-22 PROCEDURE — 74011250636 HC RX REV CODE- 250/636: Performed by: INTERNAL MEDICINE

## 2017-02-22 PROCEDURE — 74011250637 HC RX REV CODE- 250/637: Performed by: NURSE PRACTITIONER

## 2017-02-22 PROCEDURE — 82248 BILIRUBIN DIRECT: CPT | Performed by: NURSE PRACTITIONER

## 2017-02-22 RX ORDER — ENOXAPARIN SODIUM 150 MG/ML
111 INJECTION SUBCUTANEOUS EVERY 12 HOURS
Status: DISCONTINUED | OUTPATIENT
Start: 2017-02-22 | End: 2017-02-24

## 2017-02-22 RX ORDER — WARFARIN 2.5 MG/1
2.5 TABLET ORAL EVERY EVENING
Status: COMPLETED | OUTPATIENT
Start: 2017-02-22 | End: 2017-02-22

## 2017-02-22 RX ORDER — ENOXAPARIN SODIUM 100 MG/ML
1 INJECTION SUBCUTANEOUS EVERY 24 HOURS
Status: DISCONTINUED | OUTPATIENT
Start: 2017-02-22 | End: 2017-02-22

## 2017-02-22 RX ORDER — FUROSEMIDE 10 MG/ML
40 INJECTION INTRAMUSCULAR; INTRAVENOUS 2 TIMES DAILY
Status: DISCONTINUED | OUTPATIENT
Start: 2017-02-22 | End: 2017-02-27

## 2017-02-22 RX ADMIN — Medication 10 ML: at 11:52

## 2017-02-22 RX ADMIN — ENOXAPARIN SODIUM 111 MG: 120 INJECTION SUBCUTANEOUS at 14:09

## 2017-02-22 RX ADMIN — FUROSEMIDE 40 MG: 10 INJECTION, SOLUTION INTRAMUSCULAR; INTRAVENOUS at 08:02

## 2017-02-22 RX ADMIN — Medication 10 ML: at 14:11

## 2017-02-22 RX ADMIN — PANTOPRAZOLE SODIUM 40 MG: 40 TABLET, DELAYED RELEASE ORAL at 07:10

## 2017-02-22 RX ADMIN — PIPERACILLIN SODIUM,TAZOBACTAM SODIUM 3.38 G: 3; .375 INJECTION, POWDER, FOR SOLUTION INTRAVENOUS at 14:10

## 2017-02-22 RX ADMIN — WARFARIN SODIUM 2.5 MG: 2.5 TABLET ORAL at 18:28

## 2017-02-22 RX ADMIN — ASPIRIN 81 MG CHEWABLE TABLET 81 MG: 81 TABLET CHEWABLE at 08:02

## 2017-02-22 RX ADMIN — Medication 10 ML: at 14:12

## 2017-02-22 RX ADMIN — TRAMADOL HYDROCHLORIDE 50 MG: 50 TABLET, FILM COATED ORAL at 23:25

## 2017-02-22 RX ADMIN — Medication 10 ML: at 07:10

## 2017-02-22 RX ADMIN — TRAMADOL HYDROCHLORIDE 50 MG: 50 TABLET, FILM COATED ORAL at 20:19

## 2017-02-22 RX ADMIN — FUROSEMIDE 40 MG: 10 INJECTION, SOLUTION INTRAMUSCULAR; INTRAVENOUS at 18:28

## 2017-02-22 RX ADMIN — METOPROLOL TARTRATE 25 MG: 25 TABLET ORAL at 18:29

## 2017-02-22 RX ADMIN — Medication 10 ML: at 21:10

## 2017-02-22 RX ADMIN — HYDRALAZINE HYDROCHLORIDE 20 MG: 20 INJECTION INTRAMUSCULAR; INTRAVENOUS at 16:49

## 2017-02-22 RX ADMIN — PIPERACILLIN SODIUM,TAZOBACTAM SODIUM 3.38 G: 3; .375 INJECTION, POWDER, FOR SOLUTION INTRAVENOUS at 21:10

## 2017-02-22 RX ADMIN — METOPROLOL TARTRATE 25 MG: 25 TABLET ORAL at 08:02

## 2017-02-22 RX ADMIN — PIPERACILLIN SODIUM,TAZOBACTAM SODIUM 3.38 G: 3; .375 INJECTION, POWDER, FOR SOLUTION INTRAVENOUS at 06:58

## 2017-02-22 NOTE — PROGRESS NOTES
NUTRITION COMPLETE ASSESSMENT    RECOMMENDATIONS:   1. Advance diet to cardiac per GI  2. Encourage PO intake - document % meals/supplements consumed in flowsheet  3. Daily MVI     Interventions/Plan:   Food/Nutrient Delivery:    Commercial supplement (Ensure Clear BID, Magic Cup, [employer])          Assessment:   Reason for Assessment: [x]At Nutrition Risk    Diet: Full liquids  Supplements: Ensure TID  Nutritionally Significant Medications: [x] Reviewed & Includes: lasix, protonix, zosyn, coumadin; PRN: zofran q4hr, dulcolax   Meal Intake:   Patient Vitals for the past 100 hrs:   % Diet Eaten   02/20/17 1813 40 %   02/19/17 0754 0 %     Pre-Hospitalization:       Current Hospitalization:   Appetite: Poor  PO Ability: Independent Average po intake:25-50%  Average supplements intake:        Subjective:  Pt meeting w/ GI unable to interview today. Objective:  Pt admitted for ascending aortic distention. PMHx:  seborrheic dermatitis. S/p aortic root replacement with mechanical valve on 2/9/17 and femoral bypass followed by fasciotomy. EDUARD improving. Hypervolemia, lasix rx. HIDA scan normal yesterday with chronic cholestasis - no plans for intervention at this time per surgery note. Abx started. [place] with poor appetite. Improving slightly per provide notes. On clears earlier today d/t elevated LFT's but seen by GI and diet upgraded to full liquids. RN notes show family has been bringing in some food from home. Will adjust supplements to lower fat options:   SF [place] BID (300kcal, 26g protein)   Ensure Clear (200kcal, 7g protein)   Magic Cup (290kcal, 9g protein)    Will continue to follow for PO tolerance and pt interview as able. Please continue to document % of meals and supplements consumed. Severe wt gain of 12kg since admit noted with edema present (see below).    Wt Readings from Last 10 Encounters:   02/22/17 110 kg (242 lb 8.1 oz)   05/12/15 90.2 kg (198 lb 12.8 oz)   10/01/13 87.1 kg (192 lb)   04/10/12 88.5 kg (195 lb)     Estimated Nutrition Needs:   Kcals/day: 2185 Kcals/day (2185-2365kcal)  Protein: 118 g (118-127g (1.2-1.3g/kg))  Fluid: 2185 ml (1ml/kcal)  Based On: [place] (x 1.2-1.3)  Weight Used: Other (comment) (UBW 98kg)    Pt expected to meet estimated nutrient needs:  []   Yes     [x]  No (while on clear liquids)  [] Unable to predict at this time  Nutrition Diagnosis:   1. Inadequate oral food/beverage intake related to altered GI fx, poor appetite as evidenced by nausea, less than 50% meals consumed    Goals:     Consumption of at least 3 supplements/day     Monitoring & Evaluation:    - Liquid meal replacement, Total energy intake   - Weight/weight change, GI   -      Previous Nutrition Goals Met:   N/A  Previous Recommendations:    N/A    Education & Discharge Needs:   [x] None Identified   [] Identified and addressed    [] Participated in care plan, discharge planning, and/or interdisciplinary rounds        Cultural, Confucianism and ethnic food preferences identified: None    Skin Integrity: []Intact  [x]Other: surgical wounds  Edema: []None [x]Other: 2+ genital, 1+ BLUE, 2-3+ BLLE  Last BM: 2/21  Food Allergies: [x]None []Other  Diet Restrictions: Cultural/Gnosticism Preference(s): None     Anthropometrics:    Weight Loss Metrics 2/22/2017 5/12/2015 10/1/2013 4/10/2012   Today's Wt 242 lb 8.1 oz 198 lb 12.8 oz 192 lb 195 lb   BMI 33.82 kg/m2 27.34 kg/m2 26.41 kg/m2 26.82 kg/m2      Weight Source: Standing scale (comment)  Height: 5' 11\" (180.3 cm),    Body mass index is 33.82 kg/(m^2).    ,    Usual Body Weight: 90.7 kg (200 lb),      Labs:    Lab Results   Component Value Date/Time    Sodium 126 02/22/2017 04:51 AM    Potassium 3.5 02/22/2017 04:51 AM    Chloride 91 02/22/2017 04:51 AM    CO2 27 02/22/2017 04:51 AM    Glucose 97 02/22/2017 04:51 AM    BUN 40 02/22/2017 04:51 AM    Creatinine 1.33 02/22/2017 04:51 AM    Calcium 7.9 02/22/2017 04:51 AM    Magnesium 2.7 02/22/2017 04:51 AM    Phosphorus 3.4 02/22/2017 04:51 AM    Albumin 2.4 02/22/2017 04:51 AM     Lab Results   Component Value Date/Time    Hemoglobin A1c 5.2 02/09/2017 08:34 PM     Nataliia Jeffries RD

## 2017-02-22 NOTE — PROGRESS NOTES
Nephrology Progress Note  Rico Mccormick  Date of Admission : 2/9/2017    CC: Follow up for EDUARD       Assessment and Plan     Acute Kidney Injury:  - multifactorial including ATN from renal ischemia due to dissection + contrast nephropathy and post op hypotension  - appears to have renal infarctions on the left, 20% of cortex; right appears ok on CTA from 2/16  - Cr stable  - cont diuretics for now - increased lasix to BID    Hyponatremia:  - 2/2 to hypervolemia  - should improve with diuresis  - monitor daily    HTN:  - BP controlled w/o antihypertensives     Acidosis:  - resolved    Hypervolemia:  - IV lasix BID    Cholecystitis/Pancreatitis:  - HIDA scan normal    Ascending Aortic Dissection s/p aortic valve repair and left fem-fem bypass     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 Feeling ok. Edema stable. Cr stable, UOP ok. Net neg 1.2 liters in the past 24 hours. No cp, sob, n/v/d reported. Current Medications: all current  Medications have been eviewed in EPIC  Review of Systems: Pertinent items are noted in HPI. Objective:  Vitals:    Vitals:    02/21/17 2300 02/22/17 0400 02/22/17 0600 02/22/17 0758   BP: 119/42 102/51  118/49   Pulse: 66 72  67   Resp: 16 16  16   Temp: 98.4 °F (36.9 °C) 98.2 °F (36.8 °C)  98 °F (36.7 °C)   SpO2: 99% 96%  100%   Weight:   110 kg (242 lb 8.1 oz)    Height:         Intake and Output:     02/20 1901 - 02/22 0700  In: 340 [P.O.:140;  I.V.:200]  Out: 2625 [Urine:2625]    Physical Examination:  General: NAD,Conversant   Neck:  Supple, no mass  Resp:  Lungs CTA B/L, no wheezing , normal respiratory effort  CV:  RRR,  no murmur or rub, left LE edema, R in bandage  GI:  Soft, NT, + Bowel sounds, no hepatosplenomegaly  Neurologic:  Non focal  Psych:             AAO x 3 appropriate affect   Skin:  No Rash      [x]    High complexity decision making was performed  [x]    Patient is at high-risk of decompensation with multiple organ involvement    Lab Data Personally Reviewed: I have reviewed all the pertinent labs, microbiology data and radiology studies during assessment.     Recent Labs      02/22/17 0451 02/21/17 0326 02/20/17   0259   NA  126*  126*  129*   K  3.5  3.9  3.8   CL  91*  91*  92*   CO2  27  26  27   GLU  97  104*  109*   BUN  40*  47*  54*   CREA  1.33*  1.30  1.38*   CA  7.9*  7.9*  7.5*   MG  2.7*  2.9*  3.0*   PHOS  3.4  3.3   --    ALB  2.4*  2.4*  2.5*   SGOT  80*  131*  215*   ALT  475*  630*  813*   INR  1.8*  2.4*  2.8*     Recent Labs      02/22/17 0451 02/21/17 0326 02/20/17   0259   WBC  19.4*  22.8*  23.2*   HGB  7.8*  7.9*  7.8*   HCT  23.9*  24.1*  24.0*   PLT  234  232  223     No results found for: [facility]  Lab Results   Component Value Date/Time    Culture result: NO GROWTH 5 DAYS 02/15/2017 01:35 PM     Recent Results (from the past 24 hour(s))   FERRITIN    Collection Time: 02/21/17 12:21 PM   Result Value Ref Range    Ferritin 664 (H) 26 - 388 NG/ML   IRON PROFILE    Collection Time: 02/21/17 12:21 PM   Result Value Ref Range    Iron 25 (L) 35 - 150 ug/dL    TIBC 242 (L) 250 - 450 ug/dL    Iron % saturation 10 (L) 20 - 50 %   ALPHA-1-ANTITRYPSIN, TOTAL    Collection Time: 02/21/17 12:21 PM   Result Value Ref Range    Alpha-1 Antitrypsin 247 (H) 90 - 200 mg/dL   PROTHROMBIN TIME + INR    Collection Time: 02/22/17  4:51 AM   Result Value Ref Range    INR 1.8 (H) 0.9 - 1.1      Prothrombin time 18.5 (H) 9.0 - 11.1 sec   MAGNESIUM    Collection Time: 02/22/17  4:51 AM   Result Value Ref Range    Magnesium 2.7 (H) 1.6 - 2.4 mg/dL   CBC W/O DIFF    Collection Time: 02/22/17  4:51 AM   Result Value Ref Range    WBC 19.4 (H) 4.1 - 11.1 K/uL    RBC 2.53 (L) 4.10 - 5.70 M/uL    HGB 7.8 (L) 12.1 - 17.0 g/dL    HCT 23.9 (L) 36.6 - 50.3 %    MCV 94.5 80.0 - 99.0 FL    MCH 30.8 26.0 - 34.0 PG    MCHC 32.6 30.0 - 36.5 g/dL    RDW 14.6 (H) 11.5 - 14.5 %    PLATELET 484 627 - 488 K/uL   METABOLIC PANEL, COMPREHENSIVE    Collection Time: 02/22/17  4:51 AM   Result Value Ref Range    Sodium 126 (L) 136 - 145 mmol/L    Potassium 3.5 3.5 - 5.1 mmol/L    Chloride 91 (L) 97 - 108 mmol/L    CO2 27 21 - 32 mmol/L    Anion gap 8 5 - 15 mmol/L    Glucose 97 65 - 100 mg/dL    BUN 40 (H) 6 - 20 MG/DL    Creatinine 1.33 (H) 0.70 - 1.30 MG/DL    BUN/Creatinine ratio 30 (H) 12 - 20      GFR est AA >60 >60 ml/min/1.73m2    GFR est non-AA 55 (L) >60 ml/min/1.73m2    Calcium 7.9 (L) 8.5 - 10.1 MG/DL    Bilirubin, total 2.9 (H) 0.2 - 1.0 MG/DL    ALT (SGPT) 475 (H) 12 - 78 U/L    AST (SGOT) 80 (H) 15 - 37 U/L    Alk. phosphatase 371 (H) 45 - 117 U/L    Protein, total 5.6 (L) 6.4 - 8.2 g/dL    Albumin 2.4 (L) 3.5 - 5.0 g/dL    Globulin 3.2 2.0 - 4.0 g/dL    A-G Ratio 0.8 (L) 1.1 - 2.2     PHOSPHORUS    Collection Time: 02/22/17  4:51 AM   Result Value Ref Range    Phosphorus 3.4 2.6 - 4.7 MG/DL   LIPASE    Collection Time: 02/22/17  4:51 AM   Result Value Ref Range    Lipase 807 (H) 73 - 393 U/L   AMYLASE    Collection Time: 02/22/17  4:51 AM   Result Value Ref Range    Amylase 133 (H) 25 - 115 U/L   BILIRUBIN, DIRECT    Collection Time: 02/22/17  4:51 AM   Result Value Ref Range    Bilirubin, direct 1.6 (H) 0.0 - 0.2 MG/DL           Dav OcampoMcLeod [facility]   [address] [address]  Phone - [phone]   Fax - [phone]  [email or web address]

## 2017-02-22 NOTE — PROGRESS NOTES
0730 Bedside shift change report given to Cassandra Morgan RN (oncoming nurse) by José Miguel Forbes RN (offgoing nurse). Report included the following information SBAR, Kardex, ED Summary, Procedure Summary, Intake/Output, MAR, Recent Results and Med Rec Status. 0945 R leg dressing changed per orders. 1005 Pt diet changed from NPO-bowel rest, to clear liquids. Given chicken broth and jello. Tolerated both well. 1520 R leg dressing changed per orders. 0 Pt had uneventful shift. Up in chair 2xs, ambulated in the hallway 2xs. 1600 Bedside shift change report given to Ananth Richards RN (oncoming nurse) by Cassandra Morgan RN (offgoing nurse). Report included the following information SBAR, Kardex, ED Summary, Procedure Summary, Intake/Output, MAR, Recent Results and Med Rec Status. Problem: Falls - Risk of  Goal: *Absence of falls  Outcome: Progressing Towards Goal  Pt has remained free of falls since admission. He is up with 1 assist.  Goal: *Knowledge of fall prevention  Outcome: Progressing Towards Goal  Pt demonstrates appropriate safety awareness. Problem: Pressure Ulcer - Risk of  Goal: *Prevention of pressure ulcer  Outcome: Progressing Towards Goal  No ulcer noted. Problem: Aortic Aneurysm Repair: Discharge Outcomes  Goal: *Hemodynamically stable  Outcome: Progressing Towards Goal  Patient Vitals for the past 12 hrs:    Temp Pulse Resp BP SpO2   02/22/17 1649 - 76 - 146/59 -   02/22/17 1619 98.9 °F (37.2 °C) 69 16 156/53 99 %   02/22/17 1151 98.1 °F (36.7 °C) 65 16 122/58 98 %   02/22/17 0758 98 °F (36.7 °C) 67 16 118/49 100 %         Goal: *Stable cardiac rhythm  Outcome: Progressing Towards Goal  Pt is in aflutter. Goal: *Lungs clear or at baseline  Outcome: Progressing Towards Goal  Lungs clear to auscultation bilaterally. Goal: *Optimal pain control at patients stated goal  Outcome: Progressing Towards Goal  Pt has no c/o pain.     Goal: *Weight is stable  Outcome: Progressing Towards Goal  Last 3 Recorded Weights in this Encounter     02/20/17 0730 02/21/17 0704 02/22/17 0600   Weight: 110 kg (242 lb 8.1 oz) 108.3 kg (238 lb 12.1 oz) 110 kg (242 lb 8.1 oz)         Goal: *No signs and symptoms of infection or wound complications  Outcome: Progressing Towards Goal  No s/sx of wound infection or complications.

## 2017-02-22 NOTE — PROGRESS NOTES
Subjective  No abdominal pain    Temp:  [97.6 °F (36.4 °C)-98.7 °F (37.1 °C)]   Pulse (Heart Rate):  [61-75]   BP: (102-151)/(42-59)   Resp Rate:  [15-18]   O2 Sat (%):  [96 %-100 %]   Weight:  [238 lb 12.1 oz (108.3 kg)-242 lb 8.1 oz (110 kg)]      02/20 1901 - 02/22 0700  In: 340 [P.O.:140; I.V.:200]  Out: 2625 [Urine:2625]      Objective  Gen- Alert in NAD  Lungs CTA  H- RRR  Abd- Soft mildly distended NT. Still with ecchymosis in upper abdomen and chest     Recent Results (from the past 24 hour(s))   PROTHROMBIN TIME + INR    Collection Time: 02/22/17  4:51 AM   Result Value Ref Range    INR 1.8 (H) 0.9 - 1.1      Prothrombin time 18.5 (H) 9.0 - 11.1 sec   MAGNESIUM    Collection Time: 02/22/17  4:51 AM   Result Value Ref Range    Magnesium 2.7 (H) 1.6 - 2.4 mg/dL   CBC W/O DIFF    Collection Time: 02/22/17  4:51 AM   Result Value Ref Range    WBC 19.4 (H) 4.1 - 11.1 K/uL    RBC 2.53 (L) 4.10 - 5.70 M/uL    HGB 7.8 (L) 12.1 - 17.0 g/dL    HCT 23.9 (L) 36.6 - 50.3 %    MCV 94.5 80.0 - 99.0 FL    MCH 30.8 26.0 - 34.0 PG    MCHC 32.6 30.0 - 36.5 g/dL    RDW 14.6 (H) 11.5 - 14.5 %    PLATELET 697 460 - 860 K/uL   METABOLIC PANEL, COMPREHENSIVE    Collection Time: 02/22/17  4:51 AM   Result Value Ref Range    Sodium 126 (L) 136 - 145 mmol/L    Potassium 3.5 3.5 - 5.1 mmol/L    Chloride 91 (L) 97 - 108 mmol/L    CO2 27 21 - 32 mmol/L    Anion gap 8 5 - 15 mmol/L    Glucose 97 65 - 100 mg/dL    BUN 40 (H) 6 - 20 MG/DL    Creatinine 1.33 (H) 0.70 - 1.30 MG/DL    BUN/Creatinine ratio 30 (H) 12 - 20      GFR est AA >60 >60 ml/min/1.73m2    GFR est non-AA 55 (L) >60 ml/min/1.73m2    Calcium 7.9 (L) 8.5 - 10.1 MG/DL    Bilirubin, total 2.9 (H) 0.2 - 1.0 MG/DL    ALT (SGPT) 475 (H) 12 - 78 U/L    AST (SGOT) 80 (H) 15 - 37 U/L    Alk.  phosphatase 371 (H) 45 - 117 U/L    Protein, total 5.6 (L) 6.4 - 8.2 g/dL    Albumin 2.4 (L) 3.5 - 5.0 g/dL    Globulin 3.2 2.0 - 4.0 g/dL    A-G Ratio 0.8 (L) 1.1 - 2.2     PHOSPHORUS Collection Time: 02/22/17  4:51 AM   Result Value Ref Range    Phosphorus 3.4 2.6 - 4.7 MG/DL   LIPASE    Collection Time: 02/22/17  4:51 AM   Result Value Ref Range    Lipase 807 (H) 73 - 393 U/L   AMYLASE    Collection Time: 02/22/17  4:51 AM   Result Value Ref Range    Amylase 133 (H) 25 - 115 U/L   BILIRUBIN, DIRECT    Collection Time: 02/22/17  4:51 AM   Result Value Ref Range    Bilirubin, direct 1.6 (H) 0.0 - 0.2 MG/DL         Active Problems:    Ascending aortic dissection (HCC) (2/9/2017)      Overview: AORTIC ROOT REPLACEMENT USING A 29 MM [facility] VALVED CONDUIT, ECC VIA       LEFT FEMORAL ARTERIAL CANNULATION      2. LEFT TO RIGHT FEM FEM BYPASS GRAFT            Right anterior compartment fasciotomy.--Dr. Reyna Miranda 2/11/17          Assessment & Plan  Chronic cholecystitis- no indication for cholecystectomy or cholecystostomy at this point  Will Sign off. Please call if there are any questions.

## 2017-02-22 NOTE — PROGRESS NOTES
Problem: Falls - Risk of  Goal: *Absence of falls  Outcome: Progressing Towards Goal  Pt bedside table and call bell within reach. Pt aware to call for assistance as needed. Pt wearing gripper socks. Wife at bedside. Problem: General Medical Care Plan  Goal: *Vital signs within specified parameters  Outcome: Progressing Towards Goal  SBP in 120 range. VSS  Goal: *Absence of infection signs and symptoms  Outcome: Progressing Towards Goal  Incisions TRAMAINE without s/sx of infection   Goal: *Optimal pain control at patients stated goal  Outcome: Progressing Towards Goal  Pt continues to have \"pressure\" in between chest and abd area. Ultram covering pain well   Goal: *Fluid volume balance  Outcome: Progressing Towards Goal  Pt NPO except meds  Goal: *Anxiety reduced or absent  Outcome: Progressing Towards Goal  Pt calm and cooperative. Wife, daughter, and son at bedside - very supportive.    Goal: *Progressive mobility and function (eg: ADLs)  Outcome: Progressing Towards Goal  Pt up to bathroom and walk around room with minimal assistance

## 2017-02-22 NOTE — PROGRESS NOTES
CSS Floor Progress Note    Admit Date: 2017  POD:  12 Day Post-Op    Procedure:  Procedure(s):  ASCENDING AORTIC DISSECTION, AORTIC ROOT REPLACEMENT, 29MM MECHANICAL VALVE, FEM-FEM BYPASS BY DR Yaritza Turner. ECC, JOSE BY DR LEO. Subjective:   Pt seen with Dr. Kathy Magana. Afebrile, on RA. Feels better today- appetite improving.  + BM      Objective:   Vitals:  Blood pressure 118/49, pulse 67, temperature 98 °F (36.7 °C), resp. rate 16, height 5' 11\" (1.803 m), weight 242 lb 8.1 oz (110 kg), SpO2 100 %. Temp (24hrs), Av.2 °F (36.8 °C), Min:97.9 °F (36.6 °C), Max:98.7 °F (37.1 °C)    EKG/Rhythm:  SR/Aflutter    Oxygen Therapy:  Oxygen Therapy  O2 Sat (%): 100 % (17 0758)  Pulse via Oximetry: 82 beats per minute (17 1500)  O2 Device: Room air (17 0758)  O2 Flow Rate (L/min): 1 l/min (17 0810)  FIO2 (%): 40 % (02/10/17 0722)       ABD US: 17  1. . Gallbladder distention and sludge with pain on insonation and 9 mm thick  wall with pericholecystic fluid concerning for cholecystitis. 2. Diffuse increase in hepatic echogenicity compatible with diffuse  hepatocellular process, most commonly seen with steatosis. 3. Small ascites. 4. Small pleural effusions. 5. Diffuse increase in renal echogenicity likely reflective of medical renal  disease. 6. Pulsatile hepatopedal portal flow is reflect portal hypertension.         Admission Weight: Last Weight   Weight: 222 lb (100.7 kg) Weight: 242 lb 8.1 oz (110 kg)     Intake / Output / Drain:  Current Shift:    Last 24 hrs.:     Intake/Output Summary (Last 24 hours) at 17 0849  Last data filed at 17 0700   Gross per 24 hour   Intake              220 ml   Output             1650 ml   Net            -1430 ml       EXAM:  General:  Awake, appears more comfortable,                                                                                          Lungs:   Clear to auscultation bilaterally.    Incision:  Marvin MARAVILLA/ROBERT/I.   RLE dressed, but weeping serous fluid. Heart:  Regular rate and rhythm   Abdomen:   Firm, distended , mild TTP. Bowel sounds hypoactive. Extremities:  ++ edema. PPP. Neurologic:  Gross motor and sensory apparatus intact. Labs:   Recent Labs      17   0451   WBC  19.4*   HGB  7.8*   HCT  23.9*   PLT  234   NA  126*   K  3.5   BUN  40*   CREA  1.33*   GLU  97   INR  1.8*        Assessment:     Active Problems:    Ascending aortic dissection (HCC) (2017)      Overview: AORTIC ROOT REPLACEMENT USING A 29 MM [facility] [facility] VALVED CONDUIT, ECC VIA       LEFT FEMORAL ARTERIAL CANNULATION      2. LEFT TO RIGHT FEM FEM BYPASS GRAFT            Right anterior compartment fasciotomy.--Dr. Rigo Alvarez 17       Plan/Recommendations/Medical Decision Makin. S/p Aortic root replacement w/ AVR mechanical valve & L to R fem fem bypass graft: Continue low dose BB, on ASA, continue low dose coumadin tonight. 2. Atelectasis: Increase IS and activity as tolerated. 3. Postop anemia s/t acute blood loss: Monitor. Hgb 7.9 stable. 4. Anticoagulation for Mech AVR- INR 1.8-- low dose coumadin tonight. Plts stable. Cont asa. Will bridge with lovenox. 5. Elevated transaminases: Improving, caution with hepatotoxic meds. 6. Cholecystitis/possible pancreatitis:  Lipase 807 (1082), Amylase 133 (145)  tbili 2.9 (3), alk phosp 370 (380)  Abd. US + cholyecystitis, can't visualize pancreas. Diet clears. On Levaquin 750 mg IV q24h and flagyl 500 mg IV q12 hrs. GI following. 6. EDUARD: Creat better today 1.3. Renal following. Increase diuresis per renal today. 7. Postop afib & Varying heart block: Rhythm now A flutter vs NSR. On coumadin. 8. HTN: continue low dose BB. 9. Constipation: + BM small . 10. R compartment fasciotomy:  Vascular following, cont PT/OT. 11. Nausea- on ultram prn. Continue zofram PRN. Mildly improved. 12. Nutrition - Clears. Concerned about healing with poor intake.   13. Hyponatremia - s/t hypovolemia, monitor. 14. Leukocytosis - afebrile. Nontoxic. Start antibiotics for cholecystitis. Monitor. 15. GI/DVT prophylaxis - protonix. INR 1.8, will start lovenox  16. Dispo: PT/OT. Dispo plan TBD.       Signed By: Aiden Hewitt PA-C

## 2017-02-22 NOTE — PROGRESS NOTES
[address]  [address], [address]       GI PROGRESS NOTE  Chloe Diaz, [facility]  [phone] office  [phone] NP in-hospital cell phone M-F until 4:30  After 5pm or on weekends, please call  for physician on call      NAME: Ramirez Soto   :  1960   MRN:  [id number]       Subjective:     Pt denies nausea, vomiting, abdominal pain. Tolerating clears. Hungry. Objective:     VITALS:   Last 24hrs VS reviewed since prior progress note. Most recent are:  Visit Vitals    /58 (BP 1 Location: Left arm, BP Patient Position: At rest)    Pulse 65    Temp 98.1 °F (36.7 °C)    Resp 16    Ht 5' 11\" (1.803 m)    Wt 110 kg (242 lb 8.1 oz)    SpO2 98%    BMI 33.82 kg/m2       PHYSICAL EXAM:  General: Cooperative, no acute distress, wife at bedside  Neurologic:  Alert and oriented X 3. HEENT: EOMI. Lungs:  CTA bilaterally. No wheezing  Heart:  S1 S2, regular rhythm, no murmur, mechanical click, surgical incisions  Abdomen: Soft, distended but softer, mild tender. +Bowel sounds  Extremities: ++ edema; anterior fasciotomy incision to RLE, being re-dressed  Psych:   Good insight. Not anxious or agitated.     Lab Data Reviewed:     Recent Results (from the past 24 hour(s))   PROTHROMBIN TIME + INR    Collection Time: 17  4:51 AM   Result Value Ref Range    INR 1.8 (H) 0.9 - 1.1      Prothrombin time 18.5 (H) 9.0 - 11.1 sec   MAGNESIUM    Collection Time: 17  4:51 AM   Result Value Ref Range    Magnesium 2.7 (H) 1.6 - 2.4 mg/dL   CBC W/O DIFF    Collection Time: 17  4:51 AM   Result Value Ref Range    WBC 19.4 (H) 4.1 - 11.1 K/uL    RBC 2.53 (L) 4.10 - 5.70 M/uL    HGB 7.8 (L) 12.1 - 17.0 g/dL    HCT 23.9 (L) 36.6 - 50.3 %    MCV 94.5 80.0 - 99.0 FL    MCH 30.8 26.0 - 34.0 PG    MCHC 32.6 30.0 - 36.5 g/dL    RDW 14.6 (H) 11.5 - 14.5 %    PLATELET 076 992 - 539 K/uL   METABOLIC PANEL, COMPREHENSIVE    Collection Time: 17  4:51 AM   Result Value Ref Range    Sodium 126 (L) 136 - 145 mmol/L    Potassium 3.5 3.5 - 5.1 mmol/L    Chloride 91 (L) 97 - 108 mmol/L    CO2 27 21 - 32 mmol/L    Anion gap 8 5 - 15 mmol/L    Glucose 97 65 - 100 mg/dL    BUN 40 (H) 6 - 20 MG/DL    Creatinine 1.33 (H) 0.70 - 1.30 MG/DL    BUN/Creatinine ratio 30 (H) 12 - 20      GFR est AA >60 >60 ml/min/1.73m2    GFR est non-AA 55 (L) >60 ml/min/1.73m2    Calcium 7.9 (L) 8.5 - 10.1 MG/DL    Bilirubin, total 2.9 (H) 0.2 - 1.0 MG/DL    ALT (SGPT) 475 (H) 12 - 78 U/L    AST (SGOT) 80 (H) 15 - 37 U/L    Alk. phosphatase 371 (H) 45 - 117 U/L    Protein, total 5.6 (L) 6.4 - 8.2 g/dL    Albumin 2.4 (L) 3.5 - 5.0 g/dL    Globulin 3.2 2.0 - 4.0 g/dL    A-G Ratio 0.8 (L) 1.1 - 2.2     PHOSPHORUS    Collection Time: 02/22/17  4:51 AM   Result Value Ref Range    Phosphorus 3.4 2.6 - 4.7 MG/DL   LIPASE    Collection Time: 02/22/17  4:51 AM   Result Value Ref Range    Lipase 807 (H) 73 - 393 U/L   AMYLASE    Collection Time: 02/22/17  4:51 AM   Result Value Ref Range    Amylase 133 (H) 25 - 115 U/L   BILIRUBIN, DIRECT    Collection Time: 02/22/17  4:51 AM   Result Value Ref Range    Bilirubin, direct 1.6 (H) 0.0 - 0.2 MG/DL     HIDA (2/21/17) EF 0% and no distention     Assessment:   · Elevated LFTs: could have been a stone which has passed; enzymes are trending down; pt asymptomatic     Patient Active Problem List   Diagnosis Code    Seborrheic dermatitis L21.9    History of colonoscopy Z98.890    Family history of early CAD Z80.55    Family history of diabetes mellitus (DM) Z83.3    Family history of colon cancer Z80.0    Ascending aortic dissection ([address] I71.01     Plan:   · Trend LFTs. · To consider MRCP if acute upswing in LFTs. Will be up to radiology as SJ aortic mechanical valve and conduit is MR \"conditional.\"  · Advance diet to full liquids     Signed By: Abhay Acevedo. Sean Slater NP     2/22/2017  3:35 PM         GI Attending: Agree with above. Will follow. Monitor LFT's.     Handy MOTNAÑO Zonia Liu MD

## 2017-02-22 NOTE — PROGRESS NOTES
Physical Therapy    Pt just completed ambulation around unit with wife. R foot drop continues but safe at this time. Pt requesting to rest. Will follow up with patient. DF slowly improving along with decreasing edema.      Thank Luis Alberto Mendez PT,DPT

## 2017-02-22 NOTE — PROGRESS NOTES
2000: Per Karina Clarke RN, Dr. Dennis River gave OK not to give Lasix if output enough from initial dose today. Output 850 for am shift. Will hold tonights dose. 2130: Last dressing change to R leg at 1700, now saturated onto pad. Dressing changed per order. 0730: Bedside and Verbal shift change report given to Joie (oncoming nurse) by Robert Avendaño (offgoing nurse). Report included the following information SBAR, Kardex, ED Summary, OR Summary, Intake/Output, MAR, Recent Results and Cardiac Rhythm Aflutter.

## 2017-02-23 LAB
ALBUMIN SERPL BCP-MCNC: 2.3 G/DL (ref 3.5–5)
ALBUMIN/GLOB SERPL: 0.7 {RATIO} (ref 1.1–2.2)
ALP SERPL-CCNC: 296 U/L (ref 45–117)
ALT SERPL-CCNC: 355 U/L (ref 12–78)
AMYLASE SERPL-CCNC: 108 U/L (ref 25–115)
ANION GAP BLD CALC-SCNC: 10 MMOL/L (ref 5–15)
AST SERPL W P-5'-P-CCNC: 53 U/L (ref 15–37)
BILIRUB SERPL-MCNC: 2.9 MG/DL (ref 0.2–1)
BUN SERPL-MCNC: 33 MG/DL (ref 6–20)
BUN/CREAT SERPL: 26 (ref 12–20)
CALCIUM SERPL-MCNC: 7.9 MG/DL (ref 8.5–10.1)
CHLORIDE SERPL-SCNC: 93 MMOL/L (ref 97–108)
CO2 SERPL-SCNC: 27 MMOL/L (ref 21–32)
CREAT SERPL-MCNC: 1.29 MG/DL (ref 0.7–1.3)
ERYTHROCYTE [DISTWIDTH] IN BLOOD BY AUTOMATED COUNT: 14.6 % (ref 11.5–14.5)
GLOBULIN SER CALC-MCNC: 3.1 G/DL (ref 2–4)
GLUCOSE SERPL-MCNC: 90 MG/DL (ref 65–100)
HCT VFR BLD AUTO: 23.9 % (ref 36.6–50.3)
HGB BLD-MCNC: 7.8 G/DL (ref 12.1–17)
INR PPP: 1.8 (ref 0.9–1.1)
LIPASE SERPL-CCNC: 590 U/L (ref 73–393)
MAGNESIUM SERPL-MCNC: 2.3 MG/DL (ref 1.6–2.4)
MCH RBC QN AUTO: 30.8 PG (ref 26–34)
MCHC RBC AUTO-ENTMCNC: 32.6 G/DL (ref 30–36.5)
MCV RBC AUTO: 94.5 FL (ref 80–99)
PHOSPHATE SERPL-MCNC: 3.4 MG/DL (ref 2.6–4.7)
PLATELET # BLD AUTO: 231 K/UL (ref 150–400)
POTASSIUM SERPL-SCNC: 3.3 MMOL/L (ref 3.5–5.1)
PROT SERPL-MCNC: 5.4 G/DL (ref 6.4–8.2)
PROTHROMBIN TIME: 18.4 SEC (ref 9–11.1)
RBC # BLD AUTO: 2.53 M/UL (ref 4.1–5.7)
SODIUM SERPL-SCNC: 130 MMOL/L (ref 136–145)
WBC # BLD AUTO: 15.5 K/UL (ref 4.1–11.1)

## 2017-02-23 PROCEDURE — 74011250637 HC RX REV CODE- 250/637: Performed by: NURSE PRACTITIONER

## 2017-02-23 PROCEDURE — 74011250636 HC RX REV CODE- 250/636: Performed by: NURSE PRACTITIONER

## 2017-02-23 PROCEDURE — 85610 PROTHROMBIN TIME: CPT | Performed by: NURSE PRACTITIONER

## 2017-02-23 PROCEDURE — 74011250637 HC RX REV CODE- 250/637: Performed by: PHYSICIAN ASSISTANT

## 2017-02-23 PROCEDURE — 36415 COLL VENOUS BLD VENIPUNCTURE: CPT | Performed by: NURSE PRACTITIONER

## 2017-02-23 PROCEDURE — 83690 ASSAY OF LIPASE: CPT | Performed by: NURSE PRACTITIONER

## 2017-02-23 PROCEDURE — 77030018836 HC SOL IRR NACL ICUM -A

## 2017-02-23 PROCEDURE — 84100 ASSAY OF PHOSPHORUS: CPT | Performed by: NURSE PRACTITIONER

## 2017-02-23 PROCEDURE — 85027 COMPLETE CBC AUTOMATED: CPT | Performed by: NURSE PRACTITIONER

## 2017-02-23 PROCEDURE — 82150 ASSAY OF AMYLASE: CPT | Performed by: NURSE PRACTITIONER

## 2017-02-23 PROCEDURE — 74011250636 HC RX REV CODE- 250/636: Performed by: INTERNAL MEDICINE

## 2017-02-23 PROCEDURE — 74011000258 HC RX REV CODE- 258: Performed by: NURSE PRACTITIONER

## 2017-02-23 PROCEDURE — 83735 ASSAY OF MAGNESIUM: CPT | Performed by: NURSE PRACTITIONER

## 2017-02-23 PROCEDURE — 80053 COMPREHEN METABOLIC PANEL: CPT | Performed by: NURSE PRACTITIONER

## 2017-02-23 PROCEDURE — 74011250636 HC RX REV CODE- 250/636: Performed by: PHYSICIAN ASSISTANT

## 2017-02-23 PROCEDURE — 65660000000 HC RM CCU STEPDOWN

## 2017-02-23 RX ORDER — WARFARIN 2.5 MG/1
2.5 TABLET ORAL ONCE
Status: COMPLETED | OUTPATIENT
Start: 2017-02-23 | End: 2017-02-23

## 2017-02-23 RX ADMIN — FUROSEMIDE 40 MG: 10 INJECTION, SOLUTION INTRAMUSCULAR; INTRAVENOUS at 17:32

## 2017-02-23 RX ADMIN — ASPIRIN 81 MG CHEWABLE TABLET 81 MG: 81 TABLET CHEWABLE at 09:14

## 2017-02-23 RX ADMIN — ENOXAPARIN SODIUM 111 MG: 120 INJECTION SUBCUTANEOUS at 00:34

## 2017-02-23 RX ADMIN — PIPERACILLIN SODIUM,TAZOBACTAM SODIUM 3.38 G: 3; .375 INJECTION, POWDER, FOR SOLUTION INTRAVENOUS at 14:25

## 2017-02-23 RX ADMIN — TRAMADOL HYDROCHLORIDE 50 MG: 50 TABLET, FILM COATED ORAL at 20:06

## 2017-02-23 RX ADMIN — Medication 10 ML: at 22:38

## 2017-02-23 RX ADMIN — PIPERACILLIN SODIUM,TAZOBACTAM SODIUM 3.38 G: 3; .375 INJECTION, POWDER, FOR SOLUTION INTRAVENOUS at 22:38

## 2017-02-23 RX ADMIN — FUROSEMIDE 40 MG: 10 INJECTION, SOLUTION INTRAMUSCULAR; INTRAVENOUS at 09:13

## 2017-02-23 RX ADMIN — ENOXAPARIN SODIUM 111 MG: 120 INJECTION SUBCUTANEOUS at 23:25

## 2017-02-23 RX ADMIN — METOPROLOL TARTRATE 25 MG: 25 TABLET ORAL at 09:13

## 2017-02-23 RX ADMIN — PANTOPRAZOLE SODIUM 40 MG: 40 TABLET, DELAYED RELEASE ORAL at 09:14

## 2017-02-23 RX ADMIN — METOPROLOL TARTRATE 25 MG: 25 TABLET ORAL at 17:32

## 2017-02-23 RX ADMIN — ENOXAPARIN SODIUM 111 MG: 120 INJECTION SUBCUTANEOUS at 12:43

## 2017-02-23 RX ADMIN — PIPERACILLIN SODIUM,TAZOBACTAM SODIUM 3.38 G: 3; .375 INJECTION, POWDER, FOR SOLUTION INTRAVENOUS at 05:42

## 2017-02-23 RX ADMIN — TRAMADOL HYDROCHLORIDE 50 MG: 50 TABLET, FILM COATED ORAL at 23:25

## 2017-02-23 RX ADMIN — WARFARIN SODIUM 2.5 MG: 2.5 TABLET ORAL at 17:32

## 2017-02-23 RX ADMIN — Medication 10 ML: at 05:43

## 2017-02-23 RX ADMIN — Medication 10 ML: at 14:25

## 2017-02-23 NOTE — PROGRESS NOTES
Cardiac Surgery Care Coordinator- Met with Gavino Uriarte, his wife and daughter, reviewed plan of care and offered outing. Reinforced sternal precautions and encouraged continued use of the incentive spirometer. Reviewed goals for the day and emphasized the importance of increased activity to meet discharge goals  Will continue to follow for educational and emotional needs. 1450- Escorted Mr Jaswant Gaytan outside via wheelchair on monitor, bedside nurse aware. Pt tolerated activity well. Will continue to follow.  Amandeep Reddy RN

## 2017-02-23 NOTE — PROGRESS NOTES
0045: Right leg dressing saturated, large spot on bed from drainage. Dressing changed per order. Problem: Falls - Risk of  Goal: *Absence of falls  Outcome: Progressing Towards Goal  Pt bedside table and call bell within reach. Pt aware to call for assistance as needed. Pt wearing gripper socks. Problem: Surgical Wound Care  Goal: *Non-infected Wound: Absence of infection signs and symptoms  Infection control procedures (eg: clean dressings, clean gloves, hand washing, precautions to isolate wound from contamination, sterile instruments used for wound debridement) should be implemented. Outcome: Progressing Towards Goal  No s/sx of infection to R leg. Site is very edematous and has large amounts of serous fluids requiring frequent dressing changes. 0730: Bedside and Verbal shift change report given to ADRIAN (oncoming nurse) by Abe Sunshine (offgoing nurse). Report included the following information SBAR, Kardex, ED Summary, OR Summary, Procedure Summary, Intake/Output, MAR, Accordion and Cardiac Rhythm aflutter.

## 2017-02-23 NOTE — PROGRESS NOTES
Physical Therapy    Pt currently off unit out side with staff and family. Spoke with patient and family about HEP and desensitization techniques for R foot. Handouts left in room and will educate family and patient tomorrow. Patient has ambulated in halls with family today.     Thank Dennise East PT,DPT

## 2017-02-23 NOTE — PROGRESS NOTES
1500: Patient off floor with Suzi Ulloa RN and family. Problem: Aortic Aneurysm Repair: Discharge Outcomes  Goal: *Hemodynamically stable  Outcome: Progressing Towards Goal  BP WDL of SBP <140. Goal: *Lungs clear or at baseline  Outcome: Progressing Towards Goal  Lung sounds clear on RA  Goal: *Demonstrates ability to perform prescribed activity without shortness of breath or discomfort  Outcome: Progressing Towards Goal  Pt up ambulating in hallway several times per day, several hundred feet each time. Goal: *Optimal pain control at patients stated goal  Outcome: Progressing Towards Goal  Patient reports pain is well controlled on current regimen   Goal: *No signs and symptoms of infection or wound complications  Outcome: Progressing Towards Goal  Surgical wounds are healing without s/sx of infection. Fasciotomy without s/sx of infection. 1930: Bedside shift change report given to [address] (oncoming nurse) by Anel Sommers (offgoing nurse). Report included the following information SBAR, Kardex, Procedure Summary, Intake/Output, MAR, Accordion, Recent Results and Cardiac Rhythm  .

## 2017-02-23 NOTE — PROGRESS NOTES
[facility] Floor Progress Note    Admit Date: 2017  POD:  12 Day Post-Op    Procedure:  Procedure(s):  ASCENDING AORTIC DISSECTION, AORTIC ROOT REPLACEMENT, 29MM MECHANICAL VALVE, FEM-FEM BYPASS BY DR Rafiq Boyer. ECC, JOSE BY DR LEO. Subjective:   Pt seen with Dr. Bess Bellamy. Afebrile, on RA. Feels better today- tolerating full liquid diet. Objective:   Vitals:  Blood pressure 134/62, pulse 71, temperature 98.3 °F (36.8 °C), resp. rate 18, height 5' 11\" (1.803 m), weight 240 lb 4.8 oz (109 kg), SpO2 96 %. Temp (24hrs), Av.3 °F (36.8 °C), Min:98.1 °F (36.7 °C), Max:98.9 °F (37.2 °C)    EKG/Rhythm:  SR/Aflutter    Oxygen Therapy:  Oxygen Therapy  O2 Sat (%): 96 % (17 0800)  Pulse via Oximetry: 82 beats per minute (17 1500)  O2 Device: Room air (17 0800)  O2 Flow Rate (L/min): 1 l/min (17 0810)  FIO2 (%): 40 % (02/10/17 0722)       Admission Weight: Last Weight   Weight: 222 lb (100.7 kg) Weight: 240 lb 4.8 oz (109 kg)     Intake / Output / Drain:  Current Shift:  0701 -  1900  In: 240 [P.O.:240]  Out: 150 [Urine:150]  Last 24 hrs.:     Intake/Output Summary (Last 24 hours) at 17 1033  Last data filed at 17 0945   Gross per 24 hour   Intake             1480 ml   Output             3070 ml   Net            -1590 ml       EXAM:  General:  Awake, appears more comfortable,                                                                                          Lungs:   Clear to auscultation bilaterally. Incision:  Drs C/D/I.   RLE dressed, but weeping serous fluid. Heart:  Regular rate and rhythm   Abdomen:   Firm, distended , mild TTP. Bowel sounds hypoactive. Extremities:  ++ edema. PPP. Neurologic:  Gross motor and sensory apparatus intact.      Labs:   Recent Labs      17   0501   WBC  15.5*   HGB  7.8*   HCT  23.9*   PLT  231   NA  130*   K  3.3*   BUN  33*   CREA  1.29   GLU  90   INR  1.8*        Assessment:     Active Problems:    Ascending aortic dissection ([facility] [address] (2017)      Overview: AORTIC ROOT REPLACEMENT USING A 29 MM [employer]OSHA VALVED CONDUIT, ECC VIA       LEFT FEMORAL ARTERIAL CANNULATION      2. LEFT TO RIGHT FEM FEM BYPASS GRAFT            Right anterior compartment fasciotomy.--Dr. Sahara Siegel 17       Plan/Recommendations/Medical Decision Makin. S/p Aortic root replacement w/ AVR mechanical valve & L to R fem fem bypass graft: Continue low dose BB, on ASA, continue low dose coumadin tonight. 2. Atelectasis: Increase IS and activity as tolerated. 3. Postop anemia s/t acute blood loss: Monitor. stable. 4. Anticoagulation for Mech AVR- INR 1.8-- low dose coumadin tonight. Plts stable. Cont asa. Will bridge with lovenox. 5. Elevated transaminases: Improving, caution with hepatotoxic meds. 6. Cholecystitis/possible pancreatitis:  Lipase/Amylase/LFTs/alk phosp all trending down. Tbili holding 2.9. Full liquid diet-advance to GI lite when pt ready. Cont zosyn -WBC improving. GI following. 6. EDUARD: Creat stable. Renal following. diuresis per renal today. 7. Postop afib & Varying heart block: Rhythm now A flutter vs NSR. On coumadin--bridge w/ Lovenox. 8. HTN: continue low dose BB. 9. Constipation: + BM small . 10. R compartment fasciotomy:  Vascular following, cont PT/OT. 11. Nausea-  Resolved. 12. Nutrition - Full liquid-advance to GI lite when pt ready. Concerned about healing with poor intake. Add MVI when ready. 13. Hyponatremia - s/t hypovolemia, monitor. 14. Leukocytosis - Improving--afebrile. Nontoxic. Cont antibiotics for cholecystitis. Monitor. 15. GI/DVT prophylaxis - protonix. INR 1.8, Cont lovenox  16. Dispo: PT/OT. Dispo plan TBD.       Signed By: Emilie La NP

## 2017-02-23 NOTE — PROGRESS NOTES
[address]  [address], [address]       GI PROGRESS NOTE  Anthony Cherry, [facility]  [phone] office  [phone] NP in-hospital cell phone M-F until 4:30  After 5pm or on weekends, please call  for physician on call      NAME: Rico Mccormick   :  1960   MRN:  [id number]       Subjective:     No abdominal pain, nausea. Objective:     VITALS:   Last 24hrs VS reviewed since prior progress note. Most recent are:  Visit Vitals    /62 (BP 1 Location: Left arm, BP Patient Position: At rest)    Pulse 71    Temp 98.3 °F (36.8 °C)    Resp 18    Ht 5' 11\" (1.803 m)    Wt 109 kg (240 lb 4.8 oz)    SpO2 96%    BMI 33.52 kg/m2       PHYSICAL EXAM:  General: Cooperative, no acute distress, walking unit with daughterMonty  Neurologic:  Alert and oriented X 3. HEENT: EOMI. Lungs:  CTA bilaterally. No wheezing  Heart:  S1 S2, regular rhythm, no murmur, mechanical click, surgical incisions  Abdomen: Soft, distended but softer, mild tender. +Bowel sounds  Extremities: ++ edema; anterior fasciotomy incision to RLE, dressed  Psych:   Good insight. Not anxious or agitated.     Lab Data Reviewed:     Recent Results (from the past 24 hour(s))   PROTHROMBIN TIME + INR    Collection Time: 17  5:01 AM   Result Value Ref Range    INR 1.8 (H) 0.9 - 1.1      Prothrombin time 18.4 (H) 9.0 - 11.1 sec   MAGNESIUM    Collection Time: 17  5:01 AM   Result Value Ref Range    Magnesium 2.3 1.6 - 2.4 mg/dL   CBC W/O DIFF    Collection Time: 17  5:01 AM   Result Value Ref Range    WBC 15.5 (H) 4.1 - 11.1 K/uL    RBC 2.53 (L) 4.10 - 5.70 M/uL    HGB 7.8 (L) 12.1 - 17.0 g/dL    HCT 23.9 (L) 36.6 - 50.3 %    MCV 94.5 80.0 - 99.0 FL    MCH 30.8 26.0 - 34.0 PG    MCHC 32.6 30.0 - 36.5 g/dL    RDW 14.6 (H) 11.5 - 14.5 %    PLATELET 295 735 - 489 K/uL   METABOLIC PANEL, COMPREHENSIVE    Collection Time: 17  5:01 AM   Result Value Ref Range    Sodium 130 (L) 136 - 145 mmol/L    Potassium 3.3 (L) 3.5 - 5.1 mmol/L    Chloride 93 (L) 97 - 108 mmol/L    CO2 27 21 - 32 mmol/L    Anion gap 10 5 - 15 mmol/L    Glucose 90 65 - 100 mg/dL    BUN 33 (H) 6 - 20 MG/DL    Creatinine 1.29 0.70 - 1.30 MG/DL    BUN/Creatinine ratio 26 (H) 12 - 20      GFR est AA >60 >60 ml/min/1.73m2    GFR est non-AA 57 (L) >60 ml/min/1.73m2    Calcium 7.9 (L) 8.5 - 10.1 MG/DL    Bilirubin, total 2.9 (H) 0.2 - 1.0 MG/DL    ALT (SGPT) 355 (H) 12 - 78 U/L    AST (SGOT) 53 (H) 15 - 37 U/L    Alk. phosphatase 296 (H) 45 - 117 U/L    Protein, total 5.4 (L) 6.4 - 8.2 g/dL    Albumin 2.3 (L) 3.5 - 5.0 g/dL    Globulin 3.1 2.0 - 4.0 g/dL    A-G Ratio 0.7 (L) 1.1 - 2.2     PHOSPHORUS    Collection Time: 02/23/17  5:01 AM   Result Value Ref Range    Phosphorus 3.4 2.6 - 4.7 MG/DL   LIPASE    Collection Time: 02/23/17  5:01 AM   Result Value Ref Range    Lipase 590 (H) 73 - 393 U/L   AMYLASE    Collection Time: 02/23/17  5:01 AM   Result Value Ref Range    Amylase 108 25 - 115 U/L     HIDA (2/21/17) EF 0% and no distention     Assessment:   · Elevated LFTs: could have been a stone which has passed; enzymes are trending down, except t. bili same; pt still asymptomatic     Patient Active Problem List   Diagnosis Code    Seborrheic dermatitis L21.9    History of colonoscopy Z80.56    Family history of early CAD Z80.55    Family history of diabetes mellitus (DM) Z83.3    Family history of colon cancer Z80.0    Ascending aortic dissection ([facility] [address] I71.01     Plan:   · Trend LFTs. · Continue full liquids today, can advance to GI lite if pt is ready. Signed By: Nikki Chou. Gregoria Burch NP     2/23/2017  3:35 PM               GI Attending: Agree with above. Tolerating full liquids. Would advance to GI lite tomorrow. Trend LFT's. Will sign off for now and follow along peripherally with you. Please call with questions. Handy Gutierrez MD

## 2017-02-23 NOTE — PROGRESS NOTES
NUTRITION brief         Patient Vitals for the past 100 hrs:   % Diet Eaten   02/22/17 1410 75 %   02/20/17 1813 40 %     Attempted to visit with patient today. Full RD assessment completed 2/22. Pt had multiple consulting physicians in room and then note on the door indicated pt was trying to rest and didn't want to be disturbed. Visited with RN. She reports pt is tolerating full liquid diet very well, no N/V/D and cautious to advance diet. Tolerating some of the supplements but unsure if any are preferred. Likes cream soups. He is pleasant and likes foods & supps offered thus far. Expect diet to be advanced within the next 1 day. RD plan to follow up 3/1 or sooner with consult.      Leia Ferguson, RD, MS, CDE

## 2017-02-24 LAB
ALBUMIN SERPL BCP-MCNC: 2.2 G/DL (ref 3.5–5)
ALBUMIN/GLOB SERPL: 0.7 {RATIO} (ref 1.1–2.2)
ALP SERPL-CCNC: 318 U/L (ref 45–117)
ALT SERPL-CCNC: 285 U/L (ref 12–78)
AMYLASE SERPL-CCNC: 93 U/L (ref 25–115)
ANION GAP BLD CALC-SCNC: 9 MMOL/L (ref 5–15)
AST SERPL W P-5'-P-CCNC: 44 U/L (ref 15–37)
BILIRUB SERPL-MCNC: 2.9 MG/DL (ref 0.2–1)
BUN SERPL-MCNC: 24 MG/DL (ref 6–20)
BUN/CREAT SERPL: 19 (ref 12–20)
CALCIUM SERPL-MCNC: 7.9 MG/DL (ref 8.5–10.1)
CHLORIDE SERPL-SCNC: 92 MMOL/L (ref 97–108)
CO2 SERPL-SCNC: 30 MMOL/L (ref 21–32)
CREAT SERPL-MCNC: 1.25 MG/DL (ref 0.7–1.3)
ERYTHROCYTE [DISTWIDTH] IN BLOOD BY AUTOMATED COUNT: 14.9 % (ref 11.5–14.5)
GLOBULIN SER CALC-MCNC: 3.1 G/DL (ref 2–4)
GLUCOSE SERPL-MCNC: 103 MG/DL (ref 65–100)
HCT VFR BLD AUTO: 24.6 % (ref 36.6–50.3)
HGB BLD-MCNC: 7.8 G/DL (ref 12.1–17)
INR PPP: 2 (ref 0.9–1.1)
LIPASE SERPL-CCNC: 466 U/L (ref 73–393)
MAGNESIUM SERPL-MCNC: 2 MG/DL (ref 1.6–2.4)
MCH RBC QN AUTO: 29.9 PG (ref 26–34)
MCHC RBC AUTO-ENTMCNC: 31.7 G/DL (ref 30–36.5)
MCV RBC AUTO: 94.3 FL (ref 80–99)
PHOSPHATE SERPL-MCNC: 2.9 MG/DL (ref 2.6–4.7)
PLATELET # BLD AUTO: 242 K/UL (ref 150–400)
POTASSIUM SERPL-SCNC: 2.9 MMOL/L (ref 3.5–5.1)
PROT SERPL-MCNC: 5.3 G/DL (ref 6.4–8.2)
PROTHROMBIN TIME: 20.1 SEC (ref 9–11.1)
RBC # BLD AUTO: 2.61 M/UL (ref 4.1–5.7)
SODIUM SERPL-SCNC: 131 MMOL/L (ref 136–145)
WBC # BLD AUTO: 12.6 K/UL (ref 4.1–11.1)

## 2017-02-24 PROCEDURE — 85027 COMPLETE CBC AUTOMATED: CPT | Performed by: NURSE PRACTITIONER

## 2017-02-24 PROCEDURE — 80053 COMPREHEN METABOLIC PANEL: CPT | Performed by: NURSE PRACTITIONER

## 2017-02-24 PROCEDURE — 85610 PROTHROMBIN TIME: CPT | Performed by: NURSE PRACTITIONER

## 2017-02-24 PROCEDURE — 83735 ASSAY OF MAGNESIUM: CPT | Performed by: NURSE PRACTITIONER

## 2017-02-24 PROCEDURE — 97116 GAIT TRAINING THERAPY: CPT

## 2017-02-24 PROCEDURE — 74011250637 HC RX REV CODE- 250/637: Performed by: NURSE PRACTITIONER

## 2017-02-24 PROCEDURE — 84100 ASSAY OF PHOSPHORUS: CPT | Performed by: NURSE PRACTITIONER

## 2017-02-24 PROCEDURE — 74011250636 HC RX REV CODE- 250/636: Performed by: NURSE PRACTITIONER

## 2017-02-24 PROCEDURE — 97530 THERAPEUTIC ACTIVITIES: CPT

## 2017-02-24 PROCEDURE — 65660000000 HC RM CCU STEPDOWN

## 2017-02-24 PROCEDURE — 74011250636 HC RX REV CODE- 250/636: Performed by: INTERNAL MEDICINE

## 2017-02-24 PROCEDURE — 74011250637 HC RX REV CODE- 250/637: Performed by: PHYSICIAN ASSISTANT

## 2017-02-24 PROCEDURE — 83690 ASSAY OF LIPASE: CPT | Performed by: NURSE PRACTITIONER

## 2017-02-24 PROCEDURE — 74011000258 HC RX REV CODE- 258: Performed by: NURSE PRACTITIONER

## 2017-02-24 PROCEDURE — 82150 ASSAY OF AMYLASE: CPT | Performed by: NURSE PRACTITIONER

## 2017-02-24 PROCEDURE — 36415 COLL VENOUS BLD VENIPUNCTURE: CPT | Performed by: NURSE PRACTITIONER

## 2017-02-24 RX ORDER — POTASSIUM CHLORIDE 14.9 MG/ML
10 INJECTION INTRAVENOUS
Status: DISCONTINUED | OUTPATIENT
Start: 2017-02-24 | End: 2017-02-24

## 2017-02-24 RX ORDER — WARFARIN 1 MG/1
1 TABLET ORAL ONCE
Status: COMPLETED | OUTPATIENT
Start: 2017-02-24 | End: 2017-02-24

## 2017-02-24 RX ORDER — POTASSIUM CHLORIDE 750 MG/1
40 TABLET, FILM COATED, EXTENDED RELEASE ORAL EVERY 4 HOURS
Status: COMPLETED | OUTPATIENT
Start: 2017-02-24 | End: 2017-02-24

## 2017-02-24 RX ORDER — POTASSIUM CHLORIDE 750 MG/1
40 TABLET, FILM COATED, EXTENDED RELEASE ORAL DAILY
Status: DISCONTINUED | OUTPATIENT
Start: 2017-02-24 | End: 2017-02-24

## 2017-02-24 RX ORDER — ENOXAPARIN SODIUM 150 MG/ML
111 INJECTION SUBCUTANEOUS EVERY 12 HOURS
Status: DISCONTINUED | OUTPATIENT
Start: 2017-02-24 | End: 2017-02-25

## 2017-02-24 RX ORDER — AMOXICILLIN 250 MG
1 CAPSULE ORAL 2 TIMES DAILY
Status: DISCONTINUED | OUTPATIENT
Start: 2017-02-24 | End: 2017-02-27 | Stop reason: HOSPADM

## 2017-02-24 RX ORDER — THERA TABS 400 MCG
1 TAB ORAL DAILY
Status: DISCONTINUED | OUTPATIENT
Start: 2017-02-25 | End: 2017-02-27 | Stop reason: HOSPADM

## 2017-02-24 RX ADMIN — METOPROLOL TARTRATE 25 MG: 25 TABLET ORAL at 19:07

## 2017-02-24 RX ADMIN — PIPERACILLIN SODIUM,TAZOBACTAM SODIUM 3.38 G: 3; .375 INJECTION, POWDER, FOR SOLUTION INTRAVENOUS at 15:00

## 2017-02-24 RX ADMIN — ENOXAPARIN SODIUM 111 MG: 120 INJECTION SUBCUTANEOUS at 23:48

## 2017-02-24 RX ADMIN — ASPIRIN 81 MG CHEWABLE TABLET 81 MG: 81 TABLET CHEWABLE at 08:59

## 2017-02-24 RX ADMIN — DOCUSATE SODIUM AND SENNOSIDES 1 TABLET: 8.6; 5 TABLET, FILM COATED ORAL at 19:07

## 2017-02-24 RX ADMIN — POTASSIUM CHLORIDE 40 MEQ: 750 TABLET, FILM COATED, EXTENDED RELEASE ORAL at 12:12

## 2017-02-24 RX ADMIN — Medication 10 ML: at 15:00

## 2017-02-24 RX ADMIN — POTASSIUM CHLORIDE 40 MEQ: 750 TABLET, FILM COATED, EXTENDED RELEASE ORAL at 08:59

## 2017-02-24 RX ADMIN — FUROSEMIDE 40 MG: 10 INJECTION, SOLUTION INTRAMUSCULAR; INTRAVENOUS at 08:59

## 2017-02-24 RX ADMIN — POTASSIUM CHLORIDE 40 MEQ: 750 TABLET, FILM COATED, EXTENDED RELEASE ORAL at 16:47

## 2017-02-24 RX ADMIN — FUROSEMIDE 40 MG: 10 INJECTION, SOLUTION INTRAMUSCULAR; INTRAVENOUS at 19:07

## 2017-02-24 RX ADMIN — PANTOPRAZOLE SODIUM 40 MG: 40 TABLET, DELAYED RELEASE ORAL at 05:22

## 2017-02-24 RX ADMIN — TRAMADOL HYDROCHLORIDE 50 MG: 50 TABLET, FILM COATED ORAL at 21:16

## 2017-02-24 RX ADMIN — ENOXAPARIN SODIUM 111 MG: 120 INJECTION SUBCUTANEOUS at 12:11

## 2017-02-24 RX ADMIN — WARFARIN SODIUM 1 MG: 1 TABLET ORAL at 16:47

## 2017-02-24 RX ADMIN — METOPROLOL TARTRATE 25 MG: 25 TABLET ORAL at 08:59

## 2017-02-24 RX ADMIN — PIPERACILLIN SODIUM,TAZOBACTAM SODIUM 3.38 G: 3; .375 INJECTION, POWDER, FOR SOLUTION INTRAVENOUS at 21:16

## 2017-02-24 RX ADMIN — POTASSIUM CHLORIDE 10 MEQ: 200 INJECTION, SOLUTION INTRAVENOUS at 08:55

## 2017-02-24 RX ADMIN — Medication 10 ML: at 05:22

## 2017-02-24 RX ADMIN — PIPERACILLIN SODIUM,TAZOBACTAM SODIUM 3.38 G: 3; .375 INJECTION, POWDER, FOR SOLUTION INTRAVENOUS at 05:22

## 2017-02-24 RX ADMIN — DOCUSATE SODIUM AND SENNOSIDES 1 TABLET: 8.6; 5 TABLET, FILM COATED ORAL at 08:59

## 2017-02-24 RX ADMIN — Medication 10 ML: at 21:21

## 2017-02-24 NOTE — PROGRESS NOTES
0730 Bedside shift change report given to Barby Zuleta RN (oncoming nurse) by Rodney Yun RN (offgoing nurse). Report included the following information SBAR, Kardex, ED Summary, Procedure Summary, Intake/Output, MAR, Recent Results, Med Rec Status and Cardiac Rhythm Aflutter. 1145 R leg dressing changed; wound cleaned and redressed per orders. 1900 Pt had uneventful shift. Ambulated in the hallway 1x, ambulated in room throughout the day. Pt appeared to be in good spirits throughout the day. 1930 Bedside shift change report given to Yasmine Ya RN (oncoming nurse) by Verdell Brittle., RN (offgoing nurse). Report included the following information SBAR, Kardex, Procedure Summary, Intake/Output, MAR, Recent Results, Med Rec Status and Cardiac Rhythm Aflutter. Problem: Falls - Risk of  Goal: *Absence of falls  Outcome: Progressing Towards Goal  Pt has remained free of falls since admission. He is up with 1 assist.   Goal: *Knowledge of fall prevention  Outcome: Progressing Towards Goal  Pt demonstrates appropriate safety awareness. Problem: Pressure Ulcer - Risk of  Goal: *Prevention of pressure ulcer  Outcome: Progressing Towards Goal  No ulcer noted. Problem: Aortic Aneurysm Repair: Discharge Outcomes  Goal: *Hemodynamically stable  Outcome: Progressing Towards Goal  Patient Vitals for the past 12 hrs:    Temp Pulse Resp BP SpO2   02/24/17 0854 98.3 °F (36.8 °C) 79 16 120/50 100 %   02/24/17 0524 98.5 °F (36.9 °C) 75 16 123/50 99 %   02/23/17 2300 98.7 °F (37.1 °C) 66 16 (!) 113/39 98 %         Goal: *Stable cardiac rhythm  Outcome: Progressing Towards Goal  Pt is in aflutter; asymptomatic on coumadin for DVT propalaxsis. Goal: *Lungs clear or at baseline  Outcome: Progressing Towards Goal  Pt clear to auscultation bilaterally. Goal: *Optimal pain control at patients stated goal  Outcome: Progressing Towards Goal  Pt has no c/o pain.    Goal: *Weight is stable  Outcome: Progressing Towards Goal  Last 3 Recorded Weights in this Encounter     02/22/17 0600 02/23/17 0500 02/24/17 0604   Weight: 110 kg (242 lb 8.1 oz) 109 kg (240 lb 4.8 oz) 108 kg (238 lb 1.6 oz)         Goal: *No signs and symptoms of infection or wound complications  Outcome: Progressing Towards Goal  No s/sx of wound infection or complications.

## 2017-02-24 NOTE — PROGRESS NOTES
CSS Floor Progress Note    Admit Date: 2017  POD:  13 Day Post-Op    Procedure:  Procedure(s):  ASCENDING AORTIC DISSECTION, AORTIC ROOT REPLACEMENT, 29MM MECHANICAL VALVE, FEM-FEM BYPASS BY DR Rafiq Boyer. ECC, JOSE BY DR LEO. Subjective:   Pt seen with Dr. Bess Bellamy. Pt w/o complaints, reports feels good to eat     Objective:   Vitals:  Blood pressure 129/48, pulse 68, temperature 98.9 °F (37.2 °C), resp. rate 18, height 5' 11\" (1.803 m), weight 107.6 kg (237 lb 3.4 oz), SpO2 99 %. Temp (24hrs), Av.6 °F (37 °C), Min:97.9 °F (36.6 °C), Max:99.2 °F (37.3 °C)    EKG/Rhythm: Aflutter    Oxygen Therapy:  Oxygen Therapy  O2 Sat (%): 99 % (17)  Pulse via Oximetry: 70 beats per minute (17)  O2 Device: Room air (17)  O2 Flow Rate (L/min): 1 l/min (17 0810)  FIO2 (%): 40 % (02/10/17 0722)       Admission Weight: Last Weight   Weight: 100.7 kg (222 lb) Weight: 107.6 kg (237 lb 3.4 oz)     Intake / Output / Drain:  Current Shift:  0701 -  1900  In: -   Out: 450 [Urine:450]  Last 24 hrs.:     Intake/Output Summary (Last 24 hours) at 17 1115  Last data filed at 17 0816   Gross per 24 hour   Intake              820 ml   Output             2075 ml   Net            -1255 ml       EXAM:  General:  Awake, appears more comfortable,                                                                                          Lungs:   Clear to auscultation bilaterally. Incision:  Drs C/D/I.   RLE dressed, but weeping serous fluid. Heart:  Irregular rate and rhythm, aflutter in 70's    Abdomen:   Slightly firm, distended , mild TTP. Bowel sounds normal   Extremities:  ++ edema. PPP. Neurologic:  Gross motor and sensory apparatus intact.      Labs:   Recent Labs      17   0455   WBC  11.7*   HGB  7.8*   HCT  25.0*   PLT  237   NA  131*   K  3.5   BUN  23*   CREA  1.21   GLU  93   INR  1.9*        Assessment:     Active Problems:    [facility]2017[facility]      Overview: AORTIC ROOT REPLACEMENT USING A 29 MM [employer]HA VALVED CONDUIT, ECC VIA       LEFT FEMORAL ARTERIAL CANNULATION      2. LEFT TO RIGHT FEM FEM BYPASS GRAFT            Right anterior compartment fasciotomy.--Dr. Elma Fitzpatrick 17       Plan/Recommendations/Medical Decision Makin. S/p Aortic root replacement w/ AVR mechanical valve & L to R fem fem bypass graft: INR goal 2.5-3 (s/t mechanical valve + A flutter) Continue low dose BB, on ASA, coumadin  2. Atelectasis: Increase IS and activity as tolerated. 3. Postop anemia s/t acute blood loss: Monitor. stable. 4. Anticoagulation for Mech AVR: cont coumadin tonight. Plts stable. Cont asa. D/c lovenox-discussed with Dr. Omar Barnett  5. Elevated transaminases: Improving, caution with hepatotoxic meds. 6. Cholecystitis/possible pancreatitis:  Lipase/Amylase/LFTs/alk phosp all trending down. Tbili 2.4. Tolerating diet. Switch zosyn to levaquin and flagyl. 6. EDUARD: Cr trending down, renal following. Cont diuresis  7. Postop afib & Varying heart block: In NSR vs. A flutter, rate controlled. On coumadin. 8. HTN: continue low dose BB. 9. Constipation: + BM small . Cont pericolace BID. 10. R compartment fasciotomy:  Vascular following, cont PT/OT. Wound care per orders  11. Nutrition - Advance to GI lite. Concerned about healing with poor intake. Add MVI Sat. 12. Hyponatremia - s/t hypovolemia, monitor. 13. Leukocytosis - Improving--afebrile. Nontoxic. Cont antibiotics for cholecystitis. Monitor. 14. Hypokalemia: replete per orders. Monitor. 15. GI/DVT prophylaxis - protonix, coumadin. 16. Dispo: PT/OT. Looking towards d/c home with [facility] services Monday?     Signed By: Stacey Rosenberg NP

## 2017-02-24 NOTE — PROGRESS NOTES
physical Therapy TREATMENT  Patient: Talat Mcrae (64 y.o. male)  Date: 2/24/2017  Diagnosis: unknown  Ascending aortic dissection (HCC)  right foot drop <principal problem not specified>  Procedure(s) (LRB):  FASCIOTOMY RIGHT  LEG ANTERIOR COMPARTMENT (Right) 13 Days Post-Op  Precautions: WBAT, Sternal, Fall    ASSESSMENT:  Pt recd in chair, agreeable to therapy and requesting to walk. Donned B crocs for patient and placed DF assist brace on R LE (attached to croc). Pt gait trained total of 350ft with SBA to supervision. Occasional  misstep but would recover independently. Pt did take 3 standing rest breaks. Upon return to room educated patient on desentization protocol for R foot. Began with light touch using cotton swab long ibarra proximal to distal. Pt reports increased \"tingling\" towards distal aspect of toes and plantar surface of foot. After ~5mins of cotton, progressed to soft tissue massage with lotion with deeper pressure and long strokes again distal to proximal. Pt provided handout with instructions so his wife could complete over weekend. Pt also provided HEP and reviewed with patient. Will follow up with patient on Monday to complete stairs and continue to work on HEP and desensitization techniques. Progression toward goals:  [x]    Improving appropriately and progressing toward goals  []    Improving slowly and progressing toward goals  []    Not making progress toward goals and plan of care will be adjusted     PLAN:  Patient continues to benefit from skilled intervention to address the above impairments. Continue treatment per established plan of care. Discharge Recommendations:  Outpatient  Further Equipment Recommendations for Discharge:  None     SUBJECTIVE:   Patient stated \"I feel good, I just think I was getting a little confident with my walking.     OBJECTIVE DATA SUMMARY:   Critical Behavior:  Neurologic State: Alert, Appropriate for age  Orientation Level: Oriented X4  Cognition: Appropriate decision making, Appropriate for age attention/concentration, Appropriate safety awareness, Follows commands  Safety/Judgement: Awareness of environment, Good awareness of safety precautions  Functional Mobility Training:  Bed Mobility:                    Transfers:  Sit to Stand: Stand-by asssistance; Additional time  Stand to Sit: Stand-by asssistance        Bed to Chair: Stand-by asssistance                    Balance:  Sitting: Intact  Standing: Intact; With support  Ambulation/Gait Training:  Distance (ft): 350 Feet (ft)  Assistive Device: Gait belt (Crocs with DF Assist brace)  Ambulation - Level of Assistance: Independent        Gait Abnormalities: Circumduction (on R with occasional steppage gait)        Base of Support: Widened     Speed/Joselin: Slow     Swing Pattern: Right asymmetrical (slight)        Pain:  Pain Scale 1: Numeric (0 - 10)  Pain Intensity 1: 0              Activity Tolerance:   Good  Please refer to the flowsheet for vital signs taken during this treatment.   After treatment:   [x]    Patient left in no apparent distress sitting up in chair  []    Patient left in no apparent distress in bed  [x]    Call bell left within reach  [x]    Nursing notified  []    Caregiver present  []    Bed alarm activated    COMMUNICATION/COLLABORATION:   The patients plan of care was discussed with: Registered Nurse    Keyur Armando, PT   Time Calculation: 40 mins

## 2017-02-24 NOTE — PROGRESS NOTES
Nephrology Progress Note  Jill Reardon  Date of Admission : 2/9/2017    CC: Follow up for EDUARD       Assessment and Plan     Acute Kidney Injury:  - multifactorial including ATN from renal ischemia due to dissection + contrast nephropathy and post op hypotension  - appears to have renal infarctions on the left, 20% of cortex; right appears ok on CTA from 2/16  - Cr stable  - ok to continue current diuretics    Hyponatremia:  - 2/2 to hypervolemia  - slowly improving  - daily Na levels    HTN:  - BP controlled w/o antihypertensives     Acidosis:  - resolved    Hypervolemia:  - IV lasix BID    Cholecystitis/Pancreatitis:  - HIDA scan normal    Ascending Aortic Dissection s/p aortic valve repair and left fem-fem bypass     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 Cr better. UOP stable. Swelling improving slowly. No cp, sob, n/v/d reported. Current Medications: all current  Medications have been eviewed in EPIC  Review of Systems: Pertinent items are noted in HPI.     Objective:  Vitals:    Vitals:    02/23/17 2300 02/24/17 0524 02/24/17 0604 02/24/17 0854   BP: (!) 113/39 123/50  120/50   Pulse: 66 75  79   Resp: 16 16  16   Temp: 98.7 °F (37.1 °C) 98.5 °F (36.9 °C)  98.3 °F (36.8 °C)   SpO2: 98% 99%  100%   Weight:   108 kg (238 lb 1.6 oz)    Height:   5' 11\" (1.803 m)      Intake and Output:     02/22 1901 - 02/24 0700  In: 1880 [P.O.:1580; I.V.:300]  Out: 3920 [Urine:3920]    Physical Examination:  General: NAD,Conversant   Neck:  Supple, no mass  Resp:  Lungs CTA B/L, no wheezing , normal respiratory effort  CV:  RRR,  no murmur or rub, 1-2+ b/l LE edema  GI:  Soft, NT, + Bowel sounds, no hepatosplenomegaly  Neurologic:  Non focal  Psych:             AAO x 3 appropriate affect   :                  Scrotal edema present  Skin:  No Rash      [x]    High complexity decision making was performed  [x]    Patient is at high-risk of decompensation with multiple organ involvement    Lab Data Personally Reviewed: I have reviewed all the pertinent labs, microbiology data and radiology studies during assessment.     Recent Labs      02/24/17   0530  02/23/17   0501  02/22/17   0451   NA  131*  130*  126*   K  2.9*  3.3*  3.5   CL  92*  93*  91*   CO2  30  27  27   GLU  103*  90  97   BUN  24*  33*  40*   CREA  1.25  1.29  1.33*   CA  7.9*  7.9*  7.9*   MG  2.0  2.3  2.7*   PHOS  2.9  3.4  3.4   ALB  2.2*  2.3*  2.4*   SGOT  44*  53*  80*   ALT  285*  355*  475*   INR  2.0*  1.8*  1.8*     Recent Labs      02/24/17 0530  02/23/17   0501  02/22/17   0451   WBC  12.6*  15.5*  19.4*   HGB  7.8*  7.8*  7.8*   HCT  24.6*  23.9*  23.9*   PLT  242  231  234     No results found for: [facility]  Lab Results   Component Value Date/Time    Culture result: NO GROWTH 5 DAYS 02/15/2017 01:35 PM     Recent Results (from the past 24 hour(s))   PROTHROMBIN TIME + INR    Collection Time: 02/24/17  5:30 AM   Result Value Ref Range    INR 2.0 (H) 0.9 - 1.1      Prothrombin time 20.1 (H) 9.0 - 11.1 sec   MAGNESIUM    Collection Time: 02/24/17  5:30 AM   Result Value Ref Range    Magnesium 2.0 1.6 - 2.4 mg/dL   PHOSPHORUS    Collection Time: 02/24/17  5:30 AM   Result Value Ref Range    Phosphorus 2.9 2.6 - 4.7 MG/DL   LIPASE    Collection Time: 02/24/17  5:30 AM   Result Value Ref Range    Lipase 466 (H) 73 - 393 U/L   AMYLASE    Collection Time: 02/24/17  5:30 AM   Result Value Ref Range    Amylase 93 25 - 549 U/L   METABOLIC PANEL, COMPREHENSIVE    Collection Time: 02/24/17  5:30 AM   Result Value Ref Range    Sodium 131 (L) 136 - 145 mmol/L    Potassium 2.9 (L) 3.5 - 5.1 mmol/L    Chloride 92 (L) 97 - 108 mmol/L    CO2 30 21 - 32 mmol/L    Anion gap 9 5 - 15 mmol/L    Glucose 103 (H) 65 - 100 mg/dL    BUN 24 (H) 6 - 20 MG/DL    Creatinine 1.25 0.70 - 1.30 MG/DL    BUN/Creatinine ratio 19 12 - 20      GFR est AA >60 >60 ml/min/1.73m2    GFR est non-AA 60 (L) >60 ml/min/1.73m2    Calcium 7.9 (L) 8.5 - 10.1 MG/DL    Bilirubin, total 2.9 (H) 0.2 - 1.0 MG/DL    ALT (SGPT) 285 (H) 12 - 78 U/L    AST (SGOT) 44 (H) 15 - 37 U/L    Alk. phosphatase 318 (H) 45 - 117 U/L    Protein, total 5.3 (L) 6.4 - 8.2 g/dL    Albumin 2.2 (L) 3.5 - 5.0 g/dL    Globulin 3.1 2.0 - 4.0 g/dL    A-G Ratio 0.7 (L) 1.1 - 2.2     CBC W/O DIFF    Collection Time: 02/24/17  5:30 AM   Result Value Ref Range    WBC 12.6 (H) 4.1 - 11.1 K/uL    RBC 2.61 (L) 4.10 - 5.70 M/uL    HGB 7.8 (L) 12.1 - 17.0 g/dL    HCT 24.6 (L) 36.6 - 50.3 %    MCV 94.3 80.0 - 99.0 FL    MCH 29.9 26.0 - 34.0 PG    MCHC 31.7 30.0 - 36.5 g/dL    RDW 14.9 (H) 11.5 - 14.5 %    PLATELET 481 265 - 155 K/uL           Autumn Mas MD  [facility] Nephrology [facility]   [address] [address]  Phone - [phone]   Fax - [phone]  [email or web address]

## 2017-02-24 NOTE — PROGRESS NOTES
Cardiac Surgery Care Coordinator- Met with Susan Chaidez, reviewed plan of care and discussed potential discharge day of Monday. Encouraged Mr Ward Walker to ambulate and to continue to use incentive spirometer. He is without questions or concerns at this time.  Adam Gan, RN

## 2017-02-24 NOTE — PROGRESS NOTES
CSS Floor Progress Note    Admit Date: 2017  POD:  12 Day Post-Op    Procedure:  Procedure(s):  ASCENDING AORTIC DISSECTION, AORTIC ROOT REPLACEMENT, 29MM MECHANICAL VALVE, FEM-FEM BYPASS BY DR Amy Donaldson. ECC, JOSE BY DR LEO. Subjective:   Pt seen with Dr. Savanna Oliveira. Afebrile, on RA. Feels better today- tolerating full liquid diet. Objective:   Vitals:  Blood pressure 123/50, pulse 75, temperature 98.5 °F (36.9 °C), resp. rate 16, height 5' 11\" (1.803 m), weight 238 lb 1.6 oz (108 kg), SpO2 99 %. Temp (24hrs), Av.8 °F (37.1 °C), Min:98.4 °F (36.9 °C), Max:99.2 °F (37.3 °C)    EKG/Rhythm:  SR/Aflutter    Oxygen Therapy:  Oxygen Therapy  O2 Sat (%): 99 % (17 05)  Pulse via Oximetry: 82 beats per minute (17 1500)  O2 Device: Room air (17 05)  O2 Flow Rate (L/min): 1 l/min (17 0810)  FIO2 (%): 40 % (02/10/17 0722)       Admission Weight: Last Weight   Weight: 222 lb (100.7 kg) Weight: 238 lb 1.6 oz (108 kg)     Intake / Output / Drain:  Current Shift:    Last 24 hrs.:     Intake/Output Summary (Last 24 hours) at 17 0823  Last data filed at 17 0607   Gross per 24 hour   Intake             1580 ml   Output             2250 ml   Net             -670 ml       EXAM:  General:  Awake, appears more comfortable,                                                                                          Lungs:   Clear to auscultation bilaterally. Incision:  Drs C/D/I.   RLE dressed, but weeping serous fluid. Heart:  Regular rate and rhythm   Abdomen:   Firm, distended , mild TTP. Bowel sounds hypoactive. Extremities:  ++ edema. PPP. Neurologic:  Gross motor and sensory apparatus intact.      Labs:   Recent Labs      17   0530   WBC  12.6*   HGB  7.8*   HCT  24.6*   PLT  242   NA  131*   K  2.9*   BUN  24*   CREA  1.25   GLU  103*   INR  2.0*        Assessment:     Active Problems:    Ascending aortic dissection (HCC) (2017)      Overview: AORTIC ROOT REPLACEMENT USING A 29 MM [facility] VALVED CONDUIT, ECC VIA       LEFT FEMORAL ARTERIAL CANNULATION      2. LEFT TO RIGHT FEM FEM BYPASS GRAFT            Right anterior compartment fasciotomy.--Dr. Duncan Leon 17       Plan/Recommendations/Medical Decision Makin. S/p Aortic root replacement w/ AVR mechanical valve & L to R fem fem bypass graft:INR goal 2.5-3(s/t mechanical valve + A flutter) Continue low dose BB, on ASA, continue low dose coumadin tonight. 2. Atelectasis: Increase IS and activity as tolerated. 3. Postop anemia s/t acute blood loss: Monitor. stable. 4. Anticoagulation for Mech AVR- INR 2.0-- low dose coumadin tonight. Plts stable. Cont asa. Cont lovenox. 5. Elevated transaminases: Improving, caution with hepatotoxic meds. 6. Cholecystitis/possible pancreatitis:  Lipase/Amylase/LFTs/alk phosp all trending down. Tbili holding 2.9. advance to GI lite. Cont zosyn -WBC improving. GI signed off. 6. EDUARD: Creat stable. Renal following. diuresis per renal today. 7. Postop afib & Varying heart block: In NSR vs. A flutter. On coumadin. 8. HTN: continue low dose BB. 9. Constipation: + BM small . Add pericolace BID. 10. R compartment fasciotomy:  Vascular following, cont PT/OT. 11. Nausea-  Resolved. 12. Nutrition - Advance to GI lite. Concerned about healing with poor intake. Add MVI Sat. 13. Hyponatremia - s/t hypovolemia, monitor. 14. Leukocytosis - Improving--afebrile. Nontoxic. Cont antibiotics for cholecystitis. Monitor. 15. Hypokalemia: replete per orders. Monitor. 16. GI/DVT prophylaxis - protonix, coumadin. 17. Dispo: PT/OT. Looking towards d/c home with [facility] services Monday?     Signed By: Camille Barnes NP

## 2017-02-25 LAB
ALBUMIN SERPL BCP-MCNC: 2.1 G/DL (ref 3.5–5)
ALBUMIN/GLOB SERPL: 0.6 {RATIO} (ref 1.1–2.2)
ALP SERPL-CCNC: 354 U/L (ref 45–117)
ALT SERPL-CCNC: 230 U/L (ref 12–78)
AMYLASE SERPL-CCNC: 90 U/L (ref 25–115)
ANION GAP BLD CALC-SCNC: 8 MMOL/L (ref 5–15)
AST SERPL W P-5'-P-CCNC: 40 U/L (ref 15–37)
BILIRUB SERPL-MCNC: 2.4 MG/DL (ref 0.2–1)
BUN SERPL-MCNC: 23 MG/DL (ref 6–20)
BUN/CREAT SERPL: 19 (ref 12–20)
CALCIUM SERPL-MCNC: 7.8 MG/DL (ref 8.5–10.1)
CHLORIDE SERPL-SCNC: 93 MMOL/L (ref 97–108)
CO2 SERPL-SCNC: 30 MMOL/L (ref 21–32)
CREAT SERPL-MCNC: 1.21 MG/DL (ref 0.7–1.3)
ERYTHROCYTE [DISTWIDTH] IN BLOOD BY AUTOMATED COUNT: 14.9 % (ref 11.5–14.5)
GLOBULIN SER CALC-MCNC: 3.3 G/DL (ref 2–4)
GLUCOSE SERPL-MCNC: 93 MG/DL (ref 65–100)
HCT VFR BLD AUTO: 25 % (ref 36.6–50.3)
HGB BLD-MCNC: 7.8 G/DL (ref 12.1–17)
INR PPP: 1.9 (ref 0.9–1.1)
LIPASE SERPL-CCNC: 513 U/L (ref 73–393)
MAGNESIUM SERPL-MCNC: 1.9 MG/DL (ref 1.6–2.4)
MCH RBC QN AUTO: 29.9 PG (ref 26–34)
MCHC RBC AUTO-ENTMCNC: 31.2 G/DL (ref 30–36.5)
MCV RBC AUTO: 95.8 FL (ref 80–99)
PHOSPHATE SERPL-MCNC: 2.7 MG/DL (ref 2.6–4.7)
PLATELET # BLD AUTO: 237 K/UL (ref 150–400)
POTASSIUM SERPL-SCNC: 3.5 MMOL/L (ref 3.5–5.1)
PROT SERPL-MCNC: 5.4 G/DL (ref 6.4–8.2)
PROTHROMBIN TIME: 19.1 SEC (ref 9–11.1)
RBC # BLD AUTO: 2.61 M/UL (ref 4.1–5.7)
SODIUM SERPL-SCNC: 131 MMOL/L (ref 136–145)
WBC # BLD AUTO: 11.7 K/UL (ref 4.1–11.1)

## 2017-02-25 PROCEDURE — 85610 PROTHROMBIN TIME: CPT | Performed by: NURSE PRACTITIONER

## 2017-02-25 PROCEDURE — 74011250637 HC RX REV CODE- 250/637: Performed by: NURSE PRACTITIONER

## 2017-02-25 PROCEDURE — 74011000258 HC RX REV CODE- 258: Performed by: NURSE PRACTITIONER

## 2017-02-25 PROCEDURE — 83690 ASSAY OF LIPASE: CPT | Performed by: NURSE PRACTITIONER

## 2017-02-25 PROCEDURE — 77030018836 HC SOL IRR NACL ICUM -A

## 2017-02-25 PROCEDURE — 74011250637 HC RX REV CODE- 250/637: Performed by: PHYSICIAN ASSISTANT

## 2017-02-25 PROCEDURE — 65660000000 HC RM CCU STEPDOWN

## 2017-02-25 PROCEDURE — 82150 ASSAY OF AMYLASE: CPT | Performed by: NURSE PRACTITIONER

## 2017-02-25 PROCEDURE — 84100 ASSAY OF PHOSPHORUS: CPT | Performed by: NURSE PRACTITIONER

## 2017-02-25 PROCEDURE — 74011250636 HC RX REV CODE- 250/636: Performed by: INTERNAL MEDICINE

## 2017-02-25 PROCEDURE — 83735 ASSAY OF MAGNESIUM: CPT | Performed by: NURSE PRACTITIONER

## 2017-02-25 PROCEDURE — 80053 COMPREHEN METABOLIC PANEL: CPT | Performed by: NURSE PRACTITIONER

## 2017-02-25 PROCEDURE — 85027 COMPLETE CBC AUTOMATED: CPT | Performed by: NURSE PRACTITIONER

## 2017-02-25 PROCEDURE — 74011250636 HC RX REV CODE- 250/636: Performed by: NURSE PRACTITIONER

## 2017-02-25 RX ORDER — POTASSIUM CHLORIDE 750 MG/1
20 TABLET, FILM COATED, EXTENDED RELEASE ORAL DAILY
Status: DISCONTINUED | OUTPATIENT
Start: 2017-02-25 | End: 2017-02-26

## 2017-02-25 RX ORDER — WARFARIN 2.5 MG/1
2.5 TABLET ORAL ONCE
Status: COMPLETED | OUTPATIENT
Start: 2017-02-25 | End: 2017-02-25

## 2017-02-25 RX ORDER — LEVOFLOXACIN 500 MG/1
500 TABLET, FILM COATED ORAL EVERY 24 HOURS
Status: DISCONTINUED | OUTPATIENT
Start: 2017-02-25 | End: 2017-02-27 | Stop reason: HOSPADM

## 2017-02-25 RX ORDER — METRONIDAZOLE 250 MG/1
500 TABLET ORAL 3 TIMES DAILY
Status: DISCONTINUED | OUTPATIENT
Start: 2017-02-25 | End: 2017-02-27 | Stop reason: HOSPADM

## 2017-02-25 RX ADMIN — METOPROLOL TARTRATE 25 MG: 25 TABLET ORAL at 17:15

## 2017-02-25 RX ADMIN — FUROSEMIDE 40 MG: 10 INJECTION, SOLUTION INTRAMUSCULAR; INTRAVENOUS at 17:15

## 2017-02-25 RX ADMIN — Medication 10 ML: at 07:14

## 2017-02-25 RX ADMIN — DOCUSATE SODIUM AND SENNOSIDES 1 TABLET: 8.6; 5 TABLET, FILM COATED ORAL at 17:15

## 2017-02-25 RX ADMIN — POTASSIUM CHLORIDE 20 MEQ: 750 TABLET, FILM COATED, EXTENDED RELEASE ORAL at 13:29

## 2017-02-25 RX ADMIN — METOPROLOL TARTRATE 25 MG: 25 TABLET ORAL at 09:23

## 2017-02-25 RX ADMIN — FUROSEMIDE 40 MG: 10 INJECTION, SOLUTION INTRAMUSCULAR; INTRAVENOUS at 09:23

## 2017-02-25 RX ADMIN — DOCUSATE SODIUM AND SENNOSIDES 1 TABLET: 8.6; 5 TABLET, FILM COATED ORAL at 09:23

## 2017-02-25 RX ADMIN — METRONIDAZOLE 500 MG: 250 TABLET ORAL at 17:15

## 2017-02-25 RX ADMIN — WARFARIN SODIUM 2.5 MG: 2.5 TABLET ORAL at 17:15

## 2017-02-25 RX ADMIN — THERA TABS 1 TABLET: TAB at 09:23

## 2017-02-25 RX ADMIN — METRONIDAZOLE 500 MG: 250 TABLET ORAL at 22:17

## 2017-02-25 RX ADMIN — TRAMADOL HYDROCHLORIDE 50 MG: 50 TABLET, FILM COATED ORAL at 07:14

## 2017-02-25 RX ADMIN — PIPERACILLIN SODIUM,TAZOBACTAM SODIUM 3.38 G: 3; .375 INJECTION, POWDER, FOR SOLUTION INTRAVENOUS at 07:14

## 2017-02-25 RX ADMIN — LEVOFLOXACIN 500 MG: 500 TABLET, FILM COATED ORAL at 13:28

## 2017-02-25 RX ADMIN — PANTOPRAZOLE SODIUM 40 MG: 40 TABLET, DELAYED RELEASE ORAL at 07:14

## 2017-02-25 RX ADMIN — ASPIRIN 81 MG CHEWABLE TABLET 81 MG: 81 TABLET CHEWABLE at 09:23

## 2017-02-25 RX ADMIN — Medication 10 ML: at 22:18

## 2017-02-25 RX ADMIN — Medication 10 ML: at 13:29

## 2017-02-25 RX ADMIN — TRAMADOL HYDROCHLORIDE 50 MG: 50 TABLET, FILM COATED ORAL at 20:44

## 2017-02-25 NOTE — PROGRESS NOTES
0730:  Bedside shift change report given to [address] (oncoming nurse) by Zachary Monroy (offgoing nurse). Report included the following information SBAR, Kardex, MAR and Recent Results. 2330:  Bedside shift change report given to [facility] (oncoming nurse) by [address] [place] [address],[address] (offgoing nurse). Report included the following information SBAR, Kardex, MAR and Recent Results.

## 2017-02-25 NOTE — PROGRESS NOTES
Problem: Falls - Risk of  Goal: *Absence of falls  Outcome: Progressing Towards Goal  Pt remains free of falls  Goal: *Knowledge of fall prevention  Outcome: Progressing Towards Goal  Pt calls out for assistance when needed    Problem:  Aortic Aneurysm Repair: Discharge Outcomes  Goal: *Hemodynamically stable  Outcome: Progressing Towards Goal  VSS on RA

## 2017-02-25 NOTE — PROGRESS NOTES
1930: Bedside shift change report given to Mireille Magana RN (oncoming nurse) by Herman Brown. Ivanna Heart RN (offgoing nurse). Report included the following information SBAR, Procedure Summary, Intake/Output, MAR, Recent Results and Cardiac Rhythm A Flutter. 2045: Pt and wife walking around nurses station, pt returned to room and stood at sink to shave. Informed pt that staff would change leg dressing once done with evening tasks and pt was back in bed. 2150: changed R leg dressing, saturated through bed and pad underneath. Pt tolerated dressing change well. Pain medication given. 2330: Bedside shift change report given to Alysia Thakkar1 (oncoming nurse) by [address] (offgoing nurse). Report included the following information SBAR, Procedure Summary, Intake/Output, MAR, Recent Results and Cardiac Rhythm A Flutter.

## 2017-02-25 NOTE — PROGRESS NOTES
2330: Bedside and Verbal shift change report given to Carol Cabrera RN (oncoming nurse) by Ramses Chew RN (offgoing nurse). Report included the following information SBAR, Kardex, Intake/Output, MAR, Accordion, Recent Results, Med Rec Status and Cardiac Rhythm a flutter. 2355: RN in room for vitals and assessment. R leg dressing clean, dry, and intact. Dressing not changed at this time. 0700: Dressing changed per order. 0730: Bedside and Verbal shift change report given to Matthew Guerrero RN (oncoming nurse) by Carol Cabrera RN (offgoing nurse). Report included the following information SBAR, Kardex, Procedure Summary, Intake/Output, MAR, Accordion and Recent Results.

## 2017-02-26 LAB
ALBUMIN SERPL BCP-MCNC: 2 G/DL (ref 3.5–5)
ALBUMIN/GLOB SERPL: 0.6 {RATIO} (ref 1.1–2.2)
ALP SERPL-CCNC: 338 U/L (ref 45–117)
ALT SERPL-CCNC: 192 U/L (ref 12–78)
AMYLASE SERPL-CCNC: 75 U/L (ref 25–115)
ANION GAP BLD CALC-SCNC: 9 MMOL/L (ref 5–15)
AST SERPL W P-5'-P-CCNC: 36 U/L (ref 15–37)
BILIRUB SERPL-MCNC: 1.8 MG/DL (ref 0.2–1)
BUN SERPL-MCNC: 18 MG/DL (ref 6–20)
BUN/CREAT SERPL: 17 (ref 12–20)
CALCIUM SERPL-MCNC: 7.8 MG/DL (ref 8.5–10.1)
CHLORIDE SERPL-SCNC: 95 MMOL/L (ref 97–108)
CO2 SERPL-SCNC: 29 MMOL/L (ref 21–32)
CREAT SERPL-MCNC: 1.06 MG/DL (ref 0.7–1.3)
ERYTHROCYTE [DISTWIDTH] IN BLOOD BY AUTOMATED COUNT: 14.8 % (ref 11.5–14.5)
GLOBULIN SER CALC-MCNC: 3.3 G/DL (ref 2–4)
GLUCOSE SERPL-MCNC: 92 MG/DL (ref 65–100)
HCT VFR BLD AUTO: 25.7 % (ref 36.6–50.3)
HGB BLD-MCNC: 8 G/DL (ref 12.1–17)
INR PPP: 1.6 (ref 0.9–1.1)
LIPASE SERPL-CCNC: 393 U/L (ref 73–393)
MAGNESIUM SERPL-MCNC: 1.9 MG/DL (ref 1.6–2.4)
MCH RBC QN AUTO: 29.4 PG (ref 26–34)
MCHC RBC AUTO-ENTMCNC: 31.1 G/DL (ref 30–36.5)
MCV RBC AUTO: 94.5 FL (ref 80–99)
PHOSPHATE SERPL-MCNC: 2.6 MG/DL (ref 2.6–4.7)
PLATELET # BLD AUTO: 246 K/UL (ref 150–400)
POTASSIUM SERPL-SCNC: 3.4 MMOL/L (ref 3.5–5.1)
PROT SERPL-MCNC: 5.3 G/DL (ref 6.4–8.2)
PROTHROMBIN TIME: 16.3 SEC (ref 9–11.1)
RBC # BLD AUTO: 2.72 M/UL (ref 4.1–5.7)
SODIUM SERPL-SCNC: 133 MMOL/L (ref 136–145)
WBC # BLD AUTO: 10.7 K/UL (ref 4.1–11.1)

## 2017-02-26 PROCEDURE — 84100 ASSAY OF PHOSPHORUS: CPT | Performed by: NURSE PRACTITIONER

## 2017-02-26 PROCEDURE — 85027 COMPLETE CBC AUTOMATED: CPT | Performed by: NURSE PRACTITIONER

## 2017-02-26 PROCEDURE — 80053 COMPREHEN METABOLIC PANEL: CPT | Performed by: NURSE PRACTITIONER

## 2017-02-26 PROCEDURE — 36415 COLL VENOUS BLD VENIPUNCTURE: CPT | Performed by: NURSE PRACTITIONER

## 2017-02-26 PROCEDURE — 85610 PROTHROMBIN TIME: CPT | Performed by: NURSE PRACTITIONER

## 2017-02-26 PROCEDURE — 83690 ASSAY OF LIPASE: CPT | Performed by: NURSE PRACTITIONER

## 2017-02-26 PROCEDURE — 82150 ASSAY OF AMYLASE: CPT | Performed by: NURSE PRACTITIONER

## 2017-02-26 PROCEDURE — 74011250637 HC RX REV CODE- 250/637: Performed by: NURSE PRACTITIONER

## 2017-02-26 PROCEDURE — 65660000000 HC RM CCU STEPDOWN

## 2017-02-26 PROCEDURE — 74011250636 HC RX REV CODE- 250/636: Performed by: NURSE PRACTITIONER

## 2017-02-26 PROCEDURE — 83735 ASSAY OF MAGNESIUM: CPT | Performed by: NURSE PRACTITIONER

## 2017-02-26 PROCEDURE — 77030018836 HC SOL IRR NACL ICUM -A

## 2017-02-26 PROCEDURE — 74011250637 HC RX REV CODE- 250/637: Performed by: PHYSICIAN ASSISTANT

## 2017-02-26 PROCEDURE — 74011250636 HC RX REV CODE- 250/636: Performed by: INTERNAL MEDICINE

## 2017-02-26 RX ORDER — WARFARIN SODIUM 5 MG/1
5 TABLET ORAL ONCE
Status: COMPLETED | OUTPATIENT
Start: 2017-02-26 | End: 2017-02-26

## 2017-02-26 RX ORDER — POTASSIUM CHLORIDE 750 MG/1
40 TABLET, FILM COATED, EXTENDED RELEASE ORAL DAILY
Status: DISCONTINUED | OUTPATIENT
Start: 2017-02-26 | End: 2017-02-27 | Stop reason: HOSPADM

## 2017-02-26 RX ORDER — ENOXAPARIN SODIUM 150 MG/ML
105 INJECTION SUBCUTANEOUS EVERY 12 HOURS
Status: DISCONTINUED | OUTPATIENT
Start: 2017-02-26 | End: 2017-02-27 | Stop reason: HOSPADM

## 2017-02-26 RX ADMIN — METOPROLOL TARTRATE 25 MG: 25 TABLET ORAL at 08:30

## 2017-02-26 RX ADMIN — METRONIDAZOLE 500 MG: 250 TABLET ORAL at 17:15

## 2017-02-26 RX ADMIN — THERA TABS 1 TABLET: TAB at 08:30

## 2017-02-26 RX ADMIN — TRAMADOL HYDROCHLORIDE 50 MG: 50 TABLET, FILM COATED ORAL at 20:29

## 2017-02-26 RX ADMIN — DOCUSATE SODIUM AND SENNOSIDES 1 TABLET: 8.6; 5 TABLET, FILM COATED ORAL at 17:15

## 2017-02-26 RX ADMIN — FUROSEMIDE 40 MG: 10 INJECTION, SOLUTION INTRAMUSCULAR; INTRAVENOUS at 08:30

## 2017-02-26 RX ADMIN — Medication 10 ML: at 22:48

## 2017-02-26 RX ADMIN — DOCUSATE SODIUM AND SENNOSIDES 1 TABLET: 8.6; 5 TABLET, FILM COATED ORAL at 08:30

## 2017-02-26 RX ADMIN — METOPROLOL TARTRATE 25 MG: 25 TABLET ORAL at 17:15

## 2017-02-26 RX ADMIN — PANTOPRAZOLE SODIUM 40 MG: 40 TABLET, DELAYED RELEASE ORAL at 08:31

## 2017-02-26 RX ADMIN — LEVOFLOXACIN 500 MG: 500 TABLET, FILM COATED ORAL at 12:55

## 2017-02-26 RX ADMIN — POTASSIUM CHLORIDE 20 MEQ: 750 TABLET, FILM COATED, EXTENDED RELEASE ORAL at 08:30

## 2017-02-26 RX ADMIN — TRAMADOL HYDROCHLORIDE 50 MG: 50 TABLET, FILM COATED ORAL at 01:07

## 2017-02-26 RX ADMIN — ENOXAPARIN SODIUM 105 MG: 120 INJECTION SUBCUTANEOUS at 22:48

## 2017-02-26 RX ADMIN — POTASSIUM CHLORIDE 40 MEQ: 750 TABLET, FILM COATED, EXTENDED RELEASE ORAL at 11:55

## 2017-02-26 RX ADMIN — ENOXAPARIN SODIUM 105 MG: 120 INJECTION SUBCUTANEOUS at 11:55

## 2017-02-26 RX ADMIN — METRONIDAZOLE 500 MG: 250 TABLET ORAL at 22:47

## 2017-02-26 RX ADMIN — Medication 10 ML: at 17:14

## 2017-02-26 RX ADMIN — ASPIRIN 81 MG CHEWABLE TABLET 81 MG: 81 TABLET CHEWABLE at 08:30

## 2017-02-26 RX ADMIN — FUROSEMIDE 40 MG: 10 INJECTION, SOLUTION INTRAMUSCULAR; INTRAVENOUS at 17:15

## 2017-02-26 RX ADMIN — WARFARIN SODIUM 5 MG: 5 TABLET ORAL at 17:15

## 2017-02-26 RX ADMIN — Medication 10 ML: at 07:21

## 2017-02-26 RX ADMIN — METRONIDAZOLE 500 MG: 250 TABLET ORAL at 08:30

## 2017-02-26 NOTE — PROGRESS NOTES
0000: Receive report from Miriam Montiel [address] [place] [address]. Patient resting in bed at this time. 0700: Patient with uneventful night overnight. 0730: Bedside and Verbal shift change report given to Charles Bhatti RN(oncoming nurse) by Baltazar [address] [place] [address] (offgoing nurse). Report included the following information SBAR, Kardex, Intake/Output, MAR, Recent Results, Med Rec Status and Cardiac Rhythm A-flutter.  ,

## 2017-02-26 NOTE — PROGRESS NOTES
Lovenox Daily Monitoring  Indication: AVR mechanical valve   Recent Labs      02/26/17   0533  02/25/17   0455  02/24/17   0530   HGB  8.0*  7.8*  7.8*   PLT  246  237  242   INR  1.6*  1.9*  2.0*   CREA  1.06  1.21  1.25     Date of last CBC: 2/26  Current Weight: 106 kg  Est. CrCl = 95.3 ml/min  Current Dose: 110 mg subcutaneously every 12 hours. Plan: Change to 105 mg subcutaneously every 12 hours per enoxaparin dose rounding protocol.         Brandon Bejarano, PharmD

## 2017-02-26 NOTE — PROGRESS NOTES
0800:Bedside shift change report given to Immanuel Thornton (oncoming nurse) by Baltazar DENT (offgoing nurse). Report included the following information SBAR, Kardex, Procedure Summary, Intake/Output, MAR, Recent Results, Med Rec Status, Cardiac Rhythm NSR and Alarm Parameters . 1900: Uneventful day, moderate drainage from the wound, did 3 dressing changes, educated pt on Lovenox     1930:Bedside shift change report given to [id number] [address] (oncoming nurse) by Immanuel Thornton (offgoing nurse). Report included the following information SBAR, Kardex, Intake/Output, MAR, Recent Results, Med Rec Status, Cardiac Rhythm NSR and Alarm Parameters .        Problem: Falls - Risk of  Goal: *Absence of falls  Outcome: Progressing Towards Goal  Patient remains free of falls, bed low/locked, call bell within reach   Goal: *Knowledge of fall prevention  Outcome: Progressing Towards Goal  calls out for assistance     Problem: Pressure Ulcer - Risk of  Goal: *Prevention of pressure ulcer  Outcome: Progressing Towards Goal  Turns self, no pressure ulcers noted

## 2017-02-26 NOTE — PROGRESS NOTES
CSS Floor Progress Note    Admit Date: 2017  POD:  14 Day Post-Op    Procedure:  Procedure(s):  ASCENDING AORTIC DISSECTION, AORTIC ROOT REPLACEMENT, 29MM MECHANICAL VALVE, FEM-FEM BYPASS BY DR Caitie Wharton. ECC, JOSE BY DR LEO. Subjective:   Pt seen with Dr. Elizabeth Headley. Pt w/o complaints, reports feels good to eat     Objective:   Vitals:  Blood pressure 100/47, pulse 70, temperature 98.3 °F (36.8 °C), resp. rate 16, height 5' 11\" (1.803 m), weight 106 kg (233 lb 11 oz), SpO2 100 %. Temp (24hrs), Av.5 °F (36.9 °C), Min:98 °F (36.7 °C), Max:98.7 °F (37.1 °C)    EKG/Rhythm: Aflutter    Oxygen Therapy:  Oxygen Therapy  O2 Sat (%): 100 % (17 08)  Pulse via Oximetry: 70 beats per minute (17)  O2 Device: Room air (17)  O2 Flow Rate (L/min): 1 l/min (17 0810)  FIO2 (%): 40 % (02/10/17 0722)       Admission Weight: Last Weight   Weight: 100.7 kg (222 lb) Weight: 106 kg (233 lb 11 oz)     Intake / Output / Drain:  Current Shift:  0701 -  1900  In: -   Out: 325 [Urine:325]  Last 24 hrs.:     Intake/Output Summary (Last 24 hours) at 17 1009  Last data filed at 17 8482   Gross per 24 hour   Intake              480 ml   Output             3000 ml   Net            -2520 ml       EXAM:  General:  Awake, appears more comfortable,                                                                                          Lungs:   Clear to auscultation bilaterally. Incision:  Drs C/D/I.   RLE dressed, but weeping serous fluid. Heart:  Irregular rate and rhythm, aflutter in 70's    Abdomen:   Slightly firm, distended , mild TTP. Bowel sounds normal   Extremities:  ++ edema. PPP. Neurologic:  Gross motor and sensory apparatus intact.      Labs:   Recent Labs      17   0533   WBC  10.7   HGB  8.0*   HCT  25.7*   PLT  246   NA  133*   K  3.4*   BUN  18   CREA  1.06   GLU  92   INR  1.6*        Assessment:     Active Problems:    Ascending aortic dissection (HCC) (2017)      Overview: AORTIC ROOT REPLACEMENT USING A 29 MM [facility] VALVED CONDUIT, ECC VIA       LEFT FEMORAL ARTERIAL CANNULATION      2. LEFT TO RIGHT FEM FEM BYPASS GRAFT            Right anterior compartment fasciotomy.--Dr. Mohamud Stevens 17       Plan/Recommendations/Medical Decision Makin. S/p Aortic root replacement w/ AVR mechanical valve & L to R fem fem bypass graft: INR goal 2.5-3 (s/t mechanical valve + A flutter) Continue low dose BB, on ASA, coumadin. 2. Atelectasis: Increase IS and activity as tolerated. 3. Postop anemia s/t acute blood loss: Monitor. stable. 4. Anticoagulation for Mech AVR: cont coumadin tonight. Plts stable. Cont asa. Back on lovenox to bridge since INR went down  5. Elevated transaminases: Improving, caution with hepatotoxic meds. 6. Cholecystitis/possible pancreatitis:  Lipase/Amylase/LFTs/alk phosp all trending down. Tbili 1.8. Tolerating diet. Cont levaquin and flagyl-? Course of treatment  7. EDUARD: Cr trending down, renal following. Cont diuresis  8. Postop afib & Varying heart block: In NSR vs. A flutter, rate controlled. On coumadin. 9. HTN: continue low dose BB.   10. Constipation: + BM small . Cont pericolace BID. 11. R compartment fasciotomy:  Vascular following, cont PT/OT. Wound care per orders  12. Nutrition: tolerating GI lite, cont MVI  13. Hyponatremia - s/t hypovolemia, monitor. 14. Leukocytosis: resolved. Cont antibiotics for cholecystitis. Monitor. 15. Hypokalemia: replete per orders. Monitor. 16. GI/DVT prophylaxis - protonix, coumadin. 17. Dispo: PT/OT. Looking towards d/c home with [facility] services Monday?     Signed By: Liban Rodarte NP

## 2017-02-27 ENCOUNTER — TELEPHONE ANTICOAG (OUTPATIENT)
Dept: CARDIOTHORACIC SURGERY | Age: 57
End: 2017-02-27

## 2017-02-27 VITALS
RESPIRATION RATE: 16 BRPM | WEIGHT: 230.6 LBS | HEIGHT: 71 IN | BODY MASS INDEX: 32.28 KG/M2 | TEMPERATURE: 98.8 F | HEART RATE: 70 BPM | DIASTOLIC BLOOD PRESSURE: 49 MMHG | OXYGEN SATURATION: 99 % | SYSTOLIC BLOOD PRESSURE: 139 MMHG

## 2017-02-27 LAB
ALBUMIN SERPL BCP-MCNC: 2 G/DL (ref 3.5–5)
ALBUMIN/GLOB SERPL: 0.6 {RATIO} (ref 1.1–2.2)
ALP SERPL-CCNC: 317 U/L (ref 45–117)
ALT SERPL-CCNC: 160 U/L (ref 12–78)
AMYLASE SERPL-CCNC: 68 U/L (ref 25–115)
ANION GAP BLD CALC-SCNC: 8 MMOL/L (ref 5–15)
AST SERPL W P-5'-P-CCNC: 34 U/L (ref 15–37)
BILIRUB SERPL-MCNC: 1.5 MG/DL (ref 0.2–1)
BUN SERPL-MCNC: 17 MG/DL (ref 6–20)
BUN/CREAT SERPL: 16 (ref 12–20)
CALCIUM SERPL-MCNC: 8 MG/DL (ref 8.5–10.1)
CHLORIDE SERPL-SCNC: 95 MMOL/L (ref 97–108)
CO2 SERPL-SCNC: 29 MMOL/L (ref 21–32)
CREAT SERPL-MCNC: 1.04 MG/DL (ref 0.7–1.3)
ERYTHROCYTE [DISTWIDTH] IN BLOOD BY AUTOMATED COUNT: 14.7 % (ref 11.5–14.5)
GLOBULIN SER CALC-MCNC: 3.4 G/DL (ref 2–4)
GLUCOSE SERPL-MCNC: 93 MG/DL (ref 65–100)
HCT VFR BLD AUTO: 25.7 % (ref 36.6–50.3)
HGB BLD-MCNC: 8 G/DL (ref 12.1–17)
INR PPP: 1.6 (ref 0.9–1.1)
LIPASE SERPL-CCNC: 347 U/L (ref 73–393)
MAGNESIUM SERPL-MCNC: 1.7 MG/DL (ref 1.6–2.4)
MCH RBC QN AUTO: 29.5 PG (ref 26–34)
MCHC RBC AUTO-ENTMCNC: 31.1 G/DL (ref 30–36.5)
MCV RBC AUTO: 94.8 FL (ref 80–99)
PHOSPHATE SERPL-MCNC: 2.6 MG/DL (ref 2.6–4.7)
PLATELET # BLD AUTO: 244 K/UL (ref 150–400)
POTASSIUM SERPL-SCNC: 3.4 MMOL/L (ref 3.5–5.1)
PROT SERPL-MCNC: 5.4 G/DL (ref 6.4–8.2)
PROTHROMBIN TIME: 16.5 SEC (ref 9–11.1)
RBC # BLD AUTO: 2.71 M/UL (ref 4.1–5.7)
SODIUM SERPL-SCNC: 132 MMOL/L (ref 136–145)
WBC # BLD AUTO: 9.9 K/UL (ref 4.1–11.1)

## 2017-02-27 PROCEDURE — 83690 ASSAY OF LIPASE: CPT | Performed by: NURSE PRACTITIONER

## 2017-02-27 PROCEDURE — 85027 COMPLETE CBC AUTOMATED: CPT | Performed by: NURSE PRACTITIONER

## 2017-02-27 PROCEDURE — 77030009526 HC GEL CARSYN MDII -A

## 2017-02-27 PROCEDURE — 83735 ASSAY OF MAGNESIUM: CPT | Performed by: NURSE PRACTITIONER

## 2017-02-27 PROCEDURE — 74011250637 HC RX REV CODE- 250/637: Performed by: NURSE PRACTITIONER

## 2017-02-27 PROCEDURE — 77030018836 HC SOL IRR NACL ICUM -A

## 2017-02-27 PROCEDURE — 74011250637 HC RX REV CODE- 250/637: Performed by: INTERNAL MEDICINE

## 2017-02-27 PROCEDURE — 85610 PROTHROMBIN TIME: CPT | Performed by: NURSE PRACTITIONER

## 2017-02-27 PROCEDURE — 80053 COMPREHEN METABOLIC PANEL: CPT | Performed by: NURSE PRACTITIONER

## 2017-02-27 PROCEDURE — 74011250636 HC RX REV CODE- 250/636: Performed by: NURSE PRACTITIONER

## 2017-02-27 PROCEDURE — 84100 ASSAY OF PHOSPHORUS: CPT | Performed by: NURSE PRACTITIONER

## 2017-02-27 PROCEDURE — 82150 ASSAY OF AMYLASE: CPT | Performed by: NURSE PRACTITIONER

## 2017-02-27 PROCEDURE — 36415 COLL VENOUS BLD VENIPUNCTURE: CPT | Performed by: NURSE PRACTITIONER

## 2017-02-27 PROCEDURE — 74011250637 HC RX REV CODE- 250/637: Performed by: PHYSICIAN ASSISTANT

## 2017-02-27 RX ORDER — POTASSIUM CHLORIDE 1500 MG/1
60 TABLET, FILM COATED, EXTENDED RELEASE ORAL 2 TIMES DAILY
Qty: 90 TAB | Refills: 1 | Status: ON HOLD | OUTPATIENT
Start: 2017-02-27 | End: 2017-04-04

## 2017-02-27 RX ORDER — AMOXICILLIN 250 MG
1 CAPSULE ORAL 2 TIMES DAILY
Qty: 30 TAB | Refills: 0 | Status: ON HOLD | OUTPATIENT
Start: 2017-02-27 | End: 2017-04-04

## 2017-02-27 RX ORDER — ENOXAPARIN SODIUM 150 MG/ML
105 INJECTION SUBCUTANEOUS EVERY 12 HOURS
Qty: 10 SYRINGE | Refills: 1 | Status: SHIPPED | OUTPATIENT
Start: 2017-02-27 | End: 2017-03-22

## 2017-02-27 RX ORDER — METRONIDAZOLE 500 MG/1
500 TABLET ORAL 3 TIMES DAILY
Qty: 24 TAB | Refills: 0 | Status: SHIPPED | OUTPATIENT
Start: 2017-02-27 | End: 2017-03-07

## 2017-02-27 RX ORDER — WARFARIN 2.5 MG/1
TABLET ORAL
Qty: 30 TAB | Refills: 11 | Status: SHIPPED | OUTPATIENT
Start: 2017-02-27 | End: 2017-03-09 | Stop reason: SDUPTHER

## 2017-02-27 RX ORDER — LEVOFLOXACIN 500 MG/1
500 TABLET, FILM COATED ORAL EVERY 24 HOURS
Qty: 8 TAB | Refills: 0 | Status: SHIPPED | OUTPATIENT
Start: 2017-02-27 | End: 2017-03-07

## 2017-02-27 RX ORDER — FUROSEMIDE 40 MG/1
80 TABLET ORAL 2 TIMES DAILY
Qty: 60 TAB | Refills: 1 | Status: ON HOLD | OUTPATIENT
Start: 2017-02-27 | End: 2017-04-04

## 2017-02-27 RX ORDER — METOPROLOL TARTRATE 25 MG/1
25 TABLET, FILM COATED ORAL 2 TIMES DAILY
Qty: 60 TAB | Refills: 1 | Status: ON HOLD | OUTPATIENT
Start: 2017-02-27 | End: 2021-04-21

## 2017-02-27 RX ORDER — TRAMADOL HYDROCHLORIDE 50 MG/1
50-100 TABLET ORAL
Qty: 40 TAB | Refills: 0 | Status: SHIPPED | OUTPATIENT
Start: 2017-02-27 | End: 2017-03-24 | Stop reason: SDUPTHER

## 2017-02-27 RX ORDER — FUROSEMIDE 40 MG/1
80 TABLET ORAL 2 TIMES DAILY
Status: DISCONTINUED | OUTPATIENT
Start: 2017-02-27 | End: 2017-02-27 | Stop reason: HOSPADM

## 2017-02-27 RX ADMIN — Medication 10 ML: at 14:11

## 2017-02-27 RX ADMIN — PANTOPRAZOLE SODIUM 40 MG: 40 TABLET, DELAYED RELEASE ORAL at 06:47

## 2017-02-27 RX ADMIN — POTASSIUM CHLORIDE 40 MEQ: 750 TABLET, FILM COATED, EXTENDED RELEASE ORAL at 09:30

## 2017-02-27 RX ADMIN — ENOXAPARIN SODIUM 105 MG: 120 INJECTION SUBCUTANEOUS at 12:38

## 2017-02-27 RX ADMIN — FUROSEMIDE 80 MG: 40 TABLET ORAL at 09:31

## 2017-02-27 RX ADMIN — THERA TABS 1 TABLET: TAB at 09:31

## 2017-02-27 RX ADMIN — Medication 10 ML: at 06:48

## 2017-02-27 RX ADMIN — METOPROLOL TARTRATE 25 MG: 25 TABLET ORAL at 09:31

## 2017-02-27 RX ADMIN — ASPIRIN 81 MG CHEWABLE TABLET 81 MG: 81 TABLET CHEWABLE at 09:31

## 2017-02-27 RX ADMIN — METRONIDAZOLE 500 MG: 250 TABLET ORAL at 09:55

## 2017-02-27 RX ADMIN — DOCUSATE SODIUM AND SENNOSIDES 1 TABLET: 8.6; 5 TABLET, FILM COATED ORAL at 09:31

## 2017-02-27 RX ADMIN — LEVOFLOXACIN 500 MG: 500 TABLET, FILM COATED ORAL at 12:37

## 2017-02-27 NOTE — PROGRESS NOTES
0725: Bedside shift change report given to Ngozi (oncoming nurse) by [address] (offgoing nurse). Report included the following information SBAR, Kardex, Procedure Summary, MAR, Accordion and Cardiac Rhythm NSR.     1030: Discharge orders received; Pt will be discharged on Lovenox as a bridge to Coumadin, will be providing education and handouts/videos for both Lovenox and Coumadin. Wound care orders placed for home health nurse. 1100: Temporary wires pulled by JOAQUIN Mitchell, Pt will be on bedrest until 12:30 with 3 hours of cardiac monitoring to follow before discharge. 1105: Pt's wife performed wound care dressing change per orders with the assistance/instruction of RN, as she will be performing most dressing changes after discharge. 1230: Received call from [address] [place] [address] who will be visiting Mr. Ervin Brock. Verified that Mr. & Mrs. Ervin Brock have received Lovenox, Coumadin and wound care education. Reviewed AVS discharge med list, Coumadin not on it. Spoke with James Prater NP who updated AVS list.     1450: Spoke with Dr. Stuart Faria re: discharge instructions for Mr. Ervin Brock. Follow up scheduled for 2 weeks from today with Dr. Stuart Faria, only necessary discharge instructions are for wound care at fasciotomy site. Spoke with Dr. Koki Alvarado office re: handicap parking tag.  Per his office, the patient will have to  current form at [facility] and bring it to the office to be signed by Dr. Stuart Faria

## 2017-02-27 NOTE — PROGRESS NOTES
CSS Floor Progress Note    Admit Date: 2017  POD:  15 Day Post-Op    Procedure:  Procedure(s):  ASCENDING AORTIC DISSECTION, AORTIC ROOT REPLACEMENT, 29MM MECHANICAL VALVE, FEM-FEM BYPASS BY DR Eric Jeffery. ECC, JOSE BY DR LEO. Subjective:   Pt seen with Dr. Arabella Malin. Pt w/o complaints, feels ready to go home. Tmax 99.5, on RA. Objective:   Vitals:  Blood pressure 122/53, pulse 71, temperature 98.6 °F (37 °C), resp. rate 16, height 5' 11\" (1.803 m), weight 230 lb 9.6 oz (104.6 kg), SpO2 98 %. Temp (24hrs), Av.5 °F (36.9 °C), Min:97.8 °F (36.6 °C), Max:99.5 °F (37.5 °C)    EKG/Rhythm: Aflutter    Oxygen Therapy:  Oxygen Therapy  O2 Sat (%): 98 % (17 0842)  Pulse via Oximetry: 70 beats per minute (17)  O2 Device: Room air (17 043)  O2 Flow Rate (L/min): 1 l/min (17 0810)  FIO2 (%): 40 % (02/10/17 0722)       Admission Weight: Last Weight   Weight: 222 lb (100.7 kg) Weight: 230 lb 9.6 oz (104.6 kg)     Intake / Output / Drain:  Current Shift:  0701 -  1900  In: -   Out: 400 [Urine:400]  Last 24 hrs.:     Intake/Output Summary (Last 24 hours) at 17 1008  Last data filed at 17 0846   Gross per 24 hour   Intake              700 ml   Output             1550 ml   Net             -850 ml       EXAM:  General:  Awake, appears more comfortable,                                                                                          Lungs:   Clear to auscultation bilaterally. Incision:  Drs C/D/I.   RLE dressed, but weeping serous fluid. Heart:  Irregular rate and rhythm, aflutter in 70's    Abdomen:   Slightly firm, distended , mild TTP. Bowel sounds normal   Extremities:  ++ edema. PPP. Neurologic:  Gross motor and sensory apparatus intact.      Labs:   Recent Labs      17   0434   WBC  9.9   HGB  8.0*   HCT  25.7*   PLT  244   NA  132*   K  3.4*   BUN  17   CREA  1.04   GLU  93   INR  1.6*        Assessment:     Active Problems:    Ascending aortic dissection ([facility] [address] (2017)      Overview: AORTIC ROOT REPLACEMENT USING A 29 MM [facility]HA VALVED CONDUIT, ECC VIA       LEFT FEMORAL ARTERIAL CANNULATION      2. LEFT TO RIGHT FEM FEM BYPASS GRAFT            Right anterior compartment fasciotomy.--Dr. Song Sis 17       Plan/Recommendations/Medical Decision Makin. S/p Aortic root replacement w/ AVR mechanical valve & L to R fem fem bypass graft: INR goal 2.5-3 (s/t mechanical valve + A flutter) Continue low dose BB, on ASA, coumadin. 2. Atelectasis: Increase IS and activity as tolerated. 3. Postop anemia s/t acute blood loss: Monitor. stable. 4. [facility]: cont coumadin tonight. Plts stable. Cont asa. Back on lovenox to bridge since INR went down  5. Elevated transaminases: Improving, caution with hepatotoxic meds. 6. Cholecystitis/possible pancreatitis:  Lipase/Amylase/LFTs/alk phosp all trending down. Tbili 1.5. Tolerating diet. Cont levaquin and flagyl-will give to complete 2 weeks of therapy. 7. EDUARD: Cr trending down, renal following. Cont diuresis  8. Postop afib & Varying heart block: In NSR vs. A flutter, rate controlled. On coumadin. 9. HTN: continue low dose BB.   10. Constipation: + BM small . Cont pericolace BID. 11. R compartment fasciotomy:  Vascular following, cont PT/OT. Wound care per orders  12. Nutrition: tolerating GI lite, cont MVI  13. Hyponatremia - s/t hypovolemia, monitor. 14. Leukocytosis: resolved. Cont antibiotics for cholecystitis. Monitor. 15. Hypokalemia: replete per orders. Monitor. 16. GI/DVT prophylaxis - protonix, coumadin. 17. Dispo: PT/OT. Today with [facility] services.        Signed By: Camille Barnes NP

## 2017-02-27 NOTE — CARDIO/PULMONARY
Cardiac Wellness: Valve surgery education folder at bedside. Met with Deidra Li and family to review and reinforce cardiac surgery post discharge instructions and to discuss participation in [address] [place] [address].    Educated using teach back method. Reviewed/reinforced 5-lb weight lifting restrictions, use of bear for sternal support, daily weights and temperatures, showering restrictions, signs and symptoms of infection at surgery sites, daily walking and arm exercises, and use of incentive spirometer. Discussed red reminder bracelet and reviewed when to call surgeons office. Will follow up by phone with patient after discharge for enrollment. He is for discharge to home today with [address] [place] [address] and wound care. General questions answered. Veronique Jen verbalized understanding.      Julieth Owens RN

## 2017-02-27 NOTE — PROGRESS NOTES
0700: R leg fasciotomy dressing changed per order. Small amount of serous drainage. Patient tolerating well. Patient washed himself up, required minimal assistance. Bedside and Verbal shift change report given to Erin High RN and Brittany Solis RN (oncoming nurse) by Phillip Tiwari RN (offgoing nurse). Report included the following information SBAR, Kardex, Intake/Output, MAR and Accordion.

## 2017-02-27 NOTE — PROGRESS NOTES
Cardiac Surgery Care Coordinator-  Met with Treasure Burkett and his wife,  reviewed plan of care and discharge instructions. Mr and Mrs Anthony Luciano have viewed Coumadin and Lovenox videos. Reinforced 5 lb weight restriction, sternal precautions and continued use of the incentive spirometer. Reviewed the importance of daily temp and weight monitoring, discussed incisional care and reviewed signs and symptoms of infection. Red reminder bracelet on left wrist, reviewed purpose of the bracelet and when to call the MD. Using the teach back method reviewed new medications, Treasure Burkett is able to state the name, purpose and possible side effects of Lovenox, Levaquin, Flagyl, pericolace tramadol and Coumadin. Reminded pt of appts and encouraged participation in the Cardiac Wellness and rehab program after discharge. Encouraged Treasure Burkett to verbalize and emotional support given. Treasure Burkett is without questions or concerns at this time.  Danitza Pizarro RN

## 2017-02-27 NOTE — PROGRESS NOTES
Nephrology Progress Note  Paulie Hardy  Date of Admission : 2/9/2017    CC: Follow up for EDUARD       Assessment and Plan     EDUARD:  - resolving ATN  - appears to have renal infarctions on the left, 20% of cortex; right appears ok on CTA from 2/16  - Cr better  - Increased Lasix to 80 mg BID for d/c     Hyponatremia:  - 2/2 to hypervolemia  - Increase Diuretics    Scrotal/Penile Swelling :  - discussed scrotal support w/ pt and nursing     HTN:  - BP controlled w/o antihypertensives     Cholecystitis/Pancreatitis:  - HIDA scan normal    Ascending Aortic Dissection s/p aortic valve repair and left fem-fem bypass     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 Cr better. Lost 4-5 lbs over weekend. Swelling improving slowly. No cp, sob, n/v/d reported. Current Medications: all current  Medications have been eviewed in EPIC  Review of Systems: Pertinent items are noted in HPI.     Objective:  Vitals:    Vitals:    02/26/17 1948 02/26/17 2310 02/27/17 0432 02/27/17 0731   BP: 128/42 117/41 121/49    Pulse: 70 72 70    Resp: 16 18 16    Temp: 99.5 °F (37.5 °C) 98.2 °F (36.8 °C) 98.7 °F (37.1 °C)    SpO2: 98% 98% 100%    Weight:    104.6 kg (230 lb 9.6 oz)   Height:         Intake and Output:     02/25 1901 - 02/27 0700  In: 940 [P.O.:940]  Out: 2320 [Urine:2320]    Physical Examination:  General: NAD,Conversant   Neck:  Supple, no mass  Resp:  Lungs CTA B/L, no wheezing , normal respiratory effort  CV:  RRR,  no murmur or rub, 1-2+ b/l LE edema  GI:  Soft, NT, + Bowel sounds, no hepatosplenomegaly  Neurologic:  Non focal  Psych:             AAO x 3 appropriate affect   :                  Scrotal + penile edema present  Skin:  No Rash      [x]    High complexity decision making was performed  [x]    Patient is at high-risk of decompensation with multiple organ involvement    Lab Data Personally Reviewed: I have reviewed all the pertinent labs, microbiology data and radiology studies during assessment.     Recent Labs      02/27/17 0434 02/26/17 0533 02/25/17   0455   NA  132*  133*  131*   K  3.4*  3.4*  3.5   CL  95*  95*  93*   CO2  29  29  30   GLU  93  92  93   BUN  17  18  23*   CREA  1.04  1.06  1.21   CA  8.0*  7.8*  7.8*   MG  1.7  1.9  1.9   PHOS  2.6  2.6  2.7   ALB  2.0*  2.0*  2.1*   SGOT  34  36  40*   ALT  160*  192*  230*   INR  1.6*  1.6*  1.9*     Recent Labs      02/27/17 0434 02/26/17 0533 02/25/17 0455   WBC  9.9  10.7  11.7*   HGB  8.0*  8.0*  7.8*   HCT  25.7*  25.7*  25.0*   PLT  244  246  237     No results found for: [facility]  Lab Results   Component Value Date/Time    Culture result: NO GROWTH 5 DAYS 02/15/2017 01:35 PM     Recent Results (from the past 24 hour(s))   PROTHROMBIN TIME + INR    Collection Time: 02/27/17  4:34 AM   Result Value Ref Range    INR 1.6 (H) 0.9 - 1.1      Prothrombin time 16.5 (H) 9.0 - 11.1 sec   MAGNESIUM    Collection Time: 02/27/17  4:34 AM   Result Value Ref Range    Magnesium 1.7 1.6 - 2.4 mg/dL   PHOSPHORUS    Collection Time: 02/27/17  4:34 AM   Result Value Ref Range    Phosphorus 2.6 2.6 - 4.7 MG/DL   LIPASE    Collection Time: 02/27/17  4:34 AM   Result Value Ref Range    Lipase 347 73 - 393 U/L   AMYLASE    Collection Time: 02/27/17  4:34 AM   Result Value Ref Range    Amylase 68 25 - 115 U/L   CBC W/O DIFF    Collection Time: 02/27/17  4:34 AM   Result Value Ref Range    WBC 9.9 4.1 - 11.1 K/uL    RBC 2.71 (L) 4.10 - 5.70 M/uL    HGB 8.0 (L) 12.1 - 17.0 g/dL    HCT 25.7 (L) 36.6 - 50.3 %    MCV 94.8 80.0 - 99.0 FL    MCH 29.5 26.0 - 34.0 PG    MCHC 31.1 30.0 - 36.5 g/dL    RDW 14.7 (H) 11.5 - 14.5 %    PLATELET 257 807 - 009 K/uL   METABOLIC PANEL, COMPREHENSIVE    Collection Time: 02/27/17  4:34 AM   Result Value Ref Range    Sodium 132 (L) 136 - 145 mmol/L    Potassium 3.4 (L) 3.5 - 5.1 mmol/L    Chloride 95 (L) 97 - 108 mmol/L    CO2 29 21 - 32 mmol/L    Anion gap 8 5 - 15 mmol/L    Glucose 93 65 - 100 mg/dL    BUN 17 6 - 20 MG/DL    Creatinine 1.04 0.70 - 1.30 MG/DL    BUN/Creatinine ratio 16 12 - 20      GFR est AA >60 >60 ml/min/1.73m2    GFR est non-AA >60 >60 ml/min/1.73m2    Calcium 8.0 (L) 8.5 - 10.1 MG/DL    Bilirubin, total 1.5 (H) 0.2 - 1.0 MG/DL    ALT (SGPT) 160 (H) 12 - 78 U/L    AST (SGOT) 34 15 - 37 U/L    Alk. phosphatase 317 (H) 45 - 117 U/L    Protein, total 5.4 (L) 6.4 - 8.2 g/dL    Albumin 2.0 (L) 3.5 - 5.0 g/dL    Globulin 3.4 2.0 - 4.0 g/dL    A-G Ratio 0.6 (L) 1.1 - 2.2             Loco Puente MD  [facility] [address]  Phone - [phone]   Fax - [phone]  [email or web address]

## 2017-02-27 NOTE — DISCHARGE INSTRUCTIONS
Cardiac Surgery Specialist    [address] [place] [address]                           Georges Person                  [place]anna May [address]  Office- [phone]  Fax- [phone]       Office- [phone]  Fax- [phone]  _____________________________________________________________  DESTINEE Topete Dr., Dr., PA-C, PA-C     [address] [facility]     Surgery & Date: Procedure(s):  FASCIOTOMY RIGHT  LEG ANTERIOR COMPARTMENT    Discharge Date: 2/27/17     MEDICATIONS:  Please refer to your After Visit Summary for your medication list. If you do not have a prescription for a new medication, you may purchase the medication over the counter. DO NOT TAKE ANY MEDICATIONS THAT ARE NOT ON THAT LIST    INR TARGET- 2.5-3  [facility]   INR management per Dr Jose Kaminski office NP/PA  ###take 2.5 mg Coumadin by mouth at dinner time. Your INR will be checked by [address] [place] [address] before Noon on 2/28/17. INSTRUCTIONS:  NO SMOKING OR TOBACCO PRODUCTS  Follow all the instructions in your discharge book  You may shower. Wash all incisions twice daily with mild soap and water. No lotions, ointments or powder. Call the office immediately for any redness, swelling, or drainage from your incision. Take your temperature daily and call for a temperature of 101 degrees or higher or for any symptoms that make you think you have and infection. Weigh yourself each morning. Call if you gain more than 5 pounds in 48 hours. Use the incentive spirometer 6-8 times a day-10 breaths each time. Use a pillow or your bear to splint your breastbone when coughing or sneezing. If you feel your breast bone clicking or popping, notify the office immediately.    Walk several hundred feet several times daily.    DIET  Eat an American Heart Association diet. If you are having trouble with your appetite, eat what you can. Try eating small, frequent meals throughout the day. ACTIVITY  NO DRIVING--you will be evaluated to drive at your follow up visit. Increase your activity by walking several times a day. Stay out of bed most of the day. When sitting, keep your legs elevated. You may ride in a car, but you must get out every hour and walk around. If you ride in a car with an airbag that can not be switched off, put the seat ALL the way back or ride in the back seat. NO LIFTING MORE THAN 5 POUND FOR 1 MONTH, THEN YOU CAN INCREASE TO NO MORE THAN 10 LBS FOR THE 2nd MONTH AND NO MORE THAN 15 LBS FOR THE 3rd MONTH.  YOU WILL NO LONGER HAVE ANY LIFTING RESTRICTIONS AFTER 3 MONTHS. FOLLOW UP  1. Your first follow up appointment will be on 3/6 at 11am. Our office is located in [address] [place] [address] on floor G-5. Your second follow up appointment will be in four weeks, on 3/14 at 1:15pm. Please call our office at [phone] if you are unable to make either one of these appointments. 2. You will be receiving a call before your 5 day appointment to begin cardiac rehab. They are located in the [address] [place] [address] on [facility]. Their phone number is [phone]. Please call if you have not been contacted 2-3 weeks after discharge from the hospital.  3. We will make an appointment with your cardiologist at your last appointment. 4. Consult you primary care physician regarding your influenza &   pneumovax vaccines. 5.   Please bring all medications with you to your appointment.     Signature:___________________________________________________        Wound Care / Dressing Changes  Daily to right lateral lower leg fasciotomy:    - Remove old dressing, moistening with saline as needed   - Clean with Normal Saline   - Spray with Carrasyn spray   - Apply moist gauze impregnated with Carrasyn gel keeping within wound margins   - Cover with fluffed dry gauze   - Top with ABD dressings   - Secure with roll stretch gauze (Belkys) and tape   - Apply foot brace and velcro on foot not mid lower leg.

## 2017-02-27 NOTE — DISCHARGE SUMMARY
\[facility] [place] [facility]\"" Discharge Summary     Patient ID:  Nba Patterson  [id number]  20 y.o.  1960    Admit date: 2/9/2017    Discharge date: 2/27/2017     Admitting Physician: Eloisa Stewart MD     Referring Cardiologist:  N/a     PCP:  Rose Grace MD     Admitting Diagnoses: Aortic dissection     Discharge Diagnoses:     Hospital Problems  Date Reviewed: 5/12/2015          Codes Class Noted POA    Ascending aortic dissection ([address] ICD-10-CM: I71.01  ICD-9-CM: 441.01  2/9/2017 Unknown    Overview Addendum 2/13/2017  9:05 AM by Suze Gomez NP     AORTIC ROOT REPLACEMENT USING A 29 MM ST. TOSHA VALVED CONDUIT, ECC VIA LEFT FEMORAL ARTERIAL CANNULATION  2. LEFT TO RIGHT FEM FEM BYPASS GRAFT    Right anterior compartment fasciotomy.--Dr. Vieira Covert 2/11/17                   Discharged Condition: improved    Disposition: home, see patient instructions for treatment and plan    Procedures for this admission:  Procedure(s):  FASCIOTOMY RIGHT  LEG ANTERIOR COMPARTMENT    Discharge Medications:   Current Discharge Medication List      START taking these medications    Details   enoxaparin (LOVENOX) 120 mg/0.8 mL injection 105 mg by SubCUTAneous route every twelve (12) hours every twelve (12) hours. Qty: 10 Syringe, Refills: 1      traMADol (ULTRAM) 50 mg tablet Take 1-2 Tabs by mouth every four (4) hours as needed. Max Daily Amount: 600 mg. Indications: Pain  Qty: 40 Tab, Refills: 0      senna-docusate (PERICOLACE) 8.6-50 mg per tablet Take 1 Tab by mouth two (2) times a day. Qty: 30 Tab, Refills: 0      potassium chloride SR (K-TAB) 20 mEq tablet Take 3 Tabs by mouth two (2) times a day. Qty: 90 Tab, Refills: 1      metroNIDAZOLE (FLAGYL) 500 mg tablet Take 1 Tab by mouth three (3) times daily for 8 days. Qty: 24 Tab, Refills: 0      metoprolol tartrate (LOPRESSOR) 25 mg tablet Take 1 Tab by mouth two (2) times a day.   Qty: 60 Tab, Refills: 1      levoFLOXacin (LEVAQUIN) 500 mg tablet Take 1 Tab by mouth every twenty-four (24) hours for 8 days. Qty: 8 Tab, Refills: 0      furosemide (LASIX) 40 mg tablet Take 2 Tabs by mouth two (2) times a day. Qty: 60 Tab, Refills: 1      warfarin (COUMADIN) 2.5 mg tablet Take 2.5 mg daily by mouth at dinner time. Your daily dose will be determined by MD/NP/PA team.  Qty: 30 Tab, Refills: 11         CONTINUE these medications which have NOT CHANGED    Details   aspirin delayed-release 81 mg tablet Take 81 mg by mouth daily. MULTIVITAMINS (MULTIVITAMIN PO) Take 1 Tab by mouth daily. INR TARGET-  2.5-3.0  [facility]  INR management per Dr. Jimmie Smith NP/PA   ###take 2.5 mg Coumadin by mouth at dinner time. Your INR will be checked by [id number] [place] [address] before Noon on 2/28/17. HPI:   Comfort Daily is a 61 y.o. male who was referred for cardiac evaluation of aortic dissection, referred by Dr. Davon Mclaughlin in the ED. Pt's PMH includes seborrheic dermatitis who presents via EMS accompanied by wife with c/o chest pain. Pt had sudden onset of CP starting early this afternoon. Pt also c/o of right leg pain and up into R hip region, including a \"numb\" feeling, and severe headache. Pt denies radiation of pain, SOB, orthopnea, syncope. Pt last ate breakfast today. Pt denies any recent health issues, or history of hypertension. Never smoked, only social etoh use. Family history of early CAD. Pt was started on esmolol drip in the ER. Pt on exam seems to have trouble with his speech, \"jumbling words\" prior to narcotic administration for pain in leg.      Full history was not obtained due to urgency of case, Anesthesia at bedside to place lines.      Cardiac Testing  CT chest/abd/pelvis 2/9/17: 1. Type I aortic dissection extending from the root of the aorta at the aortic  valve to the common iliac arteries bilaterally. 2. 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 3. Mesenteric and renal arteries patent and probably supplied by the false  lumen.   4. 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       Hospital Course:  Pt had aortic root replacement (mechanical valved conduit) and Left to Right Fem Fem bypass graft on 2/9/17, performed by Dr. Chavez Comes Dr. Radha Linda. See operative notes for full details. Pt was transferred to ICU in stable condition on the following drips:  Amiodarone, Insulin and Precedex. Pt required R LE fasciotomy for R anterior compartment syndrome on 2/11/17, performed by Dr. Markie Bender. See operative note for full details. Pt was transferred to ICU in stable condition. See below for specific daily plan of care. POD#15:  1. S/p Aortic root replacement w/ AVR mechanical valve & L to R fem fem bypass graft: INR goal 2.5-3 (s/t mechanical valve + A flutter) Continue low dose BB, on ASA, coumadin. 2. Atelectasis: Increase IS and activity as tolerated. 3. Postop anemia s/t acute blood loss: Monitor. stable. 4. Anticoagulation for Mech AVR: cont coumadin tonight. Plts stable. Cont asa. Back on lovenox to bridge since INR went down  5. Elevated transaminases: Improving, caution with hepatotoxic meds. 6. Cholecystitis/possible pancreatitis: Lipase/Amylase/LFTs/alk phosp all trending down. Tbili 1.5. Tolerating diet. Cont levaquin and flagyl-will give to complete 2 weeks of therapy. 7. EDUARD: Cr trending down, renal following. Cont diuresis  8. Postop afib & Varying heart block: In NSR vs. A flutter, rate controlled. On coumadin. 9. HTN: continue low dose BB.   10. Constipation: + BM small 2/26. Cont pericolace BID. 11. R compartment fasciotomy: Vascular following, cont PT/OT. Wound care per orders  12. Nutrition: tolerating GI lite, cont MVI  13. Hyponatremia - s/t hypovolemia, monitor. 14. Leukocytosis: resolved. Cont antibiotics for cholecystitis. Monitor. 15. Hypokalemia: replete per orders. Monitor.    16. GI/DVT prophylaxis - protonix, coumadin. 17. Dispo: PT/OT. Today with [facility] services.        Referral to outpatient cardiac rehab made. Discharge Vital Signs:   Visit Vitals    /53 (BP 1 Location: Left arm, BP Patient Position: Sitting; At rest)    Pulse 71    Temp 98.6 °F (37 °C)    Resp 16    Ht 5' 11\" (1.803 m)    Wt 230 lb 9.6 oz (104.6 kg)    SpO2 98%    BMI 32.16 kg/m2       Labs:   Recent Labs      02/27/17   0434   WBC  9.9   HGB  8.0*   HCT  25.7*   PLT  244   NA  132*   K  3.4*   BUN  17   CREA  1.04   GLU  93   INR  1.6*       Diagnostics: None today     Patient Instructions/Follow Up Care:  Discharge instructions were reviewed with the patient and family present. Questions were also answered at this time. Prescriptions and medications were reviewed. The patient has a follow up appointment with the Nurse Practitioner or [de-identified] Assistant on 3/6/17 at 11am and with Dr. Zuleima Lin on 3/14/17 at 115pm. The patient was also instructed to follow up with his primary care physician as needed. The patient and family were encouraged to call with any questions or concerns.        Signed:  Massimo Burrows NP  2/27/2017  10:31 AM

## 2017-02-27 NOTE — PROGRESS NOTES
Spoke with Emmy Nguyen with [facility] and patient will need PT/INR and wound care as well  - discharge to home today.  ESVIN Horta

## 2017-02-27 NOTE — PROGRESS NOTES
2330 Bedside shift change report given to Beuford Gowers (oncoming nurse) by Osman Whitaker (offgoing nurse). Report included the following information SBAR, Procedure Summary, Intake/Output, MAR and Recent Results.

## 2017-02-28 ENCOUNTER — TELEPHONE (OUTPATIENT)
Dept: CASE MANAGEMENT | Age: 57
End: 2017-02-28

## 2017-02-28 ENCOUNTER — PATIENT OUTREACH (OUTPATIENT)
Dept: FAMILY MEDICINE CLINIC | Age: 57
End: 2017-02-28

## 2017-02-28 ENCOUNTER — TELEPHONE ANTICOAG (OUTPATIENT)
Dept: CARDIOTHORACIC SURGERY | Age: 57
End: 2017-02-28

## 2017-02-28 ENCOUNTER — HOME CARE VISIT (OUTPATIENT)
Dept: SCHEDULING | Facility: HOME HEALTH | Age: 57
End: 2017-02-28
Payer: COMMERCIAL

## 2017-02-28 VITALS — TEMPERATURE: 96.8 F | OXYGEN SATURATION: 98 % | HEART RATE: 72 BPM

## 2017-02-28 LAB — INR, EXTERNAL: 1.6

## 2017-02-28 PROCEDURE — G0299 HHS/HOSPICE OF RN EA 15 MIN: HCPCS

## 2017-02-28 NOTE — TELEPHONE ENCOUNTER
Cardiac Surgery Discharge - Follow up call placed to Ramirez Soto. Reviewed plan of care after discharge and encouraged Anupama Omiding to verbalize. Discussed precautions and reviewed medications, patient without questions regarding medications. Encouraged continued use of the incentive spirometer. Confirmed follow up appts and reinforced importance of wearing red reminder bracelet. Ramirez Ducking is without questions or concerns.  Wilfredo Bishop RN

## 2017-03-01 ENCOUNTER — HOME CARE VISIT (OUTPATIENT)
Dept: SCHEDULING | Facility: HOME HEALTH | Age: 57
End: 2017-03-01
Payer: COMMERCIAL

## 2017-03-01 ENCOUNTER — TELEPHONE ANTICOAG (OUTPATIENT)
Dept: CARDIOTHORACIC SURGERY | Age: 57
End: 2017-03-01

## 2017-03-01 VITALS
HEART RATE: 71 BPM | TEMPERATURE: 98.1 F | OXYGEN SATURATION: 98 % | SYSTOLIC BLOOD PRESSURE: 115 MMHG | BODY MASS INDEX: 31.8 KG/M2 | WEIGHT: 228 LBS | DIASTOLIC BLOOD PRESSURE: 65 MMHG | RESPIRATION RATE: 16 BRPM

## 2017-03-01 LAB — INR, EXTERNAL: 1.5

## 2017-03-01 PROCEDURE — G0151 HHCP-SERV OF PT,EA 15 MIN: HCPCS

## 2017-03-01 PROCEDURE — A6248 HYDROGEL DRSG GEL FILLER: HCPCS

## 2017-03-01 PROCEDURE — A9270 NON-COVERED ITEM OR SERVICE: HCPCS

## 2017-03-01 PROCEDURE — A6260 WOUND CLEANSER ANY TYPE/SIZE: HCPCS

## 2017-03-01 PROCEDURE — G0299 HHS/HOSPICE OF RN EA 15 MIN: HCPCS

## 2017-03-01 PROCEDURE — A6252 ABSORPT DRG >16 <=48 W/O BDR: HCPCS

## 2017-03-01 PROCEDURE — A6446 CONFORM BAND S W>=3" <5"/YD: HCPCS

## 2017-03-01 RX ADMIN — ENOXAPARIN SODIUM 105 MG: 150 INJECTION SUBCUTANEOUS at 10:15

## 2017-03-01 NOTE — PROGRESS NOTES
TRAMAINE call for:  Devon Womack, 61 y.o., 1960    Patient listed on [facility] discharge list for 2/27/17 as discharge from [facility] for aortic dissection, thoraco-abdominaol. NN was able to speak to spouse Winsome Zamora), who stated, \" patient is doing as well as to be expected. He is being followed by his surgeons and [facility] at his time. Nurse is to come today to check INR, and he is receiving skilled care by them. Patient declined to follow-up with PCP at this time but appreciated the call. Patient admitted. Reference discharge summary by:Mana Iqbal NP   Nurse Practitioner              Codes Class Noted POA     Ascending aortic dissection [facility]) ICD-10-CM: I71.01  ICD-9-CM: 441.01   2/9/2017 Unknown     Overview Addendum 2/13/2017 9:05 AM by Pepe Parkinson NP       AORTIC ROOT REPLACEMENT USING A 29 MM ST. TOSHA VALVED CONDUIT, ECC VIA LEFT FEMORAL ARTERIAL CANNULATION  2. LEFT TO RIGHT FEM FEM BYPASS GRAFT     Right anterior compartment fasciotomy.--Dr. Aleida Canales 2/11/17                   Discharged Condition: improved     Disposition: home, see patient instructions for treatment and plan     Procedures for this admission: Procedure(s):  FASCIOTOMY RIGHT LEG ANTERIOR COMPARTMENT     Patient Instructions/Follow Up Care:  Discharge instructions were reviewed with the patient and family present. Questions were also answered at this time. Prescriptions and medications were reviewed. The patient has a follow up appointment with the Nurse Practitioner or [de-identified] Assistant on 3/6/17 at 11am and with Dr. Reji Deshpande on 3/14/17 at 115pm. The patient was also instructed to follow up with his primary care physician as needed. The patient and family were encouraged to call with any questions or concerns.         Signed:  Pepe Parkinson NP  2/27/2017  10:31 AM     Patient has NN contact information for questions and concerns.

## 2017-03-02 ENCOUNTER — TELEPHONE (OUTPATIENT)
Dept: CARDIAC REHAB | Age: 57
End: 2017-03-02

## 2017-03-02 ENCOUNTER — TELEPHONE ANTICOAG (OUTPATIENT)
Dept: CARDIOTHORACIC SURGERY | Age: 57
End: 2017-03-02

## 2017-03-02 ENCOUNTER — HOME CARE VISIT (OUTPATIENT)
Dept: SCHEDULING | Facility: HOME HEALTH | Age: 57
End: 2017-03-02
Payer: COMMERCIAL

## 2017-03-02 VITALS
RESPIRATION RATE: 16 BRPM | OXYGEN SATURATION: 98 % | TEMPERATURE: 96.9 F | DIASTOLIC BLOOD PRESSURE: 62 MMHG | HEART RATE: 93 BPM | SYSTOLIC BLOOD PRESSURE: 122 MMHG

## 2017-03-02 LAB — INR, EXTERNAL: 1.5

## 2017-03-02 PROCEDURE — G0299 HHS/HOSPICE OF RN EA 15 MIN: HCPCS

## 2017-03-02 PROCEDURE — A4216 STERILE WATER/SALINE, 10 ML: HCPCS

## 2017-03-02 PROCEDURE — A6248 HYDROGEL DRSG GEL FILLER: HCPCS

## 2017-03-03 ENCOUNTER — HOME CARE VISIT (OUTPATIENT)
Dept: SCHEDULING | Facility: HOME HEALTH | Age: 57
End: 2017-03-03
Payer: COMMERCIAL

## 2017-03-03 ENCOUNTER — TELEPHONE ANTICOAG (OUTPATIENT)
Dept: CARDIOTHORACIC SURGERY | Age: 57
End: 2017-03-03

## 2017-03-03 ENCOUNTER — TELEPHONE (OUTPATIENT)
Dept: CARDIOTHORACIC SURGERY | Age: 57
End: 2017-03-03

## 2017-03-03 LAB — INR, EXTERNAL: 1.4

## 2017-03-03 PROCEDURE — G0299 HHS/HOSPICE OF RN EA 15 MIN: HCPCS

## 2017-03-04 ENCOUNTER — HOME CARE VISIT (OUTPATIENT)
Dept: SCHEDULING | Facility: HOME HEALTH | Age: 57
End: 2017-03-04
Payer: COMMERCIAL

## 2017-03-04 LAB — INR, EXTERNAL: 1.4

## 2017-03-04 PROCEDURE — G0299 HHS/HOSPICE OF RN EA 15 MIN: HCPCS

## 2017-03-05 ENCOUNTER — TELEPHONE ANTICOAG (OUTPATIENT)
Dept: CARDIOTHORACIC SURGERY | Age: 57
End: 2017-03-05

## 2017-03-05 ENCOUNTER — HOME CARE VISIT (OUTPATIENT)
Dept: SCHEDULING | Facility: HOME HEALTH | Age: 57
End: 2017-03-05
Payer: COMMERCIAL

## 2017-03-05 VITALS
DIASTOLIC BLOOD PRESSURE: 60 MMHG | TEMPERATURE: 98.6 F | RESPIRATION RATE: 14 BRPM | OXYGEN SATURATION: 96 % | HEART RATE: 14 BPM | SYSTOLIC BLOOD PRESSURE: 120 MMHG

## 2017-03-05 VITALS
SYSTOLIC BLOOD PRESSURE: 118 MMHG | TEMPERATURE: 97.6 F | HEART RATE: 78 BPM | OXYGEN SATURATION: 99 % | RESPIRATION RATE: 16 BRPM | DIASTOLIC BLOOD PRESSURE: 64 MMHG

## 2017-03-05 LAB
INR, EXTERNAL: 1.4
INR, EXTERNAL: 1.6

## 2017-03-05 PROCEDURE — G0300 HHS/HOSPICE OF LPN EA 15 MIN: HCPCS

## 2017-03-06 ENCOUNTER — TELEPHONE ANTICOAG (OUTPATIENT)
Dept: CARDIOTHORACIC SURGERY | Age: 57
End: 2017-03-06

## 2017-03-06 ENCOUNTER — OFFICE VISIT (OUTPATIENT)
Dept: CARDIOTHORACIC SURGERY | Age: 57
End: 2017-03-06

## 2017-03-06 VITALS
SYSTOLIC BLOOD PRESSURE: 112 MMHG | RESPIRATION RATE: 16 BRPM | TEMPERATURE: 98.8 F | BODY MASS INDEX: 28.84 KG/M2 | HEART RATE: 83 BPM | WEIGHT: 206 LBS | HEIGHT: 71 IN | DIASTOLIC BLOOD PRESSURE: 56 MMHG | OXYGEN SATURATION: 97 %

## 2017-03-06 DIAGNOSIS — Z95.2 S/P AVR: ICD-10-CM

## 2017-03-06 DIAGNOSIS — I71.010 ASCENDING AORTIC DISSECTION: Primary | ICD-10-CM

## 2017-03-06 LAB
INR, EXTERNAL: 1.6
INR, EXTERNAL: 2

## 2017-03-06 NOTE — PROGRESS NOTES
Patient: Ramirez Soto   Age: 61 y.o. Patient Care Team:  Viv Montenegro MD as PCP - General (Family Practice)  Lazaro Flower MD as Surgeon (Cardiothoracic Surgery)    Diagnosis: The primary encounter diagnosis was Ascending aortic dissection [facility]). A diagnosis of S/P AVR was also pertinent to this visit. Problem List:   Patient Active Problem List   Diagnosis Code    Seborrheic dermatitis L21.9    History of colonoscopy Z98.890    Family history of early CAD Z80.55    Family history of diabetes mellitus (DM) Z83.3    Family history of colon cancer Z80.0    Ascending aortic dissection (HCC) I71.01    S/P AVR Z95.2        Date of Surgery:   aortic root replacement (mechanical valved conduit) and Left to Right Fem Fem bypass graft on 2/9/17, performed by Dr. Trista Mehta. R LE fasciotomy for R anterior compartment syndrome on 2/11/17, performed by Dr. Jj Barragan. HPI: Pt is here for 5 day post op follow up. Taking 30 minute walk every morning in the house, and taking afternoon walk as well. Doing more and more everyday. No pain-- using tramadol only at night. No dizziness, SOB, worsening edema. Down about 20 lbs since going home. Appetite improving, bowels moving ok. Edema has greatly improved. Current Medications:   Current Outpatient Prescriptions   Medication Sig Dispense Refill    enoxaparin (LOVENOX) 120 mg/0.8 mL injection 105 mg by SubCUTAneous route every twelve (12) hours every twelve (12) hours. 10 Syringe 1    traMADol (ULTRAM) 50 mg tablet Take 1-2 Tabs by mouth every four (4) hours as needed. Max Daily Amount: 600 mg. Indications: Pain 40 Tab 0    senna-docusate (PERICOLACE) 8.6-50 mg per tablet Take 1 Tab by mouth two (2) times a day. 30 Tab 0    potassium chloride SR (K-TAB) 20 mEq tablet Take 3 Tabs by mouth two (2) times a day. 90 Tab 1    metroNIDAZOLE (FLAGYL) 500 mg tablet Take 1 Tab by mouth three (3) times daily for 8 days.  24 Tab 0    metoprolol tartrate (LOPRESSOR) 25 mg tablet Take 1 Tab by mouth two (2) times a day. 60 Tab 1    levoFLOXacin (LEVAQUIN) 500 mg tablet Take 1 Tab by mouth every twenty-four (24) hours for 8 days. 8 Tab 0    furosemide (LASIX) 40 mg tablet Take 2 Tabs by mouth two (2) times a day. 60 Tab 1    warfarin (COUMADIN) 2.5 mg tablet Take 2.5 mg daily by mouth at dinner time. Your daily dose will be determined by MD/NP/PA team. 30 Tab 11    aspirin delayed-release 81 mg tablet Take 81 mg by mouth daily.  MULTIVITAMINS (MULTIVITAMIN PO) Take 1 Tab by mouth daily. Vitals: Blood pressure 112/56, pulse 83, temperature 98.8 °F (37.1 °C), temperature source Oral, resp. rate 16, height 5' 11\" (1.803 m), weight 206 lb (93.4 kg), SpO2 97 %. Allergies: has No Known Allergies. Physical Exam:  Wounds: clean, dry, no drainage    Lungs: clear to auscultation bilaterally    Heart: regular rate and rhythm, S1, S2 normal, no murmur, click, rub or gallop    Extremities: Mod edema LE, 2-3+pitting. PPP. Pt reports improvement in abdominal/scrotal edema. Assessment/Plan:   1. S/p Aortic root replacement w/ AVR mechanical valve & L to R fem fem bypass graft: INR goal 2.5-3 (s/t mechanical valve + A flutter) Continue low dose BB, on ASA, coumadin & lovenox. 2. Elevated transaminases: Improving, caution with hepatotoxic meds. 3. Cholecystitis/possible pancreatitis:  Tolerating diet. Cont levaquin and flagyl-will give to complete 2 weeks of therapy. 4. EDUARD: . Cont diuresis  5. Postop afib & Varying heart block: In NSR vs. A flutter, rate controlled. On coumadin. 6. HTN: continue low dose BB. 7. Constipation. Cont pericolace daily. 8. R compartment fasciotomy: Stable, see's vascular next week. Wound care.      Rehab - yes  Walking: yes  Glucometer: n/a  Antibiotic card for valves: yes

## 2017-03-07 ENCOUNTER — TELEPHONE ANTICOAG (OUTPATIENT)
Dept: CARDIOTHORACIC SURGERY | Age: 57
End: 2017-03-07

## 2017-03-07 ENCOUNTER — HOME CARE VISIT (OUTPATIENT)
Dept: SCHEDULING | Facility: HOME HEALTH | Age: 57
End: 2017-03-07
Payer: COMMERCIAL

## 2017-03-07 ENCOUNTER — HOME CARE VISIT (OUTPATIENT)
Dept: HOME HEALTH SERVICES | Facility: HOME HEALTH | Age: 57
End: 2017-03-07
Payer: COMMERCIAL

## 2017-03-07 LAB — INR, EXTERNAL: 2

## 2017-03-07 PROCEDURE — G0151 HHCP-SERV OF PT,EA 15 MIN: HCPCS

## 2017-03-07 PROCEDURE — G0300 HHS/HOSPICE OF LPN EA 15 MIN: HCPCS

## 2017-03-08 VITALS
TEMPERATURE: 98.1 F | DIASTOLIC BLOOD PRESSURE: 68 MMHG | SYSTOLIC BLOOD PRESSURE: 122 MMHG | HEART RATE: 68 BPM | RESPIRATION RATE: 16 BRPM | OXYGEN SATURATION: 98 %

## 2017-03-09 ENCOUNTER — TELEPHONE (OUTPATIENT)
Dept: CARDIOTHORACIC SURGERY | Age: 57
End: 2017-03-09

## 2017-03-09 ENCOUNTER — HOME CARE VISIT (OUTPATIENT)
Dept: SCHEDULING | Facility: HOME HEALTH | Age: 57
End: 2017-03-09
Payer: COMMERCIAL

## 2017-03-09 ENCOUNTER — TELEPHONE ANTICOAG (OUTPATIENT)
Dept: CARDIOTHORACIC SURGERY | Age: 57
End: 2017-03-09

## 2017-03-09 VITALS
HEART RATE: 84 BPM | RESPIRATION RATE: 18 BRPM | TEMPERATURE: 98.1 F | OXYGEN SATURATION: 98 % | SYSTOLIC BLOOD PRESSURE: 130 MMHG | DIASTOLIC BLOOD PRESSURE: 60 MMHG

## 2017-03-09 VITALS
DIASTOLIC BLOOD PRESSURE: 70 MMHG | HEART RATE: 66 BPM | RESPIRATION RATE: 16 BRPM | TEMPERATURE: 98 F | OXYGEN SATURATION: 98 % | SYSTOLIC BLOOD PRESSURE: 126 MMHG

## 2017-03-09 LAB — INR, EXTERNAL: 1.9

## 2017-03-09 PROCEDURE — G0151 HHCP-SERV OF PT,EA 15 MIN: HCPCS

## 2017-03-09 PROCEDURE — G0299 HHS/HOSPICE OF RN EA 15 MIN: HCPCS

## 2017-03-09 RX ORDER — WARFARIN SODIUM 5 MG/1
TABLET ORAL
Qty: 90 TAB | Refills: 1 | Status: SHIPPED | OUTPATIENT
Start: 2017-03-09 | End: 2021-07-13

## 2017-03-10 ENCOUNTER — TELEPHONE ANTICOAG (OUTPATIENT)
Dept: CARDIOTHORACIC SURGERY | Age: 57
End: 2017-03-10

## 2017-03-11 ENCOUNTER — HOME CARE VISIT (OUTPATIENT)
Dept: SCHEDULING | Facility: HOME HEALTH | Age: 57
End: 2017-03-11
Payer: COMMERCIAL

## 2017-03-11 LAB — INR, EXTERNAL: 2.6

## 2017-03-11 PROCEDURE — G0299 HHS/HOSPICE OF RN EA 15 MIN: HCPCS

## 2017-03-12 ENCOUNTER — TELEPHONE ANTICOAG (OUTPATIENT)
Dept: CARDIOTHORACIC SURGERY | Age: 57
End: 2017-03-12

## 2017-03-12 VITALS
RESPIRATION RATE: 16 BRPM | DIASTOLIC BLOOD PRESSURE: 65 MMHG | WEIGHT: 186 LBS | BODY MASS INDEX: 25.94 KG/M2 | OXYGEN SATURATION: 98 % | TEMPERATURE: 97.5 F | SYSTOLIC BLOOD PRESSURE: 130 MMHG | HEART RATE: 88 BPM

## 2017-03-13 ENCOUNTER — TELEPHONE ANTICOAG (OUTPATIENT)
Dept: CARDIOTHORACIC SURGERY | Age: 57
End: 2017-03-13

## 2017-03-13 ENCOUNTER — TELEPHONE (OUTPATIENT)
Dept: CARDIOTHORACIC SURGERY | Age: 57
End: 2017-03-13

## 2017-03-13 ENCOUNTER — HOME CARE VISIT (OUTPATIENT)
Dept: SCHEDULING | Facility: HOME HEALTH | Age: 57
End: 2017-03-13
Payer: COMMERCIAL

## 2017-03-13 VITALS
TEMPERATURE: 98.7 F | RESPIRATION RATE: 16 BRPM | DIASTOLIC BLOOD PRESSURE: 70 MMHG | SYSTOLIC BLOOD PRESSURE: 138 MMHG | HEART RATE: 98 BPM

## 2017-03-13 VITALS
SYSTOLIC BLOOD PRESSURE: 114 MMHG | OXYGEN SATURATION: 98 % | TEMPERATURE: 98.4 F | HEART RATE: 79 BPM | RESPIRATION RATE: 12 BRPM | DIASTOLIC BLOOD PRESSURE: 76 MMHG

## 2017-03-13 PROCEDURE — G0151 HHCP-SERV OF PT,EA 15 MIN: HCPCS

## 2017-03-14 ENCOUNTER — TELEPHONE ANTICOAG (OUTPATIENT)
Dept: CARDIOTHORACIC SURGERY | Age: 57
End: 2017-03-14

## 2017-03-14 ENCOUNTER — HOME CARE VISIT (OUTPATIENT)
Dept: SCHEDULING | Facility: HOME HEALTH | Age: 57
End: 2017-03-14
Payer: COMMERCIAL

## 2017-03-14 ENCOUNTER — OFFICE VISIT (OUTPATIENT)
Dept: CARDIOTHORACIC SURGERY | Age: 57
End: 2017-03-14

## 2017-03-14 VITALS
TEMPERATURE: 98.8 F | OXYGEN SATURATION: 98 % | HEART RATE: 112 BPM | DIASTOLIC BLOOD PRESSURE: 68 MMHG | SYSTOLIC BLOOD PRESSURE: 112 MMHG | RESPIRATION RATE: 16 BRPM

## 2017-03-14 VITALS
TEMPERATURE: 98.5 F | RESPIRATION RATE: 16 BRPM | DIASTOLIC BLOOD PRESSURE: 64 MMHG | OXYGEN SATURATION: 97 % | SYSTOLIC BLOOD PRESSURE: 118 MMHG | HEIGHT: 71 IN | HEART RATE: 93 BPM | BODY MASS INDEX: 25.06 KG/M2 | WEIGHT: 179 LBS

## 2017-03-14 DIAGNOSIS — Z95.2 S/P AVR: Primary | ICD-10-CM

## 2017-03-14 DIAGNOSIS — I71.010 ASCENDING AORTIC DISSECTION: ICD-10-CM

## 2017-03-14 LAB — INR, EXTERNAL: 4.3

## 2017-03-14 PROCEDURE — G0299 HHS/HOSPICE OF RN EA 15 MIN: HCPCS

## 2017-03-14 RX ORDER — MELATONIN
DAILY
COMMUNITY

## 2017-03-14 NOTE — MR AVS SNAPSHOT
Visit Information Date & Time Provider Department Dept. Phone Encounter #  
 3/14/2017  1:15 PM Deana Maradiaga MD [facility] [phone] [id number] Upcoming Health Maintenance Date Due INFLUENZA AGE 9 TO ADULT 8/1/2016 COLONOSCOPY 4/8/2021 DTaP/Tdap/Td series (2 - Td) 4/10/2022 Allergies as of 3/14/2017  Review Complete On: 3/14/2017 By: Kurtis White NP No Known Allergies Current Immunizations  Reviewed on 2/10/2017 Name Date TDAP Vaccine 4/10/2012 Not reviewed this visit You Were Diagnosed With   
  
 Codes Comments S/P AVR    -  Primary ICD-10-CM: Z95.2 ICD-9-CM: V43.3 Ascending aortic dissection (HCC)     ICD-10-CM: I71.01 
ICD-9-CM: 441.01 Vitals BP Pulse Temp Resp Height(growth percentile) Weight(growth percentile)  
 118/64 (BP 1 Location: Left arm, BP Patient Position: Sitting) 93 98.5 °F (36.9 °C) (Oral) 16 5' 11\" (1.803 m) 179 lb (81.2 kg) SpO2 BMI Smoking Status 97% 24.97 kg/m2 Never Smoker BMI and BSA Data Body Mass Index Body Surface Area 24.97 kg/m 2 2.02 m 2 Preferred Pharmacy Pharmacy Name Phone [address], [address] Your Updated Medication List  
  
   
This list is accurate as of: 3/14/17  3:03 PM.  Always use your most recent med list.  
  
  
  
  
 aspirin delayed-release 81 mg tablet Take 81 mg by mouth daily. enoxaparin 120 mg/0.8 mL injection Commonly known as:  LOVENOX  
105 mg by SubCUTAneous route every twelve (12) hours every twelve (12) hours. furosemide 40 mg tablet Commonly known as:  LASIX Take 2 Tabs by mouth two (2) times a day. metoprolol tartrate 25 mg tablet Commonly known as:  LOPRESSOR Take 1 Tab by mouth two (2) times a day. MULTIVITAMIN PO Take 1 Tab by mouth daily. potassium chloride SR 20 mEq tablet Commonly known as:  K-TAB Take 3 Tabs by mouth two (2) times a day. senna-docusate 8.6-50 mg per tablet Commonly known as:  [place] Take 1 Tab by mouth two (2) times a day. traMADol 50 mg tablet Commonly known as:  ULTRAM  
Take 1-2 Tabs by mouth every four (4) hours as needed. Max Daily Amount: 600 mg. Indications: Pain VITAMIN D3 1,000 unit tablet Generic drug:  cholecalciferol Take  by mouth daily. warfarin 5 mg tablet Commonly known as:  COUMADIN Take 15 daily by mouth at dinner time. Your daily dose will be determined by MD/NP/PA team.  
  
  
  
  
We Performed the Following AMB POC EKG ROUTINE  LEADS, INTER & REP [49072 CPT(R)] To-Do List   
 2017 10:00 AM  
  Appointment with Erik Link at [place] [address]  
  
 2017 To Be Determined Appointment with Erik Link at [place] [address]  
  
 2017 To Be Determined Appointment with Erik Link at [place] [address] Introducing [facility]! Reji Issa introduces "[employer]" patient portal. Now you can access parts of your medical record, email your doctor's office, and request medication refills online. 1. In your internet browser, go to [email or web address] 2. Click on the First Time User? Click Here link in the Sign In box. You will see the New Member Sign Up page. 3. Enter your "[employer]" Access Code exactly as it appears below. You will not need to use this code after youve completed the sign-up process. If you do not sign up before the expiration date, you must request a new code. · "[employer]" Access Code: [email or web address] Expires: 5/10/2017  2:14 PM 
 
4. Enter the last four digits of your Social Security Number (xxxx) and Date of Birth (mm/dd/yyyy) as indicated and click Submit. You will be taken to the next sign-up page. 5. Create a [employer] ID. This will be your [employer] login ID and cannot be changed, so think of one that is secure and easy to remember. 6. Create a [employer] password. You can change your password at any time. 7. Enter your Password Reset Question and Answer. This can be used at a later time if you forget your password. 8. Enter your e-mail address. You will receive e-mail notification when new information is available in [address] 9. Click Sign Up. You can now view and download portions of your medical record. 10. Click the Download Summary menu link to download a portable copy of your medical information. If you have questions, please visit the Frequently Asked Questions section of the [employer] website. Remember, [employer] is NOT to be used for urgent needs. For medical emergencies, dial 911. Now available from your iPhone and Android! Please provide this summary of care documentation to your next provider. Your primary care clinician is listed as [address] [place] [address], [facility]/s . If you have any questions after today's visit, please call [phone].

## 2017-03-14 NOTE — PROGRESS NOTES
Patient: Rajesh Ulloa   Age: 61 y.o. Patient Care Team:  Kerri Winn MD as PCP - General ([facility])  Dontrell Parson MD as Surgeon (Cardiothoracic Surgery)    Diagnosis: The primary encounter diagnosis was S/P AVR. A diagnosis of Ascending aortic dissection (HCC) was also pertinent to this visit. Problem List:   Patient Active Problem List   Diagnosis Code    Seborrheic dermatitis L21.9    History of colonoscopy Z98.890    Family history of early CAD Z80.55    Family history of diabetes mellitus (DM) Z83.3    Family history of colon cancer Z80.0    Ascending aortic dissection (HCC) I71.01    S/P AVR Z95.2        Date of Surgery:   aortic root replacement (mechanical valved conduit) and Left to Right Fem Fem bypass graft on 2/9/17, performed by Dr. Jo Smart. R LE fasciotomy for R anterior compartment syndrome on 2/11/17, performed by Dr. Carol Bowsell. HPI: Pt is here for 1 month post op follow up. Doing great. Done with [address] [place] [address], will start cardiac rehab next week. Appetite good, bowels moving. Weight stable at 179lb. Saw vascular for R LE. No issues wound care. Denies CP, SOB, dizziness or worsening edema. Feels \"heart is more regular\". Energy and strength improving. Current Medications:   Current Outpatient Prescriptions   Medication Sig Dispense Refill    cholecalciferol (VITAMIN D3) 1,000 unit tablet Take  by mouth daily.  warfarin (COUMADIN) 5 mg tablet Take 15 daily by mouth at dinner time. Your daily dose will be determined by MD/NP/PA team. 90 Tab 1    traMADol (ULTRAM) 50 mg tablet Take 1-2 Tabs by mouth every four (4) hours as needed. Max Daily Amount: 600 mg. Indications: Pain 40 Tab 0    senna-docusate (PERICOLACE) 8.6-50 mg per tablet Take 1 Tab by mouth two (2) times a day. 30 Tab 0    potassium chloride SR (K-TAB) 20 mEq tablet Take 3 Tabs by mouth two (2) times a day.  90 Tab 1    metoprolol tartrate (LOPRESSOR) 25 mg tablet Take 1 Tab by mouth two (2) times a day. 60 Tab 1    furosemide (LASIX) 40 mg tablet Take 2 Tabs by mouth two (2) times a day. 60 Tab 1    aspirin delayed-release 81 mg tablet Take 81 mg by mouth daily.  MULTIVITAMINS (MULTIVITAMIN PO) Take 1 Tab by mouth daily.  enoxaparin (LOVENOX) 120 mg/0.8 mL injection 105 mg by SubCUTAneous route every twelve (12) hours every twelve (12) hours. 10 Syringe 1       Vitals: Blood pressure 118/64, pulse 93, temperature 98.5 °F (36.9 °C), temperature source Oral, resp. rate 16, height 5' 11\" (1.803 m), weight 179 lb (81.2 kg), SpO2 97 %. Allergies: has No Known Allergies. Physical Exam:  Wounds: clean, dry, no drainage    Lungs: clear to auscultation bilaterally    Heart: regular rate and rhythm, S1, S2 normal, no murmur, click, rub or gallop    Extremities: mild LE R >L edema. PPP. Assessment/Plan:   1. S/p Aortic root replacement w/ AVR mechanical valve & L to R fem fem bypass graft: INR goal 2.5-3 (s/t mechanical valve + A flutter) Continue low dose BB, on ASA, coumadin. 2. Elevated transaminases: Improving, caution with hepatotoxic meds. 3. Cholecystitis/possible pancreatitis: Tolerating diet. Completed antibiotics. Will FU as outpt. 4. EDUARD: REduce to 80 mg PO Daily, and KCL 3 tabs daily. Monitor weight. If weight stable, can reduce to 40 mg PO daily. 5. Postop afib & Varying heart block: EKG shows continued A flutter, 90s rate controlled. On coumadin. Will hold on increasing metoprolol d/t lower BP. 6. HTN: continue low dose BB. 7. Constipation. Cont pericolace daily. 8. R compartment fasciotomy: Stable, see's vascular again next week. Wound care. 9.  FU with cardiology.      Rehab - yes  Walking: yes  Glucometer: na  Antibiotic card for valves: yes

## 2017-03-16 ENCOUNTER — TELEPHONE ANTICOAG (OUTPATIENT)
Dept: CARDIOTHORACIC SURGERY | Age: 57
End: 2017-03-16

## 2017-03-16 ENCOUNTER — HOME CARE VISIT (OUTPATIENT)
Dept: SCHEDULING | Facility: HOME HEALTH | Age: 57
End: 2017-03-16
Payer: COMMERCIAL

## 2017-03-16 LAB — INR, EXTERNAL: 3.4

## 2017-03-16 PROCEDURE — G0151 HHCP-SERV OF PT,EA 15 MIN: HCPCS

## 2017-03-17 VITALS
TEMPERATURE: 97.8 F | SYSTOLIC BLOOD PRESSURE: 128 MMHG | RESPIRATION RATE: 16 BRPM | DIASTOLIC BLOOD PRESSURE: 64 MMHG | OXYGEN SATURATION: 98 %

## 2017-03-20 ENCOUNTER — TELEPHONE (OUTPATIENT)
Dept: CARDIAC REHAB | Age: 57
End: 2017-03-20

## 2017-03-20 ENCOUNTER — TELEPHONE ANTICOAG (OUTPATIENT)
Dept: CARDIOTHORACIC SURGERY | Age: 57
End: 2017-03-20

## 2017-03-20 LAB — INR, EXTERNAL: 4.3

## 2017-03-21 ENCOUNTER — TELEPHONE (OUTPATIENT)
Dept: CARDIAC REHAB | Age: 57
End: 2017-03-21

## 2017-03-21 NOTE — TELEPHONE ENCOUNTER
3/21/2017 Cardiac Wellness: Called Mr. Talat Mcrae to remind of intake appointment on Wednesday, March 22, 2017 at 3:00PM. Confirmed appointment with patient. Provided pt with contact information for [facility]. Also, reminded pt to bring a list of current medications, a personal schedule, and to wear comfortable clothes and supportive shoes.  Daljit Luu RN

## 2017-03-22 ENCOUNTER — TELEPHONE ANTICOAG (OUTPATIENT)
Dept: CARDIOTHORACIC SURGERY | Age: 57
End: 2017-03-22

## 2017-03-22 ENCOUNTER — HOSPITAL ENCOUNTER (OUTPATIENT)
Dept: CARDIAC REHAB | Age: 57
Discharge: HOME | End: 2017-03-22
Payer: COMMERCIAL

## 2017-03-22 VITALS — WEIGHT: 182 LBS | BODY MASS INDEX: 25.48 KG/M2 | HEIGHT: 71 IN

## 2017-03-22 LAB — INR, EXTERNAL: 2.5

## 2017-03-22 PROCEDURE — 93798 PHYS/QHP OP CAR RHAB W/ECG: CPT

## 2017-03-22 NOTE — CARDIO/PULMONARY
Henri Laboy  61 y.o.  presented to cardiac wellness for orientation and exercise tolerance test today with a primary diagnosis of Ascending aortic dissection,s/p AVR; L-R fem. Fem. Bypass; right compartment fasciotomy. Henri Laboy did not have a previous cardiac history and had been in good physical condition. Cardiac risk factors include hypertension, stress and these were reviewed with Rufino Ocampo. Henri Laboy is  and lives with his wife. 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 IRMA-D, depression score, is 3 and this is considered normal.. Patient denied chest pain or SOB during 6 minute walk and was in ST with first degree AVB/ history of A. Flutter. He is on coumadin for his AVR. Henri Laboy will  exercise 2- 3 days a week in cardiac wellness. EF is 55% .     Agatha Choi RN  3/22/2017

## 2017-03-23 VITALS — BODY MASS INDEX: 25.8 KG/M2 | WEIGHT: 185 LBS

## 2017-03-24 ENCOUNTER — TELEPHONE ANTICOAG (OUTPATIENT)
Dept: CARDIOTHORACIC SURGERY | Age: 57
End: 2017-03-24

## 2017-03-24 LAB — INR, EXTERNAL: 2.3

## 2017-03-24 RX ORDER — TRAMADOL HYDROCHLORIDE 50 MG/1
50-100 TABLET ORAL
Qty: 40 TAB | Refills: 0 | Status: SHIPPED | OUTPATIENT
Start: 2017-03-24 | End: 2017-03-24 | Stop reason: SDUPTHER

## 2017-03-24 RX ORDER — TRAMADOL HYDROCHLORIDE 50 MG/1
50-100 TABLET ORAL
Qty: 40 TAB | Refills: 0 | Status: SHIPPED | OUTPATIENT
Start: 2017-03-24 | End: 2017-04-17

## 2017-03-28 ENCOUNTER — TELEPHONE ANTICOAG (OUTPATIENT)
Dept: CARDIOTHORACIC SURGERY | Age: 57
End: 2017-03-28

## 2017-03-28 LAB — INR, EXTERNAL: 3.7

## 2017-03-29 ENCOUNTER — HOSPITAL ENCOUNTER (OUTPATIENT)
Dept: CARDIAC REHAB | Age: 57
Discharge: HOME | End: 2017-03-29
Payer: COMMERCIAL

## 2017-03-29 VITALS — BODY MASS INDEX: 24.97 KG/M2 | WEIGHT: 179 LBS

## 2017-03-29 PROCEDURE — 93798 PHYS/QHP OP CAR RHAB W/ECG: CPT

## 2017-03-30 ENCOUNTER — APPOINTMENT (OUTPATIENT)
Dept: CARDIAC REHAB | Age: 57
End: 2017-03-30
Payer: COMMERCIAL

## 2017-03-31 ENCOUNTER — TELEPHONE ANTICOAG (OUTPATIENT)
Dept: CARDIOTHORACIC SURGERY | Age: 57
End: 2017-03-31

## 2017-03-31 ENCOUNTER — APPOINTMENT (OUTPATIENT)
Dept: CARDIAC REHAB | Age: 57
End: 2017-03-31
Payer: COMMERCIAL

## 2017-03-31 LAB — INR, EXTERNAL: 2.9

## 2017-04-03 ENCOUNTER — HOSPITAL ENCOUNTER (OUTPATIENT)
Dept: CARDIAC REHAB | Age: 57
Discharge: HOME | End: 2017-04-03
Payer: COMMERCIAL

## 2017-04-03 NOTE — CARDIO/PULMONARY
Pt came in for exercise today and was found to be in afib/aflutter 120's-130s. BP 92/58, asymptomatic. Spoke with Han Singletary RN for Dr. Josemanuel Trent, pt to come to office at 12:45pm today. EKG done and given to patient to take to his appointment.

## 2017-04-04 ENCOUNTER — HOSPITAL ENCOUNTER (OUTPATIENT)
Dept: NON INVASIVE DIAGNOSTICS | Age: 57
Discharge: HOME | End: 2017-04-04
Attending: INTERNAL MEDICINE | Admitting: INTERNAL MEDICINE
Payer: COMMERCIAL

## 2017-04-04 ENCOUNTER — ANESTHESIA EVENT (OUTPATIENT)
Dept: NON INVASIVE DIAGNOSTICS | Age: 57
End: 2017-04-04
Payer: COMMERCIAL

## 2017-04-04 ENCOUNTER — ANESTHESIA (OUTPATIENT)
Dept: NON INVASIVE DIAGNOSTICS | Age: 57
End: 2017-04-04
Payer: COMMERCIAL

## 2017-04-04 VITALS
RESPIRATION RATE: 22 BRPM | HEIGHT: 72 IN | TEMPERATURE: 98.9 F | DIASTOLIC BLOOD PRESSURE: 53 MMHG | WEIGHT: 178 LBS | BODY MASS INDEX: 24.11 KG/M2 | OXYGEN SATURATION: 98 % | SYSTOLIC BLOOD PRESSURE: 102 MMHG | HEART RATE: 96 BPM

## 2017-04-04 LAB — INR BLD: 3.4 (ref 0.9–1.2)

## 2017-04-04 PROCEDURE — 93005 ELECTROCARDIOGRAM TRACING: CPT

## 2017-04-04 PROCEDURE — 92960 CARDIOVERSION ELECTRIC EXT: CPT

## 2017-04-04 PROCEDURE — 85610 PROTHROMBIN TIME: CPT

## 2017-04-04 PROCEDURE — 93306 TTE W/DOPPLER COMPLETE: CPT

## 2017-04-04 PROCEDURE — 74011250636 HC RX REV CODE- 250/636

## 2017-04-04 PROCEDURE — 74011250636 HC RX REV CODE- 250/636: Performed by: INTERNAL MEDICINE

## 2017-04-04 PROCEDURE — 77030018729 HC ELECTRD DEFIB PAD CARD -B

## 2017-04-04 PROCEDURE — 76060000032 HC ANESTHESIA 0.5 TO 1 HR

## 2017-04-04 RX ORDER — PROPOFOL 10 MG/ML
INJECTION, EMULSION INTRAVENOUS AS NEEDED
Status: DISCONTINUED | OUTPATIENT
Start: 2017-04-04 | End: 2017-04-04 | Stop reason: HOSPADM

## 2017-04-04 RX ORDER — SODIUM CHLORIDE 9 MG/ML
25 INJECTION, SOLUTION INTRAVENOUS CONTINUOUS
Status: DISCONTINUED | OUTPATIENT
Start: 2017-04-04 | End: 2017-04-04 | Stop reason: HOSPADM

## 2017-04-04 RX ORDER — SODIUM CHLORIDE 0.9 % (FLUSH) 0.9 %
5-10 SYRINGE (ML) INJECTION AS NEEDED
Status: DISCONTINUED | OUTPATIENT
Start: 2017-04-04 | End: 2017-04-04

## 2017-04-04 RX ORDER — ATROPINE SULFATE 0.1 MG/ML
1 INJECTION INTRAVENOUS AS NEEDED
Status: DISCONTINUED | OUTPATIENT
Start: 2017-04-04 | End: 2017-04-04

## 2017-04-04 RX ADMIN — PROPOFOL 70 MG: 10 INJECTION, EMULSION INTRAVENOUS at 13:20

## 2017-04-04 RX ADMIN — PROPOFOL 30 MG: 10 INJECTION, EMULSION INTRAVENOUS at 13:21

## 2017-04-04 RX ADMIN — SODIUM CHLORIDE 25 ML/HR: 900 INJECTION, SOLUTION INTRAVENOUS at 12:35

## 2017-04-04 NOTE — ANESTHESIA POSTPROCEDURE EVALUATION
Post-Anesthesia Evaluation and Assessment    Patient: Jessica Bianchi MRN: [id number]  SSN: [id number]    YOB: 1960  Age: 61 y.o. Sex: male       Cardiovascular Function/Vital Signs  Visit Vitals    BP 98/43 (BP 1 Location: Left arm, BP Patient Position: At rest)    Pulse (!) 101    Temp 37.2 °C (98.9 °F)    Resp 24    Ht 5' 11.5\" (1.816 m)    Wt 80.7 kg (178 lb)    SpO2 98%    BMI 24.48 kg/m2       Patient is status post MAC anesthesia for * No procedures listed *. Nausea/Vomiting: None    Postoperative hydration reviewed and adequate. Pain:  Pain Scale 1: Numeric (0 - 10) (04/04/17 1331)  Pain Intensity 1: 0 (04/04/17 1331)   Managed    Neurological Status: At baseline    Mental Status and Level of Consciousness: Arousable    Pulmonary Status:   O2 Device: Room air (04/04/17 1331)   Adequate oxygenation and airway patent    Complications related to anesthesia: None    Post-anesthesia assessment completed.  No concerns    Signed By: Hima Miner MD     April 4, 2017

## 2017-04-04 NOTE — ANESTHESIA PREPROCEDURE EVALUATION
Anesthetic History   No history of anesthetic complications            Review of Systems / Medical History  Patient summary reviewed, nursing notes reviewed and pertinent labs reviewed    Pulmonary  Within defined limits                 Neuro/Psych   Within defined limits           Cardiovascular            Dysrhythmias : atrial fibrillation  PAD         GI/Hepatic/Renal  Within defined limits              Endo/Other  Within defined limits           Other Findings              Physical Exam    Airway  Mallampati: II  TM Distance: > 6 cm  Neck ROM: normal range of motion   Mouth opening: Normal     Cardiovascular  Regular rate and rhythm,  S1 and S2 normal,  no murmur, click, rub, or gallop             Dental    Dentition: Upper dentition intact and Lower dentition intact     Pulmonary  Breath sounds clear to auscultation               Abdominal  GI exam deferred       Other Findings            Anesthetic Plan    ASA: 3  Anesthesia type: MAC            Anesthetic plan and risks discussed with: Patient

## 2017-04-04 NOTE — IP AVS SNAPSHOT
[facility] 
[phone] Patient: Pedro Palmer MRN: [id number] WIN:[id number] You are allergic to the following No active allergies Recent Documentation Height Weight BMI Smoking Status 1.816 m 80.7 kg 24.48 kg/m2 Never Smoker Emergency Contacts Name Discharge Info Relation Home Work Mobile Charla North DISCHARGE CAREGIVER [3] Spouse [3] [phone] About your hospitalization You were admitted on:  April 4, 2017 [facility] You were discharged on:  April 4, 2017 Unit phone number:  [phone] Why you were hospitalized Your primary diagnosis was:  Not on File Providers Seen During Your Hospitalizations Provider Role Specialty Primary office phone Sujatha Morales MD Attending Provider Cardiology [phone] Your Primary Care Physician (PCP) Primary Care Physician Office Phone Office Fax BRYON Andre [phone] ** None ** Follow-up Information Follow up With Details Comments Contact Info Desi Davis MD   [address] [place] [address] 
[phone] Sujatha Morales MD In 4 weeks  [address] [facility] [address] [facility] 
[phone] Your Appointments Wednesday April 05, 2017 10:30 AM EDT PHASE II EXERCISE with [facility] [address]) [address] [facility] 25  
[phone] Please arrive 15 minutes prior to your appointment time and you will register in the [facility] 100, [address], on the first floor of the [address] Telephone: [phone] Fax: [phone] Driving directions To [facility] [place]. Building: Driving WEST on V-92, take exit 183A to [employer].  Turn left onto [facility], then turn right into [employer] Parking lot Driving EAST on Z-43, take exit [address] Turn right at the end of the exit ramp. Turn left onto [facility], then turn right into [employer] lot. Friday April 07, 2017 10:30 AM EDT PHASE II EXERCISE with [address] ([address]) [address]  
[phone] Please arrive 15 minutes prior to your appointment time and you will register in the [facility] access hospital 100, [address], on the first floor of the [address] [place] [address] Road. Telephone: [phone] Fax: [phone] Driving directions To [facility] [place]. Building: Driving WEST on X-71, take exit 183A to [employer]. Turn left onto [facility], then turn right into [employer] Parking lot Driving EAST on N-63, take exit [address] [facility] [address] Turn right at the end of the exit ramp. Turn left onto [facility], then turn right into [employer] lot. Monday April 10, 2017 10:30 AM EDT PHASE II EXERCISE with [address]) [address]  
[phone] Please arrive 15 minutes prior to your appointment time and you will register in the Critical access hospital 100, [address], on the first floor of the [address] [place] [address] Road. Telephone: [phone] Fax: [phone] Driving directions To [facility]. Building: Driving WEST on V-06, take exit 183A to [employer]. Turn left onto [facility], then turn right into [employer] Parking lot Driving EAST on L-83, take exit [address] Turn right at the end of the exit ramp. Turn left onto [facility], then turn right into [employer] lot. Wednesday April 12, 2017 10:30 AM EDT PHASE II EXERCISE with [address] ([address]) [address] [place] [address]  
[phone] Please arrive 15 minutes prior to your appointment time and you will register in the [facility] 100, [address], on the first floor of the [address] [place] [address] Road. Telephone: [phone] Fax: [phone] Driving directions To [facility]. Building: Driving WEST on M-47, take exit [address]A to [employer]. Turn left onto [facility], then turn right into [employer] Parking lot Driving EAST on V-33, take exit [address] Turn right at the end of the exit ramp. Turn left onto [facility], then turn right into [employer] lot. Friday April 14, 2017 10:30 AM EDT PHASE II EXERCISE with [address] ([address]) [address] [place] [address]  
[phone] Please arrive 15 minutes prior to your appointment time and you will register in the [facility] 100, [address], on the first floor of the [address] [place] [address] Road. Telephone: [phone] Fax: [phone] Driving directions To [facility]. Building: Driving WEST on M-22, take exit 183A to [employer]. Turn left onto [facility], then turn right into [employer] Parking lot Driving EAST on O-48, take exit [address] Turn right at the end of the exit ramp. Turn left onto [facility], then turn right into [employer] lot. Monday April 17, 2017 10:30 AM EDT PHASE II EXERCISE with [address] ([address]) [address] [place] [address]  
[phone] Please arrive 15 minutes prior to your appointment time and you will register in the [facility] 100, [address], on the first floor of the [address] [place] [address] Road. Telephone: [phone] Fax: [phone] Driving directions To [facility]. Building: Driving WEST on [address]-74, take exit [address]A to [employer]. Turn left onto [facility], then turn right into [employer] Parking lot Driving EAST on N-10, take exit 120 [facility]. Turn right at the end of the exit ramp. Turn left onto [facility], then turn right into [employer] lot. Wednesday April 19, 2017 10:30 AM EDT PHASE II EXERCISE with [address] (. [address]) [address] [place] [address]  
[phone] Please arrive 15 minutes prior to your appointment time and you will register in the [facility] 100, [address], on the first floor of the [address] [place] [address] Road. Telephone: [phone] Fax: [phone] Driving directions To [facility]. Building: Driving WEST on A-63, take exit [address]A to [employer]. Turn left onto [facility], then turn right into [employer] Parking lot Driving EAST on V-93, take exit 120 [facility]. Turn right at the end of the exit ramp. Turn left onto [facility], then turn right into [employer] lot. Friday April 21, 2017 10:30 AM EDT PHASE II EXERCISE with [address]) [address]  
[phone] Please arrive 15 minutes prior to your appointment time and you will register in the [facility] 100, [address], on the first floor of the [address] [place] [address] Road. Telephone: [phone] Fax: [phone] Driving directions To [facility] [place]. Building: Driving WEST on T-98, take exit [address]A to [employer].  Turn left onto [facility], then turn right into [employer] Parking lot Driving EAST on J-29, take exit [address] Turn right at the end of the exit ramp. Turn left onto [facility], then turn right into [employer] lot. Monday April 24, 2017 10:30 AM EDT PHASE II EXERCISE with [address] ([address]) [address] [facility] [address]  
[phone] Please arrive 15 minutes prior to your appointment time and you will register in the [facility] 100, [address], on the first floor of the [address] [place] [address] Road. Telephone: [phone] Fax: [phone] Driving directions To [facility]. Building: Driving WEST on M-15, take exit 183A to [employer]. Turn left onto [facility], then turn right into [employer] Parking lot Driving EAST on S-97, take exit [address] Turn right at the end of the exit ramp. Turn left onto [facility], then turn right into [employer] lot. Wednesday April 26, 2017 10:30 AM EDT PHASE II EXERCISE with [address] ([address]) [address] [facility] [address]  
[phone] Please arrive 15 minutes prior to your appointment time and you will register in the [facility] 100, [address], on the first floor of the [address] [place] [address] Road. Telephone: [phone] Fax: [phone] Driving directions To [facility]. Building: Driving WEST on Z-94, take exit 183A to [employer]. Turn left onto [facility], then turn right into [employer] Parking lot Driving EAST on M-84, take exit [address] Turn right at the end of the exit ramp. Turn left onto [facility], then turn right into [employer] lot. Friday April 28, 2017 10:30 AM EDT PHASE II EXERCISE with [address] ([address]) [address] [facility]  
[phone] Please arrive 15 minutes prior to your appointment time and you will register in the [facility] 100, [address], on the first floor of the [address] [place] [address] Road. Telephone: [phone] Fax: [phone] Driving directions To [facility]. Building: Driving WEST on F-11, take exit [address]A to [employer]. Turn left onto [facility], then turn right into [employer] Parking lot Driving EAST on N-47, take exit [address] Turn right at the end of the exit ramp. Turn left onto [facility], then turn right into [employer] lot. Monday May 01, 2017 10:30 AM EDT PHASE II EXERCISE with [address] ([address]) [address] [facility]  
[phone] Please arrive 15 minutes prior to your appointment time and you will register in the [facility] 100, [address], on the first floor of the [address] [place] [address] Road. Telephone: [phone] Fax: [phone] Driving directions To [facility]. Building: Driving WEST on R-25, take exit [address]A to [employer]. Turn left onto [facility], then turn right into [employer] Parking lot Driving EAST on F-76, take exit [address] Turn right at the end of the exit ramp. Turn left onto [facility], then turn right into [employer] lot. Wednesday May 03, 2017 10:30 AM EDT PHASE II EXERCISE with [address] ([address]) [address] [facility]  
[phone] Please arrive 15 minutes prior to your appointment time and you will register in the [facility] 100, [address], on the first floor of the [address] [place] [address] Road. Telephone: [phone] Fax: [phone] Driving directions To [facility]. Building: Driving WEST on Q-25, take exit 183A to [employer]. Turn left onto [facility], then turn right into [employer] Parking lot Driving EAST on N-16, take exit 120 [facility]. Turn right at the end of the exit ramp. Turn left onto [facility], then turn right into [employer] lot. Friday May 05, 2017 10:30 AM EDT PHASE II EXERCISE with [address] (. [address]) [address] [place] [address]  
[phone] Please arrive 15 minutes prior to your appointment time and you will register in the [facility] 100, [address], on the first floor of the [address] [place] [address] Road. Telephone: [phone] Fax: [phone] Driving directions To [facility]. Building: Driving WEST on O-41, take exit 183A to [employer]. Turn left onto [facility], then turn right into [employer] Parking lot Driving EAST on G-91, take exit 120 [facility]. Turn right at the end of the exit ramp. Turn left onto [facility], then turn right into [employer] lot. Monday May 08, 2017 10:30 AM EDT PHASE II EXERCISE with [address]) [address] [facility] [address]  
[phone] Please arrive 15 minutes prior to your appointment time and you will register in the [facility] 100, [address], on the first floor of the [address] [place] [address] Road. Telephone: [phone] Fax: [phone] Driving directions To [facility]. Building: Driving WEST on J-01, take exit 183A to [employer].  Turn left onto [facility], then turn right into [employer] Parking lot Driving EAST on Q-62, take exit [address] Turn right at the end of the exit ramp. Turn left onto [facility], then turn right into [employer] lot. Wednesday May 10, 2017 10:30 AM EDT PHASE II EXERCISE with [address] ([address]) [address] [facility]  
[phone] Please arrive 15 minutes prior to your appointment time and you will register in the [facility] 100, [address], on the first floor of the [address] [place] [address] Road. Telephone: [phone] Fax: [phone] Driving directions To [facility] [place]. Building: Driving WEST on J-20, take exit [address]A to [employer]. Turn left onto [facility], then turn right into [employer] Parking lot Driving EAST on N-04, take exit [address] Turn right at the end of the exit ramp. Turn left onto [facility], then turn right into [employer] lot. Friday May 12, 2017 10:30 AM EDT PHASE II EXERCISE with [address] ([address]) [address] [facility]  
[phone] Please arrive 15 minutes prior to your appointment time and you will register in the [facility] 100, [address], on the first floor of the [address] [place] [address] Road. Telephone: [phone] Fax: [phone] Driving directions To [facility] [place]. Building: Driving WEST on G-97, take exit 183A to [employer]. Turn left onto [facility], then turn right into [employer] Parking lot Driving EAST on R-66, take exit [address] Turn right at the end of the exit ramp. Turn left onto [facility], then turn right into [employer] lot. Monday May 15, 2017 10:30 AM EDT PHASE II EXERCISE with [address] ([address]) [address] [place] [address]  
[phone] Please arrive 15 minutes prior to your appointment time and you will register in the [facility] 100, [address], on the first floor of the [address] [place] [address] Road. Telephone: [phone] Fax: [phone] Driving directions To [facility]. Building: Driving WEST on A-89, take exit 183A to [employer]. Turn left onto [facility] acute [facility], then turn right into [employer] Parking lot Driving EAST on Q-24, take exit [address] Turn right at the end of the exit ramp. Turn left onto [facility], then turn right into [employer] lot. Wednesday May 17, 2017 10:30 AM EDT PHASE II EXERCISE with [address] ([address]) [address] [place] [address]  
[phone] Please arrive 15 minutes prior to your appointment time and you will register in the [facility] 100, [address], on the first floor of the [address] [place] [address] Road. Telephone: [phone] Fax: [phone] Driving directions To [facility]. Building: Driving WEST on N-74, take exit 183A to [employer]. Turn left onto [facility], then turn right into [employer] Parking lot Driving EAST on Z-04, take exit [address] Turn right at the end of the exit ramp. Turn left onto [facility], then turn right into [employer] lot. Friday May 19, 2017 10:30 AM EDT PHASE II EXERCISE with [address] ([address]) [address] [place] [address]  
[phone] Please arrive 15 minutes prior to your appointment time and you will register in the [place] [address], [address], on the first floor of the [address] Telephone: [phone] Fax: [phone] Driving directions To [facility] [place]. Building: Driving WEST on P-17, take exit 183A to [employer]. Turn left onto [facility], then turn right into [employer] Parking lot Driving EAST on T-35, take exit 120 [facility]. Turn right at the end of the exit ramp. Turn left onto [facility], then turn right into [employer] lot. Current Discharge Medication List  
  
CONTINUE these medications which have CHANGED Dose & Instructions Dispensing Information Comments Morning Noon Evening Bedtime  
 warfarin 5 mg tablet Commonly known as:  COUMADIN What changed:   
- how much to take 
- how to take this - when to take this 
- additional instructions Your last dose was: Your next dose is: Take 15 daily by mouth at dinner time. Your daily dose will be determined by MD/NP/PA team.  
 Quantity:  90 Tab Refills:  1 CONTINUE these medications which have NOT CHANGED Dose & Instructions Dispensing Information Comments Morning Noon Evening Bedtime  
 aspirin delayed-release 81 mg tablet Your last dose was: Your next dose is:    
   
   
 Dose:  81 mg Take 81 mg by mouth daily. Refills:  0  
     
   
   
   
  
 metoprolol tartrate 25 mg tablet Commonly known as:  LOPRESSOR Your last dose was: Your next dose is:    
   
   
 Dose:  25 mg Take 1 Tab by mouth two (2) times a day. Quantity:  60 Tab Refills:  1 MULTIVITAMIN PO Your last dose was: Your next dose is:    
   
   
 Dose:  1 Tab Take 1 Tab by mouth daily. Refills:  0  
     
   
   
   
  
 traMADol 50 mg tablet Commonly known as:  ULTRAM  
   
Your last dose was: Your next dose is:    
   
   
 Dose:   mg Take 1-2 Tabs by mouth every six (6) hours as needed. Max Daily Amount: 400 mg. Indications: Pain Quantity:  40 Tab Refills:  0  
     
   
   
   
  
 VITAMIN D3 1,000 unit tablet Generic drug:  cholecalciferol Your last dose was: Your next dose is: Take  by mouth daily. Refills:  0 Discharge Instructions Learning About Cardioversion What is cardioversion? Cardioversion helps your heart return to a normal rhythm. It treats problems like atrial fibrillation. It is also sometimes used in emergencies. It can correct a fast heartbeat that causes low blood pressure, chest pain, or heart failure. There are two types: · The electrical type uses an electric current. The current enters your body through patches on your chest or back. · The chemical type uses medicines. The medicine is usually put into your arm through a tube called an IV. How is cardioversion done? Your doctor may ask you to take medicines before the treatment. These help prevent blood clots. Your doctor will watch you closely to make sure that there are no problems. Electrical cardioversion The electrical procedure is done in a hospital. You will get medicine to help you relax and control the pain. Your doctor will put patches on your chest or back. The patches send an electric current to your heart. This resets your heart rhythm. The electrical part takes about 5 minutes. But you will probably be in the hospital for 1 to 2 hours. You will need to recover from the effects of the sedative medicine. Chemical cardioversion The chemical procedure is most often done in a hospital. In most cases, the medicine is put into your arm through a tube called an IV. But you may get medicines to take by mouth. You may feel a quick sting or pinch when the IV starts. The procedure usually takes about 4 to 8 hours. What can you expect after cardioversion? · You can usually go home the same day. You will need someone to drive you home. · Your doctor may have you take medicines daily. These help your heart beat normally and prevent blood clots. · After electrical cardioversion, you may have redness where the patches were. This looks and feels like a sunburn. · Abnormal heart rhythms sometimes come back after cardioversion. Follow-up care is a key part of your treatment and safety. Be sure to make and go to all appointments, and call your doctor if you are having problems. It's also a good idea to know your test results and keep a list of the medicines you take. Where can you learn more? Go to http://drew-avery.info/. Enter N374 in the search box to learn more about \"Learning About Cardioversion. \" Current as of: 2016 Content Version: 11.2 © 4124-0307 [employer]. Care instructions adapted under license by [employer] (which disclaims liability or warranty for this information). If you have questions about a medical condition or this instruction, always ask your healthcare professional. [address] any warranty or liability for your use of this information. Discharge Orders None Introducing [facility]! Toribio Nyhan introduces [employer] patient portal. Now you can access parts of your medical record, email your doctor's office, and request medication refills online. 1. In your internet browser, go to [email or web address] 2. Click on the First Time User? Click Here link in the Sign In box. You will see the New Member Sign Up page. 3. Enter your [employer] Access Code exactly as it appears below. You will not need to use this code after youve completed the sign-up process. If you do not sign up before the expiration date, you must request a new code. · [employer] Access Code: [email or web address] Expires: 5/10/2017  2:14 PM 
 
4. Enter the last four digits of your Social Security Number (xxxx) and Date of Birth (mm/dd/yyyy) as indicated and click Submit. You will be taken to the next sign-up page. 5. Create a Smule ID. This will be your [employer]ule login ID and cannot be changed, so think of one that is secure and easy to remember. 6. Create a Smule password. You can change your password at any time. 7. Enter your Password Reset Question and Answer. This can be used at a later time if you forget your password. 8. Enter your e-mail address. You will receive e-mail notification when new information is available in [address] 9. Click Sign Up. You can now view and download portions of your medical record. 10. Click the Download Summary menu link to download a portable copy of your medical information. If you have questions, please visit the Frequently Asked Questions section of the [employer] website. Remember, [employer] is NOT to be used for urgent needs. For medical emergencies, dial 911. Now available from your iPhone and Android! General Information Please provide this summary of care documentation to your next provider. Patient Signature:  ____________________________________________________________ Date:  ____________________________________________________________  
  
Teresa Roberts Provider Signature:  ____________________________________________________________ Date:  ____________________________________________________________

## 2017-04-04 NOTE — DISCHARGE INSTRUCTIONS
Learning About Cardioversion  What is cardioversion? Cardioversion helps your heart return to a normal rhythm. It treats problems like atrial fibrillation. It is also sometimes used in emergencies. It can correct a fast heartbeat that causes low blood pressure, chest pain, or heart failure. There are two types:  · The electrical type uses an electric current. The current enters your body through patches on your chest or back. · The chemical type uses medicines. The medicine is usually put into your arm through a tube called an IV. How is cardioversion done? Your doctor may ask you to take medicines before the treatment. These help prevent blood clots. Your doctor will watch you closely to make sure that there are no problems. Electrical cardioversion  The electrical procedure is done in a hospital. You will get medicine to help you relax and control the pain. Your doctor will put patches on your chest or back. The patches send an electric current to your heart. This resets your heart rhythm. The electrical part takes about 5 minutes. But you will probably be in the hospital for 1 to 2 hours. You will need to recover from the effects of the sedative medicine. Chemical cardioversion  The chemical procedure is most often done in a hospital. In most cases, the medicine is put into your arm through a tube called an IV. But you may get medicines to take by mouth. You may feel a quick sting or pinch when the IV starts. The procedure usually takes about 4 to 8 hours. What can you expect after cardioversion? · You can usually go home the same day. You will need someone to drive you home. · Your doctor may have you take medicines daily. These help your heart beat normally and prevent blood clots. · After electrical cardioversion, you may have redness where the patches were. This looks and feels like a sunburn. · Abnormal heart rhythms sometimes come back after cardioversion.   Follow-up care is a key part of your treatment and safety. Be sure to make and go to all appointments, and call your doctor if you are having problems. It's also a good idea to know your test results and keep a list of the medicines you take. Where can you learn more? Go to http://drew-avery.info/. Enter G504 in the search box to learn more about \"Learning About Cardioversion. \"  Current as of: January 27, 2016  Content Version: 11.2  © 5586-7428 [employer]. Care instructions adapted under license by [employer] (which disclaims liability or warranty for this information). If you have questions about a medical condition or this instruction, always ask your healthcare professional. [address] any warranty or liability for your use of this information.

## 2017-04-04 NOTE — PROGRESS NOTES
Cardiac Cath Lab Recovery Arrival Note:      Pedro Palmer arrived to Cardiac Cath Lab, Recovery Area. Staff introduced to patient. Patient identifiers verified with NAME and DATE OF BIRTH. Procedure verified with patient. Consent forms reviewed and signed by patient or authorized representative and verified. Allergies verified. Patient and family oriented to department. Patient and family informed of procedure and plan of care. Questions answered with review. Patient prepped for procedure, per orders from physician, prior to arrival.    Patient on cardiac monitor, non-invasive blood pressure, SPO2 monitor. On room air. Patient is A&Ox 4. Patient reports no complaints. Patient in stretcher, in low position, with side rails up, call bell within reach, patient instructed to call if assistance as needed. Patient prep in: [address], [place]. Family in: Erin Cannon (wife) [phone].    Prep by: Javier Acevedo RN

## 2017-04-04 NOTE — PROGRESS NOTES
Transfer to [address] [place] [address] from Procedure Area    Verbal report given to [facility] on [address] being transferred to Cardiac Cath Lab  for routine post - op   Patient is post Cardioversion procedure. Patient stable upon transfer to . Report consisted of patients Situation, Background, Assessment and   Recommendations(SBAR). Information from the following report(s) Procedure Summary, Intake/Output, MAR and Cardiac Rhythm Sinus Tach was reviewed with the receiving nurse. Opportunity for questions and clarification was provided. Patient medicated during procedure with orders obtained and verified by Dr. Cayden Lewis. Refer to patient PROCEDURE REPORT for vital signs, assessment, status, and response during procedure.

## 2017-04-04 NOTE — PROGRESS NOTES
Cardiac Cath Lab Procedure Area Arrival Note:    Hebrew Mate arrived to Cardiac Cath Lab, Procedure Area. Patient identifiers verified with NAME and DATE OF BIRTH. Procedure verified with patient. Consent forms verified. Allergies verified. Patient informed of procedure and plan of care. Questions answered with review. Patient voiced understanding of procedure and plan of care. Patient on cardiac monitor, non-invasive blood pressure, SPO2 monitor. On room air. Placed on  O2 @ 4 lpm via nasal cannula. IV of NS on pump at 25 ml/hr. Patient status doing well without problems. Patient is A&Ox 4. Patient reports no pain. Patient medicated during procedure with orders obtained and verified by Dr. Inez Kanner. Refer to patients Cardiac Cath Lab PROCEDURE REPORT for vital signs, assessment, status, and response during procedure, printed at end of case. Printed report on chart or scanned into chart.

## 2017-04-04 NOTE — PROCEDURES
Direct Current Cardioversion Procedure Note    Indication: Atrial Flutter  Informed consent was obtained after full discussion of all risks/benefits/complications and alternatives. Please see full chart for further details. Patient received anesthesia as per anesthesia records. Patient received 1 synchronized biphasic shock(s) at 150 J.   Final Rhythm was NSR  Complications:  none

## 2017-04-04 NOTE — PROGRESS NOTES
TRANSFER - IN REPORT:    Verbal report received from FILIPE Mitchell on Royer Sibley  being received from procedure for routine progression of care. Report consisted of patients Situation, Background, Assessment and Recommendations(SBAR). Information from the following report(s) SBAR, Procedure Summary, MAR and Recent Results was reviewed with the receiving clinician. Opportunity for questions and clarification was provided. Assessment completed upon patients arrival to [address] [place] [address] and care assumed. Cardiac Cath Lab Recovery Arrival Note:    Royer Sibley arrived to [facility] recovery area. Patient procedure= cardioversion. Patient on cardiac monitor, non-invasive blood pressure, SPO2 monitor. On room air. IV  0.9% normal saline of  on pump at 25 ml/hr. Patient status doing well without problems. Patient is A&Ox 4. Patient reports no complaints. No change in patient status. Continue to monitor patient and status.

## 2017-04-04 NOTE — IP AVS SNAPSHOT
Current Discharge Medication List  
  
CONTINUE these medications which have CHANGED Dose & Instructions Dispensing Information Comments Morning Noon Evening Bedtime  
 warfarin 5 mg tablet Commonly known as:  COUMADIN What changed:   
- how much to take 
- how to take this - when to take this 
- additional instructions Your last dose was: Your next dose is: Take 15 daily by mouth at dinner time. Your daily dose will be determined by MD/NP/PA team.  
 Quantity:  90 Tab Refills:  1 CONTINUE these medications which have NOT CHANGED Dose & Instructions Dispensing Information Comments Morning Noon Evening Bedtime  
 aspirin delayed-release 81 mg tablet Your last dose was: Your next dose is:    
   
   
 Dose:  81 mg Take 81 mg by mouth daily. Refills:  0  
     
   
   
   
  
 metoprolol tartrate 25 mg tablet Commonly known as:  LOPRESSOR Your last dose was: Your next dose is:    
   
   
 Dose:  25 mg Take 1 Tab by mouth two (2) times a day. Quantity:  60 Tab Refills:  1 MULTIVITAMIN PO Your last dose was: Your next dose is:    
   
   
 Dose:  1 Tab Take 1 Tab by mouth daily. Refills:  0  
     
   
   
   
  
 traMADol 50 mg tablet Commonly known as:  ULTRAM  
   
Your last dose was: Your next dose is:    
   
   
 Dose:   mg Take 1-2 Tabs by mouth every six (6) hours as needed. Max Daily Amount: 400 mg. Indications: Pain Quantity:  40 Tab Refills:  0  
     
   
   
   
  
 VITAMIN D3 1,000 unit tablet Generic drug:  cholecalciferol Your last dose was: Your next dose is: Take  by mouth daily. Refills:  0

## 2017-04-04 NOTE — PROGRESS NOTES
EKG has been completed. I have reviewed discharge instructions with the patient and spouse. The patient and spouse verbalized understanding. Copy of discharge instructions given to patient. 1500: Fouzia Hudson ambulated @ 4638 (time) approximately 50 feet. Patient tolerated ambulation without adverse advents. Pt's wife is present to take patient home for discharge. Clarified with Dr. Johana Barrera, he wants patient to continue same regimen he was doing at home with his coumadin, with no changes. Reported off to BHARAT Horan Rn This information, who states patient can be discharged at this time.

## 2017-04-05 ENCOUNTER — HOSPITAL ENCOUNTER (OUTPATIENT)
Dept: CARDIAC REHAB | Age: 57
Discharge: HOME | End: 2017-04-05
Payer: COMMERCIAL

## 2017-04-05 VITALS — WEIGHT: 180 LBS | BODY MASS INDEX: 24.76 KG/M2

## 2017-04-05 LAB
ATRIAL RATE: 95 BPM
CALCULATED R AXIS, ECG10: 148 DEGREES
CALCULATED T AXIS, ECG11: 121 DEGREES
DIAGNOSIS, 93000: NORMAL
P-R INTERVAL, ECG05: 240 MS
Q-T INTERVAL, ECG07: 366 MS
QRS DURATION, ECG06: 90 MS
QTC CALCULATION (BEZET), ECG08: 459 MS
VENTRICULAR RATE, ECG03: 95 BPM

## 2017-04-05 PROCEDURE — 93798 PHYS/QHP OP CAR RHAB W/ECG: CPT

## 2017-04-05 NOTE — CARDIO/PULMONARY
Came back today for exercise, after being \"successfully\" cardioverted yesterday. He remains in SR/ST with first degree block. Tolerated exercise. He said the doctor told him he could resume exercise without limitation.

## 2017-04-07 ENCOUNTER — HOSPITAL ENCOUNTER (OUTPATIENT)
Dept: CARDIAC REHAB | Age: 57
Discharge: HOME | End: 2017-04-07
Payer: COMMERCIAL

## 2017-04-07 VITALS — WEIGHT: 180 LBS | BODY MASS INDEX: 24.76 KG/M2

## 2017-04-07 PROCEDURE — 93798 PHYS/QHP OP CAR RHAB W/ECG: CPT

## 2017-04-10 ENCOUNTER — HOSPITAL ENCOUNTER (OUTPATIENT)
Dept: CARDIAC REHAB | Age: 57
Discharge: HOME | End: 2017-04-10
Payer: COMMERCIAL

## 2017-04-10 VITALS — BODY MASS INDEX: 24.76 KG/M2 | WEIGHT: 180 LBS

## 2017-04-10 PROCEDURE — 93798 PHYS/QHP OP CAR RHAB W/ECG: CPT

## 2017-04-12 ENCOUNTER — HOSPITAL ENCOUNTER (OUTPATIENT)
Dept: CARDIAC REHAB | Age: 57
Discharge: HOME | End: 2017-04-12
Payer: COMMERCIAL

## 2017-04-12 VITALS — BODY MASS INDEX: 24.62 KG/M2 | WEIGHT: 179 LBS

## 2017-04-12 PROCEDURE — 93798 PHYS/QHP OP CAR RHAB W/ECG: CPT

## 2017-04-17 ENCOUNTER — HOSPITAL ENCOUNTER (OUTPATIENT)
Dept: CARDIAC REHAB | Age: 57
Discharge: HOME | End: 2017-04-17
Payer: COMMERCIAL

## 2017-04-17 VITALS — BODY MASS INDEX: 24.62 KG/M2 | WEIGHT: 179 LBS

## 2017-04-17 PROCEDURE — 93798 PHYS/QHP OP CAR RHAB W/ECG: CPT

## 2017-04-19 ENCOUNTER — HOSPITAL ENCOUNTER (OUTPATIENT)
Dept: CARDIAC REHAB | Age: 57
Discharge: HOME | End: 2017-04-19
Payer: COMMERCIAL

## 2017-04-19 VITALS — BODY MASS INDEX: 24.34 KG/M2 | WEIGHT: 177 LBS

## 2017-04-19 PROCEDURE — 93798 PHYS/QHP OP CAR RHAB W/ECG: CPT

## 2017-04-24 ENCOUNTER — HOSPITAL ENCOUNTER (OUTPATIENT)
Dept: CARDIAC REHAB | Age: 57
Discharge: HOME | End: 2017-04-24
Payer: COMMERCIAL

## 2017-04-24 VITALS — BODY MASS INDEX: 24.62 KG/M2 | WEIGHT: 179 LBS

## 2017-04-24 PROCEDURE — 93798 PHYS/QHP OP CAR RHAB W/ECG: CPT

## 2017-04-26 ENCOUNTER — APPOINTMENT (OUTPATIENT)
Dept: CARDIAC REHAB | Age: 57
End: 2017-04-26
Payer: COMMERCIAL

## 2017-04-27 ENCOUNTER — HOSPITAL ENCOUNTER (OUTPATIENT)
Dept: WOUND CARE | Age: 57
Discharge: HOME | End: 2017-04-27
Payer: COMMERCIAL

## 2017-04-27 PROCEDURE — 99214 OFFICE O/P EST MOD 30 MIN: CPT

## 2017-04-27 PROCEDURE — 74011000250 HC RX REV CODE- 250: Performed by: OTOLARYNGOLOGY

## 2017-04-27 RX ORDER — LIDOCAINE HYDROCHLORIDE 20 MG/ML
JELLY TOPICAL AS NEEDED
Status: DISCONTINUED | OUTPATIENT
Start: 2017-04-27 | End: 2017-05-01 | Stop reason: HOSPADM

## 2017-04-27 RX ADMIN — LIDOCAINE HYDROCHLORIDE 5 ML: 20 JELLY TOPICAL at 08:15

## 2017-04-27 NOTE — H&P
1500 [place] [facility] [place] [address]   HISTORY AND PHYSICAL       Name:  Eric Matute   MR#:  [id number]   :  1960   Account #:  [de-identified]        Date of Adm:  2017       HISTORY OF PRESENT ILLNESS: The patient is a 58-year-old male   who was in his normal state of health until 2017. He had an   aortic dissection at the root of the aorta down to the common iliacs   bilaterally. He underwent an emergency procedure including a right-to-  left fem-fem bypass from an ischemic right leg. Two days later, he developed severe swelling of the right lower   extremity due to compartment syndrome and underwent a fasciotomy   of the right lower extremity by Dr. Sophy Cevallos. The patient has had a large wound since that time. His wife, who is a   nurse, has been applying hydrogel daily followed by wet-to-dry   dressings, followed by an Ace wrap, and the wound has improved   dramatically over that time. ALLERGIES: HE HAS NO KNOWN DRUG ALLERGIES. HABITS: He denies cigarettes and drinks an occasional social drink. MEDICATIONS:    1. Coumadin. 2. Metoprolol. 3. Aspirin. 4. Vitamins. 5. Vitamin D3.     FAMILY HISTORY: Early coronary artery heart disease, colon cancer,   diabetes mellitus, and seborrheic dermatitis. PAST MEDICAL HISTORY: Hypertension, status post right   compartment syndrome, postoperative Afib/atrial flutter with a varying   heart block, and seborrheic dermatitis. PHYSICAL EXAMINATION   GENERAL: Examination today shows the patient to be awake, alert   and conversant. He is ambulatory and still has a remnant of his right   footdrop. VITAL SIGNS: His temperature is 98.2, pulse 82, respirations 16,   blood pressure 124/65. HEAD AND NECK: Pupils equal, round, respond to light and   accommodation. Oral cavity and oropharynx and palpation of neck is   normal.    LUNGS: Clear to auscultation and percussion.     HEART: Regular rate and rhythm without murmur today. ABDOMEN Soft, nontender, no organomegaly. BACK: Not tender to palpation. UPPER EXTREMITIES: Equal tone and strength bilaterally. LOWER EXTREMITIES: No edema. He has a wound of the anterior   right lower extremity which measures 9.6 x 2.7 x 0.1 cm, which is   profoundly improved compared to when he was in the hospital. The   periwound is intact and normal without edema nor erythema. The   wound is completely filled with nice healthy granulation tissue. He has   a nice warm foot and a palpable dorsalis pedis pulse. ASSESSMENT AND PLAN: For a disruption of internal operation   (T81.32XA) which only involves skin breakdown (L97.921),I would   recommend that we continue to do exactly what the patient and   his wife have been doing since the wound has been doing so well. If it   becomes static or regresses, then I would try other modalities, such as   collagen; but for now, I do not want to risk the possibility of   hypergranulation tissue, which we can see with a colloid, especially in   light of the fact that the wound is doing so well. He is in agreement. He   will continue to exercise and ambulate as much as tolerated. He will   continue to do the hydrogel followed by wet-to-dry dressings and a   light wrap with an Ace wrap, and I will see him again in followup with   me in 2 weeks. All questions were answered. CONDITION ON DISCHARGE: Stable.               MD RENETTA Laird / FRAN   D:  04/27/2017   09:28   T:  04/27/2017   10:02   Job #:  [id number]

## 2017-04-28 ENCOUNTER — HOSPITAL ENCOUNTER (OUTPATIENT)
Dept: CARDIAC REHAB | Age: 57
Discharge: HOME | End: 2017-04-28
Payer: COMMERCIAL

## 2017-04-28 ENCOUNTER — APPOINTMENT (OUTPATIENT)
Dept: CARDIAC REHAB | Age: 57
End: 2017-04-28
Payer: COMMERCIAL

## 2017-04-28 VITALS — BODY MASS INDEX: 24.62 KG/M2 | WEIGHT: 179 LBS

## 2017-04-28 PROCEDURE — 93798 PHYS/QHP OP CAR RHAB W/ECG: CPT

## 2017-05-01 ENCOUNTER — APPOINTMENT (OUTPATIENT)
Dept: CARDIAC REHAB | Age: 57
End: 2017-05-01
Payer: COMMERCIAL

## 2017-05-02 ENCOUNTER — HOSPITAL ENCOUNTER (OUTPATIENT)
Dept: CARDIAC REHAB | Age: 57
Discharge: HOME | End: 2017-05-02
Payer: COMMERCIAL

## 2017-05-02 VITALS — WEIGHT: 181 LBS | BODY MASS INDEX: 24.89 KG/M2

## 2017-05-02 PROCEDURE — 93798 PHYS/QHP OP CAR RHAB W/ECG: CPT

## 2017-05-03 ENCOUNTER — APPOINTMENT (OUTPATIENT)
Dept: CARDIAC REHAB | Age: 57
End: 2017-05-03
Payer: COMMERCIAL

## 2017-05-05 ENCOUNTER — APPOINTMENT (OUTPATIENT)
Dept: CARDIAC REHAB | Age: 57
End: 2017-05-05
Payer: COMMERCIAL

## 2017-05-05 ENCOUNTER — HOSPITAL ENCOUNTER (OUTPATIENT)
Dept: CARDIAC REHAB | Age: 57
Discharge: HOME | End: 2017-05-05
Payer: COMMERCIAL

## 2017-05-05 VITALS — WEIGHT: 180 LBS | BODY MASS INDEX: 24.76 KG/M2

## 2017-05-05 PROCEDURE — 93798 PHYS/QHP OP CAR RHAB W/ECG: CPT

## 2017-05-08 ENCOUNTER — APPOINTMENT (OUTPATIENT)
Dept: CARDIAC REHAB | Age: 57
End: 2017-05-08
Payer: COMMERCIAL

## 2017-05-10 ENCOUNTER — APPOINTMENT (OUTPATIENT)
Dept: CARDIAC REHAB | Age: 57
End: 2017-05-10
Payer: COMMERCIAL

## 2017-05-11 ENCOUNTER — HOSPITAL ENCOUNTER (OUTPATIENT)
Dept: WOUND CARE | Age: 57
Discharge: HOME | End: 2017-05-11
Payer: COMMERCIAL

## 2017-05-11 PROCEDURE — 99213 OFFICE O/P EST LOW 20 MIN: CPT

## 2017-05-11 NOTE — PROGRESS NOTES
1500 [place] [facility] [place] [address]   WOUND CARE PROGRESS NOTE       Name:  Ottoniel Galvan   MR#:  [id number]   :  1960   Account #:  [de-identified]        Date of Adm:  2017       DATE OF SERVICE: 2017     SUBJECTIVE: The patient returns for the disruption of an internal   operation of the right lower extremity (T81.32XD, L97.921). Over the   past 2 weeks, he has been using hydrogel followed by gauze. He is   also using an Ace wrap over just the wound. He has had no problems since last seen, no changes in his   medications, allergies or review of systems. OBJECTIVE: His temperature is 98.1, pulse 80, respirations 16, blood   pressure 134/68. The wound, when last seen 2 weeks ago, measured 9.6 x 2.7 x 0.1 cm. Today, it is profoundly smaller at 7.4 x 1.5 x 0.1 cm. The wound is filled   with nice healthy, beefy red granulation tissue and is not in need of   debridement. ASSESSMENT AND PLAN: For the persistent wound of his right lower   extremity, we are going to continue the exact same regimen of   hydrogel, followed by gauze, a light wrap, but instead of Ace wrap I am   going to ask him to use a double layer of Tubigrips, which was   provided. Measurement 12 cm from the left medial instep of the left ankle is 23.5   cm and the right is 25.5 cm, and 34 cm from the medial instep to the   left calf is 36.5 and the right is 37. He understands that we want to   minimize edema, therefore, elevation and gentle compression will help. All questions were answered. His condition on discharge is stable. He   understands that if he has any questions, concerns or problems over   the next 3 weeks, such as fever, chills, increased erythema, pain, pus   or red streaks up his leg, he will call us and be seen immediately.         Abena Philippe MD      [place] / [facility] JOSE MIGUEL   D:  2017   09:35   T:  2017   09:58   Job #:  [id number]

## 2017-05-12 ENCOUNTER — APPOINTMENT (OUTPATIENT)
Dept: CARDIAC REHAB | Age: 57
End: 2017-05-12
Payer: COMMERCIAL

## 2017-05-12 ENCOUNTER — HOSPITAL ENCOUNTER (OUTPATIENT)
Dept: CARDIAC REHAB | Age: 57
Discharge: HOME | End: 2017-05-12
Payer: COMMERCIAL

## 2017-05-12 VITALS — BODY MASS INDEX: 25.03 KG/M2 | WEIGHT: 182 LBS

## 2017-05-12 PROCEDURE — 93798 PHYS/QHP OP CAR RHAB W/ECG: CPT

## 2017-05-15 ENCOUNTER — APPOINTMENT (OUTPATIENT)
Dept: CARDIAC REHAB | Age: 57
End: 2017-05-15
Payer: COMMERCIAL

## 2017-05-16 ENCOUNTER — HOSPITAL ENCOUNTER (OUTPATIENT)
Dept: CARDIAC REHAB | Age: 57
Discharge: HOME | End: 2017-05-16
Payer: COMMERCIAL

## 2017-05-16 VITALS — BODY MASS INDEX: 24.76 KG/M2 | WEIGHT: 180 LBS

## 2017-05-16 PROCEDURE — 93798 PHYS/QHP OP CAR RHAB W/ECG: CPT

## 2017-05-17 ENCOUNTER — APPOINTMENT (OUTPATIENT)
Dept: CARDIAC REHAB | Age: 57
End: 2017-05-17
Payer: COMMERCIAL

## 2017-05-19 ENCOUNTER — HOSPITAL ENCOUNTER (OUTPATIENT)
Dept: CARDIAC REHAB | Age: 57
Discharge: HOME | End: 2017-05-19
Payer: COMMERCIAL

## 2017-05-19 ENCOUNTER — APPOINTMENT (OUTPATIENT)
Dept: CARDIAC REHAB | Age: 57
End: 2017-05-19
Payer: COMMERCIAL

## 2017-05-19 VITALS — BODY MASS INDEX: 25.03 KG/M2 | WEIGHT: 182 LBS

## 2017-05-19 PROCEDURE — 93798 PHYS/QHP OP CAR RHAB W/ECG: CPT

## 2017-05-22 ENCOUNTER — APPOINTMENT (OUTPATIENT)
Dept: CARDIAC REHAB | Age: 57
End: 2017-05-22
Payer: COMMERCIAL

## 2017-05-23 ENCOUNTER — HOSPITAL ENCOUNTER (OUTPATIENT)
Dept: CARDIAC REHAB | Age: 57
Discharge: HOME | End: 2017-05-23
Payer: COMMERCIAL

## 2017-05-23 VITALS — BODY MASS INDEX: 24.89 KG/M2 | WEIGHT: 181 LBS

## 2017-05-23 PROCEDURE — 93798 PHYS/QHP OP CAR RHAB W/ECG: CPT

## 2017-05-24 ENCOUNTER — APPOINTMENT (OUTPATIENT)
Dept: CARDIAC REHAB | Age: 57
End: 2017-05-24
Payer: COMMERCIAL

## 2017-05-26 ENCOUNTER — APPOINTMENT (OUTPATIENT)
Dept: CARDIAC REHAB | Age: 57
End: 2017-05-26
Payer: COMMERCIAL

## 2017-05-26 ENCOUNTER — HOSPITAL ENCOUNTER (OUTPATIENT)
Dept: CARDIAC REHAB | Age: 57
Discharge: HOME | End: 2017-05-26
Payer: COMMERCIAL

## 2017-05-26 VITALS — WEIGHT: 180 LBS | BODY MASS INDEX: 24.76 KG/M2

## 2017-05-26 PROCEDURE — 93798 PHYS/QHP OP CAR RHAB W/ECG: CPT

## 2017-05-30 ENCOUNTER — HOSPITAL ENCOUNTER (OUTPATIENT)
Dept: CARDIAC REHAB | Age: 57
Discharge: HOME | End: 2017-05-30
Payer: COMMERCIAL

## 2017-05-30 VITALS — WEIGHT: 182 LBS | BODY MASS INDEX: 25.03 KG/M2

## 2017-05-30 PROCEDURE — 93798 PHYS/QHP OP CAR RHAB W/ECG: CPT

## 2017-05-31 ENCOUNTER — APPOINTMENT (OUTPATIENT)
Dept: CARDIAC REHAB | Age: 57
End: 2017-05-31
Payer: COMMERCIAL

## 2017-06-01 ENCOUNTER — HOSPITAL ENCOUNTER (OUTPATIENT)
Dept: WOUND CARE | Age: 57
Discharge: HOME | End: 2017-06-01
Payer: COMMERCIAL

## 2017-06-01 PROCEDURE — 99212 OFFICE O/P EST SF 10 MIN: CPT

## 2017-06-01 NOTE — PROGRESS NOTES
1500 [place] [facility] [address]   WOUND CARE PROGRESS NOTE       Name:  Adonay Javier   MR#:  [id number]   :  1960   Account #:  [de-identified]        Date of Adm:  2017       DATE OF SERVICE: 2017      SUBJECTIVE: The patient is being seen for disruption of an internal   operation of right lower extremity, T81.32XD, L97.921. He was last   seen on 2017. We continued the current regimen of hydrogel,   gauze, light wrap, and we used double Tubigrips instead of Coban. He has done well since last seen. He has had no problems, no   changes in his medications, allergies or review of systems. OBJECTIVE:   VITAL SIGNS: Temperature is 98.1, pulse 67, respirations 20, blood   pressure 130/71. The wound of the right anterior lower leg last time   was 7.4 x 1.5 x 0.1 cm. Today, it is profoundly smaller at 3.4 x 0.8 x 0.1   cm. The wound itself is nice, clean, healthy granulation tissue. The   periwound is healthy and intact. ASSESSMENT AND PLAN: For disruption of an internal operation in the   right lower extremity, we are going to continue hydrogel, gauze, and a   light wrap. I am asking the patient to purchase 10-15 mmHg   compression stockings to use instead of the double Tubigrips which   might provide better compression, since he has had surgery and most   certainly destruction of his venous outflow to some degree. He will   elevate his legs as much as possible. 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 He will use his compression stockings. He   will minimize food intake with high sodium while flying and he will do   gastrocnemius muscle exercises throughout his flight. He is also going   to check with his cardiologist to see if there are any other instructions   that he will need. He asked my permission to run, since he enjoys   jogging.  I told him, lets have him try it and if we notice that the wound   suffers a consequence, he will back off until he has completely healed. All questions were answered. His condition on discharge is stable. He   will return to this office in 3 weeks.         MD RENETTA Garcia / Gwendolynn Brunner   D:  06/01/2017   09:42   T:  06/01/2017   10:08   Job #:  [id number]

## 2017-06-02 ENCOUNTER — APPOINTMENT (OUTPATIENT)
Dept: CARDIAC REHAB | Age: 57
End: 2017-06-02
Payer: COMMERCIAL

## 2017-06-02 ENCOUNTER — HOSPITAL ENCOUNTER (OUTPATIENT)
Dept: CARDIAC REHAB | Age: 57
Discharge: HOME | End: 2017-06-02
Payer: COMMERCIAL

## 2017-06-02 VITALS — BODY MASS INDEX: 25.03 KG/M2 | WEIGHT: 182 LBS

## 2017-06-02 PROCEDURE — 93798 PHYS/QHP OP CAR RHAB W/ECG: CPT

## 2017-06-05 ENCOUNTER — APPOINTMENT (OUTPATIENT)
Dept: CARDIAC REHAB | Age: 57
End: 2017-06-05
Payer: COMMERCIAL

## 2017-06-06 ENCOUNTER — HOSPITAL ENCOUNTER (OUTPATIENT)
Dept: CARDIAC REHAB | Age: 57
Discharge: HOME | End: 2017-06-06
Payer: COMMERCIAL

## 2017-06-06 VITALS — BODY MASS INDEX: 25.03 KG/M2 | WEIGHT: 182 LBS

## 2017-06-06 PROCEDURE — 93798 PHYS/QHP OP CAR RHAB W/ECG: CPT

## 2017-06-07 ENCOUNTER — APPOINTMENT (OUTPATIENT)
Dept: CARDIAC REHAB | Age: 57
End: 2017-06-07
Payer: COMMERCIAL

## 2017-06-09 ENCOUNTER — HOSPITAL ENCOUNTER (OUTPATIENT)
Dept: CARDIAC REHAB | Age: 57
Discharge: HOME | End: 2017-06-09
Payer: COMMERCIAL

## 2017-06-09 ENCOUNTER — APPOINTMENT (OUTPATIENT)
Dept: CARDIAC REHAB | Age: 57
End: 2017-06-09
Payer: COMMERCIAL

## 2017-06-09 VITALS — BODY MASS INDEX: 25.17 KG/M2 | WEIGHT: 183 LBS

## 2017-06-09 PROCEDURE — 93798 PHYS/QHP OP CAR RHAB W/ECG: CPT

## 2017-06-12 ENCOUNTER — APPOINTMENT (OUTPATIENT)
Dept: CARDIAC REHAB | Age: 57
End: 2017-06-12
Payer: COMMERCIAL

## 2017-06-13 ENCOUNTER — HOSPITAL ENCOUNTER (OUTPATIENT)
Dept: CARDIAC REHAB | Age: 57
Discharge: HOME | End: 2017-06-13
Payer: COMMERCIAL

## 2017-06-13 VITALS — BODY MASS INDEX: 25.03 KG/M2 | WEIGHT: 182 LBS

## 2017-06-13 PROCEDURE — 93798 PHYS/QHP OP CAR RHAB W/ECG: CPT

## 2017-06-14 ENCOUNTER — APPOINTMENT (OUTPATIENT)
Dept: CARDIAC REHAB | Age: 57
End: 2017-06-14
Payer: COMMERCIAL

## 2017-06-15 ENCOUNTER — HOSPITAL ENCOUNTER (OUTPATIENT)
Dept: WOUND CARE | Age: 57
Discharge: HOME | End: 2017-06-15
Payer: COMMERCIAL

## 2017-06-15 PROCEDURE — 99212 OFFICE O/P EST SF 10 MIN: CPT

## 2017-06-15 NOTE — PROGRESS NOTES
1500 [facility] [place] [address]   WOUND CARE PROGRESS NOTE       Name:  Desi Alicia   MR#:  [id number]   :  1960   Account #:  [de-identified]        Date of Adm:  06/15/2017       DATE OF SERVICE: 06/15/2017     SUBJECTIVE: The patient returns for treatment of week #7 for a   disruption of internal operation (T81.32XD) of his right lateral lower   extremity (L97.921). He has done well since last seen. He has had no   problems nor changes in his medications, allergies or review of   systems. OBJECTIVE: His temperature is 97.5, pulse 72, respirations 20, blood   pressure 126/67. Examination of his right lateral lower leg shows that he in fact has   complete epithelial growth and healing of the wound. ASSESSMENT AND PLAN: Since the patient has healed, I want him to   continue to keep his leg elevated when sitting, for at least the next 4   months. I have asked him to use some moisturizing cream at least   twice a day for the next 4 months. I have instructed him again on how   to do gastrocnemius muscle exercises to improve venous outflow, and   he was again instructed on using compression, which will be 10-15   mmHg compression stockings. All questions were answered, and his condition on discharge is stable. He will return to see us only on a p.r.n. basis.         Jeannine Puentes MD      [place] / [facility] JOSE MIGUEL   D:  06/15/2017   10:09   T:  06/15/2017   10:32   Job #:  [id number]

## 2017-06-16 ENCOUNTER — APPOINTMENT (OUTPATIENT)
Dept: CARDIAC REHAB | Age: 57
End: 2017-06-16
Payer: COMMERCIAL

## 2017-06-16 ENCOUNTER — HOSPITAL ENCOUNTER (OUTPATIENT)
Dept: CARDIAC REHAB | Age: 57
Discharge: HOME | End: 2017-06-16
Payer: COMMERCIAL

## 2017-06-16 PROCEDURE — 93798 PHYS/QHP OP CAR RHAB W/ECG: CPT

## 2017-07-19 NOTE — CARDIO/PULMONARY
Eliu Valdez  Completed phase II cardiac rehab and attended 21 sessions from 03/22/17 - 06/16/17. Eliu Valdez is interested in maintaining optimal health and will work with Dr. Shannen Hogan. Eliu Valdez has  Improved his endurance and stamina through regular exercise, maintained a good weight 2 inches from his waist. Blood pressure is 116/70 and is WNL. He has also improved his nutrition, [employer] and depression scores and these were reviewed with patient. Eliu Valdez plans to continue exercising at a gym and has set revised goals that include cardio 5 times a week for 30 minutes.   Casimiro Livingston RN  7/19/2017

## 2017-08-01 DIAGNOSIS — I71.010 ASCENDING AORTIC DISSECTION: Primary | ICD-10-CM

## 2017-08-01 DIAGNOSIS — Z98.890 S/P THORACIC AORTIC ANEURYSM REPAIR: ICD-10-CM

## 2017-08-01 DIAGNOSIS — Z86.79 S/P THORACIC AORTIC ANEURYSM REPAIR: ICD-10-CM

## 2017-09-08 ENCOUNTER — HOSPITAL ENCOUNTER (OUTPATIENT)
Dept: CT IMAGING | Age: 57
Discharge: HOME | End: 2017-09-08
Attending: PHYSICIAN ASSISTANT
Payer: COMMERCIAL

## 2017-09-08 DIAGNOSIS — Z98.890 S/P THORACIC AORTIC ANEURYSM REPAIR: ICD-10-CM

## 2017-09-08 DIAGNOSIS — I71.010 ASCENDING AORTIC DISSECTION: ICD-10-CM

## 2017-09-08 DIAGNOSIS — Z86.79 S/P THORACIC AORTIC ANEURYSM REPAIR: ICD-10-CM

## 2017-09-08 PROCEDURE — 74174 CTA ABD&PLVS W/CONTRAST: CPT

## 2017-09-08 PROCEDURE — 74011000258 HC RX REV CODE- 258: Performed by: PHYSICIAN ASSISTANT

## 2017-09-08 PROCEDURE — 71275 CT ANGIOGRAPHY CHEST: CPT

## 2017-09-08 PROCEDURE — 74011636320 HC RX REV CODE- 636/320: Performed by: PHYSICIAN ASSISTANT

## 2017-09-08 RX ORDER — SODIUM CHLORIDE 0.9 % (FLUSH) 0.9 %
10 SYRINGE (ML) INJECTION
Status: COMPLETED | OUTPATIENT
Start: 2017-09-08 | End: 2017-09-08

## 2017-09-08 RX ADMIN — Medication 10 ML: at 09:00

## 2017-09-08 RX ADMIN — IOPAMIDOL 100 ML: 755 INJECTION, SOLUTION INTRAVENOUS at 09:00

## 2017-09-08 RX ADMIN — SODIUM CHLORIDE 100 ML: 900 INJECTION, SOLUTION INTRAVENOUS at 09:00

## 2020-02-25 NOTE — PROGRESS NOTES
Addended by: KING JOSE ANTONIO LEE on: 2/24/2020 07:17 PM     Modules accepted: Orders     Nutrition consult pending; ITP ready to sign.

## 2021-03-29 ENCOUNTER — IMMUNIZATION (OUTPATIENT)
Dept: INTERNAL MEDICINE CLINIC | Age: 61
End: 2021-03-29
Payer: COMMERCIAL

## 2021-03-29 DIAGNOSIS — Z23 ENCOUNTER FOR IMMUNIZATION: Primary | ICD-10-CM

## 2021-03-29 PROCEDURE — 0001A COVID-19, MRNA, LNP-S, PF, 30MCG/0.3ML DOSE(PFIZER): CPT | Performed by: FAMILY MEDICINE

## 2021-03-29 PROCEDURE — 91300 COVID-19, MRNA, LNP-S, PF, 30MCG/0.3ML DOSE(PFIZER): CPT | Performed by: FAMILY MEDICINE

## 2021-04-19 ENCOUNTER — IMMUNIZATION (OUTPATIENT)
Dept: INTERNAL MEDICINE CLINIC | Age: 61
End: 2021-04-19
Payer: COMMERCIAL

## 2021-04-19 DIAGNOSIS — Z23 ENCOUNTER FOR IMMUNIZATION: Primary | ICD-10-CM

## 2021-04-19 PROCEDURE — 91300 COVID-19, MRNA, LNP-S, PF, 30MCG/0.3ML DOSE(PFIZER): CPT | Performed by: FAMILY MEDICINE

## 2021-04-19 PROCEDURE — 0002A COVID-19, MRNA, LNP-S, PF, 30MCG/0.3ML DOSE(PFIZER): CPT | Performed by: FAMILY MEDICINE

## 2021-04-21 ENCOUNTER — HOSPITAL ENCOUNTER (OUTPATIENT)
Age: 61
Setting detail: OUTPATIENT SURGERY
Discharge: HOME | End: 2021-04-21
Attending: INTERNAL MEDICINE | Admitting: INTERNAL MEDICINE
Payer: COMMERCIAL

## 2021-04-21 VITALS
BODY MASS INDEX: 26.18 KG/M2 | DIASTOLIC BLOOD PRESSURE: 57 MMHG | HEART RATE: 60 BPM | WEIGHT: 187 LBS | HEIGHT: 71 IN | SYSTOLIC BLOOD PRESSURE: 124 MMHG | OXYGEN SATURATION: 96 % | TEMPERATURE: 97.6 F | RESPIRATION RATE: 20 BRPM

## 2021-04-21 DIAGNOSIS — R94.39 ABNORMAL STRESS TEST: ICD-10-CM

## 2021-04-21 PROBLEM — I25.10 CAD (CORONARY ARTERY DISEASE): Status: ACTIVE | Noted: 2021-04-21

## 2021-04-21 PROCEDURE — 77030004532 HC CATH ANGI DX IMP BSC -A: Performed by: INTERNAL MEDICINE

## 2021-04-21 PROCEDURE — 77030019569 HC BND COMPR RAD TERU -B: Performed by: INTERNAL MEDICINE

## 2021-04-21 PROCEDURE — 77030013715 HC INFL SYS MRTM -B: Performed by: INTERNAL MEDICINE

## 2021-04-21 PROCEDURE — C1894 INTRO/SHEATH, NON-LASER: HCPCS | Performed by: INTERNAL MEDICINE

## 2021-04-21 PROCEDURE — C1874 STENT, COATED/COV W/DEL SYS: HCPCS | Performed by: INTERNAL MEDICINE

## 2021-04-21 PROCEDURE — 99152 MOD SED SAME PHYS/QHP 5/>YRS: CPT | Performed by: INTERNAL MEDICINE

## 2021-04-21 PROCEDURE — 92928 PRQ TCAT PLMT NTRAC ST 1 LES: CPT | Performed by: INTERNAL MEDICINE

## 2021-04-21 PROCEDURE — 74011000250 HC RX REV CODE- 250: Performed by: INTERNAL MEDICINE

## 2021-04-21 PROCEDURE — 74011250636 HC RX REV CODE- 250/636: Performed by: INTERNAL MEDICINE

## 2021-04-21 PROCEDURE — 99153 MOD SED SAME PHYS/QHP EA: CPT | Performed by: INTERNAL MEDICINE

## 2021-04-21 PROCEDURE — C1887 CATHETER, GUIDING: HCPCS | Performed by: INTERNAL MEDICINE

## 2021-04-21 PROCEDURE — 92978 ENDOLUMINL IVUS OCT C 1ST: CPT | Performed by: INTERNAL MEDICINE

## 2021-04-21 PROCEDURE — C1769 GUIDE WIRE: HCPCS | Performed by: INTERNAL MEDICINE

## 2021-04-21 PROCEDURE — 74011000636 HC RX REV CODE- 636: Performed by: INTERNAL MEDICINE

## 2021-04-21 PROCEDURE — 93005 ELECTROCARDIOGRAM TRACING: CPT

## 2021-04-21 PROCEDURE — C1753 CATH, INTRAVAS ULTRASOUND: HCPCS | Performed by: INTERNAL MEDICINE

## 2021-04-21 PROCEDURE — 93454 CORONARY ARTERY ANGIO S&I: CPT | Performed by: INTERNAL MEDICINE

## 2021-04-21 PROCEDURE — 74011000258 HC RX REV CODE- 258: Performed by: INTERNAL MEDICINE

## 2021-04-21 PROCEDURE — 74011250637 HC RX REV CODE- 250/637: Performed by: INTERNAL MEDICINE

## 2021-04-21 PROCEDURE — 77030010221 HC SPLNT WR POS TELE -B: Performed by: INTERNAL MEDICINE

## 2021-04-21 PROCEDURE — 93571 IV DOP VEL&/PRESS C FLO 1ST: CPT | Performed by: INTERNAL MEDICINE

## 2021-04-21 PROCEDURE — C1725 CATH, TRANSLUMIN NON-LASER: HCPCS | Performed by: INTERNAL MEDICINE

## 2021-04-21 DEVICE — XIENCE SIERRA™ EVEROLIMUS ELUTING CORONARY STENT SYSTEM 2.75 MM X 15 MM / RAPID-EXCHANGE
Type: IMPLANTABLE DEVICE | Status: FUNCTIONAL
Brand: XIENCE SIERRA™

## 2021-04-21 RX ORDER — LIDOCAINE HYDROCHLORIDE 10 MG/ML
INJECTION INFILTRATION; PERINEURAL AS NEEDED
Status: DISCONTINUED | OUTPATIENT
Start: 2021-04-21 | End: 2021-04-21 | Stop reason: HOSPADM

## 2021-04-21 RX ORDER — HEPARIN SODIUM 1000 [USP'U]/ML
INJECTION, SOLUTION INTRAVENOUS; SUBCUTANEOUS AS NEEDED
Status: DISCONTINUED | OUTPATIENT
Start: 2021-04-21 | End: 2021-04-21 | Stop reason: HOSPADM

## 2021-04-21 RX ORDER — ATORVASTATIN CALCIUM 10 MG/1
10 TABLET, FILM COATED ORAL DAILY
COMMUNITY

## 2021-04-21 RX ORDER — SODIUM CHLORIDE 0.9 % (FLUSH) 0.9 %
5-40 SYRINGE (ML) INJECTION EVERY 8 HOURS
Status: CANCELLED | OUTPATIENT
Start: 2021-04-21

## 2021-04-21 RX ORDER — CLOPIDOGREL 300 MG/1
TABLET, FILM COATED ORAL AS NEEDED
Status: DISCONTINUED | OUTPATIENT
Start: 2021-04-21 | End: 2021-04-21 | Stop reason: HOSPADM

## 2021-04-21 RX ORDER — MIDAZOLAM HYDROCHLORIDE 1 MG/ML
INJECTION, SOLUTION INTRAMUSCULAR; INTRAVENOUS AS NEEDED
Status: DISCONTINUED | OUTPATIENT
Start: 2021-04-21 | End: 2021-04-21 | Stop reason: HOSPADM

## 2021-04-21 RX ORDER — SODIUM CHLORIDE 9 MG/ML
500 INJECTION, SOLUTION INTRAVENOUS CONTINUOUS
Status: DISCONTINUED | OUTPATIENT
Start: 2021-04-21 | End: 2021-04-21

## 2021-04-21 RX ORDER — CLOPIDOGREL BISULFATE 75 MG/1
75 TABLET ORAL DAILY
Qty: 90 TAB | Refills: 1 | Status: SHIPPED | OUTPATIENT
Start: 2021-04-22

## 2021-04-21 RX ORDER — CLOPIDOGREL BISULFATE 75 MG/1
75 TABLET ORAL DAILY
Status: DISCONTINUED | OUTPATIENT
Start: 2021-04-22 | End: 2021-04-21 | Stop reason: HOSPADM

## 2021-04-21 RX ORDER — SODIUM CHLORIDE 0.9 % (FLUSH) 0.9 %
5-40 SYRINGE (ML) INJECTION AS NEEDED
Status: CANCELLED | OUTPATIENT
Start: 2021-04-21

## 2021-04-21 RX ORDER — FENTANYL CITRATE 50 UG/ML
INJECTION, SOLUTION INTRAMUSCULAR; INTRAVENOUS AS NEEDED
Status: DISCONTINUED | OUTPATIENT
Start: 2021-04-21 | End: 2021-04-21 | Stop reason: HOSPADM

## 2021-04-21 RX ORDER — HEPARIN SODIUM 200 [USP'U]/100ML
INJECTION, SOLUTION INTRAVENOUS
Status: COMPLETED | OUTPATIENT
Start: 2021-04-21 | End: 2021-04-21

## 2021-04-21 RX ORDER — METOPROLOL SUCCINATE 25 MG/1
25 TABLET, EXTENDED RELEASE ORAL DAILY
Status: DISCONTINUED | OUTPATIENT
Start: 2021-04-22 | End: 2021-04-21 | Stop reason: HOSPADM

## 2021-04-21 RX ORDER — VERAPAMIL HYDROCHLORIDE 2.5 MG/ML
INJECTION, SOLUTION INTRAVENOUS AS NEEDED
Status: DISCONTINUED | OUTPATIENT
Start: 2021-04-21 | End: 2021-04-21 | Stop reason: HOSPADM

## 2021-04-21 RX ORDER — SODIUM CHLORIDE 9 MG/ML
25 INJECTION, SOLUTION INTRAVENOUS CONTINUOUS
Status: DISCONTINUED | OUTPATIENT
Start: 2021-04-21 | End: 2021-04-21 | Stop reason: HOSPADM

## 2021-04-21 RX ORDER — AMLODIPINE BESYLATE 10 MG/1
10 TABLET ORAL DAILY
COMMUNITY

## 2021-04-21 RX ORDER — METOPROLOL SUCCINATE 25 MG/1
25 TABLET, EXTENDED RELEASE ORAL DAILY
Qty: 30 TAB | Refills: 5 | Status: SHIPPED | OUTPATIENT
Start: 2021-04-22

## 2021-04-21 RX ADMIN — SODIUM CHLORIDE 25 ML/HR: 9 INJECTION, SOLUTION INTRAVENOUS at 11:13

## 2021-04-21 NOTE — Clinical Note
Lesion located in the Mid LAD. Balloon inflated using multiple inflation technique. Lesion #1: Pressure = 12 paula; Duration = 26 sec. Inflation 2: Pressure = 12 paula; Duration = 20 sec.

## 2021-04-21 NOTE — PROGRESS NOTES
TRANSFER - IN REPORT:    Verbal report received from Russel Guajardo  on Kyle Botello  being received from cardiac catheterization for routine progression of care. Report consisted of patients Situation, Background, Assessment and Recommendations(SBAR). Information from the following report(s) Procedure Summary was reviewed with the receiving clinician. Opportunity for questions and clarification was provided. Assessment completed upon patients arrival to [address] [place] [address]. Cardiac Cath Lab Recovery Arrival Note:    Kyle Botello arrived to [facility] area. Patient procedure= PCI. Patient on cardiac monitor, non-invasive blood pressure, SPO2 monitor. On room air. IV  of Angiomax on pump at 29.7 ml/hr. Patient status doing well without problems. Patient is A&Ox 4. Patient reports no pain. PROCEDURE SITE CHECK:    Procedure site:without any bleeding and no hematoma, no pain/discomfort reported at procedure site. No change in patient status. Continue to monitor patient and status.

## 2021-04-21 NOTE — PROGRESS NOTES
Cardiac Cath Lab Recovery Arrival Note:      Isela Núñez arrived to Cardiac Cath Lab, Recovery Area. Staff introduced to patient. Patient identifiers verified with NAME and DATE OF BIRTH. Procedure verified with patient. Consent forms reviewed and signed by patient or authorized representative and verified. Allergies verified. Patient and family oriented to department. Patient and family informed of procedure and plan of care. Questions answered with review. Patient prepped for procedure, per orders from physician, prior to arrival.    Patient on cardiac monitor, non-invasive blood pressure, SPO2 monitor. On room air. Patient is A&Ox 4. Patient reports no pain. Patient in stretcher, in low position, with side rails up, call bell within reach, patient instructed to call if assistance as needed. Patient prep in: [facility] Recovery Area, [address]. Patient family has pager # [phone]  Family in: [phone].    Prep by: VERENA

## 2021-04-21 NOTE — PROCEDURES
Cardiac Catheterization Procedure Note   Patient: Herbert Ramirez  MRN: [id number]  SSN: [id number]   YOB: 1960 Age: 57 y.o.   Sex: male    Date of Procedure: 4/21/2021   Pre-procedure Diagnosis: Coronary Artery Disease  Post-procedure Diagnosis: Coronary Artery Disease  Procedure: Left Heart Cath, iFR of the LM & LAD, IVUS of the LM and LAD, PCI of the mid LAD (SINDI x 1)  :  Dr. Geraldo Burgess MD    Assistant(s):  None  Anesthesia: Moderate Sedation   Estimated Blood Loss: Less than 10 mL   Specimens Removed: None  Findings:     Access: Left radial artery    Diagnostic coronary angiography performed with a JL5 and a JR4  LM: 50% stenosis near the ostium, eccentric  LAD: 70-80% mid LAD stenosis at bifurcation of D1 (Neves 1,0,0)  LCx: no significant disease  RCA: no significant disease    iFR of the LM & LAD:  Anticoagulated with Angiomax  Guide: JL4  Normalized the wire in the aorta  Lesion crossed into the distal LAD  iFR was 0.77 (significant) consistent with flow limiting disease and correlating to his stress test result  iFR pullback showed the vast majority of the abnormality was caused by the LAD stenosis - iFR was near 1 in the proximal LAD    IVUS of the LM & LAD:  Guide: Ikari L3.5  Wire: Runthrough  IVUS showed an eccentric stenosis in the LM with MLA of 8mm2 (non-critical)  IVUS of the mid LAD showed a severe focal stenosis with moderate calcification, vessel sized to a 2.75mm vessel prox and distal    LAD intervention:  D1 protected with a Prowater wire  PTCA performed with a 2.5 x 12mm balloon  Xience 2.75 x 15mm SINDI implanted in the mid LAD at low pressure  Angio confirmed D1 branch still intact with good flow  Side branch wire removed  Stent was post-dilated with the stent balloon to 16 NEVAEH x 30 sec  No residual stenosis on final angiogram, CRISTHIAN 3 flow, no dissection and good flow in the D1 branch  Final IVUS showed stent well expanded and well apposed     Complications: None Implants:  SINDI x1   Signed by:  Carmelina Aden MD  4/21/2021  1:53 PM

## 2021-04-21 NOTE — CARDIO/PULMONARY
Cardiac Rehab: Met with Eliazar Love. He declined a CAD education folder. He has done cardiac rehab in the past and does not think he will participate in the program again after this recent stent as he is an avid exerciser. Encouraged him to continue to consider re-enrollment as it offers an opportunity to bring the exercise, diet, and education to the next level. [facility] Cardiac Rehab contact information placed on the AVS with instructions to call should he decide to enroll in the program. Eliazar Love verbalized understanding.  Shorty Gallardo RN

## 2021-04-21 NOTE — PROGRESS NOTES
Postoperative discharge instructions reviewed with patient and all questions answered. Patient dressed self and ambulating without difficulty. TR band removed and tegedern dressing applied. Patient discharged via wheelchair to home accompanied by wife at 200.

## 2021-04-21 NOTE — PROGRESS NOTES
Cardiac Cath Lab Procedure Area Arrival Note:    Riya Espino arrived to Cardiac Cath Lab, Procedure Area. Patient identifiers verified with NAME and DATE OF BIRTH. Procedure verified with patient. Consent forms verified. Allergies verified. Patient informed of procedure and plan of care. Questions answered with review. Patient voiced understanding of procedure and plan of care. Patient on cardiac monitor, non-invasive blood pressure, SPO2 monitor. On  or O2 @ 2 lpm via NC.  IV of NS on pump at 75 ml/hr. Patient status doing well without problems. Patient is A&Ox 4. Patient reports no current chest pain or shortness of breath. Patient medicated during procedure with orders obtained and verified by Dr. Jill Rowell. Refer to patients Cardiac Cath Lab PROCEDURE REPORT for vital signs, assessment, status, and response during procedure, printed at end of case. Printed report on chart or scanned into chart.

## 2021-04-21 NOTE — PROGRESS NOTES
TRANSFER - OUT REPORT:    Verbal report given to RT Zofia (R) on Ed Crum being transferred to Cath Lab Recovery for routine progression of care       Report consisted of patients Situation, Background, Assessment and   Recommendations(SBAR). Information from the following report(s) SBAR, Procedure Summary and MAR was reviewed with the receiving nurse. Opportunity for questions and clarification was provided.

## 2021-04-22 LAB
ATRIAL RATE: 58 BPM
CALCULATED P AXIS, ECG09: 25 DEGREES
CALCULATED R AXIS, ECG10: -24 DEGREES
CALCULATED T AXIS, ECG11: 50 DEGREES
DIAGNOSIS, 93000: NORMAL
P-R INTERVAL, ECG05: 264 MS
Q-T INTERVAL, ECG07: 428 MS
QRS DURATION, ECG06: 94 MS
QTC CALCULATION (BEZET), ECG08: 420 MS
VENTRICULAR RATE, ECG03: 58 BPM

## 2021-07-09 ENCOUNTER — APPOINTMENT (OUTPATIENT)
Dept: VASCULAR SURGERY | Age: 61
End: 2021-07-09
Attending: PHYSICIAN ASSISTANT
Payer: COMMERCIAL

## 2021-07-09 ENCOUNTER — HOSPITAL ENCOUNTER (OUTPATIENT)
Age: 61
Setting detail: OUTPATIENT SURGERY
Discharge: HOME | End: 2021-07-09
Attending: INTERNAL MEDICINE | Admitting: INTERNAL MEDICINE
Payer: COMMERCIAL

## 2021-07-09 VITALS
SYSTOLIC BLOOD PRESSURE: 110 MMHG | TEMPERATURE: 98.5 F | HEART RATE: 70 BPM | RESPIRATION RATE: 17 BRPM | HEIGHT: 71 IN | WEIGHT: 189 LBS | BODY MASS INDEX: 26.46 KG/M2 | DIASTOLIC BLOOD PRESSURE: 55 MMHG | OXYGEN SATURATION: 95 %

## 2021-07-09 DIAGNOSIS — I25.118: ICD-10-CM

## 2021-07-09 LAB
ABO + RH BLD: NORMAL
ALBUMIN SERPL-MCNC: 3.6 G/DL (ref 3.5–5)
ALBUMIN/GLOB SERPL: 1.3 {RATIO} (ref 1.1–2.2)
ALP SERPL-CCNC: 112 U/L (ref 45–117)
ALT SERPL-CCNC: 29 U/L (ref 12–78)
ANION GAP SERPL CALC-SCNC: 5 MMOL/L (ref 5–15)
APTT PPP: 25.6 SEC (ref 22.1–31)
AST SERPL-CCNC: 33 U/L (ref 15–37)
BASOPHILS # BLD: 0 K/UL (ref 0–0.1)
BASOPHILS NFR BLD: 1 % (ref 0–1)
BILIRUB SERPL-MCNC: 1.1 MG/DL (ref 0.2–1)
BLOOD GROUP ANTIBODIES SERPL: NORMAL
BNP SERPL-MCNC: 3587 PG/ML
BUN SERPL-MCNC: 11 MG/DL (ref 6–20)
BUN/CREAT SERPL: 13 (ref 12–20)
CALCIUM SERPL-MCNC: 8.5 MG/DL (ref 8.5–10.1)
CHLORIDE SERPL-SCNC: 108 MMOL/L (ref 97–108)
CO2 SERPL-SCNC: 29 MMOL/L (ref 21–32)
CREAT SERPL-MCNC: 0.85 MG/DL (ref 0.7–1.3)
DIFFERENTIAL METHOD BLD: ABNORMAL
EOSINOPHIL # BLD: 0.1 K/UL (ref 0–0.4)
EOSINOPHIL NFR BLD: 2 % (ref 0–7)
ERYTHROCYTE [DISTWIDTH] IN BLOOD BY AUTOMATED COUNT: 12.8 % (ref 11.5–14.5)
EST. AVERAGE GLUCOSE BLD GHB EST-MCNC: 100 MG/DL
GLOBULIN SER CALC-MCNC: 2.7 G/DL (ref 2–4)
GLUCOSE SERPL-MCNC: 106 MG/DL (ref 65–100)
HBA1C MFR BLD: 5.1 % (ref 4–5.6)
HCT VFR BLD AUTO: 39.4 % (ref 36.6–50.3)
HGB BLD-MCNC: 13.3 G/DL (ref 12.1–17)
IMM GRANULOCYTES # BLD AUTO: 0 K/UL (ref 0–0.04)
IMM GRANULOCYTES NFR BLD AUTO: 0 % (ref 0–0.5)
INR PPP: 1 (ref 0.9–1.1)
LEFT ABI: 1.27
LEFT ANTERIOR TIBIAL: 131 MMHG
LEFT CCA DIST DIAS: 13.4 CM/S
LEFT CCA DIST SYS: 68.7 CM/S
LEFT CCA PROX DIAS: 10.9 CM/S
LEFT CCA PROX SYS: 78.5 CM/S
LEFT ECA DIAS: 0 CM/S
LEFT ECA SYS: 78.3 CM/S
LEFT ICA DIST DIAS: 25.4 CM/S
LEFT ICA DIST SYS: 96.6 CM/S
LEFT ICA MID DIAS: 23.6 CM/S
LEFT ICA MID SYS: 80.2 CM/S
LEFT ICA PROX DIAS: 16.3 CM/S
LEFT ICA PROX SYS: 63.7 CM/S
LEFT ICA/CCA SYS: 1.4
LEFT POSTERIOR TIBIAL: 149 MMHG
LEFT TBI: 0.74
LEFT TOE PRESSURE: 87 MMHG
LEFT VERTEBRAL DIAS: 0 CM/S
LEFT VERTEBRAL SYS: 59.2 CM/S
LYMPHOCYTES # BLD: 1.6 K/UL (ref 0.8–3.5)
LYMPHOCYTES NFR BLD: 24 % (ref 12–49)
MAGNESIUM SERPL-MCNC: 2.3 MG/DL (ref 1.6–2.4)
MCH RBC QN AUTO: 30.9 PG (ref 26–34)
MCHC RBC AUTO-ENTMCNC: 33.8 G/DL (ref 30–36.5)
MCV RBC AUTO: 91.6 FL (ref 80–99)
MONOCYTES # BLD: 0.8 K/UL (ref 0–1)
MONOCYTES NFR BLD: 12 % (ref 5–13)
NEUTS SEG # BLD: 3.9 K/UL (ref 1.8–8)
NEUTS SEG NFR BLD: 61 % (ref 32–75)
NRBC # BLD: 0 K/UL (ref 0–0.01)
NRBC BLD-RTO: 0 PER 100 WBC
PLATELET # BLD AUTO: 141 K/UL (ref 150–400)
PMV BLD AUTO: 11.4 FL (ref 8.9–12.9)
POTASSIUM SERPL-SCNC: 3.9 MMOL/L (ref 3.5–5.1)
PROT SERPL-MCNC: 6.3 G/DL (ref 6.4–8.2)
PROTHROMBIN TIME: 10.6 SEC (ref 9–11.1)
RBC # BLD AUTO: 4.3 M/UL (ref 4.1–5.7)
RIGHT ABI: 1.21
RIGHT ANTERIOR TIBIAL: 131 MMHG
RIGHT ARM BP: 117 MMHG
RIGHT CCA DIST DIAS: 8.4 CM/S
RIGHT CCA DIST SYS: 40.5 CM/S
RIGHT CCA PROX DIAS: 15.5 CM/S
RIGHT CCA PROX SYS: 62.5 CM/S
RIGHT ECA DIAS: 0 CM/S
RIGHT ECA SYS: 63.6 CM/S
RIGHT ICA DIST DIAS: 18.1 CM/S
RIGHT ICA DIST SYS: 76.5 CM/S
RIGHT ICA MID DIAS: 21.6 CM/S
RIGHT ICA MID SYS: 82.6 CM/S
RIGHT ICA PROX DIAS: 15.6 CM/S
RIGHT ICA PROX SYS: 66.3 CM/S
RIGHT ICA/CCA SYS: 2
RIGHT POSTERIOR TIBIAL: 142 MMHG
RIGHT TBI: 0.83
RIGHT TOE PRESSURE: 97 MMHG
RIGHT VERTEBRAL DIAS: 13.1 CM/S
RIGHT VERTEBRAL SYS: 55.9 CM/S
SODIUM SERPL-SCNC: 142 MMOL/L (ref 136–145)
SPECIMEN EXP DATE BLD: NORMAL
THERAPEUTIC RANGE,PTTT: NORMAL SECS (ref 58–77)
TSH SERPL DL<=0.05 MIU/L-ACNC: 1.18 UIU/ML (ref 0.36–3.74)
WBC # BLD AUTO: 6.4 K/UL (ref 4.1–11.1)

## 2021-07-09 PROCEDURE — 93922 UPR/L XTREMITY ART 2 LEVELS: CPT

## 2021-07-09 PROCEDURE — 86901 BLOOD TYPING SEROLOGIC RH(D): CPT

## 2021-07-09 PROCEDURE — 83036 HEMOGLOBIN GLYCOSYLATED A1C: CPT

## 2021-07-09 PROCEDURE — 80053 COMPREHEN METABOLIC PANEL: CPT

## 2021-07-09 PROCEDURE — 77030013744: Performed by: INTERNAL MEDICINE

## 2021-07-09 PROCEDURE — 99152 MOD SED SAME PHYS/QHP 5/>YRS: CPT | Performed by: INTERNAL MEDICINE

## 2021-07-09 PROCEDURE — 74011000250 HC RX REV CODE- 250: Performed by: INTERNAL MEDICINE

## 2021-07-09 PROCEDURE — 85610 PROTHROMBIN TIME: CPT

## 2021-07-09 PROCEDURE — 36415 COLL VENOUS BLD VENIPUNCTURE: CPT

## 2021-07-09 PROCEDURE — 74011250636 HC RX REV CODE- 250/636: Performed by: INTERNAL MEDICINE

## 2021-07-09 PROCEDURE — 93454 CORONARY ARTERY ANGIO S&I: CPT | Performed by: INTERNAL MEDICINE

## 2021-07-09 PROCEDURE — 84443 ASSAY THYROID STIM HORMONE: CPT

## 2021-07-09 PROCEDURE — 77030004532 HC CATH ANGI DX IMP BSC -A: Performed by: INTERNAL MEDICINE

## 2021-07-09 PROCEDURE — 85025 COMPLETE CBC W/AUTO DIFF WBC: CPT

## 2021-07-09 PROCEDURE — C1894 INTRO/SHEATH, NON-LASER: HCPCS | Performed by: INTERNAL MEDICINE

## 2021-07-09 PROCEDURE — C1769 GUIDE WIRE: HCPCS | Performed by: INTERNAL MEDICINE

## 2021-07-09 PROCEDURE — 74011000636 HC RX REV CODE- 636: Performed by: INTERNAL MEDICINE

## 2021-07-09 PROCEDURE — 83880 ASSAY OF NATRIURETIC PEPTIDE: CPT

## 2021-07-09 PROCEDURE — 83735 ASSAY OF MAGNESIUM: CPT

## 2021-07-09 PROCEDURE — 85730 THROMBOPLASTIN TIME PARTIAL: CPT

## 2021-07-09 PROCEDURE — 93880 EXTRACRANIAL BILAT STUDY: CPT

## 2021-07-09 PROCEDURE — 99153 MOD SED SAME PHYS/QHP EA: CPT | Performed by: INTERNAL MEDICINE

## 2021-07-09 PROCEDURE — 77030019569 HC BND COMPR RAD TERU -B: Performed by: INTERNAL MEDICINE

## 2021-07-09 RX ORDER — SODIUM CHLORIDE 0.9 % (FLUSH) 0.9 %
5-40 SYRINGE (ML) INJECTION AS NEEDED
Status: DISCONTINUED | OUTPATIENT
Start: 2021-07-09 | End: 2021-07-09 | Stop reason: HOSPADM

## 2021-07-09 RX ORDER — HEPARIN SODIUM 200 [USP'U]/100ML
INJECTION, SOLUTION INTRAVENOUS
Status: COMPLETED | OUTPATIENT
Start: 2021-07-09 | End: 2021-07-09

## 2021-07-09 RX ORDER — SODIUM CHLORIDE 0.9 % (FLUSH) 0.9 %
5-40 SYRINGE (ML) INJECTION EVERY 8 HOURS
Status: DISCONTINUED | OUTPATIENT
Start: 2021-07-09 | End: 2021-07-09 | Stop reason: HOSPADM

## 2021-07-09 RX ORDER — ACETAMINOPHEN 325 MG/1
650 TABLET ORAL
Status: DISCONTINUED | OUTPATIENT
Start: 2021-07-09 | End: 2021-07-09 | Stop reason: HOSPADM

## 2021-07-09 RX ORDER — FENTANYL CITRATE 50 UG/ML
INJECTION, SOLUTION INTRAMUSCULAR; INTRAVENOUS AS NEEDED
Status: DISCONTINUED | OUTPATIENT
Start: 2021-07-09 | End: 2021-07-09 | Stop reason: HOSPADM

## 2021-07-09 RX ORDER — NITROGLYCERIN 0.4 MG/1
0.4 TABLET SUBLINGUAL AS NEEDED
Status: DISCONTINUED | OUTPATIENT
Start: 2021-07-09 | End: 2021-07-09 | Stop reason: HOSPADM

## 2021-07-09 RX ORDER — HEPARIN SODIUM 1000 [USP'U]/ML
INJECTION, SOLUTION INTRAVENOUS; SUBCUTANEOUS AS NEEDED
Status: DISCONTINUED | OUTPATIENT
Start: 2021-07-09 | End: 2021-07-09 | Stop reason: HOSPADM

## 2021-07-09 RX ORDER — MIDAZOLAM HYDROCHLORIDE 1 MG/ML
INJECTION, SOLUTION INTRAMUSCULAR; INTRAVENOUS AS NEEDED
Status: DISCONTINUED | OUTPATIENT
Start: 2021-07-09 | End: 2021-07-09 | Stop reason: HOSPADM

## 2021-07-09 RX ORDER — VERAPAMIL HYDROCHLORIDE 2.5 MG/ML
INJECTION, SOLUTION INTRAVENOUS AS NEEDED
Status: DISCONTINUED | OUTPATIENT
Start: 2021-07-09 | End: 2021-07-09 | Stop reason: HOSPADM

## 2021-07-09 RX ORDER — LIDOCAINE HYDROCHLORIDE 10 MG/ML
INJECTION INFILTRATION; PERINEURAL AS NEEDED
Status: DISCONTINUED | OUTPATIENT
Start: 2021-07-09 | End: 2021-07-09 | Stop reason: HOSPADM

## 2021-07-09 RX ORDER — SODIUM CHLORIDE 9 MG/ML
500 INJECTION, SOLUTION INTRAVENOUS CONTINUOUS
Status: DISCONTINUED | OUTPATIENT
Start: 2021-07-09 | End: 2021-07-09 | Stop reason: HOSPADM

## 2021-07-09 RX ADMIN — SODIUM CHLORIDE 500 ML: 9 INJECTION, SOLUTION INTRAVENOUS at 07:34

## 2021-07-09 NOTE — PROGRESS NOTES
Cardiac Cath Lab Procedure Area Arrival Note:    Karin Naranjo arrived to Cardiac Cath Lab, Procedure Area. Patient identifiers verified with NAME and DATE OF BIRTH. Procedure verified with patient. Consent forms verified. Allergies verified. Patient informed of procedure and plan of care. Questions answered with review. Patient voiced understanding of procedure and plan of care. Patient on cardiac monitor, non-invasive blood pressure, SPO2 monitor. On RA  or O2 @ 2 lpm via NC.  IV of NS on pump at 75 ml/hr. Patient status doing well without problems. Patient is A&Ox 4. Patient reports no chest pain or dyspnea. .     Patient medicated during procedure with orders obtained and verified by Dr. Jaspal Case. Refer to patients Cardiac Cath Lab PROCEDURE REPORT for vital signs, assessment, status, and response during procedure, printed at end of case. Printed report on chart or scanned into chart. 08:45- Transfer to [facility] RR from Procedure Area    Verbal report given to Roxane Espinoza on Karin Naranjo being transferred to Cardiac Cath Lab  for routine progression of care   Patient is post Coronaries only procedure. Patient stable upon transfer to . Report consisted of patients Situation, Background, Assessment and   Recommendations(SBAR). Information from the following report(s) SBAR and Procedure Summary was reviewed with the receiving nurse. Opportunity for questions and clarification was provided. Patient medicated during procedure with orders obtained and verified by Dr. Jaspal Case. Refer to patient PROCEDURE REPORT for vital signs, assessment, status, and response during procedure.

## 2021-07-09 NOTE — Clinical Note
Right groin and left radial prepped with ChloraPrep and draped. Wet prep solution applied at: 752. Wet prep solution dried at: 755. Wet prep elapsed drying time: 3 mins.

## 2021-07-09 NOTE — PROGRESS NOTES
TRANSFER - IN REPORT:    Verbal report received from Shahab Santos 411 on Bernabe Patel  being received from procedural area for routine post - op. Report consisted of patients Situation, Background, Assessment and Recommendations(SBAR). Information from the following report(s) Procedure Summary, MAR and Recent Results was reviewed with the receiving clinician. Opportunity for questions and clarification was provided. Assessment completed upon patients arrival to [address] [place] [address] and [facility]. Cardiac Cath Lab Recovery Arrival Note:    Bernabe Patel arrived to [facility] recovery area. Patient procedure= LHC. Patient on cardiac monitor, non-invasive blood pressure, SPO2 monitor. On RA. IV  of NS on pump at 75 ml/hr. Patient status doing well without problems. Patient is A&Ox 3. Patient reports no complaints. PROCEDURE SITE CHECK:    Procedure site:without any bleeding and hematoma, no pain/discomfort reported at procedure site. No change in patient status. Continue to monitor patient and status.

## 2021-07-09 NOTE — PROGRESS NOTES
Cardiac Cath Lab Recovery Arrival Note:      Mnyor Kim arrived to Cardiac Cath Lab, Recovery Area. Staff introduced to patient. Patient identifiers verified with NAME and DATE OF BIRTH. Procedure verified with patient. Consent forms reviewed and signed by patient or authorized representative and verified. Allergies verified. Patient and family oriented to department. Patient and family informed of procedure and plan of care. Questions answered with review. Patient prepped for procedure, per orders from physician, prior to arrival.    Patient on cardiac monitor, non-invasive blood pressure, SPO2 monitor. On RA. Patient is A&Ox 4. Patient reports no complaints. Patient in stretcher, in low position, with side rails up, call bell within reach, patient instructed to call if assistance as needed. Patient prep in: [address], [place] [address].    Patient family has pager # na  Family in: na.   Prep by: NATALIA

## 2021-07-09 NOTE — PROGRESS NOTES
Discharge Note; **Patient left at 1600. Wrong time entered when I discharged him from the system. **    Patient was able to eat, drink, walk, void and dress independently. I removed their IV. I reviewed discharge instructions with them and they said they understood them. I took them to the front via wheelchair. We met the patient's ride at the front of the hospital. They drove away.

## 2021-07-09 NOTE — Clinical Note
Contrast Dose Calculator:   Patient's age: 57.   Patient's sex: Male. Patient weight (kg) = 85.7. Creatinine level (mg/dL) = 0.86. Creatinine clearance (mL/min): 108. Contrast concentration (mg/mL) = 370. MACD = 300 mL. Max Contrast dose per Creatinine Cl calculator = 243 mL.

## 2021-07-09 NOTE — CONSULTS
CSS  Consult     Subjective:      Leanne Rodrigues is a 57 y.o.  male who was referred for evaluation of CAD by Dr. Bety Orlando. Pt is well known to our office, s/p emergent aortic root replacement with mechanical valved conduit and left-right fem-fem bypass due to R leg ischemia on 2/9/17 by Dr. Tyshawn Grimaldo and Dr. Va Jacobs of vascular surgery. He developed compartment syndrome and had R leg fasciotomy on 2/11/17 by Dr. Con Jeans. Other PMH includes recent CAD w/ stents on 4/21/21 (on plavix) HTN, HLD, postop a-fib s/p DCCV on 4/4/17, seborrheic dermatitis and early family hx of CAD. Pt reports over the winter noticing chest tightness when he ran, relieved with rest. He was seen by Dr. Bety Orlando for routine visit when he mentioned his symptoms. He had a stress test that was abnormal and subsequent cath in April, results below, with SINDI to the LAD at that time. He initially felt improvement in his symptoms but then noticed about 10 days ago the chest tightness had returned and was a bit worse than his previous symptoms. Repeat cath today revealed progressive LM disease, widely patent stent to the LAD. He denies any dizziness, palpitations, SOB/JUNE, orthopnea, PND or edema. He is very active, independent and lives with his wife. He denies any smoking history and drinks alcohol socially. Cardiac Testing    Cardiac catheterization 7/9/21: formal report pending  Anatomy:  LM: ostial 80% stenosis, with dampening and ventricularization on engagement.    LAD: minimal non-obstructive disease, with widely patent stent in the mid LAD and no ISR  LCx: no significant disease  RCA: minimal non-obstructive disease    Cardiac catheterization 4/21/21: Significant high grade stenosis of the mid LAD by iFR and IVUS  · Moderate left main disease with an eccentric 50% stenosis near the ostium - not significant by iFR pullback and with an MLA of 8mm2 by IVUS  · Successful IVUS guided PCI of the mid LAD with implantation of SINDI x 1     Access: Left radial artery     Diagnostic coronary angiography performed with a JL5 and a JR4  LM: 50% stenosis near the ostium, eccentric  LAD: 70-80% mid LAD stenosis at bifurcation of D1 (Neves 1,0,0)  LCx: no significant disease  RCA: no significant disease     iFR of the LM & LAD:  Anticoagulated with Angiomax  Guide: JL4  Normalized the wire in the aorta  Lesions crossed into the distal LAD  iFR in the distal LAD was 0.77 (significant) consistent with flow limiting disease and correlating to his stress test result  iFR pullback showed the vast majority of the abnormality was caused by the LAD stenosis - iFR was near 1 in the proximal LAD     IVUS of the LM & LAD:  Guide: Ikari L3.5  Wire: Runthrough  IVUS showed an eccentric stenosis in the LM with MLA of 8mm2 (non-critical)  IVUS of the mid LAD showed a severe focal stenosis with moderate calcification, vessel sized to a 2.75mm vessel prox and distal     LAD intervention:  D1 protected with a Prowater wire  PTCA performed with a 2.5 x 12mm balloon  Xience 2.75 x 15mm SINDI implanted in the mid LAD at low pressure  Angio confirmed D1 branch still intact with good flow  Side branch wire removed  Stent was post-dilated with the stent balloon to 16 NEVAEH x 30 sec  No residual stenosis on final angiogram, CRISTHIAN 3 flow, no dissection and good flow in the D1 branch  Final IVUS showed stent well expanded and well apposed     ECHO 5/7/20:  From Dr. 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[address] 03/2017    Aortic root replacement    Atrial fibrillation ([address] 02/2017    Post surgery, s/p DCCV 4/4/17     CAD (coronary artery disease) 04/21/2021    s/p stent to LAD    Family history of colon cancer     Family history of diabetes mellitus (DM) 6/8/2010    Family history of early CAD 6/8/2010    HLD (hyperlipidemia)     HTN (hypertension)     P[facility] (peripheral vascular [facility]) [facility] 02/2017    Ischemic R leg, s/p fem-fem bypass    Seborrheic dermatitis 6/8/2010     Past Surgical History:   Procedure Laterality Date    HX AORTIC VALVE REPLACEMENT  02/09/2017    HX OTHER SURGICAL  02/09/2017    L to R fem/fem bypass graft    HX OTHER SURGICAL  02/11/2017    right compartment fasciotomy    HX OTHER SURGICAL  02/09/2017    Aortic dissection repair      Social History     Tobacco Use    Smoking status: Never Smoker    Smokeless tobacco: Never Used   Substance Use Topics    Alcohol use: Yes     Comment: social      Family History   Problem Relation Age of Onset    Diabetes Mother     Heart Disease Mother     Cancer Father         colon    Hypertension Sister     Stroke Maternal Grandfather      Prior to Admission medications    Medication Sig Start Date End Date Taking? Authorizing Provider   amLODIPine (NORVASC) 10 mg tablet Take 10 mg by mouth daily. Yes Provider, Historical   atorvastatin (LIPITOR) 10 mg tablet Take 10 mg by mouth daily. Yes Provider, Historical   clopidogreL (PLAVIX) 75 mg tab Take 1 Tab by mouth daily. Indications: blood clot prevention following percutaneous coronary intervention 4/22/21  Yes Makenna Hitchcock MD   metoprolol succinate (TOPROL-XL) 25 mg XL tablet Take 1 Tab by mouth daily. 4/22/21  Yes Mabel Frey MD   cholecalciferol (VITAMIN D3) 1,000 unit tablet Take  by mouth daily. Yes Provider, Historical   MULTIVITAMINS (MULTIVITAMIN PO) Take 1 Tab by mouth daily. Yes Provider, Historical   warfarin (COUMADIN) 5 mg tablet Take 15 daily by mouth at dinner time. Your daily dose will be determined by MD/NP/PA team.  Patient taking differently: Take 10 mg by mouth daily. Take 10 mg daily, except on  Friday 15mg 3/9/17   Roberta JONES NP   aspirin delayed-release 81 mg tablet Take 81 mg by mouth daily.   Patient not taking: Reported on 7/9/2021 7/9/21  Provider, Historical No Known Allergies    Review of Systems:   Consititutional: Denies fever or chills. Eyes:  Denies use of glasses or vision problems (cataracts). ENT:  Denies hearing or swallowing difficulty. CV: Denies claudication, +chest tightness   Resp: Denies dyspnea, productive cough. : Denies dialysis or kidney problems. GI: Denies ulcers, esophageal strictures, liver problems. M/S: Denies joint or bone problems, or implanted artificial hardware. Skin: Denies varicose veins, edema. Neuro: Denies strokes, or TIAs. Psych: Denies anxiety or depression. Endocrine: Denies thyroid problems or diabetes. Heme/Lymphatic: Denies easy bruising or lymphedema. Objective:     Physical Exam:    Visit Vitals  BP (!) 107/57 (BP 1 Location: Right upper arm, BP Patient Position: At rest)   Pulse (!) 59   Temp 98.5 °F (36.9 °C)   Resp 15   Ht 5' 11\" (1.803 m)   Wt 189 lb (85.7 kg)   SpO2 92%   BMI 26.36 kg/m²     General appearance: alert, cooperative, no distress, appears stated age  Head: Normocephalic, without obvious abnormality, atraumatic  Eyes: conjunctivae/corneas clear. PERRL, EOM's intact. Neck: supple, symmetrical, trachea midline, no adenopathy, thyroid: not enlarged, symmetric, no tenderness/mass/nodules, no carotid bruit and no JVD  Lungs: clear to auscultation bilaterally  Heart: regular rate and rhythm, S1, S2 normal, no murmur, rub or gallop. Crisp valve click   Abdomen: soft, non-tender. Bowel sounds normal. No masses,  no organomegaly  Extremities: extremities normal, atraumatic, no cyanosis or edema  Neurologic: Grossly normal    Labs: No results for input(s): WBC, HGB, HCT, PLT, NA, K, BUN, CREA, GLU, GLUCPOC, INR, HGBEXT, HCTEXT, PLTEXT, INREXT, HGBEXT, HCTEXT, PLTEXT, INREXT in the last 72 hours.     No lab exists for component: GLPOC    Diagnostics:   PA and lateral: will order    Carotid doppler: will order    PFTS-FEV1: n/a, not needed    EKG: will order    Assessment:     Active Problems:    CAD (coronary artery disease) (4/21/2021)      Plan:   The risk and benefit of surgery were reviewed with patient and family and all questions answered and the patient wishes to proceed. Risk include infection, bleeding, stroke, heart attack, irregular heart rhythm, kidney failure and death. 1. CAD: progressive LM stenosis, prior stent to LAD on 4/21/21, now on plavix. Not on ASA therapy since on plavix/coumadin, cont statin, BB. Discussing high risk LM PCI vs CABG w/ Dr. Anastasiya Andre - if plans for surgery will need to wait for plavix/coumadin washout. 2. S/p aortic root replacement w/ mechanical AVR 2/2017: on coumadin preop, needs to be bridged while surgical plans determined, likely heparin gtt, if admitted. Root replacement stable on last chest CTA in 2019, consider repeat. Last TTE about a year ago, will discuss if need to repeat        3. Prior R leg ischemia, s/p L-R fem fem bypass 2/2017, R leg fasciotomy d/t compartment syndrome: no issues since prior surgery    4. Postop a-fib: no recent issues - maintaining SR, s/p DCCV 4/2017 following previous surgery. On metoprolol XL, coumadin for valve    5. HTN: on norvasc, metoprolol XL preop    6. HLD: on lipitor    STS Risk Calculator V2.81 - Discussed by surgeon with patient.    Waiting on labs/preop workup to complete     Signed By: Todd Salguero NP     July 9, 2021

## 2021-07-13 ENCOUNTER — OFFICE VISIT (OUTPATIENT)
Dept: CARDIOLOGY CLINIC | Age: 61
End: 2021-07-13
Payer: COMMERCIAL

## 2021-07-13 VITALS
SYSTOLIC BLOOD PRESSURE: 140 MMHG | HEART RATE: 76 BPM | OXYGEN SATURATION: 98 % | TEMPERATURE: 98 F | DIASTOLIC BLOOD PRESSURE: 76 MMHG | RESPIRATION RATE: 18 BRPM

## 2021-07-13 DIAGNOSIS — I25.118 CORONARY ARTERY DISEASE OF NATIVE ARTERY OF NATIVE HEART WITH STABLE ANGINA PECTORIS (HCC): Primary | ICD-10-CM

## 2021-07-13 PROCEDURE — 99204 OFFICE O/P NEW MOD 45 MIN: CPT | Performed by: THORACIC SURGERY (CARDIOTHORACIC VASCULAR SURGERY)

## 2021-07-13 RX ORDER — WARFARIN SODIUM 5 MG/1
10 TABLET ORAL DAILY
COMMUNITY

## 2021-07-13 NOTE — PROGRESS NOTES
CSS   Follow up    Subjective:      Cj Alexandre is a 57 y.o.  male who was referred for evaluation of CAD by Dr. Jonah Maravilla. Pt is well known to our office, s/p emergent aortic root replacement with mechanical valved conduit and left-right fem-fem bypass due to R leg ischemia on 2/9/17 by Dr. Tessie Thomas and Dr. Nicole Soto of vascular surgery. He developed compartment syndrome and had R leg fasciotomy on 2/11/17 by Dr. Kimberly Rodriguez. Other PMH includes recent CAD w/ stents on 4/21/21 (on plavix) HTN, HLD, postop a-fib s/p DCCV on 4/4/17, seborrheic dermatitis and early family hx of CAD. **Following information obtained from prior consult, pt seen in cath lab on 7/9/21. No changes since that visit.      Pt reports over the winter noticing chest tightness when he ran, relieved with rest. He was seen by Dr. Jonah Maravilla for routine visit when he mentioned his symptoms. He had a stress test that was abnormal and subsequent cath in April, results below, with SINDI to the LAD at that time. He initially felt improvement in his symptoms but then noticed about 10 days ago the chest tightness had returned and was a bit worse than his previous symptoms. Repeat cath today revealed progressive LM disease, widely patent stent to the LAD. He denies any dizziness, palpitations, SOB/JUNE, orthopnea, PND or edema.      He is very active, independent and lives with his wife. He denies any smoking history and drinks alcohol socially.      Cardiac Testing     Cardiac catheterization 7/9/21: formal report pending  Anatomy:  LM: ostial 80% stenosis, with dampening and ventricularization on engagement.    LAD: minimal non-obstructive disease, with widely patent stent in the mid LAD and no ISR  LCx: no significant disease  RCA: minimal non-obstructive disease     Cardiac catheterization 4/21/21: Significant high grade stenosis of the mid LAD by iFR and IVUS  · Moderate left main disease with an eccentric 50% stenosis near the ostium - not significant by iFR pullback and with an MLA of 8mm2 by IVUS  · Successful IVUS guided PCI of the mid LAD with implantation of SINDI x 1     Access: Left radial artery     Diagnostic coronary angiography performed with a JL5 and a JR4  LM: 50% stenosis near the ostium, eccentric  LAD: 70-80% mid LAD stenosis at bifurcation of D1 (Neves 1,0,0)  LCx: no significant disease  RCA: no significant disease     iFR of the LM & LAD:  Anticoagulated with Angiomax  Guide: JL4  Normalized the wire in the aorta  Lesions crossed into the distal LAD  iFR in the distal LAD was 0.77 (significant) consistent with flow limiting disease and correlating to his stress test result  iFR pullback showed the vast majority of the abnormality was caused by the LAD stenosis - iFR was near 1 in the proximal LAD     IVUS of the LM & LAD:  Guide: Ikari L3.5  Wire: Runthrough  IVUS showed an eccentric stenosis in the LM with MLA of 8mm2 (non-critical)  IVUS of the mid LAD showed a severe focal stenosis with moderate calcification, vessel sized to a 2.75mm vessel prox and distal     LAD intervention:  D1 protected with a Prowater wire  PTCA performed with a 2.5 x 12mm balloon  Xience 2.75 x 15mm SINDI implanted in the mid LAD at low pressure  Angio confirmed D1 branch still intact with good flow  Side branch wire removed  Stent was post-dilated with the stent balloon to 16 NEVAEH x 30 sec  No residual stenosis on final angiogram, CRISTHIAN 3 flow, no dissection and good flow in the D1 branch  Final IVUS showed stent well expanded and well apposed      ECHO 5/7/20:  From Dr. 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    Aortic root replacement    Atrial fibrillation ([address] 02/2017    Post surgery, s/p DCCV 4/4/17     CAD (coronary artery disease) 04/21/2021    s/p stent to LAD    Family history of colon cancer  Family history of diabetes mellitus (DM) 6/8/2010    Family history of early CAD 6/8/2010    HLD (hyperlipidemia)     HTN (hypertension)     PVD (peripheral vascular disease) ([facility] [address] 02/2017    Ischemic R leg, s/p fem-fem bypass    Seborrheic dermatitis 6/8/2010     Past Surgical History:   Procedure Laterality Date    HX AORTIC VALVE REPLACEMENT  02/09/2017    HX OTHER SURGICAL  02/09/2017    L to R fem/fem bypass graft    HX OTHER SURGICAL  02/11/2017    right compartment fasciotomy    HX OTHER SURGICAL  02/09/2017    Aortic dissection repair      Social History     Tobacco Use    Smoking status: Never Smoker    Smokeless tobacco: Never Used   Substance Use Topics    Alcohol use: Yes     Comment: social      Family History   Problem Relation Age of Onset    Diabetes Mother     Heart Disease Mother     Cancer Father         colon    Hypertension Sister     Stroke Maternal Grandfather      Prior to Admission medications    Medication Sig Start Date End Date Taking? Authorizing Provider   warfarin (Coumadin) 5 mg tablet Take 10 mg by mouth daily. Yes Provider, Historical   amLODIPine (NORVASC) 10 mg tablet Take 10 mg by mouth daily. Yes Provider, Historical   atorvastatin (LIPITOR) 10 mg tablet Take 10 mg by mouth daily. Yes Provider, Historical   clopidogreL (PLAVIX) 75 mg tab Take 1 Tab by mouth daily. Indications: blood clot prevention following percutaneous coronary intervention 4/22/21  Yes Ely Hitchcock MD   metoprolol succinate (TOPROL-XL) 25 mg XL tablet Take 1 Tab by mouth daily. 4/22/21  Yes Howie Stevens MD   cholecalciferol (VITAMIN D3) 1,000 unit tablet Take  by mouth daily. Yes Provider, Historical   MULTIVITAMINS (MULTIVITAMIN PO) Take 1 Tab by mouth daily. Yes Provider, Historical   warfarin (COUMADIN) 5 mg tablet Take 15 daily by mouth at dinner time. Your daily dose will be determined by MD/NP/PA team.  Patient taking differently: Take 10 mg by mouth daily. Take 10 mg daily, except on  Friday 15mg 3/9/17 7/13/21  Starvinod Shoulder, NP       No Known Allergies    Review of Systems:   Consititutional: Denies fever or chills. Eyes:  Denies use of glasses or vision problems (cataracts). ENT:  Denies hearing or swallowing difficulty. CV: Denies claudication, +chest tightness   Resp: Denies dyspnea, productive cough. : Denies dialysis or kidney problems. GI: Denies ulcers, esophageal strictures, liver problems. M/S: Denies joint or bone problems, or implanted artificial hardware. Skin: Denies varicose veins, edema. Neuro: Denies strokes, or TIAs. Psych: Denies anxiety or depression. Endocrine: Denies thyroid problems or diabetes. Heme/Lymphatic: Denies easy bruising or lymphedema. Objective:     Physical Exam:    Visit Vitals  BP (!) 140/76 (BP 1 Location: Left arm, BP Patient Position: Sitting)   Pulse 76   Temp 98 °F (36.7 °C)   Resp 18   SpO2 98%     General appearance: alert, cooperative, no distress, appears stated age  Head: Normocephalic, without obvious abnormality, atraumatic  Eyes: conjunctivae/corneas clear. PERRL, EOM's intact. Neck: supple, symmetrical, trachea midline, no adenopathy, thyroid: not enlarged, symmetric, no tenderness/mass/nodules, no carotid bruit and no JVD  Lungs: clear to auscultation bilaterally  Heart: regular rate and rhythm, S1, S2 normal, no murmur, rub or gallop. Crisp valve click   Abdomen: soft, non-tender. Bowel sounds normal. No masses,  no organomegaly  Extremities: extremities normal, atraumatic, no cyanosis or edema  Neurologic: Grossly normal    Assessment:     The encounter diagnosis was Coronary artery disease of native artery of native heart with stable angina pectoris ([facility] [address] Plan:   1. CAD: progressive LM stenosis, prior stent to LAD on 4/21/21, now on plavix. Not on ASA therapy since on plavix/coumadin, cont statin, BB.  Needs PCI to LM per Dr. Kenia Tanner d/t insufficient access to cannulate pt on pump d/t prior surgery, will refer back to Dr. Soni Mejia for PCI      2. S/p aortic root replacement w/ mechanical AVR 2/2017: on coumadin. Root replacement stable on last chest CTA in 2019. Last TTE about a year ago - f/u with Dr. Soni Mejia     3. Prior R leg ischemia, s/p L-R fem fem bypass 2/2017, R leg fasciotomy d/t compartment syndrome: no issues since prior surgery     4. Postop a-fib: no recent issues - maintaining SR, s/p DCCV 4/2017 following previous surgery. On metoprolol XL, coumadin for valve     5. HTN: on norvasc, metoprolol XL preop. BP little higher in office today than norm, pt reports usually lower at home, cont to monitor for now      6. HLD: on lipitor    Signed By: Chloe Pinto NP     July 13, 2021      Saw patient. History and films reviewed. Risks and benefits explained. Patient seen by PA/NP and agree with above.    Findings/Conditions: left main disease   Plan of Care: left main stent    Risk of morbidity and mortality - high  Medical decision making - high complexity

## 2021-07-14 ENCOUNTER — HOSPITAL ENCOUNTER (OUTPATIENT)
Age: 61
Setting detail: OBSERVATION
Discharge: HOME | End: 2021-07-14
Attending: INTERNAL MEDICINE | Admitting: INTERNAL MEDICINE
Payer: COMMERCIAL

## 2021-07-14 VITALS
TEMPERATURE: 98.2 F | BODY MASS INDEX: 26.18 KG/M2 | HEIGHT: 71 IN | SYSTOLIC BLOOD PRESSURE: 119 MMHG | HEART RATE: 64 BPM | DIASTOLIC BLOOD PRESSURE: 62 MMHG | RESPIRATION RATE: 20 BRPM | OXYGEN SATURATION: 96 % | WEIGHT: 187 LBS

## 2021-07-14 DIAGNOSIS — I25.10 CORONARY ARTERY DISEASE INVOLVING NATIVE CORONARY ARTERY OF NATIVE HEART, UNSPECIFIED WHETHER ANGINA PRESENT: ICD-10-CM

## 2021-07-14 PROBLEM — Z98.61 CAD S/P PERCUTANEOUS CORONARY ANGIOPLASTY: Status: ACTIVE | Noted: 2021-07-14

## 2021-07-14 PROCEDURE — C1753 CATH, INTRAVAS ULTRASOUND: HCPCS | Performed by: INTERNAL MEDICINE

## 2021-07-14 PROCEDURE — 77030010221 HC SPLNT WR POS TELE -B: Performed by: INTERNAL MEDICINE

## 2021-07-14 PROCEDURE — C1894 INTRO/SHEATH, NON-LASER: HCPCS | Performed by: INTERNAL MEDICINE

## 2021-07-14 PROCEDURE — 74011000258 HC RX REV CODE- 258: Performed by: INTERNAL MEDICINE

## 2021-07-14 PROCEDURE — 77030012468 HC VLV BLEEDBK CNTRL ABBT -B: Performed by: INTERNAL MEDICINE

## 2021-07-14 PROCEDURE — 93041 RHYTHM ECG TRACING: CPT

## 2021-07-14 PROCEDURE — C1725 CATH, TRANSLUMIN NON-LASER: HCPCS | Performed by: INTERNAL MEDICINE

## 2021-07-14 PROCEDURE — 2709999900 HC NON-CHARGEABLE SUPPLY: Performed by: INTERNAL MEDICINE

## 2021-07-14 PROCEDURE — 77030039046 HC PAD DEFIB RADIOTRNSPNT CNMD -B: Performed by: INTERNAL MEDICINE

## 2021-07-14 PROCEDURE — 99218 HC RM OBSERVATION: CPT

## 2021-07-14 PROCEDURE — 92978 ENDOLUMINL IVUS OCT C 1ST: CPT | Performed by: INTERNAL MEDICINE

## 2021-07-14 PROCEDURE — C1887 CATHETER, GUIDING: HCPCS | Performed by: INTERNAL MEDICINE

## 2021-07-14 PROCEDURE — 93454 CORONARY ARTERY ANGIO S&I: CPT | Performed by: INTERNAL MEDICINE

## 2021-07-14 PROCEDURE — 74011000636 HC RX REV CODE- 636: Performed by: INTERNAL MEDICINE

## 2021-07-14 PROCEDURE — 77030019569 HC BND COMPR RAD TERU -B: Performed by: INTERNAL MEDICINE

## 2021-07-14 PROCEDURE — 74011000250 HC RX REV CODE- 250: Performed by: INTERNAL MEDICINE

## 2021-07-14 PROCEDURE — C1874 STENT, COATED/COV W/DEL SYS: HCPCS | Performed by: INTERNAL MEDICINE

## 2021-07-14 PROCEDURE — 99153 MOD SED SAME PHYS/QHP EA: CPT | Performed by: INTERNAL MEDICINE

## 2021-07-14 PROCEDURE — 74011250637 HC RX REV CODE- 250/637: Performed by: INTERNAL MEDICINE

## 2021-07-14 PROCEDURE — 92928 PRQ TCAT PLMT NTRAC ST 1 LES: CPT | Performed by: INTERNAL MEDICINE

## 2021-07-14 PROCEDURE — 74011250636 HC RX REV CODE- 250/636: Performed by: INTERNAL MEDICINE

## 2021-07-14 PROCEDURE — 99152 MOD SED SAME PHYS/QHP 5/>YRS: CPT | Performed by: INTERNAL MEDICINE

## 2021-07-14 PROCEDURE — 85347 COAGULATION TIME ACTIVATED: CPT

## 2021-07-14 PROCEDURE — C1769 GUIDE WIRE: HCPCS | Performed by: INTERNAL MEDICINE

## 2021-07-14 PROCEDURE — 77030013715 HC INFL SYS MRTM -B: Performed by: INTERNAL MEDICINE

## 2021-07-14 DEVICE — STENT RONYX50015UX RESOLUTE ONYX 5.00X15
Type: IMPLANTABLE DEVICE | Status: FUNCTIONAL
Brand: RESOLUTE ONYX™

## 2021-07-14 RX ORDER — SODIUM CHLORIDE 0.9 % (FLUSH) 0.9 %
5-40 SYRINGE (ML) INJECTION EVERY 8 HOURS
Status: CANCELLED | OUTPATIENT
Start: 2021-07-14

## 2021-07-14 RX ORDER — GUAIFENESIN 100 MG/5ML
LIQUID (ML) ORAL AS NEEDED
Status: DISCONTINUED | OUTPATIENT
Start: 2021-07-14 | End: 2021-07-14 | Stop reason: HOSPADM

## 2021-07-14 RX ORDER — HEPARIN SODIUM 200 [USP'U]/100ML
INJECTION, SOLUTION INTRAVENOUS
Status: COMPLETED | OUTPATIENT
Start: 2021-07-14 | End: 2021-07-14

## 2021-07-14 RX ORDER — SODIUM CHLORIDE 0.9 % (FLUSH) 0.9 %
5-40 SYRINGE (ML) INJECTION AS NEEDED
Status: CANCELLED | OUTPATIENT
Start: 2021-07-14

## 2021-07-14 RX ORDER — MIDAZOLAM HYDROCHLORIDE 1 MG/ML
INJECTION, SOLUTION INTRAMUSCULAR; INTRAVENOUS AS NEEDED
Status: DISCONTINUED | OUTPATIENT
Start: 2021-07-14 | End: 2021-07-14 | Stop reason: HOSPADM

## 2021-07-14 RX ORDER — ASPIRIN 81 MG/1
81 TABLET ORAL DAILY
Qty: 30 TABLET | Refills: 0 | Status: SHIPPED | OUTPATIENT
Start: 2021-07-15

## 2021-07-14 RX ORDER — ASPIRIN 81 MG/1
81 TABLET ORAL DAILY
Status: DISCONTINUED | OUTPATIENT
Start: 2021-07-15 | End: 2021-07-14 | Stop reason: HOSPADM

## 2021-07-14 RX ORDER — LIDOCAINE HYDROCHLORIDE 10 MG/ML
INJECTION INFILTRATION; PERINEURAL AS NEEDED
Status: DISCONTINUED | OUTPATIENT
Start: 2021-07-14 | End: 2021-07-14 | Stop reason: HOSPADM

## 2021-07-14 RX ORDER — FENTANYL CITRATE 50 UG/ML
INJECTION, SOLUTION INTRAMUSCULAR; INTRAVENOUS AS NEEDED
Status: DISCONTINUED | OUTPATIENT
Start: 2021-07-14 | End: 2021-07-14 | Stop reason: HOSPADM

## 2021-07-14 RX ORDER — CLOPIDOGREL 300 MG/1
TABLET, FILM COATED ORAL AS NEEDED
Status: DISCONTINUED | OUTPATIENT
Start: 2021-07-14 | End: 2021-07-14 | Stop reason: HOSPADM

## 2021-07-14 RX ORDER — VERAPAMIL HYDROCHLORIDE 2.5 MG/ML
INJECTION, SOLUTION INTRAVENOUS AS NEEDED
Status: DISCONTINUED | OUTPATIENT
Start: 2021-07-14 | End: 2021-07-14 | Stop reason: HOSPADM

## 2021-07-14 RX ORDER — SODIUM CHLORIDE 9 MG/ML
50 INJECTION, SOLUTION INTRAVENOUS CONTINUOUS
Status: DISCONTINUED | OUTPATIENT
Start: 2021-07-14 | End: 2021-07-14 | Stop reason: HOSPADM

## 2021-07-14 RX ADMIN — SODIUM CHLORIDE 50 ML/HR: 9 INJECTION, SOLUTION INTRAVENOUS at 10:30

## 2021-07-14 NOTE — PROCEDURES
Cardiac Catheterization Procedure Note   Patient: Osiel Morales  MRN: [id number]  SSN: [id number]   YOB: 1960 Age: 57 y.o. Sex: male    Date of Procedure: 7/14/2021   Pre-procedure Diagnosis: Coronary Artery Disease  Post-procedure Diagnosis: Coronary Artery Disease  Procedure: PCI and IVUS of the left main  :  Dr. Bogdan Hong MD    Assistant(s):  None  Anesthesia: Moderate Sedation   Estimated Blood Loss: Less than 10 mL   Specimens Removed: None  Findings:     Access: left radial artery    Left main intervention:  Known 80% ostial stenosis  Left main engaged with an Ikari 3.5 guiding catheter  Anticoagulated with Angiomax (confirmed therapeutic with ACT)  Lesion crossed with a Runthrough wire  PTCA performed with a 4.0 x 15mm balloon, inflated to 12 NEVAEH x 15sec  IVUS performed for vessel sizing: sized to a 5x15mm stent  Resolute Anthony 5.0 x 15mm SINDI implanted at 12 NEVAEH x 20 sec  Post deployment IVUS showed stent well apposed and covering the true ostium. Slightly underexpanded (4.0mm) in one dimension in the mid portion  Post dilated with a 5mm NC balloon up to 18 NEVAEH x 20 sec. Excellent final result with no residual stenosis, CRISTHIAN 3 flow, no dissection. Well tolerated.     Complications: None   Implants:  SINDI x 1  Signed by:  Bogdan Hong MD  7/14/2021  12:47 PM

## 2021-07-14 NOTE — Clinical Note
Lesion: Located in the Ostium LM. Stent inserted. Stent deployed. Single technique used. First inflation pressure = 12 paula; inflation time: 20 sec.

## 2021-07-14 NOTE — PROGRESS NOTES
TRANSFER - IN REPORT:    Verbal report received from [address] [place] [address] on [place]  being received from procedure for routine progression of care. Report consisted of patients Situation, Background, Assessment and Recommendations(SBAR). Information from the following report(s) Procedure Summary, MAR, Accordion, Recent Results and Med Rec Status was reviewed with the receiving clinician. Opportunity for questions and clarification was provided. Assessment completed upon patients arrival to [address] [place] [address] and care assumed. Cardiac Cath Lab Recovery Arrival Note:    [place] arrived to [facility] recovery area. Patient procedure= LHC/PCI LM. Patient on cardiac monitor, non-invasive blood pressure, SPO2 monitor. On room air. IV  of nacl on pump at 75 ml/hr. Patient status doing well without problems. Patient is A&Ox 4. Patient reports no complaints. PROCEDURE SITE CHECK:    Procedure site:without any bleeding and or hematoma, no pain/discomfort reported at procedure site. No change in patient status. Continue to monitor patient and status. Dr Jaspal Case talked with pt and wife via the phone    1300 tolerated food and drink.     1400 12 lead EKG completed

## 2021-07-14 NOTE — PROGRESS NOTES
Assisted with dressing.   Reviewed d/c instructions, teach back method used  1700 discharged via w/c with wife, instructions, and belongings  Left radial cath site d&I

## 2021-07-14 NOTE — PROGRESS NOTES
Cardiac Cath Lab Procedure Area Arrival Note:    Meir Rodriguez arrived to Cardiac Cath Lab, Procedure Area. Patient identifiers verified with NAME and DATE OF BIRTH. Procedure verified with patient. Consent forms verified. Allergies verified. Patient informed of procedure and plan of care. Questions answered with review. Patient voiced understanding of procedure and plan of care. Patient on cardiac monitor, non-invasive blood pressure, SPO2 monitor. On RA and placed on O2 @ 2 lpm via NC.  IV of NSS on pump at 75 ml/hr. Patient status doing well without problems. Patient is A&Ox 4. Patient reports no complaints of chest pain or shortness of breath. Patient medicated during procedure with orders obtained and verified by Dr. Jorge Luis Mortensen. Refer to patients Cardiac Cath Lab PROCEDURE REPORT for vital signs, assessment, status, and response during procedure, printed at end of case. Printed report on chart or scanned into chart. 1245 Transfer to [address] [place] [address] from Procedure Area    Verbal report given to RT Kofi(R) on Meir Rodriguez being transferred to Cardiac Cath Lab  for routine progression of care   Patient is post LHC and PCI of LM procedure. Patient stable upon transfer to . Report consisted of patients Situation, Background, Assessment and   Recommendations(SBAR). Information from the following report(s) SBAR, Procedure Summary and MAR was reviewed with the receiving nurse. Opportunity for questions and clarification was provided. Patient medicated during procedure with orders obtained and verified by Dr. Jorge Luis Mortensen. Refer to patient PROCEDURE REPORT for vital signs, assessment, status, and response during procedure.

## 2021-07-14 NOTE — DISCHARGE INSTRUCTIONS
Patient Education        Coronary Angioplasty: What to Expect at Home  Your Recovery     Coronary angioplasty is a procedure that is used to open a narrowed or blocked coronary artery. It may also be called a percutaneous coronary intervention (PCI). The doctor opened your narrowed or blocked artery by putting a thin tube, called a catheter, into your heart through a blood vessel. The catheter was inserted into the blood vessel in your groin or arm. The doctor may have placed a small tube, called a stent, in the artery. Your groin or arm may have a bruise and feel sore for a day or two after the procedure. You can do light activities around the house. But don't do anything strenuous for a day or two. This care sheet gives you a general idea about how long it will take for you to recover. But each person recovers at a different pace. Follow the steps below to get better as quickly as possible. How can you care for yourself at home? Activity    · If the doctor gave you a sedative:  ? For 24 hours, don't do anything that requires attention to detail, such as going to work, making important decisions, or signing any legal documents. It takes time for the medicine's effects to completely wear off.  ? For your safety, do not drive or operate any machinery that could be dangerous. Wait until the medicine wears off and you can think clearly and react easily.     · Do not do strenuous exercise and do not lift, pull, or push anything heavy until your doctor says it is okay. This may be for a day or two. You can walk around the house and do light activity, such as cooking.     · If the catheter was placed in your groin, try not to walk up stairs for the first couple of days.     · If the catheter was placed in your arm near your wrist, do not bend your wrist deeply for the first couple of days.  Be careful using your hand to get into and out of a chair or bed.     · Carry your stent identification card with you at all times.     · If your doctor recommends it, get more exercise. Walking is a good choice. Bit by bit, increase the amount you walk every day. Try for at least 30 minutes on most days of the week.     · If you haven't been set up with a cardiac rehab program, talk to your doctor about whether rehab is right for you. Cardiac rehab includes supervised exercise. It also includes help with diet and lifestyle changes and emotional support. Diet    · Drink plenty of fluids to help your body flush out the dye. If you have kidney, heart, or liver disease and have to limit fluids, talk with your doctor before you increase the amount of fluids you drink.     · Keep eating a heart-healthy diet that has lots of fruits, vegetables, and whole grains. If you have not been eating this way, talk to your doctor. You also may want to talk to a dietitian. This expert can help you to learn about healthy foods and plan meals. Medicines    · Your doctor will tell you if and when you can restart your medicines. Your doctor will also give you instructions about taking any new medicines.     · If you take aspirin or some other blood thinner, ask your doctor if and when to start taking it again. Make sure that you understand exactly what your doctor wants you to do.     · Your doctor will prescribe blood-thinning medicines. You will likely take aspirin plus another antiplatelet, such as clopidogrel (Plavix). It is very important that you take these medicines exactly as directed. These medicines help keep the coronary artery open and reduce your risk of a heart attack.     · Call your doctor if you think you are having a problem with your medicine. Care of the catheter site    · For 1 or 2 days, keep a bandage over the spot where the catheter was inserted. The bandage probably will fall off in this time.     · Put ice or a cold pack on the area for 10 to 20 minutes at a time to help with soreness or swelling.  Put a thin cloth between the ice and your skin.     · You may shower 24 to 48 hours after the procedure, if your doctor okays it. Pat the incision dry.     · Do not soak the catheter site until it is healed. Don't take a bath for 1 week, or until your doctor tells you it is okay.     · Watch for bleeding from the site. A small amount of blood (up to the size of a quarter) on the bandage can be normal.     · If you are bleeding, lie down and press on the area for 15 minutes to try to make it stop. If the bleeding does not stop, call your doctor or seek immediate medical care. Follow-up care is a key part of your treatment and safety. Be sure to make and go to all appointments, and call your doctor if you are having problems. It's also a good idea to know your test results and keep a list of the medicines you take. When should you call for help? Call 911 anytime you think you may need emergency care. For example, call if:    · You passed out (lost consciousness).     · You have severe trouble breathing.     · You have sudden chest pain and shortness of breath, or you cough up blood.     · You have symptoms of a heart attack, such as:  ? Chest pain or pressure. ? Sweating. ? Shortness of breath. ? Nausea or vomiting. ? Pain that spreads from the chest to the neck, jaw, or one or both shoulders or arms. ? Dizziness or lightheadedness. ? A fast or uneven pulse. After calling 911, chew 1 adult-strength aspirin. Wait for an ambulance. Do not try to drive yourself.     · You have been diagnosed with angina, and you have angina symptoms that do not go away with rest or are not getting better within 5 minutes after you take one dose of nitroglycerin.    Call your doctor now or seek immediate medical care if:    · You are bleeding from the area where the catheter was put in your artery.     · You have a fast-growing, painful lump at the catheter site.     · You have signs of infection, such as:  ? Increased pain, swelling, warmth, or redness. ? Red streaks leading from the catheter site. ? Pus draining from the catheter site. ? A fever.     · Your leg, arm, or hand is painful, looks blue, or feels cold, numb, or tingly. Watch closely for changes in your health, and be sure to contact your doctor if you have any problems. Where can you learn more? Go to http://www.gray.com/  Enter O664 in the search box to learn more about \"Coronary Angioplasty: What to Expect at Home. \"  Current as of: August 31, 2020               Content Version: 12.8  © 2006-2021 [employer]. Care instructions adapted under license by [employer] (which disclaims liability or warranty for this information). If you have questions about a medical condition or this instruction, always ask your healthcare professional. [address] any warranty or liability for your use of this information.

## 2021-07-14 NOTE — Clinical Note
Lesion located in the Ostium LM. Balloon inserted. Balloon inflated using single inflation technique. Lesion #1: Pressure = 6 paula; Duration = 15 sec.

## 2021-07-14 NOTE — Clinical Note
Lesion located in the Ostium LM. Balloon inserted. Balloon inflated using single inflation technique. Lesion #1: Pressure = 18 paula; Duration = 20 sec.

## 2021-07-14 NOTE — PROGRESS NOTES
Discharge instructions reviewed with wife via the phone and pt.    [phone]  Ambulated 100 ft, gait steady. Voided, back to stretcher. Left radial cath site, no bleeding or hematoma noted.

## 2021-07-14 NOTE — PROGRESS NOTES
Cardiac Cath Lab Recovery Arrival Note:      Nikolay Briseno arrived to Cardiac Cath Lab, Recovery Area. Staff introduced to patient. Patient identifiers verified with NAME and DATE OF BIRTH. Procedure verified with patient. Consent forms reviewed and signed by patient or authorized representative and verified. Allergies verified. Patient and family oriented to department. Patient and family informed of procedure and plan of care. Questions answered with review. Patient prepped for procedure, per orders from physician, prior to arrival.    Patient on cardiac monitor, non-invasive blood pressure, SPO2 monitor. On room air. Patient is A&Ox 4. Patient reports no complaints. Patient in stretcher, in low position, with side rails up, call bell within reach, patient instructed to call if assistance as needed. Patient prep in: [address], [place] [address].    Patient family has pager # [phone]  Family in: wife and daughter in hospital.   Prep by: Imtiaz Diana RN  Pre cath teaching completed    1834  Dr Jass Armstrong in to talk with pt, instructed no need for INR was done yesterday  Last coumadin dose was this past sunday

## 2021-07-16 LAB — ACT BLD: 318 SECS (ref 79–138)

## 2021-07-17 LAB
ATRIAL RATE: 59 BPM
CALCULATED P AXIS, ECG09: 14 DEGREES
CALCULATED R AXIS, ECG10: -29 DEGREES
CALCULATED T AXIS, ECG11: 133 DEGREES
DIAGNOSIS, 93000: NORMAL
P-R INTERVAL, ECG05: 292 MS
Q-T INTERVAL, ECG07: 468 MS
QRS DURATION, ECG06: 104 MS
QTC CALCULATION (BEZET), ECG08: 463 MS
VENTRICULAR RATE, ECG03: 59 BPM

## 2021-07-20 NOTE — PROGRESS NOTES
Saw patient. History and films reviewed. Risks and benefits explained. Patient seen by PA/NP and agree with above.    Findings/Conditions: left main disease   Plan of Care: left main stent    Risk of morbidity and mortality - high  Medical decision making - high complexity

## 2021-09-21 ENCOUNTER — HOSPITAL ENCOUNTER (OUTPATIENT)
Dept: LAB | Age: 61
Discharge: HOME | End: 2021-09-21

## 2021-09-23 LAB
COMMENT, HOLDF: NORMAL
P2Y12 PLT RESPONSE,PPPR: 298 PRU (ref 194–418)
SAMPLES BEING HELD,HOLD: NORMAL

## 2022-03-18 PROBLEM — I71.010 ASCENDING AORTIC DISSECTION (HCC): Status: ACTIVE | Noted: 2017-02-09

## 2022-03-19 PROBLEM — I25.10 CAD S/P PERCUTANEOUS CORONARY ANGIOPLASTY: Status: ACTIVE | Noted: 2021-07-14

## 2022-03-19 PROBLEM — I25.10 CAD (CORONARY ARTERY DISEASE): Status: ACTIVE | Noted: 2021-04-21

## 2022-03-19 PROBLEM — Z98.61 CAD S/P PERCUTANEOUS CORONARY ANGIOPLASTY: Status: ACTIVE | Noted: 2021-07-14

## 2022-03-20 PROBLEM — Z95.2 S/P AVR: Status: ACTIVE | Noted: 2017-03-06

## 2022-05-03 NOTE — PROGRESS NOTES
EpiFix Treatment Note    NAME:  Kelvin German  YOB: 1961  MEDICAL RECORD NUMBER:  [id number]  DATE:  5/3/2022    Goal:  Patient will receive safe and proper application of skin substitute. Patient will comply with caring for dressing, offloading and reporting complications.  [x]Expiration date checked immediately prior to use   [x] Package intact prior to use and no damage noted   [x] Transport temperature controlled and acceptable(ROOM AIR)     [x]  EpiFix was removed from protective sterile packaging by physician and applied to prepared ulcer bed.  [x] EpiFix was placed using embossment letting as a guide. (UP)     [x] Epifix was hydrated with sterile normal saline per physician(IF NEEDED)         [x] EpiFix was applied to location Right 2nd Toe and affixed with steri-strips by the physician.  [x] EpiFix was covered with non-adherent ulcer dressing per physician   [x] Applied Mineral Oil Soaked Eyepad over non-adherent.  [x] Applied dry gauze and/or roll gauze.  [x] Coban or ace wrap was applied to secure graft and decrease edema.  [x] Patient/caregiver was instructed not to remove dressing and to keep it clean and dry.  [x] Pt/family/caregiver was instructed on signs and symptoms of complications to report such as draining through dressing, dressing falling down/slipping, getting wet,or  severe   pain and tingling in toes   [x] Pt/family/caregiver was instructed on need for offloading and elevation of affected extremity and on use of prescribed offloading device.  [x] Record info in tissue log( sticker with patient label). Picture epifix sticker with patient label for that specific date in . Please add epifix application number to both stickers.  [x]  Guidelines followed   [x] EpiFix may be applied a total of 10 times per wound over a 12 week period. Additionally EpiFix may only be used every 12 months per wound.     Date of Nephrology Progress Note  Tanvir Callejas  Date of Admission : 2/9/2017    CC: Follow up for EDUARD       Assessment and Plan     Acute Kidney Injury:  - multifactorial including ATN from renal ischemia due to dissection + contrast nephropathy and post op hypotension  - appears to have renal infarctions on the left, 20% of cortex; right appears ok on CTA from 2/16  - Cr stable  - cont current care    Hyponatremia:  - 2/2 to hypervolemia  - should improve with diuresis  - monitor daily    HTN:  - BP controlled w/o antihypertensives     Acidosis:  - resolved    Hypervolemia:  - IV lasix BID    Cholecystitis/Pancreatitis:  - HIDA scan normal    Ascending Aortic Dissection s/p aortic valve repair and left fem-fem bypass     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 Feeling ok. Edema better today. Cr slowly improving. No cp, sob, n/v/d reported. Current Medications: all current  Medications have been eviewed in EPIC  Review of Systems: Pertinent items are noted in HPI.     Objective:  Vitals:    Vitals:    02/23/17 0400 02/23/17 0500 02/23/17 0800 02/23/17 1108   BP: 137/48  134/62 137/53   Pulse: 84  71 66   Resp: 16  18 16   Temp: 98.2 °F (36.8 °C)  98.3 °F (36.8 °C) 98.4 °F (36.9 °C)   SpO2: 100%  96% 99%   Weight:  109 kg (240 lb 4.8 oz)     Height:         Intake and Output:  02/23 0701 - 02/23 1900  In: 360 [P.O.:360]  Out: 450 [Urine:450]  02/21 1901 - 02/23 0700  In: 8290 [P.O.:960; I.V.:500]  Out: 3720 [Urine:3720]    Physical Examination:  General: NAD,Conversant   Neck:  Supple, no mass  Resp:  Lungs CTA B/L, no wheezing , normal respiratory effort  CV:  RRR,  no murmur or rub, left LE edema, R in bandage  GI:  Soft, NT, + Bowel sounds, no hepatosplenomegaly  Neurologic:  Non focal  Psych:             AAO x 3 appropriate affect   Skin:  No Rash      [x]    High complexity decision making was performed  [x]    Patient is at high-risk of decompensation first application of EpiFix for this current wound is April 26, 2022.         Electronically signed by Yandy Gerard RN on 5/3/2022 at 3:25 PM with multiple organ involvement    Lab Data Personally Reviewed: I have reviewed all the pertinent labs, microbiology data and radiology studies during assessment.     Recent Labs      02/23/17   0501  02/22/17   0451  02/21/17   0326   NA  130*  126*  126*   K  3.3*  3.5  3.9   CL  93*  91*  91*   CO2  27  27  26   GLU  90  97  104*   BUN  33*  40*  47*   CREA  1.29  1.33*  1.30   CA  7.9*  7.9*  7.9*   MG  2.3  2.7*  2.9*   PHOS  3.4  3.4  3.3   ALB  2.3*  2.4*  2.4*   SGOT  53*  80*  131*   ALT  355*  475*  630*   INR  1.8*  1.8*  2.4*     Recent Labs      02/23/17   0501  02/22/17   0451  02/21/17   0326   WBC  15.5*  19.4*  22.8*   HGB  7.8*  7.8*  7.9*   HCT  23.9*  23.9*  24.1*   PLT  231  234  232     No results found for: [facility]  Lab Results   Component Value Date/Time    Culture result: NO GROWTH 5 DAYS 02/15/2017 01:35 PM     Recent Results (from the past 24 hour(s))   PROTHROMBIN TIME + INR    Collection Time: 02/23/17  5:01 AM   Result Value Ref Range    INR 1.8 (H) 0.9 - 1.1      Prothrombin time 18.4 (H) 9.0 - 11.1 sec   MAGNESIUM    Collection Time: 02/23/17  5:01 AM   Result Value Ref Range    Magnesium 2.3 1.6 - 2.4 mg/dL   CBC W/O DIFF    Collection Time: 02/23/17  5:01 AM   Result Value Ref Range    WBC 15.5 (H) 4.1 - 11.1 K/uL    RBC 2.53 (L) 4.10 - 5.70 M/uL    HGB 7.8 (L) 12.1 - 17.0 g/dL    HCT 23.9 (L) 36.6 - 50.3 %    MCV 94.5 80.0 - 99.0 FL    MCH 30.8 26.0 - 34.0 PG    MCHC 32.6 30.0 - 36.5 g/dL    RDW 14.6 (H) 11.5 - 14.5 %    PLATELET 989 667 - 779 K/uL   METABOLIC PANEL, COMPREHENSIVE    Collection Time: 02/23/17  5:01 AM   Result Value Ref Range    Sodium 130 (L) 136 - 145 mmol/L    Potassium 3.3 (L) 3.5 - 5.1 mmol/L    Chloride 93 (L) 97 - 108 mmol/L    CO2 27 21 - 32 mmol/L    Anion gap 10 5 - 15 mmol/L    Glucose 90 65 - 100 mg/dL    BUN 33 (H) 6 - 20 MG/DL    Creatinine 1.29 0.70 - 1.30 MG/DL    BUN/Creatinine ratio 26 (H) 12 - 20      GFR est AA >60 >60 ml/min/1.73m2    GFR est non-AA 57 (L) >60 ml/min/1.73m2    Calcium 7.9 (L) 8.5 - 10.1 MG/DL    Bilirubin, total 2.9 (H) 0.2 - 1.0 MG/DL    ALT (SGPT) 355 (H) 12 - 78 U/L    AST (SGOT) 53 (H) 15 - 37 U/L    Alk. phosphatase 296 (H) 45 - 117 U/L    Protein, total 5.4 (L) 6.4 - 8.2 g/dL    Albumin 2.3 (L) 3.5 - 5.0 g/dL    Globulin 3.1 2.0 - 4.0 g/dL    A-G Ratio 0.7 (L) 1.1 - 2.2     PHOSPHORUS    Collection Time: 02/23/17  5:01 AM   Result Value Ref Range    Phosphorus 3.4 2.6 - 4.7 MG/DL   LIPASE    Collection Time: 02/23/17  5:01 AM   Result Value Ref Range    Lipase 590 (H) 73 - 393 U/L   AMYLASE    Collection Time: 02/23/17  5:01 AM   Result Value Ref Range    Amylase 108 25 - 115 U/L           Martir Light MD  [facility]   [address] [facility] [address]  Phone - [phone]   Fax - [phone]  [email or web address]

## 2022-10-16 ENCOUNTER — APPOINTMENT (OUTPATIENT)
Dept: GENERAL RADIOLOGY | Age: 62
End: 2022-10-16
Attending: EMERGENCY MEDICINE
Payer: COMMERCIAL

## 2022-10-16 ENCOUNTER — HOSPITAL ENCOUNTER (EMERGENCY)
Age: 62
Discharge: HOME | End: 2022-10-16
Attending: EMERGENCY MEDICINE
Payer: COMMERCIAL

## 2022-10-16 VITALS
RESPIRATION RATE: 12 BRPM | DIASTOLIC BLOOD PRESSURE: 60 MMHG | OXYGEN SATURATION: 98 % | HEART RATE: 71 BPM | WEIGHT: 180 LBS | HEIGHT: 71 IN | SYSTOLIC BLOOD PRESSURE: 121 MMHG | BODY MASS INDEX: 25.2 KG/M2 | TEMPERATURE: 98 F

## 2022-10-16 DIAGNOSIS — R07.9 CHEST PAIN, UNSPECIFIED TYPE: Primary | ICD-10-CM

## 2022-10-16 LAB
ALBUMIN SERPL-MCNC: 3.7 G/DL (ref 3.5–5)
ALBUMIN/GLOB SERPL: 1.1 {RATIO} (ref 1.1–2.2)
ALP SERPL-CCNC: 116 U/L (ref 45–117)
ALT SERPL-CCNC: 57 U/L (ref 12–78)
ANION GAP SERPL CALC-SCNC: 1 MMOL/L (ref 5–15)
AST SERPL-CCNC: 35 U/L (ref 15–37)
BASOPHILS # BLD: 0 K/UL (ref 0–0.1)
BASOPHILS NFR BLD: 1 % (ref 0–1)
BILIRUB SERPL-MCNC: 0.9 MG/DL (ref 0.2–1)
BUN SERPL-MCNC: 15 MG/DL (ref 6–20)
BUN/CREAT SERPL: 17 (ref 12–20)
CALCIUM SERPL-MCNC: 8.8 MG/DL (ref 8.5–10.1)
CHLORIDE SERPL-SCNC: 110 MMOL/L (ref 97–108)
CO2 SERPL-SCNC: 28 MMOL/L (ref 21–32)
COMMENT, HOLDF: NORMAL
CREAT SERPL-MCNC: 0.88 MG/DL (ref 0.7–1.3)
DIFFERENTIAL METHOD BLD: NORMAL
EOSINOPHIL # BLD: 0.2 K/UL (ref 0–0.4)
EOSINOPHIL NFR BLD: 2 % (ref 0–7)
ERYTHROCYTE [DISTWIDTH] IN BLOOD BY AUTOMATED COUNT: 12.5 % (ref 11.5–14.5)
GLOBULIN SER CALC-MCNC: 3.3 G/DL (ref 2–4)
GLUCOSE SERPL-MCNC: 129 MG/DL (ref 65–100)
HCT VFR BLD AUTO: 43.3 % (ref 36.6–50.3)
HGB BLD-MCNC: 14.6 G/DL (ref 12.1–17)
IMM GRANULOCYTES # BLD AUTO: 0 K/UL (ref 0–0.04)
IMM GRANULOCYTES NFR BLD AUTO: 0 % (ref 0–0.5)
INR PPP: 3.3 (ref 0.9–1.1)
LYMPHOCYTES # BLD: 1.4 K/UL (ref 0.8–3.5)
LYMPHOCYTES NFR BLD: 18 % (ref 12–49)
MCH RBC QN AUTO: 30.5 PG (ref 26–34)
MCHC RBC AUTO-ENTMCNC: 33.7 G/DL (ref 30–36.5)
MCV RBC AUTO: 90.6 FL (ref 80–99)
MONOCYTES # BLD: 0.6 K/UL (ref 0–1)
MONOCYTES NFR BLD: 8 % (ref 5–13)
NEUTS SEG # BLD: 5.5 K/UL (ref 1.8–8)
NEUTS SEG NFR BLD: 71 % (ref 32–75)
NRBC # BLD: 0 K/UL (ref 0–0.01)
NRBC BLD-RTO: 0 PER 100 WBC
PLATELET # BLD AUTO: 181 K/UL (ref 150–400)
PMV BLD AUTO: 11.1 FL (ref 8.9–12.9)
POTASSIUM SERPL-SCNC: 4.4 MMOL/L (ref 3.5–5.1)
PROT SERPL-MCNC: 7 G/DL (ref 6.4–8.2)
PROTHROMBIN TIME: 31.9 SEC (ref 9–11.1)
RBC # BLD AUTO: 4.78 M/UL (ref 4.1–5.7)
SAMPLES BEING HELD,HOLD: NORMAL
SODIUM SERPL-SCNC: 139 MMOL/L (ref 136–145)
TROPONIN-HIGH SENSITIVITY: 8 NG/L (ref 0–76)
TROPONIN-HIGH SENSITIVITY: 9 NG/L (ref 0–76)
WBC # BLD AUTO: 7.8 K/UL (ref 4.1–11.1)

## 2022-10-16 PROCEDURE — 85610 PROTHROMBIN TIME: CPT

## 2022-10-16 PROCEDURE — 85025 COMPLETE CBC W/AUTO DIFF WBC: CPT

## 2022-10-16 PROCEDURE — 71046 X-RAY EXAM CHEST 2 VIEWS: CPT

## 2022-10-16 PROCEDURE — 80053 COMPREHEN METABOLIC PANEL: CPT

## 2022-10-16 PROCEDURE — 36415 COLL VENOUS BLD VENIPUNCTURE: CPT

## 2022-10-16 PROCEDURE — 84484 ASSAY OF TROPONIN QUANT: CPT

## 2022-10-16 PROCEDURE — 99284 EMERGENCY DEPT VISIT MOD MDM: CPT

## 2022-10-16 NOTE — ED NOTES
I have reviewed discharge instructions with the patient. The patient verbalized understanding. Pt ambulatory to ED exit with DC instructions and wife.

## 2022-10-16 NOTE — ED PROVIDER NOTES
Patient is a 78-year-old male with history of aortic arch dissection and valve replacement per Dr. Ricardo Lopes 6 years ago currently on Coumadin, atrial flutter, CAD status post stents [facility], hyperlipidemia, hypertension, peripheral vascular disease status post femorofemoral bypass presents with midsternal chest pressure. Patient reports around 4 AM he was woken up to midsternal chest pressure. Patient reports he got a bed and walked around and symptoms lasted about 15 to 20 minutes with no other associated symptoms. Patient says he has never felt chest pressure like this before. Patient states that his aortic dissection (sharp pain) and episode when he required a stent (heart felt funny when he was running) did not feel the same. Patient is currently asymptomatic.        Past Medical History:   Diagnosis Date    Aortic arch dissection ([facility] [address] 03/2017    Aortic root replacement    Atrial fibrillation ([facility] [address] 02/2017    Post surgery, s/p DCCV 4/4/17     CAD (coronary artery disease) 04/21/2021    s/p stent to LAD    Family history of colon cancer     Family history of diabetes mellitus (DM) 6/8/2010    Family history of early CAD 6/8/2010    HLD (hyperlipidemia)     HTN (hypertension)     PVD (peripheral vascular disease) ([facility] [address] 02/2017    Ischemic R leg, s/p fem-fem bypass    Seborrheic dermatitis 6/8/2010       Past Surgical History:   Procedure Laterality Date    HX AORTIC VALVE REPLACEMENT  02/09/2017    HX OTHER SURGICAL  02/09/2017    L to R fem/fem bypass graft    HX OTHER SURGICAL  02/11/2017    right compartment fasciotomy    HX OTHER SURGICAL  02/09/2017    Aortic dissection repair         Family History:   Problem Relation Age of Onset    Diabetes Mother     Heart Disease Mother     Cancer Father         colon    Hypertension Sister     Stroke Maternal Grandfather        Social History     Socioeconomic History    Marital status:      Spouse name: Not on file    Number of children: Not on file    Years of education: Not on file    Highest education level: Not on file   Occupational History    Not on file   Tobacco Use    Smoking status: Never    Smokeless tobacco: Never   Substance and Sexual Activity    Alcohol use: Yes     Comment: social    Drug use: No    Sexual activity: Not on file   Other Topics Concern    Not on file   Social History Narrative    Not on file     Social Determinants of Health     Financial Resource Strain: Not on file   Food Insecurity: Not on file   Transportation Needs: Not on file   Physical Activity: Not on file   Stress: Not on file   Social Connections: Not on file   Intimate Partner Violence: Not on file   Housing Stability: Not on file         ALLERGIES: Patient has no known allergies. Review of Systems   Constitutional:  Negative for chills and fever. HENT:  Negative for drooling and nosebleeds. Eyes:  Negative for pain and itching. Respiratory:  Negative for choking and stridor. Cardiovascular:  Positive for chest pain. Negative for leg swelling. Gastrointestinal:  Negative for abdominal pain and rectal pain. Endocrine: Negative for heat intolerance and polyphagia. Genitourinary:  Negative for enuresis and genital sores. Musculoskeletal:  Negative for arthralgias and joint swelling. Skin:  Negative for color change. Allergic/Immunologic: Negative for immunocompromised state. Neurological:  Negative for tremors and speech difficulty. Hematological:  Negative for adenopathy. Psychiatric/Behavioral:  Negative for dysphoric mood and sleep disturbance. Vitals:    10/16/22 0842 10/16/22 1150 10/16/22 1156   BP: 126/65  137/63   Pulse: 71  64   Resp: 18  11   Temp: 98.1 °F (36.7 °C)  97.6 °F (36.4 °C)   SpO2: 97%  97%   Weight:  81.6 kg (180 lb)    Height:  5' 11\" (1.803 m)             Physical Exam  Vitals and nursing note reviewed. Constitutional:       General: He is not in acute distress. Appearance: He is well-developed.  He is not ill-appearing, toxic-appearing or diaphoretic. HENT:      Head: Normocephalic and atraumatic. Nose: Nose normal.   Eyes:      Conjunctiva/sclera: Conjunctivae normal.   Cardiovascular:      Rate and Rhythm: Normal rate and regular rhythm. Pulmonary:      Effort: Pulmonary effort is normal. No respiratory distress. Breath sounds: Normal breath sounds. Chest:      Chest wall: No tenderness. Abdominal:      General: There is no distension. Palpations: Abdomen is soft. Musculoskeletal:         General: No deformity. Normal range of motion. Cervical back: Normal range of motion and neck supple. Skin:     General: Skin is warm and dry. Neurological:      Mental Status: He is alert and oriented to person, place, and time. Coordination: Coordination normal.   Psychiatric:         Behavior: Behavior normal.        MDM  Number of Diagnoses or Management Options  Diagnosis management comments: Patient noted to be in a flutter on EKG. At time of my evaluation he is in normal sinus rhythm and is currently asymptomatic. EKG and 2 cardiac enzymes negative for ACS today. I have no suspicion for aortic pathology today that requires imaging. Patient is stable for discharge and [facility]. ED Course as of 10/16/22 1249   Sun Oct 16, 2022   7106 ED EKG interpretation:  Rhythm: atrial flutter; and regular . Rate (approx.): 67; Axis: normal; ST/T wave: non-specific changes; No STEMI     [AL]      ED Course User Index  [AL] Sid Hale MD       Procedures    Patient's results have been reviewed with them. Patient and/or family have verbally conveyed their understanding and agreement of the patient's signs, symptoms, diagnosis, treatment and prognosis and additionally agree to follow up as recommended or return to the Emergency Room should their condition change prior to follow-up.   Discharge instructions have also been provided to the patient with some educational information regarding their diagnosis as well a list of reasons why they would want to return to the ER prior to their follow-up appointment should their condition change.

## 2022-10-16 NOTE — DISCHARGE INSTRUCTIONS
Your EKG and blood work showed the pain you experienced was not a heart attack. Please follow up with cardiology tomorrow morning. Thank you.

## 2023-01-01 ENCOUNTER — HOSPITAL ENCOUNTER (OUTPATIENT)
Facility: HOSPITAL | Age: 63
Setting detail: INFUSION SERIES
End: 2023-01-01

## 2023-01-01 ENCOUNTER — TELEPHONE (OUTPATIENT)
Facility: HOSPITAL | Age: 63
End: 2023-01-01

## 2023-01-01 ENCOUNTER — PREP FOR PROCEDURE (OUTPATIENT)
Age: 63
End: 2023-01-01

## 2023-01-01 DIAGNOSIS — C25.0 ADENOCARCINOMA OF HEAD OF PANCREAS (HCC): Primary | ICD-10-CM

## 2023-01-01 DIAGNOSIS — C25.9 MALIGNANT NEOPLASM OF PANCREAS (HCC): ICD-10-CM

## 2023-01-01 RX ORDER — SODIUM CHLORIDE 9 MG/ML
INJECTION, SOLUTION INTRAVENOUS CONTINUOUS
Status: CANCELLED | OUTPATIENT
Start: 2023-01-01

## 2023-01-01 RX ORDER — PALONOSETRON 0.05 MG/ML
0.25 INJECTION, SOLUTION INTRAVENOUS ONCE
Status: CANCELLED | OUTPATIENT
Start: 2023-01-01 | End: 2023-01-01

## 2023-01-01 RX ORDER — ACETAMINOPHEN 325 MG/1
650 TABLET ORAL
Status: CANCELLED | OUTPATIENT
Start: 2023-01-01

## 2023-01-01 RX ORDER — ONDANSETRON 2 MG/ML
8 INJECTION INTRAMUSCULAR; INTRAVENOUS
Status: CANCELLED | OUTPATIENT
Start: 2023-01-01

## 2023-01-01 RX ORDER — DEXTROSE MONOHYDRATE 50 MG/ML
5-250 INJECTION, SOLUTION INTRAVENOUS PRN
Status: CANCELLED | OUTPATIENT
Start: 2023-01-01

## 2023-01-01 RX ORDER — HEPARIN 100 UNIT/ML
500 SYRINGE INTRAVENOUS PRN
Status: CANCELLED | OUTPATIENT
Start: 2023-01-01

## 2023-01-01 RX ORDER — DIPHENHYDRAMINE HYDROCHLORIDE 50 MG/ML
50 INJECTION INTRAMUSCULAR; INTRAVENOUS
Status: CANCELLED | OUTPATIENT
Start: 2023-01-01

## 2023-01-01 RX ORDER — SODIUM CHLORIDE 0.9 % (FLUSH) 0.9 %
5-40 SYRINGE (ML) INJECTION PRN
Status: CANCELLED | OUTPATIENT
Start: 2023-01-01

## 2023-01-01 RX ORDER — ALBUTEROL SULFATE 90 UG/1
4 AEROSOL, METERED RESPIRATORY (INHALATION) PRN
Status: CANCELLED | OUTPATIENT
Start: 2023-01-01

## 2023-01-01 RX ORDER — SODIUM CHLORIDE 9 MG/ML
5-250 INJECTION, SOLUTION INTRAVENOUS PRN
Status: CANCELLED | OUTPATIENT
Start: 2023-01-01

## 2023-01-01 RX ORDER — EPINEPHRINE 1 MG/ML
0.3 INJECTION, SOLUTION, CONCENTRATE INTRAVENOUS PRN
Status: CANCELLED | OUTPATIENT
Start: 2023-01-01

## 2023-01-01 RX ORDER — MEPERIDINE HYDROCHLORIDE 25 MG/ML
12.5 INJECTION INTRAMUSCULAR; INTRAVENOUS; SUBCUTANEOUS PRN
Status: CANCELLED | OUTPATIENT
Start: 2023-01-01

## 2023-01-25 ENCOUNTER — OFFICE VISIT (OUTPATIENT)
Dept: CARDIOLOGY CLINIC | Age: 63
End: 2023-01-25
Payer: COMMERCIAL

## 2023-01-25 VITALS
BODY MASS INDEX: 26.18 KG/M2 | HEART RATE: 64 BPM | WEIGHT: 187 LBS | DIASTOLIC BLOOD PRESSURE: 70 MMHG | RESPIRATION RATE: 15 BRPM | OXYGEN SATURATION: 98 % | HEIGHT: 71 IN | SYSTOLIC BLOOD PRESSURE: 120 MMHG

## 2023-01-25 DIAGNOSIS — I25.10 CAD S/P PERCUTANEOUS CORONARY ANGIOPLASTY: ICD-10-CM

## 2023-01-25 DIAGNOSIS — I71.010 ASCENDING AORTIC DISSECTION: Primary | ICD-10-CM

## 2023-01-25 DIAGNOSIS — Z95.2 S/P AVR: ICD-10-CM

## 2023-01-25 DIAGNOSIS — I25.118 CORONARY ARTERY DISEASE OF NATIVE ARTERY OF NATIVE HEART WITH STABLE ANGINA PECTORIS (HCC): ICD-10-CM

## 2023-01-25 DIAGNOSIS — Z98.61 CAD S/P PERCUTANEOUS CORONARY ANGIOPLASTY: ICD-10-CM

## 2023-01-25 NOTE — LETTER
2/11/2023    Patient: Wilbert Powers   YOB: 1960   Date of Visit: 1/25/2023     Lavinia Bosworth, MD  [address] [place] [address]    Dear Lavinia Bosworth, MD,      Thank you for referring Mr. Gisell Thomason to [address] [place] [address] for evaluation. My notes for this consultation are attached. If you have questions, please do not hesitate to call me. I look forward to following your patient along with you.       Sincerely,    America Cartagena MD

## 2023-01-27 ENCOUNTER — TELEPHONE (OUTPATIENT)
Dept: CARDIOLOGY CLINIC | Age: 63
End: 2023-01-27

## 2023-01-27 ENCOUNTER — TELEPHONE ANTICOAG (OUTPATIENT)
Dept: CARDIOLOGY CLINIC | Age: 63
End: 2023-01-27

## 2023-01-27 DIAGNOSIS — Z95.2 S/P AVR: Primary | ICD-10-CM

## 2023-01-27 PROBLEM — I48.92 ATRIAL FLUTTER (HCC): Status: ACTIVE | Noted: 2023-01-27

## 2023-01-27 NOTE — PROGRESS NOTES
Spoke with Dr. Sung Mcneill via telephone call. Per MD patient is to start taking only 5 mg of coumadin on Fridays and Tuesdays and he is to recheck INR next week. Spoke with patient. 2 patient identifiers used. Notified patient of this and patient verbalized understanding of plan.

## 2023-01-27 NOTE — PROGRESS NOTES
Kesha spoke with patient. No change in diet/medications. Josette take 5 mg tonight then resume current dosing regimen of 10 mg warfarin nightly. To recheck INR at lab on 2-3-23.

## 2023-01-30 NOTE — PROGRESS NOTES
Physical Therapy    Attempted to see pt for skilled therapy intervention. Pt currently off the unit at this time. Will follow up later today as appropriate.      Thank Pan Carpio PT,DPT left wrist

## 2023-02-03 DIAGNOSIS — Z95.2 S/P AVR: ICD-10-CM

## 2023-02-03 LAB
INR PPP: 2.7 (ref 0.9–1.1)
PROTHROMBIN TIME: 26.3 SEC (ref 9–11.1)

## 2023-02-07 ENCOUNTER — DOCUMENTATION ONLY (OUTPATIENT)
Dept: CARDIOLOGY CLINIC | Age: 63
End: 2023-02-07

## 2023-02-07 NOTE — PROGRESS NOTES
Patient called today to check on INR. Let patient know that his information had been forwarded to [facility] of [facility] and that I would follow up with her. Notified patient his level was 2.7 within range, and that I would get nurse to reach to him. Called anticoagulation nurse and notified her of this call from patient and that he needed to be called. Patient needs direction on his next INR draw.

## 2023-02-12 NOTE — PROGRESS NOTES
Dr. Monge Mt. [phone]            Cardiology Consult/Progress Note      Requesting/referring provider: Rox Rodrigues MD    Reason for Consult: Establish care    Assessment/Plan:  1. History of ascending aortic dissection type with extension across the arch into the descending thoracic aorta  2. Ascending aortic repair by Dr. Vladimir James in 2017 in conjunction with mechanical aortic valve replacement   3. 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 History of coronary disease with PCI of LAD in 2021 and PCI of left main in 2022 by   5. Functional tolerance  6. Hypertension  7. Hyperlipidemia  8. Recurrent paroxysmal atrial fibrillation    Mr. Rell Del Cid is here for establishing care with me. He is clinically doing very well. Currently does not report of any acute symptoms. He is on anticoagulation with warfarin given mechanical aortic prosthesis and history of A-fib. We will transfer his anticoagulation management to our division/[facility] system. Continue current medications including Plavix in conjunction with warfarin. Recommend discontinuation of aspirin since risk of bleeding would be quite high and on triple therapy.   Should undergo echocardiographic surveillance of his mechanical prosthesis every 1 to 2 years along with annual clinical follow-up with    Investigations ordered    []    High complexity decision making was performed  []    Patient is at high-risk of decompensation with multiple organ involvement  []    Complex/difficult social determinants of health outcomes  Total of ** minutes were spent on reviewing the records, analyzing investigations and documentation in the chart, on the day of visit including time for examination and time spent with the patient  Investigations personally reviewed and interpreted  Cardiac catheterization from 2021  was reviewed. Moderate left main disease with moderate to severe LAD disease for which PCI was performed to proximal to mid LAD in 2021. Subsequently there was 50% angiographic disease in the ostium of the left main accompanied by ventricularization of the diagnostic catheter for which left main PCI was performed subsequently by Dr. Lyndsey Powell later that. .  About 2 to 3 mm of stent extension into aorta. Residual 20% stenosis. Investigations reviewed    HPI: Fidelina Acosta, a 60y.o. year-old who is seen for evaluation of and management of atrial fibrillation, coronary disease, history of  Type a ascending aortic dissection extending across the arch and descending thoracic aorta with prior ascending aortic repair, aortic valve replacement with mechanical prosthesis, femorofemoral graft were occluded iliac system secondary to descending thoracic extension of dissection plane. .  Currently he is doing well with no cardiac symptoms. No chest pain reported on. He  has a past medical history of Aortic arch dissection (HCC) (03/2017), Atrial fibrillation ([facility] [address] (02/2017), CAD (coronary artery disease) (04/21/2021), Family history of colon cancer, Family history of diabetes mellitus (DM) (6/8/2010), Family history of early CAD (6/8/2010), HLD (hyperlipidemia), HTN (hypertension), PVD (peripheral vascular disease) ([facility] [address] (02/2017), and Seborrheic dermatitis (6/8/2010). Review of system:Patient reports no dyspnea/PND/Orthpnea/CP. He reports no cough/fever/focal neurological deficits/abdominal pain. All other systems negative except as above.    Family History   Problem Relation Age of Onset    Diabetes Mother     Heart Disease Mother     Cancer Father         colon    Hypertension Sister     Stroke Maternal Grandfather       Social History     Socioeconomic History    Marital status:    Tobacco Use    Smoking status: Never    Smokeless tobacco: Never   Substance and Sexual Activity    Alcohol use: Yes     Comment: social    Drug use: No      PE  Vitals:    01/25/23 1512   BP: 120/70   Pulse: 64   Resp: 15   SpO2: 98%   Weight: 187 lb (84.8 kg)   Height: 5' 11\" (1.803 m)    Body mass index is 26.08 kg/m². General:    Alert, cooperative, no distress. Psychiatric:    Normal Mood and affect    Eye/ENT:      Pupils equal, No asymmetry, Conjunctival pink. Able to hear voice at normal amplitude   Lungs:      Visibly symmetric chest expansion, No palpable tenderness. Clear to auscultation bilaterally. Heart[de-identified]    Regular rate and rhythm, S1 normal.  Normal closure sound of aortic prosthesis. No murmur, click, rub or gallop. No JVD, Normal palpable peripheral pulses. No cyanosis   Abdomen:     Soft, non-tender. Bowel sounds normal. No masses,  No      organomegaly. Extremities:   Extremities normal, atraumatic, no edema. Neurologic:   CN II-XII grossly intact.  No gross focal deficits           Recent Labs:  Lab Results   Component Value Date/Time    Cholesterol, total 143 05/12/2015 12:07 PM    HDL Cholesterol 38 (L) 05/12/2015 12:07 PM    LDL, calculated 83 05/12/2015 12:07 PM    Triglyceride 111 05/12/2015 12:07 PM    CHOL/HDL Ratio 4.0 10/15/2009 08:34 AM     Lab Results   Component Value Date/Time    Creatinine 0.88 10/16/2022 08:49 AM     Lab Results   Component Value Date/Time    BUN 15 10/16/2022 08:49 AM     Lab Results   Component Value Date/Time    Potassium 4.4 10/16/2022 08:49 AM     Lab Results   Component Value Date/Time    Hemoglobin A1c 5.1 07/09/2021 12:47 PM     Lab Results   Component Value Date/Time    HGB 14.6 10/16/2022 08:49 AM     Lab Results   Component Value Date/Time    PLATELET 272 53/91/2544 08:49 AM       Reviewed:  Past Medical History:   Diagnosis Date    Aortic arch dissection ([facility] [address] 03/2017    Aortic root replacement    Atrial fibrillation ([facility] [address] 02/2017    Post surgery, s/p DCCV 4/4/17     CAD (coronary artery disease) 04/21/2021    s/p stent to LAD    Family history of colon cancer     Family history of diabetes mellitus (DM) 6/8/2010    Family history of early CAD 6/8/2010    HLD (hyperlipidemia)     HTN (hypertension)     PVD (peripheral vascular disease) ([facility] 02/2017    Ischemic R leg, s/p fem-fem bypass    Seborrheic dermatitis 6/8/2010     Social History     Tobacco Use   Smoking Status Never   Smokeless Tobacco Never     Social History     Substance and Sexual Activity   Alcohol Use Yes    Comment: social     No Known Allergies  Family History   Problem Relation Age of Onset    Diabetes Mother     Heart Disease Mother     Cancer Father         colon    Hypertension Sister     Stroke Maternal Grandfather         Current Outpatient Medications   Medication Sig    aspirin delayed-release 81 mg tablet Take 1 Tablet by mouth daily. warfarin (COUMADIN) 5 mg tablet Take 10 mg by mouth daily. amLODIPine (NORVASC) 10 mg tablet Take 10 mg by mouth daily. atorvastatin (LIPITOR) 10 mg tablet Take 10 mg by mouth daily. clopidogreL (PLAVIX) 75 mg tab Take 1 Tab by mouth daily. Indications: blood clot prevention following percutaneous coronary intervention    metoprolol succinate (TOPROL-XL) 25 mg XL tablet Take 1 Tab by mouth daily. cholecalciferol (VITAMIN D3) (1000 Units /25 mcg) tablet Take  by mouth daily. MULTIVITAMINS (MULTIVITAMIN PO) Take 1 Tab by mouth daily. No current facility-administered medications for this visit. ATTENTION:   This medical record was transcribed using an electronic medical records/speech recognition system. Although proofread, it may and can contain electronic, spelling and other errors. Corrections may be executed at a later time. Please feel free to contact us for any clarifications as needed.     [facility] [place]  [facility] [address] [phone]

## 2023-02-13 ENCOUNTER — TELEPHONE (OUTPATIENT)
Dept: CARDIOLOGY CLINIC | Age: 63
End: 2023-02-13

## 2023-02-13 NOTE — TELEPHONE ENCOUNTER
----- Message from Scot Gallegos MD sent at 2/11/2023 10:08 PM EST -----  Called patient to inform him that I recommend he does not need aspirin anymore since he is on Plavix and warfarin.   Risk of bleeding with 3 different blood thinners is quite high and hence we can take him off aspirin

## 2023-02-14 NOTE — TELEPHONE ENCOUNTER
Spoke with patient. 2 patient identifiers used. Confirmed understanding of Dr. Jhoan Jacquess call. Confirmed he has received call and information on anticoagulation from coumadin nurse. Patient appreciative of call.

## 2023-03-15 ENCOUNTER — APPOINTMENT (OUTPATIENT)
Dept: CT IMAGING | Age: 63
End: 2023-03-15
Attending: STUDENT IN AN ORGANIZED HEALTH CARE EDUCATION/TRAINING PROGRAM

## 2023-03-15 ENCOUNTER — APPOINTMENT (OUTPATIENT)
Dept: GENERAL RADIOLOGY | Age: 63
End: 2023-03-15
Attending: STUDENT IN AN ORGANIZED HEALTH CARE EDUCATION/TRAINING PROGRAM

## 2023-03-15 ENCOUNTER — NURSE TRIAGE (OUTPATIENT)
Dept: OTHER | Facility: CLINIC | Age: 63
End: 2023-03-15

## 2023-03-15 ENCOUNTER — HOSPITAL ENCOUNTER (EMERGENCY)
Age: 63
Discharge: HOME | End: 2023-03-15
Attending: STUDENT IN AN ORGANIZED HEALTH CARE EDUCATION/TRAINING PROGRAM

## 2023-03-15 VITALS
HEART RATE: 78 BPM | RESPIRATION RATE: 16 BRPM | SYSTOLIC BLOOD PRESSURE: 121 MMHG | TEMPERATURE: 98.7 F | OXYGEN SATURATION: 99 % | DIASTOLIC BLOOD PRESSURE: 51 MMHG

## 2023-03-15 DIAGNOSIS — S60.212A CONTUSION OF LEFT WRIST, INITIAL ENCOUNTER: ICD-10-CM

## 2023-03-15 DIAGNOSIS — R07.89 CHEST WALL PAIN: ICD-10-CM

## 2023-03-15 DIAGNOSIS — Z92.29 HISTORY OF LONG TERM ANTICOAGULANT USE: ICD-10-CM
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
## 2023-03-15 DIAGNOSIS — Q24.8 PERICARDIAL CYST: ICD-10-CM

## 2023-03-15 LAB
ANION GAP SERPL CALC-SCNC: 5 MMOL/L (ref 5–15)
ATRIAL RATE: 101 BPM
BUN SERPL-MCNC: 15 MG/DL (ref 6–20)
BUN/CREAT SERPL: 18 (ref 12–20)
CALCIUM SERPL-MCNC: 8.8 MG/DL (ref 8.5–10.1)
CALCULATED P AXIS, ECG09: 65 DEGREES
CALCULATED R AXIS, ECG10: -29 DEGREES
CALCULATED T AXIS, ECG11: 62 DEGREES
CHLORIDE SERPL-SCNC: 109 MMOL/L (ref 97–108)
CO2 SERPL-SCNC: 27 MMOL/L (ref 21–32)
CREAT SERPL-MCNC: 0.82 MG/DL (ref 0.7–1.3)
DIAGNOSIS, 93000: NORMAL
GLUCOSE SERPL-MCNC: 128 MG/DL (ref 65–100)
P-R INTERVAL, ECG05: 208 MS
POTASSIUM SERPL-SCNC: 4.1 MMOL/L (ref 3.5–5.1)
Q-T INTERVAL, ECG07: 362 MS
QRS DURATION, ECG06: 104 MS
QTC CALCULATION (BEZET), ECG08: 469 MS
SODIUM SERPL-SCNC: 141 MMOL/L (ref 136–145)
VENTRICULAR RATE, ECG03: 101 BPM

## 2023-03-15 PROCEDURE — 80048 BASIC METABOLIC PNL TOTAL CA: CPT

## 2023-03-15 PROCEDURE — 70450 CT HEAD/BRAIN W/O DYE: CPT

## 2023-03-15 PROCEDURE — 93005 ELECTROCARDIOGRAM TRACING: CPT

## 2023-03-15 PROCEDURE — 99285 EMERGENCY DEPT VISIT HI MDM: CPT

## 2023-03-15 PROCEDURE — 72125 CT NECK SPINE W/O DYE: CPT

## 2023-03-15 PROCEDURE — 36415 COLL VENOUS BLD VENIPUNCTURE: CPT

## 2023-03-15 PROCEDURE — 71260 CT THORAX DX C+: CPT

## 2023-03-15 PROCEDURE — 71046 X-RAY EXAM CHEST 2 VIEWS: CPT

## 2023-03-15 PROCEDURE — 74011000636 HC RX REV CODE- 636: Performed by: RADIOLOGY

## 2023-03-15 PROCEDURE — 73110 X-RAY EXAM OF WRIST: CPT

## 2023-03-15 RX ADMIN — IOPAMIDOL 80 ML: 755 INJECTION, SOLUTION INTRAVENOUS at 17:44
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
## 2023-03-15 NOTE — LETTER
[address]  [address] [facility] [address]  [phone]    Work/School Note    Date: 3/15/2023    To Whom It May concern:    Angela Beckman was seen and treated today in the emergency room by the following provider(s):  Attending Provider: Vikram Murray may return to work on 3/18/23.     Sincerely,          Christine Hdz RN
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
## 2023-03-16 NOTE — DISCHARGE INSTRUCTIONS
Your CT Showed:   Fluid density collection along the right lateral aspect of the ascending  aorta and anterior to the SVC which accounts for the possible mass that was  noted on chest x-ray. Etiology is unclear and may be related to prior surgery  and represent an acquired pericardial cyst    RETURN IF WORSENING PAIN, CHANGE IN COLOR, CHANGE IN TEMPERATURE, OR LOSS OF SENSATION IN THE AFFECTED EXTREMITY    PLEASE KEEP A CLOSE EYE ON YOUR WOUND(S). IF YOU NOTICE RED STREAKING, INCREASING DRAINAGE, WORSENING REDNESS, OR FEVER THEN PLEASE RETURN IMMEDIATELY. PLEASE RETURN IF WORSENING PAIN OR IF YOU NOTICE IT WHEN EXERTING YOURSELF, TROUBLE BREATHING, SWEATINESS, OR DIZZINESS. FOLLOW UP WITH YOUR DOCTOR IN THE NEXT 2-3 DAYS.

## 2023-03-16 NOTE — ED NOTES
Verbal shift change report given to Ulises Ramirez (oncoming nurse) by Jose Stoll (offgoing nurse). Report included the following information SBAR, ED Summary and MAR.

## 2023-03-23 DIAGNOSIS — Z95.2 S/P AVR: ICD-10-CM

## 2023-03-24 LAB
INR PPP: 2.3 (ref 0.9–1.1)
PROTHROMBIN TIME: 22.9 SEC (ref 9–11.1)

## 2023-03-25 ENCOUNTER — APPOINTMENT (OUTPATIENT)
Dept: CT IMAGING | Age: 63
DRG: 987 | End: 2023-03-25
Attending: EMERGENCY MEDICINE
Payer: COMMERCIAL

## 2023-03-25 ENCOUNTER — HOSPITAL ENCOUNTER (INPATIENT)
Age: 63
LOS: 10 days | DRG: 987 | End: 2023-03-25
Attending: EMERGENCY MEDICINE | Admitting: FAMILY MEDICINE
Payer: COMMERCIAL

## 2023-03-25 ENCOUNTER — APPOINTMENT (OUTPATIENT)
Dept: GENERAL RADIOLOGY | Age: 63
DRG: 987 | End: 2023-03-25
Attending: EMERGENCY MEDICINE
Payer: COMMERCIAL

## 2023-03-25 DIAGNOSIS — S36.09XA SPLENIC RUPTURE: Primary | ICD-10-CM

## 2023-03-25 LAB
ALBUMIN SERPL-MCNC: 3.3 G/DL (ref 3.5–5)
ALBUMIN/GLOB SERPL: 1.2 (ref 1.1–2.2)
ALP SERPL-CCNC: 189 U/L (ref 45–117)
ALT SERPL-CCNC: 80 U/L (ref 12–78)
ANION GAP SERPL CALC-SCNC: 5 MMOL/L (ref 5–15)
AST SERPL-CCNC: 52 U/L (ref 15–37)
BASOPHILS # BLD: 0.1 K/UL (ref 0–0.1)
BASOPHILS NFR BLD: 1 % (ref 0–1)
BILIRUB SERPL-MCNC: 0.6 MG/DL (ref 0.2–1)
BUN SERPL-MCNC: 18 MG/DL (ref 6–20)
BUN/CREAT SERPL: 19 (ref 12–20)
CALCIUM SERPL-MCNC: 8.4 MG/DL (ref 8.5–10.1)
CHLORIDE SERPL-SCNC: 106 MMOL/L (ref 97–108)
CO2 SERPL-SCNC: 25 MMOL/L (ref 21–32)
COMMENT, HOLDF: NORMAL
CREAT SERPL-MCNC: 0.96 MG/DL (ref 0.7–1.3)
DIFFERENTIAL METHOD BLD: ABNORMAL
EOSINOPHIL # BLD: 0.2 K/UL (ref 0–0.4)
EOSINOPHIL NFR BLD: 2 % (ref 0–7)
ERYTHROCYTE [DISTWIDTH] IN BLOOD BY AUTOMATED COUNT: 12.6 % (ref 11.5–14.5)
GLOBULIN SER CALC-MCNC: 2.8 G/DL (ref 2–4)
GLUCOSE SERPL-MCNC: 215 MG/DL (ref 65–100)
HCT VFR BLD AUTO: 34 % (ref 36.6–50.3)
HGB BLD-MCNC: 11.8 G/DL (ref 12.1–17)
IMM GRANULOCYTES # BLD AUTO: 0.1 K/UL (ref 0–0.04)
IMM GRANULOCYTES NFR BLD AUTO: 0 % (ref 0–0.5)
INR PPP: 2.4 (ref 0.9–1.1)
LIPASE SERPL-CCNC: 552 U/L (ref 73–393)
LYMPHOCYTES # BLD: 2.6 K/UL (ref 0.8–3.5)
LYMPHOCYTES NFR BLD: 19 % (ref 12–49)
MCH RBC QN AUTO: 30.6 PG (ref 26–34)
MCHC RBC AUTO-ENTMCNC: 34.7 G/DL (ref 30–36.5)
MCV RBC AUTO: 88.1 FL (ref 80–99)
MONOCYTES # BLD: 1.2 K/UL (ref 0–1)
MONOCYTES NFR BLD: 9 % (ref 5–13)
NEUTS SEG # BLD: 9.6 K/UL (ref 1.8–8)
NEUTS SEG NFR BLD: 69 % (ref 32–75)
NRBC # BLD: 0 K/UL (ref 0–0.01)
NRBC BLD-RTO: 0 PER 100 WBC
PLATELET # BLD AUTO: 189 K/UL (ref 150–400)
PMV BLD AUTO: 10.9 FL (ref 8.9–12.9)
POTASSIUM SERPL-SCNC: 4.1 MMOL/L (ref 3.5–5.1)
PROT SERPL-MCNC: 6.1 G/DL (ref 6.4–8.2)
PROTHROMBIN TIME: 23.4 SEC (ref 9–11.1)
RBC # BLD AUTO: 3.86 M/UL (ref 4.1–5.7)
SAMPLES BEING HELD,HOLD: NORMAL
SODIUM SERPL-SCNC: 136 MMOL/L (ref 136–145)
TROPONIN I SERPL HS-MCNC: 5 NG/L (ref 0–57)
WBC # BLD AUTO: 13.7 K/UL (ref 4.1–11.1)

## 2023-03-25 PROCEDURE — 96376 TX/PRO/DX INJ SAME DRUG ADON: CPT

## 2023-03-25 PROCEDURE — 71045 X-RAY EXAM CHEST 1 VIEW: CPT

## 2023-03-25 PROCEDURE — 36415 COLL VENOUS BLD VENIPUNCTURE: CPT

## 2023-03-25 PROCEDURE — 96374 THER/PROPH/DIAG INJ IV PUSH: CPT

## 2023-03-25 PROCEDURE — 83690 ASSAY OF LIPASE: CPT

## 2023-03-25 PROCEDURE — 74011250636 HC RX REV CODE- 250/636: Performed by: EMERGENCY MEDICINE

## 2023-03-25 PROCEDURE — 74177 CT ABD & PELVIS W/CONTRAST: CPT

## 2023-03-25 PROCEDURE — 74011000636 HC RX REV CODE- 636: Performed by: RADIOLOGY

## 2023-03-25 PROCEDURE — 99285 EMERGENCY DEPT VISIT HI MDM: CPT

## 2023-03-25 PROCEDURE — 85025 COMPLETE CBC W/AUTO DIFF WBC: CPT

## 2023-03-25 PROCEDURE — 85610 PROTHROMBIN TIME: CPT

## 2023-03-25 PROCEDURE — 80053 COMPREHEN METABOLIC PANEL: CPT

## 2023-03-25 PROCEDURE — 74011250637 HC RX REV CODE- 250/637: Performed by: EMERGENCY MEDICINE

## 2023-03-25 PROCEDURE — 84484 ASSAY OF TROPONIN QUANT: CPT

## 2023-03-25 PROCEDURE — 83735 ASSAY OF MAGNESIUM: CPT

## 2023-03-25 PROCEDURE — 93005 ELECTROCARDIOGRAM TRACING: CPT

## 2023-03-25 PROCEDURE — 96375 TX/PRO/DX INJ NEW DRUG ADDON: CPT

## 2023-03-25 PROCEDURE — 84100 ASSAY OF PHOSPHORUS: CPT

## 2023-03-25 RX ORDER — ONDANSETRON 2 MG/ML
4 INJECTION INTRAMUSCULAR; INTRAVENOUS
Status: COMPLETED | OUTPATIENT
Start: 2023-03-25 | End: 2023-03-25

## 2023-03-25 RX ORDER — HYDROCODONE BITARTRATE AND ACETAMINOPHEN 5; 325 MG/1; MG/1
1 TABLET ORAL
Status: COMPLETED | OUTPATIENT
Start: 2023-03-25 | End: 2023-03-25

## 2023-03-25 RX ORDER — MORPHINE SULFATE 4 MG/ML
4 INJECTION INTRAVENOUS
Status: COMPLETED | OUTPATIENT
Start: 2023-03-25 | End: 2023-03-25

## 2023-03-25 RX ADMIN — ONDANSETRON 4 MG: 2 INJECTION INTRAMUSCULAR; INTRAVENOUS at 22:03

## 2023-03-25 RX ADMIN — MORPHINE SULFATE 4 MG: 4 INJECTION, SOLUTION INTRAMUSCULAR; INTRAVENOUS at 23:41

## 2023-03-25 RX ADMIN — ONDANSETRON 4 MG: 2 INJECTION INTRAMUSCULAR; INTRAVENOUS at 23:43

## 2023-03-25 RX ADMIN — HYDROCODONE BITARTRATE AND ACETAMINOPHEN 1 TABLET: 5; 325 TABLET ORAL at 21:31

## 2023-03-25 RX ADMIN — IOPAMIDOL 100 ML: 755 INJECTION, SOLUTION INTRAVENOUS at 22:54

## 2023-03-26 ENCOUNTER — ANESTHESIA (OUTPATIENT)
Dept: INTERVENTIONAL RADIOLOGY/VASCULAR | Age: 63
DRG: 987 | End: 2023-03-26
Payer: COMMERCIAL

## 2023-03-26 ENCOUNTER — APPOINTMENT (OUTPATIENT)
Dept: CT IMAGING | Age: 63
DRG: 987 | End: 2023-03-26
Attending: EMERGENCY MEDICINE
Payer: COMMERCIAL

## 2023-03-26 ENCOUNTER — ANESTHESIA EVENT (OUTPATIENT)
Dept: INTERVENTIONAL RADIOLOGY/VASCULAR | Age: 63
DRG: 987 | End: 2023-03-26
Payer: COMMERCIAL

## 2023-03-26 ENCOUNTER — APPOINTMENT (OUTPATIENT)
Dept: INTERVENTIONAL RADIOLOGY/VASCULAR | Age: 63
DRG: 987 | End: 2023-03-26
Attending: STUDENT IN AN ORGANIZED HEALTH CARE EDUCATION/TRAINING PROGRAM
Payer: COMMERCIAL

## 2023-03-26 PROBLEM — S36.039A SPLENIC LACERATION: Status: ACTIVE | Noted: 2023-03-26

## 2023-03-26 LAB
COMMENT, HOLDF: NORMAL
COMMENT, HOLDF: NORMAL
ERYTHROCYTE [DISTWIDTH] IN BLOOD BY AUTOMATED COUNT: 13.1 % (ref 11.5–14.5)
ERYTHROCYTE [DISTWIDTH] IN BLOOD BY AUTOMATED COUNT: 13.2 % (ref 11.5–14.5)
HCT VFR BLD AUTO: 26.9 % (ref 36.6–50.3)
HCT VFR BLD AUTO: 27.1 % (ref 36.6–50.3)
HCT VFR BLD AUTO: 29.5 % (ref 36.6–50.3)
HGB BLD-MCNC: 10.1 G/DL (ref 12.1–17)
HGB BLD-MCNC: 8.6 G/DL (ref 12.1–17)
HGB BLD-MCNC: 9.1 G/DL (ref 12.1–17)
HGB BLD-MCNC: 9.1 G/DL (ref 12.1–17)
HGB BLD-MCNC: 9.8 G/DL (ref 12.1–17)
HISTORY CHECKED?,CKHIST: NORMAL
HISTORY CHECKED?,CKHIST: NORMAL
MAGNESIUM SERPL-MCNC: 1.9 MG/DL (ref 1.6–2.4)
MAGNESIUM SERPL-MCNC: 2 MG/DL (ref 1.6–2.4)
MCH RBC QN AUTO: 30.4 PG (ref 26–34)
MCH RBC QN AUTO: 30.9 PG (ref 26–34)
MCHC RBC AUTO-ENTMCNC: 32 G/DL (ref 30–36.5)
MCHC RBC AUTO-ENTMCNC: 33.6 G/DL (ref 30–36.5)
MCV RBC AUTO: 90.6 FL (ref 80–99)
MCV RBC AUTO: 96.8 FL (ref 80–99)
NRBC # BLD: 0 K/UL (ref 0–0.01)
NRBC # BLD: 0 K/UL (ref 0–0.01)
NRBC BLD-RTO: 0 PER 100 WBC
NRBC BLD-RTO: 0 PER 100 WBC
PHOSPHATE SERPL-MCNC: 3.6 MG/DL (ref 2.6–4.7)
PHOSPHATE SERPL-MCNC: 4.4 MG/DL (ref 2.6–4.7)
PLATELET # BLD AUTO: 191 K/UL (ref 150–400)
PLATELET # BLD AUTO: 205 K/UL (ref 150–400)
PMV BLD AUTO: 10.6 FL (ref 8.9–12.9)
PMV BLD AUTO: 12 FL (ref 8.9–12.9)
RBC # BLD AUTO: 2.78 M/UL (ref 4.1–5.7)
RBC # BLD AUTO: 2.99 M/UL (ref 4.1–5.7)
SAMPLES BEING HELD,HOLD: NORMAL
SAMPLES BEING HELD,HOLD: NORMAL
WBC # BLD AUTO: 15.3 K/UL (ref 4.1–11.1)
WBC # BLD AUTO: 16.4 K/UL (ref 4.1–11.1)

## 2023-03-26 PROCEDURE — 77030019698 HC SYR ANGI MDLON MRTM -A

## 2023-03-26 PROCEDURE — 99221 1ST HOSP IP/OBS SF/LOW 40: CPT | Performed by: SURGERY

## 2023-03-26 PROCEDURE — 86923 COMPATIBILITY TEST ELECTRIC: CPT

## 2023-03-26 PROCEDURE — 74011250636 HC RX REV CODE- 250/636: Performed by: EMERGENCY MEDICINE

## 2023-03-26 PROCEDURE — 85027 COMPLETE CBC AUTOMATED: CPT

## 2023-03-26 PROCEDURE — 84100 ASSAY OF PHOSPHORUS: CPT

## 2023-03-26 PROCEDURE — 74011000258 HC RX REV CODE- 258: Performed by: EMERGENCY MEDICINE

## 2023-03-26 PROCEDURE — 85018 HEMOGLOBIN: CPT

## 2023-03-26 PROCEDURE — 36415 COLL VENOUS BLD VENIPUNCTURE: CPT

## 2023-03-26 PROCEDURE — 74011000636 HC RX REV CODE- 636

## 2023-03-26 PROCEDURE — 2709999900 HC NON-CHARGEABLE SUPPLY

## 2023-03-26 PROCEDURE — C1887 CATHETER, GUIDING: HCPCS

## 2023-03-26 PROCEDURE — 04L43DZ OCCLUSION OF SPLENIC ARTERY WITH INTRALUMINAL DEVICE, PERCUTANEOUS APPROACH: ICD-10-PCS | Performed by: STUDENT IN AN ORGANIZED HEALTH CARE EDUCATION/TRAINING PROGRAM

## 2023-03-26 PROCEDURE — 74170 CT ABD WO CNTRST FLWD CNTRST: CPT

## 2023-03-26 PROCEDURE — 74011000250 HC RX REV CODE- 250: Performed by: FAMILY MEDICINE

## 2023-03-26 PROCEDURE — 65610000006 HC RM INTENSIVE CARE

## 2023-03-26 PROCEDURE — 74011250636 HC RX REV CODE- 250/636: Performed by: FAMILY MEDICINE

## 2023-03-26 PROCEDURE — 74011250636 HC RX REV CODE- 250/636: Performed by: STUDENT IN AN ORGANIZED HEALTH CARE EDUCATION/TRAINING PROGRAM

## 2023-03-26 PROCEDURE — C1889 IMPLANT/INSERT DEVICE, NOC: HCPCS

## 2023-03-26 PROCEDURE — 83735 ASSAY OF MAGNESIUM: CPT

## 2023-03-26 PROCEDURE — 74011250636 HC RX REV CODE- 250/636

## 2023-03-26 PROCEDURE — 76937 US GUIDE VASCULAR ACCESS: CPT

## 2023-03-26 PROCEDURE — 74011000250 HC RX REV CODE- 250

## 2023-03-26 PROCEDURE — 77030009378 HC DEV TORQ OLCT F/GWIRE MANIP COOK -A

## 2023-03-26 PROCEDURE — 74011000250 HC RX REV CODE- 250: Performed by: STUDENT IN AN ORGANIZED HEALTH CARE EDUCATION/TRAINING PROGRAM

## 2023-03-26 PROCEDURE — 74011000636 HC RX REV CODE- 636: Performed by: RADIOLOGY

## 2023-03-26 PROCEDURE — 74011250636 HC RX REV CODE- 250/636: Performed by: NURSE PRACTITIONER

## 2023-03-26 PROCEDURE — 96376 TX/PRO/DX INJ SAME DRUG ADON: CPT

## 2023-03-26 PROCEDURE — 74011250636 HC RX REV CODE- 250/636: Performed by: INTERNAL MEDICINE

## 2023-03-26 PROCEDURE — 77030014021 HC SYR ANGI FIX LOK MRTM -A

## 2023-03-26 PROCEDURE — 86900 BLOOD TYPING SEROLOGIC ABO: CPT

## 2023-03-26 PROCEDURE — 77010033678 HC OXYGEN DAILY

## 2023-03-26 PROCEDURE — 77030019569 HC BND COMPR RAD TERU -B

## 2023-03-26 PROCEDURE — 96375 TX/PRO/DX INJ NEW DRUG ADDON: CPT

## 2023-03-26 PROCEDURE — C1769 GUIDE WIRE: HCPCS

## 2023-03-26 PROCEDURE — 74011250637 HC RX REV CODE- 250/637: Performed by: STUDENT IN AN ORGANIZED HEALTH CARE EDUCATION/TRAINING PROGRAM

## 2023-03-26 PROCEDURE — C1894 INTRO/SHEATH, NON-LASER: HCPCS

## 2023-03-26 RX ORDER — ONDANSETRON 2 MG/ML
4 INJECTION INTRAMUSCULAR; INTRAVENOUS
Status: DISCONTINUED
Start: 2023-03-26 | End: 2023-04-05 | Stop reason: HOSPADM

## 2023-03-26 RX ORDER — SODIUM CHLORIDE 9 MG/ML
50 INJECTION, SOLUTION INTRAVENOUS CONTINUOUS
Status: DISCONTINUED | OUTPATIENT
Start: 2023-03-26 | End: 2023-03-27

## 2023-03-26 RX ORDER — MIDAZOLAM HYDROCHLORIDE 1 MG/ML
.5-5 INJECTION, SOLUTION INTRAMUSCULAR; INTRAVENOUS
Status: DISCONTINUED | OUTPATIENT
Start: 2023-03-26 | End: 2023-03-26

## 2023-03-26 RX ORDER — HEPARIN SODIUM 1000 [USP'U]/ML
2000 INJECTION, SOLUTION INTRAVENOUS; SUBCUTANEOUS
Status: COMPLETED | OUTPATIENT
Start: 2023-03-26 | End: 2023-03-26

## 2023-03-26 RX ORDER — NALOXONE HYDROCHLORIDE 0.4 MG/ML
0.4 INJECTION, SOLUTION INTRAMUSCULAR; INTRAVENOUS; SUBCUTANEOUS
Status: DISCONTINUED
Start: 2023-03-26 | End: 2023-04-05 | Stop reason: HOSPADM

## 2023-03-26 RX ORDER — SODIUM CHLORIDE 9 MG/ML
125 INJECTION, SOLUTION INTRAVENOUS CONTINUOUS
Status: DISPENSED | OUTPATIENT
Start: 2023-03-26 | End: 2023-03-27

## 2023-03-26 RX ORDER — ACETAMINOPHEN 325 MG/1
650 TABLET ORAL
Status: DISCONTINUED
Start: 2023-03-26 | End: 2023-04-05 | Stop reason: HOSPADM

## 2023-03-26 RX ORDER — SODIUM CHLORIDE 9 MG/ML
250 INJECTION, SOLUTION INTRAVENOUS AS NEEDED
Status: DISCONTINUED
Start: 2023-03-26 | End: 2023-04-05 | Stop reason: HOSPADM

## 2023-03-26 RX ORDER — FENTANYL CITRATE 50 UG/ML
12.5-2 INJECTION, SOLUTION INTRAMUSCULAR; INTRAVENOUS
Status: DISCONTINUED | OUTPATIENT
Start: 2023-03-26 | End: 2023-03-26

## 2023-03-26 RX ORDER — MORPHINE SULFATE 4 MG/ML
4 INJECTION INTRAVENOUS
Status: COMPLETED | OUTPATIENT
Start: 2023-03-26 | End: 2023-03-26

## 2023-03-26 RX ORDER — LIDOCAINE HYDROCHLORIDE 10 MG/ML
10 INJECTION INFILTRATION; PERINEURAL
Status: COMPLETED | OUTPATIENT
Start: 2023-03-26 | End: 2023-03-26

## 2023-03-26 RX ORDER — FLUMAZENIL 0.1 MG/ML
0.5 INJECTION INTRAVENOUS
Status: DISCONTINUED | OUTPATIENT
Start: 2023-03-26 | End: 2023-03-27 | Stop reason: HOSPADM

## 2023-03-26 RX ORDER — ACETAMINOPHEN 650 MG/1
650 SUPPOSITORY RECTAL
Status: DISCONTINUED
Start: 2023-03-26 | End: 2023-04-05 | Stop reason: HOSPADM

## 2023-03-26 RX ORDER — HYDROMORPHONE HYDROCHLORIDE 1 MG/ML
0.5 INJECTION, SOLUTION INTRAMUSCULAR; INTRAVENOUS; SUBCUTANEOUS
Status: DISPENSED | OUTPATIENT
Start: 2023-03-26 | End: 2023-03-28

## 2023-03-26 RX ORDER — LIDOCAINE HYDROCHLORIDE 10 MG/ML
INJECTION INFILTRATION; PERINEURAL
Status: COMPLETED
Start: 2023-03-26 | End: 2023-03-26

## 2023-03-26 RX ORDER — POLYETHYLENE GLYCOL 3350 17 G/17G
17 POWDER, FOR SOLUTION ORAL DAILY PRN
Status: DISCONTINUED
Start: 2023-03-26 | End: 2023-04-05 | Stop reason: HOSPADM

## 2023-03-26 RX ORDER — VERAPAMIL HYDROCHLORIDE 2.5 MG/ML
2.5 INJECTION, SOLUTION INTRAVENOUS
Status: COMPLETED | OUTPATIENT
Start: 2023-03-26 | End: 2023-03-26

## 2023-03-26 RX ORDER — SODIUM CHLORIDE 0.9 % (FLUSH) 0.9 %
5-40 SYRINGE (ML) INJECTION AS NEEDED
Status: DISCONTINUED
Start: 2023-03-26 | End: 2023-04-05 | Stop reason: HOSPADM

## 2023-03-26 RX ORDER — FENTANYL CITRATE 50 UG/ML
12.5 INJECTION, SOLUTION INTRAMUSCULAR; INTRAVENOUS ONCE
Status: COMPLETED | OUTPATIENT
Start: 2023-03-26 | End: 2023-03-26

## 2023-03-26 RX ORDER — SODIUM CHLORIDE 0.9 % (FLUSH) 0.9 %
5-40 SYRINGE (ML) INJECTION EVERY 8 HOURS
Status: DISCONTINUED
Start: 2023-03-26 | End: 2023-04-05 | Stop reason: HOSPADM

## 2023-03-26 RX ORDER — LIDOCAINE 40 MG/G
CREAM TOPICAL
Status: COMPLETED | OUTPATIENT
Start: 2023-03-26 | End: 2023-03-26

## 2023-03-26 RX ORDER — ONDANSETRON 4 MG/1
4 TABLET, ORALLY DISINTEGRATING ORAL
Status: DISCONTINUED
Start: 2023-03-26 | End: 2023-04-05 | Stop reason: HOSPADM

## 2023-03-26 RX ORDER — HYDROMORPHONE HYDROCHLORIDE 2 MG/ML
1 INJECTION, SOLUTION INTRAMUSCULAR; INTRAVENOUS; SUBCUTANEOUS ONCE
Status: COMPLETED | OUTPATIENT
Start: 2023-03-26 | End: 2023-03-26

## 2023-03-26 RX ORDER — HYDROMORPHONE HYDROCHLORIDE 1 MG/ML
0.5 INJECTION, SOLUTION INTRAMUSCULAR; INTRAVENOUS; SUBCUTANEOUS ONCE
Status: COMPLETED | OUTPATIENT
Start: 2023-03-26 | End: 2023-03-26

## 2023-03-26 RX ORDER — METOCLOPRAMIDE HYDROCHLORIDE 5 MG/ML
10 INJECTION INTRAMUSCULAR; INTRAVENOUS
Status: DISCONTINUED
Start: 2023-03-26 | End: 2023-04-05 | Stop reason: HOSPADM

## 2023-03-26 RX ADMIN — LIDOCAINE HYDROCHLORIDE 5 ML: 10 INJECTION, SOLUTION INFILTRATION; PERINEURAL at 06:45

## 2023-03-26 RX ADMIN — IOPAMIDOL 72 ML: 755 INJECTION, SOLUTION INTRAVENOUS at 06:45

## 2023-03-26 RX ADMIN — NITROGLYCERIN 0.5 INCH: 20 OINTMENT TOPICAL at 05:11

## 2023-03-26 RX ADMIN — HEPARIN SODIUM 2000 UNITS: 1000 INJECTION INTRAVENOUS; SUBCUTANEOUS at 06:10

## 2023-03-26 RX ADMIN — LIDOCAINE 4%: 4 CREAM TOPICAL at 05:11

## 2023-03-26 RX ADMIN — ONDANSETRON 4 MG: 2 INJECTION INTRAMUSCULAR; INTRAVENOUS at 05:26

## 2023-03-26 RX ADMIN — HYDROMORPHONE HYDROCHLORIDE 0.5 MG: 1 INJECTION, SOLUTION INTRAMUSCULAR; INTRAVENOUS; SUBCUTANEOUS at 14:31

## 2023-03-26 RX ADMIN — HYDROMORPHONE HYDROCHLORIDE 1 MG: 2 INJECTION, SOLUTION INTRAMUSCULAR; INTRAVENOUS; SUBCUTANEOUS at 02:53

## 2023-03-26 RX ADMIN — LIDOCAINE HYDROCHLORIDE 5 ML: 10 INJECTION INFILTRATION; PERINEURAL at 06:45

## 2023-03-26 RX ADMIN — Medication: at 06:45

## 2023-03-26 RX ADMIN — SODIUM CHLORIDE 125 ML/HR: 900 INJECTION, SOLUTION INTRAVENOUS at 07:53

## 2023-03-26 RX ADMIN — FENTANYL CITRATE 12.5 MCG: 50 INJECTION, SOLUTION INTRAMUSCULAR; INTRAVENOUS at 08:20

## 2023-03-26 RX ADMIN — CEFAZOLIN 2 G: 1 INJECTION, POWDER, FOR SOLUTION INTRAMUSCULAR; INTRAVENOUS at 05:27

## 2023-03-26 RX ADMIN — IOPAMIDOL 100 ML: 755 INJECTION, SOLUTION INTRAVENOUS at 01:47

## 2023-03-26 RX ADMIN — SODIUM CHLORIDE 50 ML/HR: 9 INJECTION, SOLUTION INTRAVENOUS at 05:26

## 2023-03-26 RX ADMIN — PHYTONADIONE 10 MG: 10 INJECTION, EMULSION INTRAMUSCULAR; INTRAVENOUS; SUBCUTANEOUS at 00:43

## 2023-03-26 RX ADMIN — NITROGLYCERIN 200 MCG: 10 INJECTION INTRAVENOUS at 06:10

## 2023-03-26 RX ADMIN — SODIUM CHLORIDE, PRESERVATIVE FREE 10 ML: 5 INJECTION INTRAVENOUS at 14:31

## 2023-03-26 RX ADMIN — MORPHINE SULFATE 4 MG: 4 INJECTION INTRAVENOUS at 00:57

## 2023-03-26 RX ADMIN — SODIUM CHLORIDE 125 ML/HR: 900 INJECTION, SOLUTION INTRAVENOUS at 15:51

## 2023-03-26 RX ADMIN — NITROGLYCERIN 200 MCG: 10 INJECTION INTRAVENOUS at 06:45

## 2023-03-26 RX ADMIN — SODIUM CHLORIDE, PRESERVATIVE FREE 10 ML: 5 INJECTION INTRAVENOUS at 22:22

## 2023-03-26 RX ADMIN — METOCLOPRAMIDE 10 MG: 5 INJECTION, SOLUTION INTRAMUSCULAR; INTRAVENOUS at 08:21

## 2023-03-26 RX ADMIN — VERAPAMIL HYDROCHLORIDE 2.5 MG: 2.5 INJECTION, SOLUTION INTRAVENOUS at 06:10

## 2023-03-26 RX ADMIN — SODIUM CHLORIDE, PRESERVATIVE FREE 10 ML: 5 INJECTION INTRAVENOUS at 02:55

## 2023-03-26 RX ADMIN — SODIUM CHLORIDE, PRESERVATIVE FREE 10 ML: 5 INJECTION INTRAVENOUS at 08:04

## 2023-03-26 RX ADMIN — HYDROMORPHONE HYDROCHLORIDE 0.5 MG: 1 INJECTION, SOLUTION INTRAMUSCULAR; INTRAVENOUS; SUBCUTANEOUS at 22:21

## 2023-03-26 RX ADMIN — HEPARIN SODIUM 2000 UNITS: 1000 INJECTION INTRAVENOUS; SUBCUTANEOUS at 06:45

## 2023-03-26 RX ADMIN — VERAPAMIL HYDROCHLORIDE 2.5 MG: 2.5 INJECTION, SOLUTION INTRAVENOUS at 06:46
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
## 2023-03-26 NOTE — PROGRESS NOTES
0315: TRANSFER - IN REPORT:    Verbal report received from Emily Rappch Willow RN(name) on Clayton Ards  being received from ED(unit) for routine progression of care      Report consisted of patients Situation, Background, Assessment and   Recommendations(SBAR). Information from the following report(s) SBAR, Kardex, ED Summary, Intake/Output, MAR, Recent Results, and Cardiac Rhythm NSR  was reviewed with the receiving nurse. Opportunity for questions and clarification was provided. Assessment completed upon patients arrival to unit and care assumed. 0320: Pt arrived to unit, connected to tele. Vital signs completed. Wife with patient at bedside. Call light in reach. 0345: Dr. Susanne Willett at bedside for pt assessment. Expressed to patient that she would prefer pt to be on the ICU. Will reach out to Intensivist about transfer. 0400: Transfer orders received for patient to go to ICU. Orders also placed for patient to go to IR for angiogram.     0430: Patient report given to IR nurse. TRANSFER - OUT REPORT:    Verbal report given to Elizabeth Santamaria RN(name) on Clayton Ards  being transferred to IR(unit) for change in patient condition(angiogram)       Report consisted of patients Situation, Background, Assessment and   Recommendations(SBAR). Information from the following report(s) SBAR, Kardex, ED Summary, Intake/Output, MAR, Recent Results, and Cardiac Rhythm NSR  was reviewed with the receiving nurse. Lines:   Peripheral IV 03/25/23 Right Antecubital (Active)       Peripheral IV 03/26/23 Right;Posterior Hand (Active)        Opportunity for questions and clarification was provided.       Patient transported with:   Monitor  Registered Nurse

## 2023-03-26 NOTE — PROGRESS NOTES
0730: Bedside and Verbal shift change report given to Steve Ardon, RN and Manasa Grande, RN (oncoming nurse) by Rubia Walker RN (offgoing nurse). Report included the following information SBAR, Kardex, Procedure Summary, Intake/Output, MAR, Cardiac Rhythm NSR, and Quality Measures. 0900: TR Band removed, tegaderm applied. No hematoma. 1005: Dr. Destiney Ross at bedside, pt ok to have ice chips. 94 20 56: Per general surgery, hold off on CT of abdomen for now. 1930: Bedside and Verbal shift change report given to Jose L Patterson RN (oncoming nurse) by Steve Ardon RN and Manasa Grande RN (offgoing nurse). Report included the following information SBAR, Kardex, Intake/Output, MAR, Cardiac Rhythm NSR w/ PVC/PAC, Quality Measures, and Dual Neuro Assessment.

## 2023-03-26 NOTE — PROGRESS NOTES
TRANSFER - IN REPORT:    Verbal report received from Jann Gonzales RN (name) on Baldev Pal  being received from Angio (unit) for routine post - op      Report consisted of patients Situation, Background, Assessment and   Recommendations(SBAR). Information from the following report(s) SBAR, Kardex, ED Summary, Procedure Summary, Intake/Output, MAR, Accordion, Recent Results, Cardiac Rhythm NSR, and Alarm Parameters  was reviewed with the receiving nurse. Opportunity for questions and clarification was provided. Assessment completed upon patients arrival to unit and care assumed.
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
## 2023-03-26 NOTE — PROGRESS NOTES
Interventional Radiology  Procedure Note        3/26/2023 9:41 AM    Patient: Sean Harman     Diagnosis: Grade 4 splenic laceration    Procedure(s): Proximal splenic embolization    Specimens removed: None    Informed Consent: Obtained    Sedation: Moderate    Complications: None    Assessment:  No active bleeding was found on diagnostic angiography, however multiple pseudoaneurysms were seen. Proximal splenic embolization was performed without complications. Significantly reduced pressurehead to spleen on post-embolization angiography. Left radial artery access and closure uneventful (TR Band).      Plan:  - continue to monitor for signs of bleeding   - if persistent bleeding, recommend surgical consultation as next step   - please contact with questions/concerns  - full dictation to follow      -------------------------------  Kindra Conti MD  Vascular and Interventional Radiology

## 2023-03-26 NOTE — PROGRESS NOTES
Critical Care Attending Brief Note:     Traumatic splenic laceration s/p embolization of splenic artery. Pt continues to have abdominal pain 5-6 in intensity, tender to touch with \"peritonitis\" feature. HR at 80s, SBP at 130. Afebrile. Occasional PVCs. Received Fentanyl/Diluadid 0.5 IV X1. Hg 9.1 <-- 10.1 < --9.8 <-- 11.8  PLT normal    I discussed the findings briefly with Dr. Irish Ardon. Will continue monitoring CBC and clinical pictures. Will hold off on ABD CT for now.     Peter Colon MD  Pulmonary Critical Care Medicine

## 2023-03-26 NOTE — ED NOTES
TRANSFER - OUT REPORT:    Verbal report given to [facility], RN(name) on Angela Beckman  being transferred to  [facility](unit) for routine progression of care       Report consisted of patients Situation, Background, Assessment and   Recommendations(SBAR). Information from the following report(s) SBAR, ED Summary, [facility] and Recent Results was reviewed with the receiving nurse. Lines:   Peripheral IV 03/25/23 Left Antecubital (Active)        Opportunity for questions and clarification was provided.       Patient transported with:   Monitor  Registered Nurse

## 2023-03-26 NOTE — PROGRESS NOTES
Pt arrives via bed to angio department accompanied by RN x 2 for Splenic embolization procedure. All assessments completed and consent was reviewed. Education given was regarding procedure, moderate sedation, post-procedure care and  management/follow-up. Opportunity for questions was provided and all questions and concerns were addressed. 4862: 11mL of air instrilled to TR band to left radial.     TRANSFER - OUT REPORT:    Verbal report given to Jose L Patterson RN(name) on Tono Sales  being transferred to ICU(unit) for routine post - op       Report consisted of patients Situation, Background, Assessment and   Recommendations(SBAR). Information from the following report(s) SBAR, Procedure Summary, MAR, and Recent Results was reviewed with the receiving nurse. Lines:   Peripheral IV 03/25/23 Right Antecubital (Active)       Peripheral IV 03/26/23 Right;Posterior Hand (Active)        Opportunity for questions and clarification was provided.       Patient transported with:   Monitor  O2 @ 2 liters  Registered Nurse
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
## 2023-03-26 NOTE — PROGRESS NOTES
Critical Care Attending Brief Note:    Traumatic splenic acceleration s/p embolization of splenic artery. Reviewing literatures, routine vaccination post splenic embolization is not recommended. For patients scheduled for elective splenectomy, the recommended timing for vaccination is about two weeks prior to the procedure allowing sufficient time for maturation of immune system. In this case, if patient undergoes splenectomy, the appropriate timing for vaccination would be prior to discharge. Will defer vaccination for now.       Shoaib Stratton MD  Pulmonary Critical Care Medicine
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
## 2023-03-26 NOTE — PROGRESS NOTES
Patient underwent embolization earlier today. Denies any abdominal pain. Has not required any transfusion during this hospitalization yet. Vitals are stable without requiring pressors. General alert in no acute distress  Abdomen soft nontender nondistended  Continue to monitor H&H  Okay to have ice chips but would not start feeding yet until we are absolutely sure that he will not require further intervention.   Following

## 2023-03-26 NOTE — CONSULTS
Spoke with Dr. Ramonita Boykin. He has questions about INR reversal. Patient reportedly on warfarin for a valve replacement. We discussed that I would recommend consulting patient's managing service cardiology first regarding INR reversal as well as working with pharmacy since there may be a specific recommendation from Cardiology. I will close out the consult order with this non-billing note. I let Dr. Ramonita Boykin know that that they can put in another consultation to Hematology if still needed thereafter discussing with cardiology since I have recommended that cardiology be consulted primarily regarding the question surrounding appropriate INR reversal in a patient reportedly bleeding. Please contact us if any new questions or concerns. Non-Urgent Matters: Call our clinic at [phone] during open clinic hours. Please note that [facility] Messages may not be immediately returned. Urgent Matters: Call hospital  ([address] [facility], or [facility]) at night or on weekends for questions that cannot wait until clinic opens. Emergency: Call 9-1-1.     Domenica Moore MD  Hematology/Medical Oncology Physician  [id number]  P: [phone]    NON-BILLING NOTE
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
## 2023-03-27 ENCOUNTER — APPOINTMENT (OUTPATIENT)
Dept: CT IMAGING | Age: 63
DRG: 987 | End: 2023-03-27
Attending: STUDENT IN AN ORGANIZED HEALTH CARE EDUCATION/TRAINING PROGRAM
Payer: COMMERCIAL

## 2023-03-27 LAB
ANION GAP SERPL CALC-SCNC: 2 MMOL/L (ref 5–15)
APTT PPP: 23 SEC (ref 22.1–31)
ATRIAL RATE: 90 BPM
BUN SERPL-MCNC: 19 MG/DL (ref 6–20)
BUN/CREAT SERPL: 23 (ref 12–20)
CALCIUM SERPL-MCNC: 8.2 MG/DL (ref 8.5–10.1)
CALCULATED P AXIS, ECG09: 69 DEGREES
CALCULATED R AXIS, ECG10: 36 DEGREES
CALCULATED T AXIS, ECG11: 72 DEGREES
CHLORIDE SERPL-SCNC: 109 MMOL/L (ref 97–108)
CO2 SERPL-SCNC: 25 MMOL/L (ref 21–32)
CREAT SERPL-MCNC: 0.84 MG/DL (ref 0.7–1.3)
DIAGNOSIS, 93000: NORMAL
ERYTHROCYTE [DISTWIDTH] IN BLOOD BY AUTOMATED COUNT: 13.1 % (ref 11.5–14.5)
ERYTHROCYTE [DISTWIDTH] IN BLOOD BY AUTOMATED COUNT: 13.3 % (ref 11.5–14.5)
GLUCOSE SERPL-MCNC: 131 MG/DL (ref 65–100)
HCT VFR BLD AUTO: 21.3 % (ref 36.6–50.3)
HCT VFR BLD AUTO: 21.8 % (ref 36.6–50.3)
HCT VFR BLD AUTO: 22.2 % (ref 36.6–50.3)
HCT VFR BLD AUTO: 23.5 % (ref 36.6–50.3)
HGB BLD-MCNC: 7.3 G/DL (ref 12.1–17)
HGB BLD-MCNC: 7.4 G/DL (ref 12.1–17)
HGB BLD-MCNC: 7.4 G/DL (ref 12.1–17)
HGB BLD-MCNC: 8 G/DL (ref 12.1–17)
INR PPP: 1.1 (ref 0.9–1.1)
MCH RBC QN AUTO: 30.5 PG (ref 26–34)
MCH RBC QN AUTO: 30.7 PG (ref 26–34)
MCH RBC QN AUTO: 30.8 PG (ref 26–34)
MCH RBC QN AUTO: 31.2 PG (ref 26–34)
MCHC RBC AUTO-ENTMCNC: 33.3 G/DL (ref 30–36.5)
MCHC RBC AUTO-ENTMCNC: 33.5 G/DL (ref 30–36.5)
MCHC RBC AUTO-ENTMCNC: 34 G/DL (ref 30–36.5)
MCHC RBC AUTO-ENTMCNC: 34.7 G/DL (ref 30–36.5)
MCV RBC AUTO: 89.9 FL (ref 80–99)
MCV RBC AUTO: 90.4 FL (ref 80–99)
MCV RBC AUTO: 91.4 FL (ref 80–99)
MCV RBC AUTO: 91.6 FL (ref 80–99)
NRBC # BLD: 0 K/UL (ref 0–0.01)
NRBC BLD-RTO: 0 PER 100 WBC
P-R INTERVAL, ECG05: 224 MS
PLATELET # BLD AUTO: 171 K/UL (ref 150–400)
PLATELET # BLD AUTO: 177 K/UL (ref 150–400)
PLATELET # BLD AUTO: 178 K/UL (ref 150–400)
PLATELET # BLD AUTO: 189 K/UL (ref 150–400)
PMV BLD AUTO: 10.6 FL (ref 8.9–12.9)
PMV BLD AUTO: 11 FL (ref 8.9–12.9)
PMV BLD AUTO: 11.1 FL (ref 8.9–12.9)
PMV BLD AUTO: 11.2 FL (ref 8.9–12.9)
POTASSIUM SERPL-SCNC: 5 MMOL/L (ref 3.5–5.1)
PROTHROMBIN TIME: 11.6 SEC (ref 9–11.1)
Q-T INTERVAL, ECG07: 396 MS
QRS DURATION, ECG06: 98 MS
QTC CALCULATION (BEZET), ECG08: 484 MS
RBC # BLD AUTO: 2.37 M/UL (ref 4.1–5.7)
RBC # BLD AUTO: 2.38 M/UL (ref 4.1–5.7)
RBC # BLD AUTO: 2.43 M/UL (ref 4.1–5.7)
RBC # BLD AUTO: 2.6 M/UL (ref 4.1–5.7)
SODIUM SERPL-SCNC: 136 MMOL/L (ref 136–145)
THERAPEUTIC RANGE,PTTT: NORMAL SECS (ref 58–77)
VENTRICULAR RATE, ECG03: 90 BPM
WBC # BLD AUTO: 15.1 K/UL (ref 4.1–11.1)
WBC # BLD AUTO: 15.6 K/UL (ref 4.1–11.1)
WBC # BLD AUTO: 16.1 K/UL (ref 4.1–11.1)
WBC # BLD AUTO: 16.3 K/UL (ref 4.1–11.1)

## 2023-03-27 PROCEDURE — 74011250636 HC RX REV CODE- 250/636: Performed by: STUDENT IN AN ORGANIZED HEALTH CARE EDUCATION/TRAINING PROGRAM

## 2023-03-27 PROCEDURE — 36415 COLL VENOUS BLD VENIPUNCTURE: CPT

## 2023-03-27 PROCEDURE — 85610 PROTHROMBIN TIME: CPT

## 2023-03-27 PROCEDURE — 74011000636 HC RX REV CODE- 636: Performed by: RADIOLOGY

## 2023-03-27 PROCEDURE — 74011250636 HC RX REV CODE- 250/636: Performed by: NURSE PRACTITIONER

## 2023-03-27 PROCEDURE — 80048 BASIC METABOLIC PNL TOTAL CA: CPT

## 2023-03-27 PROCEDURE — 85027 COMPLETE CBC AUTOMATED: CPT

## 2023-03-27 PROCEDURE — 65610000006 HC RM INTENSIVE CARE

## 2023-03-27 PROCEDURE — 71275 CT ANGIOGRAPHY CHEST: CPT

## 2023-03-27 PROCEDURE — 99231 SBSQ HOSP IP/OBS SF/LOW 25: CPT | Performed by: NURSE PRACTITIONER

## 2023-03-27 PROCEDURE — 74011250636 HC RX REV CODE- 250/636: Performed by: FAMILY MEDICINE

## 2023-03-27 PROCEDURE — 74011000250 HC RX REV CODE- 250: Performed by: FAMILY MEDICINE

## 2023-03-27 PROCEDURE — 85730 THROMBOPLASTIN TIME PARTIAL: CPT

## 2023-03-27 RX ORDER — SODIUM CHLORIDE 9 MG/ML
125 INJECTION, SOLUTION INTRAVENOUS CONTINUOUS
Status: DISCONTINUED | OUTPATIENT
Start: 2023-03-27 | End: 2023-03-29

## 2023-03-27 RX ADMIN — SODIUM CHLORIDE 125 ML/HR: 9 INJECTION, SOLUTION INTRAVENOUS at 15:57

## 2023-03-27 RX ADMIN — SODIUM CHLORIDE 125 ML/HR: 900 INJECTION, SOLUTION INTRAVENOUS at 00:13

## 2023-03-27 RX ADMIN — HYDROMORPHONE HYDROCHLORIDE 0.5 MG: 1 INJECTION, SOLUTION INTRAMUSCULAR; INTRAVENOUS; SUBCUTANEOUS at 20:50

## 2023-03-27 RX ADMIN — IOPAMIDOL 100 ML: 755 INJECTION, SOLUTION INTRAVENOUS at 17:15

## 2023-03-27 RX ADMIN — SODIUM CHLORIDE, PRESERVATIVE FREE 10 ML: 5 INJECTION INTRAVENOUS at 14:00

## 2023-03-27 RX ADMIN — SODIUM CHLORIDE, PRESERVATIVE FREE 10 ML: 5 INJECTION INTRAVENOUS at 06:44

## 2023-03-27 RX ADMIN — HYDROMORPHONE HYDROCHLORIDE 0.5 MG: 1 INJECTION, SOLUTION INTRAMUSCULAR; INTRAVENOUS; SUBCUTANEOUS at 09:08

## 2023-03-27 RX ADMIN — HYDROMORPHONE HYDROCHLORIDE 0.5 MG: 1 INJECTION, SOLUTION INTRAMUSCULAR; INTRAVENOUS; SUBCUTANEOUS at 16:42

## 2023-03-27 RX ADMIN — HYDROMORPHONE HYDROCHLORIDE 0.5 MG: 1 INJECTION, SOLUTION INTRAMUSCULAR; INTRAVENOUS; SUBCUTANEOUS at 12:10

## 2023-03-27 RX ADMIN — SODIUM CHLORIDE, PRESERVATIVE FREE 10 ML: 5 INJECTION INTRAVENOUS at 21:56

## 2023-03-27 RX ADMIN — HYDROMORPHONE HYDROCHLORIDE 0.5 MG: 1 INJECTION, SOLUTION INTRAMUSCULAR; INTRAVENOUS; SUBCUTANEOUS at 01:26
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
## 2023-03-27 NOTE — PROGRESS NOTES
Spiritual Care Assessment/Progress Note  [facility]      NAME: Dianne Harris      MRN: [id number]  AGE: 60 y.o. SEX: male  Restorationist Affiliation: Tenriism   Language: English     3/27/2023     Total Time (in minutes): 7     Spiritual Assessment begun in [address] through conversation with:         []Patient        [] Family    [] Friend(s)        Reason for Consult: Advance medical directive consult     Spiritual beliefs: (Please include comment if needed)     [] Identifies with a sean tradition:         [] Supported by a sean community:            [] Claims no spiritual orientation:           [] Seeking spiritual identity:                [] Adheres to an individual form of spirituality:           [x] Not able to assess:                           Identified resources for coping:      [] Prayer                               [] Music                  [] Guided Imagery     [] Family/friends                 [] Pet visits     [] Devotional reading                         [] Unknown     [] Other:                                               Interventions offered during this visit: (See comments for more details)                Plan of Care:     [] Support spiritual and/or cultural needs    [] Support AMD and/or advance care planning process      [] Support grieving process   [] Coordinate Rites and/or Rituals    [] Coordination with community clergy   [] No spiritual needs identified at this time   [] Detailed Plan of Care below (See Comments)  [] Make referral to Music Therapy  [] Make referral to Pet Therapy     [] Make referral to Addiction services  [] Make referral to [facility]  [] Make referral to Spiritual Care Partner  [] No future visits requested        [] Contact Spiritual Care for further referrals     Comments: Informed by Care Manager that Mr Lou Miramontes in ICU-15 would like AMD information; reviewed patient's chart.      All lights in patient's room were turned off and he appeared to be sleeping when  arrived. Spoke with patient's nurse, Tristen Sorto, who stated that she would give the blank AMD and copy of the booklet, \"Your Right to Decide\" , to patient. This  requested that  be informed when patient was ready to discuss the AMD information. : Rev. Ankur Mendiola.  Alverto Bean; [facility], to contact [phone] Nathan Fernandez call: [phone]

## 2023-03-27 NOTE — PROGRESS NOTES
Transition of Care: Home    RUR: 11%  Disposition: Home  Barriers: None    Reason for Admission:  Abdominal Pain, Bleeding from  Splenic Lacerations into the Left UpperqQuadrant. RUR Score:  11% Low                   Plan for utilizing home health: Mr. Raina Swenson is willing to [place] home health services. He has used [facility] in the past.            PCP: First and Last name:  Fahad Miramontes MD     Name of Practice: [address] [place] [address]   Are you a current patient: Yes/No: Yes   Approximate date of last visit: 4/1/2021   Can you participate in a virtual visit with your PCP: Yes                    Current Advanced Directive/Advance Care Plan: Full Code  Mr. Raina Swenson does not have an advance care plan. He requested to complete one. A referral was made to Critical access hospital. Healthcare Decision Maker:   Click here to complete [facility] including selection of the Healthcare Decision Maker Relationship (ie \"Primary\")  Mr. Raina Swenson healthcare decision maker is his spouse, Carola Frazier, [phone]                       Transition of Care Plan:    Mr. Raina Swenson was seen in his room with his wife and daughter. His address and phone number were verified. He lives with his wife in a 2 story home with 4 steps to enter. He does not have durable medical equipment. He is employed. He does drive. He was independent with his ADLs and IADLs prior to his admission. His pharmacy is the [facility] at [address] [place] [address]. He is able to afford and acquire his medications. His wife will provide transportation when he is medically stable for discharge. Care Management Interventions  PCP Verified by CM:  Yes  Last Visit to PCP: 04/01/21  Palliative Care Criteria Met (RRAT>21 & CHF Dx)?: No  Mode of Transport at Discharge: Self  Transition of Care Consult (CM Consult): Discharge Planning  MyChart Signup: No  Discharge Durable Medical Equipment: No  Physical Therapy Consult: No  Occupational Therapy Consult: No  Speech Therapy Consult: No  Support Systems: Spouse/Significant Other, Child(ronaldo)  Confirm Follow Up Transport: Self  The Patient and/or Patient Representative was Provided with a Choice of Provider and Agrees with the Discharge Plan?: No  Freedom of Choice List was Provided with Basic Dialogue that Supports the Patient's Individualized Plan of Care/Goals, Treatment Preferences and Shares the Quality Data Associated with the Providers?: No   Resource Information Provided?: No  Discharge Location  Patient Expects to be Discharged to[de-identified] Home    Will continue to follow for discharge planning.   Signed By: Alan Jane LCSW     March 27, 2023

## 2023-03-27 NOTE — PROGRESS NOTES
0706 Bedside and Verbal shift change report given to JODY GARBER RN (oncoming nurse) by JODY Antonio (offgoing nurse). Report included the following information SBAR, Kardex, ED Summary, OR Summary, Procedure Summary, Intake/Output, MAR, Accordion, Recent Results, Med Rec Status, Cardiac Rhythm SINUS RHYTHM/SINUS TACHY with FREQUENT/OCCASIONAL PVCs AND PACs, and Alarm Parameters . 1129 ICU multidisciplinary rounds lead by Author Belkys RN (Intensivist)  The following elements were discussed:   A. Pain Management Plan: yes  B. SAT/SBT:not applicable  C. Sedation and RASS Goal not applicable  D. CAM-ICU: Negative Prevention/Management & Sleep plan? no  Restraints: Needed? NO   Order Renewed? not applicable  E. Mobility Level   PT/OT Consulted: no - awaiting to see if patient needs surgery or IR. F. Family Involvement: Has the family been updated in the last 24 hours? yes  Central line? NO Still Indicated? N/a  Stress Ulcer Prophylaxis Indicated/in place: NO  DVT Prophylaxis SCD's or Sequential Compression Device  Nutrition: NPO  Review of body systems, fluid balance I/Os, recent labs and imaging, length of ICU stay discussed. Daily Goals include: Plans to continue to trend CBC Q6; May need IR again or surgery. Keep patient NPO for now. (Note written by RN). 1400 Patient's daughter asking this RN if patient can get up and walk around. This RN verified with Author MD Belkys. Per MD, if patient stands up okay in room, can try to ambulate in hallway. [id number] Patient ambulating in hallway with this RN and MYRIAM Crooks. Patient's wife and daughter ambulating with this RN, PCT and patient. VSS. Patient did have one episode of feeling dizzy and HR jumped to the 140s, but then came back down to low 100's.     1451 Patient back in ICU 15. Patient sitting up in the chair. Family at bedside. 1652 This RN taking patient down to CT for CTA of abdomen and chest.     1731 Patient back in ICU 15. VSS. No issues with transport. 1930 Bedside and Verbal shift change report given to JODY GORMAN RN (oncoming nurse) by JODY GARBER RN (offgoing nurse). Report included the following information SBAR, Kardex, ED Summary, OR Summary, Procedure Summary, Intake/Output, MAR, Accordion, Recent Results, Med Rec Status, Cardiac Rhythm SINUS RHYTHM/SINUS TACHY with OCCASIONAL/FREQUENT PVCs and PACs, and Alarm Parameters .

## 2023-03-27 NOTE — PROGRESS NOTES
General Surgery Daily Progress Note    Admit Date: 3/25/2023  Post-Operative Day: * No surgery found * from * No surgery found *     Subjective:     Last 24 hrs: pt c/o some epigastric pain and bloating; hgb cont to trend down       Objective:     Blood pressure (!) 124/59, pulse 79, temperature 98.3 °F (36.8 °C), resp. rate 20, height 5' 11\" (1.803 m), weight 186 lb 8.2 oz (84.6 kg), SpO2 95 %. Temp (24hrs), Av.3 °F (36.8 °C), Min:98.1 °F (36.7 °C), Max:98.5 °F (36.9 °C)      _____________________  Physical Exam:     Alert and Oriented, x3, in no acute distress.   Cardiovascular: RRR, no peripheral edema  Abdomen: soft, mild distention, TTP epigastric area    Assessment:   Active Problems:    Splenic laceration (3/26/2023)            Plan:     Cont q6 hour h/h checks  Pain mgmt  SCDs  Holding coumadin and plavix - INR nl now    Data Review:    Recent Labs     23  0902 23  02323   WBC 15.1* 15.6* 15.3*   HGB 7.4* 8.0* 8.6*   HCT 21.3* 23.5* 26.9*    189 191     Recent Labs     23  02323     --  136   K 5.0  --  4.1   *  --  106   CO2 25  --  25   *  --  215*   BUN 19  --  18   CREA 0.84  --  0.96   CA 8.2*  --  8.4*   MG  --  2.0 1.9   PHOS  --  3.6 4.4   ALB  --   --  3.3*   ALT  --   --  80*   INR 1.1  --  2.4*     Recent Labs     23  2105   LPSE 552*           ______________________  Medications:    Current Facility-Administered Medications   Medication Dose Route Frequency    sodium chloride (NS) flush 5-40 mL  5-40 mL IntraVENous Q8H    sodium chloride (NS) flush 5-40 mL  5-40 mL IntraVENous PRN    acetaminophen (TYLENOL) tablet 650 mg  650 mg Oral Q6H PRN    Or    acetaminophen (TYLENOL) suppository 650 mg  650 mg Rectal Q6H PRN    polyethylene glycol (MIRALAX) packet 17 g  17 g Oral DAILY PRN    ondansetron (ZOFRAN ODT) tablet 4 mg  4 mg Oral Q8H PRN    Or    ondansetron (ZOFRAN) injection 4 mg  4 mg IntraVENous Q6H PRN    0.9% sodium chloride infusion 250 mL  250 mL IntraVENous PRN    0.9% sodium chloride infusion  50 mL/hr IntraVENous CONTINUOUS    naloxone (NARCAN) injection 0.4 mg  0.4 mg IntraVENous RAD PRN    0.9% sodium chloride infusion 250 mL  250 mL IntraVENous PRN    metoclopramide HCl (REGLAN) injection 10 mg  10 mg IntraVENous Q6H PRN    HYDROmorphone (DILAUDID) injection 0.5 mg  0.5 mg IntraVENous Q3H PRN       Donell Wong NP  3/27/2023    Agree with above doing better, no indication for surgical intervention at this time. Continue to monitor h/h.

## 2023-03-28 LAB
ANION GAP SERPL CALC-SCNC: 2 MMOL/L (ref 5–15)
ANION GAP SERPL CALC-SCNC: 5 MMOL/L (ref 5–15)
APTT PPP: 26.5 SEC (ref 22.1–31)
BUN SERPL-MCNC: 13 MG/DL (ref 6–20)
BUN SERPL-MCNC: 14 MG/DL (ref 6–20)
BUN/CREAT SERPL: 21 (ref 12–20)
BUN/CREAT SERPL: 22 (ref 12–20)
CALCIUM SERPL-MCNC: 7.6 MG/DL (ref 8.5–10.1)
CALCIUM SERPL-MCNC: 8 MG/DL (ref 8.5–10.1)
CHLORIDE SERPL-SCNC: 108 MMOL/L (ref 97–108)
CHLORIDE SERPL-SCNC: 110 MMOL/L (ref 97–108)
CO2 SERPL-SCNC: 25 MMOL/L (ref 21–32)
CO2 SERPL-SCNC: 26 MMOL/L (ref 21–32)
CREAT SERPL-MCNC: 0.58 MG/DL (ref 0.7–1.3)
CREAT SERPL-MCNC: 0.67 MG/DL (ref 0.7–1.3)
ERYTHROCYTE [DISTWIDTH] IN BLOOD BY AUTOMATED COUNT: 13.1 % (ref 11.5–14.5)
ERYTHROCYTE [DISTWIDTH] IN BLOOD BY AUTOMATED COUNT: 13.2 % (ref 11.5–14.5)
GLUCOSE SERPL-MCNC: 103 MG/DL (ref 65–100)
GLUCOSE SERPL-MCNC: 110 MG/DL (ref 65–100)
HCT VFR BLD AUTO: 20.2 % (ref 36.6–50.3)
HCT VFR BLD AUTO: 22.5 % (ref 36.6–50.3)
HCT VFR BLD AUTO: 23 % (ref 36.6–50.3)
HCT VFR BLD AUTO: 23.9 % (ref 36.6–50.3)
HGB BLD-MCNC: 6.5 G/DL (ref 12.1–17)
HGB BLD-MCNC: 7.1 G/DL (ref 12.1–17)
HGB BLD-MCNC: 7.3 G/DL (ref 12.1–17)
HGB BLD-MCNC: 7.5 G/DL (ref 12.1–17)
MCH RBC QN AUTO: 29.9 PG (ref 26–34)
MCH RBC QN AUTO: 29.9 PG (ref 26–34)
MCH RBC QN AUTO: 30 PG (ref 26–34)
MCH RBC QN AUTO: 30.1 PG (ref 26–34)
MCHC RBC AUTO-ENTMCNC: 31.4 G/DL (ref 30–36.5)
MCHC RBC AUTO-ENTMCNC: 31.6 G/DL (ref 30–36.5)
MCHC RBC AUTO-ENTMCNC: 31.7 G/DL (ref 30–36.5)
MCHC RBC AUTO-ENTMCNC: 32.2 G/DL (ref 30–36.5)
MCV RBC AUTO: 93.5 FL (ref 80–99)
MCV RBC AUTO: 94.3 FL (ref 80–99)
MCV RBC AUTO: 94.9 FL (ref 80–99)
MCV RBC AUTO: 95.2 FL (ref 80–99)
NRBC # BLD: 0 K/UL (ref 0–0.01)
NRBC # BLD: 0.02 K/UL (ref 0–0.01)
NRBC BLD-RTO: 0 PER 100 WBC
NRBC BLD-RTO: 0.1 PER 100 WBC
PLATELET # BLD AUTO: 189 K/UL (ref 150–400)
PLATELET # BLD AUTO: 191 K/UL (ref 150–400)
PLATELET # BLD AUTO: 198 K/UL (ref 150–400)
PLATELET # BLD AUTO: 203 K/UL (ref 150–400)
PMV BLD AUTO: 10.5 FL (ref 8.9–12.9)
PMV BLD AUTO: 10.6 FL (ref 8.9–12.9)
PMV BLD AUTO: 10.8 FL (ref 8.9–12.9)
PMV BLD AUTO: 11 FL (ref 8.9–12.9)
POTASSIUM SERPL-SCNC: 3.6 MMOL/L (ref 3.5–5.1)
POTASSIUM SERPL-SCNC: 3.6 MMOL/L (ref 3.5–5.1)
RBC # BLD AUTO: 2.16 M/UL (ref 4.1–5.7)
RBC # BLD AUTO: 2.37 M/UL (ref 4.1–5.7)
RBC # BLD AUTO: 2.44 M/UL (ref 4.1–5.7)
RBC # BLD AUTO: 2.51 M/UL (ref 4.1–5.7)
SODIUM SERPL-SCNC: 138 MMOL/L (ref 136–145)
SODIUM SERPL-SCNC: 138 MMOL/L (ref 136–145)
THERAPEUTIC RANGE,PTTT: NORMAL SECS (ref 58–77)
UFH PPP CHRO-ACNC: <0.1 IU/ML
WBC # BLD AUTO: 15 K/UL (ref 4.1–11.1)
WBC # BLD AUTO: 15 K/UL (ref 4.1–11.1)
WBC # BLD AUTO: 15.7 K/UL (ref 4.1–11.1)
WBC # BLD AUTO: 18.8 K/UL (ref 4.1–11.1)

## 2023-03-28 PROCEDURE — 74011000250 HC RX REV CODE- 250: Performed by: FAMILY MEDICINE

## 2023-03-28 PROCEDURE — 99232 SBSQ HOSP IP/OBS MODERATE 35: CPT | Performed by: SURGERY

## 2023-03-28 PROCEDURE — 74011250636 HC RX REV CODE- 250/636: Performed by: NURSE PRACTITIONER

## 2023-03-28 PROCEDURE — 85730 THROMBOPLASTIN TIME PARTIAL: CPT

## 2023-03-28 PROCEDURE — 85520 HEPARIN ASSAY: CPT

## 2023-03-28 PROCEDURE — 74011250636 HC RX REV CODE- 250/636: Performed by: STUDENT IN AN ORGANIZED HEALTH CARE EDUCATION/TRAINING PROGRAM

## 2023-03-28 PROCEDURE — 85027 COMPLETE CBC AUTOMATED: CPT

## 2023-03-28 PROCEDURE — 80048 BASIC METABOLIC PNL TOTAL CA: CPT

## 2023-03-28 PROCEDURE — 74011250637 HC RX REV CODE- 250/637: Performed by: STUDENT IN AN ORGANIZED HEALTH CARE EDUCATION/TRAINING PROGRAM

## 2023-03-28 PROCEDURE — 74011250637 HC RX REV CODE- 250/637: Performed by: FAMILY MEDICINE

## 2023-03-28 PROCEDURE — 36415 COLL VENOUS BLD VENIPUNCTURE: CPT

## 2023-03-28 PROCEDURE — 74011250637 HC RX REV CODE- 250/637: Performed by: NURSE PRACTITIONER

## 2023-03-28 PROCEDURE — 65610000006 HC RM INTENSIVE CARE

## 2023-03-28 RX ORDER — HEPARIN SODIUM 10000 [USP'U]/100ML
11-25 INJECTION, SOLUTION INTRAVENOUS
Status: DISCONTINUED
Start: 2023-03-28 | End: 2023-04-05 | Stop reason: ALTCHOICE

## 2023-03-28 RX ORDER — OXYCODONE AND ACETAMINOPHEN 5; 325 MG/1; MG/1
1 TABLET ORAL
Status: DISCONTINUED
Start: 2023-03-28 | End: 2023-04-05 | Stop reason: HOSPADM

## 2023-03-28 RX ORDER — METOPROLOL SUCCINATE 25 MG/1
25 TABLET, EXTENDED RELEASE ORAL DAILY
Status: DISCONTINUED
Start: 2023-03-28 | End: 2023-04-05 | Stop reason: HOSPADM

## 2023-03-28 RX ADMIN — HYDROMORPHONE HYDROCHLORIDE 0.5 MG: 1 INJECTION, SOLUTION INTRAMUSCULAR; INTRAVENOUS; SUBCUTANEOUS at 13:55

## 2023-03-28 RX ADMIN — HYDROMORPHONE HYDROCHLORIDE 0.5 MG: 1 INJECTION, SOLUTION INTRAMUSCULAR; INTRAVENOUS; SUBCUTANEOUS at 00:37

## 2023-03-28 RX ADMIN — SODIUM CHLORIDE 125 ML/HR: 9 INJECTION, SOLUTION INTRAVENOUS at 08:59

## 2023-03-28 RX ADMIN — SODIUM CHLORIDE, PRESERVATIVE FREE 10 ML: 5 INJECTION INTRAVENOUS at 13:55

## 2023-03-28 RX ADMIN — METOPROLOL SUCCINATE 25 MG: 25 TABLET, EXTENDED RELEASE ORAL at 17:05

## 2023-03-28 RX ADMIN — OXYCODONE AND ACETAMINOPHEN 1 TABLET: 5; 325 TABLET ORAL at 20:10

## 2023-03-28 RX ADMIN — SODIUM CHLORIDE 125 ML/HR: 9 INJECTION, SOLUTION INTRAVENOUS at 00:37

## 2023-03-28 RX ADMIN — HYDROMORPHONE HYDROCHLORIDE 0.5 MG: 1 INJECTION, SOLUTION INTRAMUSCULAR; INTRAVENOUS; SUBCUTANEOUS at 06:35

## 2023-03-28 RX ADMIN — SODIUM CHLORIDE, PRESERVATIVE FREE 10 ML: 5 INJECTION INTRAVENOUS at 06:26

## 2023-03-28 RX ADMIN — HYDROMORPHONE HYDROCHLORIDE 0.5 MG: 1 INJECTION, SOLUTION INTRAMUSCULAR; INTRAVENOUS; SUBCUTANEOUS at 09:23

## 2023-03-28 RX ADMIN — ACETAMINOPHEN 650 MG: 325 TABLET ORAL at 03:54

## 2023-03-28 RX ADMIN — HYDROMORPHONE HYDROCHLORIDE 0.5 MG: 1 INJECTION, SOLUTION INTRAMUSCULAR; INTRAVENOUS; SUBCUTANEOUS at 17:05

## 2023-03-28 RX ADMIN — HEPARIN SODIUM 11 UNITS/KG/HR: 10000 INJECTION, SOLUTION INTRAVENOUS at 17:16

## 2023-03-28 NOTE — PROGRESS NOTES
1930: Bedside and Verbal shift change report given to 121 Jaime Alvarez (oncoming nurse) by Severa Fickle RN (offgoing nurse). Report included the following information SBAR, Procedure Summary, Intake/Output, MAR, Recent Results, Cardiac Rhythm NSR, and Alarm Parameters . 0730: Bedside and Verbal shift change report given to [address] (oncoming nurse) by Delpha Denver RN (offgoing nurse). Report included the following information SBAR, Intake/Output, MAR, Recent Results, Cardiac Rhythm NSR, and Alarm Parameters . PRN dilaudid given for abd pain x3 and PRN tylenol given once. Patient remained on RA overnight. Maintained continuous fluids.

## 2023-03-28 NOTE — PROGRESS NOTES
Problem: Pain  Goal: *Control of Pain  Outcome: Progressing Towards Goal     Problem: General Medical Care Plan  Goal: *Vital signs within specified parameters  Outcome: Progressing Towards Goal  Goal: *Labs within defined limits  Outcome: Progressing Towards Goal  Goal: *Absence of infection signs and symptoms  Outcome: Progressing Towards Goal  Goal: *Optimal pain control at patient's stated goal  Outcome: Progressing Towards Goal  Goal: *Skin integrity maintained  Outcome: Progressing Towards Goal  Goal: *Fluid volume balance  Outcome: Progressing Towards Goal  Goal: *Optimize nutritional status  Outcome: Not Progressing Towards Goal  Goal: *Anxiety reduced or absent  Outcome: Progressing Towards Goal  Goal: *Progressive mobility and function (eg: ADL's)  Outcome: Progressing Towards Goal     Problem: Falls - Risk of  Goal: *Absence of Falls  Description: Document Eduarda Fall Risk and appropriate interventions in the flowsheet. Outcome: Progressing Towards Goal  Note: Fall Risk Interventions:                                Problem: Pressure Injury - Risk of  Goal: *Prevention of pressure injury  Description: Document Elvin Scale and appropriate interventions in the flowsheet. Outcome: Progressing Towards Goal  Note: Pressure Injury Interventions:             Activity Interventions: Assess need for specialty bed, Pressure redistribution bed/mattress(bed type), PT/OT evaluation    Mobility Interventions: Assess need for specialty bed, Float heels, Pressure redistribution bed/mattress (bed type), PT/OT evaluation    Nutrition Interventions: Document food/fluid/supplement intake, Discuss nutritional consult with provider
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
## 2023-03-28 NOTE — PROGRESS NOTES
Progress Note    Patient: Lamont Stearns MRN: [id number]  SSN: [id number]    YOB: 1960  Age: 60 y.o. Sex: male      Admit Date: 3/25/2023    * No surgery found *    Procedure:      Subjective:     Patient feeling pretty good today. Started on a diet without any difficulty. Objective:     Visit Vitals  BP (!) 127/53   Pulse 99   Temp 98.5 °F (36.9 °C)   Resp 21   Ht 5' 11\" (1.803 m)   Wt 85.4 kg (188 lb 4.4 oz)   SpO2 92%   BMI 26.26 kg/m²       Temp (24hrs), Av.4 °F (36.9 °C), Min:97.8 °F (36.6 °C), Max:98.8 °F (37.1 °C)      Physical Exam:    General alert no acute distress  Lungs clear  Heart regular rate and rhythm  Abdomen soft minimal tenderness no rebound or guarding    Data Review: images and reports reviewed  CT- IMPRESSION  1. No active extravasation from the spleen after splenic artery embolization. 2.  Large perisplenic hematoma and moderate hemoperitoneum similar to the prior  study. 3.  Chronic changes of type aortic dissection status post aortic valve  replacement and ascending thoracic aortic repair. Lab Review: All lab results for the last 24 hours reviewed.   Recent Results (from the past 24 hour(s))   CBC W/O DIFF    Collection Time: 23  2:09 PM   Result Value Ref Range    WBC 16.3 (H) 4.1 - 11.1 K/uL    RBC 2.38 (L) 4.10 - 5.70 M/uL    HGB 7.3 (L) 12.1 - 17.0 g/dL    HCT 21.8 (L) 36.6 - 50.3 %    MCV 91.6 80.0 - 99.0 FL    MCH 30.7 26.0 - 34.0 PG    MCHC 33.5 30.0 - 36.5 g/dL    RDW 13.3 11.5 - 14.5 %    PLATELET 156 522 - 784 K/uL    MPV 11.2 8.9 - 12.9 FL    NRBC 0.0 0  WBC    ABSOLUTE NRBC 0.00 0.00 - 0.01 K/uL   CBC W/O DIFF    Collection Time: 23  9:01 PM   Result Value Ref Range    WBC 16.1 (H) 4.1 - 11.1 K/uL    RBC 2.43 (L) 4.10 - 5.70 M/uL    HGB 7.4 (L) 12.1 - 17.0 g/dL    HCT 22.2 (L) 36.6 - 50.3 %    MCV 91.4 80.0 - 99.0 FL    MCH 30.5 26.0 - 34.0 PG    MCHC 33.3 30.0 - 36.5 g/dL    RDW 13.1 11.5 - 14.5 %    PLATELET 207 412 - 812 K/uL MPV 10.6 8.9 - 12.9 FL    NRBC 0.0 0  WBC    ABSOLUTE NRBC 0.00 0.00 - 9.01 K/uL   METABOLIC PANEL, BASIC    Collection Time: 03/28/23  3:19 AM   Result Value Ref Range    Sodium 138 136 - 145 mmol/L    Potassium 3.6 3.5 - 5.1 mmol/L    Chloride 108 97 - 108 mmol/L    CO2 25 21 - 32 mmol/L    Anion gap 5 5 - 15 mmol/L    Glucose 103 (H) 65 - 100 mg/dL    BUN 13 6 - 20 MG/DL    Creatinine 0.58 (L) 0.70 - 1.30 MG/DL    BUN/Creatinine ratio 22 (H) 12 - 20      eGFR >60 >60 ml/min/1.73m2    Calcium 8.0 (L) 8.5 - 10.1 MG/DL   CBC W/O DIFF    Collection Time: 03/28/23  3:19 AM   Result Value Ref Range    WBC 15.7 (H) 4.1 - 11.1 K/uL    RBC 2.37 (L) 4.10 - 5.70 M/uL    HGB 7.1 (L) 12.1 - 17.0 g/dL    HCT 22.5 (L) 36.6 - 50.3 %    MCV 94.9 80.0 - 99.0 FL    MCH 30.0 26.0 - 34.0 PG    MCHC 31.6 30.0 - 36.5 g/dL    RDW 13.2 11.5 - 14.5 %    PLATELET 518 057 - 561 K/uL    MPV 11.0 8.9 - 12.9 FL    NRBC 0.0 0  WBC    ABSOLUTE NRBC 0.00 0.00 - 0.01 K/uL   CBC W/O DIFF    Collection Time: 03/28/23  8:59 AM   Result Value Ref Range    WBC 15.0 (H) 4.1 - 11.1 K/uL    RBC 2.51 (L) 4.10 - 5.70 M/uL    HGB 7.5 (L) 12.1 - 17.0 g/dL    HCT 23.9 (L) 36.6 - 50.3 %    MCV 95.2 80.0 - 99.0 FL    MCH 29.9 26.0 - 34.0 PG    MCHC 31.4 30.0 - 36.5 g/dL    RDW 13.2 11.5 - 14.5 %    PLATELET 250 820 - 157 K/uL    MPV 10.8 8.9 - 12.9 FL    NRBC 0.0 0  WBC    ABSOLUTE NRBC 0.00 0.00 - 0.01 K/uL       Assessment:     Hospital Problems  Date Reviewed: 5/12/2015            Codes Class Noted POA    Splenic laceration ICD-10-CM: B27.693W  ICD-9-CM: 865.09  3/26/2023 Unknown           Plan/Recommendations/Medical Decision Making:   Splenic laceration status post embolization   Seems to be doing well with stable H&H. Seems like it is reasonable to start heparin for necessary anticoagulation.   Would probably do that for a few days to make sure that the spleen is holding before going to oral anticoagulation  Okay for diet

## 2023-03-29 LAB
ERYTHROCYTE [DISTWIDTH] IN BLOOD BY AUTOMATED COUNT: 13 % (ref 11.5–14.5)
ERYTHROCYTE [DISTWIDTH] IN BLOOD BY AUTOMATED COUNT: 13.2 % (ref 11.5–14.5)
ERYTHROCYTE [DISTWIDTH] IN BLOOD BY AUTOMATED COUNT: 13.4 % (ref 11.5–14.5)
HCT VFR BLD AUTO: 22.8 % (ref 36.6–50.3)
HCT VFR BLD AUTO: 27.2 % (ref 36.6–50.3)
HCT VFR BLD AUTO: 28.1 % (ref 36.6–50.3)
HGB BLD-MCNC: 7.3 G/DL (ref 12.1–17)
HGB BLD-MCNC: 8.6 G/DL (ref 12.1–17)
HGB BLD-MCNC: 9.4 G/DL (ref 12.1–17)
HISTORY CHECKED?,CKHIST: NORMAL
MCH RBC QN AUTO: 29.6 PG (ref 26–34)
MCH RBC QN AUTO: 30 PG (ref 26–34)
MCH RBC QN AUTO: 30.8 PG (ref 26–34)
MCHC RBC AUTO-ENTMCNC: 31.6 G/DL (ref 30–36.5)
MCHC RBC AUTO-ENTMCNC: 32 G/DL (ref 30–36.5)
MCHC RBC AUTO-ENTMCNC: 33.5 G/DL (ref 30–36.5)
MCV RBC AUTO: 92.1 FL (ref 80–99)
MCV RBC AUTO: 93.5 FL (ref 80–99)
MCV RBC AUTO: 93.8 FL (ref 80–99)
NRBC # BLD: 0 K/UL (ref 0–0.01)
NRBC BLD-RTO: 0 PER 100 WBC
PLATELET # BLD AUTO: 195 K/UL (ref 150–400)
PLATELET # BLD AUTO: 237 K/UL (ref 150–400)
PLATELET # BLD AUTO: 273 K/UL (ref 150–400)
PMV BLD AUTO: 10.4 FL (ref 8.9–12.9)
PMV BLD AUTO: 10.5 FL (ref 8.9–12.9)
PMV BLD AUTO: 10.6 FL (ref 8.9–12.9)
RBC # BLD AUTO: 2.43 M/UL (ref 4.1–5.7)
RBC # BLD AUTO: 2.91 M/UL (ref 4.1–5.7)
RBC # BLD AUTO: 3.05 M/UL (ref 4.1–5.7)
TROPONIN I SERPL HS-MCNC: 51 NG/L (ref 0–57)
UFH PPP CHRO-ACNC: 0.11 IU/ML
UFH PPP CHRO-ACNC: 0.16 IU/ML
UFH PPP CHRO-ACNC: 0.71 IU/ML
UFH PPP CHRO-ACNC: <0.1 IU/ML
WBC # BLD AUTO: 14.6 K/UL (ref 4.1–11.1)
WBC # BLD AUTO: 15.4 K/UL (ref 4.1–11.1)
WBC # BLD AUTO: 16.8 K/UL (ref 4.1–11.1)

## 2023-03-29 PROCEDURE — 30233N1 TRANSFUSION OF NONAUTOLOGOUS RED BLOOD CELLS INTO PERIPHERAL VEIN, PERCUTANEOUS APPROACH: ICD-10-PCS | Performed by: STUDENT IN AN ORGANIZED HEALTH CARE EDUCATION/TRAINING PROGRAM

## 2023-03-29 PROCEDURE — 74011250637 HC RX REV CODE- 250/637: Performed by: NURSE PRACTITIONER

## 2023-03-29 PROCEDURE — 36415 COLL VENOUS BLD VENIPUNCTURE: CPT

## 2023-03-29 PROCEDURE — 85520 HEPARIN ASSAY: CPT

## 2023-03-29 PROCEDURE — 84484 ASSAY OF TROPONIN QUANT: CPT

## 2023-03-29 PROCEDURE — 90471 IMMUNIZATION ADMIN: CPT

## 2023-03-29 PROCEDURE — 74011250637 HC RX REV CODE- 250/637: Performed by: STUDENT IN AN ORGANIZED HEALTH CARE EDUCATION/TRAINING PROGRAM

## 2023-03-29 PROCEDURE — P9016 RBC LEUKOCYTES REDUCED: HCPCS

## 2023-03-29 PROCEDURE — 90647 HIB PRP-OMP VACC 3 DOSE IM: CPT | Performed by: STUDENT IN AN ORGANIZED HEALTH CARE EDUCATION/TRAINING PROGRAM

## 2023-03-29 PROCEDURE — 65270000046 HC RM TELEMETRY

## 2023-03-29 PROCEDURE — 36430 TRANSFUSION BLD/BLD COMPNT: CPT

## 2023-03-29 PROCEDURE — 90670 PCV13 VACCINE IM: CPT | Performed by: STUDENT IN AN ORGANIZED HEALTH CARE EDUCATION/TRAINING PROGRAM

## 2023-03-29 PROCEDURE — P9040 RBC LEUKOREDUCED IRRADIATED: HCPCS

## 2023-03-29 PROCEDURE — 74011250636 HC RX REV CODE- 250/636: Performed by: STUDENT IN AN ORGANIZED HEALTH CARE EDUCATION/TRAINING PROGRAM

## 2023-03-29 PROCEDURE — 99232 SBSQ HOSP IP/OBS MODERATE 35: CPT | Performed by: SURGERY

## 2023-03-29 PROCEDURE — 74011250636 HC RX REV CODE- 250/636: Performed by: NURSE PRACTITIONER

## 2023-03-29 PROCEDURE — 85027 COMPLETE CBC AUTOMATED: CPT

## 2023-03-29 PROCEDURE — 74011000636 HC RX REV CODE- 636: Performed by: STUDENT IN AN ORGANIZED HEALTH CARE EDUCATION/TRAINING PROGRAM

## 2023-03-29 PROCEDURE — 74011000250 HC RX REV CODE- 250: Performed by: FAMILY MEDICINE

## 2023-03-29 RX ORDER — HEPARIN SODIUM 1000 [USP'U]/ML
2000 INJECTION, SOLUTION INTRAVENOUS; SUBCUTANEOUS ONCE
Status: COMPLETED | OUTPATIENT
Start: 2023-03-29 | End: 2023-03-29

## 2023-03-29 RX ORDER — HEPARIN SODIUM 1000 [USP'U]/ML
4000 INJECTION, SOLUTION INTRAVENOUS; SUBCUTANEOUS ONCE
Status: DISCONTINUED | OUTPATIENT
Start: 2023-03-29 | End: 2023-03-29

## 2023-03-29 RX ADMIN — HEPARIN SODIUM 14 UNITS/KG/HR: 10000 INJECTION, SOLUTION INTRAVENOUS at 21:09

## 2023-03-29 RX ADMIN — HEPARIN SODIUM 15 UNITS/KG/HR: 10000 INJECTION, SOLUTION INTRAVENOUS at 17:14

## 2023-03-29 RX ADMIN — OXYCODONE AND ACETAMINOPHEN 1 TABLET: 5; 325 TABLET ORAL at 14:45

## 2023-03-29 RX ADMIN — SODIUM CHLORIDE, PRESERVATIVE FREE 10 ML: 5 INJECTION INTRAVENOUS at 13:51

## 2023-03-29 RX ADMIN — HEPARIN SODIUM 2000 UNITS: 1000 INJECTION INTRAVENOUS; SUBCUTANEOUS at 05:16

## 2023-03-29 RX ADMIN — HEPARIN SODIUM 2000 UNITS: 1000 INJECTION INTRAVENOUS; SUBCUTANEOUS at 13:50

## 2023-03-29 RX ADMIN — METOPROLOL SUCCINATE 25 MG: 25 TABLET, EXTENDED RELEASE ORAL at 08:54

## 2023-03-29 RX ADMIN — OXYCODONE AND ACETAMINOPHEN 1 TABLET: 5; 325 TABLET ORAL at 18:59

## 2023-03-29 RX ADMIN — HAEMOPHILUS B CONJUGATE VACCINE (MENINGOCOCCAL PROTEIN CONJUGATE) 7.5 MCG: 7.5 INJECTION, SUSPENSION INTRAMUSCULAR at 14:34

## 2023-03-29 RX ADMIN — OXYCODONE AND ACETAMINOPHEN 1 TABLET: 5; 325 TABLET ORAL at 09:02

## 2023-03-29 RX ADMIN — PNEUMOCOCCAL 13-VALENT CONJUGATE VACCINE 0.5 ML: 2.2; 2.2; 2.2; 2.2; 2.2; 4.4; 2.2; 2.2; 2.2; 2.2; 2.2; 2.2; 2.2 INJECTION, SUSPENSION INTRAMUSCULAR at 14:38

## 2023-03-29 RX ADMIN — SODIUM CHLORIDE, PRESERVATIVE FREE 10 ML: 5 INJECTION INTRAVENOUS at 23:41

## 2023-03-29 RX ADMIN — OXYCODONE AND ACETAMINOPHEN 1 TABLET: 5; 325 TABLET ORAL at 23:40

## 2023-03-29 NOTE — PROGRESS NOTES
0000: Noted Hb drop to 6.5 from 7.1. Discussed ongoing Heparin gtt with Mary Mahan NP. NP stated to transfuse x1 PRBC and keep Heparin gtt going. Recheck CBC ~ 0400 and NP will make further decision regarding anticoagulation. 0130: Noted AntiXa to be <0.1. Discussed needed Heparin bolus and rate change based off the protocol with Mary Mahan NP.  NP stated do not give bolus nor do a rate change at this time. NP stated to recheck CBC & AntiXa when PRBC finishes and he will make determination from there. Discussed with PharmD.     0400: Informed AMAN Prince of increase in Hb to 7.5 s/p transfusion. NP stated OK to return to normal Heparin protocol dosing/bolusing.

## 2023-03-29 NOTE — PROGRESS NOTES
Progress Note    Patient: Gracie Albrecht MRN: [id number]  SSN: [id number]    YOB: 1960  Age: 60 y.o. Sex: male      Admit Date: 3/25/2023    * No surgery found *    Procedure:      Subjective:     Patient feeling pretty good. Tolerated diet. Did require 1 unit of transfusion. Objective:     Visit Vitals  /80   Pulse 94   Temp 98.3 °F (36.8 °C)   Resp 15   Ht 5' 11\" (1.803 m)   Wt 86.2 kg (190 lb 0.6 oz)   SpO2 95%   BMI 26.50 kg/m²       Temp (24hrs), Av.6 °F (37 °C), Min:98.2 °F (36.8 °C), Max:100.3 °F (37.9 °C)      Physical Exam:    General alert no acute distress  Lungs clear  Heart regular rate and rhythm  Abdomen soft nontender nondistended    Data Review: images and reports reviewed    Lab Review: All lab results for the last 24 hours reviewed.   Recent Results (from the past 24 hour(s))   CBC W/O DIFF    Collection Time: 23  3:15 PM   Result Value Ref Range    WBC 18.8 (H) 4.1 - 11.1 K/uL    RBC 2.44 (L) 4.10 - 5.70 M/uL    HGB 7.3 (L) 12.1 - 17.0 g/dL    HCT 23.0 (L) 36.6 - 50.3 %    MCV 94.3 80.0 - 99.0 FL    MCH 29.9 26.0 - 34.0 PG    MCHC 31.7 30.0 - 36.5 g/dL    RDW 13.1 11.5 - 14.5 %    PLATELET 456 802 - 043 K/uL    MPV 10.6 8.9 - 12.9 FL    NRBC 0.0 0  WBC    ABSOLUTE NRBC 0.00 0.00 - 0.01 K/uL   PTT    Collection Time: 23  5:08 PM   Result Value Ref Range    aPTT 26.5 22.1 - 31.0 sec    aPTT, therapeutic range     58.0 - 77.0 SECS   ANTI-XA UNFRACTIONATED AND LMW HEPARIN    Collection Time: 23  5:08 PM   Result Value Ref Range    Heparin Xa,Unfrac. and LMW <0.10 IU/mL   CBC W/O DIFF    Collection Time: 23 11:20 PM   Result Value Ref Range    WBC 15.0 (H) 4.1 - 11.1 K/uL    RBC 2.16 (L) 4.10 - 5.70 M/uL    HGB 6.5 (L) 12.1 - 17.0 g/dL    HCT 20.2 (L) 36.6 - 50.3 %    MCV 93.5 80.0 - 99.0 FL    MCH 30.1 26.0 - 34.0 PG    MCHC 32.2 30.0 - 36.5 g/dL    RDW 13.2 11.5 - 14.5 %    PLATELET 608 271 - 712 K/uL    MPV 10.5 8.9 - 12.9 FL    NRBC 0.1 (H) 0  WBC    ABSOLUTE NRBC 0.02 (H) 0.00 - 0.01 K/uL   ANTI-XA UNFRACTIONATED AND LMW HEPARIN    Collection Time: 03/28/23 11:20 PM   Result Value Ref Range    Heparin Xa,Unfrac. and LMW <2.09 IU/mL   METABOLIC PANEL, BASIC    Collection Time: 03/28/23 11:20 PM   Result Value Ref Range    Sodium 138 136 - 145 mmol/L    Potassium 3.6 3.5 - 5.1 mmol/L    Chloride 110 (H) 97 - 108 mmol/L    CO2 26 21 - 32 mmol/L    Anion gap 2 (L) 5 - 15 mmol/L    Glucose 110 (H) 65 - 100 mg/dL    BUN 14 6 - 20 MG/DL    Creatinine 0.67 (L) 0.70 - 1.30 MG/DL    BUN/Creatinine ratio 21 (H) 12 - 20      eGFR >60 >60 ml/min/1.73m2    Calcium 7.6 (L) 8.5 - 10.1 MG/DL   RBC, ALLOCATE    Collection Time: 03/29/23 12:15 AM   Result Value Ref Range    HISTORY CHECKED? Historical check performed    CBC W/O DIFF    Collection Time: 03/29/23  3:34 AM   Result Value Ref Range    WBC 15.4 (H) 4.1 - 11.1 K/uL    RBC 2.43 (L) 4.10 - 5.70 M/uL    HGB 7.3 (L) 12.1 - 17.0 g/dL    HCT 22.8 (L) 36.6 - 50.3 %    MCV 93.8 80.0 - 99.0 FL    MCH 30.0 26.0 - 34.0 PG    MCHC 32.0 30.0 - 36.5 g/dL    RDW 13.0 11.5 - 14.5 %    PLATELET 221 169 - 426 K/uL    MPV 10.4 8.9 - 12.9 FL    NRBC 0.0 0  WBC    ABSOLUTE NRBC 0.00 0.00 - 0.01 K/uL   ANTI-XA UNFRACTIONATED AND LMW HEPARIN    Collection Time: 03/29/23  3:34 AM   Result Value Ref Range    Heparin Xa,Unfrac. and LMW 0.11 IU/mL       Assessment:     Hospital Problems  Date Reviewed: 5/12/2015            Codes Class Noted POA    Splenic laceration ICD-10-CM: G23.755B  ICD-9-CM: 865.09  3/26/2023 Unknown           Plan/Recommendations/Medical Decision Making:   Overall seems to be doing well. Did have a little bit of a dip of H&H with beginning heparin. Required 1 unit of transfusion. Would continue to monitor this and transfuse as needed.   No plans at this point for operative intervention but will continue to follow

## 2023-03-29 NOTE — PROGRESS NOTES
[address] March 29, 2023       RE: Kamron Petty      To Whom It May Concern,    This is to certify that Kamron Petty was acutely hospitalized in the Intensive Care Unit since 3/25- 3/29 (not yet discharged) and accompanied by his wife Aakash Hodge who was needed at the bedside. Please feel free to contact my office if you have any questions or concerns. Thank you for your assistance in this matter.       Sincerely,  Farhad Marcum NP
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
## 2023-03-30 LAB
ABO + RH BLD: NORMAL
ANION GAP SERPL CALC-SCNC: 2 MMOL/L (ref 5–15)
BLD PROD TYP BPU: NORMAL
BLOOD GROUP ANTIBODIES SERPL: NORMAL
BPU ID: NORMAL
BUN SERPL-MCNC: 13 MG/DL (ref 6–20)
BUN/CREAT SERPL: 19 (ref 12–20)
CALCIUM SERPL-MCNC: 8.4 MG/DL (ref 8.5–10.1)
CHLORIDE SERPL-SCNC: 105 MMOL/L (ref 97–108)
CO2 SERPL-SCNC: 29 MMOL/L (ref 21–32)
CREAT SERPL-MCNC: 0.67 MG/DL (ref 0.7–1.3)
CROSSMATCH RESULT,%XM: NORMAL
ERYTHROCYTE [DISTWIDTH] IN BLOOD BY AUTOMATED COUNT: 13.3 % (ref 11.5–14.5)
GLUCOSE SERPL-MCNC: 110 MG/DL (ref 65–100)
HCT VFR BLD AUTO: 23.5 % (ref 36.6–50.3)
HCT VFR BLD AUTO: 27.4 % (ref 36.6–50.3)
HGB BLD-MCNC: 7.7 G/DL (ref 12.1–17)
HGB BLD-MCNC: 9.1 G/DL (ref 12.1–17)
MCH RBC QN AUTO: 30.1 PG (ref 26–34)
MCHC RBC AUTO-ENTMCNC: 32.8 G/DL (ref 30–36.5)
MCV RBC AUTO: 91.8 FL (ref 80–99)
NRBC # BLD: 0 K/UL (ref 0–0.01)
NRBC BLD-RTO: 0 PER 100 WBC
PLATELET # BLD AUTO: 239 K/UL (ref 150–400)
PMV BLD AUTO: 10.2 FL (ref 8.9–12.9)
POTASSIUM SERPL-SCNC: 4 MMOL/L (ref 3.5–5.1)
RBC # BLD AUTO: 2.56 M/UL (ref 4.1–5.7)
SODIUM SERPL-SCNC: 136 MMOL/L (ref 136–145)
SPECIMEN EXP DATE BLD: NORMAL
STATUS OF UNIT,%ST: NORMAL
UFH PPP CHRO-ACNC: 0.13 IU/ML
UFH PPP CHRO-ACNC: 0.18 IU/ML
UFH PPP CHRO-ACNC: 0.29 IU/ML
UNIT DIVISION, %UDIV: 0
WBC # BLD AUTO: 13.4 K/UL (ref 4.1–11.1)

## 2023-03-30 PROCEDURE — 99232 SBSQ HOSP IP/OBS MODERATE 35: CPT | Performed by: SURGERY

## 2023-03-30 PROCEDURE — 36415 COLL VENOUS BLD VENIPUNCTURE: CPT

## 2023-03-30 PROCEDURE — 85027 COMPLETE CBC AUTOMATED: CPT

## 2023-03-30 PROCEDURE — 74011000250 HC RX REV CODE- 250: Performed by: FAMILY MEDICINE

## 2023-03-30 PROCEDURE — 85018 HEMOGLOBIN: CPT

## 2023-03-30 PROCEDURE — 85520 HEPARIN ASSAY: CPT

## 2023-03-30 PROCEDURE — 65270000046 HC RM TELEMETRY

## 2023-03-30 PROCEDURE — 74011250636 HC RX REV CODE- 250/636: Performed by: STUDENT IN AN ORGANIZED HEALTH CARE EDUCATION/TRAINING PROGRAM

## 2023-03-30 PROCEDURE — 80048 BASIC METABOLIC PNL TOTAL CA: CPT

## 2023-03-30 PROCEDURE — 74011250637 HC RX REV CODE- 250/637: Performed by: STUDENT IN AN ORGANIZED HEALTH CARE EDUCATION/TRAINING PROGRAM

## 2023-03-30 PROCEDURE — 74011250637 HC RX REV CODE- 250/637: Performed by: NURSE PRACTITIONER

## 2023-03-30 PROCEDURE — 74011250636 HC RX REV CODE- 250/636: Performed by: FAMILY MEDICINE

## 2023-03-30 PROCEDURE — 90734 MENACWYD/MENACWYCRM VACC IM: CPT | Performed by: STUDENT IN AN ORGANIZED HEALTH CARE EDUCATION/TRAINING PROGRAM

## 2023-03-30 RX ORDER — HEPARIN SODIUM 1000 [USP'U]/ML
2000 INJECTION, SOLUTION INTRAVENOUS; SUBCUTANEOUS ONCE
Status: COMPLETED | OUTPATIENT
Start: 2023-03-30 | End: 2023-03-30

## 2023-03-30 RX ADMIN — SODIUM CHLORIDE, PRESERVATIVE FREE 10 ML: 5 INJECTION INTRAVENOUS at 17:06

## 2023-03-30 RX ADMIN — SODIUM CHLORIDE, PRESERVATIVE FREE 10 ML: 5 INJECTION INTRAVENOUS at 21:00

## 2023-03-30 RX ADMIN — METOPROLOL SUCCINATE 25 MG: 25 TABLET, EXTENDED RELEASE ORAL at 08:58

## 2023-03-30 RX ADMIN — HEPARIN SODIUM 2000 UNITS: 1000 INJECTION INTRAVENOUS; SUBCUTANEOUS at 20:51

## 2023-03-30 RX ADMIN — OXYCODONE AND ACETAMINOPHEN 1 TABLET: 5; 325 TABLET ORAL at 15:51

## 2023-03-30 RX ADMIN — HEPARIN SODIUM 2000 UNITS: 1000 INJECTION INTRAVENOUS; SUBCUTANEOUS at 12:22

## 2023-03-30 RX ADMIN — HEPARIN SODIUM 18 UNITS/KG/HR: 10000 INJECTION, SOLUTION INTRAVENOUS at 15:37

## 2023-03-30 RX ADMIN — Medication 0.5 ML: at 12:27

## 2023-03-30 RX ADMIN — OXYCODONE AND ACETAMINOPHEN 1 TABLET: 5; 325 TABLET ORAL at 09:06

## 2023-03-30 RX ADMIN — HEPARIN SODIUM 2000 UNITS: 1000 INJECTION INTRAVENOUS; SUBCUTANEOUS at 04:48

## 2023-03-30 RX ADMIN — SODIUM CHLORIDE, PRESERVATIVE FREE 10 ML: 5 INJECTION INTRAVENOUS at 05:55

## 2023-03-30 NOTE — PROGRESS NOTES
Bedside shift change report given to [address] (oncoming nurse) by Maria Del Rosario Bender RN (offgoing nurse). Report included the following information SBAR, MAR, and Cardiac Rhythm NSR;ST .
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
## 2023-03-30 NOTE — PROGRESS NOTES
Bedside and Verbal shift change report given to [address] [place] [address] (oncoming nurse) by Li Lucas RN (offgoing nurse). Report included the following information SBAR, Kardex, ED Summary, OR Summary, Procedure Summary, Intake/Output, MAR, Recent Results, and Cardiac Rhythm Sinus Tachy .

## 2023-03-30 NOTE — PROGRESS NOTES
Progress Note    Patient: Ochoa Ards MRN: [id number]  SSN: [id number]    YOB: 1960  Age: 60 y.o. Sex: male      Admit Date: 3/25/2023    * No surgery found *    Procedure:      Subjective:     Patient feeling good today. Denies any abdominal pain. Tolerating diet. Objective:     Visit Vitals  BP (!) 110/57 (BP 1 Location: Right upper arm, BP Patient Position: Semi fowlers)   Pulse 86   Temp 98 °F (36.7 °C)   Resp 16   Ht 5' 11\" (1.803 m)   Wt 85.4 kg (188 lb 4.4 oz)   SpO2 95%   BMI 26.26 kg/m²       Temp (24hrs), Av.3 °F (36.8 °C), Min:97.7 °F (36.5 °C), Max:99.9 °F (37.7 °C)      Physical Exam:    General alert no acute distress  Lungs clear  Heart regular rate and rhythm  Abdomen soft nontender nondistended    Data Review: images and reports reviewed    Lab Review: All lab results for the last 24 hours reviewed.   Recent Results (from the past 24 hour(s))   ANTI-XA UNFRACTIONATED AND LMW HEPARIN    Collection Time: 23  6:55 PM   Result Value Ref Range    Heparin Xa,Unfrac. and LMW 0.71 IU/mL   CBC W/O DIFF    Collection Time: 23  6:55 PM   Result Value Ref Range    WBC 16.8 (H) 4.1 - 11.1 K/uL    RBC 3.05 (L) 4.10 - 5.70 M/uL    HGB 9.4 (L) 12.1 - 17.0 g/dL    HCT 28.1 (L) 36.6 - 50.3 %    MCV 92.1 80.0 - 99.0 FL    MCH 30.8 26.0 - 34.0 PG    MCHC 33.5 30.0 - 36.5 g/dL    RDW 13.4 11.5 - 14.5 %    PLATELET 840 814 - 233 K/uL    MPV 10.5 8.9 - 12.9 FL    NRBC 0.0 0  WBC    ABSOLUTE NRBC 0.00 0.00 - 0.01 K/uL   TROPONIN-HIGH SENSITIVITY    Collection Time: 23  6:55 PM   Result Value Ref Range    Troponin-High Sensitivity 51 0 - 57 ng/L   ANTI-XA UNFRACTIONATED AND LMW HEPARIN    Collection Time: 23  3:33 AM   Result Value Ref Range    Heparin Xa,Unfrac. and LMW 2.16 IU/mL   METABOLIC PANEL, BASIC    Collection Time: 23  3:38 AM   Result Value Ref Range    Sodium 136 136 - 145 mmol/L    Potassium 4.0 3.5 - 5.1 mmol/L    Chloride 105 97 - 108 mmol/L CO2 29 21 - 32 mmol/L    Anion gap 2 (L) 5 - 15 mmol/L    Glucose 110 (H) 65 - 100 mg/dL    BUN 13 6 - 20 MG/DL    Creatinine 0.67 (L) 0.70 - 1.30 MG/DL    BUN/Creatinine ratio 19 12 - 20      eGFR >60 >60 ml/min/1.73m2    Calcium 8.4 (L) 8.5 - 10.1 MG/DL   CBC W/O DIFF    Collection Time: 03/30/23  3:38 AM   Result Value Ref Range    WBC 13.4 (H) 4.1 - 11.1 K/uL    RBC 2.56 (L) 4.10 - 5.70 M/uL    HGB 7.7 (L) 12.1 - 17.0 g/dL    HCT 23.5 (L) 36.6 - 50.3 %    MCV 91.8 80.0 - 99.0 FL    MCH 30.1 26.0 - 34.0 PG    MCHC 32.8 30.0 - 36.5 g/dL    RDW 13.3 11.5 - 14.5 %    PLATELET 899 783 - 325 K/uL    MPV 10.2 8.9 - 12.9 FL    NRBC 0.0 0  WBC    ABSOLUTE NRBC 0.00 0.00 - 0.01 K/uL   ANTI-XA UNFRACTIONATED AND LMW HEPARIN    Collection Time: 03/30/23 10:26 AM   Result Value Ref Range    Heparin Xa,Unfrac. and LMW 0.13 IU/mL       Assessment:     Hospital Problems  Date Reviewed: 5/12/2015            Codes Class Noted POA    Splenic laceration ICD-10-CM: Y88.368K  ICD-9-CM: 865.09  3/26/2023 Unknown           Plan/Recommendations/Medical Decision Making:   Blood count seems to be a little bit lower today. Patient does not appear to be unstable. We will need to continue to monitor his H&H and it will need to be stable before we should begin oral anticoagulation. Would continue heparin drip for now.   If he continues to drop with anticoagulation still possibility that he may require splenectomy but hopefully this can be avoided

## 2023-03-30 NOTE — PROGRESS NOTES
TRANSFER - IN REPORT:    Verbal report received from Shalini RN(name) on Emil Deal  being received from ICU(unit) for routine progression of care      Report consisted of patients Situation, Background, Assessment and   Recommendations(SBAR). Information from the following report(s) SBAR, MAR, and Cardiac Rhythm NSR  was reviewed with the receiving nurse. Opportunity for questions and clarification was provided. Assessment completed upon patients arrival to unit and care assumed.        Primary Nurse Mike Jaramillo RN and Elizabeth Stanton RN performed a dual skin assessment on this patient No impairment noted  Elvin score is 23

## 2023-03-30 NOTE — PROGRESS NOTES
Problem: Pain  Goal: *Control of Pain  Outcome: Progressing Towards Goal  Goal: *PALLIATIVE CARE:  Alleviation of Pain  Outcome: Progressing Towards Goal     Problem: General Medical Care Plan  Goal: *Vital signs within specified parameters  Outcome: Progressing Towards Goal  Goal: *Absence of infection signs and symptoms  Outcome: Progressing Towards Goal  Goal: *Optimal pain control at patient's stated goal  Outcome: Progressing Towards Goal  Goal: *Skin integrity maintained  Outcome: Progressing Towards Goal  Goal: *Fluid volume balance  Outcome: Progressing Towards Goal  Goal: *Optimize nutritional status  Outcome: Progressing Towards Goal     Problem: Pressure Injury - Risk of  Goal: *Prevention of pressure injury  Description: Document Elvin Scale and appropriate interventions in the flowsheet.   Outcome: Progressing Towards Goal  Note: Pressure Injury Interventions:  Sensory Interventions: Assess need for specialty bed, Maintain/enhance activity level         Activity Interventions: PT/OT evaluation, Increase time out of bed    Mobility Interventions: PT/OT evaluation, Assess need for specialty bed    Nutrition Interventions: Document food/fluid/supplement intake

## 2023-03-31 LAB
ANION GAP SERPL CALC-SCNC: 5 MMOL/L (ref 5–15)
BASOPHILS # BLD: 0.1 K/UL (ref 0–0.1)
BASOPHILS NFR BLD: 1 % (ref 0–1)
BUN SERPL-MCNC: 14 MG/DL (ref 6–20)
BUN/CREAT SERPL: 19 (ref 12–20)
CALCIUM SERPL-MCNC: 8.2 MG/DL (ref 8.5–10.1)
CHLORIDE SERPL-SCNC: 104 MMOL/L (ref 97–108)
CO2 SERPL-SCNC: 25 MMOL/L (ref 21–32)
CREAT SERPL-MCNC: 0.74 MG/DL (ref 0.7–1.3)
DIFFERENTIAL METHOD BLD: ABNORMAL
EOSINOPHIL # BLD: 0.3 K/UL (ref 0–0.4)
EOSINOPHIL NFR BLD: 3 % (ref 0–7)
ERYTHROCYTE [DISTWIDTH] IN BLOOD BY AUTOMATED COUNT: 13.1 % (ref 11.5–14.5)
GLUCOSE SERPL-MCNC: 144 MG/DL (ref 65–100)
HCT VFR BLD AUTO: 23.7 % (ref 36.6–50.3)
HCT VFR BLD AUTO: 24.2 % (ref 36.6–50.3)
HCT VFR BLD AUTO: 25.9 % (ref 36.6–50.3)
HGB BLD-MCNC: 7.7 G/DL (ref 12.1–17)
HGB BLD-MCNC: 7.8 G/DL (ref 12.1–17)
HGB BLD-MCNC: 8.3 G/DL (ref 12.1–17)
IMM GRANULOCYTES # BLD AUTO: 0.1 K/UL (ref 0–0.04)
IMM GRANULOCYTES NFR BLD AUTO: 1 % (ref 0–0.5)
LYMPHOCYTES # BLD: 1.6 K/UL (ref 0.8–3.5)
LYMPHOCYTES NFR BLD: 14 % (ref 12–49)
MCH RBC QN AUTO: 30.2 PG (ref 26–34)
MCHC RBC AUTO-ENTMCNC: 32.9 G/DL (ref 30–36.5)
MCV RBC AUTO: 91.9 FL (ref 80–99)
MONOCYTES # BLD: 1.8 K/UL (ref 0–1)
MONOCYTES NFR BLD: 16 % (ref 5–13)
NEUTS SEG # BLD: 7.5 K/UL (ref 1.8–8)
NEUTS SEG NFR BLD: 65 % (ref 32–75)
NRBC # BLD: 0 K/UL (ref 0–0.01)
NRBC BLD-RTO: 0 PER 100 WBC
PLATELET # BLD AUTO: 254 K/UL (ref 150–400)
PMV BLD AUTO: 10.2 FL (ref 8.9–12.9)
POTASSIUM SERPL-SCNC: 3.6 MMOL/L (ref 3.5–5.1)
RBC # BLD AUTO: 2.58 M/UL (ref 4.1–5.7)
SODIUM SERPL-SCNC: 134 MMOL/L (ref 136–145)
UFH PPP CHRO-ACNC: 0.25 IU/ML
UFH PPP CHRO-ACNC: 0.32 IU/ML
UFH PPP CHRO-ACNC: 0.43 IU/ML
WBC # BLD AUTO: 11.4 K/UL (ref 4.1–11.1)

## 2023-03-31 PROCEDURE — 74011250636 HC RX REV CODE- 250/636: Performed by: STUDENT IN AN ORGANIZED HEALTH CARE EDUCATION/TRAINING PROGRAM

## 2023-03-31 PROCEDURE — 74011250637 HC RX REV CODE- 250/637: Performed by: STUDENT IN AN ORGANIZED HEALTH CARE EDUCATION/TRAINING PROGRAM

## 2023-03-31 PROCEDURE — 85520 HEPARIN ASSAY: CPT

## 2023-03-31 PROCEDURE — 74011000250 HC RX REV CODE- 250: Performed by: FAMILY MEDICINE

## 2023-03-31 PROCEDURE — 80048 BASIC METABOLIC PNL TOTAL CA: CPT

## 2023-03-31 PROCEDURE — 85025 COMPLETE CBC W/AUTO DIFF WBC: CPT

## 2023-03-31 PROCEDURE — 74011250637 HC RX REV CODE- 250/637: Performed by: NURSE PRACTITIONER

## 2023-03-31 PROCEDURE — 85018 HEMOGLOBIN: CPT

## 2023-03-31 PROCEDURE — 65270000046 HC RM TELEMETRY

## 2023-03-31 PROCEDURE — 99231 SBSQ HOSP IP/OBS SF/LOW 25: CPT | Performed by: NURSE PRACTITIONER

## 2023-03-31 PROCEDURE — 36415 COLL VENOUS BLD VENIPUNCTURE: CPT

## 2023-03-31 PROCEDURE — 94760 N-INVAS EAR/PLS OXIMETRY 1: CPT

## 2023-03-31 RX ORDER — HEPARIN SODIUM 1000 [USP'U]/ML
2000 INJECTION, SOLUTION INTRAVENOUS; SUBCUTANEOUS ONCE
Status: COMPLETED | OUTPATIENT
Start: 2023-03-31 | End: 2023-03-31

## 2023-03-31 RX ADMIN — HEPARIN SODIUM 22 UNITS/KG/HR: 10000 INJECTION, SOLUTION INTRAVENOUS at 22:33

## 2023-03-31 RX ADMIN — HEPARIN SODIUM 2000 UNITS: 1000 INJECTION INTRAVENOUS; SUBCUTANEOUS at 05:02

## 2023-03-31 RX ADMIN — HEPARIN SODIUM 22 UNITS/KG/HR: 10000 INJECTION, SOLUTION INTRAVENOUS at 08:35

## 2023-03-31 RX ADMIN — SODIUM CHLORIDE, PRESERVATIVE FREE 10 ML: 5 INJECTION INTRAVENOUS at 14:37

## 2023-03-31 RX ADMIN — SODIUM CHLORIDE, PRESERVATIVE FREE 10 ML: 5 INJECTION INTRAVENOUS at 22:08

## 2023-03-31 RX ADMIN — OXYCODONE AND ACETAMINOPHEN 1 TABLET: 5; 325 TABLET ORAL at 22:08

## 2023-03-31 RX ADMIN — SODIUM CHLORIDE, PRESERVATIVE FREE 10 ML: 5 INJECTION INTRAVENOUS at 06:15

## 2023-03-31 RX ADMIN — OXYCODONE AND ACETAMINOPHEN 1 TABLET: 5; 325 TABLET ORAL at 18:14

## 2023-03-31 RX ADMIN — OXYCODONE AND ACETAMINOPHEN 1 TABLET: 5; 325 TABLET ORAL at 03:19

## 2023-03-31 RX ADMIN — METOPROLOL SUCCINATE 25 MG: 25 TABLET, EXTENDED RELEASE ORAL at 10:13

## 2023-03-31 NOTE — PROGRESS NOTES
Bedside shift change report given to NITHIN GUAJARDO RN/FILIPE Rodriguez (oncoming nurse) by Jessica Chew RN (offgoing nurse). Report included the following information SBAR, Kardex, Intake/Output, MAR, and Recent Results.
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
## 2023-04-01 LAB
ANION GAP SERPL CALC-SCNC: 4 MMOL/L (ref 5–15)
BASOPHILS # BLD: 0 K/UL (ref 0–0.1)
BASOPHILS NFR BLD: 0 % (ref 0–1)
BUN SERPL-MCNC: 12 MG/DL (ref 6–20)
BUN/CREAT SERPL: 17 (ref 12–20)
CALCIUM SERPL-MCNC: 8.5 MG/DL (ref 8.5–10.1)
CHLORIDE SERPL-SCNC: 104 MMOL/L (ref 97–108)
CO2 SERPL-SCNC: 30 MMOL/L (ref 21–32)
CREAT SERPL-MCNC: 0.69 MG/DL (ref 0.7–1.3)
DIFFERENTIAL METHOD BLD: ABNORMAL
EOSINOPHIL # BLD: 0.3 K/UL (ref 0–0.4)
EOSINOPHIL NFR BLD: 3 % (ref 0–7)
ERYTHROCYTE [DISTWIDTH] IN BLOOD BY AUTOMATED COUNT: 13.3 % (ref 11.5–14.5)
GLUCOSE SERPL-MCNC: 97 MG/DL (ref 65–100)
HCT VFR BLD AUTO: 24.1 % (ref 36.6–50.3)
HCT VFR BLD AUTO: 24.3 % (ref 36.6–50.3)
HCT VFR BLD AUTO: 25.4 % (ref 36.6–50.3)
HCT VFR BLD AUTO: 27.2 % (ref 36.6–50.3)
HGB BLD-MCNC: 7.8 G/DL (ref 12.1–17)
HGB BLD-MCNC: 7.8 G/DL (ref 12.1–17)
HGB BLD-MCNC: 7.9 G/DL (ref 12.1–17)
HGB BLD-MCNC: 8.5 G/DL (ref 12.1–17)
IMM GRANULOCYTES # BLD AUTO: 0.1 K/UL (ref 0–0.04)
IMM GRANULOCYTES NFR BLD AUTO: 1 % (ref 0–0.5)
INR PPP: 1.1 (ref 0.9–1.1)
LYMPHOCYTES # BLD: 2.5 K/UL (ref 0.8–3.5)
LYMPHOCYTES NFR BLD: 25 % (ref 12–49)
MCH RBC QN AUTO: 29.4 PG (ref 26–34)
MCHC RBC AUTO-ENTMCNC: 31.1 G/DL (ref 30–36.5)
MCV RBC AUTO: 94.4 FL (ref 80–99)
MONOCYTES # BLD: 1.6 K/UL (ref 0–1)
MONOCYTES NFR BLD: 17 % (ref 5–13)
NEUTS SEG # BLD: 5.3 K/UL (ref 1.8–8)
NEUTS SEG NFR BLD: 54 % (ref 32–75)
NRBC # BLD: 0 K/UL (ref 0–0.01)
NRBC BLD-RTO: 0 PER 100 WBC
PLATELET # BLD AUTO: 251 K/UL (ref 150–400)
PMV BLD AUTO: 10.5 FL (ref 8.9–12.9)
POTASSIUM SERPL-SCNC: 3.6 MMOL/L (ref 3.5–5.1)
PROTHROMBIN TIME: 11.5 SEC (ref 9–11.1)
RBC # BLD AUTO: 2.69 M/UL (ref 4.1–5.7)
SODIUM SERPL-SCNC: 138 MMOL/L (ref 136–145)
UFH PPP CHRO-ACNC: 0.33 IU/ML
WBC # BLD AUTO: 9.8 K/UL (ref 4.1–11.1)

## 2023-04-01 PROCEDURE — 36415 COLL VENOUS BLD VENIPUNCTURE: CPT

## 2023-04-01 PROCEDURE — 65270000046 HC RM TELEMETRY

## 2023-04-01 PROCEDURE — 85018 HEMOGLOBIN: CPT

## 2023-04-01 PROCEDURE — 74011250637 HC RX REV CODE- 250/637: Performed by: STUDENT IN AN ORGANIZED HEALTH CARE EDUCATION/TRAINING PROGRAM

## 2023-04-01 PROCEDURE — 74011250637 HC RX REV CODE- 250/637: Performed by: NURSE PRACTITIONER

## 2023-04-01 PROCEDURE — 85610 PROTHROMBIN TIME: CPT

## 2023-04-01 PROCEDURE — 99232 SBSQ HOSP IP/OBS MODERATE 35: CPT | Performed by: SURGERY

## 2023-04-01 PROCEDURE — 80048 BASIC METABOLIC PNL TOTAL CA: CPT

## 2023-04-01 PROCEDURE — 85025 COMPLETE CBC W/AUTO DIFF WBC: CPT

## 2023-04-01 PROCEDURE — 85520 HEPARIN ASSAY: CPT

## 2023-04-01 PROCEDURE — 74011000250 HC RX REV CODE- 250: Performed by: FAMILY MEDICINE

## 2023-04-01 PROCEDURE — 74011250636 HC RX REV CODE- 250/636: Performed by: STUDENT IN AN ORGANIZED HEALTH CARE EDUCATION/TRAINING PROGRAM

## 2023-04-01 RX ORDER — WARFARIN 10 MG/1
10 TABLET ORAL ONCE
Status: COMPLETED | OUTPATIENT
Start: 2023-04-01 | End: 2023-04-01

## 2023-04-01 RX ADMIN — OXYCODONE AND ACETAMINOPHEN 1 TABLET: 5; 325 TABLET ORAL at 07:09

## 2023-04-01 RX ADMIN — POTASSIUM BICARBONATE 20 MEQ: 782 TABLET, EFFERVESCENT ORAL at 13:36

## 2023-04-01 RX ADMIN — OXYCODONE AND ACETAMINOPHEN 1 TABLET: 5; 325 TABLET ORAL at 03:25

## 2023-04-01 RX ADMIN — HEPARIN SODIUM 22 UNITS/KG/HR: 10000 INJECTION, SOLUTION INTRAVENOUS at 13:36

## 2023-04-01 RX ADMIN — OXYCODONE AND ACETAMINOPHEN 1 TABLET: 5; 325 TABLET ORAL at 16:15

## 2023-04-01 RX ADMIN — SODIUM CHLORIDE, PRESERVATIVE FREE 10 ML: 5 INJECTION INTRAVENOUS at 07:07

## 2023-04-01 RX ADMIN — OXYCODONE AND ACETAMINOPHEN 1 TABLET: 5; 325 TABLET ORAL at 21:07

## 2023-04-01 RX ADMIN — WARFARIN SODIUM 10 MG: 10 TABLET ORAL at 17:49

## 2023-04-01 RX ADMIN — OXYCODONE AND ACETAMINOPHEN 1 TABLET: 5; 325 TABLET ORAL at 11:34

## 2023-04-01 RX ADMIN — METOPROLOL SUCCINATE 25 MG: 25 TABLET, EXTENDED RELEASE ORAL at 09:38
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
## 2023-04-01 NOTE — PROGRESS NOTES
Problem: Pain  Goal: *Control of Pain  Outcome: Progressing Towards Goal     Problem: General Medical Care Plan  Goal: *Vital signs within specified parameters  Outcome: Progressing Towards Goal  Goal: *Labs within defined limits  Outcome: Progressing Towards Goal  Goal: *Absence of infection signs and symptoms  Outcome: Progressing Towards Goal  Goal: *Optimal pain control at patient's stated goal  Outcome: Progressing Towards Goal  Goal: *Skin integrity maintained  Outcome: Progressing Towards Goal
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
## 2023-04-01 NOTE — PROGRESS NOTES
0800- report received. All care assumed    1200- MD Frantz Douglas notified of potassium levels 3.6. Provider to order replacement. Asked about frequency of h&h and if Pt/inr was needed since coumadin was to be started tonight. 2000- Bedside and Verbal shift change report given to Aggie Rm (oncoming nurse) by Sheila Cruz (offgoing nurse). Report included the following information Intake/Output, MAR, Recent Results, Med Rec Status, and Cardiac Rhythm NSR .

## 2023-04-02 LAB
ANION GAP SERPL CALC-SCNC: 3 MMOL/L (ref 5–15)
BASOPHILS # BLD: 0.1 K/UL (ref 0–0.1)
BASOPHILS # BLD: 0.1 K/UL (ref 0–0.1)
BASOPHILS NFR BLD: 1 % (ref 0–1)
BASOPHILS NFR BLD: 1 % (ref 0–1)
BUN SERPL-MCNC: 11 MG/DL (ref 6–20)
BUN/CREAT SERPL: 16 (ref 12–20)
CALCIUM SERPL-MCNC: 8.3 MG/DL (ref 8.5–10.1)
CHLORIDE SERPL-SCNC: 105 MMOL/L (ref 97–108)
CO2 SERPL-SCNC: 30 MMOL/L (ref 21–32)
CREAT SERPL-MCNC: 0.67 MG/DL (ref 0.7–1.3)
DIFFERENTIAL METHOD BLD: ABNORMAL
DIFFERENTIAL METHOD BLD: ABNORMAL
EOSINOPHIL # BLD: 0.3 K/UL (ref 0–0.4)
EOSINOPHIL # BLD: 0.4 K/UL (ref 0–0.4)
EOSINOPHIL NFR BLD: 2 % (ref 0–7)
EOSINOPHIL NFR BLD: 4 % (ref 0–7)
ERYTHROCYTE [DISTWIDTH] IN BLOOD BY AUTOMATED COUNT: 13.4 % (ref 11.5–14.5)
ERYTHROCYTE [DISTWIDTH] IN BLOOD BY AUTOMATED COUNT: 13.4 % (ref 11.5–14.5)
GLUCOSE SERPL-MCNC: 96 MG/DL (ref 65–100)
HCT VFR BLD AUTO: 26.1 % (ref 36.6–50.3)
HCT VFR BLD AUTO: 27.9 % (ref 36.6–50.3)
HCT VFR BLD AUTO: 28.3 % (ref 36.6–50.3)
HGB BLD-MCNC: 8.3 G/DL (ref 12.1–17)
HGB BLD-MCNC: 8.6 G/DL (ref 12.1–17)
HGB BLD-MCNC: 9 G/DL (ref 12.1–17)
IMM GRANULOCYTES # BLD AUTO: 0.1 K/UL (ref 0–0.04)
IMM GRANULOCYTES # BLD AUTO: 0.2 K/UL (ref 0–0.04)
IMM GRANULOCYTES NFR BLD AUTO: 1 % (ref 0–0.5)
IMM GRANULOCYTES NFR BLD AUTO: 1 % (ref 0–0.5)
INR PPP: 1.1 (ref 0.9–1.1)
INR PPP: 1.2 (ref 0.9–1.1)
LYMPHOCYTES # BLD: 1.8 K/UL (ref 0.8–3.5)
LYMPHOCYTES # BLD: 2.3 K/UL (ref 0.8–3.5)
LYMPHOCYTES NFR BLD: 14 % (ref 12–49)
LYMPHOCYTES NFR BLD: 21 % (ref 12–49)
MCH RBC QN AUTO: 29.7 PG (ref 26–34)
MCH RBC QN AUTO: 30.3 PG (ref 26–34)
MCHC RBC AUTO-ENTMCNC: 31.8 G/DL (ref 30–36.5)
MCHC RBC AUTO-ENTMCNC: 31.8 G/DL (ref 30–36.5)
MCV RBC AUTO: 93.5 FL (ref 80–99)
MCV RBC AUTO: 95.3 FL (ref 80–99)
MONOCYTES # BLD: 1.7 K/UL (ref 0–1)
MONOCYTES # BLD: 1.8 K/UL (ref 0–1)
MONOCYTES NFR BLD: 14 % (ref 5–13)
MONOCYTES NFR BLD: 15 % (ref 5–13)
NEUTS SEG # BLD: 6.7 K/UL (ref 1.8–8)
NEUTS SEG # BLD: 8.5 K/UL (ref 1.8–8)
NEUTS SEG NFR BLD: 58 % (ref 32–75)
NEUTS SEG NFR BLD: 68 % (ref 32–75)
NRBC # BLD: 0 K/UL (ref 0–0.01)
NRBC # BLD: 0 K/UL (ref 0–0.01)
NRBC BLD-RTO: 0 PER 100 WBC
NRBC BLD-RTO: 0 PER 100 WBC
PLATELET # BLD AUTO: 278 K/UL (ref 150–400)
PLATELET # BLD AUTO: 281 K/UL (ref 150–400)
PMV BLD AUTO: 10.4 FL (ref 8.9–12.9)
PMV BLD AUTO: 10.5 FL (ref 8.9–12.9)
POTASSIUM SERPL-SCNC: 4.2 MMOL/L (ref 3.5–5.1)
PROTHROMBIN TIME: 11.9 SEC (ref 9–11.1)
PROTHROMBIN TIME: 12.3 SEC (ref 9–11.1)
RBC # BLD AUTO: 2.79 M/UL (ref 4.1–5.7)
RBC # BLD AUTO: 2.97 M/UL (ref 4.1–5.7)
SODIUM SERPL-SCNC: 138 MMOL/L (ref 136–145)
UFH PPP CHRO-ACNC: 0.17 IU/ML
UFH PPP CHRO-ACNC: 0.26 IU/ML
UFH PPP CHRO-ACNC: 0.29 IU/ML
UFH PPP CHRO-ACNC: 0.36 IU/ML
WBC # BLD AUTO: 11.4 K/UL (ref 4.1–11.1)
WBC # BLD AUTO: 12.6 K/UL (ref 4.1–11.1)

## 2023-04-02 PROCEDURE — 85520 HEPARIN ASSAY: CPT

## 2023-04-02 PROCEDURE — 74011250636 HC RX REV CODE- 250/636: Performed by: STUDENT IN AN ORGANIZED HEALTH CARE EDUCATION/TRAINING PROGRAM

## 2023-04-02 PROCEDURE — 74011250637 HC RX REV CODE- 250/637: Performed by: NURSE PRACTITIONER

## 2023-04-02 PROCEDURE — 74011000250 HC RX REV CODE- 250: Performed by: FAMILY MEDICINE

## 2023-04-02 PROCEDURE — 74011250637 HC RX REV CODE- 250/637: Performed by: STUDENT IN AN ORGANIZED HEALTH CARE EDUCATION/TRAINING PROGRAM

## 2023-04-02 PROCEDURE — 36415 COLL VENOUS BLD VENIPUNCTURE: CPT

## 2023-04-02 PROCEDURE — 85018 HEMOGLOBIN: CPT

## 2023-04-02 PROCEDURE — 94760 N-INVAS EAR/PLS OXIMETRY 1: CPT

## 2023-04-02 PROCEDURE — 85025 COMPLETE CBC W/AUTO DIFF WBC: CPT

## 2023-04-02 PROCEDURE — 65270000046 HC RM TELEMETRY

## 2023-04-02 PROCEDURE — 80048 BASIC METABOLIC PNL TOTAL CA: CPT

## 2023-04-02 PROCEDURE — 85610 PROTHROMBIN TIME: CPT

## 2023-04-02 RX ORDER — HEPARIN SODIUM 1000 [USP'U]/ML
2000 INJECTION, SOLUTION INTRAVENOUS; SUBCUTANEOUS ONCE
Status: COMPLETED | OUTPATIENT
Start: 2023-04-02 | End: 2023-04-02

## 2023-04-02 RX ORDER — WARFARIN 10 MG/1
10 TABLET ORAL ONCE
Status: COMPLETED | OUTPATIENT
Start: 2023-04-02 | End: 2023-04-02

## 2023-04-02 RX ADMIN — METOPROLOL SUCCINATE 25 MG: 25 TABLET, EXTENDED RELEASE ORAL at 08:34

## 2023-04-02 RX ADMIN — HEPARIN SODIUM 26 UNITS/KG/HR: 10000 INJECTION, SOLUTION INTRAVENOUS at 18:51

## 2023-04-02 RX ADMIN — OXYCODONE AND ACETAMINOPHEN 1 TABLET: 5; 325 TABLET ORAL at 11:05

## 2023-04-02 RX ADMIN — HEPARIN SODIUM 2000 UNITS: 1000 INJECTION INTRAVENOUS; SUBCUTANEOUS at 16:16

## 2023-04-02 RX ADMIN — HEPARIN SODIUM 22 UNITS/KG/HR: 10000 INJECTION, SOLUTION INTRAVENOUS at 05:19

## 2023-04-02 RX ADMIN — OXYCODONE AND ACETAMINOPHEN 1 TABLET: 5; 325 TABLET ORAL at 06:48

## 2023-04-02 RX ADMIN — OXYCODONE AND ACETAMINOPHEN 1 TABLET: 5; 325 TABLET ORAL at 20:03

## 2023-04-02 RX ADMIN — HEPARIN SODIUM 2000 UNITS: 1000 INJECTION INTRAVENOUS; SUBCUTANEOUS at 08:30

## 2023-04-02 RX ADMIN — OXYCODONE AND ACETAMINOPHEN 1 TABLET: 5; 325 TABLET ORAL at 15:05

## 2023-04-02 RX ADMIN — WARFARIN SODIUM 10 MG: 10 TABLET ORAL at 16:22

## 2023-04-02 RX ADMIN — SODIUM CHLORIDE, PRESERVATIVE FREE 10 ML: 5 INJECTION INTRAVENOUS at 15:05

## 2023-04-02 NOTE — PROGRESS NOTES
Pharmacist Note - Warfarin Dosing  Consult provided for this 62 y.o.male to manage warfarin for Afib, mechanical aortic valve    INR Goal: 2 - 2.5    Home regimen/ tablet size: 10mg PO Daily    Drugs that may increase INR: None  Drugs that may decrease INR: None  Other current anticoagulants/ drugs that may increase bleeding risk: Heparin  Risk factors: None  Daily INR ordered: YES    Recent Labs     04/02/23  0656 04/02/23  0534 04/01/23  2116 04/01/23  1337   HGB  --  9.0* 8.5* 7.8*   INR 1.2* 1.1  --  1.1     Date               INR                  Dose  4/1/23             1.1    10mg  4/2/23             1.2    10mg                                                                                                                                                        Assessment/ Plan: Will order warfarin 10 mg PO x 1 dose. Pharmacy will continue to monitor daily and adjust therapy as indicated. Please contact the pharmacist at  for outpatient recommendations if needed.

## 2023-04-02 NOTE — PROGRESS NOTES
[employer] [facility]    Subjective     No acute events, started on blood thinners, no complaints resting    Objective     Patient Vitals for the past 24 hrs:   Temp Pulse Resp BP SpO2   04/02/23 1000 -- 75 -- -- --   04/02/23 0930 -- -- -- -- 94 %   04/02/23 0800 -- 92 -- -- 97 %   04/02/23 0630 98.6 °F (37 °C) 88 18 135/64 93 %   04/02/23 0600 -- 92 -- -- --   04/02/23 0400 -- 85 -- -- --   04/02/23 0337 97.8 °F (36.6 °C) 85 18 114/63 99 %   04/02/23 0200 -- 86 -- -- --   04/01/23 2352 98 °F (36.7 °C) 86 18 129/63 --   04/01/23 2200 -- 93 -- -- --   04/01/23 2000 -- (!) 104 -- -- --   04/01/23 1800 -- 83 -- -- --   04/01/23 1630 -- (!) 111 -- -- --   04/01/23 1620 98.2 °F (36.8 °C) (!) 115 -- 118/73 96 %   04/01/23 1400 -- 74 -- -- --   04/01/23 1200 -- 78 -- -- --   04/01/23 1137 98.6 °F (37 °C) 75 16 127/61 100 %       Date 04/01/23 0700 - 04/02/23 0659 04/02/23 0700 - 04/03/23 0659   Shift 5047-3280 1104-0280 24 Hour Total 0502-1198 0625-8060 24 Hour Total   INTAKE   P.O. 490  490        P. O. 490  490      I. V.(mL/kg/hr) 77.1(0.1)  77.1(0)        Heparin Volume 77.1  77.1      Shift Total(mL/kg) 567. 1(6.6)  567. 1(6.6)      OUTPUT   Urine(mL/kg/hr)           Urine Occurrence(s) 2 x  2 x      Shift Total(mL/kg)         .1  567.1      Weight (kg) 85.4 85.4 85.4 85.4 85.4 85.4       PE  Pulm - CTAB  CV - RRR  Abd -soft, nondistended, bowel sounds present, nontender to palpation    Labs  Recent Results (from the past 12 hour(s))   ANTI-XA UNFRACTIONATED AND LMW HEPARIN    Collection Time: 04/02/23  5:34 AM   Result Value Ref Range    Heparin Xa,Unfrac. and LMW 1.12 IU/mL   METABOLIC PANEL, BASIC    Collection Time: 04/02/23  5:34 AM   Result Value Ref Range    Sodium 138 136 - 145 mmol/L    Potassium 4.2 3.5 - 5.1 mmol/L    Chloride 105 97 - 108 mmol/L    CO2 30 21 - 32 mmol/L    Anion gap 3 (L) 5 - 15 mmol/L    Glucose 96 65 - 100 mg/dL    BUN 11 6 - 20 MG/DL    Creatinine 0.67 (L) 0.70 - 1.30 MG/DL    BUN/Creatinine ratio 16 12 - 20      eGFR >60 >60 ml/min/1.73m2    Calcium 8.3 (L) 8.5 - 10.1 MG/DL   CBC WITH AUTOMATED DIFF    Collection Time: 04/02/23  5:34 AM   Result Value Ref Range    WBC 11.4 (H) 4.1 - 11.1 K/uL    RBC 2.97 (L) 4.10 - 5.70 M/uL    HGB 9.0 (L) 12.1 - 17.0 g/dL    HCT 28.3 (L) 36.6 - 50.3 %    MCV 95.3 80.0 - 99.0 FL    MCH 30.3 26.0 - 34.0 PG    MCHC 31.8 30.0 - 36.5 g/dL    RDW 13.4 11.5 - 14.5 %    PLATELET 941 437 - 936 K/uL    MPV 10.5 8.9 - 12.9 FL    NRBC 0.0 0  WBC    ABSOLUTE NRBC 0.00 0.00 - 0.01 K/uL    NEUTROPHILS 58 32 - 75 %    LYMPHOCYTES 21 12 - 49 %    MONOCYTES 15 (H) 5 - 13 %    EOSINOPHILS 4 0 - 7 %    BASOPHILS 1 0 - 1 %    IMMATURE GRANULOCYTES 1 (H) 0.0 - 0.5 %    ABS. NEUTROPHILS 6.7 1.8 - 8.0 K/UL    ABS. LYMPHOCYTES 2.3 0.8 - 3.5 K/UL    ABS. MONOCYTES 1.7 (H) 0.0 - 1.0 K/UL    ABS. EOSINOPHILS 0.4 0.0 - 0.4 K/UL    ABS. BASOPHILS 0.1 0.0 - 0.1 K/UL    ABS. IMM. GRANS. 0.1 (H) 0.00 - 0.04 K/UL    DF AUTOMATED     PROTHROMBIN TIME + INR    Collection Time: 04/02/23  5:34 AM   Result Value Ref Range    INR 1.1 0.9 - 1.1      Prothrombin time 11.9 (H) 9.0 - 11.1 sec   ANTI-XA UNFRACTIONATED AND LMW HEPARIN    Collection Time: 04/02/23  6:56 AM   Result Value Ref Range    Heparin Xa,Unfrac. and LMW 0.17 IU/mL   PROTHROMBIN TIME + INR    Collection Time: 04/02/23  6:56 AM   Result Value Ref Range    INR 1.2 (H) 0.9 - 1.1      Prothrombin time 12.3 (H) 9.0 - 11.1 sec         Assessment     Pat Baez is a 60 y. o.yr old male status post splenic embolization for splenic laceration    Plan     Continue diet  Doing well on anticoagulation, hemoglobin stable  Okay to discharge once INR therapeutic    Eric Marrero MD

## 2023-04-02 NOTE — PROGRESS NOTES
Problem: Pain  Goal: *Control of Pain  Outcome: Progressing Towards Goal     Problem: General Medical Care Plan  Goal: *Labs within defined limits  Outcome: Progressing Towards Goal  Goal: *Absence of infection signs and symptoms  Outcome: Progressing Towards Goal  Goal: *Optimal pain control at patient's stated goal  Outcome: Progressing Towards Goal  Goal: *Skin integrity maintained  Outcome: Progressing Towards Goal
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
## 2023-04-03 LAB
ANION GAP SERPL CALC-SCNC: 4 MMOL/L (ref 5–15)
BASOPHILS # BLD: 0.1 K/UL (ref 0–0.1)
BASOPHILS NFR BLD: 1 % (ref 0–1)
BUN SERPL-MCNC: 11 MG/DL (ref 6–20)
BUN/CREAT SERPL: 15 (ref 12–20)
CALCIUM SERPL-MCNC: 8.6 MG/DL (ref 8.5–10.1)
CHLORIDE SERPL-SCNC: 105 MMOL/L (ref 97–108)
CO2 SERPL-SCNC: 28 MMOL/L (ref 21–32)
CREAT SERPL-MCNC: 0.74 MG/DL (ref 0.7–1.3)
DIFFERENTIAL METHOD BLD: ABNORMAL
EOSINOPHIL # BLD: 0.4 K/UL (ref 0–0.4)
EOSINOPHIL NFR BLD: 3 % (ref 0–7)
ERYTHROCYTE [DISTWIDTH] IN BLOOD BY AUTOMATED COUNT: 13.6 % (ref 11.5–14.5)
GLUCOSE SERPL-MCNC: 104 MG/DL (ref 65–100)
HCT VFR BLD AUTO: 26.8 % (ref 36.6–50.3)
HCT VFR BLD AUTO: 28.6 % (ref 36.6–50.3)
HGB BLD-MCNC: 8.6 G/DL (ref 12.1–17)
HGB BLD-MCNC: 9 G/DL (ref 12.1–17)
IMM GRANULOCYTES # BLD AUTO: 0.1 K/UL (ref 0–0.04)
IMM GRANULOCYTES NFR BLD AUTO: 1 % (ref 0–0.5)
INR PPP: 1.5 (ref 0.9–1.1)
LYMPHOCYTES # BLD: 2 K/UL (ref 0.8–3.5)
LYMPHOCYTES NFR BLD: 18 % (ref 12–49)
MCH RBC QN AUTO: 30 PG (ref 26–34)
MCHC RBC AUTO-ENTMCNC: 32.1 G/DL (ref 30–36.5)
MCV RBC AUTO: 93.4 FL (ref 80–99)
MONOCYTES # BLD: 1.6 K/UL (ref 0–1)
MONOCYTES NFR BLD: 14 % (ref 5–13)
NEUTS SEG # BLD: 6.9 K/UL (ref 1.8–8)
NEUTS SEG NFR BLD: 63 % (ref 32–75)
NRBC # BLD: 0 K/UL (ref 0–0.01)
NRBC BLD-RTO: 0 PER 100 WBC
PLATELET # BLD AUTO: 270 K/UL (ref 150–400)
PMV BLD AUTO: 10.4 FL (ref 8.9–12.9)
POTASSIUM SERPL-SCNC: 4 MMOL/L (ref 3.5–5.1)
PROTHROMBIN TIME: 15.5 SEC (ref 9–11.1)
RBC # BLD AUTO: 2.87 M/UL (ref 4.1–5.7)
SODIUM SERPL-SCNC: 137 MMOL/L (ref 136–145)
UFH PPP CHRO-ACNC: 0.4 IU/ML
UFH PPP CHRO-ACNC: 0.46 IU/ML
WBC # BLD AUTO: 11 K/UL (ref 4.1–11.1)

## 2023-04-03 PROCEDURE — 74011250637 HC RX REV CODE- 250/637: Performed by: NURSE PRACTITIONER

## 2023-04-03 PROCEDURE — 80048 BASIC METABOLIC PNL TOTAL CA: CPT

## 2023-04-03 PROCEDURE — 85520 HEPARIN ASSAY: CPT

## 2023-04-03 PROCEDURE — 74011250636 HC RX REV CODE- 250/636: Performed by: STUDENT IN AN ORGANIZED HEALTH CARE EDUCATION/TRAINING PROGRAM

## 2023-04-03 PROCEDURE — 65270000046 HC RM TELEMETRY

## 2023-04-03 PROCEDURE — 74011000250 HC RX REV CODE- 250: Performed by: FAMILY MEDICINE

## 2023-04-03 PROCEDURE — 85610 PROTHROMBIN TIME: CPT

## 2023-04-03 PROCEDURE — 85025 COMPLETE CBC W/AUTO DIFF WBC: CPT

## 2023-04-03 PROCEDURE — 36415 COLL VENOUS BLD VENIPUNCTURE: CPT

## 2023-04-03 PROCEDURE — 99232 SBSQ HOSP IP/OBS MODERATE 35: CPT | Performed by: NURSE PRACTITIONER

## 2023-04-03 PROCEDURE — 85018 HEMOGLOBIN: CPT

## 2023-04-03 PROCEDURE — 74011250637 HC RX REV CODE- 250/637: Performed by: STUDENT IN AN ORGANIZED HEALTH CARE EDUCATION/TRAINING PROGRAM

## 2023-04-03 RX ORDER — WARFARIN 10 MG/1
10 TABLET ORAL ONCE
Status: COMPLETED | OUTPATIENT
Start: 2023-04-03 | End: 2023-04-03

## 2023-04-03 RX ADMIN — SODIUM CHLORIDE, PRESERVATIVE FREE 10 ML: 5 INJECTION INTRAVENOUS at 05:29

## 2023-04-03 RX ADMIN — HEPARIN SODIUM 26 UNITS/KG/HR: 10000 INJECTION, SOLUTION INTRAVENOUS at 17:34

## 2023-04-03 RX ADMIN — OXYCODONE AND ACETAMINOPHEN 1 TABLET: 5; 325 TABLET ORAL at 11:37

## 2023-04-03 RX ADMIN — OXYCODONE AND ACETAMINOPHEN 1 TABLET: 5; 325 TABLET ORAL at 18:28

## 2023-04-03 RX ADMIN — OXYCODONE AND ACETAMINOPHEN 1 TABLET: 5; 325 TABLET ORAL at 22:37

## 2023-04-03 RX ADMIN — SODIUM CHLORIDE, PRESERVATIVE FREE 10 ML: 5 INJECTION INTRAVENOUS at 22:38

## 2023-04-03 RX ADMIN — OXYCODONE AND ACETAMINOPHEN 1 TABLET: 5; 325 TABLET ORAL at 05:27

## 2023-04-03 RX ADMIN — WARFARIN SODIUM 10 MG: 10 TABLET ORAL at 17:44

## 2023-04-03 RX ADMIN — METOPROLOL SUCCINATE 25 MG: 25 TABLET, EXTENDED RELEASE ORAL at 10:04

## 2023-04-03 RX ADMIN — SODIUM CHLORIDE, PRESERVATIVE FREE 10 ML: 5 INJECTION INTRAVENOUS at 17:44

## 2023-04-03 NOTE — PROGRESS NOTES
Bedside shift change report given to Sujey RN (oncoming nurse) by Linda Trejo RN (offgoing nurse). Report included the following information SBAR, MAR, and Cardiac Rhythm NSR .
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
## 2023-04-03 NOTE — PROGRESS NOTES
Transition of Care: hopefully home with followup with specialist/pcp    Transport Plan: likely in a car    RUR: 8%    Main contact is wife- Ina Lehman- [phone]    Discharge pending:  -patient transferred from ICU  -patient continues on a heparin drip  -pending medical progress and care recommendations    CM noted from chart    CM following  Ruddy Caro RN        NOTE: per previous CM note:  Mr. Britni Knight was seen in his room with his wife and daughter. His address and phone number were verified. He lives with his wife in a 2 story home with 4 steps to enter. He does not have durable medical equipment. He is employed. He does drive. He was independent with his ADLs and IADLs prior to his admission. His pharmacy is the [facility] at [address] [place] [address]. He is able to afford and acquire his medications. His wife will provide transportation when he is medically stable for discharge.

## 2023-04-03 NOTE — PROGRESS NOTES
Pharmacist Note - Warfarin Dosing  Consult provided for this 62 y.o.male to manage warfarin for Afib, mechanical aortic valve    INR Goal: 2 - 3     Home regimen/ tablet size: 10 mg daily except 15 mg on Tuesdays and Fridays    Drugs that may increase INR: None  Drugs that may decrease INR: None  Other current anticoagulants/ drugs that may increase bleeding risk: Heparin drip  Risk factors: None  Daily INR ordered: YES    Recent Labs     04/03/23  0521 04/02/23  2225 04/02/23  1502 04/02/23  0656 04/02/23  0534   HGB 8.6* 8.3* 8.6*  --  9.0*   INR 1.5*  --   --  1.2* 1.1     Date               INR                  Dose  4/1/23             1.1    10 mg  4/2/23             1.2    10 mg  4/3/23             1.5                   10 mg                                                                                                                                                        Assessment/ Plan: Will order warfarin 10 mg PO x 1 dose. Pharmacy will continue to monitor daily and adjust therapy as indicated. Please contact the pharmacist at  for outpatient recommendations if needed.      Kem Hammer, AmericoD, BCPS

## 2023-04-03 NOTE — PROGRESS NOTES
Bedside shift change report given to Barby Raphael RN (oncoming nurse) by Angel Nicholas RN (offgoing nurse). Report included the following information SBAR, Kardex, MAR, Recent Results, Med Rec Status, and Cardiac Rhythm NSR .

## 2023-04-03 NOTE — PROGRESS NOTES
Problem: Pain  Goal: *Control of Pain  Outcome: Progressing Towards Goal  Goal: *PALLIATIVE CARE:  Alleviation of Pain  Outcome: Progressing Towards Goal     Problem: Patient Education: Go to Patient Education Activity  Goal: Patient/Family Education  Outcome: Progressing Towards Goal     Problem: General Medical Care Plan  Goal: *Vital signs within specified parameters  Outcome: Progressing Towards Goal  Goal: *Labs within defined limits  Outcome: Progressing Towards Goal  Goal: *Absence of infection signs and symptoms  Outcome: Progressing Towards Goal  Goal: *Optimal pain control at patient's stated goal  Outcome: Progressing Towards Goal  Goal: *Skin integrity maintained  Outcome: Progressing Towards Goal  Goal: *Fluid volume balance  Outcome: Progressing Towards Goal  Goal: *Optimize nutritional status  Outcome: Progressing Towards Goal  Goal: *Anxiety reduced or absent  Outcome: Progressing Towards Goal  Goal: *Progressive mobility and function (eg: ADL's)  Outcome: Progressing Towards Goal     Problem: Falls - Risk of  Goal: *Absence of Falls  Description: Document Eduarda Fall Risk and appropriate interventions in the flowsheet. Outcome: Progressing Towards Goal  Note: Fall Risk Interventions:                                Problem: Patient Education: Go to Patient Education Activity  Goal: Patient/Family Education  Outcome: Progressing Towards Goal     Problem: Pressure Injury - Risk of  Goal: *Prevention of pressure injury  Description: Document Elvin Scale and appropriate interventions in the flowsheet.   Outcome: Progressing Towards Goal     Problem: Patient Education: Go to Patient Education Activity  Goal: Patient/Family Education  Outcome: Progressing Towards Goal

## 2023-04-04 LAB
ANION GAP SERPL CALC-SCNC: 4 MMOL/L (ref 5–15)
BASOPHILS # BLD: 0.1 K/UL (ref 0–0.1)
BASOPHILS NFR BLD: 1 % (ref 0–1)
BUN SERPL-MCNC: 13 MG/DL (ref 6–20)
BUN/CREAT SERPL: 18 (ref 12–20)
CALCIUM SERPL-MCNC: 8.6 MG/DL (ref 8.5–10.1)
CHLORIDE SERPL-SCNC: 106 MMOL/L (ref 97–108)
CO2 SERPL-SCNC: 27 MMOL/L (ref 21–32)
CREAT SERPL-MCNC: 0.71 MG/DL (ref 0.7–1.3)
DIFFERENTIAL METHOD BLD: ABNORMAL
EOSINOPHIL # BLD: 0.3 K/UL (ref 0–0.4)
EOSINOPHIL NFR BLD: 3 % (ref 0–7)
ERYTHROCYTE [DISTWIDTH] IN BLOOD BY AUTOMATED COUNT: 13.6 % (ref 11.5–14.5)
GLUCOSE SERPL-MCNC: 99 MG/DL (ref 65–100)
HCT VFR BLD AUTO: 28.3 % (ref 36.6–50.3)
HCT VFR BLD AUTO: 29.1 % (ref 36.6–50.3)
HGB BLD-MCNC: 8.7 G/DL (ref 12.1–17)
HGB BLD-MCNC: 8.9 G/DL (ref 12.1–17)
IMM GRANULOCYTES # BLD AUTO: 0.1 K/UL (ref 0–0.04)
IMM GRANULOCYTES NFR BLD AUTO: 1 % (ref 0–0.5)
INR PPP: 2 (ref 0.9–1.1)
LYMPHOCYTES # BLD: 2.5 K/UL (ref 0.8–3.5)
LYMPHOCYTES NFR BLD: 22 % (ref 12–49)
MCH RBC QN AUTO: 29.5 PG (ref 26–34)
MCHC RBC AUTO-ENTMCNC: 30.6 G/DL (ref 30–36.5)
MCV RBC AUTO: 96.4 FL (ref 80–99)
MONOCYTES # BLD: 1.8 K/UL (ref 0–1)
MONOCYTES NFR BLD: 17 % (ref 5–13)
NEUTS SEG # BLD: 6.2 K/UL (ref 1.8–8)
NEUTS SEG NFR BLD: 56 % (ref 32–75)
NRBC # BLD: 0 K/UL (ref 0–0.01)
NRBC BLD-RTO: 0 PER 100 WBC
PLATELET # BLD AUTO: 285 K/UL (ref 150–400)
PMV BLD AUTO: 10.5 FL (ref 8.9–12.9)
POTASSIUM SERPL-SCNC: 4 MMOL/L (ref 3.5–5.1)
PROTHROMBIN TIME: 20.3 SEC (ref 9–11.1)
RBC # BLD AUTO: 3.02 M/UL (ref 4.1–5.7)
SODIUM SERPL-SCNC: 137 MMOL/L (ref 136–145)
UFH PPP CHRO-ACNC: 0.48 IU/ML
WBC # BLD AUTO: 11 K/UL (ref 4.1–11.1)

## 2023-04-04 PROCEDURE — 36415 COLL VENOUS BLD VENIPUNCTURE: CPT

## 2023-04-04 PROCEDURE — 74011250637 HC RX REV CODE- 250/637: Performed by: STUDENT IN AN ORGANIZED HEALTH CARE EDUCATION/TRAINING PROGRAM

## 2023-04-04 PROCEDURE — 85025 COMPLETE CBC W/AUTO DIFF WBC: CPT

## 2023-04-04 PROCEDURE — 65270000046 HC RM TELEMETRY

## 2023-04-04 PROCEDURE — 74011000250 HC RX REV CODE- 250: Performed by: FAMILY MEDICINE

## 2023-04-04 PROCEDURE — 85520 HEPARIN ASSAY: CPT

## 2023-04-04 PROCEDURE — 85610 PROTHROMBIN TIME: CPT

## 2023-04-04 PROCEDURE — 80048 BASIC METABOLIC PNL TOTAL CA: CPT

## 2023-04-04 PROCEDURE — 85018 HEMOGLOBIN: CPT

## 2023-04-04 PROCEDURE — 74011250636 HC RX REV CODE- 250/636: Performed by: STUDENT IN AN ORGANIZED HEALTH CARE EDUCATION/TRAINING PROGRAM

## 2023-04-04 PROCEDURE — 74011250637 HC RX REV CODE- 250/637: Performed by: NURSE PRACTITIONER

## 2023-04-04 RX ADMIN — METOPROLOL SUCCINATE 25 MG: 25 TABLET, EXTENDED RELEASE ORAL at 08:56

## 2023-04-04 RX ADMIN — OXYCODONE AND ACETAMINOPHEN 1 TABLET: 5; 325 TABLET ORAL at 08:56

## 2023-04-04 RX ADMIN — WARFARIN SODIUM 5 MG: 2 TABLET ORAL at 16:45

## 2023-04-04 RX ADMIN — OXYCODONE AND ACETAMINOPHEN 1 TABLET: 5; 325 TABLET ORAL at 21:02

## 2023-04-04 RX ADMIN — HEPARIN SODIUM 26 UNITS/KG/HR: 10000 INJECTION, SOLUTION INTRAVENOUS at 16:45

## 2023-04-04 RX ADMIN — OXYCODONE AND ACETAMINOPHEN 1 TABLET: 5; 325 TABLET ORAL at 16:50

## 2023-04-04 RX ADMIN — HEPARIN SODIUM 26 UNITS/KG/HR: 10000 INJECTION, SOLUTION INTRAVENOUS at 05:39

## 2023-04-04 RX ADMIN — SODIUM CHLORIDE, PRESERVATIVE FREE 10 ML: 5 INJECTION INTRAVENOUS at 21:02

## 2023-04-04 NOTE — PROGRESS NOTES
Pharmacist Note - Warfarin Dosing  Consult provided for this 62 y.o.male to manage warfarin for Afib, mechanical aortic valve    INR Goal: 2 - 3     Home regimen/ tablet size: 10 mg daily except 15 mg on Tuesdays and Fridays (per patient)    Drugs that may increase INR: None  Drugs that may decrease INR: None  Other current anticoagulants/ drugs that may increase bleeding risk: Heparin drip  Risk factors: None  Daily INR ordered: YES    Recent Labs     04/04/23  0435 04/03/23  1528 04/03/23  0521 04/02/23  1502 04/02/23  0656   HGB 8.9* 9.0* 8.6*   < >  --    INR 2.0*  --  1.5*  --  1.2*    < > = values in this interval not displayed. Date               INR                  Dose  4/1                   1.1    10 mg  4/2                   1.2    10 mg  4/3                   1.5                  10 mg  4/4                   2.0                  5 mg                                                                                                                                                        Assessment/ Plan: Will order warfarin 5 mg PO x 1 dose due to rapid increase in INR since therapy resumption. Pharmacy will continue to monitor daily and adjust therapy as indicated. Please contact the pharmacist at  for outpatient recommendations if needed.      Tracey Arevalo, PharmD, BCPS
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
## 2023-04-04 NOTE — PROGRESS NOTES
Bedside shift change report given to [address] [place] [address] (oncoming nurse) by Junior Dempsey (offgoing nurse). Report included the following information SBAR, Kardex, ED Summary, Intake/Output, MAR, Recent Results, Med Rec Status, and Cardiac Rhythm NS . WDL
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
## 2023-04-05 LAB
BASOPHILS # BLD: 0 K/UL (ref 0–0.1)
BASOPHILS NFR BLD: 0 % (ref 0–1)
DIFFERENTIAL METHOD BLD: ABNORMAL
EOSINOPHIL # BLD: 0.3 K/UL (ref 0–0.4)
EOSINOPHIL NFR BLD: 2 % (ref 0–7)
ERYTHROCYTE [DISTWIDTH] IN BLOOD BY AUTOMATED COUNT: 13.5 % (ref 11.5–14.5)
HCT VFR BLD AUTO: 29.4 % (ref 36.6–50.3)
HGB BLD-MCNC: 8.9 G/DL (ref 12.1–17)
IMM GRANULOCYTES # BLD AUTO: 0.2 K/UL (ref 0–0.04)
IMM GRANULOCYTES NFR BLD AUTO: 1 % (ref 0–0.5)
INR PPP: 2.5 (ref 0.9–1.1)
LYMPHOCYTES # BLD: 2.4 K/UL (ref 0.8–3.5)
LYMPHOCYTES NFR BLD: 20 % (ref 12–49)
MCH RBC QN AUTO: 28.9 PG (ref 26–34)
MCHC RBC AUTO-ENTMCNC: 30.3 G/DL (ref 30–36.5)
MCV RBC AUTO: 95.5 FL (ref 80–99)
MONOCYTES # BLD: 1.8 K/UL (ref 0–1)
MONOCYTES NFR BLD: 15 % (ref 5–13)
NEUTS SEG # BLD: 7.4 K/UL (ref 1.8–8)
NEUTS SEG NFR BLD: 62 % (ref 32–75)
NRBC # BLD: 0 K/UL (ref 0–0.01)
NRBC BLD-RTO: 0 PER 100 WBC
PLATELET # BLD AUTO: 281 K/UL (ref 150–400)
PMV BLD AUTO: 10.6 FL (ref 8.9–12.9)
PROTHROMBIN TIME: 24.5 SEC (ref 9–11.1)
RBC # BLD AUTO: 3.08 M/UL (ref 4.1–5.7)
UFH PPP CHRO-ACNC: 0.51 IU/ML
WBC # BLD AUTO: 12 K/UL (ref 4.1–11.1)

## 2023-04-05 PROCEDURE — 85520 HEPARIN ASSAY: CPT

## 2023-04-05 PROCEDURE — 74011250636 HC RX REV CODE- 250/636: Performed by: STUDENT IN AN ORGANIZED HEALTH CARE EDUCATION/TRAINING PROGRAM

## 2023-04-05 PROCEDURE — 74011250637 HC RX REV CODE- 250/637: Performed by: STUDENT IN AN ORGANIZED HEALTH CARE EDUCATION/TRAINING PROGRAM

## 2023-04-05 PROCEDURE — 85610 PROTHROMBIN TIME: CPT

## 2023-04-05 PROCEDURE — 36415 COLL VENOUS BLD VENIPUNCTURE: CPT

## 2023-04-05 PROCEDURE — 85025 COMPLETE CBC W/AUTO DIFF WBC: CPT

## 2023-04-05 PROCEDURE — 74011250637 HC RX REV CODE- 250/637: Performed by: NURSE PRACTITIONER

## 2023-04-05 PROCEDURE — 74011000250 HC RX REV CODE- 250: Performed by: FAMILY MEDICINE

## 2023-04-05 RX ORDER — WARFARIN SODIUM 5 MG/1
TABLET ORAL
Status: SHIPPED
Start: 2023-04-05

## 2023-04-05 RX ORDER — OXYCODONE AND ACETAMINOPHEN 5; 325 MG/1; MG/1
1 TABLET ORAL
Qty: 21 TABLET | Refills: 0 | Status: SHIPPED
Start: 2023-04-05 | End: 2023-04-12

## 2023-04-05 RX ADMIN — OXYCODONE AND ACETAMINOPHEN 1 TABLET: 5; 325 TABLET ORAL at 07:19

## 2023-04-05 RX ADMIN — HEPARIN SODIUM 26 UNITS/KG/HR: 10000 INJECTION, SOLUTION INTRAVENOUS at 03:25

## 2023-04-05 RX ADMIN — METOPROLOL SUCCINATE 25 MG: 25 TABLET, EXTENDED RELEASE ORAL at 08:34

## 2023-04-05 RX ADMIN — SODIUM CHLORIDE, PRESERVATIVE FREE 10 ML: 5 INJECTION INTRAVENOUS at 06:50

## 2023-04-05 NOTE — PROGRESS NOTES
[address] April 5, 2023       RE: Shira Crawley      To Whom It May Concern,    This is to certify that Shira Crawley may may return to work on 05/05/2023. Please feel free to contact my office if you have any questions or concerns. Thank you for your assistance in this matter.                   Sincerely,  Allie Pop MD

## 2023-04-05 NOTE — DISCHARGE INSTRUCTIONS
No strenuous exercise or lifting above 5 pounds for 1 month. Recommend no driving for about a week. Call your doctor's office to arrange to get your INR checked this week to continue adjustments to warfarin dose.

## 2023-04-05 NOTE — PROGRESS NOTES
Transition of Care: home with followup with specialist/pcp     Transport Plan:  in a car     RUR: 8%     Main contact is wife- Skyler [facility]- [phone]     0046: this CM noted discharge order; patient is going home with followup with specialist/pcp; this CM met with patient at bedside; he stated he is going home later today; his wife will transport him home; no other CM needs noted        CM following  Franca Aponte RN           NOTE: per previous CM note:  Mr. Sue Pelaez was seen in his room with his wife and daughter. His address and phone number were verified. He lives with his wife in a 2 story home with 4 steps to enter. He does not have durable medical equipment. He is employed. He does drive. He was independent with his ADLs and IADLs prior to his admission. His pharmacy is the [facility] at [address] [place] [address]. He is able to afford and acquire his medications. His wife will provide transportation when he is medically stable for discharge.
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
## 2023-04-06 ENCOUNTER — TELEPHONE (OUTPATIENT)
Dept: CARDIOLOGY CLINIC | Age: 63
End: 2023-04-06

## 2023-04-06 NOTE — TELEPHONE ENCOUNTER
Pt returned a call to the nurse, Pt also stated that [address] [place] [address] wasn't sure if they needed additional information      Pt #[phone]

## 2023-04-12 PROBLEM — Z95.2 S/P AVR (AORTIC VALVE REPLACEMENT): Status: ACTIVE | Noted: 2017-03-01

## 2023-04-12 PROBLEM — I73.9 PVD (PERIPHERAL VASCULAR DISEASE) (HCC): Status: ACTIVE | Noted: 2017-02-01

## 2023-04-12 PROBLEM — E78.5 HLD (HYPERLIPIDEMIA): Status: ACTIVE | Noted: 2023-04-12

## 2023-04-12 PROBLEM — I10 HTN (HYPERTENSION): Status: ACTIVE | Noted: 2023-04-12

## 2023-04-12 PROBLEM — I71.011 AORTIC ARCH DISSECTION (HCC): Status: ACTIVE | Noted: 2017-03-01

## 2023-04-12 PROBLEM — I48.91 ATRIAL FIBRILLATION (HCC): Status: ACTIVE | Noted: 2017-02-01

## 2023-04-13 ENCOUNTER — APPOINTMENT (OUTPATIENT)
Dept: FAMILY MEDICINE CLINIC | Age: 63
End: 2023-04-13

## 2023-04-13 DIAGNOSIS — D62 ANEMIA DUE TO ACUTE BLOOD LOSS: ICD-10-CM

## 2023-04-13 LAB
ERYTHROCYTE [DISTWIDTH] IN BLOOD BY AUTOMATED COUNT: 13.2 % (ref 11.5–14.5)
HCT VFR BLD AUTO: 34.2 % (ref 36.6–50.3)
HGB BLD-MCNC: 10.4 G/DL (ref 12.1–17)
IRON SATN MFR SERPL: 11 % (ref 20–50)
IRON SERPL-MCNC: 24 UG/DL (ref 35–150)
MCH RBC QN AUTO: 29.1 PG (ref 26–34)
MCHC RBC AUTO-ENTMCNC: 30.4 G/DL (ref 30–36.5)
MCV RBC AUTO: 95.8 FL (ref 80–99)
NRBC # BLD: 0 K/UL (ref 0–0.01)
NRBC BLD-RTO: 0 PER 100 WBC
PLATELET # BLD AUTO: 391 K/UL (ref 150–400)
PMV BLD AUTO: 10.2 FL (ref 8.9–12.9)
RBC # BLD AUTO: 3.57 M/UL (ref 4.1–5.7)
TIBC SERPL-MCNC: 224 UG/DL (ref 250–450)
WBC # BLD AUTO: 10.4 K/UL (ref 4.1–11.1)

## 2023-04-19 ENCOUNTER — ANTI-COAG VISIT (OUTPATIENT)
Dept: PHARMACY | Age: 63
End: 2023-04-19
Payer: COMMERCIAL

## 2023-04-19 DIAGNOSIS — Z95.2 S/P AVR (AORTIC VALVE REPLACEMENT): Primary | ICD-10-CM

## 2023-04-19 DIAGNOSIS — I48.92 ATRIAL FLUTTER, UNSPECIFIED TYPE (HCC): ICD-10-CM

## 2023-04-19 LAB
INR BLD: 3.3
PT POC: 39.8 SECONDS
VALID INTERNAL CONTROL?: YES

## 2023-04-19 PROCEDURE — 99212 OFFICE O/P EST SF 10 MIN: CPT

## 2023-04-21 ENCOUNTER — TELEPHONE (OUTPATIENT)
Dept: FAMILY MEDICINE CLINIC | Age: 63
End: 2023-04-21

## 2023-04-21 NOTE — TELEPHONE ENCOUNTER
Patients wife Compa Rock) called stating that they sent in [phone] [address] paperwork, and was hoping to have this done today. They stated that this paperwork is due today, and was hoping that someone could complete it. They are also hoping to get a call back when this paperwork has been completed.      Best call back number   [phone]

## 2023-04-22 DIAGNOSIS — I25.118 CORONARY ARTERY DISEASE OF NATIVE ARTERY OF NATIVE HEART WITH STABLE ANGINA PECTORIS (HCC): Primary | ICD-10-CM

## 2023-04-24 DIAGNOSIS — S36.039D LACERATION OF SPLEEN, SUBSEQUENT ENCOUNTER: ICD-10-CM

## 2023-04-24 DIAGNOSIS — R10.12 LUQ PAIN: ICD-10-CM

## 2023-04-24 RX ORDER — OXYCODONE AND ACETAMINOPHEN 5; 325 MG/1; MG/1
1 TABLET ORAL
Qty: 25 TABLET | Refills: 0 | Status: SHIPPED | OUTPATIENT
Start: 2023-04-24 | End: 2023-05-01

## 2023-04-24 NOTE — TELEPHONE ENCOUNTER
Pt arrived to office 04.24.23 to  documents for himself and spouse. Pt states he needs to extend use of percocet. Due to spleen surgery.        Best number to reach pt     # [phone]

## 2023-05-01 ENCOUNTER — PATIENT MESSAGE (OUTPATIENT)
Dept: FAMILY MEDICINE CLINIC | Age: 63
End: 2023-05-01

## 2023-05-01 DIAGNOSIS — D62 ANEMIA DUE TO ACUTE BLOOD LOSS: Primary | ICD-10-CM

## 2023-05-01 NOTE — PROGRESS NOTES
Pharmacy Progress Note - Anticoagulation Management    S/O:  Mr. Oscar Gomez  is a 58 y.o. male seen today for anticoagulation management for the diagnosis of Atrial Fibrillation/Flutter     HPI: At last visit (4/19) INR was 3.3 and supratherapeutic for INR goal 2.0-3.0. Over the previous 7 days, including held doses, patient had taken 35 mg of warfarin. Weekly planned dose was reduced to 32.5 mg (7%). Patient was instructed to take 2.5 mg Wed; 5 mg daily ROW. Patient recommended to recheck INR in 1 to 2 weeks. Patient scheduled follow up INR on 5/3. Interim History:    Warfarin start date: 2017 per patient  INR Goal:  2.0-3.0    Current warfarin regimen: 2.5 mg Wed; 5 mg daily ROW*                      Warfarin tablet strength:   5 mg   Duration of therapy: Indefinite    Today's pertinent positives includes:  No significant changes since last visit    Results for orders placed or performed in visit on 05/03/23   AMB POC PT/INR   Result Value Ref Range    VALID INTERNAL CONTROL POC Yes     Prothrombin time (POC) 19.4 seconds    INR POC 1.6      Adherence:   Able to recall regimen? YES  Miss/extra dose? YES  Need refill? NO    *Patient reports he mistakenly took 10 mg on 4/28 and 5/1 not 5 mg as directed. Upcoming procedure(s):  NO      INR History:   (normal INR range 0.8-1.2)     Date   INR   PT   Dose/Comments  05/03/23 1.6  19.4 Reduced to 2.5 mg Wed; 5 mg daily ROW, patient took 10 mg on 4/28 and 5/1 04/19/23 3.3  39.8 Held dose x 2 days then 10 mg MWF; 5 mg daily ROW  04/12/23 5.7  68.4 5 mg Tues, Fri; 10 mg daily ROW       A/P:       Anticoagulation:  Considering Mr. Couch Ra past history, todays findings, and per Anticoagulation Collaborative Practice Agreement/Protocol:    Fingerstick POC INR (1.6) is Subtherapeutic for INR goal today. Change warfarin and increase to take 5 mg Mon, Fri; 7.5 mg daily ROW  INR is 1.6 and subtherapeutic for INR goal 2.0-3.0.   Patient recalled warfarin plan from last visit (4/19) but reports he mistakenly took 10 mg on 4/28 and 5/1 not 5 mg as directed. Patient denies changes to his other medications and reports consistent intake of vitamin K foods. Over the previous 7 days patient has taken 42.5 mg of warfarin. Will increase planned weekly dose to 47.5 mg (10%). Patient will take 5 mg Mon, Fri; 7.5 mg daily ROW and recheck INR in 1 week. Patient was instructed to schedule an appointment in 1 week(s) prior to leaving the clinic. Medication reconciliation was completed during the visit. There are no discontinued medications. A full discussion of the nature of anticoagulants has been carried out. A full discussion of the need for frequent and regular monitoring, precise dosage adjustment and compliance was stressed. Side effects of potential bleeding were discussed and Mr. Fabienne Santana was instructed to call [phone] if there are any signs of abnormal bleeding. Mr. Fabienne Santana was instructed to avoid any OTC items containing aspirin or ibuprofen and prior to starting any new OTC products to consult with his physician or pharmacist to ensure no drug interactions are present. Mr. Fabienne Santana was instructed to avoid any major changes in his general diet and to avoid alcohol consumption. Mr. Fabienne Santana was provided information in the AVS that includes topics on understanding coumadin therapy, drug interaction considerations, vitamin K and coumadin use, interactions with foods and supplements containing vitamin K, and the use of herbal products. Mr. Fabienne Santana verbalized his understanding of all instructions and will call the office with any questions, concerns, or signs of abnormal bleeding or blood clot. Notifications of recommendations will be sent to Dr. Prashanth Mobley MD for review.     Thank you,  Annice Councilman, [facility]    Intervention Detail: Dose Adjustment: 1, reason: Therapy Optimization and Lab(s) Ordered  Total # of Interventions Recommended: 2  Total # of Interventions Accepted: 2  Time Spent (min): 30

## 2023-05-01 NOTE — PATIENT INSTRUCTIONS
Today your INR was 1.6. Your goal INR is  2.0-3.0. You have a   5 mg tablet of Coumadin (warfarin). Take Coumadin (warfarin) as follows: Take 5 mg (1 tablet) on Mondays and Fridays and 7.5 mg (1.5 tablets) daily rest of week    Come back in 1 week(s) for your next finger stick/INR blood test.        Avoid any over the counter items containing aspirin or ibuprofen, and avoid great swings in general diet. Avoid alcohol consumption. Please notify the INR nurse if you are started on any new medication including over the counter or herbal supplements. Also, please notify your INR nurse if any of your other prescription or over the counter medications have been discontinued. Your Care Instructions  Warfarin is a medicine that you take to prevent blood clots. It is often called a blood thinner. Doctors give warfarin (such as Coumadin) to reduce the risk of blood clots. You may be at risk for blood clots if you have atrial fibrillation or deep vein thrombosis. Some other health problems may also put you at risk. Warfarin slows the amount of time it takes for your blood to clot. It can cause bleeding problems. Even if you've been taking warfarin for a while, it's important to know how to take it safely. Foods and other medicines can affect the way warfarin works. Some can make warfarin work too well. This can cause bleeding problems. And some can make it work poorly, so that it does not prevent blood clots very well. You will need regular blood tests to check how long it takes for your blood to form a clot. This test is called a PT or prothrombin time test. The result of the test is called an INR level. Depending on the test results, your doctor or anticoagulation clinic may adjust your dose of warfarin. Follow-up care is a key part of your treatment and safety. Be sure to make and go to all appointments, and call your doctor if you are having problems.  It's also a good idea to know your test results and keep a list of the medicines you take. How can you care for yourself at home? Take warfarin safely  Take your warfarin at the same time each day. If you miss a dose of warfarin, don't take an extra dose to make up for it. Your doctor can tell you exactly what to do so you don't take too much or too little. Wear medical alert jewelry that lets others know that you take warfarin. You can buy this at most drugstores. Don't take warfarin if you are pregnant or planning to get pregnant. Talk to your doctor about how you can prevent getting pregnant while you are taking it. Don't change your dose or stop taking warfarin unless your doctor tells you to. Effects of medicines and food on warfarin  Don't start or stop taking any medicines, vitamins, or natural remedies unless you first talk to your doctor. Many medicines can affect how warfarin works. These include aspirin and other pain relievers, over-the-counter medicines, multivitamins, dietary supplements, and herbal products. Tell all of your doctors and pharmacists that you take warfarin. Some prescription medicines can affect how warfarin works. Keep the amount of vitamin K in your diet about the same from day to day. Do not suddenly eat a lot more or a lot less food that is rich in vitamin K than you usually do. Vitamin K affects how warfarin works and how your blood clots. Talk with your doctor before making big changes in your diet. Vitamin K is in many foods, such as:  Leafy greens, such as kale, cabbage, spinach, Swiss chard, and lettuce. Canola and soybean oils. Green vegetables, such as asparagus, broccoli, and Engelhard sprouts. Vegetable drinks, green tea leaves, and some dietary supplement drinks. Avoid cranberry juice and other cranberry products. They can increase the effects of warfarin. Limit your use of alcohol. Avoid bleeding by preventing falls and injuries  Wear slippers or shoes with nonskid soles.   Remove throw rugs and clutter. Rearrange furniture and electrical cords to keep them out of walking paths. Keep stairways, porches, and outside walkways well lit. Use night-lights in hallways and bathrooms. Be extra careful when you work with sharp tools or knives. When should you call for help? Call 911 anytime you think you may need emergency care. For example, call if:  You have a sudden, severe headache that is different from past headaches. Call your doctor now or seek immediate medical care if:  You have any abnormal bleeding, such as:  Nosebleeds. Vaginal bleeding that is different (heavier, more frequent, at a different time of the month) than what you are used to. Bloody or black stools, or rectal bleeding. Bloody or pink urine. Watch closely for changes in your health, and be sure to contact your doctor if you have any problems. Where can you learn more? Go to http://www.gray.com/. Enter H747 in the search box to learn more about \"Taking Warfarin Safely: Care Instructions. \"  Current as of: January 27, 2016  Content Version: 11.1  © 5167-9322 [employer]. Care instructions adapted under license by [employer] (which disclaims liability or warranty for this information). If you have questions about a medical condition or this instruction, always ask your healthcare professional. [place] [address] any warranty or liability for your use of this information.

## 2023-05-03 ENCOUNTER — ANTI-COAG VISIT (OUTPATIENT)
Dept: PHARMACY | Age: 63
End: 2023-05-03
Payer: COMMERCIAL

## 2023-05-03 DIAGNOSIS — I48.92 ATRIAL FLUTTER, UNSPECIFIED TYPE (HCC): ICD-10-CM

## 2023-05-03 DIAGNOSIS — Z95.2 S/P AVR (AORTIC VALVE REPLACEMENT): Primary | ICD-10-CM

## 2023-05-03 LAB
INR BLD: 1.6
PT POC: 19.4 SECONDS
VALID INTERNAL CONTROL?: YES

## 2023-05-03 PROCEDURE — 99212 OFFICE O/P EST SF 10 MIN: CPT

## 2023-05-15 ENCOUNTER — ANTI-COAG VISIT (OUTPATIENT)
Facility: HOSPITAL | Age: 63
End: 2023-05-15

## 2023-05-23 ENCOUNTER — ANTI-COAG VISIT (OUTPATIENT)
Facility: HOSPITAL | Age: 63
End: 2023-05-23

## 2023-05-23 LAB — INTERNATIONAL NORMALIZATION RATIO, POC: 1.6

## 2023-05-24 ENCOUNTER — TELEPHONE (OUTPATIENT)
Age: 63
End: 2023-05-24

## 2023-05-24 NOTE — TELEPHONE ENCOUNTER
Pt called c/o of chest tightens,the other day stated he took some advise and it went away, stated he would like to discuss this with nurse or dr.please advise    Pt # [phone]

## 2023-05-25 NOTE — TELEPHONE ENCOUNTER
Spoke with patient. 2 patient identifiers used. Patient states he has been having chest tightness in the middle of his chest for the last 10 days that has felt like he has ate a big pizza and he is taking advil to help relieve this pain and it works. He would like to come in for a semi annual appointment and states he is working on light duty. Patient denies SOB or other symptoms. Let patient know I would contact Dr. Ovi Saha and his NP for recommendations. Patient verbalized understanding.

## 2023-05-25 NOTE — TELEPHONE ENCOUNTER
Spoke with patient. 2 patient identifiers used. Patient states that the pain is the same with with activity and he has not taken anything for indigestion. Let patient know that I would update NP and call him back with next steps.

## 2023-05-25 NOTE — TELEPHONE ENCOUNTER
Alexandro Gonzalez, APRN - CNP  You 18 minutes ago (1:39 PM)     KJ  Discussed w/ Dr. Zapata Session. Doesn't sound cardiac in nature, considering NSAIDs help. Although should be cautious w/ nsaids since he's on coumadin. May be some type of inflammation from recent events in March/April. If continues despite NSAIDs, would set up to be seen in clinic. Sebas       Spoke with patient. 2 patient identifiers used. Discussed above message from NP with patient. Patient verbalized understanding and wanted to schedule for next week to see Dr. Zapata Session.      Future Appointments   Date Time Provider Tip Christina   5/31/2023  3:45 PM [facility] MEDICATION [facility]   5/31/2023  4:00 PM MD ABBIE Iniguez BS AMB   1/31/2024  3:00 PM BSLORENA SEPULVEDA ECHO 1 ABBIE NEWBERRY AMB   1/31/2024  3:40 PM MD ABBIE Iniguez BS AMB

## 2023-05-30 NOTE — PROGRESS NOTES
Pharmacy Progress Note - Anticoagulation Management      S/O:  Mr. Donovan Hernández  is a 58 y.o. male seen today for anticoagulation management for  the diagnosis of Atrial Fibrillation/Flutter       HPI: At last visit (5/23) INR was 1.6 and subtherapeutic for INR goal 2.0-3.0. Due to INR depression, patient was instructed to take extra dose (10 mg total) on 5/23. Weekly planned warfarin dose was also increased from 52.5 mg to 55 mg (5%). Starting 5/23 patient was instructed to take 10 mg Wed; 7.5 mg daily ROW and recheck INR in 1 week. Patient called and rescheduled INR from 5/31 to 6/2. Interim History:       Warfarin start date: 2017 per patient    INR Goal:  2.0-3.0      Current warfarin regimen: 10 mg x 1 dose then 10 mg Wed; 7.5 mg daily ROW                  Warfarin tablet strength:   5 mg     Duration of therapy: Indefinite    Today's pertinent positives includes:  No significant changes since last visit    INR 2.1  PT 24.8    Adherence:   Able to recall regimen? YES  Miss/extra dose? NO  Need refill? NO    Upcoming procedure(s):  NO       INR History:              (normal INR range 0.8-1.2)       Date               INR                  PT        Dose/Comments  06/02/23 2.1  24.8  10 mg x 1 dose then 10 mg Wed; 7.5 mg daily ROW  05/23/23 1.6  18.7  10 mg x 1 dose then 7.5 mg daily ROW  05/15/23 1.5  18.2  Increased to 5 mg Mon, Fri; 7.5 mg daily ROW  05/03/23          1.6                   19.4     Reduced to 2.5 mg Wed; 5 mg daily ROW, patient took 10 mg on 4/28 and 5/1 04/19/23         3.3                   39.8     Held dose x 2 days then 10 mg MWF; 5 mg daily ROW  04/12/23         5.7                   68.4     5 mg Tues, Fri; 10 mg daily ROW          A/P:         Anticoagulation:   Considering Mr. Kiko Doyle past history, todays findings, and per Anticoagulation Collaborative Practice Agreement/Protocol:     Fingerstick POC INR (2.1) is  Therapeutic for INR goal today.   Continue warfarin 10 mg

## 2023-05-31 ENCOUNTER — OFFICE VISIT (OUTPATIENT)
Age: 63
End: 2023-05-31
Payer: COMMERCIAL

## 2023-05-31 VITALS
WEIGHT: 165.8 LBS | SYSTOLIC BLOOD PRESSURE: 124 MMHG | OXYGEN SATURATION: 97 % | HEIGHT: 71 IN | BODY MASS INDEX: 23.21 KG/M2 | HEART RATE: 57 BPM | DIASTOLIC BLOOD PRESSURE: 62 MMHG

## 2023-05-31 DIAGNOSIS — Z95.2 S/P AVR (AORTIC VALVE REPLACEMENT): ICD-10-CM

## 2023-05-31 DIAGNOSIS — I71.010 ASCENDING AORTIC DISSECTION (HCC): ICD-10-CM

## 2023-05-31 DIAGNOSIS — I25.10 CORONARY ARTERY DISEASE INVOLVING NATIVE CORONARY ARTERY OF NATIVE HEART WITHOUT ANGINA PECTORIS: Primary | ICD-10-CM

## 2023-05-31 DIAGNOSIS — S36.039D LACERATION OF SPLEEN, SUBSEQUENT ENCOUNTER: ICD-10-CM

## 2023-05-31 PROCEDURE — 3074F SYST BP LT 130 MM HG: CPT | Performed by: INTERNAL MEDICINE

## 2023-05-31 PROCEDURE — 99214 OFFICE O/P EST MOD 30 MIN: CPT | Performed by: INTERNAL MEDICINE

## 2023-05-31 PROCEDURE — 3078F DIAST BP <80 MM HG: CPT | Performed by: INTERNAL MEDICINE

## 2023-05-31 ASSESSMENT — PATIENT HEALTH QUESTIONNAIRE - PHQ9
SUM OF ALL RESPONSES TO PHQ QUESTIONS 1-9: 0
1. LITTLE INTEREST OR PLEASURE IN DOING THINGS: 0
2. FEELING DOWN, DEPRESSED OR HOPELESS: 0
SUM OF ALL RESPONSES TO PHQ QUESTIONS 1-9: 0
SUM OF ALL RESPONSES TO PHQ9 QUESTIONS 1 & 2: 0
SUM OF ALL RESPONSES TO PHQ QUESTIONS 1-9: 0
SUM OF ALL RESPONSES TO PHQ QUESTIONS 1-9: 0

## 2023-05-31 NOTE — PATIENT INSTRUCTIONS
Please call and let us know in 2 weeks about your chest pain. This will base when to get your CTA. To get your CTA scheduled, please call [phone].
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
## 2023-06-02 ENCOUNTER — ANTI-COAG VISIT (OUTPATIENT)
Facility: HOSPITAL | Age: 63
End: 2023-06-02

## 2023-06-02 DIAGNOSIS — I48.91 ATRIAL FIBRILLATION, UNSPECIFIED TYPE (HCC): ICD-10-CM

## 2023-06-02 DIAGNOSIS — I48.92 ATRIAL FLUTTER, UNSPECIFIED TYPE (HCC): Primary | ICD-10-CM

## 2023-06-02 LAB — INTERNATIONAL NORMALIZATION RATIO, POC: 2.1

## 2023-06-02 NOTE — PATIENT INSTRUCTIONS
Today your INR was 2.1. Your goal INR is  2.0-3.0. You have a   5 mg tablet of Coumadin (warfarin). Take Coumadin (warfarin) as follows: Take 10 mg (2 tab) Wednesdays and 7.5 mg (1.5 tab) daily rest of week    Come back in 2 week(s) for your next finger stick/INR blood test.        Avoid any over the counter items containing aspirin or ibuprofen, and avoid great swings in general diet. Avoid alcohol consumption. Please notify the INR nurse if you are started on any new medication including over the counter or herbal supplements. Also, please notify your INR nurse if any of your other prescription or over the counter medications have been discontinued. Your Care Instructions  Warfarin is a medicine that you take to prevent blood clots. It is often called a blood thinner. Doctors give warfarin (such as Coumadin) to reduce the risk of blood clots. You may be at risk for blood clots if you have atrial fibrillation or deep vein thrombosis. Some other health problems may also put you at risk. Warfarin slows the amount of time it takes for your blood to clot. It can cause bleeding problems. Even if you've been taking warfarin for a while, it's important to know how to take it safely. Foods and other medicines can affect the way warfarin works. Some can make warfarin work too well. This can cause bleeding problems. And some can make it work poorly, so that it does not prevent blood clots very well. You will need regular blood tests to check how long it takes for your blood to form a clot. This test is called a PT or prothrombin time test. The result of the test is called an INR level. Depending on the test results, your doctor or anticoagulation clinic may adjust your dose of warfarin. Follow-up care is a key part of your treatment and safety. Be sure to make and go to all appointments, and call your doctor if you are having problems.  It's also a good idea to know your test results and keep a list

## 2023-06-13 ENCOUNTER — TELEPHONE (OUTPATIENT)
Age: 63
End: 2023-06-13

## 2023-06-13 NOTE — TELEPHONE ENCOUNTER
Spoke with patient. 2 patient identifiers used. Let patient know that his letter is ready for  and to remember to still get CTA one week before his next appointment. Patient was appreciative and verbalized understanding.

## 2023-06-13 NOTE — TELEPHONE ENCOUNTER
Patient is calling to let the doctor know that he doesn't need another CTA Chest W/WO contrast.    Patient also said that he needs a letter stating that he can return to full duty at work or without restrictions. Pt said he needs a copy also to give to his employer. Pt will like to come in and pick this up.    [phone]

## 2023-06-19 NOTE — PATIENT INSTRUCTIONS
clutter. Rearrange furniture and electrical cords to keep them out of walking paths. Keep stairways, porches, and outside walkways well lit. Use night-lights in hallways and bathrooms. Be extra careful when you work with sharp tools or knives. When should you call for help? Call 911 anytime you think you may need emergency care. For example, call if:  You have a sudden, severe headache that is different from past headaches. Call your doctor now or seek immediate medical care if:  You have any abnormal bleeding, such as:  Nosebleeds. Vaginal bleeding that is different (heavier, more frequent, at a different time of the month) than what you are used to. Bloody or black stools, or rectal bleeding. Bloody or pink urine. Watch closely for changes in your health, and be sure to contact your doctor if you have any problems. Where can you learn more? Go to http://rowan-cecilia.info/. Enter L373 in the search box to learn more about \"Taking Warfarin Safely: Care Instructions. \"  Current as of: January 27, 2016  Content Version: 11.1  © 7493-1115 [employer]. Care instructions adapted under license by [employer] (which disclaims liability or warranty for this information). If you have questions about a medical condition or this instruction, always ask your healthcare professional. [place] [address] any warranty or liability for your use of this information.

## 2023-06-19 NOTE — PROGRESS NOTES
Pharmacy Progress Note - Anticoagulation Management      S/O:  Mr. Sridevi Banuelos  is a 58 y.o. male seen today for anticoagulation management for  the diagnosis of Atrial Fibrillation/Flutter       HPI: At last visit (6/2) INR was 2.1 and therapeutic for INR goal 2.0-3.0. Patient recommended to continue 10 mg Wed; 7.5 mg daily ROW and recheck INR in 2 weeks. Interim History:       Warfarin start date: 2017 per patient    INR Goal:  2.0-3.0      Current warfarin regimen: 10 mg Wed; 7.5 mg daily ROW                  Warfarin tablet strength:   5 mg     Duration of therapy: Indefinite    Today's pertinent positives includes:  No significant changes since last visit    INR 1.8  PT 21.3    Adherence:   Able to recall regimen? YES  Miss/extra dose? NO  Need refill? NO    Upcoming procedure(s):  NO       INR History:              (normal INR range 0.8-1.2)       Date               INR                  PT        Dose/Comments  06/21/23 1.8  21.3  10 mg Wed; 7.5 mg daily ROW  06/02/23 2.1  24.8  10 mg x 1 dose then 10 mg Wed; 7.5 mg daily ROW  05/23/23 1.6  18.7  10 mg x 1 dose then 7.5 mg daily ROW  05/15/23 1.5  18.2  Increased to 5 mg Mon, Fri; 7.5 mg daily ROW  05/03/23          1.6                   19.4     Reduced to 2.5 mg Wed; 5 mg daily ROW, patient took 10 mg on 4/28 and 5/1 04/19/23         3.3                   39.8     Held dose x 2 days then 10 mg MWF; 5 mg daily ROW  04/12/23         5.7                   68.4     5 mg Tues, Fri; 10 mg daily ROW          A/P:         Anticoagulation:   Considering Mr. Azucena Heredia past history, todays findings, and per Anticoagulation Collaborative Practice Agreement/Protocol:     Fingerstick POC INR (1.8) is  Subtherapeutic for INR goal today. Change warfarin and increase to take 10 mg Wed, Sat; 7.5 mg daily ROW  INR is 1.8 and subtherapeutic for INR goal 2.0-3.0. Patient recalled warfarin plan from last visit (6/2) and denies missed or extra doses of warfarin.   Patient

## 2023-06-21 ENCOUNTER — ANTI-COAG VISIT (OUTPATIENT)
Facility: HOSPITAL | Age: 63
End: 2023-06-21
Payer: COMMERCIAL

## 2023-06-21 DIAGNOSIS — I48.92 ATRIAL FLUTTER, UNSPECIFIED TYPE (HCC): Primary | ICD-10-CM

## 2023-06-21 DIAGNOSIS — I48.91 ATRIAL FIBRILLATION, UNSPECIFIED TYPE (HCC): ICD-10-CM

## 2023-06-21 LAB — INTERNATIONAL NORMALIZATION RATIO, POC: 1.8

## 2023-06-21 PROCEDURE — 85610 PROTHROMBIN TIME: CPT

## 2023-06-21 PROCEDURE — 99212 OFFICE O/P EST SF 10 MIN: CPT

## 2023-07-08 ENCOUNTER — APPOINTMENT (OUTPATIENT)
Facility: HOSPITAL | Age: 63
DRG: 436 | End: 2023-07-08
Payer: COMMERCIAL

## 2023-07-08 ENCOUNTER — HOSPITAL ENCOUNTER (INPATIENT)
Facility: HOSPITAL | Age: 63
LOS: 13 days | Discharge: HOME | DRG: 436 | End: 2023-07-21
Attending: STUDENT IN AN ORGANIZED HEALTH CARE EDUCATION/TRAINING PROGRAM | Admitting: FAMILY MEDICINE
Payer: COMMERCIAL

## 2023-07-08 DIAGNOSIS — M54.2 NECK PAIN ON RIGHT SIDE: ICD-10-CM

## 2023-07-08 DIAGNOSIS — R79.1 SUPRATHERAPEUTIC INR: ICD-10-CM

## 2023-07-08 DIAGNOSIS — R74.8 ABNORMAL TRANSAMINASES: ICD-10-CM

## 2023-07-08 DIAGNOSIS — E80.6 HYPERBILIRUBINEMIA: Primary | ICD-10-CM

## 2023-07-08 PROBLEM — R79.89 ELEVATED LFTS: Status: ACTIVE | Noted: 2023-07-08

## 2023-07-08 LAB
ALBUMIN SERPL-MCNC: 3.2 G/DL (ref 3.5–5)
ALBUMIN/GLOB SERPL: 1 (ref 1.1–2.2)
ALP SERPL-CCNC: 756 U/L (ref 45–117)
ALT SERPL-CCNC: 184 U/L (ref 12–78)
ANION GAP SERPL CALC-SCNC: 1 MMOL/L (ref 5–15)
AST SERPL-CCNC: 114 U/L (ref 15–37)
BASOPHILS # BLD: 0.1 K/UL (ref 0–0.1)
BASOPHILS NFR BLD: 1 % (ref 0–1)
BILIRUB SERPL-MCNC: 3.7 MG/DL (ref 0.2–1)
BUN SERPL-MCNC: 10 MG/DL (ref 6–20)
BUN/CREAT SERPL: 11 (ref 12–20)
CALCIUM SERPL-MCNC: 8.6 MG/DL (ref 8.5–10.1)
CHLORIDE SERPL-SCNC: 112 MMOL/L (ref 97–108)
CO2 SERPL-SCNC: 25 MMOL/L (ref 21–32)
COMMENT:: NORMAL
CREAT SERPL-MCNC: 0.94 MG/DL (ref 0.7–1.3)
DIFFERENTIAL METHOD BLD: ABNORMAL
EOSINOPHIL # BLD: 0.2 K/UL (ref 0–0.4)
EOSINOPHIL NFR BLD: 2 % (ref 0–7)
ERYTHROCYTE [DISTWIDTH] IN BLOOD BY AUTOMATED COUNT: 15 % (ref 11.5–14.5)
GLOBULIN SER CALC-MCNC: 3.3 G/DL (ref 2–4)
GLUCOSE SERPL-MCNC: 127 MG/DL (ref 65–100)
HCT VFR BLD AUTO: 37.6 % (ref 36.6–50.3)
HGB BLD-MCNC: 11.9 G/DL (ref 12.1–17)
IMM GRANULOCYTES # BLD AUTO: 0 K/UL (ref 0–0.04)
IMM GRANULOCYTES NFR BLD AUTO: 1 % (ref 0–0.5)
INR PPP: 10.9 (ref 0.9–1.1)
LYMPHOCYTES # BLD: 1.6 K/UL (ref 0.8–3.5)
LYMPHOCYTES NFR BLD: 21 % (ref 12–49)
MCH RBC QN AUTO: 28.4 PG (ref 26–34)
MCHC RBC AUTO-ENTMCNC: 31.6 G/DL (ref 30–36.5)
MCV RBC AUTO: 89.7 FL (ref 80–99)
MONOCYTES # BLD: 0.9 K/UL (ref 0–1)
MONOCYTES NFR BLD: 12 % (ref 5–13)
NEUTS SEG # BLD: 4.8 K/UL (ref 1.8–8)
NEUTS SEG NFR BLD: 63 % (ref 32–75)
NRBC # BLD: 0 K/UL (ref 0–0.01)
NRBC BLD-RTO: 0 PER 100 WBC
PLATELET # BLD AUTO: 182 K/UL (ref 150–400)
PMV BLD AUTO: 11.5 FL (ref 8.9–12.9)
POTASSIUM SERPL-SCNC: 4.1 MMOL/L (ref 3.5–5.1)
PROT SERPL-MCNC: 6.5 G/DL (ref 6.4–8.2)
PROTHROMBIN TIME: 98 SEC (ref 9–11.1)
RBC # BLD AUTO: 4.19 M/UL (ref 4.1–5.7)
SODIUM SERPL-SCNC: 138 MMOL/L (ref 136–145)
SPECIMEN HOLD: NORMAL
WBC # BLD AUTO: 7.6 K/UL (ref 4.1–11.1)

## 2023-07-08 PROCEDURE — 36415 COLL VENOUS BLD VENIPUNCTURE: CPT

## 2023-07-08 PROCEDURE — 76705 ECHO EXAM OF ABDOMEN: CPT

## 2023-07-08 PROCEDURE — 85610 PROTHROMBIN TIME: CPT

## 2023-07-08 PROCEDURE — 6360000004 HC RX CONTRAST MEDICATION

## 2023-07-08 PROCEDURE — 6370000000 HC RX 637 (ALT 250 FOR IP): Performed by: PHYSICIAN ASSISTANT

## 2023-07-08 PROCEDURE — 99285 EMERGENCY DEPT VISIT HI MDM: CPT

## 2023-07-08 PROCEDURE — 2580000003 HC RX 258: Performed by: PHYSICIAN ASSISTANT

## 2023-07-08 PROCEDURE — 80053 COMPREHEN METABOLIC PANEL: CPT

## 2023-07-08 PROCEDURE — 70498 CT ANGIOGRAPHY NECK: CPT

## 2023-07-08 PROCEDURE — 1100000000 HC RM PRIVATE

## 2023-07-08 PROCEDURE — 85025 COMPLETE CBC W/AUTO DIFF WBC: CPT

## 2023-07-08 RX ORDER — ONDANSETRON 4 MG/1
4 TABLET, ORALLY DISINTEGRATING ORAL EVERY 8 HOURS PRN
Status: DISCONTINUED | OUTPATIENT
Start: 2023-07-08 | End: 2023-07-21 | Stop reason: HOSPADM

## 2023-07-08 RX ORDER — ONDANSETRON 2 MG/ML
4 INJECTION INTRAMUSCULAR; INTRAVENOUS EVERY 6 HOURS PRN
Status: DISCONTINUED | OUTPATIENT
Start: 2023-07-08 | End: 2023-07-21 | Stop reason: HOSPADM

## 2023-07-08 RX ORDER — SODIUM CHLORIDE 9 MG/ML
INJECTION, SOLUTION INTRAVENOUS PRN
Status: DISCONTINUED | OUTPATIENT
Start: 2023-07-08 | End: 2023-07-21 | Stop reason: HOSPADM

## 2023-07-08 RX ORDER — AMLODIPINE BESYLATE 5 MG/1
10 TABLET ORAL NIGHTLY
Status: DISCONTINUED | OUTPATIENT
Start: 2023-07-08 | End: 2023-07-10

## 2023-07-08 RX ORDER — ACETAMINOPHEN 325 MG/1
650 TABLET ORAL EVERY 6 HOURS PRN
Status: DISCONTINUED | OUTPATIENT
Start: 2023-07-08 | End: 2023-07-21 | Stop reason: HOSPADM

## 2023-07-08 RX ORDER — POLYETHYLENE GLYCOL 3350 17 G/17G
17 POWDER, FOR SOLUTION ORAL DAILY PRN
Status: DISCONTINUED | OUTPATIENT
Start: 2023-07-08 | End: 2023-07-21 | Stop reason: HOSPADM

## 2023-07-08 RX ORDER — ACETAMINOPHEN 650 MG/1
650 SUPPOSITORY RECTAL EVERY 6 HOURS PRN
Status: DISCONTINUED | OUTPATIENT
Start: 2023-07-08 | End: 2023-07-21 | Stop reason: HOSPADM

## 2023-07-08 RX ORDER — METOPROLOL SUCCINATE 25 MG/1
25 TABLET, EXTENDED RELEASE ORAL NIGHTLY
Status: DISCONTINUED | OUTPATIENT
Start: 2023-07-08 | End: 2023-07-21 | Stop reason: HOSPADM

## 2023-07-08 RX ORDER — METOPROLOL SUCCINATE 25 MG/1
25 TABLET, EXTENDED RELEASE ORAL DAILY
Status: DISCONTINUED | OUTPATIENT
Start: 2023-07-08 | End: 2023-07-08

## 2023-07-08 RX ORDER — AMLODIPINE BESYLATE 5 MG/1
10 TABLET ORAL DAILY
Status: DISCONTINUED | OUTPATIENT
Start: 2023-07-08 | End: 2023-07-08

## 2023-07-08 RX ORDER — FERROUS SULFATE 325(65) MG
325 TABLET ORAL
Status: ON HOLD | COMMUNITY

## 2023-07-08 RX ORDER — SODIUM CHLORIDE 0.9 % (FLUSH) 0.9 %
5-40 SYRINGE (ML) INJECTION PRN
Status: DISCONTINUED | OUTPATIENT
Start: 2023-07-08 | End: 2023-07-21 | Stop reason: HOSPADM

## 2023-07-08 RX ORDER — SODIUM CHLORIDE 0.9 % (FLUSH) 0.9 %
5-40 SYRINGE (ML) INJECTION EVERY 12 HOURS SCHEDULED
Status: DISCONTINUED | OUTPATIENT
Start: 2023-07-08 | End: 2023-07-21 | Stop reason: HOSPADM

## 2023-07-08 RX ADMIN — METOPROLOL SUCCINATE 25 MG: 25 TABLET, EXTENDED RELEASE ORAL at 22:06

## 2023-07-08 RX ADMIN — IOPAMIDOL 100 ML: 755 INJECTION, SOLUTION INTRAVENOUS at 05:33

## 2023-07-08 RX ADMIN — SODIUM CHLORIDE, PRESERVATIVE FREE 10 ML: 5 INJECTION INTRAVENOUS at 22:07

## 2023-07-08 ASSESSMENT — PAIN SCALES - GENERAL
PAINLEVEL_OUTOF10: 8
PAINLEVEL_OUTOF10: 0
PAINLEVEL_OUTOF10: 0

## 2023-07-08 ASSESSMENT — PAIN - FUNCTIONAL ASSESSMENT: PAIN_FUNCTIONAL_ASSESSMENT: 0-10

## 2023-07-08 ASSESSMENT — PAIN DESCRIPTION - ORIENTATION: ORIENTATION: RIGHT;ANTERIOR

## 2023-07-08 ASSESSMENT — PAIN DESCRIPTION - LOCATION: LOCATION: NECK
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
## 2023-07-08 NOTE — ED NOTES
Bedside, Verbal, and Recorded shift change report given to [facility] (oncoming nurse) by Alvin Borden (offgoing nurse). Report included the following information Nurse Handoff Report, Index, ED Encounter Summary, ED SBAR, Adult Overview, Intake/Output, MAR, Recent Results, and Med Rec Status. Zuleyka Ocampo RN  07/08/23 3899

## 2023-07-09 ENCOUNTER — APPOINTMENT (OUTPATIENT)
Facility: HOSPITAL | Age: 63
DRG: 436 | End: 2023-07-09
Payer: COMMERCIAL

## 2023-07-09 PROBLEM — E80.6 HYPERBILIRUBINEMIA: Status: ACTIVE | Noted: 2023-07-09

## 2023-07-09 PROBLEM — Z95.2 H/O MECHANICAL AORTIC VALVE REPLACEMENT: Status: ACTIVE | Noted: 2017-03-01

## 2023-07-09 PROBLEM — R79.1 SUPRATHERAPEUTIC INR: Status: ACTIVE | Noted: 2023-07-09

## 2023-07-09 LAB
INR PPP: 12.4 (ref 0.9–1.1)
PROTHROMBIN TIME: 110.4 SEC (ref 9–11.1)

## 2023-07-09 PROCEDURE — 99222 1ST HOSP IP/OBS MODERATE 55: CPT | Performed by: INTERNAL MEDICINE

## 2023-07-09 PROCEDURE — 2580000003 HC RX 258: Performed by: PHYSICIAN ASSISTANT

## 2023-07-09 PROCEDURE — 6360000004 HC RX CONTRAST MEDICATION: Performed by: RADIOLOGY

## 2023-07-09 PROCEDURE — 74177 CT ABD & PELVIS W/CONTRAST: CPT

## 2023-07-09 PROCEDURE — 6370000000 HC RX 637 (ALT 250 FOR IP): Performed by: PHYSICIAN ASSISTANT

## 2023-07-09 PROCEDURE — 36415 COLL VENOUS BLD VENIPUNCTURE: CPT

## 2023-07-09 PROCEDURE — 85610 PROTHROMBIN TIME: CPT

## 2023-07-09 PROCEDURE — 1100000000 HC RM PRIVATE

## 2023-07-09 RX ADMIN — IOPAMIDOL 100 ML: 755 INJECTION, SOLUTION INTRAVENOUS at 08:54

## 2023-07-09 RX ADMIN — METOPROLOL SUCCINATE 25 MG: 25 TABLET, EXTENDED RELEASE ORAL at 21:39

## 2023-07-09 RX ADMIN — SODIUM CHLORIDE, PRESERVATIVE FREE 10 ML: 5 INJECTION INTRAVENOUS at 21:40

## 2023-07-09 RX ADMIN — SODIUM CHLORIDE, PRESERVATIVE FREE 10 ML: 5 INJECTION INTRAVENOUS at 10:07

## 2023-07-09 ASSESSMENT — PAIN SCALES - GENERAL
PAINLEVEL_OUTOF10: 0

## 2023-07-09 NOTE — CARE COORDINATION
Care Management Initial Assessment       RUR:  7%  Readmission? No  1st IM letter given? No  1st  letter given: No  Patient has [employer] Employee     Disposition   Home  Transportation   Wife  Evan Isbell  -- will arrange if ordered  --would use [facility] follow up   PCP and specialist    Contact  Wife  Mervat Burkett  [phone]       07/09/23 7616   Service Assessment   Patient Orientation Alert and Oriented   Cognition Alert   History Provided By Patient   Primary Caregiver Self   Support Systems Spouse/Significant Other;Children;Family Members   PCP Verified by CM Yes  Moe Armstrong)   Last Visit to PCP Within last 3 months   Prior Functional Level Independent in ADLs/IADLs   Current Functional Level Independent in ADLs/IADLs   Can patient return to prior living arrangement Yes   Ability to make needs known: Good   Family able to assist with home care needs: Yes   Would you like for me to discuss the discharge plan with any other family members/significant others, and if so, who? 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  No   Potential Assistance Purchasing Medications No  ([employer]  can aford medications)   Type of Home Care Services None   Patient expects to be discharged to: House   One/Two Story Residence Two story   Services At/After Discharge   Transition of Care

## 2023-07-10 ENCOUNTER — APPOINTMENT (OUTPATIENT)
Facility: HOSPITAL | Age: 63
DRG: 436 | End: 2023-07-10
Payer: COMMERCIAL

## 2023-07-10 LAB
ALBUMIN SERPL-MCNC: 3.2 G/DL (ref 3.5–5)
ALBUMIN/GLOB SERPL: 1 (ref 1.1–2.2)
ALP SERPL-CCNC: 845 U/L (ref 45–117)
ALT SERPL-CCNC: 169 U/L (ref 12–78)
ANION GAP SERPL CALC-SCNC: 4 MMOL/L (ref 5–15)
AST SERPL-CCNC: 110 U/L (ref 15–37)
BILIRUB SERPL-MCNC: 4.7 MG/DL (ref 0.2–1)
BUN SERPL-MCNC: 11 MG/DL (ref 6–20)
BUN/CREAT SERPL: 17 (ref 12–20)
CALCIUM SERPL-MCNC: 8.8 MG/DL (ref 8.5–10.1)
CHLORIDE SERPL-SCNC: 111 MMOL/L (ref 97–108)
CO2 SERPL-SCNC: 25 MMOL/L (ref 21–32)
CREAT SERPL-MCNC: 0.66 MG/DL (ref 0.7–1.3)
ERYTHROCYTE [DISTWIDTH] IN BLOOD BY AUTOMATED COUNT: 15.1 % (ref 11.5–14.5)
GLOBULIN SER CALC-MCNC: 3.2 G/DL (ref 2–4)
GLUCOSE SERPL-MCNC: 117 MG/DL (ref 65–100)
HCT VFR BLD AUTO: 40.1 % (ref 36.6–50.3)
HGB BLD-MCNC: 13 G/DL (ref 12.1–17)
INR PPP: 8.6 (ref 0.9–1.1)
MCH RBC QN AUTO: 28.3 PG (ref 26–34)
MCHC RBC AUTO-ENTMCNC: 32.4 G/DL (ref 30–36.5)
MCV RBC AUTO: 87.2 FL (ref 80–99)
NRBC # BLD: 0 K/UL (ref 0–0.01)
NRBC BLD-RTO: 0 PER 100 WBC
PLATELET # BLD AUTO: 204 K/UL (ref 150–400)
PMV BLD AUTO: 11.8 FL (ref 8.9–12.9)
POTASSIUM SERPL-SCNC: 4.1 MMOL/L (ref 3.5–5.1)
PROT SERPL-MCNC: 6.4 G/DL (ref 6.4–8.2)
PROTHROMBIN TIME: 78.4 SEC (ref 9–11.1)
RBC # BLD AUTO: 4.6 M/UL (ref 4.1–5.7)
SODIUM SERPL-SCNC: 140 MMOL/L (ref 136–145)
WBC # BLD AUTO: 7.1 K/UL (ref 4.1–11.1)

## 2023-07-10 PROCEDURE — 85027 COMPLETE CBC AUTOMATED: CPT

## 2023-07-10 PROCEDURE — 6360000004 HC RX CONTRAST MEDICATION

## 2023-07-10 PROCEDURE — 85610 PROTHROMBIN TIME: CPT

## 2023-07-10 PROCEDURE — 99233 SBSQ HOSP IP/OBS HIGH 50: CPT | Performed by: INTERNAL MEDICINE

## 2023-07-10 PROCEDURE — 2709999900 MRI ABDOMEN W WO CONTRAST MRCP

## 2023-07-10 PROCEDURE — A9579 GAD-BASE MR CONTRAST NOS,1ML: HCPCS

## 2023-07-10 PROCEDURE — 80053 COMPREHEN METABOLIC PANEL: CPT

## 2023-07-10 PROCEDURE — 36415 COLL VENOUS BLD VENIPUNCTURE: CPT

## 2023-07-10 PROCEDURE — 2580000003 HC RX 258: Performed by: PHYSICIAN ASSISTANT

## 2023-07-10 PROCEDURE — 6370000000 HC RX 637 (ALT 250 FOR IP): Performed by: PHYSICIAN ASSISTANT

## 2023-07-10 PROCEDURE — 1100000000 HC RM PRIVATE

## 2023-07-10 RX ADMIN — GADOTERIDOL 15 ML: 279.3 INJECTION, SOLUTION INTRAVENOUS at 17:38

## 2023-07-10 RX ADMIN — SODIUM CHLORIDE, PRESERVATIVE FREE 10 ML: 5 INJECTION INTRAVENOUS at 22:11

## 2023-07-10 RX ADMIN — METOPROLOL SUCCINATE 25 MG: 25 TABLET, EXTENDED RELEASE ORAL at 22:07

## 2023-07-10 RX ADMIN — SODIUM CHLORIDE, PRESERVATIVE FREE 10 ML: 5 INJECTION INTRAVENOUS at 08:37

## 2023-07-10 RX ADMIN — SODIUM CHLORIDE 100 ML: 900 INJECTION, SOLUTION INTRAVENOUS at 17:38

## 2023-07-10 ASSESSMENT — PAIN SCALES - GENERAL: PAINLEVEL_OUTOF10: 0

## 2023-07-11 LAB
ALBUMIN SERPL-MCNC: 3.2 G/DL (ref 3.5–5)
ALBUMIN/GLOB SERPL: 1.1 (ref 1.1–2.2)
ALP SERPL-CCNC: 883 U/L (ref 45–117)
ALT SERPL-CCNC: 173 U/L (ref 12–78)
ANION GAP SERPL CALC-SCNC: 5 MMOL/L (ref 5–15)
AST SERPL-CCNC: 115 U/L (ref 15–37)
BILIRUB SERPL-MCNC: 5.6 MG/DL (ref 0.2–1)
BUN SERPL-MCNC: 12 MG/DL (ref 6–20)
BUN/CREAT SERPL: 16 (ref 12–20)
CALCIUM SERPL-MCNC: 8.8 MG/DL (ref 8.5–10.1)
CEA SERPL-MCNC: 5.3 NG/ML
CHLORIDE SERPL-SCNC: 112 MMOL/L (ref 97–108)
CO2 SERPL-SCNC: 24 MMOL/L (ref 21–32)
CREAT SERPL-MCNC: 0.74 MG/DL (ref 0.7–1.3)
ERYTHROCYTE [DISTWIDTH] IN BLOOD BY AUTOMATED COUNT: 15.2 % (ref 11.5–14.5)
GLOBULIN SER CALC-MCNC: 2.9 G/DL (ref 2–4)
GLUCOSE SERPL-MCNC: 105 MG/DL (ref 65–100)
HCT VFR BLD AUTO: 33.1 % (ref 36.6–50.3)
HGB BLD-MCNC: 10.4 G/DL (ref 12.1–17)
INR PPP: 6 (ref 0.9–1.1)
MCH RBC QN AUTO: 28.7 PG (ref 26–34)
MCHC RBC AUTO-ENTMCNC: 31.4 G/DL (ref 30–36.5)
MCV RBC AUTO: 91.2 FL (ref 80–99)
NRBC # BLD: 0 K/UL (ref 0–0.01)
NRBC BLD-RTO: 0 PER 100 WBC
PLATELET # BLD AUTO: 158 K/UL (ref 150–400)
PMV BLD AUTO: 11.4 FL (ref 8.9–12.9)
POTASSIUM SERPL-SCNC: 4 MMOL/L (ref 3.5–5.1)
PROT SERPL-MCNC: 6.1 G/DL (ref 6.4–8.2)
PROTHROMBIN TIME: 55.6 SEC (ref 9–11.1)
RBC # BLD AUTO: 3.63 M/UL (ref 4.1–5.7)
SODIUM SERPL-SCNC: 141 MMOL/L (ref 136–145)
WBC # BLD AUTO: 5.8 K/UL (ref 4.1–11.1)

## 2023-07-11 PROCEDURE — 2580000003 HC RX 258: Performed by: PHYSICIAN ASSISTANT

## 2023-07-11 PROCEDURE — 85610 PROTHROMBIN TIME: CPT

## 2023-07-11 PROCEDURE — 85027 COMPLETE CBC AUTOMATED: CPT

## 2023-07-11 PROCEDURE — 6370000000 HC RX 637 (ALT 250 FOR IP): Performed by: PHYSICIAN ASSISTANT

## 2023-07-11 PROCEDURE — 1100000000 HC RM PRIVATE

## 2023-07-11 PROCEDURE — 80053 COMPREHEN METABOLIC PANEL: CPT

## 2023-07-11 PROCEDURE — 36415 COLL VENOUS BLD VENIPUNCTURE: CPT

## 2023-07-11 PROCEDURE — 82378 CARCINOEMBRYONIC ANTIGEN: CPT

## 2023-07-11 PROCEDURE — 86301 IMMUNOASSAY TUMOR CA 19-9: CPT

## 2023-07-11 RX ADMIN — METOPROLOL SUCCINATE 25 MG: 25 TABLET, EXTENDED RELEASE ORAL at 21:00

## 2023-07-11 RX ADMIN — SODIUM CHLORIDE, PRESERVATIVE FREE 10 ML: 5 INJECTION INTRAVENOUS at 22:40

## 2023-07-11 NOTE — PLAN OF CARE
Problem: Discharge Planning  Goal: Discharge to home or other facility with appropriate resources  Outcome: Progressing     Problem: Pain  Goal: Verbalizes/displays adequate comfort level or baseline comfort level  Outcome: Progressing  Flowsheets (Taken 7/10/2023 2207 by Josie Washington RN)  Verbalizes/displays adequate comfort level or baseline comfort level:   Encourage patient to monitor pain and request assistance   Implement non-pharmacological measures as appropriate and evaluate response   Consider cultural and social influences on pain and pain management   Notify Licensed Independent Practitioner if interventions unsuccessful or patient reports new pain   Administer analgesics based on type and severity of pain and evaluate response   Assess pain using appropriate pain scale     Problem: ABCDS Injury Assessment  Goal: Absence of physical injury  Outcome: Progressing

## 2023-07-12 LAB
ALBUMIN SERPL-MCNC: 3.1 G/DL (ref 3.5–5)
ALBUMIN/GLOB SERPL: 1 (ref 1.1–2.2)
ALP SERPL-CCNC: 882 U/L (ref 45–117)
ALT SERPL-CCNC: 188 U/L (ref 12–78)
ANION GAP SERPL CALC-SCNC: 3 MMOL/L (ref 5–15)
AST SERPL-CCNC: 132 U/L (ref 15–37)
BILIRUB SERPL-MCNC: 7 MG/DL (ref 0.2–1)
BUN SERPL-MCNC: 11 MG/DL (ref 6–20)
BUN/CREAT SERPL: 16 (ref 12–20)
CALCIUM SERPL-MCNC: 8.4 MG/DL (ref 8.5–10.1)
CANCER AG19-9 SERPL-ACNC: 56 U/ML (ref 0–35)
CHLORIDE SERPL-SCNC: 113 MMOL/L (ref 97–108)
CO2 SERPL-SCNC: 24 MMOL/L (ref 21–32)
CREAT SERPL-MCNC: 0.68 MG/DL (ref 0.7–1.3)
ERYTHROCYTE [DISTWIDTH] IN BLOOD BY AUTOMATED COUNT: 15.2 % (ref 11.5–14.5)
GLOBULIN SER CALC-MCNC: 3.1 G/DL (ref 2–4)
GLUCOSE SERPL-MCNC: 97 MG/DL (ref 65–100)
HCT VFR BLD AUTO: 38.6 % (ref 36.6–50.3)
HGB BLD-MCNC: 12.2 G/DL (ref 12.1–17)
INR PPP: 4.5 (ref 0.9–1.1)
MCH RBC QN AUTO: 28 PG (ref 26–34)
MCHC RBC AUTO-ENTMCNC: 31.6 G/DL (ref 30–36.5)
MCV RBC AUTO: 88.7 FL (ref 80–99)
NRBC # BLD: 0 K/UL (ref 0–0.01)
NRBC BLD-RTO: 0 PER 100 WBC
PLATELET # BLD AUTO: 195 K/UL (ref 150–400)
PMV BLD AUTO: 11.8 FL (ref 8.9–12.9)
POTASSIUM SERPL-SCNC: 4.2 MMOL/L (ref 3.5–5.1)
PROT SERPL-MCNC: 6.2 G/DL (ref 6.4–8.2)
PROTHROMBIN TIME: 42.6 SEC (ref 9–11.1)
RBC # BLD AUTO: 4.35 M/UL (ref 4.1–5.7)
SODIUM SERPL-SCNC: 140 MMOL/L (ref 136–145)
WBC # BLD AUTO: 6.9 K/UL (ref 4.1–11.1)

## 2023-07-12 PROCEDURE — 85610 PROTHROMBIN TIME: CPT

## 2023-07-12 PROCEDURE — 6370000000 HC RX 637 (ALT 250 FOR IP): Performed by: PHYSICIAN ASSISTANT

## 2023-07-12 PROCEDURE — 1100000000 HC RM PRIVATE

## 2023-07-12 PROCEDURE — 80053 COMPREHEN METABOLIC PANEL: CPT

## 2023-07-12 PROCEDURE — 36415 COLL VENOUS BLD VENIPUNCTURE: CPT

## 2023-07-12 PROCEDURE — 85027 COMPLETE CBC AUTOMATED: CPT

## 2023-07-12 PROCEDURE — 2580000003 HC RX 258: Performed by: PHYSICIAN ASSISTANT

## 2023-07-12 RX ADMIN — SODIUM CHLORIDE, PRESERVATIVE FREE 10 ML: 5 INJECTION INTRAVENOUS at 20:46

## 2023-07-12 RX ADMIN — METOPROLOL SUCCINATE 25 MG: 25 TABLET, EXTENDED RELEASE ORAL at 20:46

## 2023-07-13 LAB
ALBUMIN SERPL-MCNC: 2.9 G/DL (ref 3.5–5)
ALBUMIN/GLOB SERPL: 1 (ref 1.1–2.2)
ALP SERPL-CCNC: 921 U/L (ref 45–117)
ALT SERPL-CCNC: 199 U/L (ref 12–78)
ANION GAP SERPL CALC-SCNC: 4 MMOL/L (ref 5–15)
APTT PPP: 37.5 SEC (ref 22.1–31)
AST SERPL-CCNC: 125 U/L (ref 15–37)
BILIRUB SERPL-MCNC: 7.8 MG/DL (ref 0.2–1)
BUN SERPL-MCNC: 12 MG/DL (ref 6–20)
BUN/CREAT SERPL: 19 (ref 12–20)
CALCIUM SERPL-MCNC: 8.8 MG/DL (ref 8.5–10.1)
CANCER AG19-9 SERPL-ACNC: 59 U/ML (ref 0–35)
CHLORIDE SERPL-SCNC: 110 MMOL/L (ref 97–108)
CO2 SERPL-SCNC: 24 MMOL/L (ref 21–32)
COMMENT:: NORMAL
CREAT SERPL-MCNC: 0.62 MG/DL (ref 0.7–1.3)
ERYTHROCYTE [DISTWIDTH] IN BLOOD BY AUTOMATED COUNT: 15.3 % (ref 11.5–14.5)
GLOBULIN SER CALC-MCNC: 2.9 G/DL (ref 2–4)
GLUCOSE SERPL-MCNC: 110 MG/DL (ref 65–100)
HCT VFR BLD AUTO: 37 % (ref 36.6–50.3)
HGB BLD-MCNC: 12 G/DL (ref 12.1–17)
INR PPP: 2.4 (ref 0.9–1.1)
MCH RBC QN AUTO: 28.5 PG (ref 26–34)
MCHC RBC AUTO-ENTMCNC: 32.4 G/DL (ref 30–36.5)
MCV RBC AUTO: 87.9 FL (ref 80–99)
NRBC # BLD: 0 K/UL (ref 0–0.01)
NRBC BLD-RTO: 0 PER 100 WBC
PLATELET # BLD AUTO: 191 K/UL (ref 150–400)
PMV BLD AUTO: 11.4 FL (ref 8.9–12.9)
POTASSIUM SERPL-SCNC: 4 MMOL/L (ref 3.5–5.1)
PROT SERPL-MCNC: 5.8 G/DL (ref 6.4–8.2)
PROTHROMBIN TIME: 23.9 SEC (ref 9–11.1)
RBC # BLD AUTO: 4.21 M/UL (ref 4.1–5.7)
SODIUM SERPL-SCNC: 138 MMOL/L (ref 136–145)
SPECIMEN HOLD: NORMAL
THERAPEUTIC RANGE: ABNORMAL SECS (ref 58–77)
UFH PPP CHRO-ACNC: 0.18 IU/ML
UFH PPP CHRO-ACNC: 0.4 IU/ML
UFH PPP CHRO-ACNC: <0.1 IU/ML
WBC # BLD AUTO: 6.7 K/UL (ref 4.1–11.1)

## 2023-07-13 PROCEDURE — 80053 COMPREHEN METABOLIC PANEL: CPT

## 2023-07-13 PROCEDURE — 85730 THROMBOPLASTIN TIME PARTIAL: CPT

## 2023-07-13 PROCEDURE — 36415 COLL VENOUS BLD VENIPUNCTURE: CPT

## 2023-07-13 PROCEDURE — 6370000000 HC RX 637 (ALT 250 FOR IP): Performed by: PHYSICIAN ASSISTANT

## 2023-07-13 PROCEDURE — 94760 N-INVAS EAR/PLS OXIMETRY 1: CPT

## 2023-07-13 PROCEDURE — 85027 COMPLETE CBC AUTOMATED: CPT

## 2023-07-13 PROCEDURE — 85520 HEPARIN ASSAY: CPT

## 2023-07-13 PROCEDURE — 99232 SBSQ HOSP IP/OBS MODERATE 35: CPT | Performed by: INTERNAL MEDICINE

## 2023-07-13 PROCEDURE — 85610 PROTHROMBIN TIME: CPT

## 2023-07-13 PROCEDURE — 2580000003 HC RX 258: Performed by: PHYSICIAN ASSISTANT

## 2023-07-13 PROCEDURE — 6360000002 HC RX W HCPCS: Performed by: NURSE PRACTITIONER

## 2023-07-13 PROCEDURE — 1100000000 HC RM PRIVATE

## 2023-07-13 RX ORDER — HEPARIN SODIUM 1000 [USP'U]/ML
4000 INJECTION, SOLUTION INTRAVENOUS; SUBCUTANEOUS PRN
Status: DISCONTINUED | OUTPATIENT
Start: 2023-07-13 | End: 2023-07-21 | Stop reason: HOSPADM

## 2023-07-13 RX ORDER — HEPARIN SODIUM 1000 [USP'U]/ML
2000 INJECTION, SOLUTION INTRAVENOUS; SUBCUTANEOUS PRN
Status: DISCONTINUED | OUTPATIENT
Start: 2023-07-13 | End: 2023-07-21 | Stop reason: HOSPADM

## 2023-07-13 RX ORDER — HEPARIN SODIUM 10000 [USP'U]/100ML
5-30 INJECTION, SOLUTION INTRAVENOUS CONTINUOUS
Status: DISCONTINUED | OUTPATIENT
Start: 2023-07-13 | End: 2023-07-21 | Stop reason: HOSPADM

## 2023-07-13 RX ORDER — HEPARIN SODIUM 1000 [USP'U]/ML
4000 INJECTION, SOLUTION INTRAVENOUS; SUBCUTANEOUS ONCE
Status: COMPLETED | OUTPATIENT
Start: 2023-07-13 | End: 2023-07-13

## 2023-07-13 RX ADMIN — SODIUM CHLORIDE, PRESERVATIVE FREE 10 ML: 5 INJECTION INTRAVENOUS at 08:20

## 2023-07-13 RX ADMIN — HEPARIN SODIUM 12 UNITS/KG/HR: 10000 INJECTION, SOLUTION INTRAVENOUS at 10:22

## 2023-07-13 RX ADMIN — HEPARIN SODIUM 4000 UNITS: 1000 INJECTION INTRAVENOUS; SUBCUTANEOUS at 10:21

## 2023-07-13 RX ADMIN — SODIUM CHLORIDE, PRESERVATIVE FREE 10 ML: 5 INJECTION INTRAVENOUS at 22:05

## 2023-07-13 RX ADMIN — HEPARIN SODIUM 2000 UNITS: 1000 INJECTION INTRAVENOUS; SUBCUTANEOUS at 17:24

## 2023-07-13 RX ADMIN — METOPROLOL SUCCINATE 25 MG: 25 TABLET, EXTENDED RELEASE ORAL at 21:52

## 2023-07-13 NOTE — PLAN OF CARE
Problem: Discharge Planning  Goal: Discharge to home or other facility with appropriate resources  Outcome: Progressing     Problem: Pain  Goal: Verbalizes/displays adequate comfort level or baseline comfort level  Outcome: Progressing     Problem: ABCDS Injury Assessment  Goal: Absence of physical injury  Outcome: Progressing  Flowsheets (Taken 7/13/2023 6643)  Absence of Physical Injury: Implement safety measures based on patient assessment

## 2023-07-13 NOTE — CARE COORDINATION
Transition of Care: Plan for discharge home with wife when medically stable. Patient is independent and does not use any DME. Patient prefers [facility] for home health if [address] [facility] is needed. Noted GI and cardiology following, plans for EUS and biopsy on Friday. Contact: peyton Rosario  [phone]    2nd IMM needed prior to discharge.     CORINE HENRIQUEZ

## 2023-07-14 LAB
ALBUMIN SERPL-MCNC: 3 G/DL (ref 3.5–5)
ALBUMIN/GLOB SERPL: 1 (ref 1.1–2.2)
ALP SERPL-CCNC: 953 U/L (ref 45–117)
ALT SERPL-CCNC: 213 U/L (ref 12–78)
ANION GAP SERPL CALC-SCNC: 8 MMOL/L (ref 5–15)
AST SERPL-CCNC: 137 U/L (ref 15–37)
BILIRUB SERPL-MCNC: 8.8 MG/DL (ref 0.2–1)
BUN SERPL-MCNC: 15 MG/DL (ref 6–20)
BUN/CREAT SERPL: 20 (ref 12–20)
CALCIUM SERPL-MCNC: 9 MG/DL (ref 8.5–10.1)
CHLORIDE SERPL-SCNC: 110 MMOL/L (ref 97–108)
CO2 SERPL-SCNC: 24 MMOL/L (ref 21–32)
CREAT SERPL-MCNC: 0.76 MG/DL (ref 0.7–1.3)
ERYTHROCYTE [DISTWIDTH] IN BLOOD BY AUTOMATED COUNT: 15.5 % (ref 11.5–14.5)
GLOBULIN SER CALC-MCNC: 3 G/DL (ref 2–4)
GLUCOSE SERPL-MCNC: 137 MG/DL (ref 65–100)
HCT VFR BLD AUTO: 36.7 % (ref 36.6–50.3)
HGB BLD-MCNC: 11.7 G/DL (ref 12.1–17)
INR PPP: 2 (ref 0.9–1.1)
LIPASE SERPL-CCNC: 827 U/L (ref 73–393)
MCH RBC QN AUTO: 28.5 PG (ref 26–34)
MCHC RBC AUTO-ENTMCNC: 31.9 G/DL (ref 30–36.5)
MCV RBC AUTO: 89.3 FL (ref 80–99)
NRBC # BLD: 0 K/UL (ref 0–0.01)
NRBC BLD-RTO: 0 PER 100 WBC
PLATELET # BLD AUTO: 184 K/UL (ref 150–400)
PMV BLD AUTO: 12.2 FL (ref 8.9–12.9)
POTASSIUM SERPL-SCNC: 4.2 MMOL/L (ref 3.5–5.1)
PROT SERPL-MCNC: 6 G/DL (ref 6.4–8.2)
PROTHROMBIN TIME: 20 SEC (ref 9–11.1)
RBC # BLD AUTO: 4.11 M/UL (ref 4.1–5.7)
SODIUM SERPL-SCNC: 142 MMOL/L (ref 136–145)
UFH PPP CHRO-ACNC: 0.26 IU/ML
UFH PPP CHRO-ACNC: 0.29 IU/ML
WBC # BLD AUTO: 6.7 K/UL (ref 4.1–11.1)

## 2023-07-14 PROCEDURE — 85520 HEPARIN ASSAY: CPT

## 2023-07-14 PROCEDURE — 36415 COLL VENOUS BLD VENIPUNCTURE: CPT

## 2023-07-14 PROCEDURE — 2580000003 HC RX 258: Performed by: PHYSICIAN ASSISTANT

## 2023-07-14 PROCEDURE — 85027 COMPLETE CBC AUTOMATED: CPT

## 2023-07-14 PROCEDURE — 6360000002 HC RX W HCPCS: Performed by: NURSE PRACTITIONER

## 2023-07-14 PROCEDURE — 6370000000 HC RX 637 (ALT 250 FOR IP): Performed by: PHYSICIAN ASSISTANT

## 2023-07-14 PROCEDURE — 1100000000 HC RM PRIVATE

## 2023-07-14 PROCEDURE — 85610 PROTHROMBIN TIME: CPT

## 2023-07-14 PROCEDURE — 80053 COMPREHEN METABOLIC PANEL: CPT

## 2023-07-14 PROCEDURE — 83690 ASSAY OF LIPASE: CPT

## 2023-07-14 RX ADMIN — SODIUM CHLORIDE, PRESERVATIVE FREE 10 ML: 5 INJECTION INTRAVENOUS at 08:32

## 2023-07-14 RX ADMIN — HEPARIN SODIUM 18 UNITS/KG/HR: 10000 INJECTION, SOLUTION INTRAVENOUS at 18:40

## 2023-07-14 RX ADMIN — HEPARIN SODIUM 2000 UNITS: 1000 INJECTION INTRAVENOUS; SUBCUTANEOUS at 06:28

## 2023-07-14 RX ADMIN — HEPARIN SODIUM 2000 UNITS: 1000 INJECTION INTRAVENOUS; SUBCUTANEOUS at 18:38

## 2023-07-14 RX ADMIN — HEPARIN SODIUM 16 UNITS/KG/HR: 10000 INJECTION, SOLUTION INTRAVENOUS at 11:33

## 2023-07-14 RX ADMIN — METOPROLOL SUCCINATE 25 MG: 25 TABLET, EXTENDED RELEASE ORAL at 21:34

## 2023-07-14 RX ADMIN — SODIUM CHLORIDE, PRESERVATIVE FREE 10 ML: 5 INJECTION INTRAVENOUS at 21:35

## 2023-07-14 ASSESSMENT — PAIN SCALES - GENERAL: PAINLEVEL_OUTOF10: 0

## 2023-07-14 NOTE — PLAN OF CARE
Problem: Pain  Goal: Verbalizes/displays adequate comfort level or baseline comfort level  Flowsheets (Taken 7/14/2023 0343)  Verbalizes/displays adequate comfort level or baseline comfort level:   Assess pain using appropriate pain scale   Encourage patient to monitor pain and request assistance   Administer analgesics based on type and severity of pain and evaluate response   Implement non-pharmacological measures as appropriate and evaluate response   Consider cultural and social influences on pain and pain management     Problem: ABCDS Injury Assessment  Goal: Absence of physical injury  Flowsheets (Taken 7/14/2023 0343)  Absence of Physical Injury: Implement safety measures based on patient assessment

## 2023-07-14 NOTE — PLAN OF CARE
Problem: Discharge Planning  Goal: Discharge to home or other facility with appropriate resources  Outcome: Progressing     Problem: Pain  Goal: Verbalizes/displays adequate comfort level or baseline comfort level  7/14/2023 1535 by Kandace Beverly RN  Outcome: Progressing  7/14/2023 0343 by Jessica Juárez RN  Flowsheets (Taken 7/14/2023 0813)  Verbalizes/displays adequate comfort level or baseline comfort level:   Assess pain using appropriate pain scale   Encourage patient to monitor pain and request assistance   Administer analgesics based on type and severity of pain and evaluate response   Implement non-pharmacological measures as appropriate and evaluate response   Consider cultural and social influences on pain and pain management     Problem: ABCDS Injury Assessment  Goal: Absence of physical injury  7/14/2023 1535 by Kandace Beverly RN  Outcome: Progressing  Flowsheets (Taken 7/14/2023 1130)  Absence of Physical Injury: Implement safety measures based on patient assessment  7/14/2023 0343 by Jessica Juárez RN  Flowsheets (Taken 7/14/2023 1085)  Absence of Physical Injury: Implement safety measures based on patient assessment     Problem: Safety - Adult  Goal: Free from fall injury  Outcome: Progressing  Flowsheets (Taken 7/14/2023 1130)  Free From Fall Injury: Based on caregiver fall risk screen, instruct family/caregiver to ask for assistance with transferring infant if caregiver noted to have fall risk factors

## 2023-07-15 LAB
ALBUMIN SERPL-MCNC: 2.9 G/DL (ref 3.5–5)
ALBUMIN/GLOB SERPL: 0.9 (ref 1.1–2.2)
ALP SERPL-CCNC: 927 U/L (ref 45–117)
ALT SERPL-CCNC: 234 U/L (ref 12–78)
ANION GAP SERPL CALC-SCNC: 6 MMOL/L (ref 5–15)
AST SERPL-CCNC: 156 U/L (ref 15–37)
BILIRUB SERPL-MCNC: 9.6 MG/DL (ref 0.2–1)
BUN SERPL-MCNC: 9 MG/DL (ref 6–20)
BUN/CREAT SERPL: 14 (ref 12–20)
CALCIUM SERPL-MCNC: 8.9 MG/DL (ref 8.5–10.1)
CHLORIDE SERPL-SCNC: 110 MMOL/L (ref 97–108)
CO2 SERPL-SCNC: 23 MMOL/L (ref 21–32)
CREAT SERPL-MCNC: 0.65 MG/DL (ref 0.7–1.3)
ERYTHROCYTE [DISTWIDTH] IN BLOOD BY AUTOMATED COUNT: 15.7 % (ref 11.5–14.5)
GLOBULIN SER CALC-MCNC: 3.1 G/DL (ref 2–4)
GLUCOSE SERPL-MCNC: 98 MG/DL (ref 65–100)
HCT VFR BLD AUTO: 37.2 % (ref 36.6–50.3)
HGB BLD-MCNC: 12 G/DL (ref 12.1–17)
INR PPP: 1.7 (ref 0.9–1.1)
LIPASE SERPL-CCNC: 226 U/L (ref 73–393)
MCH RBC QN AUTO: 28.4 PG (ref 26–34)
MCHC RBC AUTO-ENTMCNC: 32.3 G/DL (ref 30–36.5)
MCV RBC AUTO: 87.9 FL (ref 80–99)
NRBC # BLD: 0 K/UL (ref 0–0.01)
NRBC BLD-RTO: 0 PER 100 WBC
PLATELET # BLD AUTO: 190 K/UL (ref 150–400)
PMV BLD AUTO: 12.3 FL (ref 8.9–12.9)
POTASSIUM SERPL-SCNC: 4.1 MMOL/L (ref 3.5–5.1)
PROT SERPL-MCNC: 6 G/DL (ref 6.4–8.2)
PROTHROMBIN TIME: 17.4 SEC (ref 9–11.1)
RBC # BLD AUTO: 4.23 M/UL (ref 4.1–5.7)
SODIUM SERPL-SCNC: 139 MMOL/L (ref 136–145)
UFH PPP CHRO-ACNC: 0.54 IU/ML
UFH PPP CHRO-ACNC: 0.57 IU/ML
WBC # BLD AUTO: 6 K/UL (ref 4.1–11.1)

## 2023-07-15 PROCEDURE — 99232 SBSQ HOSP IP/OBS MODERATE 35: CPT | Performed by: SPECIALIST

## 2023-07-15 PROCEDURE — 2580000003 HC RX 258: Performed by: PHYSICIAN ASSISTANT

## 2023-07-15 PROCEDURE — 80053 COMPREHEN METABOLIC PANEL: CPT

## 2023-07-15 PROCEDURE — 85610 PROTHROMBIN TIME: CPT

## 2023-07-15 PROCEDURE — 85027 COMPLETE CBC AUTOMATED: CPT

## 2023-07-15 PROCEDURE — 83690 ASSAY OF LIPASE: CPT

## 2023-07-15 PROCEDURE — 6370000000 HC RX 637 (ALT 250 FOR IP): Performed by: PHYSICIAN ASSISTANT

## 2023-07-15 PROCEDURE — 1100000000 HC RM PRIVATE

## 2023-07-15 PROCEDURE — 85520 HEPARIN ASSAY: CPT

## 2023-07-15 PROCEDURE — 6360000002 HC RX W HCPCS: Performed by: NURSE PRACTITIONER

## 2023-07-15 PROCEDURE — 36415 COLL VENOUS BLD VENIPUNCTURE: CPT

## 2023-07-15 RX ADMIN — SODIUM CHLORIDE, PRESERVATIVE FREE 10 ML: 5 INJECTION INTRAVENOUS at 22:04

## 2023-07-15 RX ADMIN — METOPROLOL SUCCINATE 25 MG: 25 TABLET, EXTENDED RELEASE ORAL at 22:02

## 2023-07-15 RX ADMIN — HEPARIN SODIUM 18 UNITS/KG/HR: 10000 INJECTION, SOLUTION INTRAVENOUS at 06:53

## 2023-07-15 ASSESSMENT — PAIN SCALES - GENERAL
PAINLEVEL_OUTOF10: 0

## 2023-07-16 LAB
ALBUMIN SERPL-MCNC: 2.9 G/DL (ref 3.5–5)
ALBUMIN/GLOB SERPL: 0.9 (ref 1.1–2.2)
ALP SERPL-CCNC: 922 U/L (ref 45–117)
ALT SERPL-CCNC: 243 U/L (ref 12–78)
ANION GAP SERPL CALC-SCNC: 9 MMOL/L (ref 5–15)
AST SERPL-CCNC: 160 U/L (ref 15–37)
BILIRUB SERPL-MCNC: 9.7 MG/DL (ref 0.2–1)
BUN SERPL-MCNC: 8 MG/DL (ref 6–20)
BUN/CREAT SERPL: 11 (ref 12–20)
CALCIUM SERPL-MCNC: 9 MG/DL (ref 8.5–10.1)
CHLORIDE SERPL-SCNC: 108 MMOL/L (ref 97–108)
CO2 SERPL-SCNC: 21 MMOL/L (ref 21–32)
CREAT SERPL-MCNC: 0.72 MG/DL (ref 0.7–1.3)
ERYTHROCYTE [DISTWIDTH] IN BLOOD BY AUTOMATED COUNT: 15.9 % (ref 11.5–14.5)
GLOBULIN SER CALC-MCNC: 3.2 G/DL (ref 2–4)
GLUCOSE SERPL-MCNC: 107 MG/DL (ref 65–100)
HCT VFR BLD AUTO: 36.6 % (ref 36.6–50.3)
HGB BLD-MCNC: 11.7 G/DL (ref 12.1–17)
INR PPP: 1.5 (ref 0.9–1.1)
LIPASE SERPL-CCNC: 684 U/L (ref 73–393)
MCH RBC QN AUTO: 28.1 PG (ref 26–34)
MCHC RBC AUTO-ENTMCNC: 32 G/DL (ref 30–36.5)
MCV RBC AUTO: 88 FL (ref 80–99)
NRBC # BLD: 0 K/UL (ref 0–0.01)
NRBC BLD-RTO: 0 PER 100 WBC
PLATELET # BLD AUTO: 186 K/UL (ref 150–400)
PMV BLD AUTO: 11.8 FL (ref 8.9–12.9)
POTASSIUM SERPL-SCNC: 5.4 MMOL/L (ref 3.5–5.1)
PROT SERPL-MCNC: 6.1 G/DL (ref 6.4–8.2)
PROTHROMBIN TIME: 15 SEC (ref 9–11.1)
RBC # BLD AUTO: 4.16 M/UL (ref 4.1–5.7)
SODIUM SERPL-SCNC: 138 MMOL/L (ref 136–145)
UFH PPP CHRO-ACNC: 0.53 IU/ML
WBC # BLD AUTO: 6.1 K/UL (ref 4.1–11.1)

## 2023-07-16 PROCEDURE — 6360000002 HC RX W HCPCS: Performed by: NURSE PRACTITIONER

## 2023-07-16 PROCEDURE — 1100000000 HC RM PRIVATE

## 2023-07-16 PROCEDURE — 85520 HEPARIN ASSAY: CPT

## 2023-07-16 PROCEDURE — 36415 COLL VENOUS BLD VENIPUNCTURE: CPT

## 2023-07-16 PROCEDURE — 85027 COMPLETE CBC AUTOMATED: CPT

## 2023-07-16 PROCEDURE — 80053 COMPREHEN METABOLIC PANEL: CPT

## 2023-07-16 PROCEDURE — 85610 PROTHROMBIN TIME: CPT

## 2023-07-16 PROCEDURE — 83690 ASSAY OF LIPASE: CPT

## 2023-07-16 RX ADMIN — HEPARIN SODIUM 18 UNITS/KG/HR: 10000 INJECTION, SOLUTION INTRAVENOUS at 00:17

## 2023-07-16 RX ADMIN — HEPARIN SODIUM 18 UNITS/KG/HR: 10000 INJECTION, SOLUTION INTRAVENOUS at 19:12

## 2023-07-16 ASSESSMENT — PAIN SCALES - GENERAL
PAINLEVEL_OUTOF10: 0
PAINLEVEL_OUTOF10: 0

## 2023-07-17 ENCOUNTER — APPOINTMENT (OUTPATIENT)
Facility: HOSPITAL | Age: 63
DRG: 436 | End: 2023-07-17
Payer: COMMERCIAL

## 2023-07-17 ENCOUNTER — ANESTHESIA EVENT (OUTPATIENT)
Facility: HOSPITAL | Age: 63
DRG: 436 | End: 2023-07-17
Payer: COMMERCIAL

## 2023-07-17 ENCOUNTER — HOSPITAL ENCOUNTER (INPATIENT)
Facility: HOSPITAL | Age: 63
DRG: 436 | End: 2023-07-17
Payer: COMMERCIAL

## 2023-07-17 ENCOUNTER — ANESTHESIA (OUTPATIENT)
Facility: HOSPITAL | Age: 63
DRG: 436 | End: 2023-07-17
Payer: COMMERCIAL

## 2023-07-17 LAB
ALBUMIN SERPL-MCNC: 3.2 G/DL (ref 3.5–5)
ALBUMIN/GLOB SERPL: 1 (ref 1.1–2.2)
ALP SERPL-CCNC: 916 U/L (ref 45–117)
ALT SERPL-CCNC: 274 U/L (ref 12–78)
ANION GAP SERPL CALC-SCNC: 5 MMOL/L (ref 5–15)
AST SERPL-CCNC: 166 U/L (ref 15–37)
BILIRUB SERPL-MCNC: 10.4 MG/DL (ref 0.2–1)
BUN SERPL-MCNC: 6 MG/DL (ref 6–20)
BUN/CREAT SERPL: 8 (ref 12–20)
CALCIUM SERPL-MCNC: 9.4 MG/DL (ref 8.5–10.1)
CHLORIDE SERPL-SCNC: 108 MMOL/L (ref 97–108)
CO2 SERPL-SCNC: 26 MMOL/L (ref 21–32)
CREAT SERPL-MCNC: 0.73 MG/DL (ref 0.7–1.3)
ERYTHROCYTE [DISTWIDTH] IN BLOOD BY AUTOMATED COUNT: 15.9 % (ref 11.5–14.5)
GLOBULIN SER CALC-MCNC: 3.3 G/DL (ref 2–4)
GLUCOSE SERPL-MCNC: 112 MG/DL (ref 65–100)
HCT VFR BLD AUTO: 38.8 % (ref 36.6–50.3)
HGB BLD-MCNC: 12.5 G/DL (ref 12.1–17)
INR PPP: 1.3 (ref 0.9–1.1)
MCH RBC QN AUTO: 28.4 PG (ref 26–34)
MCHC RBC AUTO-ENTMCNC: 32.2 G/DL (ref 30–36.5)
MCV RBC AUTO: 88.2 FL (ref 80–99)
NRBC # BLD: 0 K/UL (ref 0–0.01)
NRBC BLD-RTO: 0 PER 100 WBC
PLATELET # BLD AUTO: 197 K/UL (ref 150–400)
PMV BLD AUTO: 11.6 FL (ref 8.9–12.9)
POTASSIUM SERPL-SCNC: 4.1 MMOL/L (ref 3.5–5.1)
PROT SERPL-MCNC: 6.5 G/DL (ref 6.4–8.2)
PROTHROMBIN TIME: 13.7 SEC (ref 9–11.1)
RBC # BLD AUTO: 4.4 M/UL (ref 4.1–5.7)
SODIUM SERPL-SCNC: 139 MMOL/L (ref 136–145)
WBC # BLD AUTO: 7 K/UL (ref 4.1–11.1)

## 2023-07-17 PROCEDURE — 3600007513: Performed by: INTERNAL MEDICINE

## 2023-07-17 PROCEDURE — 80053 COMPREHEN METABOLIC PANEL: CPT

## 2023-07-17 PROCEDURE — 2709999900 HC NON-CHARGEABLE SUPPLY: Performed by: INTERNAL MEDICINE

## 2023-07-17 PROCEDURE — 1100000000 HC RM PRIVATE

## 2023-07-17 PROCEDURE — 2720000010 HC SURG SUPPLY STERILE: Performed by: INTERNAL MEDICINE

## 2023-07-17 PROCEDURE — C1769 GUIDE WIRE: HCPCS | Performed by: INTERNAL MEDICINE

## 2023-07-17 PROCEDURE — C1874 STENT, COATED/COV W/DEL SYS: HCPCS | Performed by: INTERNAL MEDICINE

## 2023-07-17 PROCEDURE — 36415 COLL VENOUS BLD VENIPUNCTURE: CPT

## 2023-07-17 PROCEDURE — 7100000011 HC PHASE II RECOVERY - ADDTL 15 MIN: Performed by: INTERNAL MEDICINE

## 2023-07-17 PROCEDURE — 0FDG3ZX EXTRACTION OF PANCREAS, PERCUTANEOUS APPROACH, DIAGNOSTIC: ICD-10-PCS | Performed by: INTERNAL MEDICINE

## 2023-07-17 PROCEDURE — 0FD98ZX EXTRACTION OF COMMON BILE DUCT, VIA NATURAL OR ARTIFICIAL OPENING ENDOSCOPIC, DIAGNOSTIC: ICD-10-PCS | Performed by: INTERNAL MEDICINE

## 2023-07-17 PROCEDURE — 6360000004 HC RX CONTRAST MEDICATION

## 2023-07-17 PROCEDURE — 6360000002 HC RX W HCPCS: Performed by: NURSE ANESTHETIST, CERTIFIED REGISTERED

## 2023-07-17 PROCEDURE — 88307 TISSUE EXAM BY PATHOLOGIST: CPT

## 2023-07-17 PROCEDURE — 6370000000 HC RX 637 (ALT 250 FOR IP): Performed by: PHYSICIAN ASSISTANT

## 2023-07-17 PROCEDURE — 3700000001 HC ADD 15 MINUTES (ANESTHESIA): Performed by: INTERNAL MEDICINE

## 2023-07-17 PROCEDURE — 2500000003 HC RX 250 WO HCPCS: Performed by: NURSE ANESTHETIST, CERTIFIED REGISTERED

## 2023-07-17 PROCEDURE — 3600007503: Performed by: INTERNAL MEDICINE

## 2023-07-17 PROCEDURE — 85027 COMPLETE CBC AUTOMATED: CPT

## 2023-07-17 PROCEDURE — 85610 PROTHROMBIN TIME: CPT

## 2023-07-17 PROCEDURE — 0F798DZ DILATION OF COMMON BILE DUCT WITH INTRALUMINAL DEVICE, VIA NATURAL OR ARTIFICIAL OPENING ENDOSCOPIC: ICD-10-PCS | Performed by: INTERNAL MEDICINE

## 2023-07-17 PROCEDURE — 6360000002 HC RX W HCPCS: Performed by: INTERNAL MEDICINE

## 2023-07-17 PROCEDURE — 2580000003 HC RX 258: Performed by: INTERNAL MEDICINE

## 2023-07-17 PROCEDURE — 2580000003 HC RX 258: Performed by: PHYSICIAN ASSISTANT

## 2023-07-17 PROCEDURE — 7100000010 HC PHASE II RECOVERY - FIRST 15 MIN: Performed by: INTERNAL MEDICINE

## 2023-07-17 PROCEDURE — 3700000000 HC ANESTHESIA ATTENDED CARE: Performed by: INTERNAL MEDICINE

## 2023-07-17 PROCEDURE — 88112 CYTOPATH CELL ENHANCE TECH: CPT

## 2023-07-17 DEVICE — STENT SYSTEM RMV
Type: IMPLANTABLE DEVICE | Site: BILE DUCT | Status: FUNCTIONAL
Brand: WALLFLEX BILIARY

## 2023-07-17 RX ORDER — SODIUM CHLORIDE 9 MG/ML
25 INJECTION, SOLUTION INTRAVENOUS PRN
Status: DISCONTINUED | OUTPATIENT
Start: 2023-07-17 | End: 2023-07-17 | Stop reason: HOSPADM

## 2023-07-17 RX ORDER — PHENYLEPHRINE HCL IN 0.9% NACL 0.4MG/10ML
SYRINGE (ML) INTRAVENOUS PRN
Status: DISCONTINUED | OUTPATIENT
Start: 2023-07-17 | End: 2023-07-17 | Stop reason: SDUPTHER

## 2023-07-17 RX ORDER — ROCURONIUM BROMIDE 10 MG/ML
INJECTION, SOLUTION INTRAVENOUS PRN
Status: DISCONTINUED | OUTPATIENT
Start: 2023-07-17 | End: 2023-07-17 | Stop reason: SDUPTHER

## 2023-07-17 RX ORDER — SODIUM CHLORIDE 9 MG/ML
INJECTION, SOLUTION INTRAVENOUS CONTINUOUS
Status: DISCONTINUED | OUTPATIENT
Start: 2023-07-17 | End: 2023-07-21 | Stop reason: HOSPADM

## 2023-07-17 RX ORDER — SUCCINYLCHOLINE CHLORIDE 20 MG/ML
INJECTION INTRAMUSCULAR; INTRAVENOUS PRN
Status: DISCONTINUED | OUTPATIENT
Start: 2023-07-17 | End: 2023-07-17 | Stop reason: SDUPTHER

## 2023-07-17 RX ORDER — LIDOCAINE HYDROCHLORIDE 20 MG/ML
INJECTION, SOLUTION EPIDURAL; INFILTRATION; INTRACAUDAL; PERINEURAL PRN
Status: DISCONTINUED | OUTPATIENT
Start: 2023-07-17 | End: 2023-07-17 | Stop reason: SDUPTHER

## 2023-07-17 RX ORDER — FENTANYL CITRATE 50 UG/ML
INJECTION, SOLUTION INTRAMUSCULAR; INTRAVENOUS PRN
Status: DISCONTINUED | OUTPATIENT
Start: 2023-07-17 | End: 2023-07-17 | Stop reason: SDUPTHER

## 2023-07-17 RX ORDER — SODIUM CHLORIDE 0.9 % (FLUSH) 0.9 %
5-40 SYRINGE (ML) INJECTION EVERY 12 HOURS SCHEDULED
Status: DISCONTINUED | OUTPATIENT
Start: 2023-07-17 | End: 2023-07-17 | Stop reason: HOSPADM

## 2023-07-17 RX ORDER — DEXAMETHASONE SODIUM PHOSPHATE 4 MG/ML
INJECTION, SOLUTION INTRA-ARTICULAR; INTRALESIONAL; INTRAMUSCULAR; INTRAVENOUS; SOFT TISSUE PRN
Status: DISCONTINUED | OUTPATIENT
Start: 2023-07-17 | End: 2023-07-17 | Stop reason: SDUPTHER

## 2023-07-17 RX ORDER — TRAMADOL HYDROCHLORIDE 50 MG/1
50 TABLET ORAL EVERY 6 HOURS PRN
Status: DISCONTINUED | OUTPATIENT
Start: 2023-07-17 | End: 2023-07-18

## 2023-07-17 RX ORDER — SODIUM CHLORIDE 0.9 % (FLUSH) 0.9 %
5-40 SYRINGE (ML) INJECTION PRN
Status: DISCONTINUED | OUTPATIENT
Start: 2023-07-17 | End: 2023-07-17 | Stop reason: HOSPADM

## 2023-07-17 RX ORDER — ONDANSETRON 2 MG/ML
INJECTION INTRAMUSCULAR; INTRAVENOUS PRN
Status: DISCONTINUED | OUTPATIENT
Start: 2023-07-17 | End: 2023-07-17 | Stop reason: SDUPTHER

## 2023-07-17 RX ORDER — HYDROMORPHONE HYDROCHLORIDE 1 MG/ML
1 INJECTION, SOLUTION INTRAMUSCULAR; INTRAVENOUS; SUBCUTANEOUS EVERY 4 HOURS PRN
Status: DISCONTINUED | OUTPATIENT
Start: 2023-07-17 | End: 2023-07-21 | Stop reason: HOSPADM

## 2023-07-17 RX ADMIN — Medication 160 MCG: at 17:39

## 2023-07-17 RX ADMIN — Medication 80 MCG: at 17:15

## 2023-07-17 RX ADMIN — SODIUM CHLORIDE: 9 INJECTION, SOLUTION INTRAVENOUS at 16:35

## 2023-07-17 RX ADMIN — METOPROLOL SUCCINATE 25 MG: 25 TABLET, EXTENDED RELEASE ORAL at 20:25

## 2023-07-17 RX ADMIN — HYDROMORPHONE HYDROCHLORIDE 1 MG: 1 INJECTION, SOLUTION INTRAMUSCULAR; INTRAVENOUS; SUBCUTANEOUS at 19:15

## 2023-07-17 RX ADMIN — FENTANYL CITRATE 50 MCG: 50 INJECTION, SOLUTION INTRAMUSCULAR; INTRAVENOUS at 17:52

## 2023-07-17 RX ADMIN — Medication 160 MCG: at 17:17

## 2023-07-17 RX ADMIN — ROCURONIUM BROMIDE 10 MG: 10 SOLUTION INTRAVENOUS at 17:04

## 2023-07-17 RX ADMIN — DEXAMETHASONE SODIUM PHOSPHATE 4 MG: 4 INJECTION, SOLUTION INTRAMUSCULAR; INTRAVENOUS at 17:04

## 2023-07-17 RX ADMIN — FENTANYL CITRATE 50 MCG: 50 INJECTION, SOLUTION INTRAMUSCULAR; INTRAVENOUS at 17:04

## 2023-07-17 RX ADMIN — HYDROMORPHONE HYDROCHLORIDE 1 MG: 1 INJECTION, SOLUTION INTRAMUSCULAR; INTRAVENOUS; SUBCUTANEOUS at 23:09

## 2023-07-17 RX ADMIN — PROPOFOL 150 MG: 10 INJECTION, EMULSION INTRAVENOUS at 17:04

## 2023-07-17 RX ADMIN — SODIUM CHLORIDE, PRESERVATIVE FREE 10 ML: 5 INJECTION INTRAVENOUS at 08:33

## 2023-07-17 RX ADMIN — ONDANSETRON HYDROCHLORIDE 4 MG: 2 INJECTION, SOLUTION INTRAMUSCULAR; INTRAVENOUS at 17:58

## 2023-07-17 RX ADMIN — IOPAMIDOL 10 ML: 612 INJECTION, SOLUTION INTRAVENOUS at 18:01

## 2023-07-17 RX ADMIN — SUCCINYLCHOLINE CHLORIDE 160 MG: 20 INJECTION, SOLUTION INTRAMUSCULAR; INTRAVENOUS at 17:04

## 2023-07-17 RX ADMIN — LIDOCAINE HYDROCHLORIDE 40 MG: 20 INJECTION, SOLUTION EPIDURAL; INFILTRATION; INTRACAUDAL; PERINEURAL at 17:04

## 2023-07-17 ASSESSMENT — PAIN SCALES - GENERAL
PAINLEVEL_OUTOF10: 4
PAINLEVEL_OUTOF10: 0
PAINLEVEL_OUTOF10: 8

## 2023-07-17 ASSESSMENT — PAIN - FUNCTIONAL ASSESSMENT: PAIN_FUNCTIONAL_ASSESSMENT: PREVENTS OR INTERFERES SOME ACTIVE ACTIVITIES AND ADLS

## 2023-07-17 ASSESSMENT — PAIN DESCRIPTION - LOCATION: LOCATION: CHEST

## 2023-07-17 ASSESSMENT — PAIN DESCRIPTION - DESCRIPTORS: DESCRIPTORS: SHARP

## 2023-07-17 ASSESSMENT — PAIN SCALES - WONG BAKER: WONGBAKER_NUMERICALRESPONSE: 2

## 2023-07-17 ASSESSMENT — PAIN DESCRIPTION - ORIENTATION: ORIENTATION: MID

## 2023-07-17 NOTE — OP NOTE
[address], [address] [place] [address]  [phone]                                Endoscopic Ultrasound    NAME:  Stephanie Masterson   :   1960   MRN:   [id number]       Date/Time:  2023   Procedure Type: Linear Upper EUS with FNB      Indications: Pancreatic mass    Pre-operative Diagnosis: see indication above    Post-operative Diagnosis:  See findings below    : Oralia Moyer MD    Surgical Assistant: Circulator: Jose A Fernandez RN  Endoscopy Technician: Thelma Fuentes    Implants: none    Referring Provider: Terry Johnson MD    Anethesia/Sedation:  General anesthesia      Procedure Details   After infom consent was obtained for the procedure, with all risks and benefits of procedure explained the patient was taken to the endoscopy suite and placed in the left lateral decubitus position. Following sequential administration of sedation as per above, the linear echoendoscope was inserted into the mouth and advanced under direct vision to second portion of the duodenum. A careful inspection was made as the gastroscope was withdrawn, including a retroflexed view of the proximal stomach; findings and interventions are described below. Findings:     Endoscopic:   Esophagus:Normal mucosa. Small sliding hiatal hernia was noted. Stomach: normal    Duodenum/jejunum: normal    Ultrasound:   Esophagus: not examined   Stomach: not examined   Pancreas:     Areas examined: the entire gland    Parenchyma: -A heterogenous, ill defined mass was noted in head of pancreas was noted. This measured about 43 mm X 42 mm in size. Mass was noted to obstruct the pancreatic duct in the head region as well as the common bile duct. Rest of the pancreas was mildly atrophic with diffuse tortuous dilation of the main pancreatic duct.      Pancreatic Duct: obstructed  the head    Liver:     Parenchyma: not examined    Gallbladder:  distended with stones    Bile Duct: the common

## 2023-07-17 NOTE — DISCHARGE INSTR - DIET

## 2023-07-17 NOTE — ANESTHESIA POSTPROCEDURE EVALUATION
Department of Anesthesiology  Postprocedure Note    Patient: Taiwo Bustamante  MRN: [id number]  YOB: 1960  Date of evaluation: 7/17/2023      Procedure Summary     Date: 07/17/23 Room / Location: [facility] ENDO [address] [place] [address] ENDOSCOPY    Anesthesia Start: 1653 Anesthesia Stop: 1813    Procedures:       ENDOSCOPIC ULTRASOUND (Upper GI Region)      ERCP ENDOSCOPIC RETROGRADE CHOLANGIOPANCREATOGRAPHY (Upper GI Region)      EGD ESOPHAGOGASTRODUODENOSCOPY      ERCP SPHINCTER/PAPILLOTOMY Diagnosis:       Malignant neoplasm of pancreas, unspecified location of malignancy ([address])      (Malignant neoplasm of pancreas, unspecified location of malignancy ([address]) [C25.9])    Surgeons: Daxa Gonzalez MD Responsible Provider: Fernanda Schaumann, MD    Anesthesia Type: general ASA Status: 2          Anesthesia Type: No value filed.     Rambo Phase I: Rambo Score: 10    Rambo Phase II: Rambo Score: 8      Anesthesia Post Evaluation    Patient location during evaluation: PACU  Patient participation: complete - patient participated  Level of consciousness: awake  Pain score: 2  Airway patency: patent  Nausea & Vomiting: no nausea  Complications: no  Cardiovascular status: blood pressure returned to baseline  Respiratory status: acceptable  Hydration status: euvolemic

## 2023-07-17 NOTE — OP NOTE
[address], [address] [place] [address]  [phone]                   ERCP NOTE    NAME:  Taiwo Bustamante   :   1960   MRN:   [id number]     Date/Time:  2023 6:04 PM    Procedure Type:   ERCPwith biliary sphincterotomy, biliary sampling, biliary stent placement     Indications: jaundice, abnormal CT/MRCP, biliary obstruction  Pre-operative Diagnosis: see indication above  Post-operative Diagnosis:  See findings below  : Daxa Gonzalez MD    Staff: Circulator: Shelley Ness RN  Endoscopy Technician: Isobel Severs     Implants: 1 fully covered biliary metallic stent 8 mm x 60 mm was placed in the common bile duct (Intervolve)    Referring Provider:     Dee Barros MD    Sedation:  General anesthesia    Procedure Details:  After informed consent was obtained with all risks and benefits of procedure explained, the patient was taken to the fluoroscopy suite and placed in the prone position. Upon sequential sedation as per above, the Olympus duodenoscope [id number]   was inserted via the mouthpeice and carefully advanced to the second portion of the duodenum. The quality of visualization was good. The duodenoscope was withdrawn into a short position. Findings:   Esophagus:indirect visualization  Stomach: indirect visualization  Duodenum/jejunum: indirect visualization  Ampulla:small   Cholangiogram: -Common bile duct was selectively cannulated using dream wire. Contrast was injected. 1 tight stricture was noted in the intrapancreatic portion of the common bile duct and was about 2 cm in length. Common bile duct proximal to the stricture was diffusely dilated. No filling defects were noted. Biliary sphincterotomy was performed using E RBE. Biliary brushings were obtained using Squabbler biliary brush. 1 fully covered biliary metallic stent 8 mm x 60 mm was placed in the common bile duct.   Stent was in good position and dark bile started

## 2023-07-18 ENCOUNTER — APPOINTMENT (OUTPATIENT)
Facility: HOSPITAL | Age: 63
DRG: 436 | End: 2023-07-18
Payer: COMMERCIAL

## 2023-07-18 LAB
ALBUMIN SERPL-MCNC: 2.8 G/DL (ref 3.5–5)
ALBUMIN/GLOB SERPL: 0.8 (ref 1.1–2.2)
ALP SERPL-CCNC: 828 U/L (ref 45–117)
ALT SERPL-CCNC: 261 U/L (ref 12–78)
ANION GAP SERPL CALC-SCNC: 4 MMOL/L (ref 5–15)
AST SERPL-CCNC: 151 U/L (ref 15–37)
BILIRUB SERPL-MCNC: 6.7 MG/DL (ref 0.2–1)
BUN SERPL-MCNC: 9 MG/DL (ref 6–20)
BUN/CREAT SERPL: 14 (ref 12–20)
CALCIUM SERPL-MCNC: 9 MG/DL (ref 8.5–10.1)
CHLORIDE SERPL-SCNC: 106 MMOL/L (ref 97–108)
CO2 SERPL-SCNC: 24 MMOL/L (ref 21–32)
CREAT SERPL-MCNC: 0.66 MG/DL (ref 0.7–1.3)
GLOBULIN SER CALC-MCNC: 3.7 G/DL (ref 2–4)
GLUCOSE SERPL-MCNC: 125 MG/DL (ref 65–100)
INR PPP: 1.4 (ref 0.9–1.1)
POTASSIUM SERPL-SCNC: 4.5 MMOL/L (ref 3.5–5.1)
PROT SERPL-MCNC: 6.5 G/DL (ref 6.4–8.2)
PROTHROMBIN TIME: 14.7 SEC (ref 9–11.1)
SODIUM SERPL-SCNC: 134 MMOL/L (ref 136–145)

## 2023-07-18 PROCEDURE — 6360000002 HC RX W HCPCS: Performed by: INTERNAL MEDICINE

## 2023-07-18 PROCEDURE — 2580000003 HC RX 258: Performed by: PHYSICIAN ASSISTANT

## 2023-07-18 PROCEDURE — 80053 COMPREHEN METABOLIC PANEL: CPT

## 2023-07-18 PROCEDURE — 6360000002 HC RX W HCPCS: Performed by: NURSE PRACTITIONER

## 2023-07-18 PROCEDURE — 1100000000 HC RM PRIVATE

## 2023-07-18 PROCEDURE — 6370000000 HC RX 637 (ALT 250 FOR IP): Performed by: PHYSICIAN ASSISTANT

## 2023-07-18 PROCEDURE — 36415 COLL VENOUS BLD VENIPUNCTURE: CPT

## 2023-07-18 PROCEDURE — 0FB13ZX EXCISION OF RIGHT LOBE LIVER, PERCUTANEOUS APPROACH, DIAGNOSTIC: ICD-10-PCS | Performed by: RADIOLOGY

## 2023-07-18 PROCEDURE — 85610 PROTHROMBIN TIME: CPT

## 2023-07-18 PROCEDURE — 82787 IGG 1 2 3 OR 4 EACH: CPT

## 2023-07-18 PROCEDURE — 2500000003 HC RX 250 WO HCPCS: Performed by: NURSE PRACTITIONER

## 2023-07-18 PROCEDURE — 47000 NEEDLE BIOPSY OF LIVER PERQ: CPT

## 2023-07-18 PROCEDURE — 88333 PATH CONSLTJ SURG CYTO XM 1: CPT

## 2023-07-18 PROCEDURE — 6370000000 HC RX 637 (ALT 250 FOR IP): Performed by: FAMILY MEDICINE

## 2023-07-18 PROCEDURE — 88307 TISSUE EXAM BY PATHOLOGIST: CPT

## 2023-07-18 RX ORDER — MIDAZOLAM HYDROCHLORIDE 2 MG/2ML
INJECTION, SOLUTION INTRAMUSCULAR; INTRAVENOUS PRN
Status: COMPLETED | OUTPATIENT
Start: 2023-07-18 | End: 2023-07-18

## 2023-07-18 RX ORDER — LIDOCAINE HYDROCHLORIDE 10 MG/ML
INJECTION, SOLUTION EPIDURAL; INFILTRATION; INTRACAUDAL; PERINEURAL PRN
Status: COMPLETED | OUTPATIENT
Start: 2023-07-18 | End: 2023-07-18

## 2023-07-18 RX ORDER — FENTANYL CITRATE 50 UG/ML
INJECTION, SOLUTION INTRAMUSCULAR; INTRAVENOUS PRN
Status: COMPLETED | OUTPATIENT
Start: 2023-07-18 | End: 2023-07-18

## 2023-07-18 RX ORDER — OXYCODONE HYDROCHLORIDE AND ACETAMINOPHEN 5; 325 MG/1; MG/1
1 TABLET ORAL EVERY 4 HOURS PRN
Status: DISCONTINUED | OUTPATIENT
Start: 2023-07-18 | End: 2023-07-21 | Stop reason: HOSPADM

## 2023-07-18 RX ADMIN — FENTANYL CITRATE 25 MCG: 50 INJECTION, SOLUTION INTRAMUSCULAR; INTRAVENOUS at 14:23

## 2023-07-18 RX ADMIN — HYDROMORPHONE HYDROCHLORIDE 1 MG: 1 INJECTION, SOLUTION INTRAMUSCULAR; INTRAVENOUS; SUBCUTANEOUS at 05:00

## 2023-07-18 RX ADMIN — HEPARIN SODIUM 18 UNITS/KG/HR: 10000 INJECTION, SOLUTION INTRAVENOUS at 21:51

## 2023-07-18 RX ADMIN — SODIUM CHLORIDE, PRESERVATIVE FREE 10 ML: 5 INJECTION INTRAVENOUS at 21:40

## 2023-07-18 RX ADMIN — METOPROLOL SUCCINATE 25 MG: 25 TABLET, EXTENDED RELEASE ORAL at 21:40

## 2023-07-18 RX ADMIN — FENTANYL CITRATE 50 MCG: 50 INJECTION, SOLUTION INTRAMUSCULAR; INTRAVENOUS at 14:15

## 2023-07-18 RX ADMIN — LIDOCAINE HYDROCHLORIDE 2 ML: 10 INJECTION, SOLUTION EPIDURAL; INFILTRATION; INTRACAUDAL; PERINEURAL at 14:15

## 2023-07-18 RX ADMIN — MIDAZOLAM HYDROCHLORIDE 1 MG: 1 INJECTION, SOLUTION INTRAMUSCULAR; INTRAVENOUS at 14:18

## 2023-07-18 RX ADMIN — MIDAZOLAM HYDROCHLORIDE 1 MG: 1 INJECTION, SOLUTION INTRAMUSCULAR; INTRAVENOUS at 14:15

## 2023-07-18 RX ADMIN — HYDROMORPHONE HYDROCHLORIDE 1 MG: 1 INJECTION, SOLUTION INTRAMUSCULAR; INTRAVENOUS; SUBCUTANEOUS at 21:41

## 2023-07-18 RX ADMIN — HYDROMORPHONE HYDROCHLORIDE 1 MG: 1 INJECTION, SOLUTION INTRAMUSCULAR; INTRAVENOUS; SUBCUTANEOUS at 17:33

## 2023-07-18 RX ADMIN — SODIUM CHLORIDE, PRESERVATIVE FREE 10 ML: 5 INJECTION INTRAVENOUS at 08:35

## 2023-07-18 RX ADMIN — FENTANYL CITRATE 25 MCG: 50 INJECTION, SOLUTION INTRAMUSCULAR; INTRAVENOUS at 14:19

## 2023-07-18 RX ADMIN — OXYCODONE AND ACETAMINOPHEN 1 TABLET: 5; 325 TABLET ORAL at 10:23

## 2023-07-18 ASSESSMENT — PAIN SCALES - GENERAL
PAINLEVEL_OUTOF10: 2
PAINLEVEL_OUTOF10: 0
PAINLEVEL_OUTOF10: 7
PAINLEVEL_OUTOF10: 0
PAINLEVEL_OUTOF10: 7
PAINLEVEL_OUTOF10: 0
PAINLEVEL_OUTOF10: 2
PAINLEVEL_OUTOF10: 0
PAINLEVEL_OUTOF10: 5
PAINLEVEL_OUTOF10: 2
PAINLEVEL_OUTOF10: 0
PAINLEVEL_OUTOF10: 5
PAINLEVEL_OUTOF10: 0

## 2023-07-18 ASSESSMENT — PAIN DESCRIPTION - ORIENTATION
ORIENTATION: MID
ORIENTATION: RIGHT;LOWER

## 2023-07-18 ASSESSMENT — PAIN DESCRIPTION - DESCRIPTORS
DESCRIPTORS: ACHING
DESCRIPTORS: ACHING
DESCRIPTORS: DULL;ACHING
DESCRIPTORS: ACHING

## 2023-07-18 ASSESSMENT — PAIN DESCRIPTION - LOCATION
LOCATION: ABDOMEN
LOCATION: CHEST

## 2023-07-18 ASSESSMENT — PAIN DESCRIPTION - FREQUENCY: FREQUENCY: CONTINUOUS

## 2023-07-19 ENCOUNTER — APPOINTMENT (OUTPATIENT)
Facility: HOSPITAL | Age: 63
DRG: 436 | End: 2023-07-19
Payer: COMMERCIAL

## 2023-07-19 PROBLEM — Z79.01 LONG TERM (CURRENT) USE OF ANTICOAGULANTS: Status: ACTIVE | Noted: 2023-07-19

## 2023-07-19 PROBLEM — R74.8 ABNORMAL TRANSAMINASES: Status: ACTIVE | Noted: 2023-07-19

## 2023-07-19 LAB
ALBUMIN SERPL-MCNC: 3.2 G/DL (ref 3.5–5)
ALBUMIN/GLOB SERPL: 0.9 (ref 1.1–2.2)
ALP SERPL-CCNC: 884 U/L (ref 45–117)
ALT SERPL-CCNC: 286 U/L (ref 12–78)
ANION GAP SERPL CALC-SCNC: 4 MMOL/L (ref 5–15)
AST SERPL-CCNC: 157 U/L (ref 15–37)
BILIRUB SERPL-MCNC: 4.9 MG/DL (ref 0.2–1)
BUN SERPL-MCNC: 10 MG/DL (ref 6–20)
BUN/CREAT SERPL: 11 (ref 12–20)
CALCIUM SERPL-MCNC: 9 MG/DL (ref 8.5–10.1)
CHLORIDE SERPL-SCNC: 106 MMOL/L (ref 97–108)
CO2 SERPL-SCNC: 29 MMOL/L (ref 21–32)
CREAT SERPL-MCNC: 0.93 MG/DL (ref 0.7–1.3)
ERYTHROCYTE [DISTWIDTH] IN BLOOD BY AUTOMATED COUNT: 16.1 % (ref 11.5–14.5)
GLOBULIN SER CALC-MCNC: 3.7 G/DL (ref 2–4)
GLUCOSE SERPL-MCNC: 114 MG/DL (ref 65–100)
HCT VFR BLD AUTO: 39.5 % (ref 36.6–50.3)
HGB BLD-MCNC: 12.5 G/DL (ref 12.1–17)
INR PPP: 1.4 (ref 0.9–1.1)
MCH RBC QN AUTO: 28.8 PG (ref 26–34)
MCHC RBC AUTO-ENTMCNC: 31.6 G/DL (ref 30–36.5)
MCV RBC AUTO: 91 FL (ref 80–99)
NRBC # BLD: 0 K/UL (ref 0–0.01)
NRBC BLD-RTO: 0 PER 100 WBC
PLATELET # BLD AUTO: 175 K/UL (ref 150–400)
PMV BLD AUTO: 12.2 FL (ref 8.9–12.9)
POTASSIUM SERPL-SCNC: 4.4 MMOL/L (ref 3.5–5.1)
PROT SERPL-MCNC: 6.9 G/DL (ref 6.4–8.2)
PROTHROMBIN TIME: 14.1 SEC (ref 9–11.1)
RBC # BLD AUTO: 4.34 M/UL (ref 4.1–5.7)
SODIUM SERPL-SCNC: 139 MMOL/L (ref 136–145)
UFH PPP CHRO-ACNC: 0.29 IU/ML
UFH PPP CHRO-ACNC: 0.56 IU/ML
UFH PPP CHRO-ACNC: 0.63 IU/ML
WBC # BLD AUTO: 8.3 K/UL (ref 4.1–11.1)

## 2023-07-19 PROCEDURE — 6360000002 HC RX W HCPCS: Performed by: NURSE PRACTITIONER

## 2023-07-19 PROCEDURE — 80053 COMPREHEN METABOLIC PANEL: CPT

## 2023-07-19 PROCEDURE — 36415 COLL VENOUS BLD VENIPUNCTURE: CPT

## 2023-07-19 PROCEDURE — 85027 COMPLETE CBC AUTOMATED: CPT

## 2023-07-19 PROCEDURE — 99223 1ST HOSP IP/OBS HIGH 75: CPT | Performed by: INTERNAL MEDICINE

## 2023-07-19 PROCEDURE — 2580000003 HC RX 258: Performed by: PHYSICIAN ASSISTANT

## 2023-07-19 PROCEDURE — 85520 HEPARIN ASSAY: CPT

## 2023-07-19 PROCEDURE — 6370000000 HC RX 637 (ALT 250 FOR IP): Performed by: PHYSICIAN ASSISTANT

## 2023-07-19 PROCEDURE — 6370000000 HC RX 637 (ALT 250 FOR IP): Performed by: FAMILY MEDICINE

## 2023-07-19 PROCEDURE — 99233 SBSQ HOSP IP/OBS HIGH 50: CPT | Performed by: INTERNAL MEDICINE

## 2023-07-19 PROCEDURE — 71250 CT THORAX DX C-: CPT

## 2023-07-19 PROCEDURE — 1100000000 HC RM PRIVATE

## 2023-07-19 PROCEDURE — 6360000002 HC RX W HCPCS: Performed by: INTERNAL MEDICINE

## 2023-07-19 PROCEDURE — 85610 PROTHROMBIN TIME: CPT

## 2023-07-19 RX ADMIN — HYDROMORPHONE HYDROCHLORIDE 1 MG: 1 INJECTION, SOLUTION INTRAMUSCULAR; INTRAVENOUS; SUBCUTANEOUS at 18:36

## 2023-07-19 RX ADMIN — HEPARIN SODIUM 20 UNITS/KG/HR: 10000 INJECTION, SOLUTION INTRAVENOUS at 08:26

## 2023-07-19 RX ADMIN — OXYCODONE AND ACETAMINOPHEN 1 TABLET: 5; 325 TABLET ORAL at 04:13

## 2023-07-19 RX ADMIN — METOPROLOL SUCCINATE 25 MG: 25 TABLET, EXTENDED RELEASE ORAL at 20:37

## 2023-07-19 RX ADMIN — HEPARIN SODIUM 2000 UNITS: 1000 INJECTION INTRAVENOUS; SUBCUTANEOUS at 05:55

## 2023-07-19 RX ADMIN — HYDROMORPHONE HYDROCHLORIDE 1 MG: 1 INJECTION, SOLUTION INTRAMUSCULAR; INTRAVENOUS; SUBCUTANEOUS at 14:30

## 2023-07-19 RX ADMIN — HEPARIN SODIUM 20 UNITS/KG/HR: 10000 INJECTION, SOLUTION INTRAVENOUS at 15:10

## 2023-07-19 RX ADMIN — WARFARIN SODIUM 7.5 MG: 5 TABLET ORAL at 14:39

## 2023-07-19 RX ADMIN — SODIUM CHLORIDE, PRESERVATIVE FREE 10 ML: 5 INJECTION INTRAVENOUS at 21:07

## 2023-07-19 ASSESSMENT — PAIN SCALES - GENERAL
PAINLEVEL_OUTOF10: 4
PAINLEVEL_OUTOF10: 7
PAINLEVEL_OUTOF10: 9
PAINLEVEL_OUTOF10: 8

## 2023-07-19 ASSESSMENT — PAIN - FUNCTIONAL ASSESSMENT: PAIN_FUNCTIONAL_ASSESSMENT: ACTIVITIES ARE NOT PREVENTED

## 2023-07-19 ASSESSMENT — PAIN DESCRIPTION - ORIENTATION
ORIENTATION: RIGHT
ORIENTATION: RIGHT;LOWER

## 2023-07-19 ASSESSMENT — PAIN DESCRIPTION - DESCRIPTORS
DESCRIPTORS: ACHING
DESCRIPTORS: ACHING

## 2023-07-19 ASSESSMENT — PAIN DESCRIPTION - LOCATION
LOCATION: ABDOMEN
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
## 2023-07-20 ENCOUNTER — TELEPHONE (OUTPATIENT)
Age: 63
End: 2023-07-20

## 2023-07-20 LAB
ALBUMIN SERPL-MCNC: 2.8 G/DL (ref 3.5–5)
ALBUMIN/GLOB SERPL: 0.8 (ref 1.1–2.2)
ALP SERPL-CCNC: 782 U/L (ref 45–117)
ALT SERPL-CCNC: 267 U/L (ref 12–78)
ANION GAP SERPL CALC-SCNC: 3 MMOL/L (ref 5–15)
AST SERPL-CCNC: 140 U/L (ref 15–37)
BILIRUB SERPL-MCNC: 3.9 MG/DL (ref 0.2–1)
BUN SERPL-MCNC: 9 MG/DL (ref 6–20)
BUN/CREAT SERPL: 15 (ref 12–20)
CALCIUM SERPL-MCNC: 8.7 MG/DL (ref 8.5–10.1)
CHLORIDE SERPL-SCNC: 106 MMOL/L (ref 97–108)
CO2 SERPL-SCNC: 27 MMOL/L (ref 21–32)
CREAT SERPL-MCNC: 0.61 MG/DL (ref 0.7–1.3)
ERYTHROCYTE [DISTWIDTH] IN BLOOD BY AUTOMATED COUNT: 15.9 % (ref 11.5–14.5)
GLOBULIN SER CALC-MCNC: 3.6 G/DL (ref 2–4)
GLUCOSE SERPL-MCNC: 108 MG/DL (ref 65–100)
HCT VFR BLD AUTO: 37.5 % (ref 36.6–50.3)
HGB BLD-MCNC: 11.9 G/DL (ref 12.1–17)
INR PPP: 1.5 (ref 0.9–1.1)
MCH RBC QN AUTO: 28.5 PG (ref 26–34)
MCHC RBC AUTO-ENTMCNC: 31.7 G/DL (ref 30–36.5)
MCV RBC AUTO: 89.9 FL (ref 80–99)
NRBC # BLD: 0 K/UL (ref 0–0.01)
NRBC BLD-RTO: 0 PER 100 WBC
PLATELET # BLD AUTO: 163 K/UL (ref 150–400)
PMV BLD AUTO: 12.3 FL (ref 8.9–12.9)
POTASSIUM SERPL-SCNC: 3.9 MMOL/L (ref 3.5–5.1)
PROT SERPL-MCNC: 6.4 G/DL (ref 6.4–8.2)
PROTHROMBIN TIME: 15.2 SEC (ref 9–11.1)
RBC # BLD AUTO: 4.17 M/UL (ref 4.1–5.7)
SODIUM SERPL-SCNC: 136 MMOL/L (ref 136–145)
UFH PPP CHRO-ACNC: 0.61 IU/ML
WBC # BLD AUTO: 6.6 K/UL (ref 4.1–11.1)

## 2023-07-20 PROCEDURE — 80053 COMPREHEN METABOLIC PANEL: CPT

## 2023-07-20 PROCEDURE — 6360000002 HC RX W HCPCS: Performed by: INTERNAL MEDICINE

## 2023-07-20 PROCEDURE — 85027 COMPLETE CBC AUTOMATED: CPT

## 2023-07-20 PROCEDURE — 6360000002 HC RX W HCPCS: Performed by: NURSE PRACTITIONER

## 2023-07-20 PROCEDURE — 86301 IMMUNOASSAY TUMOR CA 19-9: CPT

## 2023-07-20 PROCEDURE — 6370000000 HC RX 637 (ALT 250 FOR IP): Performed by: FAMILY MEDICINE

## 2023-07-20 PROCEDURE — 1100000000 HC RM PRIVATE

## 2023-07-20 PROCEDURE — 36415 COLL VENOUS BLD VENIPUNCTURE: CPT

## 2023-07-20 PROCEDURE — 6370000000 HC RX 637 (ALT 250 FOR IP): Performed by: PHYSICIAN ASSISTANT

## 2023-07-20 PROCEDURE — 85520 HEPARIN ASSAY: CPT

## 2023-07-20 PROCEDURE — 85610 PROTHROMBIN TIME: CPT

## 2023-07-20 RX ADMIN — WARFARIN SODIUM 7.5 MG: 5 TABLET ORAL at 11:19

## 2023-07-20 RX ADMIN — METOPROLOL SUCCINATE 25 MG: 25 TABLET, EXTENDED RELEASE ORAL at 23:05

## 2023-07-20 RX ADMIN — HYDROMORPHONE HYDROCHLORIDE 1 MG: 1 INJECTION, SOLUTION INTRAMUSCULAR; INTRAVENOUS; SUBCUTANEOUS at 11:17

## 2023-07-20 RX ADMIN — HEPARIN SODIUM 20 UNITS/KG/HR: 10000 INJECTION, SOLUTION INTRAVENOUS at 07:43

## 2023-07-20 RX ADMIN — HYDROMORPHONE HYDROCHLORIDE 1 MG: 1 INJECTION, SOLUTION INTRAMUSCULAR; INTRAVENOUS; SUBCUTANEOUS at 05:39

## 2023-07-20 RX ADMIN — HYDROMORPHONE HYDROCHLORIDE 1 MG: 1 INJECTION, SOLUTION INTRAMUSCULAR; INTRAVENOUS; SUBCUTANEOUS at 23:06

## 2023-07-20 RX ADMIN — HYDROMORPHONE HYDROCHLORIDE 1 MG: 1 INJECTION, SOLUTION INTRAMUSCULAR; INTRAVENOUS; SUBCUTANEOUS at 18:58

## 2023-07-20 RX ADMIN — HEPARIN SODIUM 20 UNITS/KG/HR: 10000 INJECTION, SOLUTION INTRAVENOUS at 23:04

## 2023-07-20 ASSESSMENT — PAIN SCALES - GENERAL
PAINLEVEL_OUTOF10: 6
PAINLEVEL_OUTOF10: 6
PAINLEVEL_OUTOF10: 9
PAINLEVEL_OUTOF10: 6
PAINLEVEL_OUTOF10: 0
PAINLEVEL_OUTOF10: 5
PAINLEVEL_OUTOF10: 4
PAINLEVEL_OUTOF10: 7

## 2023-07-20 ASSESSMENT — PAIN DESCRIPTION - LOCATION
LOCATION: ABDOMEN

## 2023-07-20 ASSESSMENT — PAIN DESCRIPTION - ORIENTATION
ORIENTATION: ANTERIOR
ORIENTATION: UPPER;RIGHT
ORIENTATION: OTHER (COMMENT)

## 2023-07-20 ASSESSMENT — PAIN DESCRIPTION - DESCRIPTORS
DESCRIPTORS: ACHING;HEAVINESS
DESCRIPTORS: NUMBNESS
DESCRIPTORS: ACHING;CRAMPING
DESCRIPTORS: ACHING;HEAVINESS
DESCRIPTORS: HEAVINESS;ACHING

## 2023-07-20 ASSESSMENT — PAIN - FUNCTIONAL ASSESSMENT
PAIN_FUNCTIONAL_ASSESSMENT: ACTIVITIES ARE NOT PREVENTED
PAIN_FUNCTIONAL_ASSESSMENT: ACTIVITIES ARE NOT PREVENTED

## 2023-07-20 NOTE — TELEPHONE ENCOUNTER
Spoke with wife listed on PHI . 2 patient identifiers used. Let wife know that per Dr. Kalina Myrick PCP needs to fill out leave paper work. Wife verbalize understanding that Dr. Orozco Drafts should fill out paperwork because patient is not really in the hospital for a cardiac issue.

## 2023-07-20 NOTE — PLAN OF CARE
Problem: Discharge Planning  Goal: Discharge to home or other facility with appropriate resources  Outcome: Progressing  Flowsheets  Taken 7/20/2023 0803 by London Poe RN  Discharge to home or other facility with appropriate resources: Identify barriers to discharge with patient and caregiver  Taken 7/19/2023 2005 by Selina Prado RN  Discharge to home or other facility with appropriate resources:   Identify barriers to discharge with patient and caregiver   Arrange for needed discharge resources and transportation as appropriate   Identify discharge learning needs (meds, wound care, etc)     Problem: Pain  Goal: Verbalizes/displays adequate comfort level or baseline comfort level  Outcome: Progressing  Flowsheets (Taken 7/19/2023 2005 by Selina Prado RN)  Verbalizes/displays adequate comfort level or baseline comfort level:   Assess pain using appropriate pain scale   Administer analgesics based on type and severity of pain and evaluate response     Problem: ABCDS Injury Assessment  Goal: Absence of physical injury  Outcome: Progressing     Problem: Safety - Adult  Goal: Free from fall injury  Outcome: Progressing     Problem: Nutrition Deficit:  Goal: Optimize nutritional status  Outcome: Progressing

## 2023-07-20 NOTE — TELEPHONE ENCOUNTER
Reached out to wife listed on PHI since patient is in hospital. Not able to reach. Per Dr. Wojciech Astudillo PCP needs to fill out leave paperwork.

## 2023-07-21 ENCOUNTER — TELEPHONE (OUTPATIENT)
Age: 63
End: 2023-07-21

## 2023-07-21 VITALS
BODY MASS INDEX: 22.82 KG/M2 | WEIGHT: 159.39 LBS | SYSTOLIC BLOOD PRESSURE: 125 MMHG | TEMPERATURE: 98.3 F | DIASTOLIC BLOOD PRESSURE: 66 MMHG | RESPIRATION RATE: 16 BRPM | HEIGHT: 70 IN | OXYGEN SATURATION: 97 % | HEART RATE: 66 BPM

## 2023-07-21 LAB
ALBUMIN SERPL-MCNC: 2.9 G/DL (ref 3.5–5)
ALBUMIN/GLOB SERPL: 0.8 (ref 1.1–2.2)
ALP SERPL-CCNC: 754 U/L (ref 45–117)
ALT SERPL-CCNC: 232 U/L (ref 12–78)
ANION GAP SERPL CALC-SCNC: 3 MMOL/L (ref 5–15)
AST SERPL-CCNC: 113 U/L (ref 15–37)
BILIRUB SERPL-MCNC: 3.2 MG/DL (ref 0.2–1)
BUN SERPL-MCNC: 12 MG/DL (ref 6–20)
BUN/CREAT SERPL: 18 (ref 12–20)
CALCIUM SERPL-MCNC: 9 MG/DL (ref 8.5–10.1)
CANCER AG19-9 SERPL-ACNC: 63 U/ML (ref 0–35)
CHLORIDE SERPL-SCNC: 106 MMOL/L (ref 97–108)
CO2 SERPL-SCNC: 29 MMOL/L (ref 21–32)
CREAT SERPL-MCNC: 0.67 MG/DL (ref 0.7–1.3)
ERYTHROCYTE [DISTWIDTH] IN BLOOD BY AUTOMATED COUNT: 15.9 % (ref 11.5–14.5)
GLOBULIN SER CALC-MCNC: 3.5 G/DL (ref 2–4)
GLUCOSE SERPL-MCNC: 89 MG/DL (ref 65–100)
HCT VFR BLD AUTO: 38.4 % (ref 36.6–50.3)
HGB BLD-MCNC: 12.1 G/DL (ref 12.1–17)
IGG4 SER-MCNC: 91 MG/DL (ref 2–96)
INR PPP: 2.6 (ref 0.9–1.1)
MCH RBC QN AUTO: 28.8 PG (ref 26–34)
MCHC RBC AUTO-ENTMCNC: 31.5 G/DL (ref 30–36.5)
MCV RBC AUTO: 91.4 FL (ref 80–99)
NRBC # BLD: 0 K/UL (ref 0–0.01)
NRBC BLD-RTO: 0 PER 100 WBC
PLATELET # BLD AUTO: 169 K/UL (ref 150–400)
PMV BLD AUTO: 12.6 FL (ref 8.9–12.9)
POTASSIUM SERPL-SCNC: 3.6 MMOL/L (ref 3.5–5.1)
PROT SERPL-MCNC: 6.4 G/DL (ref 6.4–8.2)
PROTHROMBIN TIME: 25.5 SEC (ref 9–11.1)
RBC # BLD AUTO: 4.2 M/UL (ref 4.1–5.7)
SODIUM SERPL-SCNC: 138 MMOL/L (ref 136–145)
WBC # BLD AUTO: 7.3 K/UL (ref 4.1–11.1)

## 2023-07-21 PROCEDURE — 99233 SBSQ HOSP IP/OBS HIGH 50: CPT | Performed by: INTERNAL MEDICINE

## 2023-07-21 PROCEDURE — 36415 COLL VENOUS BLD VENIPUNCTURE: CPT

## 2023-07-21 PROCEDURE — 6360000002 HC RX W HCPCS: Performed by: INTERNAL MEDICINE

## 2023-07-21 PROCEDURE — 6370000000 HC RX 637 (ALT 250 FOR IP): Performed by: PHYSICIAN ASSISTANT

## 2023-07-21 PROCEDURE — 80053 COMPREHEN METABOLIC PANEL: CPT

## 2023-07-21 PROCEDURE — 85027 COMPLETE CBC AUTOMATED: CPT

## 2023-07-21 PROCEDURE — 85610 PROTHROMBIN TIME: CPT

## 2023-07-21 RX ORDER — OXYCODONE HYDROCHLORIDE AND ACETAMINOPHEN 5; 325 MG/1; MG/1
1 TABLET ORAL EVERY 6 HOURS PRN
Qty: 20 TABLET | Refills: 0 | Status: SHIPPED | OUTPATIENT
Start: 2023-07-21 | End: 2023-07-26

## 2023-07-21 RX ORDER — WARFARIN SODIUM 2.5 MG/1
2.5 TABLET ORAL
Status: COMPLETED | OUTPATIENT
Start: 2023-07-21 | End: 2023-07-21

## 2023-07-21 RX ORDER — WARFARIN SODIUM 5 MG/1
2.5 TABLET ORAL NIGHTLY
Qty: 15 TABLET | Refills: 0 | Status: ON HOLD | OUTPATIENT
Start: 2023-07-21 | End: 2023-08-20

## 2023-07-21 RX ADMIN — HYDROMORPHONE HYDROCHLORIDE 1 MG: 1 INJECTION, SOLUTION INTRAMUSCULAR; INTRAVENOUS; SUBCUTANEOUS at 05:56

## 2023-07-21 RX ADMIN — HYDROMORPHONE HYDROCHLORIDE 1 MG: 1 INJECTION, SOLUTION INTRAMUSCULAR; INTRAVENOUS; SUBCUTANEOUS at 11:11

## 2023-07-21 RX ADMIN — WARFARIN SODIUM 2.5 MG: 2.5 TABLET ORAL at 11:07

## 2023-07-21 ASSESSMENT — PAIN DESCRIPTION - ORIENTATION
ORIENTATION: RIGHT
ORIENTATION: ANTERIOR
ORIENTATION: ANTERIOR
ORIENTATION: RIGHT;UPPER

## 2023-07-21 ASSESSMENT — PAIN DESCRIPTION - LOCATION
LOCATION: ABDOMEN

## 2023-07-21 ASSESSMENT — PAIN SCALES - GENERAL
PAINLEVEL_OUTOF10: 5
PAINLEVEL_OUTOF10: 2
PAINLEVEL_OUTOF10: 4
PAINLEVEL_OUTOF10: 7
PAINLEVEL_OUTOF10: 7

## 2023-07-21 ASSESSMENT — PAIN DESCRIPTION - DESCRIPTORS
DESCRIPTORS: ACHING
DESCRIPTORS: ACHING;HEAVINESS;DISCOMFORT
DESCRIPTORS: ACHING
DESCRIPTORS: DISCOMFORT

## 2023-07-21 ASSESSMENT — PAIN - FUNCTIONAL ASSESSMENT: PAIN_FUNCTIONAL_ASSESSMENT: ACTIVITIES ARE NOT PREVENTED
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
## 2023-07-21 NOTE — PATIENT INSTRUCTIONS
Today your INR was 1.4. Your goal INR is  2.0-3.0. You have a   5 mg tablet of Coumadin (warfarin). Take Coumadin (warfarin) as follows: Take 7.5 mg (1.5 tab) tonight (7/24). Starting 7/25 take 5 mg (1 tab) Monday, Wednesday, Friday and 2.5 mg (0.5 tab) daily rest of week    Come back in 1 week(s) for your next finger stick/INR blood test.        Avoid any over the counter items containing aspirin or ibuprofen, and avoid great swings in general diet. Avoid alcohol consumption. Please notify the INR nurse if you are started on any new medication including over the counter or herbal supplements. Also, please notify your INR nurse if any of your other prescription or over the counter medications have been discontinued. Your Care Instructions  Warfarin is a medicine that you take to prevent blood clots. It is often called a blood thinner. Doctors give warfarin (such as Coumadin) to reduce the risk of blood clots. You may be at risk for blood clots if you have atrial fibrillation or deep vein thrombosis. Some other health problems may also put you at risk. Warfarin slows the amount of time it takes for your blood to clot. It can cause bleeding problems. Even if you've been taking warfarin for a while, it's important to know how to take it safely. Foods and other medicines can affect the way warfarin works. Some can make warfarin work too well. This can cause bleeding problems. And some can make it work poorly, so that it does not prevent blood clots very well. You will need regular blood tests to check how long it takes for your blood to form a clot. This test is called a PT or prothrombin time test. The result of the test is called an INR level. Depending on the test results, your doctor or anticoagulation clinic may adjust your dose of warfarin. Follow-up care is a key part of your treatment and safety.  Be sure to make and go to all appointments, and call your doctor if you are having

## 2023-07-21 NOTE — TELEPHONE ENCOUNTER
Palliative Medicine  Nursing Note  [phone]  Fax [phone]        Patient Name: Jon Palafox  YOB: 1960    7/21/2023         Demographics 7/21/2023   Marital Status      Mr. Oumou Spivey and his wife came to palliative office shortly after being discharged from the hospital today, though patient is unknown to palliative. There was some confusion related to a possible prescription for Dilaudid that palliative would provide them. Upon further discussion it appears that Dr. Kallie Kapoor, whom patient will be pursuing future treatment with, had suggested he follow up with palliative for symptom management after discharge, which they understood to mean today. Patient and his wife shared that he was receiving IV Dilaudid 1 mg quite frequently while inpatient and did not transition to oral medication prior to discharge. He mentioned that the discharging provider had not been comfortable prescribing oral Dilaudid due to concerns of addiction and strength. He has concerns about what to do or who to call if the script for Percocet 5 mg 1 tab every 6 hours prn that they received today wasn't strong enough, especially as it is Friday afternoon and their PCP is not open over the weekend. Discussed with Mr. Oumou Spivey that they have already established care with Oncology and they should be available to help over the weekend if he is in pain. Shared that palliative would be happy to see them in clinic and the referral nurse can contact them next week to set up a New patient appt. He and his wife verbalized understanding and were appreciative. They added that they felt hospital inpatient staff had been great and he received good care.      Teodora Thayer RN  Palliative Medicine

## 2023-07-21 NOTE — PROGRESS NOTES
Pharmacy Progress Note - Anticoagulation Management      S/O:  Mr. Deena Wells  is a 58 y.o. male seen today for anticoagulation management for  the diagnosis of Atrial Fibrillation/Flutter       HPI: At last visit (6/21) INR was  1.8 and subtherapeutic for INR goal 2.0-3.0. Weekly planned dose was increased from 55 mg to 57.5 mg (5%). Patient recommended to take 10 mg Wed, Sat; 7.5 mg daily ROW and recheck INR in 2 weeks. Follow up INR was scheduled for 7/10. Patient cancelled INR appointment because he was hospitalized. Patient was discharged on 7/21 and advised to take 2.5 mg Friday, Saturday, and Sunday. Follow up INR scheduled for 7/24 at 9:45 AM.      Interim History:       Warfarin start date: 2017 per patient    INR Goal:  2.0-3.0      Current warfarin regimen: 2.5 mg on 7/21, 7/22, and 7/23    Warfarin tablet strength:   5 mg     Duration of therapy: Indefinite    Today's pertinent positives includes:  Medication change and Hospitalization / ED visits    Patient was hospitalized last week and INR was >10 on admission. Patient was discharged on 7/21 and advised to take 2.5 mg Friday, Saturday, and Sunday. INR 1.4  PT 16.4    Adherence:   Able to recall regimen? YES  Miss/extra dose? NO  Need refill? NO    Upcoming procedure(s):  YES    Patient is scheduled to have port placed, but date of procedure has not been confirmed.       INR History:              (normal INR range 0.8-1.2)       Date               INR                  PT        Dose/Comments  07/24/23 1.4  16.4   22.5 mg of warfarin in the past 7 days, hospitalized  06/21/23 1.8  21.3  10 mg Wed; 7.5 mg daily ROW  06/02/23 2.1  24.8  10 mg x 1 dose then 10 mg Wed; 7.5 mg daily ROW  05/23/23 1.6  18.7  10 mg x 1 dose then 7.5 mg daily ROW  05/15/23 1.5  18.2  Increased to 5 mg Mon, Fri; 7.5 mg daily ROW  05/03/23          1.6                   19.4     Reduced to 2.5 mg Wed; 5 mg daily ROW, patient took 10 mg on 4/28 and 5/1 04/19/23

## 2023-07-21 NOTE — TELEPHONE ENCOUNTER
Sandra ray  stating pt is being d/c from [facility] [place] today; stated pt was told to call us by Dr. Jordan Mcdaniel for RX; requested RX for dilaudid in pill form and callback.

## 2023-07-21 NOTE — TELEPHONE ENCOUNTER
Thanks for triaging [facility]. He needs to be seen promptlyValeda Ply, if he is amenable I can see him Tuesday morning, will create a slot for him at 10 am.  Let me know.

## 2023-07-24 ENCOUNTER — ANTI-COAG VISIT (OUTPATIENT)
Facility: HOSPITAL | Age: 63
End: 2023-07-24
Payer: COMMERCIAL

## 2023-07-24 ENCOUNTER — CARE COORDINATION (OUTPATIENT)
Dept: OTHER | Facility: CLINIC | Age: 63
End: 2023-07-24

## 2023-07-24 ENCOUNTER — TELEPHONE (OUTPATIENT)
Age: 63
End: 2023-07-24

## 2023-07-24 DIAGNOSIS — C25.9 MALIGNANT NEOPLASM OF PANCREAS, UNSPECIFIED LOCATION OF MALIGNANCY (HCC): ICD-10-CM

## 2023-07-24 DIAGNOSIS — I48.91 ATRIAL FIBRILLATION, UNSPECIFIED TYPE (HCC): ICD-10-CM

## 2023-07-24 DIAGNOSIS — C25.9 MALIGNANT NEOPLASM OF PANCREAS, UNSPECIFIED LOCATION OF MALIGNANCY (HCC): Primary | ICD-10-CM

## 2023-07-24 DIAGNOSIS — I48.92 ATRIAL FLUTTER, UNSPECIFIED TYPE (HCC): Primary | ICD-10-CM

## 2023-07-24 LAB — INTERNATIONAL NORMALIZATION RATIO, POC: 1.4

## 2023-07-24 PROCEDURE — 85610 PROTHROMBIN TIME: CPT

## 2023-07-24 PROCEDURE — 99212 OFFICE O/P EST SF 10 MIN: CPT

## 2023-07-24 NOTE — TELEPHONE ENCOUNTER
Spoke with franklin.  She wanted to let Dr. Shaun Ann know that patient is scheduled for port placement Friday the 28th at 2pm.

## 2023-07-24 NOTE — TELEPHONE ENCOUNTER
Abigial called from [facility] oncology and would like to speak to nurse concerning pt,please advise    [phone]

## 2023-07-24 NOTE — CARE COORDINATION
[facility] Care Transitions Initial Follow Up Call    Call within 2 business days of discharge: Yes    LPN Care Coordinator attempted to contact the patient by telephone to perform post hospital discharge assessment. No answer received, left HIPPA compliant VM requesting a return call. Patient: Elizabeth Goldberg Patient : 1960   MRN: [id number]  Reason for Admission: Elevated LFT's, Pancreatic Cancer  Discharge Date: 23 RARS: Readmission Risk Score: 9      Last Discharge [address] [facility]       Date Complaint Diagnosis Description Type Department Provider    23 Neck Pain Hyperbilirubinemia . .. ED to Hosp-Admission (Discharged) (ADMITTED) Fredi Montes MD; Valeria Schultz. .. Was this an external facility discharge?  No Discharge Facility: [facility] / PLAN:       Elevated LFTs/hyperbilirubinemia- improving after biliary stent placement   pancreatic mass, liver lesions, mildly elevated Ca19-9  - continue prn pain control, antiemetics  - EUS and ERCP done 23- pancreatic mass biopsoes and biliary duct brushings done-   - Metal stent placed in bile duct due to obstruction/stricture  - path showing Poorly differentiated adenocarcinoma; oncology consulted  - Chest CT with no acute process  - Liver biopsy done 23- path pending  - Follow up with oncology as an outpatient  - Will discharge with 5 day course percocet for pain, explained that further pain management will be managed outpatient     Right neck pain- resolved  - CTA head/neck: no evidence of carotid or vertebral dissection,   unchanged thoracic aortic dissection  - Continue prn pain control     HTN  - Continue metoprolol, follow up with PCP     Supratherapeutic INR- resolved with holding of coumadin  Hx of thoracic Aortic dissection s/p repair, and mechanical AV replacement  - No obvious signs of bleeding, Hgb stable,    - cardiologist recs noted  - INR therapeutic today: 2.6.   - Spoke with

## 2023-07-24 NOTE — PROGRESS NOTES
7/24/23    1255:    Called patient to review plan of care and assist with PORT placement   No answer   LVM to call office back     1300:    Contacted cardiology to notify PORT placement date and time     1318:    Called scheduling to finalize appointment date and time

## 2023-07-24 NOTE — TELEPHONE ENCOUNTER
Attempted to call patient and schedule an appointment with Dr. Gigi Franks for 8/1 per corey for pancreatic cancer. Patient being referred by Dr. Jessa Hooper. No answer, left a voicemail for a returned call.

## 2023-07-24 NOTE — TELEPHONE ENCOUNTER
[facility] Palliative Medicine Office  Nursing Note  [phone]  Fax [phone]      Name:  Malinda Gonsales  YOB: 1960    Received outpatient Palliative Medicine referral from Dr. Gunnar Hinton to see patient for symptom management and supportive care. Chart  reviewed. Malinda Gonsales is a 58 y.o. male with recently diagnosed pancreatic cancer with liver mets. Patient was hospitalized at [address] [place] [address],[address] 7/8/23-7/21/23, presented to ED with right neck pain, workup revealed elevated LFT's, pancreatic mass, liver lesions. ACP:     [facility] signed on 3/27/23 is on file. Primary Healthcare Decision Maker is Jero Gilbert (Pepe Sellers ([phone]), Secondary Healthcare Decision Maker is Arvin West ([phone]). Nurse called patient to [facility] outpatient Palliative Medicine services and to schedule appointment. No answer, nurse left message requesting call back.      Luc Briggs RN, Gerontological Nursing-BC, [facility]

## 2023-07-24 NOTE — TELEPHONE ENCOUNTER
[facility] [place] pt and left VM to r/t call to the office to discuss coordination.

## 2023-07-25 ENCOUNTER — CARE COORDINATION (OUTPATIENT)
Dept: OTHER | Facility: CLINIC | Age: 63
End: 2023-07-25

## 2023-07-25 ENCOUNTER — TELEPHONE (OUTPATIENT)
Age: 63
End: 2023-07-25

## 2023-07-25 RX ORDER — ENOXAPARIN SODIUM 100 MG/ML
1 INJECTION SUBCUTANEOUS 2 TIMES DAILY
Qty: 16 ML | Refills: 0 | Status: SHIPPED | OUTPATIENT
Start: 2023-07-25 | End: 2023-08-04 | Stop reason: SDUPTHER

## 2023-07-25 NOTE — CARE COORDINATION
Ambulatory Care Coordination Note    LPN CC attempted 2nd outreach to patient for introduction to Associate Care Management related to admission on 7/8/23 to 7/21/23 for new Dx of Pancreatic Cancer with liver mets. HIPAA compliant message left requesting a return phone call at patient convenience. Unable to Reach Letter sent to patient via chart. Will continue to outreach.       Future Appointments   Date Time Provider [address]   7/28/2023  2:00 PM [facility] ANGIO 1 [facility]   7/31/2023 10:15 AM [facility] MEDICATION MGMT [address]   8/1/2023  8:00 AM E1 LIZETT LONG [address] [facility]   8/1/2023  8:15 AM Gunnar Hinton MD [address] BS AMB   8/3/2023  3:30 PM H2 [facility]   8/15/2023  8:00 AM A2 LIZETT LONG [address] [facility]   8/17/2023  3:00 PM G2 [facility]T [facility]   8/29/2023  8:00 AM C2 LIZETT LONG [place] [facility]   8/31/2023  3:00 PM H2 [facility]T [facility]STR[facility]   9/12/2023  8:00 AM F1 LIZETT LONG [address] [facility]   9/14/2023  3:00 [facility]   9/26/2023  8:00 AM E1 LIZETT LONG [place] [facility]   9/28/2023  3:00 PM H1 LIZETT [facility]STR[facility]   10/10/2023  8:00 AM D1 LIZETT LONG [place] [facility]   10/12/2023  3:00 PM G2 LIZETT FASTR[facility]   10/18/2023  3:40 PM MD ABBIE Dexter BS AMB   1/31/2024  3:00 PM BSC SEPULVEDA ECHO 1 ABBIE BS AMB   1/31/2024  3:40 PM MD ABBIE Dexter AMB

## 2023-07-25 NOTE — TELEPHONE ENCOUNTER
[facility]   (formerly [place])   Phone:  [phone]      Fax:  [phone] [facility], [place] [address] Tangela Michelle    Name:  Jean Douglas  YOB: 1960    Patient returned call to schedule outpatient Palliative Medicine appointment and left message. This nurse returned call and patient did not answer. Dr. Jose De Jesus Kellogg had a cancellation for 7/27/23 at [facility].  This nurse left message that she has scheduled patient for this appointment and if it doesn't work for him we can change it.        Phil Rueda, RN, Gerontological Nursing-BC, [facility]  Referral Navigator, [facility] Supportive [facility]

## 2023-07-25 NOTE — TELEPHONE ENCOUNTER
3539:    Called and spoke with patient's spouse Krys aGrcia   Informed of the PORT placement appointment   Reviewed date, time, prep and location   Advised to contact cardiology to review Lovenox bridging     Sandra verbalized understanding and agreed with plan     No new questions or concerns at this time

## 2023-07-25 NOTE — TELEPHONE ENCOUNTER
Second attempt to call patient and schedule an appointment with Dr. Sharron Obando for pancreatic cancer. Attempted to call spouse as well. No answer from either, left a voicemail for a returned call.

## 2023-07-25 NOTE — TELEPHONE ENCOUNTER
Pt is schedule to have surgery on 7/28/23 and his wife would like to know if the patient needs to hold his warfarin. Dr.Michael Rice   [facility]. Pt is having a port placed for chemotherapy.     [phone]

## 2023-07-27 ENCOUNTER — TELEPHONE (OUTPATIENT)
Age: 63
End: 2023-07-27

## 2023-07-27 ENCOUNTER — CARE COORDINATION (OUTPATIENT)
Dept: OTHER | Facility: CLINIC | Age: 63
End: 2023-07-27

## 2023-07-27 NOTE — TELEPHONE ENCOUNTER
The patient left a message on voicemail. He will not be able to make his 7/27/23 appt with Dr. Karuna Lambert. Called to reschedule but got voicemail.
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
## 2023-07-28 ENCOUNTER — HOSPITAL ENCOUNTER (OUTPATIENT)
Facility: HOSPITAL | Age: 63
Discharge: HOME | End: 2023-07-28
Attending: STUDENT IN AN ORGANIZED HEALTH CARE EDUCATION/TRAINING PROGRAM | Admitting: STUDENT IN AN ORGANIZED HEALTH CARE EDUCATION/TRAINING PROGRAM
Payer: COMMERCIAL

## 2023-07-28 VITALS
DIASTOLIC BLOOD PRESSURE: 48 MMHG | RESPIRATION RATE: 18 BRPM | OXYGEN SATURATION: 98 % | TEMPERATURE: 98 F | HEART RATE: 89 BPM | SYSTOLIC BLOOD PRESSURE: 112 MMHG

## 2023-07-28 DIAGNOSIS — C25.9 MALIGNANT NEOPLASM OF PANCREAS, UNSPECIFIED LOCATION OF MALIGNANCY (HCC): ICD-10-CM

## 2023-07-28 PROCEDURE — 6360000002 HC RX W HCPCS: Performed by: RADIOLOGY

## 2023-07-28 PROCEDURE — 2580000003 HC RX 258: Performed by: RADIOLOGY

## 2023-07-28 PROCEDURE — 99152 MOD SED SAME PHYS/QHP 5/>YRS: CPT

## 2023-07-28 RX ORDER — FENTANYL CITRATE 50 UG/ML
INJECTION, SOLUTION INTRAMUSCULAR; INTRAVENOUS PRN
Status: DISCONTINUED | OUTPATIENT
Start: 2023-07-28 | End: 2023-07-31 | Stop reason: HOSPADM

## 2023-07-28 RX ORDER — MIDAZOLAM HYDROCHLORIDE 2 MG/2ML
INJECTION, SOLUTION INTRAMUSCULAR; INTRAVENOUS PRN
Status: DISCONTINUED | OUTPATIENT
Start: 2023-07-28 | End: 2023-07-31 | Stop reason: HOSPADM

## 2023-07-28 RX ADMIN — MIDAZOLAM HYDROCHLORIDE 1 MG: 1 INJECTION, SOLUTION INTRAMUSCULAR; INTRAVENOUS at 16:39

## 2023-07-28 RX ADMIN — FENTANYL CITRATE 25 MCG: 50 INJECTION, SOLUTION INTRAMUSCULAR; INTRAVENOUS at 16:50

## 2023-07-28 RX ADMIN — MIDAZOLAM HYDROCHLORIDE 1 MG: 1 INJECTION, SOLUTION INTRAMUSCULAR; INTRAVENOUS at 16:44

## 2023-07-28 RX ADMIN — WATER 2000 MG: 1 INJECTION INTRAMUSCULAR; INTRAVENOUS; SUBCUTANEOUS at 16:34

## 2023-07-28 RX ADMIN — FENTANYL CITRATE 50 MCG: 50 INJECTION, SOLUTION INTRAMUSCULAR; INTRAVENOUS at 16:43

## 2023-07-28 RX ADMIN — FENTANYL CITRATE 50 MCG: 50 INJECTION, SOLUTION INTRAMUSCULAR; INTRAVENOUS at 16:39

## 2023-07-28 RX ADMIN — MIDAZOLAM HYDROCHLORIDE 0.5 MG: 1 INJECTION, SOLUTION INTRAMUSCULAR; INTRAVENOUS at 16:50

## 2023-07-28 ASSESSMENT — PAIN SCALES - GENERAL: PAINLEVEL_OUTOF10: 0

## 2023-07-28 NOTE — TELEPHONE ENCOUNTER
Third attempt to call patient and schedule an appointment with Dr. Noemy Chen for pancreatic cancer. Attempted to call spouse as well. No answer from either, left a voicemail for a returned call.

## 2023-07-28 NOTE — PROGRESS NOTES
[employer]ilogy MD, Leola Agustin, at bedside for pre procedure assessment, consult and consent. All questions reviewed with understanding. Consent obtained and witnessed by RN. MD aware of all patients current medications and lab value results.

## 2023-07-28 NOTE — DISCHARGE INSTRUCTIONS
You have received sedation medications today. YOU SHOULD NOT DRIVE FOR 24 HOURS, DO NOT OPERATE HEAVY MACHINERY, DO NOT MAKE ANY LEGAL DECISIONS OR SIGN LEGAL DOCUMENTS FOR 24 HOURS. DO NOT DRINK ALCOHOL, TAKE ANY MEDICATIONS UNLESS PRESCRIBED BY YOUR DOCTOR. IF YOU ARE A CAREGIVER, SOMEONE SHOULD TAKE THAT ROLE FOR 24 HOURS. Side effects of sedation medications and other medications used today have been reviewed  Those side effects can include but are not limited to: dizziness, drowsiness, poor balance, fatigue, sleepiness. Take precautions at home to prevent falls, such as assistance with walking or stairs if allowed and /or when needed or position changes. Allergic or adverse reactions could include nausea, itching, hives, dizziness, or anything else out of the ordinary. Should you experience any of these significant changes, please call [phone] between the hours of 7:30 am and 3:30 pm or [phone] after hours. After hours, ask the  to page the X-ray Technologist, and describe the problem to the technologist. If you are experiencing chest pain, shortness of breath, altered mental state, unusual bleeding or any other emergent symptom you should call 911 immediately.

## 2023-07-29 DIAGNOSIS — C25.9 MALIGNANT NEOPLASM OF PANCREAS, UNSPECIFIED LOCATION OF MALIGNANCY (HCC): Primary | ICD-10-CM

## 2023-07-29 RX ORDER — SODIUM CHLORIDE 9 MG/ML
INJECTION, SOLUTION INTRAVENOUS CONTINUOUS
OUTPATIENT
Start: 2023-08-01

## 2023-07-29 RX ORDER — HEPARIN SODIUM (PORCINE) LOCK FLUSH IV SOLN 100 UNIT/ML 100 UNIT/ML
500 SOLUTION INTRAVENOUS PRN
OUTPATIENT
Start: 2023-08-01

## 2023-07-29 RX ORDER — MEPERIDINE HYDROCHLORIDE 50 MG/ML
12.5 INJECTION INTRAMUSCULAR; INTRAVENOUS; SUBCUTANEOUS PRN
OUTPATIENT
Start: 2023-08-01

## 2023-07-29 RX ORDER — ONDANSETRON 2 MG/ML
8 INJECTION INTRAMUSCULAR; INTRAVENOUS
OUTPATIENT
Start: 2023-08-01

## 2023-07-29 RX ORDER — EPINEPHRINE 1 MG/ML
0.3 INJECTION, SOLUTION, CONCENTRATE INTRAVENOUS PRN
OUTPATIENT
Start: 2023-08-01

## 2023-07-29 RX ORDER — DEXTROSE MONOHYDRATE 50 MG/ML
5-250 INJECTION, SOLUTION INTRAVENOUS PRN
OUTPATIENT
Start: 2023-08-01

## 2023-07-29 RX ORDER — FAMOTIDINE 10 MG/ML
20 INJECTION, SOLUTION INTRAVENOUS
OUTPATIENT
Start: 2023-08-01

## 2023-07-29 RX ORDER — SODIUM CHLORIDE 0.9 % (FLUSH) 0.9 %
5-40 SYRINGE (ML) INJECTION PRN
OUTPATIENT
Start: 2023-08-03

## 2023-07-29 RX ORDER — ACETAMINOPHEN 325 MG/1
650 TABLET ORAL
OUTPATIENT
Start: 2023-08-01

## 2023-07-29 RX ORDER — ALBUTEROL SULFATE 90 UG/1
4 AEROSOL, METERED RESPIRATORY (INHALATION) PRN
OUTPATIENT
Start: 2023-08-01

## 2023-07-29 RX ORDER — PALONOSETRON 0.05 MG/ML
0.25 INJECTION, SOLUTION INTRAVENOUS ONCE
OUTPATIENT
Start: 2023-08-01 | End: 2023-08-01

## 2023-07-29 RX ORDER — DIPHENHYDRAMINE HYDROCHLORIDE 50 MG/ML
50 INJECTION INTRAMUSCULAR; INTRAVENOUS
OUTPATIENT
Start: 2023-08-01

## 2023-07-29 RX ORDER — HEPARIN SODIUM (PORCINE) LOCK FLUSH IV SOLN 100 UNIT/ML 100 UNIT/ML
500 SOLUTION INTRAVENOUS PRN
OUTPATIENT
Start: 2023-08-03

## 2023-07-29 RX ORDER — SODIUM CHLORIDE 9 MG/ML
5-250 INJECTION, SOLUTION INTRAVENOUS PRN
OUTPATIENT
Start: 2023-08-03

## 2023-07-29 RX ORDER — SODIUM CHLORIDE 9 MG/ML
5-250 INJECTION, SOLUTION INTRAVENOUS PRN
OUTPATIENT
Start: 2023-08-01

## 2023-07-29 RX ORDER — ATROPINE SULFATE 0.4 MG/ML
0.4 INJECTION, SOLUTION INTRAMUSCULAR; INTRAVENOUS; SUBCUTANEOUS
OUTPATIENT
Start: 2023-08-01

## 2023-07-29 RX ORDER — SODIUM CHLORIDE 0.9 % (FLUSH) 0.9 %
5-40 SYRINGE (ML) INJECTION PRN
OUTPATIENT
Start: 2023-08-01
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
## 2023-07-31 ENCOUNTER — TELEPHONE (OUTPATIENT)
Age: 63
End: 2023-07-31

## 2023-07-31 ENCOUNTER — ANTI-COAG VISIT (OUTPATIENT)
Facility: HOSPITAL | Age: 63
End: 2023-07-31
Payer: COMMERCIAL

## 2023-07-31 DIAGNOSIS — I48.92 ATRIAL FLUTTER, UNSPECIFIED TYPE (HCC): Primary | ICD-10-CM

## 2023-07-31 DIAGNOSIS — I48.91 ATRIAL FIBRILLATION, UNSPECIFIED TYPE (HCC): ICD-10-CM

## 2023-07-31 DIAGNOSIS — C25.9 MALIGNANT NEOPLASM OF PANCREAS, UNSPECIFIED LOCATION OF MALIGNANCY (HCC): Primary | ICD-10-CM

## 2023-07-31 LAB — INTERNATIONAL NORMALIZATION RATIO, POC: 1.2

## 2023-07-31 PROCEDURE — 99212 OFFICE O/P EST SF 10 MIN: CPT

## 2023-07-31 PROCEDURE — 85610 PROTHROMBIN TIME: CPT

## 2023-07-31 NOTE — TELEPHONE ENCOUNTER
[facility] Outpatient Palliative Medicine Office  Nursing Note  [phone]  Fax [phone]     Name:  Leatha Gonzalez  YOB: 1960     Patient and his wife stopped by outpatient Palliative Medicine office to schedule an appointment. See this nurse's note from 7/24/23. Appointment scheduled for 8/14/23 at 11:00 am with Dr. Lokesh Catherine. Instructed patient to arrive at 10:45am in order to sign new patient paperwork, and to bring any pain medication that he is taking in the original container.     Jazlyn Aguirre RN, Gerontological Nursing-BC, [facility]  Palliative Medicine  [phone]

## 2023-07-31 NOTE — PROGRESS NOTES
Pharmacy Progress Note - Anticoagulation Management      S/O:  Mr. Kody Bills  is a 58 y.o. male seen today for anticoagulation management for  the diagnosis of Atrial Fibrillation/Flutter       HPI: At last visit (7/24) INR was 1.4 and subtherapeutic for INR goal 2.0-3.0. Due to INR depression, patient advised to take extra dose (7.5 mg total) on 7/24. Weekly planned dose was also increased from 22.5 mg to 25 mg (10%). Starting 7/25 patient recommended to take 5 mg Mon, Wed, Fri; 2.5 mg daily ROW and recheck INR in 1 week. Patient scheduled for port placement on 7/28. Patient instructed to hold warfarin starting on 7/25 and begin Lovenox 70 mg Q12H on 7/26. Patient instructed to resume warfarin and Lovenox after procedure when directed by physician. Interim History:       Warfarin start date: 2017 per patient    INR Goal:  2.0-3.0      Current warfarin regimen: 5 mg Mon, Wed, Fri; 2.5 mg daily ROW    Warfarin tablet strength:   5 mg     Duration of therapy: Indefinite    Today's pertinent positives includes:  Medication change    Patient had port placement on 7/28. Patient held warfarin starting on 7/25 and began Lovenox 70 mg Q12H on 7/26. Patient resumed Lovenox and warfarin after procedure on 7/28. INR 1.2  PT 14.4    Adherence:   Able to recall regimen? YES  Miss/extra dose? NO  Need refill?  NO    Upcoming procedure(s):  NO     INR History:              (normal INR range 0.8-1.2)       Date               INR                  PT        Dose/Comments  07/31/23 1.2  14.4   Resumed Lovenox and warfarin on 7/28 after procedure  07/24/23 1.4  16.4   22.5 mg of warfarin in the past 7 days, hospitalized  06/21/23 1.8  21.3  10 mg Wed; 7.5 mg daily ROW  06/02/23 2.1  24.8  10 mg x 1 dose then 10 mg Wed; 7.5 mg daily ROW  05/23/23 1.6  18.7  10 mg x 1 dose then 7.5 mg daily ROW  05/15/23 1.5  18.2  Increased to 5 mg Mon, Fri; 7.5 mg daily ROW  05/03/23          1.6                   19.4     Reduced

## 2023-07-31 NOTE — PATIENT INSTRUCTIONS
Today your INR was 1.2. Your goal INR is  2.0-3.0. You have a   5 mg tablet of Coumadin (warfarin). Take Coumadin (warfarin) as follows: Take 5 mg (1 tab) Monday, Wednesday, Friday and 2.5 mg (0.5 tab) daily rest of week    Come back in 1 week(s) for your next finger stick/INR blood test.        Avoid any over the counter items containing aspirin or ibuprofen, and avoid great swings in general diet. Avoid alcohol consumption. Please notify the INR nurse if you are started on any new medication including over the counter or herbal supplements. Also, please notify your INR nurse if any of your other prescription or over the counter medications have been discontinued. Your Care Instructions  Warfarin is a medicine that you take to prevent blood clots. It is often called a blood thinner. Doctors give warfarin (such as Coumadin) to reduce the risk of blood clots. You may be at risk for blood clots if you have atrial fibrillation or deep vein thrombosis. Some other health problems may also put you at risk. Warfarin slows the amount of time it takes for your blood to clot. It can cause bleeding problems. Even if you've been taking warfarin for a while, it's important to know how to take it safely. Foods and other medicines can affect the way warfarin works. Some can make warfarin work too well. This can cause bleeding problems. And some can make it work poorly, so that it does not prevent blood clots very well. You will need regular blood tests to check how long it takes for your blood to form a clot. This test is called a PT or prothrombin time test. The result of the test is called an INR level. Depending on the test results, your doctor or anticoagulation clinic may adjust your dose of warfarin. Follow-up care is a key part of your treatment and safety. Be sure to make and go to all appointments, and call your doctor if you are having problems.  It's also a good idea to know your test results and

## 2023-08-01 ENCOUNTER — CLINICAL DOCUMENTATION (OUTPATIENT)
Age: 63
End: 2023-08-01

## 2023-08-01 ENCOUNTER — OFFICE VISIT (OUTPATIENT)
Age: 63
End: 2023-08-01
Payer: COMMERCIAL

## 2023-08-01 ENCOUNTER — TELEPHONE (OUTPATIENT)
Age: 63
End: 2023-08-01

## 2023-08-01 ENCOUNTER — HOSPITAL ENCOUNTER (OUTPATIENT)
Facility: HOSPITAL | Age: 63
Setting detail: INFUSION SERIES
End: 2023-08-01
Payer: COMMERCIAL

## 2023-08-01 VITALS
OXYGEN SATURATION: 98 % | RESPIRATION RATE: 18 BRPM | BODY MASS INDEX: 22.53 KG/M2 | TEMPERATURE: 97.8 F | WEIGHT: 157 LBS | DIASTOLIC BLOOD PRESSURE: 59 MMHG | HEART RATE: 71 BPM | SYSTOLIC BLOOD PRESSURE: 123 MMHG

## 2023-08-01 VITALS
HEIGHT: 70 IN | WEIGHT: 157 LBS | SYSTOLIC BLOOD PRESSURE: 116 MMHG | TEMPERATURE: 97.8 F | RESPIRATION RATE: 18 BRPM | DIASTOLIC BLOOD PRESSURE: 51 MMHG | HEART RATE: 54 BPM | BODY MASS INDEX: 22.48 KG/M2

## 2023-08-01 DIAGNOSIS — C25.9 MALIGNANT NEOPLASM OF PANCREAS, UNSPECIFIED LOCATION OF MALIGNANCY (HCC): Primary | ICD-10-CM

## 2023-08-01 DIAGNOSIS — C78.7 CANCER, METASTATIC TO LIVER (HCC): ICD-10-CM

## 2023-08-01 LAB
ALBUMIN SERPL-MCNC: 2.8 G/DL (ref 3.5–5)
ALBUMIN/GLOB SERPL: 0.8 (ref 1.1–2.2)
ALP SERPL-CCNC: 592 U/L (ref 45–117)
ALT SERPL-CCNC: 138 U/L (ref 12–78)
ANION GAP SERPL CALC-SCNC: 4 MMOL/L (ref 5–15)
AST SERPL-CCNC: 104 U/L (ref 15–37)
BASOPHILS # BLD: 0 K/UL (ref 0–0.1)
BASOPHILS NFR BLD: 0 % (ref 0–1)
BILIRUB SERPL-MCNC: 1.2 MG/DL (ref 0.2–1)
BUN SERPL-MCNC: 12 MG/DL (ref 6–20)
BUN/CREAT SERPL: 17 (ref 12–20)
CALCIUM SERPL-MCNC: 8.8 MG/DL (ref 8.5–10.1)
CHLORIDE SERPL-SCNC: 107 MMOL/L (ref 97–108)
CO2 SERPL-SCNC: 28 MMOL/L (ref 21–32)
CREAT SERPL-MCNC: 0.7 MG/DL (ref 0.7–1.3)
DIFFERENTIAL METHOD BLD: ABNORMAL
EOSINOPHIL # BLD: 0.1 K/UL (ref 0–0.4)
EOSINOPHIL NFR BLD: 2 % (ref 0–7)
ERYTHROCYTE [DISTWIDTH] IN BLOOD BY AUTOMATED COUNT: 14.9 % (ref 11.5–14.5)
GLOBULIN SER CALC-MCNC: 3.7 G/DL (ref 2–4)
GLUCOSE SERPL-MCNC: 140 MG/DL (ref 65–100)
HBV SURFACE AB SER QL: NONREACTIVE
HBV SURFACE AB SER-ACNC: <3.1 MIU/ML
HBV SURFACE AG SER QL: <0.1 INDEX
HBV SURFACE AG SER QL: NEGATIVE
HCT VFR BLD AUTO: 38.1 % (ref 36.6–50.3)
HGB BLD-MCNC: 12.1 G/DL (ref 12.1–17)
IMM GRANULOCYTES # BLD AUTO: 0 K/UL
IMM GRANULOCYTES NFR BLD AUTO: 0 %
LYMPHOCYTES # BLD: 1.7 K/UL (ref 0.8–3.5)
LYMPHOCYTES NFR BLD: 27 % (ref 12–49)
MCH RBC QN AUTO: 28.7 PG (ref 26–34)
MCHC RBC AUTO-ENTMCNC: 31.8 G/DL (ref 30–36.5)
MCV RBC AUTO: 90.3 FL (ref 80–99)
MONOCYTES # BLD: 1.2 K/UL (ref 0–1)
MONOCYTES NFR BLD: 19 % (ref 5–13)
NEUTS SEG # BLD: 3.4 K/UL (ref 1.8–8)
NEUTS SEG NFR BLD: 52 % (ref 32–75)
NRBC # BLD: 0 K/UL (ref 0–0.01)
NRBC BLD-RTO: 0 PER 100 WBC
PLATELET # BLD AUTO: 243 K/UL (ref 150–400)
PMV BLD AUTO: 11.6 FL (ref 8.9–12.9)
POTASSIUM SERPL-SCNC: 3.8 MMOL/L (ref 3.5–5.1)
PROT SERPL-MCNC: 6.5 G/DL (ref 6.4–8.2)
RBC # BLD AUTO: 4.22 M/UL (ref 4.1–5.7)
RBC MORPH BLD: ABNORMAL
SODIUM SERPL-SCNC: 139 MMOL/L (ref 136–145)
WBC # BLD AUTO: 6.4 K/UL (ref 4.1–11.1)
WBC MORPH BLD: ABNORMAL

## 2023-08-01 PROCEDURE — 3078F DIAST BP <80 MM HG: CPT | Performed by: INTERNAL MEDICINE

## 2023-08-01 PROCEDURE — 3074F SYST BP LT 130 MM HG: CPT | Performed by: INTERNAL MEDICINE

## 2023-08-01 PROCEDURE — 80053 COMPREHEN METABOLIC PANEL: CPT

## 2023-08-01 PROCEDURE — 36415 COLL VENOUS BLD VENIPUNCTURE: CPT

## 2023-08-01 PROCEDURE — 96417 CHEMO IV INFUS EACH ADDL SEQ: CPT

## 2023-08-01 PROCEDURE — 86706 HEP B SURFACE ANTIBODY: CPT

## 2023-08-01 PROCEDURE — G0498 CHEMO EXTEND IV INFUS W/PUMP: HCPCS

## 2023-08-01 PROCEDURE — 96367 TX/PROPH/DG ADDL SEQ IV INF: CPT

## 2023-08-01 PROCEDURE — 96413 CHEMO IV INFUSION 1 HR: CPT

## 2023-08-01 PROCEDURE — 96416 CHEMO PROLONG INFUSE W/PUMP: CPT

## 2023-08-01 PROCEDURE — 86704 HEP B CORE ANTIBODY TOTAL: CPT

## 2023-08-01 PROCEDURE — 6360000002 HC RX W HCPCS: Performed by: INTERNAL MEDICINE

## 2023-08-01 PROCEDURE — 2580000003 HC RX 258: Performed by: INTERNAL MEDICINE

## 2023-08-01 PROCEDURE — 87340 HEPATITIS B SURFACE AG IA: CPT

## 2023-08-01 PROCEDURE — 96375 TX/PRO/DX INJ NEW DRUG ADDON: CPT

## 2023-08-01 PROCEDURE — 99215 OFFICE O/P EST HI 40 MIN: CPT | Performed by: INTERNAL MEDICINE

## 2023-08-01 PROCEDURE — 96415 CHEMO IV INFUSION ADDL HR: CPT

## 2023-08-01 PROCEDURE — 86301 IMMUNOASSAY TUMOR CA 19-9: CPT

## 2023-08-01 PROCEDURE — 96368 THER/DIAG CONCURRENT INF: CPT

## 2023-08-01 PROCEDURE — 85025 COMPLETE CBC W/AUTO DIFF WBC: CPT

## 2023-08-01 RX ORDER — DEXTROSE MONOHYDRATE 50 MG/ML
5-250 INJECTION, SOLUTION INTRAVENOUS PRN
Status: DISCONTINUED | OUTPATIENT
Start: 2023-08-01 | End: 2023-08-02 | Stop reason: HOSPADM

## 2023-08-01 RX ORDER — SODIUM CHLORIDE 9 MG/ML
5-250 INJECTION, SOLUTION INTRAVENOUS PRN
Status: DISCONTINUED | OUTPATIENT
Start: 2023-08-01 | End: 2023-08-02 | Stop reason: HOSPADM

## 2023-08-01 RX ORDER — DEXAMETHASONE 4 MG/1
8 TABLET ORAL
Qty: 32 TABLET | Refills: 5 | Status: SHIPPED | OUTPATIENT
Start: 2023-08-01 | End: 2023-08-01

## 2023-08-01 RX ORDER — ATROPINE SULFATE 0.4 MG/ML
0.4 INJECTION, SOLUTION INTRAVENOUS
Status: DISCONTINUED | OUTPATIENT
Start: 2023-08-01 | End: 2023-08-02 | Stop reason: HOSPADM

## 2023-08-01 RX ORDER — HEPARIN 100 UNIT/ML
500 SYRINGE INTRAVENOUS PRN
Status: DISCONTINUED | OUTPATIENT
Start: 2023-08-01 | End: 2023-08-02 | Stop reason: HOSPADM

## 2023-08-01 RX ORDER — ONDANSETRON 8 MG/1
8 TABLET, ORALLY DISINTEGRATING ORAL EVERY 8 HOURS PRN
Qty: 30 TABLET | Refills: 2 | Status: SHIPPED | OUTPATIENT
Start: 2023-08-01 | End: 2023-08-01

## 2023-08-01 RX ORDER — DEXAMETHASONE 4 MG/1
8 TABLET ORAL
Qty: 32 TABLET | Refills: 5 | Status: SHIPPED | OUTPATIENT
Start: 2023-08-01

## 2023-08-01 RX ORDER — SODIUM CHLORIDE 0.9 % (FLUSH) 0.9 %
5-40 SYRINGE (ML) INJECTION PRN
Status: DISCONTINUED | OUTPATIENT
Start: 2023-08-01 | End: 2023-08-02 | Stop reason: HOSPADM

## 2023-08-01 RX ORDER — PROCHLORPERAZINE MALEATE 5 MG/1
5 TABLET ORAL EVERY 6 HOURS PRN
Qty: 30 TABLET | Refills: 2 | Status: SHIPPED | OUTPATIENT
Start: 2023-08-01 | End: 2023-08-01

## 2023-08-01 RX ORDER — ONDANSETRON 8 MG/1
8 TABLET, ORALLY DISINTEGRATING ORAL EVERY 8 HOURS PRN
Qty: 30 TABLET | Refills: 2 | Status: SHIPPED | OUTPATIENT
Start: 2023-08-01

## 2023-08-01 RX ORDER — PALONOSETRON 0.05 MG/ML
0.25 INJECTION, SOLUTION INTRAVENOUS ONCE
Status: COMPLETED | OUTPATIENT
Start: 2023-08-01 | End: 2023-08-01

## 2023-08-01 RX ORDER — PROCHLORPERAZINE MALEATE 5 MG/1
5 TABLET ORAL EVERY 6 HOURS PRN
Qty: 30 TABLET | Refills: 2 | Status: SHIPPED | OUTPATIENT
Start: 2023-08-01

## 2023-08-01 RX ADMIN — IRINOTECAN HYDROCHLORIDE 200 MG: 20 INJECTION, SOLUTION INTRAVENOUS at 14:01

## 2023-08-01 RX ADMIN — FLUOROURACIL 3000 MG: 50 INJECTION, SOLUTION INTRAVENOUS at 15:45

## 2023-08-01 RX ADMIN — LEUCOVORIN CALCIUM 200 MG: 200 INJECTION, POWDER, LYOPHILIZED, FOR SOLUTION INTRAMUSCULAR; INTRAVENOUS at 14:01

## 2023-08-01 RX ADMIN — DEXTROSE MONOHYDRATE 250 ML/HR: 50 INJECTION, SOLUTION INTRAVENOUS at 10:43

## 2023-08-01 RX ADMIN — DEXAMETHASONE SODIUM PHOSPHATE 12 MG: 4 INJECTION, SOLUTION INTRAMUSCULAR; INTRAVENOUS at 11:00

## 2023-08-01 RX ADMIN — OXALIPLATIN 110 MG: 5 INJECTION, SOLUTION INTRAVENOUS at 11:49

## 2023-08-01 RX ADMIN — SODIUM CHLORIDE 150 MG: 900 INJECTION, SOLUTION INTRAVENOUS at 11:17

## 2023-08-01 RX ADMIN — PALONOSETRON HYDROCHLORIDE 0.25 MG: 0.25 INJECTION INTRAVENOUS at 10:55

## 2023-08-01 ASSESSMENT — PATIENT HEALTH QUESTIONNAIRE - PHQ9
SUM OF ALL RESPONSES TO PHQ QUESTIONS 1-9: 0
SUM OF ALL RESPONSES TO PHQ QUESTIONS 1-9: 0
1. LITTLE INTEREST OR PLEASURE IN DOING THINGS: 0
SUM OF ALL RESPONSES TO PHQ QUESTIONS 1-9: 0
SUM OF ALL RESPONSES TO PHQ9 QUESTIONS 1 & 2: 0
2. FEELING DOWN, DEPRESSED OR HOPELESS: 0
SUM OF ALL RESPONSES TO PHQ QUESTIONS 1-9: 0

## 2023-08-01 NOTE — PROGRESS NOTES
Keeley Gupta is a 58 y.o. male    Chief Complaint   Patient presents with    New Patient      stage IV Pancreatic adenocarcinoma       1. Have you been to the ER, urgent care clinic since your last visit? Hospitalized since your last visit? Yes, [facility]    2. Have you seen or consulted any other health care providers outside of the [address] [place] [address] Avenue since your last visit? Include any pap smears or colon screening.  No

## 2023-08-01 NOTE — PROGRESS NOTES
Pharmacy Note- Chemotherapy Education    Bethany Evans is a  58 y.o.male  diagnosed with pancreatic cancer here today for Cycle 1, Day 1 chemotherapy counseling and consent. Mr. Inge Moore is being treated with mFOLFIRINOX. Provided education on oxaliplatin, irinotecan, fluorouracil, leucovorin and premedications - fosaprepitant, palonosetron and dexamethasone. Provided Mr. Inge Moore with a handout and reviewed home supportive care regimen. Side effects of chemotherapy reviewed included s/s infection, anemia, appetite changes, thrombocytopenia, fatigue, hair loss/alopecia, bone pain, skin and nail changes, diarrhea/constipation and nerve sensitivities (cold sensitivity/peripheraln neuropathy). Patient given ways to manage these side effects and when to contact office. [employer] Handout of medications provided to patient. Mr. Inge Moore verbalized understanding of the information presented and all of the patient's questions were answered.     Rocky Gonsales, PharmD, [facility], [address]    Program: Medical Group  CPA in place:  Yes  Recommendation Provided To: Patient/Caregiver: 2 via In person  Intervention Detail: New Rx: 3, reason: Needs Additional Therapy  Intervention Accepted By: Patient/Caregiver: 2    Time Spent (min): 30

## 2023-08-01 NOTE — PROGRESS NOTES
Pharmacy Progress Note - Anticoagulation Management      S/O:  Mr. Malinda Gonsales  is a 58 y.o. male seen today for anticoagulation management for  the diagnosis of Atrial Fibrillation/Flutter       HPI: At last visit (7/31) INR was 1.2 and subtherapeutic for INR goal 2.0-3.0. Patient had resumed Lovenox and warfarin on 7/28 after port placement. Due to INR depression, patient instructed to take extra dose (10 mg total) on 7/31. Weekly planned dose was also increased from 25 mg to 27.5 mg (10%). Patient recommended to continue Lovenox and take 5 mg on 8/1, 5 mg on 8/2 and 2.5 mg on 8/3. Follow up INR scheduled for 8/4. Interim History:       Warfarin start date: 2017 per patient    INR Goal:  2.0-3.0      Current warfarin regimen: 10 mg x 1 dose then 5 mg, 5 mg, 2.5 mg    Warfarin tablet strength:   5 mg     Duration of therapy: Indefinite    Today's pertinent positives includes:  Medication change    Patient has started chemotherapy every other week with mFOLFIRINOX with Dr. Pankaj Weiner - fluorouracil, oxaliplatin, irinotecan and leucovorin, with premedications with fosaprepitant, palonosetron and dexamethasone. INR 1.3  PT 16.1    Adherence:   Able to recall regimen? YES  Miss/extra dose? NO  Need refill?  NO    Upcoming procedure(s):  NO     INR History:              (normal INR range 0.8-1.2)       Date               INR                  PT        Dose/Comments  08/04/23 1.3  16.1   10 mg, 5 mg, 5 mg, then 2.5 mg, chemotherapy  07/31/23 1.2  14.4   Resumed Lovenox and warfarin on 7/28 after procedure  07/24/23 1.4  16.4   22.5 mg of warfarin in the past 7 days, hospitalized  06/21/23 1.8  21.3  10 mg Wed; 7.5 mg daily ROW  06/02/23 2.1  24.8  10 mg x 1 dose then 10 mg Wed; 7.5 mg daily ROW  05/23/23 1.6  18.7  10 mg x 1 dose then 7.5 mg daily ROW  05/15/23 1.5  18.2  Increased to 5 mg Mon, Fri; 7.5 mg daily ROW  05/03/23          1.6                   19.4     Reduced to 2.5 mg Wed; 5 mg daily ROW,

## 2023-08-01 NOTE — PATIENT INSTRUCTIONS
you take. How can you care for yourself at home? Take warfarin safely  Take your warfarin at the same time each day. If you miss a dose of warfarin, don't take an extra dose to make up for it. Your doctor can tell you exactly what to do so you don't take too much or too little. Wear medical alert jewelry that lets others know that you take warfarin. You can buy this at most drugstores. Don't take warfarin if you are pregnant or planning to get pregnant. Talk to your doctor about how you can prevent getting pregnant while you are taking it. Don't change your dose or stop taking warfarin unless your doctor tells you to. Effects of medicines and food on warfarin  Don't start or stop taking any medicines, vitamins, or natural remedies unless you first talk to your doctor. Many medicines can affect how warfarin works. These include aspirin and other pain relievers, over-the-counter medicines, multivitamins, dietary supplements, and herbal products. Tell all of your doctors and pharmacists that you take warfarin. Some prescription medicines can affect how warfarin works. Keep the amount of vitamin K in your diet about the same from day to day. Do not suddenly eat a lot more or a lot less food that is rich in vitamin K than you usually do. Vitamin K affects how warfarin works and how your blood clots. Talk with your doctor before making big changes in your diet. Vitamin K is in many foods, such as:  Leafy greens, such as kale, cabbage, spinach, Swiss chard, and lettuce. Canola and soybean oils. Green vegetables, such as asparagus, broccoli, and [place] sprouts. Vegetable drinks, green tea leaves, and some dietary supplement drinks. Avoid cranberry juice and other cranberry products. They can increase the effects of warfarin. Limit your use of alcohol. Avoid bleeding by preventing falls and injuries  Wear slippers or shoes with nonskid soles. Remove throw rugs and clutter.   Rearrange furniture and

## 2023-08-01 NOTE — TELEPHONE ENCOUNTER
Patient's spouse Rachel Frances) called requesting a call back regarding a conversation with Dr. Vandana James regarding \"Creon\".    Contact can be reached at:  [phone]

## 2023-08-01 NOTE — PROGRESS NOTES
Oncology Social Work  Psychosocial Assessment    Reason for Assessment:      [x] Social Work Referral [x] Initial Assessment [x] New Diagnosis [] Other    Advance Care Planning:  Demographics 8/3/2023   Marital Status        Sources of Information:    [x]Patient  [x]Family  []Staff  [x]Medical Record    Mental Status:    [x]Alert  []Lethargic  []Unresponsive   [] Unable to assess   Oriented to:  [x]Person  [x]Place  [x]Time  [x]Situation      Relationship Status:  []Single  [x]  []Significant Other/Life Partner  []  []  []    Living Circumstances:  []Lives Alone  [x]Family/Significant Other in Household  []Roommates  []Children in the Home  []Paid Caregivers  []Assisted Living Facility/Group [address] [place] [address]  []Homeless  []Incarcerated  []Environmental/Care Concerns  []Other:    Employment Status:  [x]Employed Full-time []Employed Part-time []Homemaker  [] Retired [] Short-Term Disability [] Long-Term Disability  [] Unemployed   [] [id number] [facility] [place] [facility]   [] SSDI  [] Self-Employment    Barriers to Learning:    []Language  []Developmental  []Cognitive  []Altered Mental Status  []Visual/Hearing Impairment  []Unable to Read/Write  []Motivational  [] Challenges Understanding Medical Jargon [x]No Barriers Identified      Financial/Legal Concerns:    []Uninsured  []Limited Income/Resources  []Non-Citizen  []Food Insecurity [x]No Concerns Identified   []Other:    Sikhism/Spiritual/Existential: not discussed at this visit   Does patient have any spiritual or Latter-day beliefs? [] Yes [] No  Is the patient involved in a spiritual, jay or Latter-day community?  [] Yes [] No  Patient expressing spiritual/existential angst? [] Yes [] No  Notes:    Support System: not assessed at this visit   Identified Support Person/Group:  []Strong  []Fair  []Limited    Coping with Illness:   [x]  Coping Well  [] Challenges Coping with Serious Illness [] Situational Depression [] Situational Anxiety []

## 2023-08-02 ENCOUNTER — TELEPHONE (OUTPATIENT)
Age: 63
End: 2023-08-02

## 2023-08-02 DIAGNOSIS — C25.9 MALIGNANT NEOPLASM OF PANCREAS, UNSPECIFIED LOCATION OF MALIGNANCY (HCC): Primary | ICD-10-CM

## 2023-08-02 LAB
CANCER AG19-9 SERPL-ACNC: 80 U/ML (ref 0–35)
HBV CORE AB SERPL QL IA: NEGATIVE

## 2023-08-02 NOTE — TELEPHONE ENCOUNTER
8985:    Returned call to patient's spouse   PHI confirmed   Sandra wanted to verify if CREON was prescribed or what would be the steps for this     Informed consult to RD would be placed and discussion/education on CREON would be provided and then Rx would be sent out     Spouse verbalized understanding and agreed with plan     No new questions or concerns at this time

## 2023-08-02 NOTE — TELEPHONE ENCOUNTER
Cancer [place] at [facility]   [address] [facility], [address] [place] [address] [address] , [address]   W: [phone]  F: [phone]      Medical Nutrition Therapy  Nutrition Referral:    Referral received. Called and spoke with patient and wife, explained that RD is available to address nutrition throughout the spectrum of care. Patient with pancreatic cancer, started mFOLFIRINOX. Over the past 6 months, he has lost about 15#. Last night, he had 2 bowls of chicken noodle soup, banana. This morning he had a bowl of cereal w/ 2% milk and yogurt. Ht Readings from Last 1 Encounters:   08/01/23 5' 10\" (1.778 m)       Wt Readings from Last 5 Encounters:   08/01/23 157 lb (71.2 kg)   08/01/23 157 lb (71.2 kg)   07/17/23 159 lb 6.3 oz (72.3 kg)   05/31/23 165 lb 12.8 oz (75.2 kg)   04/11/23 170 lb (77.1 kg)     Estimated Nutrition Needs:   Calorie Range: 1775-2136kcal/day      Protein Range: 71-85g/day      Fluid Needs: 2000ml     Plan:  - Discussed pancreatic enzymes and how to take. - Shanon Montenegro ordered and information sent to pharmacy     - Savings card information provided.         Signed By: Adelaida Nguyen, [facility]

## 2023-08-03 ENCOUNTER — HOSPITAL ENCOUNTER (OUTPATIENT)
Facility: HOSPITAL | Age: 63
Setting detail: INFUSION SERIES
End: 2023-08-03
Payer: COMMERCIAL

## 2023-08-03 ENCOUNTER — CARE COORDINATION (OUTPATIENT)
Dept: OTHER | Facility: CLINIC | Age: 63
End: 2023-08-03

## 2023-08-03 VITALS — SYSTOLIC BLOOD PRESSURE: 128 MMHG | DIASTOLIC BLOOD PRESSURE: 58 MMHG | HEART RATE: 61 BPM

## 2023-08-03 DIAGNOSIS — C25.9 MALIGNANT NEOPLASM OF PANCREAS, UNSPECIFIED LOCATION OF MALIGNANCY (HCC): Primary | ICD-10-CM

## 2023-08-03 PROCEDURE — 96523 IRRIG DRUG DELIVERY DEVICE: CPT

## 2023-08-03 RX ORDER — SODIUM CHLORIDE 9 MG/ML
5-250 INJECTION, SOLUTION INTRAVENOUS PRN
Status: DISCONTINUED | OUTPATIENT
Start: 2023-08-03 | End: 2023-08-04 | Stop reason: HOSPADM

## 2023-08-03 RX ORDER — HEPARIN 100 UNIT/ML
500 SYRINGE INTRAVENOUS PRN
Status: DISCONTINUED | OUTPATIENT
Start: 2023-08-03 | End: 2023-08-04 | Stop reason: HOSPADM

## 2023-08-03 RX ORDER — SODIUM CHLORIDE 0.9 % (FLUSH) 0.9 %
5-40 SYRINGE (ML) INJECTION PRN
Status: DISCONTINUED | OUTPATIENT
Start: 2023-08-03 | End: 2023-08-04 | Stop reason: HOSPADM
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
## 2023-08-04 ENCOUNTER — ANTI-COAG VISIT (OUTPATIENT)
Facility: HOSPITAL | Age: 63
End: 2023-08-04
Payer: COMMERCIAL

## 2023-08-04 DIAGNOSIS — I48.91 ATRIAL FIBRILLATION, UNSPECIFIED TYPE (HCC): ICD-10-CM

## 2023-08-04 DIAGNOSIS — I48.92 ATRIAL FLUTTER, UNSPECIFIED TYPE (HCC): Primary | ICD-10-CM

## 2023-08-04 LAB — INTERNATIONAL NORMALIZATION RATIO, POC: 1.3

## 2023-08-04 PROCEDURE — 85610 PROTHROMBIN TIME: CPT

## 2023-08-04 PROCEDURE — 99213 OFFICE O/P EST LOW 20 MIN: CPT

## 2023-08-04 RX ORDER — ENOXAPARIN SODIUM 100 MG/ML
1 INJECTION SUBCUTANEOUS 2 TIMES DAILY
Qty: 4.8 ML | Refills: 1 | Status: SHIPPED | OUTPATIENT
Start: 2023-08-04 | End: 2023-08-07

## 2023-08-04 NOTE — PROGRESS NOTES
Pharmacy Progress Note - Anticoagulation Management      S/O:  Mr. Deena Wells  is a 58 y.o. male seen today for anticoagulation management for  the diagnosis of Atrial Fibrillation/Flutter       HPI: At last visit (8/4) INR was 1.3 and subtherapeutic for INR goal 2.0-3.0. Due to INR depression, patient recommended to take 10 mg on 8/4. Weekly planned dose was also increased from 32.5 mg to 37.5 mg (15%). Patient instructed to continue Lovenox Q12H and begin taking 7.5 mg Sat; 5 mg daily ROW. Patient scheduled to recheck INR on 8/7 at 1:30 PM at [address] [place] [address]. Interim History:       Warfarin start date: 2017 per patient    INR Goal:  2.0-3.0      Current warfarin regimen: 10 mg x 1 dose then 7.5 mg, 5 mg    Warfarin tablet strength:   5 mg     Duration of therapy: Indefinite    Today's pertinent positives includes:  No significant changes since last visit    INR 1.2  PT 14.5    Adherence:   Able to recall regimen? YES  Miss/extra dose? NO  Need refill?  NO    Upcoming procedure(s):  NO     INR History:              (normal INR range 0.8-1.2)       Date               INR                  PT        Dose/Comments  08/07/23 1.2  14.5   10 mg, 7.5 mg, 5 mg  08/04/23 1.3  16.1   10 mg, 5 mg, 5 mg, then 2.5 mg, chemotherapy  07/31/23 1.2  14.4   Resumed Lovenox and warfarin on 7/28 after procedure  07/24/23 1.4  16.4   22.5 mg of warfarin in the past 7 days, hospitalized  06/21/23 1.8  21.3  10 mg Wed; 7.5 mg daily ROW  06/02/23 2.1  24.8  10 mg x 1 dose then 10 mg Wed; 7.5 mg daily ROW  05/23/23 1.6  18.7  10 mg x 1 dose then 7.5 mg daily ROW  05/15/23 1.5  18.2  Increased to 5 mg Mon, Fri; 7.5 mg daily ROW  05/03/23          1.6                   19.4     Reduced to 2.5 mg Wed; 5 mg daily ROW, patient took 10 mg on 4/28 and 5/1 04/19/23         3.3                   39.8     Held dose x 2 days then 10 mg MWF; 5 mg daily ROW  04/12/23         5.7                   68.4     5 mg Tues, Fri; 10 mg daily ROW

## 2023-08-04 NOTE — PATIENT INSTRUCTIONS
Today your INR was 1.2. Your goal INR is  2.0-3.0. You have a   5 mg tablet of Coumadin (warfarin). Take Coumadin (warfarin) as follows: Take 15 mg (3 tab) tonight (8/7). On 8/8 begin 10 mg (2 tab) Tuesdays and 7.5 mg (1.5 tab) daily rest of week    Check INR on 8/11 at 10 AM      Avoid any over the counter items containing aspirin or ibuprofen, and avoid great swings in general diet. Avoid alcohol consumption. Please notify the INR nurse if you are started on any new medication including over the counter or herbal supplements. Also, please notify your INR nurse if any of your other prescription or over the counter medications have been discontinued. Your Care Instructions  Warfarin is a medicine that you take to prevent blood clots. It is often called a blood thinner. Doctors give warfarin (such as Coumadin) to reduce the risk of blood clots. You may be at risk for blood clots if you have atrial fibrillation or deep vein thrombosis. Some other health problems may also put you at risk. Warfarin slows the amount of time it takes for your blood to clot. It can cause bleeding problems. Even if you've been taking warfarin for a while, it's important to know how to take it safely. Foods and other medicines can affect the way warfarin works. Some can make warfarin work too well. This can cause bleeding problems. And some can make it work poorly, so that it does not prevent blood clots very well. You will need regular blood tests to check how long it takes for your blood to form a clot. This test is called a PT or prothrombin time test. The result of the test is called an INR level. Depending on the test results, your doctor or anticoagulation clinic may adjust your dose of warfarin. Follow-up care is a key part of your treatment and safety. Be sure to make and go to all appointments, and call your doctor if you are having problems.  It's also a good idea to know your test results and keep a list

## 2023-08-07 ENCOUNTER — ANTI-COAG VISIT (OUTPATIENT)
Facility: HOSPITAL | Age: 63
End: 2023-08-07
Payer: COMMERCIAL

## 2023-08-07 ENCOUNTER — TELEPHONE (OUTPATIENT)
Age: 63
End: 2023-08-07

## 2023-08-07 DIAGNOSIS — I48.92 ATRIAL FLUTTER, UNSPECIFIED TYPE (HCC): Primary | ICD-10-CM

## 2023-08-07 DIAGNOSIS — I48.91 ATRIAL FIBRILLATION, UNSPECIFIED TYPE (HCC): ICD-10-CM

## 2023-08-07 DIAGNOSIS — C25.9 MALIGNANT NEOPLASM OF PANCREAS, UNSPECIFIED LOCATION OF MALIGNANCY (HCC): Primary | ICD-10-CM

## 2023-08-07 LAB — INTERNATIONAL NORMALIZATION RATIO, POC: 1.2

## 2023-08-07 RX ORDER — OXYCODONE HYDROCHLORIDE AND ACETAMINOPHEN 5; 325 MG/1; MG/1
1 TABLET ORAL EVERY 6 HOURS PRN
Qty: 28 TABLET | Refills: 0 | Status: SHIPPED | OUTPATIENT
Start: 2023-08-07 | End: 2023-08-21

## 2023-08-07 NOTE — TELEPHONE ENCOUNTER
Called patient back. BEBETO left. I have refilled his previous inpatient prescription for percocet and have sent it to the pharmacy on file. I have given him a 14 day supple as he will be seeing palliative care on 8/14. Left office number and instructed patient to call with questions.       Jose Guadalupe Hernández NP

## 2023-08-08 ENCOUNTER — TELEPHONE (OUTPATIENT)
Age: 63
End: 2023-08-08

## 2023-08-08 ENCOUNTER — APPOINTMENT (OUTPATIENT)
Facility: HOSPITAL | Age: 63
DRG: 308 | End: 2023-08-08
Payer: COMMERCIAL

## 2023-08-08 ENCOUNTER — HOSPITAL ENCOUNTER (INPATIENT)
Facility: HOSPITAL | Age: 63
LOS: 2 days | Discharge: HOME | DRG: 308 | End: 2023-08-10
Attending: EMERGENCY MEDICINE | Admitting: INTERNAL MEDICINE
Payer: COMMERCIAL

## 2023-08-08 DIAGNOSIS — I47.1 PAROXYSMAL SUPRAVENTRICULAR TACHYCARDIA (HCC): ICD-10-CM

## 2023-08-08 DIAGNOSIS — E86.0 DEHYDRATION: ICD-10-CM

## 2023-08-08 DIAGNOSIS — R19.7 DIARRHEA, UNSPECIFIED TYPE: ICD-10-CM

## 2023-08-08 DIAGNOSIS — C78.7 CANCER, METASTATIC TO LIVER (HCC): ICD-10-CM

## 2023-08-08 DIAGNOSIS — R07.9 CHEST PAIN, UNSPECIFIED TYPE: Primary | ICD-10-CM

## 2023-08-08 LAB
ALBUMIN SERPL-MCNC: 3.3 G/DL (ref 3.5–5)
ALBUMIN/GLOB SERPL: 0.8 (ref 1.1–2.2)
ALP SERPL-CCNC: 475 U/L (ref 45–117)
ALT SERPL-CCNC: 194 U/L (ref 12–78)
ANION GAP SERPL CALC-SCNC: 10 MMOL/L (ref 5–15)
AST SERPL-CCNC: 72 U/L (ref 15–37)
BILIRUB SERPL-MCNC: 1.5 MG/DL (ref 0.2–1)
BUN SERPL-MCNC: 21 MG/DL (ref 6–20)
BUN/CREAT SERPL: 21 (ref 12–20)
CALCIUM SERPL-MCNC: 8.9 MG/DL (ref 8.5–10.1)
CHLORIDE SERPL-SCNC: 102 MMOL/L (ref 97–108)
CO2 SERPL-SCNC: 23 MMOL/L (ref 21–32)
COMMENT:: NORMAL
CREAT SERPL-MCNC: 0.98 MG/DL (ref 0.7–1.3)
D DIMER PPP FEU-MCNC: 3.14 MG/L FEU (ref 0–0.65)
ERYTHROCYTE [DISTWIDTH] IN BLOOD BY AUTOMATED COUNT: 14.5 % (ref 11.5–14.5)
GLOBULIN SER CALC-MCNC: 3.9 G/DL (ref 2–4)
GLUCOSE SERPL-MCNC: 145 MG/DL (ref 65–100)
HCT VFR BLD AUTO: 48.3 % (ref 36.6–50.3)
HGB BLD-MCNC: 15.6 G/DL (ref 12.1–17)
LIPASE SERPL-CCNC: 97 U/L (ref 73–393)
MAGNESIUM SERPL-MCNC: 2.1 MG/DL (ref 1.6–2.4)
MCH RBC QN AUTO: 29.1 PG (ref 26–34)
MCHC RBC AUTO-ENTMCNC: 32.3 G/DL (ref 30–36.5)
MCV RBC AUTO: 89.9 FL (ref 80–99)
NRBC # BLD: 0 K/UL (ref 0–0.01)
NRBC BLD-RTO: 0 PER 100 WBC
PLATELET # BLD AUTO: 211 K/UL (ref 150–400)
PMV BLD AUTO: 12 FL (ref 8.9–12.9)
POTASSIUM SERPL-SCNC: 4.2 MMOL/L (ref 3.5–5.1)
PROT SERPL-MCNC: 7.2 G/DL (ref 6.4–8.2)
RBC # BLD AUTO: 5.37 M/UL (ref 4.1–5.7)
SODIUM SERPL-SCNC: 135 MMOL/L (ref 136–145)
SPECIMEN HOLD: NORMAL
TROPONIN I SERPL HS-MCNC: 8 NG/L (ref 0–76)
TROPONIN I SERPL HS-MCNC: 9 NG/L (ref 0–76)
WBC # BLD AUTO: 9.6 K/UL (ref 4.1–11.1)

## 2023-08-08 PROCEDURE — 84484 ASSAY OF TROPONIN QUANT: CPT

## 2023-08-08 PROCEDURE — 36415 COLL VENOUS BLD VENIPUNCTURE: CPT

## 2023-08-08 PROCEDURE — 6360000002 HC RX W HCPCS: Performed by: INTERNAL MEDICINE

## 2023-08-08 PROCEDURE — 83690 ASSAY OF LIPASE: CPT

## 2023-08-08 PROCEDURE — 71045 X-RAY EXAM CHEST 1 VIEW: CPT

## 2023-08-08 PROCEDURE — 85379 FIBRIN DEGRADATION QUANT: CPT

## 2023-08-08 PROCEDURE — 96361 HYDRATE IV INFUSION ADD-ON: CPT

## 2023-08-08 PROCEDURE — 83735 ASSAY OF MAGNESIUM: CPT

## 2023-08-08 PROCEDURE — 96375 TX/PRO/DX INJ NEW DRUG ADDON: CPT

## 2023-08-08 PROCEDURE — 80053 COMPREHEN METABOLIC PANEL: CPT

## 2023-08-08 PROCEDURE — 2580000003 HC RX 258: Performed by: EMERGENCY MEDICINE

## 2023-08-08 PROCEDURE — 85027 COMPLETE CBC AUTOMATED: CPT

## 2023-08-08 PROCEDURE — 96374 THER/PROPH/DIAG INJ IV PUSH: CPT

## 2023-08-08 PROCEDURE — 6360000002 HC RX W HCPCS: Performed by: EMERGENCY MEDICINE

## 2023-08-08 PROCEDURE — 2060000000 HC ICU INTERMEDIATE R&B

## 2023-08-08 PROCEDURE — 6360000004 HC RX CONTRAST MEDICATION: Performed by: EMERGENCY MEDICINE

## 2023-08-08 PROCEDURE — 99285 EMERGENCY DEPT VISIT HI MDM: CPT

## 2023-08-08 PROCEDURE — 71275 CT ANGIOGRAPHY CHEST: CPT

## 2023-08-08 PROCEDURE — 2580000003 HC RX 258: Performed by: INTERNAL MEDICINE

## 2023-08-08 RX ORDER — ENOXAPARIN SODIUM 100 MG/ML
INJECTION SUBCUTANEOUS 2 TIMES DAILY
Status: ON HOLD | COMMUNITY
End: 2023-08-10 | Stop reason: HOSPADM

## 2023-08-08 RX ORDER — NALOXONE HYDROCHLORIDE 0.4 MG/ML
0.4 INJECTION, SOLUTION INTRAMUSCULAR; INTRAVENOUS; SUBCUTANEOUS PRN
Status: DISCONTINUED | OUTPATIENT
Start: 2023-08-08 | End: 2023-08-10 | Stop reason: HOSPADM

## 2023-08-08 RX ORDER — MORPHINE SULFATE 2 MG/ML
2 INJECTION, SOLUTION INTRAMUSCULAR; INTRAVENOUS ONCE
Status: COMPLETED | OUTPATIENT
Start: 2023-08-08 | End: 2023-08-08

## 2023-08-08 RX ORDER — SODIUM CHLORIDE, SODIUM LACTATE, POTASSIUM CHLORIDE, CALCIUM CHLORIDE 600; 310; 30; 20 MG/100ML; MG/100ML; MG/100ML; MG/100ML
INJECTION, SOLUTION INTRAVENOUS CONTINUOUS
Status: DISCONTINUED | OUTPATIENT
Start: 2023-08-08 | End: 2023-08-10 | Stop reason: HOSPADM

## 2023-08-08 RX ORDER — ONDANSETRON 2 MG/ML
4 INJECTION INTRAMUSCULAR; INTRAVENOUS EVERY 6 HOURS PRN
Status: DISCONTINUED | OUTPATIENT
Start: 2023-08-08 | End: 2023-08-09

## 2023-08-08 RX ORDER — DIGOXIN 0.25 MG/ML
250 INJECTION INTRAMUSCULAR; INTRAVENOUS ONCE
Status: COMPLETED | OUTPATIENT
Start: 2023-08-08 | End: 2023-08-08

## 2023-08-08 RX ORDER — NITROGLYCERIN 0.4 MG/1
0.4 TABLET SUBLINGUAL EVERY 5 MIN PRN
Status: DISCONTINUED | OUTPATIENT
Start: 2023-08-08 | End: 2023-08-10 | Stop reason: HOSPADM

## 2023-08-08 RX ORDER — MORPHINE SULFATE 2 MG/ML
2 INJECTION, SOLUTION INTRAMUSCULAR; INTRAVENOUS EVERY 4 HOURS PRN
Status: DISCONTINUED | OUTPATIENT
Start: 2023-08-08 | End: 2023-08-10

## 2023-08-08 RX ORDER — 0.9 % SODIUM CHLORIDE 0.9 %
1000 INTRAVENOUS SOLUTION INTRAVENOUS ONCE
Status: COMPLETED | OUTPATIENT
Start: 2023-08-08 | End: 2023-08-08

## 2023-08-08 RX ORDER — 0.9 % SODIUM CHLORIDE 0.9 %
500 INTRAVENOUS SOLUTION INTRAVENOUS ONCE
Status: COMPLETED | OUTPATIENT
Start: 2023-08-08 | End: 2023-08-09

## 2023-08-08 RX ADMIN — DIGOXIN 250 MCG: 0.25 INJECTION INTRAMUSCULAR; INTRAVENOUS at 22:36

## 2023-08-08 RX ADMIN — MORPHINE SULFATE 2 MG: 2 INJECTION, SOLUTION INTRAMUSCULAR; INTRAVENOUS at 18:09

## 2023-08-08 RX ADMIN — SODIUM CHLORIDE, POTASSIUM CHLORIDE, SODIUM LACTATE AND CALCIUM CHLORIDE: 600; 310; 30; 20 INJECTION, SOLUTION INTRAVENOUS at 23:29

## 2023-08-08 RX ADMIN — SODIUM CHLORIDE 1000 ML: 9 INJECTION, SOLUTION INTRAVENOUS at 17:11

## 2023-08-08 RX ADMIN — IOPAMIDOL 100 ML: 755 INJECTION, SOLUTION INTRAVENOUS at 18:36

## 2023-08-08 RX ADMIN — SODIUM CHLORIDE 500 ML: 9 INJECTION, SOLUTION INTRAVENOUS at 22:36

## 2023-08-08 RX ADMIN — ONDANSETRON 4 MG: 2 INJECTION INTRAMUSCULAR; INTRAVENOUS at 18:09

## 2023-08-08 ASSESSMENT — PAIN SCALES - GENERAL
PAINLEVEL_OUTOF10: 4
PAINLEVEL_OUTOF10: 8
PAINLEVEL_OUTOF10: 2
PAINLEVEL_OUTOF10: 10
PAINLEVEL_OUTOF10: 9

## 2023-08-08 ASSESSMENT — ENCOUNTER SYMPTOMS
DIARRHEA: 0
NAUSEA: 0
VOMITING: 0
COUGH: 0
SHORTNESS OF BREATH: 0
RHINORRHEA: 0
BACK PAIN: 0
EYE PAIN: 0
ABDOMINAL PAIN: 0
SORE THROAT: 0
COLOR CHANGE: 0

## 2023-08-08 ASSESSMENT — PAIN - FUNCTIONAL ASSESSMENT: PAIN_FUNCTIONAL_ASSESSMENT: 0-10

## 2023-08-08 ASSESSMENT — PAIN DESCRIPTION - ORIENTATION
ORIENTATION: MID
ORIENTATION: MID

## 2023-08-08 ASSESSMENT — PAIN DESCRIPTION - LOCATION
LOCATION: CHEST

## 2023-08-08 ASSESSMENT — PAIN DESCRIPTION - DESCRIPTORS
DESCRIPTORS: ACHING
DESCRIPTORS: ACHING

## 2023-08-08 NOTE — ED PROVIDER NOTES
[facility] EMERGENCY [facility]      Pt Name: Pasha Veronica  MRN: [id number]  [address] [facility] 1960  Date of evaluation: 8/8/2023  Provider: Kenton Hobbs MD    CHIEF COMPLAINT       Chief Complaint   Patient presents with    Chest Pain    Nausea    Diarrhea         HISTORY OF PRESENT ILLNESS   (Location/Symptom, Timing/Onset, Context/Setting, Quality, Duration, Modifying Factors, Severity)  Note limiting factors. 58-year-old male with a history of thoracic aneurysm dissection repair and pancreatic and liver cancer presents today with chest pain and diarrhea for several days. Chest pains ongoing for several hours but is doing better now. He also has palpitations and noted tachycardia. No bleeding with stool. No nausea or vomiting. The history is provided by the patient. Chest Pain  Pain location:  Substernal area  Pain quality: sharp    Pain radiates to:  Does not radiate  Pain severity:  Moderate  Onset quality:  Sudden  Duration:  1 hour  Timing:  Constant  Progression:  Improving  Chronicity:  New  Context: not breathing, not drug use, not eating, not lifting, not movement, not raising an arm and not at rest    Relieved by:  Nothing  Worsened by:  Nothing  Ineffective treatments:  None tried  Associated symptoms: no abdominal pain, no back pain, no cough, no dizziness, no fatigue, no fever, no nausea, no numbness, no shortness of breath and no vomiting        Review of External Medical Records:     Nursing Notes were reviewed. REVIEW OF SYSTEMS    (2-9 systems for level 4, 10 or more for level 5)     Review of Systems   Constitutional:  Negative for fatigue and fever. HENT:  Negative for ear pain, rhinorrhea and sore throat. Eyes:  Negative for pain and visual disturbance. Respiratory:  Negative for cough and shortness of breath. Cardiovascular:  Positive for chest pain. Gastrointestinal:  Negative for abdominal pain, diarrhea, nausea and vomiting. ENDOSCOPY    UPPER GASTROINTESTINAL ENDOSCOPY N/A 7/17/2023    EGD ESOPHAGOGASTRODUODENOSCOPY performed by Jermaine Garica MD at [facility]  7/18/2023    US GUIDED LIVER BIOPSY PERCUTANEOUS 7/18/2023 KARON Antonio - NP [facility]         CURRENT MEDICATIONS       Previous Medications    ATORVASTATIN (LIPITOR) 10 MG TABLET    Take 4 tablets by mouth daily    DEXAMETHASONE (DECADRON) 4 MG TABLET    Take 2 tablets by mouth daily (with breakfast) For 2 days after each chemotherapy    FERROUS SULFATE (IRON 325) 325 (65 FE) MG TABLET    Take 1 tablet by mouth daily (with breakfast)    METOPROLOL SUCCINATE (TOPROL XL) 25 MG EXTENDED RELEASE TABLET    Take by mouth daily    MULTIPLE VITAMINS-MINERALS (MULTIVITAMIN ADULTS 50+ PO)    Take 1 tablet by mouth daily    ONDANSETRON (ZOFRAN-ODT) 8 MG TBDP DISINTEGRATING TABLET    Place 1 tablet under the tongue every 8 hours as needed for Nausea or Vomiting    OXYCODONE-ACETAMINOPHEN (PERCOCET) 5-325 MG PER TABLET    Take 1 tablet by mouth every 6 hours as needed for Pain for up to 14 days. Intended supply: 7 days. Take lowest dose possible to manage pain Max Daily Amount: 4 tablets    PANCRELIPASE, LIP-PROT-AMYL, (ZENPEP) 88377-55138 UNITS CPEP    Take 2 capsules with first bite of full meal, 1 with first bite of snacks. Up to 8 per day. PROCHLORPERAZINE (COMPAZINE) 5 MG TABLET    Take 1 tablet by mouth every 6 hours as needed for Nausea    VITAMIN D (CHOLECALCIFEROL) 25 MCG (1000 UT) TABS TABLET    Take by mouth daily    WARFARIN (COUMADIN) 5 MG TABLET    Take 0.5 tablets by mouth at bedtime       ALLERGIES     Patient has no known allergies.     FAMILY HISTORY       Family History   Problem Relation Age of Onset    Diabetes Mother     Stroke Maternal Grandfather     Hypertension Sister     Cancer Father         colon    Heart Disease Mother           SOCIAL HISTORY       Social History     Socioeconomic History    Marital

## 2023-08-08 NOTE — TELEPHONE ENCOUNTER
Patient's spouse Darling Rodriguez) called regarding patient's current condition. Caller states, patient has diarrhea and is throwing up.   Caller can be reached at:  [phone]

## 2023-08-08 NOTE — TELEPHONE ENCOUNTER
1031:    Returned call to [facility] confirmed   Reported the following:  Nausea and vomiting this morning; x2 episodes; she has given him 1 dose of compazine this morning; no further further occurrences today; advised to have patient use Zofran every 8 hours for today and slowly have him try saltines and increase food intake as tolerated     Diarrhea x1 occurrence; has given 1 dose of imodium   Advised of BRAT diet and maintain fluid intake     If any symptom gets worse or has nay questions to call office     Gloriae verbalized understanding   No new questions or concerns at this time
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
## 2023-08-08 NOTE — PATIENT INSTRUCTIONS
Today your INR was 3.2. Your goal INR is  2.0-3.0. You have a   5 mg tablet of Coumadin (warfarin). Take Coumadin (warfarin) as follows: Take 5 (1 tab) Fridays and 7.5 mg (1.5 tab) daily rest of week    Check INR on 8/18 at 11 AM      Avoid any over the counter items containing aspirin or ibuprofen, and avoid great swings in general diet. Avoid alcohol consumption. Please notify the INR nurse if you are started on any new medication including over the counter or herbal supplements. Also, please notify your INR nurse if any of your other prescription or over the counter medications have been discontinued. Your Care Instructions  Warfarin is a medicine that you take to prevent blood clots. It is often called a blood thinner. Doctors give warfarin (such as Coumadin) to reduce the risk of blood clots. You may be at risk for blood clots if you have atrial fibrillation or deep vein thrombosis. Some other health problems may also put you at risk. Warfarin slows the amount of time it takes for your blood to clot. It can cause bleeding problems. Even if you've been taking warfarin for a while, it's important to know how to take it safely. Foods and other medicines can affect the way warfarin works. Some can make warfarin work too well. This can cause bleeding problems. And some can make it work poorly, so that it does not prevent blood clots very well. You will need regular blood tests to check how long it takes for your blood to form a clot. This test is called a PT or prothrombin time test. The result of the test is called an INR level. Depending on the test results, your doctor or anticoagulation clinic may adjust your dose of warfarin. Follow-up care is a key part of your treatment and safety. Be sure to make and go to all appointments, and call your doctor if you are having problems. It's also a good idea to know your test results and keep a list of the medicines you take.     How can you care

## 2023-08-08 NOTE — PROGRESS NOTES
Pharmacy Progress Note - Anticoagulation Management      S/O:  Mr. Gigi Spivey  is a 58 y.o. male seen today for anticoagulation management for  the diagnosis of Atrial Fibrillation/Flutter       HPI: At last visit (8/7) INR was 1.2 and subtherapeutic for INR goal 2.0-3.0. Due to INR depression, patient recommended to take extra dose (15 mg total) on 8/7. Over the previous 7 days, including extra dose, patient had taken 45 mg of warfarin. Weekly planned dose was also increased to 55 mg (22%). Starting 8/8 patient recommended to take 10 mg Tues; 7.5 mg daily ROW and recheck INR on 8/11. Interim History:       Warfarin start date: 2017 per patient    INR Goal:  2.0-3.0      Current warfarin regimen: 15 mg x 1 dose then 10 mg Tues; 7.5 mg daily ROW    Warfarin tablet strength:   5 mg     Duration of therapy: Indefinite    Today's pertinent positives includes:  Medication change    Patient was hospitalized on 8/8 and discharged on 8/10. Patient received 5 mg on 8/9 while hospitalized. Patient reports he skipped warfarin dose yesterday 8/10 because INR was 3.1. Patient reports metoprolol has been discontinued and he has started diltiazem. INR 3.2  PT 38.1    Adherence:   Able to recall regimen? YES  Miss/extra dose? NO  Need refill? NO    Upcoming procedure(s):  NO     INR History:              (normal INR range 0.8-1.2)       Date               INR                  PT        Dose/Comments  08/11/23 3.2  38.1  15 mg x 1 dose then 10 mg Tues; 7.5 mg daily ROW.  5 mg 8/9, held dose 8/10  08/10/23 3.1     Hospitalized,   08/09/23 2.5     Hospitalized, received 5 mg  08/07/23 1.2  14.5   10 mg, 7.5 mg, 5 mg  08/04/23 1.3  16.1   10 mg, 5 mg, 5 mg, then 2.5 mg, chemotherapy  07/31/23 1.2  14.4   Resumed Lovenox and warfarin on 7/28 after procedure  07/24/23 1.4  16.4   22.5 mg of warfarin in the past 7 days, hospitalized  06/21/23 1.8  21.3  10 mg Wed; 7.5 mg daily ROW  06/02/23 2.1  24.8  10 mg x 1 dose then

## 2023-08-09 PROBLEM — K83.1 OBSTRUCTIVE JAUNDICE: Status: ACTIVE | Noted: 2023-08-09

## 2023-08-09 PROBLEM — E86.0 DEHYDRATION: Status: ACTIVE | Noted: 2023-08-09

## 2023-08-09 PROBLEM — R19.7 DIARRHEA: Status: ACTIVE | Noted: 2023-08-09

## 2023-08-09 LAB
ALBUMIN SERPL-MCNC: 2.8 G/DL (ref 3.5–5)
ALBUMIN/GLOB SERPL: 0.7 (ref 1.1–2.2)
ALP SERPL-CCNC: 409 U/L (ref 45–117)
ALT SERPL-CCNC: 157 U/L (ref 12–78)
ANION GAP SERPL CALC-SCNC: 8 MMOL/L (ref 5–15)
AST SERPL-CCNC: 47 U/L (ref 15–37)
BASOPHILS # BLD: 0 K/UL (ref 0–0.1)
BASOPHILS NFR BLD: 0 % (ref 0–1)
BILIRUB SERPL-MCNC: 1.4 MG/DL (ref 0.2–1)
BUN SERPL-MCNC: 21 MG/DL (ref 6–20)
BUN/CREAT SERPL: 23 (ref 12–20)
CALCIUM SERPL-MCNC: 8.7 MG/DL (ref 8.5–10.1)
CHLORIDE SERPL-SCNC: 108 MMOL/L (ref 97–108)
CO2 SERPL-SCNC: 18 MMOL/L (ref 21–32)
CREAT SERPL-MCNC: 0.92 MG/DL (ref 0.7–1.3)
DIFFERENTIAL METHOD BLD: ABNORMAL
EOSINOPHIL # BLD: 0 K/UL (ref 0–0.4)
EOSINOPHIL NFR BLD: 0 % (ref 0–7)
ERYTHROCYTE [DISTWIDTH] IN BLOOD BY AUTOMATED COUNT: 14.5 % (ref 11.5–14.5)
GLOBULIN SER CALC-MCNC: 4 G/DL (ref 2–4)
GLUCOSE SERPL-MCNC: 107 MG/DL (ref 65–100)
HCT VFR BLD AUTO: 45.3 % (ref 36.6–50.3)
HGB BLD-MCNC: 14.3 G/DL (ref 12.1–17)
IMM GRANULOCYTES # BLD AUTO: 0 K/UL
IMM GRANULOCYTES NFR BLD AUTO: 0 %
INR PPP: 2.5 (ref 0.9–1.1)
LYMPHOCYTES # BLD: 1.9 K/UL (ref 0.8–3.5)
LYMPHOCYTES NFR BLD: 25 % (ref 12–49)
MAGNESIUM SERPL-MCNC: 2.2 MG/DL (ref 1.6–2.4)
MCH RBC QN AUTO: 28.7 PG (ref 26–34)
MCHC RBC AUTO-ENTMCNC: 31.6 G/DL (ref 30–36.5)
MCV RBC AUTO: 91 FL (ref 80–99)
MONOCYTES # BLD: 0.3 K/UL (ref 0–1)
MONOCYTES NFR BLD: 4 % (ref 5–13)
NEUTS SEG # BLD: 5.4 K/UL (ref 1.8–8)
NEUTS SEG NFR BLD: 71 % (ref 32–75)
NRBC # BLD: 0 K/UL (ref 0–0.01)
NRBC BLD-RTO: 0 PER 100 WBC
NT PRO BNP: 933 PG/ML
PHOSPHATE SERPL-MCNC: 3.9 MG/DL (ref 2.6–4.7)
PLATELET # BLD AUTO: 183 K/UL (ref 150–400)
PMV BLD AUTO: 11.8 FL (ref 8.9–12.9)
POTASSIUM SERPL-SCNC: 4 MMOL/L (ref 3.5–5.1)
PROT SERPL-MCNC: 6.8 G/DL (ref 6.4–8.2)
PROTHROMBIN TIME: 25 SEC (ref 9–11.1)
RBC # BLD AUTO: 4.98 M/UL (ref 4.1–5.7)
RBC MORPH BLD: ABNORMAL
SODIUM SERPL-SCNC: 134 MMOL/L (ref 136–145)
TROPONIN I SERPL HS-MCNC: 9 NG/L (ref 0–76)
TSH SERPL DL<=0.05 MIU/L-ACNC: 1.39 UIU/ML (ref 0.36–3.74)
WBC # BLD AUTO: 7.6 K/UL (ref 4.1–11.1)

## 2023-08-09 PROCEDURE — 85025 COMPLETE CBC W/AUTO DIFF WBC: CPT

## 2023-08-09 PROCEDURE — 6370000000 HC RX 637 (ALT 250 FOR IP): Performed by: HOSPITALIST

## 2023-08-09 PROCEDURE — 85610 PROTHROMBIN TIME: CPT

## 2023-08-09 PROCEDURE — 2580000003 HC RX 258: Performed by: INTERNAL MEDICINE

## 2023-08-09 PROCEDURE — 99223 1ST HOSP IP/OBS HIGH 75: CPT | Performed by: INTERNAL MEDICINE

## 2023-08-09 PROCEDURE — 6370000000 HC RX 637 (ALT 250 FOR IP): Performed by: INTERNAL MEDICINE

## 2023-08-09 PROCEDURE — 83735 ASSAY OF MAGNESIUM: CPT

## 2023-08-09 PROCEDURE — 99222 1ST HOSP IP/OBS MODERATE 55: CPT | Performed by: INTERNAL MEDICINE

## 2023-08-09 PROCEDURE — 6360000002 HC RX W HCPCS: Performed by: INTERNAL MEDICINE

## 2023-08-09 PROCEDURE — 2060000000 HC ICU INTERMEDIATE R&B

## 2023-08-09 PROCEDURE — 84443 ASSAY THYROID STIM HORMONE: CPT

## 2023-08-09 PROCEDURE — 83036 HEMOGLOBIN GLYCOSYLATED A1C: CPT

## 2023-08-09 PROCEDURE — 80053 COMPREHEN METABOLIC PANEL: CPT

## 2023-08-09 PROCEDURE — 83880 ASSAY OF NATRIURETIC PEPTIDE: CPT

## 2023-08-09 PROCEDURE — 36415 COLL VENOUS BLD VENIPUNCTURE: CPT

## 2023-08-09 PROCEDURE — 84484 ASSAY OF TROPONIN QUANT: CPT

## 2023-08-09 PROCEDURE — 84100 ASSAY OF PHOSPHORUS: CPT

## 2023-08-09 RX ORDER — POLYETHYLENE GLYCOL 3350 17 G/17G
17 POWDER, FOR SOLUTION ORAL DAILY PRN
Status: DISCONTINUED | OUTPATIENT
Start: 2023-08-09 | End: 2023-08-10 | Stop reason: HOSPADM

## 2023-08-09 RX ORDER — SODIUM CHLORIDE 0.9 % (FLUSH) 0.9 %
5-40 SYRINGE (ML) INJECTION EVERY 12 HOURS SCHEDULED
Status: DISCONTINUED | OUTPATIENT
Start: 2023-08-09 | End: 2023-08-10 | Stop reason: HOSPADM

## 2023-08-09 RX ORDER — ONDANSETRON 2 MG/ML
4 INJECTION INTRAMUSCULAR; INTRAVENOUS EVERY 6 HOURS PRN
Status: DISCONTINUED | OUTPATIENT
Start: 2023-08-09 | End: 2023-08-10 | Stop reason: HOSPADM

## 2023-08-09 RX ORDER — FERROUS SULFATE 325(65) MG
325 TABLET ORAL
Status: DISCONTINUED | OUTPATIENT
Start: 2023-08-09 | End: 2023-08-10 | Stop reason: HOSPADM

## 2023-08-09 RX ORDER — ONDANSETRON 4 MG/1
4 TABLET, ORALLY DISINTEGRATING ORAL EVERY 8 HOURS PRN
Status: DISCONTINUED | OUTPATIENT
Start: 2023-08-09 | End: 2023-08-10 | Stop reason: HOSPADM

## 2023-08-09 RX ORDER — SODIUM CHLORIDE 9 MG/ML
INJECTION, SOLUTION INTRAVENOUS PRN
Status: DISCONTINUED | OUTPATIENT
Start: 2023-08-09 | End: 2023-08-10 | Stop reason: HOSPADM

## 2023-08-09 RX ORDER — DILTIAZEM HYDROCHLORIDE 120 MG/1
120 CAPSULE, COATED, EXTENDED RELEASE ORAL DAILY
Status: DISCONTINUED | OUTPATIENT
Start: 2023-08-09 | End: 2023-08-10 | Stop reason: HOSPADM

## 2023-08-09 RX ORDER — ENOXAPARIN SODIUM 100 MG/ML
1 INJECTION SUBCUTANEOUS 2 TIMES DAILY
Status: DISCONTINUED | OUTPATIENT
Start: 2023-08-09 | End: 2023-08-09 | Stop reason: ALTCHOICE

## 2023-08-09 RX ORDER — ACETAMINOPHEN 325 MG/1
650 TABLET ORAL EVERY 6 HOURS PRN
Status: DISCONTINUED | OUTPATIENT
Start: 2023-08-09 | End: 2023-08-10 | Stop reason: HOSPADM

## 2023-08-09 RX ORDER — ACETAMINOPHEN 650 MG/1
650 SUPPOSITORY RECTAL EVERY 6 HOURS PRN
Status: DISCONTINUED | OUTPATIENT
Start: 2023-08-09 | End: 2023-08-10 | Stop reason: HOSPADM

## 2023-08-09 RX ORDER — SODIUM CHLORIDE 0.9 % (FLUSH) 0.9 %
5-40 SYRINGE (ML) INJECTION PRN
Status: DISCONTINUED | OUTPATIENT
Start: 2023-08-09 | End: 2023-08-10 | Stop reason: HOSPADM

## 2023-08-09 RX ADMIN — SODIUM CHLORIDE, POTASSIUM CHLORIDE, SODIUM LACTATE AND CALCIUM CHLORIDE: 600; 310; 30; 20 INJECTION, SOLUTION INTRAVENOUS at 21:29

## 2023-08-09 RX ADMIN — SODIUM CHLORIDE, PRESERVATIVE FREE 10 ML: 5 INJECTION INTRAVENOUS at 12:34

## 2023-08-09 RX ADMIN — SODIUM CHLORIDE, PRESERVATIVE FREE 10 ML: 5 INJECTION INTRAVENOUS at 21:30

## 2023-08-09 RX ADMIN — ENOXAPARIN SODIUM 70 MG: 100 INJECTION SUBCUTANEOUS at 09:17

## 2023-08-09 RX ADMIN — WARFARIN SODIUM 5 MG: 2 TABLET ORAL at 18:26

## 2023-08-09 RX ADMIN — MORPHINE SULFATE 2 MG: 2 INJECTION, SOLUTION INTRAMUSCULAR; INTRAVENOUS at 10:27

## 2023-08-09 RX ADMIN — FERROUS SULFATE TAB 325 MG (65 MG ELEMENTAL FE) 325 MG: 325 (65 FE) TAB at 09:17

## 2023-08-09 RX ADMIN — MORPHINE SULFATE 2 MG: 2 INJECTION, SOLUTION INTRAMUSCULAR; INTRAVENOUS at 16:09

## 2023-08-09 RX ADMIN — SODIUM CHLORIDE, POTASSIUM CHLORIDE, SODIUM LACTATE AND CALCIUM CHLORIDE: 600; 310; 30; 20 INJECTION, SOLUTION INTRAVENOUS at 12:58

## 2023-08-09 RX ADMIN — MORPHINE SULFATE 2 MG: 2 INJECTION, SOLUTION INTRAMUSCULAR; INTRAVENOUS at 22:27

## 2023-08-09 ASSESSMENT — PAIN SCALES - GENERAL
PAINLEVEL_OUTOF10: 7
PAINLEVEL_OUTOF10: 2
PAINLEVEL_OUTOF10: 0
PAINLEVEL_OUTOF10: 2
PAINLEVEL_OUTOF10: 5
PAINLEVEL_OUTOF10: 0
PAINLEVEL_OUTOF10: 5

## 2023-08-09 ASSESSMENT — PAIN DESCRIPTION - ORIENTATION
ORIENTATION: MID
ORIENTATION: MID

## 2023-08-09 ASSESSMENT — PAIN DESCRIPTION - LOCATION
LOCATION: ABDOMEN
LOCATION: CHEST

## 2023-08-09 ASSESSMENT — PAIN DESCRIPTION - DESCRIPTORS
DESCRIPTORS: TIGHTNESS
DESCRIPTORS: TIGHTNESS
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
## 2023-08-09 NOTE — ED NOTES
2230: Patient's HR up to 150s, EKG completed, Harper WHITMAN notified, order for IVF & digoxin     2305: HR now sustaining 90s, BP WNL       Olga Cowan RN  08/08/23 2332       Olga Cowan RN  08/09/23 0126

## 2023-08-09 NOTE — ED NOTES
Bedside and Verbal shift change report given to Adelaida Grover RN (oncoming nurse) by Teresa Shah RN (offgoing nurse). Report included the following information Nurse Handoff Report.         Todd Heart RN  08/08/23 2001

## 2023-08-09 NOTE — PROGRESS NOTES
Pharmacist Note - Warfarin Dosing  Consult provided for this 58 y.o.male to manage warfarin for hx mechanical aortic valve replacement; Afib    INR Goal: 2 - 3 per coumadin clinic notes    Home regimen/ tablet size: 15 mg on 8/7 then was supposed to start 7.5 mg daily as of today    Drugs that may increase INR: None  Drugs that may decrease INR: None  Other current anticoagulants/ drugs that may increase bleeding risk: None  Risk factors: None  Daily INR ordered: YES    Recent Labs     08/07/23  1338 08/08/23  1633 08/09/23  0458   HGB  --  15.6 14.3   INR 1.2  --  2.5*     Date               INR                  Dose  8/7              1.2                  15 mg (PTA)  8/8                   --                          --  8/9                  2.5                      5 mg                                                                               Assessment/ Plan: Will order warfarin 5 mg PO x 1 dose due to rapid jump in INR since Monday. Pharmacy will continue to monitor daily and adjust therapy as indicated. Please contact the pharmacist at [phone] or  for outpatient recommendations if needed.      Chris Saba, PharmD, BCPS

## 2023-08-09 NOTE — H&P
[address] [place] [address]  HISTORY AND PHYSICAL    Name:  Verónica Bhagat  MR#:  [id number]  :  1960  ACCOUNT #:  [de-identified]  ADMIT DATE:  2023      The patient was seen, evaluated, and admitted by me on 2023. PRIMARY CARE PHYSICIAN:  Parviz Guerrero MD    SOURCE OF INFORMATION:  The patient and review of ED electronic medical records. CHIEF COMPLAINT:  Chest pain. HISTORY OF PRESENT ILLNESS:  This is a 59-year-old man with past medical history significant for recent diagnosis of pancreatic cancer with metastasis to the liver; hypertension; aortic valve replacement with mechanical valve; atrial flutter; and thoracic aneurysm dissection, status post repair; presented at the emergency room with chest pain. The chest pain started on the day of his presentation at the emergency room, located at the center of the chest, constant, pressure-like, no radiation, no known aggravating or relieving factors, 9/10 in severity. Also for the past several days, the patient has been having diarrhea as well as nausea. The patient was started on chemotherapy about a week ago and underwent chemotherapy three times a week, and that was the first time the patient underwent chemotherapy for the pancreatic cancer with metastasis to the liver. There was no associated fever, rigors, or chills. When the patient arrived at the emergency room, CT of the chest as well as abdomen and pelvis was obtained. This shows stable appearance of ascending thoracic aortic dissection, hepatic mass lesion and pancreatic mass with common bile duct stent in place, and pneumobilia. The patient's first set of troponin was negative. He was referred to the hospitalist service for admission. PAST MEDICAL HISTORY:  1. Recent diagnosis of pancreatic cancer with metastasis to the liver. 2.  Aortic valve replacement with mechanical valve. 3.  Atrial flutter. 4.  Dyslipidemia. 5.  Hypertension.   6.  Thoracic aneurysm

## 2023-08-09 NOTE — CARE COORDINATION
Care Management Initial Assessment       RUR:18%  Readmission? No  1st IM letter given? N/A  1st  letter given: N/A    Transition of Care Plan:    Prior Level of Functioning: independent  Disposition: return to independent functioning  If SNF or IPR: Date FOC offered: N/A  Follow up appointments:   DME needed: None  Transportation at discharge: Family  IM/IMM Medicare/ letter given: N/A  Is patient a [place] and connected with [facility]? No  Caregiver Contact: wife Sarah Forman  Discharge Caregiver contacted prior to discharge? Care Conference needed? Barriers to discharge:     Consults: cardiology    Reason for Admission: Chest pain    Pancreatic cancer with mets to the liver    S/P ascending aortic repair   S/P AVR                            Plan for utilizing home health:   None       PCP: First and Last name:  Randy Malcolm MD   Name of Practice:    Are you a current patient: Yes    Approximate date of last visit: April 2023    Current Advanced Directive/Advance Care Plan: Full Code                     CM met with patient and his wife with whom he bryce. Patient lives in a  2 story house. Local family support includes son and daughter. Patient was ambulatory prior to admission. Patient confirmed PCP, health insurance, and prescription coverage. Previous home health :  [facility] about 6 to 7 years ago  Previous IPR/ SNF rehab : None  DME : None     08/09/23 1453   Service Assessment   Patient Orientation Alert and Oriented   Cognition Alert   History Provided By Patient   Primary Caregiver Self   Support Systems Spouse/Significant Other;Children   PCP Verified by CM Yes   Last Visit to PCP Within last 6 months   Prior Functional Level Independent in ADLs/IADLs   Current Functional Level Independent in ADLs/IADLs   Can patient return to prior living arrangement Yes   Ability to make needs known: Good   Family able to assist with home care needs: Yes   Would you like for me to discuss the

## 2023-08-09 NOTE — PROGRESS NOTES
[address] [facility]  Hospitalist Group                                                                                          Hospitalist Progress Note  Chema Mayo MD  Office Phone: [phone]        Date of Service:  2023  NAME:  Tad Irizarry  :  1960  MRN:  [id number]       Admission Summary: This is a 67-year-old man with past medical history significant for recent diagnosis of pancreatic cancer with metastasis to the liver; hypertension; aortic valve replacement with mechanical valve; atrial flutter; and thoracic aneurysm dissection, status post repair; presented at the emergency room with chest pain. The chest pain started on the day of his presentation at the emergency room, located at the center of the chest, constant, pressure-like, no radiation, no known aggravating or relieving factors, 9/10 in severity. Also for the past several days, the patient has been having diarrhea as well as nausea. The patient was started on chemotherapy about a week ago and underwent chemotherapy three times a week, and that was the first time the patient underwent chemotherapy for the pancreatic cancer with metastasis to the liver. There was no associated fever, rigors, or chills. When the patient arrived at the emergency room, CT of the chest as well as abdomen and pelvis was obtained. This shows stable appearance of ascending thoracic aortic dissection, hepatic mass lesion and pancreatic mass with common bile duct stent in place, and pneumobilia. The patient's first set of troponin was negative. He was referred to the hospitalist service for admission. Interval history / Subjective:    Follow up Chest pain   Feels much better  Says yesterday chest pain was 10/10 and today it is 5/10 retrosternal. No dizziness  Assessment & Plan:     Chest pain /A fib with RVR  History of AVR with mechanical valve  -trial of cardizem  -Continue digoxin/coumadin  -pharmacy to manage

## 2023-08-10 VITALS
RESPIRATION RATE: 19 BRPM | SYSTOLIC BLOOD PRESSURE: 129 MMHG | HEART RATE: 87 BPM | DIASTOLIC BLOOD PRESSURE: 52 MMHG | OXYGEN SATURATION: 93 % | HEIGHT: 70 IN | WEIGHT: 145.28 LBS | BODY MASS INDEX: 20.8 KG/M2 | TEMPERATURE: 97.6 F

## 2023-08-10 LAB
ANION GAP SERPL CALC-SCNC: 7 MMOL/L (ref 5–15)
BASOPHILS # BLD: 0 K/UL (ref 0–0.1)
BASOPHILS NFR BLD: 0 % (ref 0–1)
BUN SERPL-MCNC: 13 MG/DL (ref 6–20)
BUN/CREAT SERPL: 17 (ref 12–20)
CALCIUM SERPL-MCNC: 9 MG/DL (ref 8.5–10.1)
CHLORIDE SERPL-SCNC: 108 MMOL/L (ref 97–108)
CO2 SERPL-SCNC: 19 MMOL/L (ref 21–32)
CREAT SERPL-MCNC: 0.76 MG/DL (ref 0.7–1.3)
DIFFERENTIAL METHOD BLD: ABNORMAL
EOSINOPHIL # BLD: 0.2 K/UL (ref 0–0.4)
EOSINOPHIL NFR BLD: 2 % (ref 0–7)
ERYTHROCYTE [DISTWIDTH] IN BLOOD BY AUTOMATED COUNT: 14.4 % (ref 11.5–14.5)
EST. AVERAGE GLUCOSE BLD GHB EST-MCNC: 120 MG/DL
GLUCOSE SERPL-MCNC: 86 MG/DL (ref 65–100)
HBA1C MFR BLD: 5.8 % (ref 4–5.6)
HCT VFR BLD AUTO: 42.6 % (ref 36.6–50.3)
HGB BLD-MCNC: 13.7 G/DL (ref 12.1–17)
IMM GRANULOCYTES # BLD AUTO: 0 K/UL (ref 0–0.04)
IMM GRANULOCYTES NFR BLD AUTO: 0 % (ref 0–0.5)
INR PPP: 3.1 (ref 0.9–1.1)
LYMPHOCYTES # BLD: 2.6 K/UL (ref 0.8–3.5)
LYMPHOCYTES NFR BLD: 33 % (ref 12–49)
MCH RBC QN AUTO: 29 PG (ref 26–34)
MCHC RBC AUTO-ENTMCNC: 32.2 G/DL (ref 30–36.5)
MCV RBC AUTO: 90.1 FL (ref 80–99)
MONOCYTES # BLD: 1.3 K/UL (ref 0–1)
MONOCYTES NFR BLD: 16 % (ref 5–13)
NEUTS SEG # BLD: 3.9 K/UL (ref 1.8–8)
NEUTS SEG NFR BLD: 49 % (ref 32–75)
NRBC # BLD: 0 K/UL (ref 0–0.01)
NRBC BLD-RTO: 0 PER 100 WBC
PLATELET # BLD AUTO: 175 K/UL (ref 150–400)
PMV BLD AUTO: 11.8 FL (ref 8.9–12.9)
POTASSIUM SERPL-SCNC: 3.5 MMOL/L (ref 3.5–5.1)
PROTHROMBIN TIME: 30.4 SEC (ref 9–11.1)
RBC # BLD AUTO: 4.73 M/UL (ref 4.1–5.7)
SODIUM SERPL-SCNC: 134 MMOL/L (ref 136–145)
WBC # BLD AUTO: 8 K/UL (ref 4.1–11.1)

## 2023-08-10 PROCEDURE — 97161 PT EVAL LOW COMPLEX 20 MIN: CPT

## 2023-08-10 PROCEDURE — 97116 GAIT TRAINING THERAPY: CPT

## 2023-08-10 PROCEDURE — 36415 COLL VENOUS BLD VENIPUNCTURE: CPT

## 2023-08-10 PROCEDURE — 2580000003 HC RX 258: Performed by: INTERNAL MEDICINE

## 2023-08-10 PROCEDURE — 6360000002 HC RX W HCPCS: Performed by: INTERNAL MEDICINE

## 2023-08-10 PROCEDURE — 6370000000 HC RX 637 (ALT 250 FOR IP): Performed by: STUDENT IN AN ORGANIZED HEALTH CARE EDUCATION/TRAINING PROGRAM

## 2023-08-10 PROCEDURE — 6370000000 HC RX 637 (ALT 250 FOR IP): Performed by: INTERNAL MEDICINE

## 2023-08-10 PROCEDURE — 85610 PROTHROMBIN TIME: CPT

## 2023-08-10 PROCEDURE — 80048 BASIC METABOLIC PNL TOTAL CA: CPT

## 2023-08-10 PROCEDURE — 85025 COMPLETE CBC W/AUTO DIFF WBC: CPT

## 2023-08-10 RX ORDER — OXYCODONE HYDROCHLORIDE 5 MG/1
5 TABLET ORAL EVERY 4 HOURS PRN
Status: DISCONTINUED | OUTPATIENT
Start: 2023-08-10 | End: 2023-08-10 | Stop reason: HOSPADM

## 2023-08-10 RX ORDER — DILTIAZEM HYDROCHLORIDE 120 MG/1
120 CAPSULE, COATED, EXTENDED RELEASE ORAL DAILY
Qty: 30 CAPSULE | Refills: 0 | Status: SHIPPED | OUTPATIENT
Start: 2023-08-11 | End: 2023-09-10

## 2023-08-10 RX ORDER — LOPERAMIDE HYDROCHLORIDE 2 MG/1
2 CAPSULE ORAL 4 TIMES DAILY PRN
Status: DISCONTINUED | OUTPATIENT
Start: 2023-08-10 | End: 2023-08-10 | Stop reason: HOSPADM

## 2023-08-10 RX ORDER — OXYCODONE HYDROCHLORIDE AND ACETAMINOPHEN 5; 325 MG/1; MG/1
1 TABLET ORAL EVERY 4 HOURS PRN
Status: DISCONTINUED | OUTPATIENT
Start: 2023-08-10 | End: 2023-08-10 | Stop reason: HOSPADM

## 2023-08-10 RX ORDER — LOPERAMIDE HYDROCHLORIDE 2 MG/1
2 CAPSULE ORAL 4 TIMES DAILY PRN
Qty: 10 CAPSULE | Refills: 0 | Status: SHIPPED | OUTPATIENT
Start: 2023-08-10 | End: 2023-08-17

## 2023-08-10 RX ORDER — OXYCODONE HYDROCHLORIDE 5 MG/1
5 TABLET ORAL EVERY 4 HOURS PRN
Qty: 42 TABLET | Refills: 0 | Status: SHIPPED | OUTPATIENT
Start: 2023-08-10 | End: 2023-08-16 | Stop reason: SDUPTHER

## 2023-08-10 RX ADMIN — FERROUS SULFATE TAB 325 MG (65 MG ELEMENTAL FE) 325 MG: 325 (65 FE) TAB at 09:22

## 2023-08-10 RX ADMIN — MORPHINE SULFATE 2 MG: 2 INJECTION, SOLUTION INTRAMUSCULAR; INTRAVENOUS at 02:52

## 2023-08-10 RX ADMIN — OXYCODONE HYDROCHLORIDE AND ACETAMINOPHEN 1 TABLET: 5; 325 TABLET ORAL at 14:44

## 2023-08-10 RX ADMIN — SODIUM CHLORIDE, POTASSIUM CHLORIDE, SODIUM LACTATE AND CALCIUM CHLORIDE: 600; 310; 30; 20 INJECTION, SOLUTION INTRAVENOUS at 12:03

## 2023-08-10 RX ADMIN — DILTIAZEM HYDROCHLORIDE 120 MG: 120 CAPSULE, COATED, EXTENDED RELEASE ORAL at 09:22

## 2023-08-10 RX ADMIN — MORPHINE SULFATE 2 MG: 2 INJECTION, SOLUTION INTRAMUSCULAR; INTRAVENOUS at 09:22

## 2023-08-10 RX ADMIN — SODIUM CHLORIDE, PRESERVATIVE FREE 10 ML: 5 INJECTION INTRAVENOUS at 09:27

## 2023-08-10 RX ADMIN — LOPERAMIDE HYDROCHLORIDE 2 MG: 2 CAPSULE ORAL at 11:59

## 2023-08-10 RX ADMIN — SODIUM CHLORIDE, POTASSIUM CHLORIDE, SODIUM LACTATE AND CALCIUM CHLORIDE: 600; 310; 30; 20 INJECTION, SOLUTION INTRAVENOUS at 04:43

## 2023-08-10 ASSESSMENT — PAIN SCALES - GENERAL
PAINLEVEL_OUTOF10: 6
PAINLEVEL_OUTOF10: 7
PAINLEVEL_OUTOF10: 7

## 2023-08-10 ASSESSMENT — PAIN DESCRIPTION - LOCATION
LOCATION: CHEST
LOCATION: ABDOMEN
LOCATION: ABDOMEN

## 2023-08-10 ASSESSMENT — PAIN DESCRIPTION - DESCRIPTORS
DESCRIPTORS: TIGHTNESS
DESCRIPTORS: TIGHTNESS

## 2023-08-10 NOTE — PROGRESS NOTES
Mobility Short Forms. Physical Therapy Mar 2014, 94 (7) 379-391; DOI: 10.2522/ptj.57416286  2. Rene Clinton. Association of AM-PAC \"6-Clicks\" Basic Mobility and Daily Activity Scores With Discharge Destination. Phys Ther. 2021 Apr 4;101(4):byus631. doi: 10.1093/ptj/kcnq006. PMID: 10493288. [address] [facility],[address] Floor, [place] REGAN, Delio Houston Hank S. Activity Measure for Post-Acute Care \"6-Clicks\" Basic Mobility Scores Predict Discharge Destination After Acute Care Hospitalization in Select Patient Groups: A Retrospective, Observational Study. Arch Rehabil Res Clin Transl. 2022 Jul 16;4(3):827251. doi: 10.1016/j.arrct. 9980.656159. PMID: 20199744; PMCID: [id number]. 4. Yara Garduno, Lizette MONTANO, Valerie VITAL. AM-PAC Short Forms Manual 4.0. Revised 2/2020. Pain Rating:  Reported none when asked   Pain Intervention(s):        Activity Tolerance:   Good and see assessment    After treatment:   Call bell within reach, Caregiver / family present, and sitting at the EOB      COMMUNICATION/EDUCATION:   The patient's plan of care was discussed with: occupational therapist and registered nurse    Patient Education  Education Given To: Patient  Education Provided: Role of Therapy  Education Provided Comments: activity pacing for discharge  Education Method: Verbal  Barriers to Learning: None    Thank you for this referral.  Danita York, PT         Physical Therapy Evaluation Charge Determination   History Examination Presentation Decision-Making   HIGH Complexity :3+ comorbidities / personal factors will impact the outcome/ POC  MEDIUM Complexity : 3 Standardized tests and measures addressin body structure, function, activity limitation and / or participation in recreation  LOW Complexity : Stable, uncomplicated  AM-PAC  LOW      Based on the above components, the patient evaluation is determined to be of the following complexity level: Low

## 2023-08-10 NOTE — DISCHARGE SUMMARY
Discharge Summary       PATIENT ID: Scarlett Encarnacion  MRN: [id number]   YOB: 1960    DATE OF ADMISSION: 8/8/2023  4:29 PM    DATE OF DISCHARGE: 08/10/23    PRIMARY CARE PROVIDER: Moe Amrstrong MD     ATTENDING PHYSICIAN: Yuan Diaz MD   DISCHARGING PROVIDER: Yuan Diaz MD    To contact this individual call [phone] and ask the  to page. If unavailable ask to be transferred the Adult Hospitalist Department. CONSULTATIONS: IP CONSULT TO CARDIOLOGY  IP CONSULT TO ONCOLOGY  IP CONSULT TO PHARMACY  IP CONSULT TO PALLIATIVE CARE    PROCEDURES/SURGERIES: * No surgery found *     ADMITTING DIAGNOSES & HOSPITAL COURSE:   HPI: \"This is a 69-year-old man with past medical history significant for recent diagnosis of pancreatic cancer with metastasis to the liver; hypertension; aortic valve replacement with mechanical valve; atrial flutter; and thoracic aneurysm dissection, status post repair; presented at the emergency room with chest pain. The chest pain started on the day of his presentation at the emergency room, located at the center of the chest, constant, pressure-like, no radiation, no known aggravating or relieving factors, 9/10 in severity. Also for the past several days, the patient has been having diarrhea as well as nausea. The patient was started on chemotherapy about a week ago and underwent chemotherapy three times a week, and that was the first time the patient underwent chemotherapy for the pancreatic cancer with metastasis to the liver. There was no associated fever, rigors, or chills. When the patient arrived at the emergency room, CT of the chest as well as abdomen and pelvis was obtained. This shows stable appearance of ascending thoracic aortic dissection, hepatic mass lesion and pancreatic mass with common bile duct stent in place, and pneumobilia. The patient's first set of troponin was negative. He was referred to the hospitalist service for admission. \" capsule by mouth daily  Qty: 30 capsule, Refills: 0  Start date: 8/11/2023, End date: 9/10/2023      oxyCODONE (ROXICODONE) 5 MG immediate release tablet Take 1 tablet by mouth every 4 hours as needed for Pain for up to 7 days. Intended supply: 3 days. Take lowest dose possible to manage pain Max Daily Amount: 30 mg  Qty: 42 tablet, Refills: 0  Start date: 8/10/2023, End date: 8/17/2023    Comments: Stage IV pancreatic cancer, treatment palliative  Associated Diagnoses: Cancer, metastatic to liver ([address])           CONTINUE these medications which have NOT CHANGED    Details   oxyCODONE-acetaminophen (PERCOCET) 5-325 MG per tablet Take 1 tablet by mouth every 6 hours as needed for Pain for up to 14 days. Intended supply: 7 days. Take lowest dose possible to manage pain Max Daily Amount: 4 tablets  Qty: 28 tablet, Refills: 0    Comments: Reduce doses taken as pain becomes manageable  Associated Diagnoses: Malignant neoplasm of pancreas, unspecified location of malignancy (HCC)      Pancrelipase, Lip-Prot-Amyl, (ZENPEP) 68059-63572 units CPEP Take 2 capsules with first bite of full meal, 1 with first bite of snacks. Up to 8 per day.   Qty: 240 capsule, Refills: 3    Comments: Please add saving Card: BIN# [id number] PCN# CN FT#[id number] [facility]# [id number]  Associated Diagnoses: Malignant neoplasm of pancreas, unspecified location of malignancy (HCC)      dexamethasone (DECADRON) 4 MG tablet Take 2 tablets by mouth daily (with breakfast) For 2 days after each chemotherapy  Qty: 32 tablet, Refills: 5    Associated Diagnoses: Malignant neoplasm of pancreas, unspecified location of malignancy (HCC)      ondansetron (ZOFRAN-ODT) 8 MG TBDP disintegrating tablet Place 1 tablet under the tongue every 8 hours as needed for Nausea or Vomiting  Qty: 30 tablet, Refills: 2    Associated Diagnoses: Malignant neoplasm of pancreas, unspecified location of malignancy (HCC)      prochlorperazine (COMPAZINE) 5 MG tablet Take 1 tablet by mouth

## 2023-08-10 NOTE — PALLIATIVE CARE DISCHARGE
[address] [facility] [address] Benav[address], [address]    Phone: [phone]        Goals of Care/Treatment Preferences    The Palliative Medicine team was consulted as part of your/your loved one's care in the hospital. Our team is a supportive service; we strive to relieve suffering and improve quality of life. You have an appointment scheduled for 11:00 am on Monday 8/14/2023 with Dr. Rose Thibodeaux in our [facility] Outpatient Palliative Medicine clinic. Please arrive at 10:45 am to complete new patient paper work. Please bring your pain medications in their original container  Please call palliative office at [phone], with any questions or concerns. If after hours, please listen to the entire message, at end of message you will be given the option of contacting the on-call provider. Other Instructions: For control of pain:     On 7/21 you received Rx for Oxycodone/Acetaminophen 5/325 (aka Percocet 5/325) 1 tab every 6 hours as needed, with 20 tabs dispensed. You think you have may still have \"some\" percocet left at home, but not certain how much. I want you to stop taking the percocet. I have sent a prescription to your pharmacy for new pain medicine, Oxycodone IR 5 mg orally every 4 hours as needed, moderate to severe pain. You may take Acetaminophen as an adjunct to your pain treatment regimen, with MAX Daily dose should not exceed 2 grams (or 2000 mg)    I recommend keeping a log of your pain medicine use, including date and time, to help avoid taking it too soon when in pain crisis. This information may also help your provider to know when adjustments to your pain treatment plan are warranted. For Constipation: If no bowel movement x 2 days, please take your home bowel regimen of colace. I appreciate the opportunity to have been a part of your care here at [facility].        Because of the importance

## 2023-08-10 NOTE — PROGRESS NOTES
Attempted to schedule hospital follow up PCP appointment. ? Office /Nurse will contact the patient with appointment information. Pending patient discharge.  Kevyn Bartlett RN- Care Management Assistant

## 2023-08-10 NOTE — PROGRESS NOTES
[address] [facility]                    Cardiology Care Note     []Initial Encounter     [x]Follow-up    Patient Name: Hiral Orantes - OLN:[id number] - [facility]:[id number]  Primary Cardiologist: Hadley Kennedy MD          Assessment and Plan     1. History of ascending aortic dissection  2. Status post ascending aortic repair with graft transposition  3. Status post aortic valve replacement with mechanical aortic prosthesis(with the graft conduit)  4. Ostial narrowing of left main status post left main stenting 2 years ago  5. Recent diagnosis of pancreatic adenocarcinoma treated as stage IV with evidence of hepatobiliary metastasis and cholestatic jaundice secondary to bile duct obstruction  6. Recent hospitalization with supratherapeutic INR in the setting of hepatic dysfunction  7. Current presentation with chest pain potentially related to supraventricular tachycardia and 5-fluorouracil therapy with no evidence of acute coronary syndrome/from plaque rupture    Mr. Edgar Woodward presented to the hospital with chest pain. This has been associated with a narrow complex tachycardia. Unclear thus far if it is truly atrial fibrillation. Regardless patient has been on anticoagulation. Rate control therapy should be adequate. Potential contribution of 5-fluorouracil in causing chest discomfort. Unfortunately may not have enough alternatives. Trial of diltiazem to help with potential vasospasm may be of help and will also assist in rate control. Will avoid beta-blockers. Pt cleared to discharge from Cardiology standpoint.    Please continue diltiazem and discontinue beta blocker on discharge.       ____________________________________________________________  HPI:    Hiral Orantes is a 58 y.o. male with PMH significant for h/o ascending aortic dissection status post repair with Dr. Perla Rodriguez in 2017 along with mechanical aortic valve replacement, history of left to right fem fem bypass due to occlusion of the right

## 2023-08-10 NOTE — PROGRESS NOTES
Discharge papers explained to the pt and wife. Pt vitals stable. Both IV's removed with no complications. Pt discharged with belongings via wheelchair.

## 2023-08-10 NOTE — PROGRESS NOTES
OT order received, chart reviewed. Spoke with patient on role of OT- patient has been up ad john to bathroom without need for assistive device, and reports no ADL deficits. Briefly discussed recent medical history and provided education on simple energy conservation strategies in light of new chemotherapy treatments he is receiving. Patient appreciative and very pleasant. Additional OT services not indicated.  Completing order  Ghazal Ricks, OTR/L

## 2023-08-11 ENCOUNTER — TELEPHONE (OUTPATIENT)
Age: 63
End: 2023-08-11

## 2023-08-11 ENCOUNTER — CARE COORDINATION (OUTPATIENT)
Dept: OTHER | Facility: CLINIC | Age: 63
End: 2023-08-11

## 2023-08-11 ENCOUNTER — ANTI-COAG VISIT (OUTPATIENT)
Facility: HOSPITAL | Age: 63
End: 2023-08-11
Payer: COMMERCIAL

## 2023-08-11 DIAGNOSIS — C25.9 MALIGNANT NEOPLASM OF PANCREAS, UNSPECIFIED LOCATION OF MALIGNANCY (HCC): Primary | ICD-10-CM

## 2023-08-11 DIAGNOSIS — I48.91 ATRIAL FIBRILLATION, UNSPECIFIED TYPE (HCC): Primary | ICD-10-CM

## 2023-08-11 DIAGNOSIS — I48.91 ATRIAL FIBRILLATION, UNSPECIFIED TYPE (HCC): ICD-10-CM

## 2023-08-11 DIAGNOSIS — I48.92 ATRIAL FLUTTER, UNSPECIFIED TYPE (HCC): Primary | ICD-10-CM

## 2023-08-11 LAB — INTERNATIONAL NORMALIZATION RATIO, POC: 3.2

## 2023-08-11 PROCEDURE — 99212 OFFICE O/P EST SF 10 MIN: CPT

## 2023-08-11 PROCEDURE — 85610 PROTHROMBIN TIME: CPT

## 2023-08-11 RX ORDER — SODIUM CHLORIDE 9 MG/ML
5-250 INJECTION, SOLUTION INTRAVENOUS PRN
Status: CANCELLED | OUTPATIENT
Start: 2023-08-17

## 2023-08-11 RX ORDER — SODIUM CHLORIDE 0.9 % (FLUSH) 0.9 %
5-40 SYRINGE (ML) INJECTION PRN
Status: CANCELLED | OUTPATIENT
Start: 2023-08-17

## 2023-08-11 RX ORDER — HEPARIN 100 UNIT/ML
500 SYRINGE INTRAVENOUS PRN
Status: CANCELLED | OUTPATIENT
Start: 2023-08-17

## 2023-08-11 NOTE — TELEPHONE ENCOUNTER
Called patient to advise/confirm upcoming appt with Dr. Nena Grimaldo on 8/11/23 at 11:00  at [facility]. Spoke with Napoleon Esparza and confirmed appointment.

## 2023-08-11 NOTE — CARE COORDINATION
[facility] Care Transitions Initial Follow Up Call    Call within 2 business days of discharge: Yes    LPN Care Coordinator attempted to call the patient  to perform post hospital discharge assessment. No answer, left HIPPA compliant VM requesting a return call. Patient: Leatha Gonzalez Patient : 1960   MRN: [id number]  Reason for Admission: chest pain  Discharge Date: 8/10/23 RARS: Readmission Risk Score: 17.2      Last Discharge [address] [facility],[address]       Date Complaint Diagnosis Description Type Department Provider    23 Chest Pain; Nausea; Diarrhea Chest pain, unspecified type . .. ED to Hosp-Admission (Discharged) (ADMITTED) Oliverio Herrera MD; [facility] .. Was this an external facility discharge? No Discharge Facility: [address]:   HPI: \"This is a 58-year-old man with past medical history significant for recent diagnosis of pancreatic cancer with metastasis to the liver; hypertension; aortic valve replacement with mechanical valve; atrial flutter; and thoracic aneurysm dissection, status post repair; presented at the emergency room with chest pain. The chest pain started on the day of his presentation at the emergency room, located at the center of the chest, constant, pressure-like, no radiation, no known aggravating or relieving factors, 9/10 in severity. Also for the past several days, the patient has been having diarrhea as well as nausea. The patient was started on chemotherapy about a week ago and underwent chemotherapy three times a week, and that was the first time the patient underwent chemotherapy for the pancreatic cancer with metastasis to the liver. There was no associated fever, rigors, or chills. When the patient arrived at the emergency room, CT of the chest as well as abdomen and pelvis was obtained.   This shows stable appearance of ascending thoracic aortic dissection, hepatic mass lesion and pancreatic mass with

## 2023-08-11 NOTE — TELEPHONE ENCOUNTER
Spoke with patient. 2 patient identifiers used. Let him know that he is scheduled to get an EKG her at the office prior to his chemo on Tuesday. Provided time and location. Patient verbalized understanding.

## 2023-08-12 LAB
EKG ATRIAL RATE: 106 BPM
EKG DIAGNOSIS: NORMAL
EKG P AXIS: 78 DEGREES
EKG P-R INTERVAL: 208 MS
EKG Q-T INTERVAL: 304 MS
EKG Q-T INTERVAL: 318 MS
EKG Q-T INTERVAL: 352 MS
EKG QRS DURATION: 94 MS
EKG QRS DURATION: 96 MS
EKG QRS DURATION: 98 MS
EKG QTC CALCULATION (BAZETT): 467 MS
EKG QTC CALCULATION (BAZETT): 492 MS
EKG QTC CALCULATION (BAZETT): 504 MS
EKG R AXIS: -25 DEGREES
EKG R AXIS: 11 DEGREES
EKG R AXIS: 62 DEGREES
EKG T AXIS: 53 DEGREES
EKG T AXIS: 61 DEGREES
EKG T AXIS: 77 DEGREES
EKG VENTRICULAR RATE: 106 BPM
EKG VENTRICULAR RATE: 151 BPM
EKG VENTRICULAR RATE: 158 BPM

## 2023-08-14 ENCOUNTER — TELEPHONE (OUTPATIENT)
Age: 63
End: 2023-08-14

## 2023-08-14 ENCOUNTER — CARE COORDINATION (OUTPATIENT)
Dept: OTHER | Facility: CLINIC | Age: 63
End: 2023-08-14

## 2023-08-14 ENCOUNTER — NURSE ONLY (OUTPATIENT)
Age: 63
End: 2023-08-14
Payer: COMMERCIAL

## 2023-08-14 ENCOUNTER — OFFICE VISIT (OUTPATIENT)
Age: 63
End: 2023-08-14
Payer: COMMERCIAL

## 2023-08-14 VITALS
DIASTOLIC BLOOD PRESSURE: 56 MMHG | RESPIRATION RATE: 18 BRPM | SYSTOLIC BLOOD PRESSURE: 109 MMHG | HEART RATE: 56 BPM | TEMPERATURE: 95.9 F | OXYGEN SATURATION: 99 %

## 2023-08-14 DIAGNOSIS — C25.0 ADENOCARCINOMA OF HEAD OF PANCREAS (HCC): ICD-10-CM

## 2023-08-14 DIAGNOSIS — Z95.2 H/O MECHANICAL AORTIC VALVE REPLACEMENT: ICD-10-CM

## 2023-08-14 DIAGNOSIS — I25.10 CORONARY ARTERY DISEASE INVOLVING NATIVE CORONARY ARTERY OF NATIVE HEART WITHOUT ANGINA PECTORIS: ICD-10-CM

## 2023-08-14 DIAGNOSIS — I25.10 CORONARY ARTERY DISEASE INVOLVING NATIVE CORONARY ARTERY OF NATIVE HEART WITHOUT ANGINA PECTORIS: Primary | ICD-10-CM

## 2023-08-14 DIAGNOSIS — G89.3 CANCER RELATED PAIN: Primary | ICD-10-CM

## 2023-08-14 DIAGNOSIS — R53.0 NEOPLASTIC (MALIGNANT) RELATED FATIGUE: ICD-10-CM

## 2023-08-14 PROCEDURE — 3078F DIAST BP <80 MM HG: CPT | Performed by: INTERNAL MEDICINE

## 2023-08-14 PROCEDURE — 3074F SYST BP LT 130 MM HG: CPT | Performed by: INTERNAL MEDICINE

## 2023-08-14 PROCEDURE — 99204 OFFICE O/P NEW MOD 45 MIN: CPT | Performed by: INTERNAL MEDICINE

## 2023-08-14 PROCEDURE — 93000 ELECTROCARDIOGRAM COMPLETE: CPT | Performed by: NURSE PRACTITIONER

## 2023-08-14 NOTE — PROGRESS NOTES
Patient tolerated EKG well. Paula Rasmussen NP reviewed EKG. EKG sent to Dr. Neris Mulligan and Keren Bruno.

## 2023-08-14 NOTE — PROGRESS NOTES
Pharmacy Progress Note - Anticoagulation Management      S/O:  Mr. Phillip Jon  is a 58 y.o. male seen today for anticoagulation management for  the diagnosis of Atrial Fibrillation/Flutter       HPI: At last visit (8/11) INR was 3.2 and supratherapeutic for INR goal 2.0-3.0. Over the previous 7 days, including extra and held doses, patient had taken 52.5 mg of warfarin. Weekly planned dose was reduced to 50 mg (5%). Patient recommended to take 5 mg Fri; 7.5 mg daily ROW and recheck INR in 1 week. Interim History:       Warfarin start date: 2017 per patient    INR Goal:  2.0-3.0      Current warfarin regimen: 5 mg Fri; 7.5 mg daily ROW    Warfarin tablet strength:   5 mg     Duration of therapy: Indefinite    Today's pertinent positives includes:  No significant changes since last visit    INR 2.6  PT 31.6    Adherence:   Able to recall regimen? YES  Miss/extra dose? NO  Need refill? NO    Upcoming procedure(s):  NO     INR History:              (normal INR range 0.8-1.2)       Date               INR                  PT        Dose/Comments  08/18/23 2.6  31.6  Reduced to 5 mg Fri; 7.5 mg daily ROW  08/11/23 3.2  38.1  15 mg x 1 dose then 10 mg Tues; 7.5 mg daily ROW.  5 mg 8/9, held dose 8/10  08/10/23 3.1     Hospitalized,   08/09/23 2.5     Hospitalized, received 5 mg  08/07/23 1.2  14.5   10 mg, 7.5 mg, 5 mg  08/04/23 1.3  16.1   10 mg, 5 mg, 5 mg, then 2.5 mg, chemotherapy  07/31/23 1.2  14.4   Resumed Lovenox and warfarin on 7/28 after procedure  07/24/23 1.4  16.4   22.5 mg of warfarin in the past 7 days, hospitalized  06/21/23 1.8  21.3  10 mg Wed; 7.5 mg daily ROW  06/02/23 2.1  24.8  10 mg x 1 dose then 10 mg Wed; 7.5 mg daily ROW  05/23/23 1.6  18.7  10 mg x 1 dose then 7.5 mg daily ROW  05/15/23 1.5  18.2  Increased to 5 mg Mon, Fri; 7.5 mg daily ROW  05/03/23          1.6                   19.4     Reduced to 2.5 mg Wed; 5 mg daily ROW, patient took 10 mg on 4/28 and 5/1 04/19/23         3.3

## 2023-08-14 NOTE — PROGRESS NOTES
Palliative Medicine Office Visit  Palliative Medicine Nurse Check In  [phone]    Patient Name: Taiwo Bustamante  YOB: 1960      Date of Office Visit: 8/14/23    Patient states: \"  New patient\"    From Check In Sheet (scanned in Media):  Has a medical provider talked with you about cardiopulmonary resuscitation (CPR)? [] Yes   [x] No   [] Unable to obtain    Nurse reminder to complete or update ACP FlowSheet:    Is ACP on the Problem List?    [] Yes    [x] No  IF ACP Document is ON FILE; Nurse to place ACP on Problem List     Is there an ACP Note in Chart Review/Note? [x] Yes    [] No   If NO: ALERT PROVIDER     Demographics 8/17/2023   Marital Status        Is there anything that we should know about you as a person in order to provide you the best care possible? Have you been to the ER, urgent care clinic since your last visit? [x] Yes   [] No   [] Unable to obtain    Have you been hospitalized since your last visit? [] Yes   [x] No   [] Unable to obtain    Have you seen or consulted any other health care providers outside of the [address] [place] [address] since your last visit? [] Yes   [x] No   [] Unable to obtain    Functional status (describe):   Independent    Last BM: today - no constipation     accessed (date): 8/14/23    Bottle review (for opioid pain medication):  Medication 1: Oxycodone  Date filled:   Directions: 1 every 4 hours  # filled: 8/11/23  # left:   # pills taking per day: as prn - max 2 tabs daily  Last dose taken:
coronal plane. FINDINGS:  PE: Ascending thoracic aorta dissection status post partial repair with  dissection extending into the innominate, left common carotid artery and left  subclavian arteries. Dissection is unchanged. Aortic dissection extends to the  abdominal aorta and common iliac vasculature more so on the right. Also  unchanged in appearance celiac and SMA origins are patent. This is a good  quality study for the evaluation of pulmonary embolism to the subsegmental  arterial level. There is no pulmonary embolism to this level. CORONARY VASCULAR CALCIFICATIONS:  Present there is no pleural effusion or pneumothorax. Status post sternotomy. LIVER/GALLBLADDER: Pneumobilia. Scattered hepatic mass lesions. Cholelithiasis. There is no intrahepatic duct dilatation. There is no hepatic parenchymal mass. Hepatic enhancement pattern is within normal limits. Portal vein is patent. SPLEEN/PANCREAS: Pancreatic mass with CBD stent. Post coil embolization of the  splenic artery perisplenic hematoma is diminished in size There is no evidence  of splenomegaly. ADRENALS/KIDNEYS: Left renal cyst.  There is no hydronephrosis. There is no  renal mass. There is no perinephric mass. STOMACH: No dilatation or wall thickening. COLON AND SMALL BOWEL: No dilatation or wall thickening. There is no free  intraperitoneal air. There is no evidence of incarceration or obstruction. No  mesenteric adenopathy. PERITONEUM: Unremarkable. APPENDIX: Unremarkable. BLADDER/REPRODUCTIVE ORGANS: No mass or calculus. RETROPERITONEUM: Unremarkable. The abdominal aorta is normal in caliber. No  aneurysm. No retroperitoneal adenopathy. OSSEOUS STRUCTURES: No destructive bone lesion. Impression  Stable appearance of ascending thoracic aortic dissection status post repair . Diminished size of splenic hematoma. Hepatic mass lesions and pancreatic mass with CBD stent in place and  pneumobilia.     Incidental and/or nonemergent

## 2023-08-14 NOTE — PATIENT INSTRUCTIONS
Today your INR was 2.6. Your goal INR is  2.0-3.0. You have a   5 mg tablet of Coumadin (warfarin). Take Coumadin (warfarin) as follows: Take 5 (1 tab) Fridays and 7.5 mg (1.5 tab) daily rest of week    Return to check INR in 2 week(s)      Avoid any over the counter items containing aspirin or ibuprofen, and avoid great swings in general diet. Avoid alcohol consumption. Please notify the INR nurse if you are started on any new medication including over the counter or herbal supplements. Also, please notify your INR nurse if any of your other prescription or over the counter medications have been discontinued. Your Care Instructions  Warfarin is a medicine that you take to prevent blood clots. It is often called a blood thinner. Doctors give warfarin (such as Coumadin) to reduce the risk of blood clots. You may be at risk for blood clots if you have atrial fibrillation or deep vein thrombosis. Some other health problems may also put you at risk. Warfarin slows the amount of time it takes for your blood to clot. It can cause bleeding problems. Even if you've been taking warfarin for a while, it's important to know how to take it safely. Foods and other medicines can affect the way warfarin works. Some can make warfarin work too well. This can cause bleeding problems. And some can make it work poorly, so that it does not prevent blood clots very well. You will need regular blood tests to check how long it takes for your blood to form a clot. This test is called a PT or prothrombin time test. The result of the test is called an INR level. Depending on the test results, your doctor or anticoagulation clinic may adjust your dose of warfarin. Follow-up care is a key part of your treatment and safety. Be sure to make and go to all appointments, and call your doctor if you are having problems. It's also a good idea to know your test results and keep a list of the medicines you take.     How can you

## 2023-08-14 NOTE — CARE COORDINATION
Ambulatory Care Coordination Note    ACM attempted to reach patient and spouse for follow up call regarding admission on 8/8/23 to 8/10/23. HIPAA compliant message left requesting a return phone call at patient and spouse convenience.

## 2023-08-15 ENCOUNTER — HOSPITAL ENCOUNTER (OUTPATIENT)
Facility: HOSPITAL | Age: 63
Setting detail: INFUSION SERIES
End: 2023-08-15
Payer: COMMERCIAL

## 2023-08-15 ENCOUNTER — OFFICE VISIT (OUTPATIENT)
Age: 63
End: 2023-08-15
Payer: COMMERCIAL

## 2023-08-15 VITALS
SYSTOLIC BLOOD PRESSURE: 105 MMHG | DIASTOLIC BLOOD PRESSURE: 51 MMHG | HEART RATE: 60 BPM | RESPIRATION RATE: 18 BRPM | BODY MASS INDEX: 22.05 KG/M2 | TEMPERATURE: 97.5 F | WEIGHT: 154 LBS | HEIGHT: 70 IN

## 2023-08-15 VITALS
RESPIRATION RATE: 18 BRPM | OXYGEN SATURATION: 99 % | HEART RATE: 69 BPM | BODY MASS INDEX: 22.1 KG/M2 | WEIGHT: 154 LBS | DIASTOLIC BLOOD PRESSURE: 42 MMHG | SYSTOLIC BLOOD PRESSURE: 109 MMHG | TEMPERATURE: 97.5 F

## 2023-08-15 DIAGNOSIS — C25.0 ADENOCARCINOMA OF HEAD OF PANCREAS (HCC): ICD-10-CM

## 2023-08-15 DIAGNOSIS — C78.7 CANCER, METASTATIC TO LIVER (HCC): ICD-10-CM

## 2023-08-15 DIAGNOSIS — C25.9 MALIGNANT NEOPLASM OF PANCREAS, UNSPECIFIED LOCATION OF MALIGNANCY (HCC): Primary | ICD-10-CM

## 2023-08-15 DIAGNOSIS — C25.0 ADENOCARCINOMA OF HEAD OF PANCREAS (HCC): Primary | ICD-10-CM

## 2023-08-15 LAB
ALBUMIN SERPL-MCNC: 2.5 G/DL (ref 3.5–5)
ALBUMIN/GLOB SERPL: 0.8 (ref 1.1–2.2)
ALP SERPL-CCNC: 453 U/L (ref 45–117)
ALT SERPL-CCNC: 84 U/L (ref 12–78)
ANION GAP SERPL CALC-SCNC: 5 MMOL/L (ref 5–15)
AST SERPL-CCNC: 39 U/L (ref 15–37)
BASOPHILS # BLD: 0.1 K/UL (ref 0–0.1)
BASOPHILS NFR BLD: 1 % (ref 0–1)
BILIRUB SERPL-MCNC: 0.7 MG/DL (ref 0.2–1)
BUN SERPL-MCNC: 11 MG/DL (ref 6–20)
BUN/CREAT SERPL: 18 (ref 12–20)
CALCIUM SERPL-MCNC: 8.2 MG/DL (ref 8.5–10.1)
CHLORIDE SERPL-SCNC: 108 MMOL/L (ref 97–108)
CO2 SERPL-SCNC: 27 MMOL/L (ref 21–32)
CREAT SERPL-MCNC: 0.6 MG/DL (ref 0.7–1.3)
DIFFERENTIAL METHOD BLD: ABNORMAL
EOSINOPHIL # BLD: 0.4 K/UL (ref 0–0.4)
EOSINOPHIL NFR BLD: 5 % (ref 0–7)
ERYTHROCYTE [DISTWIDTH] IN BLOOD BY AUTOMATED COUNT: 14.6 % (ref 11.5–14.5)
GLOBULIN SER CALC-MCNC: 3.2 G/DL (ref 2–4)
GLUCOSE SERPL-MCNC: 175 MG/DL (ref 65–100)
HCT VFR BLD AUTO: 31.9 % (ref 36.6–50.3)
HGB BLD-MCNC: 10.6 G/DL (ref 12.1–17)
IMM GRANULOCYTES # BLD AUTO: 0 K/UL (ref 0–0.04)
IMM GRANULOCYTES NFR BLD AUTO: 0 % (ref 0–0.5)
INR PPP: 2.2 (ref 0.9–1.1)
LYMPHOCYTES # BLD: 2 K/UL (ref 0.8–3.5)
LYMPHOCYTES NFR BLD: 27 % (ref 12–49)
MCH RBC QN AUTO: 29.6 PG (ref 26–34)
MCHC RBC AUTO-ENTMCNC: 33.2 G/DL (ref 30–36.5)
MCV RBC AUTO: 89.1 FL (ref 80–99)
MONOCYTES # BLD: 1 K/UL (ref 0–1)
MONOCYTES NFR BLD: 13 % (ref 5–13)
NEUTS SEG # BLD: 4 K/UL (ref 1.8–8)
NEUTS SEG NFR BLD: 54 % (ref 32–75)
NRBC # BLD: 0 K/UL (ref 0–0.01)
NRBC BLD-RTO: 0 PER 100 WBC
PLATELET # BLD AUTO: 206 K/UL (ref 150–400)
PMV BLD AUTO: 10.8 FL (ref 8.9–12.9)
POTASSIUM SERPL-SCNC: 3.3 MMOL/L (ref 3.5–5.1)
PROT SERPL-MCNC: 5.7 G/DL (ref 6.4–8.2)
PROTHROMBIN TIME: 21.9 SEC (ref 9–11.1)
RBC # BLD AUTO: 3.58 M/UL (ref 4.1–5.7)
SODIUM SERPL-SCNC: 140 MMOL/L (ref 136–145)
WBC # BLD AUTO: 7.4 K/UL (ref 4.1–11.1)

## 2023-08-15 PROCEDURE — 85610 PROTHROMBIN TIME: CPT

## 2023-08-15 PROCEDURE — 96413 CHEMO IV INFUSION 1 HR: CPT

## 2023-08-15 PROCEDURE — 96367 TX/PROPH/DG ADDL SEQ IV INF: CPT

## 2023-08-15 PROCEDURE — 80053 COMPREHEN METABOLIC PANEL: CPT

## 2023-08-15 PROCEDURE — 6370000000 HC RX 637 (ALT 250 FOR IP): Performed by: NURSE PRACTITIONER

## 2023-08-15 PROCEDURE — 2580000003 HC RX 258: Performed by: INTERNAL MEDICINE

## 2023-08-15 PROCEDURE — 85025 COMPLETE CBC W/AUTO DIFF WBC: CPT

## 2023-08-15 PROCEDURE — 86301 IMMUNOASSAY TUMOR CA 19-9: CPT

## 2023-08-15 PROCEDURE — 96417 CHEMO IV INFUS EACH ADDL SEQ: CPT

## 2023-08-15 PROCEDURE — 36415 COLL VENOUS BLD VENIPUNCTURE: CPT

## 2023-08-15 PROCEDURE — 96416 CHEMO PROLONG INFUSE W/PUMP: CPT

## 2023-08-15 PROCEDURE — 6360000002 HC RX W HCPCS: Performed by: INTERNAL MEDICINE

## 2023-08-15 PROCEDURE — 96375 TX/PRO/DX INJ NEW DRUG ADDON: CPT

## 2023-08-15 PROCEDURE — 96415 CHEMO IV INFUSION ADDL HR: CPT

## 2023-08-15 PROCEDURE — 99215 OFFICE O/P EST HI 40 MIN: CPT | Performed by: INTERNAL MEDICINE

## 2023-08-15 PROCEDURE — 3078F DIAST BP <80 MM HG: CPT | Performed by: INTERNAL MEDICINE

## 2023-08-15 PROCEDURE — 3074F SYST BP LT 130 MM HG: CPT | Performed by: INTERNAL MEDICINE

## 2023-08-15 RX ORDER — SODIUM CHLORIDE 9 MG/ML
5-250 INJECTION, SOLUTION INTRAVENOUS PRN
Status: DISCONTINUED | OUTPATIENT
Start: 2023-08-15 | End: 2023-08-16 | Stop reason: HOSPADM

## 2023-08-15 RX ORDER — DIPHENHYDRAMINE HYDROCHLORIDE 50 MG/ML
50 INJECTION INTRAMUSCULAR; INTRAVENOUS
Status: DISCONTINUED | OUTPATIENT
Start: 2023-08-15 | End: 2023-08-16 | Stop reason: HOSPADM

## 2023-08-15 RX ORDER — HEPARIN 100 UNIT/ML
500 SYRINGE INTRAVENOUS PRN
Status: DISCONTINUED | OUTPATIENT
Start: 2023-08-15 | End: 2023-08-16 | Stop reason: HOSPADM

## 2023-08-15 RX ORDER — DEXTROSE MONOHYDRATE 50 MG/ML
5-250 INJECTION, SOLUTION INTRAVENOUS PRN
Status: DISCONTINUED | OUTPATIENT
Start: 2023-08-15 | End: 2023-08-16 | Stop reason: HOSPADM

## 2023-08-15 RX ORDER — ACETAMINOPHEN 325 MG/1
650 TABLET ORAL
Status: DISCONTINUED | OUTPATIENT
Start: 2023-08-15 | End: 2023-08-16 | Stop reason: HOSPADM

## 2023-08-15 RX ORDER — ALBUTEROL SULFATE 90 UG/1
4 AEROSOL, METERED RESPIRATORY (INHALATION) PRN
Status: DISCONTINUED | OUTPATIENT
Start: 2023-08-15 | End: 2023-08-16 | Stop reason: HOSPADM

## 2023-08-15 RX ORDER — SODIUM CHLORIDE 9 MG/ML
INJECTION, SOLUTION INTRAVENOUS CONTINUOUS
Status: DISCONTINUED | OUTPATIENT
Start: 2023-08-15 | End: 2023-08-16 | Stop reason: HOSPADM

## 2023-08-15 RX ORDER — PALONOSETRON 0.05 MG/ML
0.25 INJECTION, SOLUTION INTRAVENOUS ONCE
Status: COMPLETED | OUTPATIENT
Start: 2023-08-15 | End: 2023-08-15

## 2023-08-15 RX ORDER — SODIUM CHLORIDE 0.9 % (FLUSH) 0.9 %
5-40 SYRINGE (ML) INJECTION PRN
Status: DISCONTINUED | OUTPATIENT
Start: 2023-08-15 | End: 2023-08-16 | Stop reason: HOSPADM

## 2023-08-15 RX ORDER — MEPERIDINE HYDROCHLORIDE 25 MG/ML
12.5 INJECTION INTRAMUSCULAR; INTRAVENOUS; SUBCUTANEOUS PRN
Status: DISCONTINUED | OUTPATIENT
Start: 2023-08-15 | End: 2023-10-26 | Stop reason: HOSPADM

## 2023-08-15 RX ORDER — ONDANSETRON 2 MG/ML
8 INJECTION INTRAMUSCULAR; INTRAVENOUS
Status: DISCONTINUED | OUTPATIENT
Start: 2023-08-15 | End: 2023-08-16 | Stop reason: HOSPADM

## 2023-08-15 RX ORDER — EPINEPHRINE 1 MG/ML
0.3 INJECTION, SOLUTION, CONCENTRATE INTRAVENOUS PRN
Status: DISCONTINUED | OUTPATIENT
Start: 2023-08-15 | End: 2023-10-26 | Stop reason: HOSPADM

## 2023-08-15 RX ORDER — ATROPINE SULFATE 0.4 MG/ML
0.4 INJECTION, SOLUTION INTRAVENOUS
Status: DISCONTINUED | OUTPATIENT
Start: 2023-08-15 | End: 2023-08-16 | Stop reason: HOSPADM

## 2023-08-15 RX ORDER — POTASSIUM CHLORIDE 750 MG/1
40 TABLET, FILM COATED, EXTENDED RELEASE ORAL ONCE
Status: COMPLETED | OUTPATIENT
Start: 2023-08-15 | End: 2023-08-15

## 2023-08-15 RX ADMIN — OXALIPLATIN 110 MG: 5 INJECTION, SOLUTION INTRAVENOUS at 11:16

## 2023-08-15 RX ADMIN — LEUCOVORIN CALCIUM 200 MG: 200 INJECTION, POWDER, LYOPHILIZED, FOR SOLUTION INTRAMUSCULAR; INTRAVENOUS at 13:23

## 2023-08-15 RX ADMIN — SODIUM CHLORIDE 150 MG: 900 INJECTION, SOLUTION INTRAVENOUS at 09:50

## 2023-08-15 RX ADMIN — DEXTROSE MONOHYDRATE 170 MG: 50 INJECTION, SOLUTION INTRAVENOUS at 13:23

## 2023-08-15 RX ADMIN — PALONOSETRON HYDROCHLORIDE 0.25 MG: 0.25 INJECTION INTRAVENOUS at 10:14

## 2023-08-15 RX ADMIN — FLUOROURACIL 3000 MG: 50 INJECTION, SOLUTION INTRAVENOUS at 14:58

## 2023-08-15 RX ADMIN — POTASSIUM CHLORIDE 40 MEQ: 750 TABLET, FILM COATED, EXTENDED RELEASE ORAL at 10:02

## 2023-08-15 RX ADMIN — DEXAMETHASONE SODIUM PHOSPHATE 12 MG: 4 INJECTION, SOLUTION INTRAMUSCULAR; INTRAVENOUS at 10:16

## 2023-08-15 RX ADMIN — DEXTROSE MONOHYDRATE 25 ML/HR: 50 INJECTION, SOLUTION INTRAVENOUS at 10:33

## 2023-08-16 ENCOUNTER — PATIENT MESSAGE (OUTPATIENT)
Age: 63
End: 2023-08-16

## 2023-08-16 LAB — CANCER AG19-9 SERPL-ACNC: 85 U/ML (ref 0–35)

## 2023-08-16 RX ORDER — OXYCODONE HYDROCHLORIDE 5 MG/1
5 TABLET ORAL EVERY 6 HOURS PRN
Qty: 60 TABLET | Refills: 0 | Status: SHIPPED | OUTPATIENT
Start: 2023-08-18 | End: 2023-09-02

## 2023-08-17 ENCOUNTER — TELEPHONE (OUTPATIENT)
Age: 63
End: 2023-08-17

## 2023-08-17 ENCOUNTER — HOSPITAL ENCOUNTER (OUTPATIENT)
Facility: HOSPITAL | Age: 63
Setting detail: INFUSION SERIES
End: 2023-08-17
Payer: COMMERCIAL

## 2023-08-17 VITALS — SYSTOLIC BLOOD PRESSURE: 134 MMHG | HEART RATE: 62 BPM | DIASTOLIC BLOOD PRESSURE: 64 MMHG

## 2023-08-17 DIAGNOSIS — C25.0 ADENOCARCINOMA OF HEAD OF PANCREAS (HCC): Primary | ICD-10-CM

## 2023-08-17 PROCEDURE — 96523 IRRIG DRUG DELIVERY DEVICE: CPT

## 2023-08-17 RX ORDER — HEPARIN 100 UNIT/ML
500 SYRINGE INTRAVENOUS PRN
Status: DISCONTINUED | OUTPATIENT
Start: 2023-08-17 | End: 2023-08-18 | Stop reason: HOSPADM

## 2023-08-17 RX ORDER — SODIUM CHLORIDE 0.9 % (FLUSH) 0.9 %
5-40 SYRINGE (ML) INJECTION PRN
Status: DISCONTINUED | OUTPATIENT
Start: 2023-08-17 | End: 2023-08-18 | Stop reason: HOSPADM

## 2023-08-17 RX ORDER — SODIUM CHLORIDE 9 MG/ML
5-250 INJECTION, SOLUTION INTRAVENOUS PRN
Status: DISCONTINUED | OUTPATIENT
Start: 2023-08-17 | End: 2023-08-18 | Stop reason: HOSPADM

## 2023-08-17 NOTE — PROGRESS NOTES
OPIC Short Note                       Date: 2023    Name: Elizabeth Goldberg    MRN: [id number]         : 1960      1500 Pt admit to [facility] for Pump DC ambulatory in stable condition. Assessment completed. No new concerns voiced. Mr. Oralia Wagner vitals were reviewed    Patient Vitals for the past 12 hrs:   Pulse BP   23 1530 62 134/64         Medications given:  Port deaccessed and flushed w NS per protocol      Mr. Luca Lee tolerated the infusion, and had no complaints. Mr. Luca Lee was discharged from [address] [place] [address] in stable condition. Pt aware of next appt.     Future Appointments   Date Time Provider [address]   2023 11:00 AM [facility] MEDICATION MGMT [address]   2023  8:00 AM C2 [place] [facility]   2023  8:00 AM Marco A Tirado MD [address] Sanje[address] Cast[address] BS AMB   2023  3:00 PM H2 LIZETT FASTRACK [facility]   2023  8:00 AM F1 LIZETT LONG [address] [facility]   2023  3:00 PM H2 LIZ[place]T FASTR[place] [facility]   2023 11:00 AM Kathy Littlejohn MD [address] Jone Castillo BS AMB   2023  8:00 AM E1 [place] [facility]   2023  3:00 PM H1 LIZETT FASTRACK [facility]   10/10/2023  8:00 AM D1 [place] [facility]   10/12/2023  3:00 PM G2 LIZETT FASTRACK [facility]   10/18/2023  3:40 PM MD ABBIE Alegria BS AMB   2024  3:00 PM BSC SEPULVEDA ECHO 1 ABBIE BS AMB   2024  3:40 PM MD Kirill Alegria BS AMB       Gilford Morning, RN  2023  3:48 PM

## 2023-08-17 NOTE — TELEPHONE ENCOUNTER
Damaris Milligan is the patient's spouse. She called to ask for an update on the patient's oxycodone prescription and states that she has not heard from the pharmacy. She said that the patient was seen by Dr. Kim Zamora on Monday, 8/14/23. Her callback is [phone].

## 2023-08-18 ENCOUNTER — ANTI-COAG VISIT (OUTPATIENT)
Facility: HOSPITAL | Age: 63
End: 2023-08-18
Payer: COMMERCIAL

## 2023-08-18 DIAGNOSIS — I48.91 ATRIAL FIBRILLATION, UNSPECIFIED TYPE (HCC): ICD-10-CM

## 2023-08-18 DIAGNOSIS — I48.92 ATRIAL FLUTTER, UNSPECIFIED TYPE (HCC): Primary | ICD-10-CM

## 2023-08-18 LAB — INTERNATIONAL NORMALIZATION RATIO, POC: 2.6

## 2023-08-18 PROCEDURE — 99211 OFF/OP EST MAY X REQ PHY/QHP: CPT

## 2023-08-18 PROCEDURE — 85610 PROTHROMBIN TIME: CPT

## 2023-08-18 NOTE — TELEPHONE ENCOUNTER
Palliative Medicine  Nursing Note  [phone]  Fax [phone]      Telephone Call  Patient Name: Pasha Veronica  YOB: 1960    8/18/2023         Demographics 8/17/2023   Marital Status      Call placed to pharmacy and verified they received the Oxy IR prescription and are in the process of filling it for Mr. Washington Davis as it was not due to be filled until today. Ms. Washington Davis stopped by the office and the above was shared with her. She was appreciative.      Antonino Jason, RN  Palliative Medicine

## 2023-08-21 ENCOUNTER — HOSPITAL ENCOUNTER (OUTPATIENT)
Facility: HOSPITAL | Age: 63
Discharge: HOME | End: 2023-08-24
Payer: COMMERCIAL

## 2023-08-21 ENCOUNTER — TELEPHONE (OUTPATIENT)
Age: 63
End: 2023-08-21

## 2023-08-21 ENCOUNTER — CLINICAL DOCUMENTATION (OUTPATIENT)
Age: 63
End: 2023-08-21

## 2023-08-21 DIAGNOSIS — C25.0 ADENOCARCINOMA OF HEAD OF PANCREAS (HCC): Primary | ICD-10-CM

## 2023-08-21 DIAGNOSIS — R06.6 HICCUPS: ICD-10-CM

## 2023-08-21 DIAGNOSIS — C25.0 ADENOCARCINOMA OF HEAD OF PANCREAS (HCC): ICD-10-CM

## 2023-08-21 DIAGNOSIS — R22.42 LOCALIZED SWELLING OF LEFT LOWER EXTREMITY: ICD-10-CM

## 2023-08-21 LAB
VAS LEFT GSV BK MID DIAM: 0.8 MM
VAS LEFT POPLITEAL VEIN DIAM: 1.5 MM

## 2023-08-21 PROCEDURE — 93971 EXTREMITY STUDY: CPT

## 2023-08-21 RX ORDER — METHYLPREDNISOLONE 4 MG/1
40 TABLET ORAL DAILY
Qty: 60 TABLET | Refills: 3 | Status: ON HOLD | OUTPATIENT
Start: 2023-08-21

## 2023-08-21 RX ORDER — BACLOFEN 10 MG/1
10 TABLET ORAL 3 TIMES DAILY PRN
Qty: 42 TABLET | Refills: 0 | Status: ON HOLD | OUTPATIENT
Start: 2023-08-21 | End: 2023-09-04

## 2023-08-21 RX ORDER — ENOXAPARIN SODIUM 100 MG/ML
100 INJECTION SUBCUTANEOUS DAILY
Qty: 30 EACH | Refills: 1 | Status: ON HOLD | OUTPATIENT
Start: 2023-08-22

## 2023-08-21 NOTE — TELEPHONE ENCOUNTER
Spouse left vm regarding pt experiencing hiccups and odd pain at top of calf under knee. Is wearing compression socks. Call back number is [phone].

## 2023-08-21 NOTE — TELEPHONE ENCOUNTER
[address] to [place] with a medication update  Updated treatment plan to change dexamethasone to methylprednisolone   for the 2 days after each treatment, this will replace the dexamethasone that he was taking at home. Sandra voiced understanding and has no concerns at this time.

## 2023-08-21 NOTE — TELEPHONE ENCOUNTER
1209  Update to pts wife Sandra  Baclofen ordered to take for hiccups  Dopper ordered for left calf pain as stat  Number to scheduling provided to schedule. Sandra voiced understanding and has no further questions or concerns at this time.

## 2023-08-21 NOTE — TELEPHONE ENCOUNTER
Short pump emergency for Outpatient called stating \"patient was in for Vascular duplex lower extremity left, and they found a clot on the left popliteal vein\" and is calling for direction on how to proceed. Spoke with Jennie Boyer, who reached out to Dr. Michelle Yañez, who advised that the patient go home. Dr. Michelle Yañez will reach out to Cardiology\". Message was communicated to the caller, who acknowledge understanding and will update the patient.

## 2023-08-21 NOTE — TELEPHONE ENCOUNTER
1110  R/t call to wife, HIPAA verified x2  Wife stated pts hiccups are worse than they were the last time pt was in office and would like to discuss what can be taken to help with relief. Pain in left calf 5/10 since yesterday  Denies rendess/streaking, swelling, SOB at this time  Pt has been wearing compression socks. Will discuss with team and r/t call with updates.

## 2023-08-21 NOTE — PROGRESS NOTES
Patient is a new DVT  He has been on therapeutic on warfarin laterly but likely has developed the DVT due to interruptions needed in the last month    I called Dr. Neris Mulligan with cardiology  He agrees with Lovenox  Patient was called and asked to not take warfarin tomorrorow onwards  Start Lovenox 1.5 mg/kg tomorrow night ( rounded dose based on 69.9 kg weight is 100 mg)  Plan to keep him on this for a month and then reattempt warfarin. Cardiology is in agreement. BEBETO left with these instructions  Lovenox called in.     Clara please call him again tomorrow and ensure he is not taking warfarin

## 2023-08-22 ENCOUNTER — TELEPHONE (OUTPATIENT)
Age: 63
End: 2023-08-22

## 2023-08-22 NOTE — TELEPHONE ENCOUNTER
2294:    Called patient to confirm he will hold his coumadin in order to start Lovenox     No answer   Line secured   Left detailed VM to call office back and confirm     1000:    Returned call to [facility] and [address] [place] [address] confirmed   Confirmed patient has held his coumadin and is aware to start Lovenox tonight     No new questions or concerns at this time

## 2023-08-22 NOTE — TELEPHONE ENCOUNTER
Pt's wife left  checking on prescription for Lovenox and when should prescription begin. Call back number is [phone].

## 2023-08-23 ENCOUNTER — CARE COORDINATION (OUTPATIENT)
Dept: OTHER | Facility: CLINIC | Age: 63
End: 2023-08-23

## 2023-08-23 NOTE — CARE COORDINATION
[facility] Care Transitions Follow Up Call    Patient Current Location:  Home: [address] [place] [id number]    [facility] Care Coordinator contacted the  spouse  by telephone to follow up after admission on 23. Verified name and  with patient as identifiers. Patient: Deena Wells  Patient : 1960   MRN: [id number]  Reason for Admission: chest pain  Discharge Date: 8/10/23 RARS: Readmission Risk Score: 17.2      Needs to be reviewed by the provider   Additional needs identified to be addressed with provider: No  none             Method of communication with provider: none. Spoke with patient's spouse, who reports patient is doing ok. Had new DVT diagnosed on 23, changed to Lovenox from coumadin, and has had hiccups, changed to dexametasone from methylprednisolone and called in baclofen, Sandra reports hiccups seem to be improved today. States patient's appetite is good and he is able to manage most activities. After discharge on on 23 for chest pain, Chemo was adjusted and he completed last treatment on 23 with no further chest pain or other side effects. Scheduled for next Chemo on 23. Osmanyorly Aylincarson denies any new concerns or questions. Discussed follow-up appointments.    Future Appointments   Date Time Provider [address] [place] [address]   2023  8:00 AM [facility]2 [facility] [place] [facility]   2023  8:00 AM Chris Galvan MD 5091 Sanjeev Castillo BS AMB   2023  3:00 PM [facility]2 [facility]   2023 11:15 AM [facility] MEDICATION MGMT [address]   2023  8:00 AM F1 LIZETT LONG [address] [facility]   2023  3:00 PM H2 LIZETT FASTRACK [facility]   2023 11:00 AM Juan M Dawn MD 8[address] Jone Castillo BS AMB   2023  8:00 AM E1 LIZETT LONG [address] [facility]   2023  3:00 PM H1 LIZETT FASTRACK [facility] [place]   10/10/2023  8:00 AM D1 LI[place]ETT LONG [place] [facility]   10/12/2023  3:00 PM G2 LIZETT CLEMONS [facility]   10/18/2023  3:40 PM MD ABBIE Montgomery BS AMB   2024  3:00 PM LATA SEPULVEDA ECHO 1 ABBIE NEWBERRY AMB   2024

## 2023-08-24 ENCOUNTER — TELEPHONE (OUTPATIENT)
Age: 63
End: 2023-08-24

## 2023-08-24 NOTE — TELEPHONE ENCOUNTER
Call placed to patient, updated on dose increase instructions. Patient verbalized understanding, and will reach out to the office before Monday if regimen not effective or any other changes.

## 2023-08-24 NOTE — TELEPHONE ENCOUNTER
Spoke with patient to address recent medical events. Patient was evaluated by [facility] emergency center on 8/21/2023. After  vascular duplex diagnosed with a DVT and of the left popliteal vein. Patient Coumadin was held and started on Lovenox injections once daily     Patient to discuss increased pain associated with DVT. States no pain is present when sitting, but shoots to a 9 out of 10 with ambulating. Patient currently takes a regimen of Oxycodone 5 mg every 8 hours prn pain. Would like Dr. Ginger Schafer to advise on a safe increase.

## 2023-08-24 NOTE — TELEPHONE ENCOUNTER
The patient's wife states he was diagnosed w/ a blood clot in his leg on Tuesday. She wants to know if there is anything that can be done regarding the pain. CB # [id number].

## 2023-08-24 NOTE — TELEPHONE ENCOUNTER
He may increase to 1-2 tabs (5-10 mg) every 4 hours as needed. Let him know to give us a call next week Mon or Tues with an update on how this is working as we can further adjust and/or write a new script with new directions if he is running low.      Dr. Shaikh Comment

## 2023-08-25 ENCOUNTER — APPOINTMENT (OUTPATIENT)
Facility: HOSPITAL | Age: 63
DRG: 871 | End: 2023-08-25
Payer: COMMERCIAL

## 2023-08-25 ENCOUNTER — HOSPITAL ENCOUNTER (INPATIENT)
Facility: HOSPITAL | Age: 63
LOS: 6 days | Discharge: HOME | DRG: 871 | End: 2023-08-31
Attending: STUDENT IN AN ORGANIZED HEALTH CARE EDUCATION/TRAINING PROGRAM | Admitting: STUDENT IN AN ORGANIZED HEALTH CARE EDUCATION/TRAINING PROGRAM
Payer: COMMERCIAL

## 2023-08-25 DIAGNOSIS — G89.3 CANCER RELATED PAIN: ICD-10-CM

## 2023-08-25 DIAGNOSIS — Z85.07 HISTORY OF PANCREATIC CANCER: ICD-10-CM

## 2023-08-25 DIAGNOSIS — C78.7 CANCER, METASTATIC TO LIVER (HCC): ICD-10-CM

## 2023-08-25 DIAGNOSIS — K80.20 CALCULUS OF GALLBLADDER WITHOUT CHOLECYSTITIS WITHOUT OBSTRUCTION: ICD-10-CM

## 2023-08-25 DIAGNOSIS — A41.9 SEVERE SEPSIS (HCC): Primary | ICD-10-CM

## 2023-08-25 DIAGNOSIS — R65.20 SEVERE SEPSIS (HCC): Primary | ICD-10-CM

## 2023-08-25 DIAGNOSIS — C25.9 MALIGNANT NEOPLASM OF PANCREAS, UNSPECIFIED LOCATION OF MALIGNANCY (HCC): ICD-10-CM

## 2023-08-25 DIAGNOSIS — Z51.5 PALLIATIVE CARE ENCOUNTER: ICD-10-CM

## 2023-08-25 DIAGNOSIS — G93.40 ENCEPHALOPATHY: ICD-10-CM

## 2023-08-25 DIAGNOSIS — R74.01 TRANSAMINITIS: ICD-10-CM

## 2023-08-25 LAB
ALBUMIN SERPL-MCNC: 2.6 G/DL (ref 3.5–5)
ALBUMIN/GLOB SERPL: 0.8 (ref 1.1–2.2)
ALP SERPL-CCNC: 1101 U/L (ref 45–117)
ALT SERPL-CCNC: 224 U/L (ref 12–78)
AMMONIA PLAS-SCNC: 51 UMOL/L
ANION GAP SERPL CALC-SCNC: 8 MMOL/L (ref 5–15)
APPEARANCE UR: CLEAR
AST SERPL-CCNC: 134 U/L (ref 15–37)
BACTERIA URNS QL MICRO: NEGATIVE /HPF
BASOPHILS # BLD: 0 K/UL (ref 0–0.1)
BASOPHILS NFR BLD: 0 % (ref 0–1)
BILIRUB SERPL-MCNC: 1.1 MG/DL (ref 0.2–1)
BILIRUB UR QL: NEGATIVE
BUN SERPL-MCNC: 12 MG/DL (ref 6–20)
BUN/CREAT SERPL: 14 (ref 12–20)
CALCIUM SERPL-MCNC: 8.8 MG/DL (ref 8.5–10.1)
CHLORIDE SERPL-SCNC: 101 MMOL/L (ref 97–108)
CO2 SERPL-SCNC: 24 MMOL/L (ref 21–32)
COLOR UR: ABNORMAL
COMMENT:: NORMAL
COMMENT:: NORMAL
CREAT SERPL-MCNC: 0.84 MG/DL (ref 0.7–1.3)
DIFFERENTIAL METHOD BLD: ABNORMAL
EOSINOPHIL # BLD: 0 K/UL (ref 0–0.4)
EOSINOPHIL NFR BLD: 0 % (ref 0–7)
EPITH CASTS URNS QL MICRO: ABNORMAL /LPF
ERYTHROCYTE [DISTWIDTH] IN BLOOD BY AUTOMATED COUNT: 14.3 % (ref 11.5–14.5)
FLUAV AG NPH QL IA: NEGATIVE
FLUBV AG NOSE QL IA: NEGATIVE
GLOBULIN SER CALC-MCNC: 3.4 G/DL (ref 2–4)
GLUCOSE SERPL-MCNC: 136 MG/DL (ref 65–100)
GLUCOSE UR STRIP.AUTO-MCNC: NEGATIVE MG/DL
HCT VFR BLD AUTO: 37.5 % (ref 36.6–50.3)
HGB BLD-MCNC: 12.4 G/DL (ref 12.1–17)
HGB UR QL STRIP: NEGATIVE
HYALINE CASTS URNS QL MICRO: ABNORMAL /LPF (ref 0–5)
IMM GRANULOCYTES # BLD AUTO: 0 K/UL (ref 0–0.04)
IMM GRANULOCYTES NFR BLD AUTO: 0 % (ref 0–0.5)
INR PPP: 1.2 (ref 0.9–1.1)
KETONES UR QL STRIP.AUTO: NEGATIVE MG/DL
LACTATE SERPL-SCNC: 2.2 MMOL/L (ref 0.4–2)
LACTATE SERPL-SCNC: 2.6 MMOL/L (ref 0.4–2)
LEUKOCYTE ESTERASE UR QL STRIP.AUTO: NEGATIVE
LIPASE SERPL-CCNC: 59 U/L (ref 73–393)
LYMPHOCYTES # BLD: 0.7 K/UL (ref 0.8–3.5)
LYMPHOCYTES NFR BLD: 7 % (ref 12–49)
MCH RBC QN AUTO: 29.2 PG (ref 26–34)
MCHC RBC AUTO-ENTMCNC: 33.1 G/DL (ref 30–36.5)
MCV RBC AUTO: 88.4 FL (ref 80–99)
MONOCYTES # BLD: 0.3 K/UL (ref 0–1)
MONOCYTES NFR BLD: 3 % (ref 5–13)
NEUTS SEG # BLD: 8.8 K/UL (ref 1.8–8)
NEUTS SEG NFR BLD: 90 % (ref 32–75)
NITRITE UR QL STRIP.AUTO: NEGATIVE
NRBC # BLD: 0 K/UL (ref 0–0.01)
NRBC BLD-RTO: 0 PER 100 WBC
PH UR STRIP: 7 (ref 5–8)
PLATELET # BLD AUTO: 156 K/UL (ref 150–400)
PMV BLD AUTO: 11.3 FL (ref 8.9–12.9)
POTASSIUM SERPL-SCNC: 4.4 MMOL/L (ref 3.5–5.1)
PROCALCITONIN SERPL-MCNC: 1.1 NG/ML
PROT SERPL-MCNC: 6 G/DL (ref 6.4–8.2)
PROT UR STRIP-MCNC: 30 MG/DL
PROTHROMBIN TIME: 12.1 SEC (ref 9–11.1)
RBC # BLD AUTO: 4.24 M/UL (ref 4.1–5.7)
RBC #/AREA URNS HPF: ABNORMAL /HPF (ref 0–5)
RBC MORPH BLD: ABNORMAL
SARS-COV-2 RDRP RESP QL NAA+PROBE: NOT DETECTED
SODIUM SERPL-SCNC: 133 MMOL/L (ref 136–145)
SOURCE: NORMAL
SP GR UR REFRACTOMETRY: 1.01 (ref 1–1.03)
SPECIMEN HOLD: NORMAL
TROPONIN I SERPL HS-MCNC: 20 NG/L (ref 0–76)
URINE CULTURE IF INDICATED: ABNORMAL
UROBILINOGEN UR QL STRIP.AUTO: 1 EU/DL (ref 0.2–1)
WBC # BLD AUTO: 9.8 K/UL (ref 4.1–11.1)
WBC URNS QL MICRO: ABNORMAL /HPF (ref 0–4)

## 2023-08-25 PROCEDURE — 0F9430Z DRAINAGE OF GALLBLADDER WITH DRAINAGE DEVICE, PERCUTANEOUS APPROACH: ICD-10-PCS | Performed by: NURSE PRACTITIONER

## 2023-08-25 PROCEDURE — 81001 URINALYSIS AUTO W/SCOPE: CPT

## 2023-08-25 PROCEDURE — 6360000002 HC RX W HCPCS: Performed by: NURSE PRACTITIONER

## 2023-08-25 PROCEDURE — 99223 1ST HOSP IP/OBS HIGH 75: CPT | Performed by: NURSE PRACTITIONER

## 2023-08-25 PROCEDURE — 87804 INFLUENZA ASSAY W/OPTIC: CPT

## 2023-08-25 PROCEDURE — 76705 ECHO EXAM OF ABDOMEN: CPT

## 2023-08-25 PROCEDURE — 83690 ASSAY OF LIPASE: CPT

## 2023-08-25 PROCEDURE — 82140 ASSAY OF AMMONIA: CPT

## 2023-08-25 PROCEDURE — 80053 COMPREHEN METABOLIC PANEL: CPT

## 2023-08-25 PROCEDURE — 2580000003 HC RX 258: Performed by: NURSE PRACTITIONER

## 2023-08-25 PROCEDURE — 6370000000 HC RX 637 (ALT 250 FOR IP): Performed by: STUDENT IN AN ORGANIZED HEALTH CARE EDUCATION/TRAINING PROGRAM

## 2023-08-25 PROCEDURE — 96366 THER/PROPH/DIAG IV INF ADDON: CPT

## 2023-08-25 PROCEDURE — 96361 HYDRATE IV INFUSION ADD-ON: CPT

## 2023-08-25 PROCEDURE — 70450 CT HEAD/BRAIN W/O DYE: CPT

## 2023-08-25 PROCEDURE — 99285 EMERGENCY DEPT VISIT HI MDM: CPT

## 2023-08-25 PROCEDURE — 96365 THER/PROPH/DIAG IV INF INIT: CPT

## 2023-08-25 PROCEDURE — 6360000004 HC RX CONTRAST MEDICATION: Performed by: STUDENT IN AN ORGANIZED HEALTH CARE EDUCATION/TRAINING PROGRAM

## 2023-08-25 PROCEDURE — 83605 ASSAY OF LACTIC ACID: CPT

## 2023-08-25 PROCEDURE — 93005 ELECTROCARDIOGRAM TRACING: CPT | Performed by: EMERGENCY MEDICINE

## 2023-08-25 PROCEDURE — 2580000003 HC RX 258: Performed by: STUDENT IN AN ORGANIZED HEALTH CARE EDUCATION/TRAINING PROGRAM

## 2023-08-25 PROCEDURE — 85610 PROTHROMBIN TIME: CPT

## 2023-08-25 PROCEDURE — 87186 SC STD MICRODIL/AGAR DIL: CPT

## 2023-08-25 PROCEDURE — 71045 X-RAY EXAM CHEST 1 VIEW: CPT

## 2023-08-25 PROCEDURE — 2500000003 HC RX 250 WO HCPCS: Performed by: STUDENT IN AN ORGANIZED HEALTH CARE EDUCATION/TRAINING PROGRAM

## 2023-08-25 PROCEDURE — 87040 BLOOD CULTURE FOR BACTERIA: CPT

## 2023-08-25 PROCEDURE — 84484 ASSAY OF TROPONIN QUANT: CPT

## 2023-08-25 PROCEDURE — 99223 1ST HOSP IP/OBS HIGH 75: CPT | Performed by: INTERNAL MEDICINE

## 2023-08-25 PROCEDURE — 99221 1ST HOSP IP/OBS SF/LOW 40: CPT | Performed by: NURSE PRACTITIONER

## 2023-08-25 PROCEDURE — 87635 SARS-COV-2 COVID-19 AMP PRB: CPT

## 2023-08-25 PROCEDURE — 74177 CT ABD & PELVIS W/CONTRAST: CPT

## 2023-08-25 PROCEDURE — 36415 COLL VENOUS BLD VENIPUNCTURE: CPT

## 2023-08-25 PROCEDURE — 87086 URINE CULTURE/COLONY COUNT: CPT

## 2023-08-25 PROCEDURE — 96367 TX/PROPH/DG ADDL SEQ IV INF: CPT

## 2023-08-25 PROCEDURE — 84145 PROCALCITONIN (PCT): CPT

## 2023-08-25 PROCEDURE — 6370000000 HC RX 637 (ALT 250 FOR IP): Performed by: NURSE PRACTITIONER

## 2023-08-25 PROCEDURE — 87077 CULTURE AEROBIC IDENTIFY: CPT

## 2023-08-25 PROCEDURE — 85025 COMPLETE CBC W/AUTO DIFF WBC: CPT

## 2023-08-25 PROCEDURE — 6360000002 HC RX W HCPCS: Performed by: STUDENT IN AN ORGANIZED HEALTH CARE EDUCATION/TRAINING PROGRAM

## 2023-08-25 PROCEDURE — 2000000000 HC ICU R&B

## 2023-08-25 RX ORDER — ACETAMINOPHEN 650 MG/1
650 SUPPOSITORY RECTAL EVERY 6 HOURS PRN
Status: DISCONTINUED | OUTPATIENT
Start: 2023-08-25 | End: 2023-08-31 | Stop reason: HOSPADM

## 2023-08-25 RX ORDER — HYDROMORPHONE HYDROCHLORIDE 1 MG/ML
0.5 INJECTION, SOLUTION INTRAMUSCULAR; INTRAVENOUS; SUBCUTANEOUS EVERY 4 HOURS PRN
Status: DISCONTINUED | OUTPATIENT
Start: 2023-08-25 | End: 2023-08-31 | Stop reason: HOSPADM

## 2023-08-25 RX ORDER — SODIUM CHLORIDE 9 MG/ML
INJECTION, SOLUTION INTRAVENOUS PRN
Status: DISCONTINUED | OUTPATIENT
Start: 2023-08-25 | End: 2023-08-31 | Stop reason: HOSPADM

## 2023-08-25 RX ORDER — ACETAMINOPHEN 325 MG/1
650 TABLET ORAL EVERY 6 HOURS PRN
Status: DISCONTINUED | OUTPATIENT
Start: 2023-08-25 | End: 2023-08-31 | Stop reason: HOSPADM

## 2023-08-25 RX ORDER — ACETAMINOPHEN 500 MG
1000 TABLET ORAL EVERY 6 HOURS PRN
Status: DISCONTINUED | OUTPATIENT
Start: 2023-08-25 | End: 2023-08-25 | Stop reason: SDUPTHER

## 2023-08-25 RX ORDER — BACLOFEN 10 MG/1
10 TABLET ORAL 3 TIMES DAILY PRN
Status: DISCONTINUED | OUTPATIENT
Start: 2023-08-25 | End: 2023-08-31 | Stop reason: HOSPADM

## 2023-08-25 RX ORDER — SODIUM CHLORIDE 0.9 % (FLUSH) 0.9 %
5-40 SYRINGE (ML) INJECTION EVERY 12 HOURS SCHEDULED
Status: DISCONTINUED | OUTPATIENT
Start: 2023-08-25 | End: 2023-08-31 | Stop reason: HOSPADM

## 2023-08-25 RX ORDER — 0.9 % SODIUM CHLORIDE 0.9 %
1000 INTRAVENOUS SOLUTION INTRAVENOUS ONCE
Status: COMPLETED | OUTPATIENT
Start: 2023-08-25 | End: 2023-08-25

## 2023-08-25 RX ORDER — ENOXAPARIN SODIUM 100 MG/ML
1.5 INJECTION SUBCUTANEOUS DAILY
Status: DISCONTINUED | OUTPATIENT
Start: 2023-08-25 | End: 2023-08-27

## 2023-08-25 RX ORDER — SODIUM CHLORIDE, SODIUM LACTATE, POTASSIUM CHLORIDE, AND CALCIUM CHLORIDE .6; .31; .03; .02 G/100ML; G/100ML; G/100ML; G/100ML
1000 INJECTION, SOLUTION INTRAVENOUS ONCE
Status: COMPLETED | OUTPATIENT
Start: 2023-08-25 | End: 2023-08-25

## 2023-08-25 RX ORDER — ONDANSETRON 4 MG/1
4 TABLET, ORALLY DISINTEGRATING ORAL EVERY 8 HOURS PRN
Status: DISCONTINUED | OUTPATIENT
Start: 2023-08-25 | End: 2023-08-31 | Stop reason: HOSPADM

## 2023-08-25 RX ORDER — ONDANSETRON 2 MG/ML
4 INJECTION INTRAMUSCULAR; INTRAVENOUS EVERY 6 HOURS PRN
Status: DISCONTINUED | OUTPATIENT
Start: 2023-08-25 | End: 2023-08-31 | Stop reason: HOSPADM

## 2023-08-25 RX ORDER — SODIUM CHLORIDE 0.9 % (FLUSH) 0.9 %
5-40 SYRINGE (ML) INJECTION PRN
Status: DISCONTINUED | OUTPATIENT
Start: 2023-08-25 | End: 2023-08-31 | Stop reason: HOSPADM

## 2023-08-25 RX ORDER — SODIUM CHLORIDE, SODIUM LACTATE, POTASSIUM CHLORIDE, CALCIUM CHLORIDE 600; 310; 30; 20 MG/100ML; MG/100ML; MG/100ML; MG/100ML
INJECTION, SOLUTION INTRAVENOUS CONTINUOUS
Status: DISCONTINUED | OUTPATIENT
Start: 2023-08-25 | End: 2023-08-31 | Stop reason: HOSPADM

## 2023-08-25 RX ORDER — POLYETHYLENE GLYCOL 3350 17 G/17G
17 POWDER, FOR SOLUTION ORAL DAILY PRN
Status: DISCONTINUED | OUTPATIENT
Start: 2023-08-25 | End: 2023-08-31 | Stop reason: HOSPADM

## 2023-08-25 RX ORDER — NOREPINEPHRINE BITARTRATE 0.06 MG/ML
1-100 INJECTION, SOLUTION INTRAVENOUS CONTINUOUS
Status: DISCONTINUED | OUTPATIENT
Start: 2023-08-25 | End: 2023-08-26

## 2023-08-25 RX ADMIN — PIPERACILLIN, TAZOBACTAM 4500 MG: 4; .5 INJECTION, POWDER, LYOPHILIZED, FOR SOLUTION INTRAVENOUS at 09:00

## 2023-08-25 RX ADMIN — ACETAMINOPHEN 650 MG: 325 TABLET ORAL at 20:29

## 2023-08-25 RX ADMIN — SODIUM CHLORIDE, POTASSIUM CHLORIDE, SODIUM LACTATE AND CALCIUM CHLORIDE: 600; 310; 30; 20 INJECTION, SOLUTION INTRAVENOUS at 14:32

## 2023-08-25 RX ADMIN — PIPERACILLIN AND TAZOBACTAM 3375 MG: 3; .375 INJECTION, POWDER, LYOPHILIZED, FOR SOLUTION INTRAVENOUS at 23:16

## 2023-08-25 RX ADMIN — ENOXAPARIN SODIUM 100 MG: 100 INJECTION SUBCUTANEOUS at 14:49

## 2023-08-25 RX ADMIN — ACETAMINOPHEN 1000 MG: 500 TABLET ORAL at 07:24

## 2023-08-25 RX ADMIN — PIPERACILLIN AND TAZOBACTAM 3375 MG: 3; .375 INJECTION, POWDER, LYOPHILIZED, FOR SOLUTION INTRAVENOUS at 16:11

## 2023-08-25 RX ADMIN — SODIUM CHLORIDE 1000 ML: 9 INJECTION, SOLUTION INTRAVENOUS at 09:00

## 2023-08-25 RX ADMIN — SODIUM CHLORIDE 1000 ML: 9 INJECTION, SOLUTION INTRAVENOUS at 07:10

## 2023-08-25 RX ADMIN — SODIUM CHLORIDE, POTASSIUM CHLORIDE, SODIUM LACTATE AND CALCIUM CHLORIDE 1000 ML: 600; 310; 30; 20 INJECTION, SOLUTION INTRAVENOUS at 10:26

## 2023-08-25 RX ADMIN — IOPAMIDOL 100 ML: 755 INJECTION, SOLUTION INTRAVENOUS at 08:22

## 2023-08-25 RX ADMIN — VANCOMYCIN HYDROCHLORIDE 1750 MG: 10 INJECTION, POWDER, LYOPHILIZED, FOR SOLUTION INTRAVENOUS at 10:26

## 2023-08-25 RX ADMIN — NOREPINEPHRINE BITARTRATE 5 MCG/MIN: 1 INJECTION, SOLUTION, CONCENTRATE INTRAVENOUS at 14:32

## 2023-08-25 RX ADMIN — HYDROMORPHONE HYDROCHLORIDE 0.5 MG: 1 INJECTION, SOLUTION INTRAMUSCULAR; INTRAVENOUS; SUBCUTANEOUS at 20:29

## 2023-08-25 RX ADMIN — BACLOFEN 10 MG: 10 TABLET ORAL at 20:29

## 2023-08-25 RX ADMIN — SODIUM CHLORIDE, PRESERVATIVE FREE 10 ML: 5 INJECTION INTRAVENOUS at 20:31

## 2023-08-25 ASSESSMENT — ENCOUNTER SYMPTOMS
RESPIRATORY NEGATIVE: 1
EYES NEGATIVE: 1
GASTROINTESTINAL NEGATIVE: 1

## 2023-08-25 ASSESSMENT — PAIN DESCRIPTION - LOCATION: LOCATION: ABDOMEN

## 2023-08-25 ASSESSMENT — PAIN SCALES - GENERAL
PAINLEVEL_OUTOF10: 0
PAINLEVEL_OUTOF10: 5

## 2023-08-25 ASSESSMENT — PAIN DESCRIPTION - DESCRIPTORS: DESCRIPTORS: OTHER (COMMENT)

## 2023-08-25 ASSESSMENT — PAIN DESCRIPTION - ORIENTATION: ORIENTATION: MID;UPPER

## 2023-08-25 NOTE — ED NOTES
Critical result lactic 2.6 from [employer], reported to MD. PT DIANNA to CT.     1200: Critical result lactic 2.2 from Hi parikh MD notified.      Hazel Cobos RN  08/25/23 1200    Shift change report given to SUSAN Ortiz RN  08/25/23 1600

## 2023-08-25 NOTE — ED TRIAGE NOTES
Pt arrives c/o AMS. Pt has hx of pancreatic cx. Per EMS pt wife usually becomes weak after chemotherapy sessions but not altered. PT only alert to self.

## 2023-08-25 NOTE — ED NOTES
.Bedside and Verbal shift change report given to Dandre Vigil (oncoming nurse) by Paul Schmitt RN (offgoing nurse). Report included the following information Nurse Handoff Report, ED Encounter Summary, ED SBAR, Adult Overview, and Neuro Assessment.        Hai Love RN  08/25/23 0611

## 2023-08-25 NOTE — ED PROVIDER NOTES
[facility] EMERGENCY DEP  EMERGENCY DEPARTMENT ENCOUNTER      Pt Name: Leatha Gonzalez  MRN: [id number]  [address] [facility] [place] 1960  Date of evaluation: 8/25/2023  Provider: Viridiana Christine       Chief Complaint   Patient presents with    Fever         HISTORY OF PRESENT ILLNESS   (Location/Symptom, Timing/Onset, Context/Setting, Quality, Duration, Modifying Factors, Severity)  Note limiting factors. 39-year-old male presents today secondary to fever. He has a history of pancreatic cancer and last received chemotherapy on 8/15/2023. Patient unable to provide any history as he is confused. History obtained from wife at bedside. She states that his pain medications were increased yesterday and he had a pretty good night last night. In the the early hours of the morning he started to moan and she felt he was in discomfort. This morning he started to have rigors and he seemed to be confused so she called 911 who brought him in. Review of External Medical Records:     Nursing Notes were reviewed. REVIEW OF SYSTEMS    (2-9 systems for level 4, 10 or more for level 5)     Review of Systems   Unable to perform ROS: Mental status change     Except as noted above the remainder of the review of systems was reviewed and negative.        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

## 2023-08-25 NOTE — H&P
obstructive jaundice  Secondary to pancreatic head mass and malignant stricture s/p ERCP and metallic stent placed 2/2/40/3094    RENAL/ELECTROLYTE/FLUIDS:   Goal urine output > 0.5 cc/kg/hr, Strict I/Os, avoid nephrotoxins  Replenish Electrolytes as indicated  IVF LR @ 75 cc/hr    ENDOCRINE:   Blood Sugar Goal 120-180, Avoid hypo/hyperglycemia      HEMATOLOGY/ONCOLOGY:   Hx of recent Dx of metastatic pancreatic adenocarcinoma (7/2023),   F/b Dr. Brian Hawthorne    Hx of recent dx of DVT while on warfarin (Dx 8/21 DVT left posterior tibial vein)  Continue home treatment dose enoxaparin 1.5 mg/kg daily    INFECTIOUS DISEASE:   Severe Sepsis of unclear etiology in a immunocompromised host  Continue empiric vancomycin/Zosyn, monitor lactic as a surrogate for tissue perfusion  Cont. Antibiotics. Follow cultures. ANTIBIOTICS TO DATE:  Zosyn (8/25-current)  Vancomycin (8/25-current)    CULTURES TO DATE:  (8/25) BC- pending    ICU DAILY CHECKLIST     Code Status:FULL  DVT Prophylaxis:Enoxaparin  T/L/D:PIV  SUP: Not indicated  Diet: NPO  Activity Level:As tolerated  ABCDEF Bundle/Checklist Completed:Yes  Disposition: Admit to ICU  Multidisciplinary Rounds Completed:  Prending  Patient/Family Updated: Yes      [address]   Review of Systems   Constitutional:  Positive for fatigue. HENT: Negative. Eyes: Negative. Respiratory: Negative. Cardiovascular: Negative. Gastrointestinal: Negative. Endocrine: Negative. Genitourinary: Negative. Musculoskeletal: Negative. Neurological:  Positive for dizziness. Hematological: Negative. OBJECTIVE   Physical Exam  Vitals reviewed. Constitutional:       Appearance: He is ill-appearing. HENT:      Head: Normocephalic and atraumatic. Nose: Nose normal.      Mouth/Throat:      Mouth: Mucous membranes are moist.   Eyes:      Pupils: Pupils are equal, round, and reactive to light.

## 2023-08-26 ENCOUNTER — HOSPITAL ENCOUNTER (INPATIENT)
Facility: HOSPITAL | Age: 63
Discharge: HOME | DRG: 871 | End: 2023-08-29
Payer: COMMERCIAL

## 2023-08-26 VITALS
HEART RATE: 88 BPM | TEMPERATURE: 98.1 F | RESPIRATION RATE: 14 BRPM | DIASTOLIC BLOOD PRESSURE: 66 MMHG | SYSTOLIC BLOOD PRESSURE: 117 MMHG | OXYGEN SATURATION: 99 %

## 2023-08-26 PROBLEM — R74.01 TRANSAMINITIS: Status: ACTIVE | Noted: 2023-07-19

## 2023-08-26 LAB
ACCESSION NUMBER, LLC1M: ABNORMAL
ACINETOBACTER CALCOAC BAUMANNII COMPLEX BY PCR: NOT DETECTED
ALBUMIN SERPL-MCNC: 2.1 G/DL (ref 3.5–5)
ALBUMIN/GLOB SERPL: 0.7 (ref 1.1–2.2)
ALP SERPL-CCNC: 770 U/L (ref 45–117)
ALT SERPL-CCNC: 151 U/L (ref 12–78)
ANION GAP SERPL CALC-SCNC: 3 MMOL/L (ref 5–15)
AST SERPL-CCNC: 63 U/L (ref 15–37)
B FRAGILIS DNA BLD POS QL NAA+NON-PROBE: NOT DETECTED
BILIRUB DIRECT SERPL-MCNC: 0.5 MG/DL (ref 0–0.2)
BILIRUB SERPL-MCNC: 1 MG/DL (ref 0.2–1)
BIOFIRE TEST COMMENT: ABNORMAL
BUN SERPL-MCNC: 15 MG/DL (ref 6–20)
BUN/CREAT SERPL: 22 (ref 12–20)
C ALBICANS DNA BLD POS QL NAA+NON-PROBE: NOT DETECTED
C AURIS DNA BLD POS QL NAA+NON-PROBE: NOT DETECTED
C GATTII+NEOFOR DNA BLD POS QL NAA+N-PRB: NOT DETECTED
C GLABRATA DNA BLD POS QL NAA+NON-PROBE: NOT DETECTED
C KRUSEI DNA BLD POS QL NAA+NON-PROBE: NOT DETECTED
C PARAP DNA BLD POS QL NAA+NON-PROBE: NOT DETECTED
C TROPICLS DNA BLD POS QL NAA+NON-PROBE: NOT DETECTED
CALCIUM SERPL-MCNC: 8.3 MG/DL (ref 8.5–10.1)
CHLORIDE SERPL-SCNC: 112 MMOL/L (ref 97–108)
CO2 SERPL-SCNC: 25 MMOL/L (ref 21–32)
COMMENT:: NORMAL
CREAT SERPL-MCNC: 0.68 MG/DL (ref 0.7–1.3)
E CLOAC COMP DNA BLD POS NAA+NON-PROBE: NOT DETECTED
E COLI DNA BLD POS QL NAA+NON-PROBE: NOT DETECTED
E FAECALIS DNA BLD POS QL NAA+NON-PROBE: NOT DETECTED
E FAECIUM DNA BLD POS QL NAA+NON-PROBE: NOT DETECTED
ENTEROBACTERALES DNA BLD POS NAA+N-PRB: NOT DETECTED
ERYTHROCYTE [DISTWIDTH] IN BLOOD BY AUTOMATED COUNT: 14.6 % (ref 11.5–14.5)
GLOBULIN SER CALC-MCNC: 2.9 G/DL (ref 2–4)
GLUCOSE SERPL-MCNC: 97 MG/DL (ref 65–100)
GP B STREP DNA BLD POS QL NAA+NON-PROBE: NOT DETECTED
HAEM INFLU DNA BLD POS QL NAA+NON-PROBE: NOT DETECTED
HCT VFR BLD AUTO: 31.9 % (ref 36.6–50.3)
HGB BLD-MCNC: 10.3 G/DL (ref 12.1–17)
K OXYTOCA DNA BLD POS QL NAA+NON-PROBE: NOT DETECTED
KLEBSIELLA SP DNA BLD POS QL NAA+NON-PRB: NOT DETECTED
KLEBSIELLA SP DNA BLD POS QL NAA+NON-PRB: NOT DETECTED
L MONOCYTOG DNA BLD POS QL NAA+NON-PROBE: NOT DETECTED
MAGNESIUM SERPL-MCNC: 1.9 MG/DL (ref 1.6–2.4)
MCH RBC QN AUTO: 29.3 PG (ref 26–34)
MCHC RBC AUTO-ENTMCNC: 32.3 G/DL (ref 30–36.5)
MCV RBC AUTO: 90.6 FL (ref 80–99)
N MEN DNA BLD POS QL NAA+NON-PROBE: NOT DETECTED
NRBC # BLD: 0 K/UL (ref 0–0.01)
NRBC BLD-RTO: 0 PER 100 WBC
P AERUGINOSA DNA BLD POS NAA+NON-PROBE: NOT DETECTED
PHOSPHATE SERPL-MCNC: 2.6 MG/DL (ref 2.6–4.7)
PLATELET # BLD AUTO: 172 K/UL (ref 150–400)
PMV BLD AUTO: 10.8 FL (ref 8.9–12.9)
POTASSIUM SERPL-SCNC: 3.8 MMOL/L (ref 3.5–5.1)
PROCALCITONIN SERPL-MCNC: 25.28 NG/ML
PROT SERPL-MCNC: 5 G/DL (ref 6.4–8.2)
PROTEUS SP DNA BLD POS QL NAA+NON-PROBE: NOT DETECTED
RBC # BLD AUTO: 3.52 M/UL (ref 4.1–5.7)
RESISTANT GENE TARGETS: ABNORMAL
S AUREUS DNA BLD POS QL NAA+NON-PROBE: NOT DETECTED
S AUREUS+CONS DNA BLD POS NAA+NON-PROBE: NOT DETECTED
S EPIDERMIDIS DNA BLD POS QL NAA+NON-PRB: NOT DETECTED
S LUGDUNENSIS DNA BLD POS QL NAA+NON-PRB: NOT DETECTED
S MALTOPHILIA DNA BLD POS QL NAA+NON-PRB: NOT DETECTED
S MARCESCENS DNA BLD POS NAA+NON-PROBE: NOT DETECTED
S PNEUM DNA BLD POS QL NAA+NON-PROBE: NOT DETECTED
S PYO DNA BLD POS QL NAA+NON-PROBE: NOT DETECTED
SALMONELLA DNA BLD POS QL NAA+NON-PROBE: NOT DETECTED
SODIUM SERPL-SCNC: 140 MMOL/L (ref 136–145)
SPECIMEN HOLD: NORMAL
STREPTOCOCCUS DNA BLD POS NAA+NON-PROBE: DETECTED
WBC # BLD AUTO: 18.5 K/UL (ref 4.1–11.1)

## 2023-08-26 PROCEDURE — 87075 CULTR BACTERIA EXCEPT BLOOD: CPT

## 2023-08-26 PROCEDURE — 87150 DNA/RNA AMPLIFIED PROBE: CPT

## 2023-08-26 PROCEDURE — 0F9430Z DRAINAGE OF GALLBLADDER WITH DRAINAGE DEVICE, PERCUTANEOUS APPROACH: ICD-10-PCS | Performed by: NURSE PRACTITIONER

## 2023-08-26 PROCEDURE — 36415 COLL VENOUS BLD VENIPUNCTURE: CPT

## 2023-08-26 PROCEDURE — 80076 HEPATIC FUNCTION PANEL: CPT

## 2023-08-26 PROCEDURE — 87205 SMEAR GRAM STAIN: CPT

## 2023-08-26 PROCEDURE — 84100 ASSAY OF PHOSPHORUS: CPT

## 2023-08-26 PROCEDURE — 6360000002 HC RX W HCPCS: Performed by: ANESTHESIOLOGY

## 2023-08-26 PROCEDURE — 87070 CULTURE OTHR SPECIMN AEROBIC: CPT

## 2023-08-26 PROCEDURE — 6360000002 HC RX W HCPCS: Performed by: NURSE PRACTITIONER

## 2023-08-26 PROCEDURE — 6370000000 HC RX 637 (ALT 250 FOR IP): Performed by: NURSE PRACTITIONER

## 2023-08-26 PROCEDURE — 80048 BASIC METABOLIC PNL TOTAL CA: CPT

## 2023-08-26 PROCEDURE — 87185 SC STD ENZYME DETCJ PER NZM: CPT

## 2023-08-26 PROCEDURE — 87186 SC STD MICRODIL/AGAR DIL: CPT

## 2023-08-26 PROCEDURE — 83735 ASSAY OF MAGNESIUM: CPT

## 2023-08-26 PROCEDURE — 99232 SBSQ HOSP IP/OBS MODERATE 35: CPT | Performed by: SURGERY

## 2023-08-26 PROCEDURE — 2000000000 HC ICU R&B

## 2023-08-26 PROCEDURE — 87077 CULTURE AEROBIC IDENTIFY: CPT

## 2023-08-26 PROCEDURE — 85027 COMPLETE CBC AUTOMATED: CPT

## 2023-08-26 PROCEDURE — 2580000003 HC RX 258: Performed by: NURSE PRACTITIONER

## 2023-08-26 PROCEDURE — 6360000002 HC RX W HCPCS: Performed by: STUDENT IN AN ORGANIZED HEALTH CARE EDUCATION/TRAINING PROGRAM

## 2023-08-26 PROCEDURE — 99231 SBSQ HOSP IP/OBS SF/LOW 25: CPT | Performed by: INTERNAL MEDICINE

## 2023-08-26 PROCEDURE — C1713 ANCHOR/SCREW BN/BN,TIS/BN: HCPCS

## 2023-08-26 PROCEDURE — 87076 CULTURE ANAEROBE IDENT EACH: CPT

## 2023-08-26 PROCEDURE — 84145 PROCALCITONIN (PCT): CPT

## 2023-08-26 RX ORDER — FENTANYL CITRATE 50 UG/ML
25 INJECTION, SOLUTION INTRAMUSCULAR; INTRAVENOUS EVERY 4 HOURS PRN
Status: DISCONTINUED | OUTPATIENT
Start: 2023-08-26 | End: 2023-08-27

## 2023-08-26 RX ORDER — FENTANYL CITRATE 50 UG/ML
INJECTION, SOLUTION INTRAMUSCULAR; INTRAVENOUS PRN
Status: COMPLETED | OUTPATIENT
Start: 2023-08-26 | End: 2023-08-26

## 2023-08-26 RX ORDER — MIDAZOLAM HYDROCHLORIDE 5 MG/ML
INJECTION INTRAMUSCULAR; INTRAVENOUS PRN
Status: COMPLETED | OUTPATIENT
Start: 2023-08-26 | End: 2023-08-26

## 2023-08-26 RX ADMIN — VANCOMYCIN HYDROCHLORIDE 1250 MG: 1.25 INJECTION, POWDER, LYOPHILIZED, FOR SOLUTION INTRAVENOUS at 03:31

## 2023-08-26 RX ADMIN — FENTANYL CITRATE 25 MCG: 50 INJECTION INTRAMUSCULAR; INTRAVENOUS at 23:28

## 2023-08-26 RX ADMIN — HYDROMORPHONE HYDROCHLORIDE 0.5 MG: 1 INJECTION, SOLUTION INTRAMUSCULAR; INTRAVENOUS; SUBCUTANEOUS at 14:31

## 2023-08-26 RX ADMIN — ACETAMINOPHEN 650 MG: 325 TABLET ORAL at 15:55

## 2023-08-26 RX ADMIN — ACETAMINOPHEN 650 MG: 325 TABLET ORAL at 21:49

## 2023-08-26 RX ADMIN — BACLOFEN 10 MG: 10 TABLET ORAL at 08:49

## 2023-08-26 RX ADMIN — HYDROMORPHONE HYDROCHLORIDE 0.5 MG: 1 INJECTION, SOLUTION INTRAMUSCULAR; INTRAVENOUS; SUBCUTANEOUS at 19:53

## 2023-08-26 RX ADMIN — VANCOMYCIN HYDROCHLORIDE 1250 MG: 1.25 INJECTION, POWDER, LYOPHILIZED, FOR SOLUTION INTRAVENOUS at 21:46

## 2023-08-26 RX ADMIN — PIPERACILLIN AND TAZOBACTAM 3375 MG: 3; .375 INJECTION, POWDER, LYOPHILIZED, FOR SOLUTION INTRAVENOUS at 06:41

## 2023-08-26 RX ADMIN — SODIUM CHLORIDE, PRESERVATIVE FREE 10 ML: 5 INJECTION INTRAVENOUS at 08:42

## 2023-08-26 RX ADMIN — PIPERACILLIN AND TAZOBACTAM 3375 MG: 3; .375 INJECTION, POWDER, LYOPHILIZED, FOR SOLUTION INTRAVENOUS at 14:36

## 2023-08-26 RX ADMIN — BACLOFEN 10 MG: 10 TABLET ORAL at 14:32

## 2023-08-26 RX ADMIN — SODIUM CHLORIDE, PRESERVATIVE FREE 10 ML: 5 INJECTION INTRAVENOUS at 20:02

## 2023-08-26 RX ADMIN — SODIUM CHLORIDE, POTASSIUM CHLORIDE, SODIUM LACTATE AND CALCIUM CHLORIDE: 600; 310; 30; 20 INJECTION, SOLUTION INTRAVENOUS at 14:35

## 2023-08-26 RX ADMIN — MIDAZOLAM 1 MG: 5 INJECTION INTRAMUSCULAR; INTRAVENOUS at 13:47

## 2023-08-26 RX ADMIN — FENTANYL CITRATE 50 MCG: 50 INJECTION, SOLUTION INTRAMUSCULAR; INTRAVENOUS at 13:47

## 2023-08-26 RX ADMIN — BACLOFEN 10 MG: 10 TABLET ORAL at 04:30

## 2023-08-26 RX ADMIN — FENTANYL CITRATE 25 MCG: 50 INJECTION INTRAMUSCULAR; INTRAVENOUS at 16:04

## 2023-08-26 RX ADMIN — ONDANSETRON 4 MG: 2 INJECTION INTRAMUSCULAR; INTRAVENOUS at 16:11

## 2023-08-26 RX ADMIN — PIPERACILLIN AND TAZOBACTAM 3375 MG: 3; .375 INJECTION, POWDER, LYOPHILIZED, FOR SOLUTION INTRAVENOUS at 23:18

## 2023-08-26 ASSESSMENT — PAIN DESCRIPTION - DESCRIPTORS
DESCRIPTORS: SPASM;SHOOTING
DESCRIPTORS: SHARP
DESCRIPTORS: SQUEEZING;SPASM
DESCRIPTORS: SPASM;SHARP
DESCRIPTORS: SHOOTING

## 2023-08-26 ASSESSMENT — PAIN DESCRIPTION - LOCATION
LOCATION: ABDOMEN

## 2023-08-26 ASSESSMENT — PAIN SCALES - GENERAL
PAINLEVEL_OUTOF10: 10
PAINLEVEL_OUTOF10: 9
PAINLEVEL_OUTOF10: 10
PAINLEVEL_OUTOF10: 0
PAINLEVEL_OUTOF10: 5
PAINLEVEL_OUTOF10: 10
PAINLEVEL_OUTOF10: 0
PAINLEVEL_OUTOF10: 3
PAINLEVEL_OUTOF10: 0
PAINLEVEL_OUTOF10: 10
PAINLEVEL_OUTOF10: 0
PAINLEVEL_OUTOF10: 7
PAINLEVEL_OUTOF10: 9
PAINLEVEL_OUTOF10: 10

## 2023-08-26 ASSESSMENT — PAIN DESCRIPTION - ORIENTATION
ORIENTATION: UPPER;MID
ORIENTATION: RIGHT
ORIENTATION: MID;UPPER
ORIENTATION: RIGHT

## 2023-08-26 ASSESSMENT — PAIN DESCRIPTION - PAIN TYPE
TYPE: ACUTE PAIN
TYPE: ACUTE PAIN
TYPE: SURGICAL PAIN

## 2023-08-26 ASSESSMENT — PAIN DESCRIPTION - FREQUENCY: FREQUENCY: CONTINUOUS

## 2023-08-26 ASSESSMENT — PAIN - FUNCTIONAL ASSESSMENT
PAIN_FUNCTIONAL_ASSESSMENT: PREVENTS OR INTERFERES SOME ACTIVE ACTIVITIES AND ADLS
PAIN_FUNCTIONAL_ASSESSMENT: PREVENTS OR INTERFERES WITH ALL ACTIVE AND SOME PASSIVE ACTIVITIES
PAIN_FUNCTIONAL_ASSESSMENT: PREVENTS OR INTERFERES WITH ALL ACTIVE AND SOME PASSIVE ACTIVITIES

## 2023-08-26 ASSESSMENT — PAIN DESCRIPTION - ONSET: ONSET: ON-GOING

## 2023-08-26 NOTE — PROCEDURES
Interventional Radiology  Procedure Note        8/26/2023 1:57 PM    Patient: Otoniel Michaels     Informed consent obtained    Diagnosis: Acute cholecystitis    Procedure(s): Percutaneous cholecystostomy tube, 10Fr    Specimens removed:  20cc thin purulent fluid    Complications: None    Primary Physician: Kerrie Weston MD    Recommendations: N/A    Discharge Disposition: return to floor    Full dictated report to follow    Kerrie Weston MD  Interventional Radiology  [facility]  1:57 PM, 8/26/2023

## 2023-08-26 NOTE — CARE COORDINATION
Care Management Initial Assessment       RUR: 25%  Readmission? Yes - 8-8-23 to 8-10-23)  Contacts:  Primary mPOA/wife Aline Figueroa [phone]  Secondary mPOA/daughter Kojo Rai [phone]    7-8-23 to 7-21-23 [facility] Inpatient for RUQ pain. Patient was diagnosed with pancreatic mass. He had ERCP and stent placed. 8-8-23 to 8-10-23 [facility] In[facility] for chest pain and Afib with RVR.  8-25-23 [facility] In[facility] sepsis    Met with patient in his ICU room. Patient confirmed his address, phone number, and emergency contacts. Patient lives with his wife in a 2 story home. He is independent with his ADLs and did not have a change in his ability to care for himself since last admission. Patient does not own any DME. He uses [employer] at [place] [facility] [employer]. He has an AMD on file and it is still accurate. See mPOAs listed above. He has used [facility] in past.         08/26/23 1113   Service Assessment   Patient Orientation Alert and Oriented   Cognition Alert   History Provided By Patient   Primary Caregiver Self   Support Systems Spouse/Significant Other;Children   Patient's Healthcare Decision Maker is: Named in [address]   PCP Verified by CM Yes  (Dr. Adela Asher)   Last Visit to PCP Within last 6 months   Prior Functional Level Independent in ADLs/IADLs   Current Functional Level Independent in ADLs/IADLs   Can patient return to prior living arrangement Yes   Ability to make needs known: Good   Family able to assist with home care needs: Yes   Would you like for me to discuss the discharge plan with any other family members/significant others, and if so, who? Yes  (wife)   Social/Functional History   Lives With [facility] Layout Two level; Able to Live on Main level with bedroom/bathroom   [address] [facility]

## 2023-08-26 NOTE — PROGRESS NOTES
Pt arrives via bed to angio department accompanied by ICU RN x2 for cholesystostomy tube procedure. All assessments completed and consent was reviewed. Education given was regarding procedure, moderate sedation, post-procedure care and  management/follow-up. Opportunity for questions was provided and all questions and concerns were addressed.
intact 08/26/23 0800   Line Status Infusing 08/26/23 0800   Line Care Connections checked and tightened 08/26/23 0800   Phlebitis Assessment No symptoms 08/26/23 0800   Infiltration Assessment 0 08/26/23 0800   Alcohol Cap Used Yes 08/26/23 0800   Dressing Status Clean, dry & intact 08/26/23 0800   Dressing Type Transparent 08/26/23 0800       Peripheral IV 08/25/23 Proximal;Right Cephalic (Active)   Site Assessment Clean, dry & intact 08/26/23 0800   Line Status Infusing 08/26/23 0800   Line Care Connections checked and tightened 08/26/23 0800   Phlebitis Assessment No symptoms 08/26/23 0800   Infiltration Assessment 0 08/26/23 0800   Alcohol Cap Used Yes 08/26/23 0800   Dressing Status Clean, dry & intact 08/26/23 0800   Dressing Type Transparent 08/26/23 0800        Opportunity for questions and clarification was provided.       Patient transported with:  Monitor, Registered Nurse, and Tech  Pt on monitor

## 2023-08-26 NOTE — H&P
Radiology History and Physical    Patient: Ladan Joseph 58 y.o. male       Chief Complaint: No chief complaint on file. History of Present Illness: 58year old male with suspected acute cholecystitis, and sepsis. No other plausible explanation for sepsis.     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    Aortic root replacement    Atrial fibrillation ([address]) 02/2017    Post surgery, s/p DCCV 4/4/17     CAD (coronary artery disease) 04/21/2021    s/p stent to LAD    Dyslipidemia, goal LDL below 79     Family history of colon cancer     Family history of diabetes mellitus (DM) 06/08/2010    Family history of early CAD 06/08/2010    HLD (hyperlipidemia)     HTN (hypertension)     PVD (peripheral vascular disease) ([address]) 02/2017    Ischemic R leg, s/p fem-fem bypass    S/P AVR (aortic valve replacement) 03/2017    Seborrheic dermatitis 06/08/2010     Family History   Problem Relation Age of Onset    Diabetes Mother     Stroke Maternal Grandfather     Hypertension Sister     Cancer Father         colon    Heart Disease Mother      Social History     Socioeconomic History    Marital status:      Spouse name: Not on file    Number of children: Not on file    Years of education: Not on file    Highest education level: Not on file   Occupational History    Not on file   Tobacco Use    Smoking status: Never    Smokeless tobacco: Never   Vaping Use    Vaping Use: Never used   Substance and Sexual Activity    Alcohol use: Not Currently     Comment: Once a weekend    Drug use: No    Sexual activity: Not on file   Other Topics Concern    Not on file   Social History Narrative    Not on file     Social Determinants of Health     Financial Resource Strain: Low Risk     Difficulty of Paying Living Expenses: Not hard at all   Food Insecurity: No Food Insecurity    Worried About Running Out of Food in the Last Year: Never true    [address] in the Last Year: Never true

## 2023-08-26 NOTE — CARE COORDINATION
08/26/23 1107   Readmission Assessment   Number of Days since last admission? 8-30 days   Previous Disposition Home with Family   Who is being Interviewed Patient   What was the patient's/caregiver's perception as to why they think they needed to return back to the hospital?   (sepsis)   Did you visit your Primary Care Physician after you left the hospital, before you returned this time? No   Why weren't you able to visit your PCP? Did not have an appointment   Did you see a specialist, such as Cardiac, Pulmonary, Orthopedic Physician, etc. after you left the hospital? Yes   Who advised the patient to return to the hospital? Self-referral   Does the patient report anything that got in the way of taking their medications? No   In our efforts to provide the best possible care to you and others like you, can you think of anything that we could have done to help you after you left the hospital the first time, so that you might not have needed to return so soon?  Other (Comment)  (sepsis)     MARILU Soto

## 2023-08-26 NOTE — H&P
CRITICAL CARE NOTE      Name: Noelle Clement   : 1960   MRN: [id number]   Date: 2023      REASON FOR ICU ADMISSION:  Sepsis     PRINCIPAL ICU DIAGNOSIS   Sepsis  Asymptomatic Cholelithiasis  Toxic metabolic encephalopathy  Lactic Acidosis  Mild hyponatremia  Elevated LFTS, Alk Phos    BRIEF PATIENT SUMMARY   59 y/o male PMHx of Afib, CAD s/p Stent,  AV replacement, thoracic aneurysm s/p repair HTN, recent dx of metastatic pancreatic adenocarcinoma admitted  for sepsis of unclear etiology in a immunocompromised host.  Hx per chart review, presented to ED via EMS c/o fever, rigors, confusion. ED work- up Tachycardia, Hypotension, febrile and lactic acidosis. CXR with no evidence of pneumonia, CT A/P demonstrated cholelithiasis, no pericholecystic fluid and/or gallbladder thickening, evaluated by surgery-> asymptomatic cholelithiasis. Received greater then 30 ml/kg sepsis fluid resuscitation, levophed ordered d/t ongoing hypotension. ICU consulted for admission    COMPREHENSIVE ASSESSMENT & PLAN:SYSTEM BASED     24 HOUR EVENTS:   RUQ- pending  Continue broad-spectrum antibiotics  Levophed as needed for goal MAP greater than 65    NEUROLOGICAL:   Acute metabolic encephalopathy  Presented with generalized confusion likely in the setting of acute illness  CT head- pending  Routine neurological monitoring, delirium precautions    PULMONOLOGY:   CXR: No evidence of pneumonia  Supplemental oxygen for goal saturation > 94 %  HOB Elevated, Aspiration precautions    CARDIOVASCULAR:  Hx of Afib s/p DCCV (2017) mechanical AV replacement, HTN, CAD s/p stent to LAD, ascending aortic dissection s/p repair ()   ST on telemetry, no acute ST changes  Sepsis IVF fluid resuscitated (greater than 30 mL/kg)  Vasopressors to keep MAP 65 and above for adequate tissue perfusion.      GASTROINTESTINAL   Asymptomatic cholelithiasis  Surgery consulted-no indication for surgery at this time  Tube placement today    Hx

## 2023-08-27 LAB
ANION GAP SERPL CALC-SCNC: 6 MMOL/L (ref 5–15)
BACTERIA SPEC CULT: NORMAL
BUN SERPL-MCNC: 18 MG/DL (ref 6–20)
BUN/CREAT SERPL: 26 (ref 12–20)
CALCIUM SERPL-MCNC: 8.4 MG/DL (ref 8.5–10.1)
CHLORIDE SERPL-SCNC: 109 MMOL/L (ref 97–108)
CO2 SERPL-SCNC: 23 MMOL/L (ref 21–32)
CREAT SERPL-MCNC: 0.69 MG/DL (ref 0.7–1.3)
ERYTHROCYTE [DISTWIDTH] IN BLOOD BY AUTOMATED COUNT: 14.6 % (ref 11.5–14.5)
GLUCOSE SERPL-MCNC: 95 MG/DL (ref 65–100)
HCT VFR BLD AUTO: 33 % (ref 36.6–50.3)
HGB BLD-MCNC: 10.5 G/DL (ref 12.1–17)
MAGNESIUM SERPL-MCNC: 1.8 MG/DL (ref 1.6–2.4)
MCH RBC QN AUTO: 29.1 PG (ref 26–34)
MCHC RBC AUTO-ENTMCNC: 31.8 G/DL (ref 30–36.5)
MCV RBC AUTO: 91.4 FL (ref 80–99)
NRBC # BLD: 0 K/UL (ref 0–0.01)
NRBC BLD-RTO: 0 PER 100 WBC
PHOSPHATE SERPL-MCNC: 3.8 MG/DL (ref 2.6–4.7)
PLATELET # BLD AUTO: 183 K/UL (ref 150–400)
PMV BLD AUTO: 10.8 FL (ref 8.9–12.9)
POTASSIUM SERPL-SCNC: 4 MMOL/L (ref 3.5–5.1)
RBC # BLD AUTO: 3.61 M/UL (ref 4.1–5.7)
SERVICE CMNT-IMP: NORMAL
SERVICE CMNT-IMP: NORMAL
SODIUM SERPL-SCNC: 138 MMOL/L (ref 136–145)
VANCOMYCIN SERPL-MCNC: 17.3 UG/ML
WBC # BLD AUTO: 11.8 K/UL (ref 4.1–11.1)

## 2023-08-27 PROCEDURE — 85027 COMPLETE CBC AUTOMATED: CPT

## 2023-08-27 PROCEDURE — 87040 BLOOD CULTURE FOR BACTERIA: CPT

## 2023-08-27 PROCEDURE — 80048 BASIC METABOLIC PNL TOTAL CA: CPT

## 2023-08-27 PROCEDURE — 80202 ASSAY OF VANCOMYCIN: CPT

## 2023-08-27 PROCEDURE — 99232 SBSQ HOSP IP/OBS MODERATE 35: CPT | Performed by: SURGERY

## 2023-08-27 PROCEDURE — 84100 ASSAY OF PHOSPHORUS: CPT

## 2023-08-27 PROCEDURE — 6360000002 HC RX W HCPCS: Performed by: NURSE PRACTITIONER

## 2023-08-27 PROCEDURE — 2060000000 HC ICU INTERMEDIATE R&B

## 2023-08-27 PROCEDURE — 83735 ASSAY OF MAGNESIUM: CPT

## 2023-08-27 PROCEDURE — 6370000000 HC RX 637 (ALT 250 FOR IP): Performed by: NURSE PRACTITIONER

## 2023-08-27 PROCEDURE — 36415 COLL VENOUS BLD VENIPUNCTURE: CPT

## 2023-08-27 PROCEDURE — 2580000003 HC RX 258: Performed by: NURSE PRACTITIONER

## 2023-08-27 RX ORDER — ENOXAPARIN SODIUM 100 MG/ML
90 INJECTION SUBCUTANEOUS DAILY
Status: DISCONTINUED | OUTPATIENT
Start: 2023-08-28 | End: 2023-08-29

## 2023-08-27 RX ADMIN — BACLOFEN 10 MG: 10 TABLET ORAL at 15:13

## 2023-08-27 RX ADMIN — PIPERACILLIN AND TAZOBACTAM 3375 MG: 3; .375 INJECTION, POWDER, LYOPHILIZED, FOR SOLUTION INTRAVENOUS at 14:43

## 2023-08-27 RX ADMIN — HYDROMORPHONE HYDROCHLORIDE 0.5 MG: 1 INJECTION, SOLUTION INTRAMUSCULAR; INTRAVENOUS; SUBCUTANEOUS at 17:08

## 2023-08-27 RX ADMIN — VANCOMYCIN HYDROCHLORIDE 1500 MG: 1.5 INJECTION, POWDER, LYOPHILIZED, FOR SOLUTION INTRAVENOUS at 17:37

## 2023-08-27 RX ADMIN — BACLOFEN 10 MG: 10 TABLET ORAL at 04:11

## 2023-08-27 RX ADMIN — BACLOFEN 10 MG: 10 TABLET ORAL at 11:40

## 2023-08-27 RX ADMIN — PIPERACILLIN AND TAZOBACTAM 3375 MG: 3; .375 INJECTION, POWDER, LYOPHILIZED, FOR SOLUTION INTRAVENOUS at 06:30

## 2023-08-27 RX ADMIN — HYDROMORPHONE HYDROCHLORIDE 0.5 MG: 1 INJECTION, SOLUTION INTRAMUSCULAR; INTRAVENOUS; SUBCUTANEOUS at 21:06

## 2023-08-27 RX ADMIN — HYDROMORPHONE HYDROCHLORIDE 0.5 MG: 1 INJECTION, SOLUTION INTRAMUSCULAR; INTRAVENOUS; SUBCUTANEOUS at 08:43

## 2023-08-27 RX ADMIN — SODIUM CHLORIDE, POTASSIUM CHLORIDE, SODIUM LACTATE AND CALCIUM CHLORIDE: 600; 310; 30; 20 INJECTION, SOLUTION INTRAVENOUS at 04:31

## 2023-08-27 RX ADMIN — BACLOFEN 10 MG: 10 TABLET ORAL at 08:47

## 2023-08-27 RX ADMIN — HYDROMORPHONE HYDROCHLORIDE 0.5 MG: 1 INJECTION, SOLUTION INTRAMUSCULAR; INTRAVENOUS; SUBCUTANEOUS at 03:41

## 2023-08-27 RX ADMIN — ENOXAPARIN SODIUM 100 MG: 100 INJECTION SUBCUTANEOUS at 08:40

## 2023-08-27 RX ADMIN — ACETAMINOPHEN 650 MG: 325 TABLET ORAL at 15:13

## 2023-08-27 RX ADMIN — PIPERACILLIN AND TAZOBACTAM 3375 MG: 3; .375 INJECTION, POWDER, LYOPHILIZED, FOR SOLUTION INTRAVENOUS at 23:05

## 2023-08-27 RX ADMIN — SODIUM CHLORIDE, PRESERVATIVE FREE 40 ML: 5 INJECTION INTRAVENOUS at 08:48

## 2023-08-27 RX ADMIN — SODIUM CHLORIDE, POTASSIUM CHLORIDE, SODIUM LACTATE AND CALCIUM CHLORIDE: 600; 310; 30; 20 INJECTION, SOLUTION INTRAVENOUS at 17:08

## 2023-08-27 RX ADMIN — HYDROMORPHONE HYDROCHLORIDE 0.5 MG: 1 INJECTION, SOLUTION INTRAMUSCULAR; INTRAVENOUS; SUBCUTANEOUS at 12:42

## 2023-08-27 RX ADMIN — SODIUM CHLORIDE, PRESERVATIVE FREE 10 ML: 5 INJECTION INTRAVENOUS at 21:07

## 2023-08-27 ASSESSMENT — PAIN SCALES - GENERAL
PAINLEVEL_OUTOF10: 5
PAINLEVEL_OUTOF10: 5
PAINLEVEL_OUTOF10: 6
PAINLEVEL_OUTOF10: 0
PAINLEVEL_OUTOF10: 6
PAINLEVEL_OUTOF10: 7
PAINLEVEL_OUTOF10: 5
PAINLEVEL_OUTOF10: 0
PAINLEVEL_OUTOF10: 7

## 2023-08-27 ASSESSMENT — PAIN DESCRIPTION - ORIENTATION
ORIENTATION: MID;UPPER

## 2023-08-27 ASSESSMENT — PAIN DESCRIPTION - LOCATION
LOCATION: ABDOMEN

## 2023-08-27 ASSESSMENT — PAIN DESCRIPTION - DESCRIPTORS
DESCRIPTORS: ACHING
DESCRIPTORS: ACHING
DESCRIPTORS: SHARP

## 2023-08-27 NOTE — H&P
or lesions  Drain: Cholecystectomy tube in place draining yellow fluid with dark sediment    Data Review:   I have independently reviewed and interpreted patient's lab and all other diagnostic data    Abnormal Labs Reviewed   CULTURE, BLOOD 2 - Abnormal; Notable for the following components:       Result Value    Culture   (*)     Value: STREPTOCOCCUS CONSTELLATUS GROWING IN 1 OF 2 BOTTLES DRAWN No Site Indicated SENSITIVITY TO FOLLOW    All other components within normal limits   CULTURE, BLOOD 1 - Abnormal; Notable for the following components:    Culture   (*)     Value: PROBABLE Streptococcus species GROWING IN 1 OF 2 BOTTLES DRAWN    Culture   (*)     Value: APPARENT GRAM POSITIVE RODS GROWING IN THE SECOND BOTTLE DRAWN No Site Indicated    All other components within normal limits   LACTATE, SEPSIS - Abnormal; Notable for the following components:    Lactic Acid, Sepsis 2.6 (*)     All other components within normal limits   LACTATE, SEPSIS - Abnormal; Notable for the following components:    Lactic Acid, Sepsis 2.2 (*)     All other components within normal limits   AMMONIA - Abnormal; Notable for the following components:    Ammonia 51 (*)     All other components within normal limits   CBC WITH AUTO DIFFERENTIAL - Abnormal; Notable for the following components:    Neutrophils % 90 (*)     Lymphocytes % 7 (*)     Monocytes % 3 (*)     Neutrophils Absolute 8.8 (*)     Lymphocytes Absolute 0.7 (*)     All other components within normal limits   COMPREHENSIVE METABOLIC PANEL - Abnormal; Notable for the following components:    Sodium 133 (*)     Glucose 136 (*)     Total Bilirubin 1.1 (*)      (*)      (*)     Alk Phosphatase 1101 (*)     Total Protein 6.0 (*)     Albumin 2.6 (*)     Albumin/Globulin Ratio 0.8 (*)     All other components within normal limits   PROTIME-INR - Abnormal; Notable for the following components:    INR 1.2 (*)     Protime 12.1 (*)     All other components within normal

## 2023-08-28 PROBLEM — R63.0 POOR APPETITE: Status: ACTIVE | Noted: 2023-08-28

## 2023-08-28 PROBLEM — Z51.5 PALLIATIVE CARE ENCOUNTER: Status: ACTIVE | Noted: 2023-08-28

## 2023-08-28 PROBLEM — R52 PAIN: Status: ACTIVE | Noted: 2023-08-28

## 2023-08-28 PROBLEM — E88.09 HYPOALBUMINEMIA: Status: ACTIVE | Noted: 2023-08-28

## 2023-08-28 PROBLEM — K81.9 CHOLECYSTITIS: Status: ACTIVE | Noted: 2023-08-25

## 2023-08-28 PROBLEM — Z85.07 HISTORY OF PANCREATIC CANCER: Status: ACTIVE | Noted: 2023-08-28

## 2023-08-28 PROBLEM — Z71.89 DNR (DO NOT RESUSCITATE) DISCUSSION: Status: ACTIVE | Noted: 2023-08-28

## 2023-08-28 LAB
ANION GAP SERPL CALC-SCNC: 2 MMOL/L (ref 5–15)
BACTERIA SPEC CULT: ABNORMAL
BUN SERPL-MCNC: 15 MG/DL (ref 6–20)
BUN/CREAT SERPL: 24 (ref 12–20)
CALCIUM SERPL-MCNC: 8.2 MG/DL (ref 8.5–10.1)
CHLORIDE SERPL-SCNC: 110 MMOL/L (ref 97–108)
CO2 SERPL-SCNC: 27 MMOL/L (ref 21–32)
CREAT SERPL-MCNC: 0.62 MG/DL (ref 0.7–1.3)
EKG ATRIAL RATE: 136 BPM
EKG ATRIAL RATE: 77 BPM
EKG DIAGNOSIS: NORMAL
EKG DIAGNOSIS: NORMAL
EKG P AXIS: 57 DEGREES
EKG P AXIS: 97 DEGREES
EKG P-R INTERVAL: 170 MS
EKG P-R INTERVAL: 232 MS
EKG Q-T INTERVAL: 380 MS
EKG Q-T INTERVAL: 430 MS
EKG QRS DURATION: 104 MS
EKG QRS DURATION: 90 MS
EKG QTC CALCULATION (BAZETT): 486 MS
EKG QTC CALCULATION (BAZETT): 571 MS
EKG R AXIS: -31 DEGREES
EKG R AXIS: -60 DEGREES
EKG T AXIS: -8 DEGREES
EKG T AXIS: 85 DEGREES
EKG VENTRICULAR RATE: 136 BPM
EKG VENTRICULAR RATE: 77 BPM
ERYTHROCYTE [DISTWIDTH] IN BLOOD BY AUTOMATED COUNT: 14.8 % (ref 11.5–14.5)
GLUCOSE BLD STRIP.AUTO-MCNC: 99 MG/DL (ref 65–117)
GLUCOSE SERPL-MCNC: 91 MG/DL (ref 65–100)
GRAM STN SPEC: ABNORMAL
HCT VFR BLD AUTO: 29.2 % (ref 36.6–50.3)
HGB BLD-MCNC: 9.4 G/DL (ref 12.1–17)
LACTATE SERPL-SCNC: 0.6 MMOL/L (ref 0.4–2)
MAGNESIUM SERPL-MCNC: 2 MG/DL (ref 1.6–2.4)
MCH RBC QN AUTO: 29.4 PG (ref 26–34)
MCHC RBC AUTO-ENTMCNC: 32.2 G/DL (ref 30–36.5)
MCV RBC AUTO: 91.3 FL (ref 80–99)
NRBC # BLD: 0 K/UL (ref 0–0.01)
NRBC BLD-RTO: 0 PER 100 WBC
PHOSPHATE SERPL-MCNC: 2.7 MG/DL (ref 2.6–4.7)
PLATELET # BLD AUTO: 176 K/UL (ref 150–400)
PMV BLD AUTO: 10.8 FL (ref 8.9–12.9)
POTASSIUM SERPL-SCNC: 3.6 MMOL/L (ref 3.5–5.1)
RBC # BLD AUTO: 3.2 M/UL (ref 4.1–5.7)
SERVICE CMNT-IMP: ABNORMAL
SERVICE CMNT-IMP: NORMAL
SODIUM SERPL-SCNC: 139 MMOL/L (ref 136–145)
WBC # BLD AUTO: 6.9 K/UL (ref 4.1–11.1)

## 2023-08-28 PROCEDURE — 6370000000 HC RX 637 (ALT 250 FOR IP): Performed by: NURSE PRACTITIONER

## 2023-08-28 PROCEDURE — 2060000000 HC ICU INTERMEDIATE R&B

## 2023-08-28 PROCEDURE — 6360000002 HC RX W HCPCS: Performed by: NURSE PRACTITIONER

## 2023-08-28 PROCEDURE — 99232 SBSQ HOSP IP/OBS MODERATE 35: CPT | Performed by: NURSE PRACTITIONER

## 2023-08-28 PROCEDURE — 83605 ASSAY OF LACTIC ACID: CPT

## 2023-08-28 PROCEDURE — 82962 GLUCOSE BLOOD TEST: CPT

## 2023-08-28 PROCEDURE — 93010 ELECTROCARDIOGRAM REPORT: CPT | Performed by: SPECIALIST

## 2023-08-28 PROCEDURE — 36415 COLL VENOUS BLD VENIPUNCTURE: CPT

## 2023-08-28 PROCEDURE — 84100 ASSAY OF PHOSPHORUS: CPT

## 2023-08-28 PROCEDURE — 85027 COMPLETE CBC AUTOMATED: CPT

## 2023-08-28 PROCEDURE — 6360000002 HC RX W HCPCS: Performed by: ANESTHESIOLOGY

## 2023-08-28 PROCEDURE — 80048 BASIC METABOLIC PNL TOTAL CA: CPT

## 2023-08-28 PROCEDURE — 83735 ASSAY OF MAGNESIUM: CPT

## 2023-08-28 PROCEDURE — 99222 1ST HOSP IP/OBS MODERATE 55: CPT | Performed by: INTERNAL MEDICINE

## 2023-08-28 PROCEDURE — 2580000003 HC RX 258: Performed by: NURSE PRACTITIONER

## 2023-08-28 RX ORDER — OXYCODONE HYDROCHLORIDE 5 MG/1
5 TABLET ORAL EVERY 4 HOURS PRN
Status: DISCONTINUED | OUTPATIENT
Start: 2023-08-28 | End: 2023-08-31 | Stop reason: HOSPADM

## 2023-08-28 RX ADMIN — SODIUM CHLORIDE, POTASSIUM CHLORIDE, SODIUM LACTATE AND CALCIUM CHLORIDE: 600; 310; 30; 20 INJECTION, SOLUTION INTRAVENOUS at 06:45

## 2023-08-28 RX ADMIN — VANCOMYCIN HYDROCHLORIDE 1500 MG: 1.5 INJECTION, POWDER, LYOPHILIZED, FOR SOLUTION INTRAVENOUS at 12:21

## 2023-08-28 RX ADMIN — PIPERACILLIN AND TAZOBACTAM 3375 MG: 3; .375 INJECTION, POWDER, LYOPHILIZED, FOR SOLUTION INTRAVENOUS at 06:44

## 2023-08-28 RX ADMIN — SODIUM CHLORIDE, PRESERVATIVE FREE 40 ML: 5 INJECTION INTRAVENOUS at 10:03

## 2023-08-28 RX ADMIN — BACLOFEN 10 MG: 10 TABLET ORAL at 21:39

## 2023-08-28 RX ADMIN — HYDROMORPHONE HYDROCHLORIDE 0.5 MG: 1 INJECTION, SOLUTION INTRAMUSCULAR; INTRAVENOUS; SUBCUTANEOUS at 05:40

## 2023-08-28 RX ADMIN — ENOXAPARIN SODIUM 90 MG: 100 INJECTION SUBCUTANEOUS at 08:15

## 2023-08-28 RX ADMIN — HYDROMORPHONE HYDROCHLORIDE 0.5 MG: 1 INJECTION, SOLUTION INTRAMUSCULAR; INTRAVENOUS; SUBCUTANEOUS at 14:29

## 2023-08-28 RX ADMIN — HYDROMORPHONE HYDROCHLORIDE 0.5 MG: 1 INJECTION, SOLUTION INTRAMUSCULAR; INTRAVENOUS; SUBCUTANEOUS at 10:02

## 2023-08-28 RX ADMIN — BACLOFEN 10 MG: 10 TABLET ORAL at 14:29

## 2023-08-28 RX ADMIN — OXYCODONE HYDROCHLORIDE 5 MG: 5 TABLET ORAL at 17:58

## 2023-08-28 RX ADMIN — HYDROMORPHONE HYDROCHLORIDE 0.5 MG: 1 INJECTION, SOLUTION INTRAMUSCULAR; INTRAVENOUS; SUBCUTANEOUS at 21:38

## 2023-08-28 RX ADMIN — PIPERACILLIN AND TAZOBACTAM 3375 MG: 3; .375 INJECTION, POWDER, LYOPHILIZED, FOR SOLUTION INTRAVENOUS at 16:20

## 2023-08-28 RX ADMIN — OXYCODONE HYDROCHLORIDE 5 MG: 5 TABLET ORAL at 12:25

## 2023-08-28 RX ADMIN — PIPERACILLIN AND TAZOBACTAM 3375 MG: 3; .375 INJECTION, POWDER, LYOPHILIZED, FOR SOLUTION INTRAVENOUS at 23:56

## 2023-08-28 RX ADMIN — HYDROMORPHONE HYDROCHLORIDE 0.5 MG: 1 INJECTION, SOLUTION INTRAMUSCULAR; INTRAVENOUS; SUBCUTANEOUS at 01:46

## 2023-08-28 RX ADMIN — BACLOFEN 10 MG: 10 TABLET ORAL at 08:19

## 2023-08-28 ASSESSMENT — PAIN DESCRIPTION - PAIN TYPE: TYPE: SURGICAL PAIN

## 2023-08-28 ASSESSMENT — PAIN SCALES - GENERAL
PAINLEVEL_OUTOF10: 7
PAINLEVEL_OUTOF10: 7
PAINLEVEL_OUTOF10: 0
PAINLEVEL_OUTOF10: 7
PAINLEVEL_OUTOF10: 6
PAINLEVEL_OUTOF10: 6

## 2023-08-28 ASSESSMENT — PAIN DESCRIPTION - ORIENTATION
ORIENTATION: MID;UPPER
ORIENTATION: RIGHT
ORIENTATION: MID;UPPER
ORIENTATION: RIGHT

## 2023-08-28 ASSESSMENT — PAIN DESCRIPTION - FREQUENCY: FREQUENCY: CONTINUOUS

## 2023-08-28 ASSESSMENT — PAIN DESCRIPTION - DESCRIPTORS
DESCRIPTORS: ACHING

## 2023-08-28 ASSESSMENT — PAIN DESCRIPTION - LOCATION
LOCATION: ABDOMEN;RIB CAGE
LOCATION: ABDOMEN
LOCATION: RIB CAGE
LOCATION: ABDOMEN
LOCATION: ABDOMEN

## 2023-08-28 ASSESSMENT — PAIN DESCRIPTION - ONSET: ONSET: ON-GOING

## 2023-08-28 ASSESSMENT — PAIN - FUNCTIONAL ASSESSMENT: PAIN_FUNCTIONAL_ASSESSMENT: ACTIVITIES ARE NOT PREVENTED

## 2023-08-29 ENCOUNTER — CARE COORDINATION (OUTPATIENT)
Dept: OTHER | Facility: CLINIC | Age: 63
End: 2023-08-29

## 2023-08-29 ENCOUNTER — APPOINTMENT (OUTPATIENT)
Facility: HOSPITAL | Age: 63
DRG: 871 | End: 2023-08-29
Attending: HOSPITALIST
Payer: COMMERCIAL

## 2023-08-29 LAB
ANION GAP SERPL CALC-SCNC: 6 MMOL/L (ref 5–15)
BUN SERPL-MCNC: 10 MG/DL (ref 6–20)
BUN/CREAT SERPL: 14 (ref 12–20)
CALCIUM SERPL-MCNC: 8.1 MG/DL (ref 8.5–10.1)
CHLORIDE SERPL-SCNC: 111 MMOL/L (ref 97–108)
CO2 SERPL-SCNC: 26 MMOL/L (ref 21–32)
CREAT SERPL-MCNC: 0.72 MG/DL (ref 0.7–1.3)
DATE LAST DOSE: ABNORMAL
DOSE AMOUNT: ABNORMAL UNITS
DOSE DATE/TIME: ABNORMAL
GLUCOSE SERPL-MCNC: 91 MG/DL (ref 65–100)
POTASSIUM SERPL-SCNC: 3.5 MMOL/L (ref 3.5–5.1)
SODIUM SERPL-SCNC: 143 MMOL/L (ref 136–145)
VANCOMYCIN TROUGH SERPL-MCNC: 13.7 UG/ML (ref 5–10)

## 2023-08-29 PROCEDURE — 6370000000 HC RX 637 (ALT 250 FOR IP): Performed by: NURSE PRACTITIONER

## 2023-08-29 PROCEDURE — 6360000002 HC RX W HCPCS: Performed by: ANESTHESIOLOGY

## 2023-08-29 PROCEDURE — 36415 COLL VENOUS BLD VENIPUNCTURE: CPT

## 2023-08-29 PROCEDURE — 99231 SBSQ HOSP IP/OBS SF/LOW 25: CPT | Performed by: NURSE PRACTITIONER

## 2023-08-29 PROCEDURE — 99232 SBSQ HOSP IP/OBS MODERATE 35: CPT | Performed by: INTERNAL MEDICINE

## 2023-08-29 PROCEDURE — 2060000000 HC ICU INTERMEDIATE R&B

## 2023-08-29 PROCEDURE — 80048 BASIC METABOLIC PNL TOTAL CA: CPT

## 2023-08-29 PROCEDURE — 2580000003 HC RX 258: Performed by: NURSE PRACTITIONER

## 2023-08-29 PROCEDURE — 93306 TTE W/DOPPLER COMPLETE: CPT

## 2023-08-29 PROCEDURE — 6360000002 HC RX W HCPCS: Performed by: NURSE PRACTITIONER

## 2023-08-29 PROCEDURE — 80202 ASSAY OF VANCOMYCIN: CPT

## 2023-08-29 RX ORDER — ENOXAPARIN SODIUM 100 MG/ML
100 INJECTION SUBCUTANEOUS DAILY
Status: DISCONTINUED | OUTPATIENT
Start: 2023-08-30 | End: 2023-08-31 | Stop reason: HOSPADM

## 2023-08-29 RX ADMIN — OXYCODONE HYDROCHLORIDE 5 MG: 5 TABLET ORAL at 04:56

## 2023-08-29 RX ADMIN — OXYCODONE HYDROCHLORIDE 5 MG: 5 TABLET ORAL at 08:43

## 2023-08-29 RX ADMIN — OXYCODONE HYDROCHLORIDE 5 MG: 5 TABLET ORAL at 13:57

## 2023-08-29 RX ADMIN — OXYCODONE HYDROCHLORIDE 5 MG: 5 TABLET ORAL at 20:00

## 2023-08-29 RX ADMIN — BACLOFEN 10 MG: 10 TABLET ORAL at 13:56

## 2023-08-29 RX ADMIN — BACLOFEN 10 MG: 10 TABLET ORAL at 04:56

## 2023-08-29 RX ADMIN — VANCOMYCIN HYDROCHLORIDE 1500 MG: 1.5 INJECTION, POWDER, LYOPHILIZED, FOR SOLUTION INTRAVENOUS at 05:11

## 2023-08-29 RX ADMIN — SODIUM CHLORIDE, POTASSIUM CHLORIDE, SODIUM LACTATE AND CALCIUM CHLORIDE: 600; 310; 30; 20 INJECTION, SOLUTION INTRAVENOUS at 01:10

## 2023-08-29 RX ADMIN — PIPERACILLIN AND TAZOBACTAM 3375 MG: 3; .375 INJECTION, POWDER, LYOPHILIZED, FOR SOLUTION INTRAVENOUS at 07:07

## 2023-08-29 RX ADMIN — BACLOFEN 10 MG: 10 TABLET ORAL at 21:01

## 2023-08-29 RX ADMIN — ENOXAPARIN SODIUM 90 MG: 100 INJECTION SUBCUTANEOUS at 08:31

## 2023-08-29 RX ADMIN — SODIUM CHLORIDE, PRESERVATIVE FREE 10 ML: 5 INJECTION INTRAVENOUS at 08:31

## 2023-08-29 RX ADMIN — PIPERACILLIN AND TAZOBACTAM 3375 MG: 3; .375 INJECTION, POWDER, LYOPHILIZED, FOR SOLUTION INTRAVENOUS at 14:49

## 2023-08-29 ASSESSMENT — PAIN DESCRIPTION - ORIENTATION: ORIENTATION: MID

## 2023-08-29 ASSESSMENT — PAIN SCALES - GENERAL
PAINLEVEL_OUTOF10: 7
PAINLEVEL_OUTOF10: 7
PAINLEVEL_OUTOF10: 1
PAINLEVEL_OUTOF10: 0

## 2023-08-29 ASSESSMENT — PAIN DESCRIPTION - LOCATION: LOCATION: ABDOMEN;RIB CAGE

## 2023-08-29 ASSESSMENT — PAIN DESCRIPTION - DESCRIPTORS: DESCRIPTORS: ACHING

## 2023-08-29 NOTE — CARE COORDINATION
Transition of Care Plan:    Home with spouse    Home health SN for IV abx vs [facility] - ID consulted    Transport: Family     RUR: 22%  Prior Level of Functioning: Independent  Disposition: Home health   Follow up appointments: PCP,   DME needed: None  Transportation at discharge: Spouse  Caregiver Contact: Primary mPOA/wife Jodie Johnson [phone]  Secondary mPOA/daughter Arvin West [phone]  Discharge Caregiver contacted prior to discharge? Care Conference needed? No  Barriers to discharge: Medical, ID consulted for home iv abx order, echo pending    Pt may need home health SN for IV abx, pending ID consult. Has used [facility] in the past. CM will follow. SHAE SuarezSPETRONA.

## 2023-08-30 ENCOUNTER — HOME HEALTH ADMISSION (OUTPATIENT)
Dept: HOME HEALTH SERVICES | Facility: HOME HEALTH | Age: 63
End: 2023-08-30
Payer: COMMERCIAL

## 2023-08-30 ENCOUNTER — APPOINTMENT (OUTPATIENT)
Facility: HOSPITAL | Age: 63
DRG: 871 | End: 2023-08-30
Payer: COMMERCIAL

## 2023-08-30 PROBLEM — K59.00 CONSTIPATION: Status: ACTIVE | Noted: 2023-08-30

## 2023-08-30 PROBLEM — R53.1 WEAKNESS GENERALIZED: Status: ACTIVE | Noted: 2023-08-30

## 2023-08-30 LAB
ANION GAP SERPL CALC-SCNC: 10 MMOL/L (ref 5–15)
BUN SERPL-MCNC: 8 MG/DL (ref 6–20)
BUN/CREAT SERPL: 11 (ref 12–20)
CALCIUM SERPL-MCNC: 8 MG/DL (ref 8.5–10.1)
CHLORIDE SERPL-SCNC: 112 MMOL/L (ref 97–108)
CO2 SERPL-SCNC: 22 MMOL/L (ref 21–32)
CREAT SERPL-MCNC: 0.72 MG/DL (ref 0.7–1.3)
ECHO AO ROOT DIAM: 3.6 CM
ECHO AO ROOT INDEX: 1.93 CM/M2
ECHO AV AREA PEAK VELOCITY: 4.1 CM2
ECHO AV AREA VTI: 4.4 CM2
ECHO AV AREA/BSA PEAK VELOCITY: 2.2 CM2/M2
ECHO AV AREA/BSA VTI: 2.4 CM2/M2
ECHO AV MEAN GRADIENT: 6 MMHG
ECHO AV MEAN VELOCITY: 1.1 M/S
ECHO AV PEAK GRADIENT: 11 MMHG
ECHO AV PEAK VELOCITY: 1.7 M/S
ECHO AV VELOCITY RATIO: 0.88
ECHO AV VTI: 30.1 CM
ECHO BSA: 1.86 M2
ECHO EST RA PRESSURE: 3 MMHG
ECHO LA DIAMETER INDEX: 2.78 CM/M2
ECHO LA DIAMETER: 5.2 CM
ECHO LA TO AORTIC ROOT RATIO: 1.44
ECHO LV FRACTIONAL SHORTENING: 13 % (ref 28–44)
ECHO LV INTERNAL DIMENSION DIASTOLE INDEX: 3.37 CM/M2
ECHO LV INTERNAL DIMENSION DIASTOLIC: 6.3 CM (ref 4.2–5.9)
ECHO LV INTERNAL DIMENSION SYSTOLIC INDEX: 2.94 CM/M2
ECHO LV INTERNAL DIMENSION SYSTOLIC: 5.5 CM
ECHO LV IVSD: 0.8 CM (ref 0.6–1)
ECHO LV MASS 2D: 202.8 G (ref 88–224)
ECHO LV MASS INDEX 2D: 108.4 G/M2 (ref 49–115)
ECHO LV POSTERIOR WALL DIASTOLIC: 0.8 CM (ref 0.6–1)
ECHO LV RELATIVE WALL THICKNESS RATIO: 0.25
ECHO LVOT AREA: 4.5 CM2
ECHO LVOT AV VTI INDEX: 0.98
ECHO LVOT DIAM: 2.4 CM
ECHO LVOT MEAN GRADIENT: 5 MMHG
ECHO LVOT PEAK GRADIENT: 9 MMHG
ECHO LVOT PEAK VELOCITY: 1.5 M/S
ECHO LVOT STROKE VOLUME INDEX: 71.6 ML/M2
ECHO LVOT SV: 133.8 ML
ECHO LVOT VTI: 29.6 CM
ECHO MV A VELOCITY: 0.57 M/S
ECHO MV AREA PHT: 2.9 CM2
ECHO MV E DECELERATION TIME (DT): 259.9 MS
ECHO MV E VELOCITY: 0.39 M/S
ECHO MV E/A RATIO: 0.68
ECHO MV PRESSURE HALF TIME (PHT): 75.4 MS
ECHO PV MAX VELOCITY: 1.1 M/S
ECHO PV PEAK GRADIENT: 5 MMHG
ECHO RIGHT VENTRICULAR SYSTOLIC PRESSURE (RVSP): 23 MMHG
ECHO RV INTERNAL DIMENSION: 3.3 CM
ECHO TV REGURGITANT MAX VELOCITY: 2.24 M/S
ECHO TV REGURGITANT PEAK GRADIENT: 20 MMHG
GLUCOSE SERPL-MCNC: 107 MG/DL (ref 65–100)
POTASSIUM SERPL-SCNC: 4.1 MMOL/L (ref 3.5–5.1)
SODIUM SERPL-SCNC: 144 MMOL/L (ref 136–145)

## 2023-08-30 PROCEDURE — 2580000003 HC RX 258: Performed by: NURSE PRACTITIONER

## 2023-08-30 PROCEDURE — 6360000002 HC RX W HCPCS: Performed by: NURSE PRACTITIONER

## 2023-08-30 PROCEDURE — 74300 X-RAY BILE DUCTS/PANCREAS: CPT

## 2023-08-30 PROCEDURE — 36415 COLL VENOUS BLD VENIPUNCTURE: CPT

## 2023-08-30 PROCEDURE — 80048 BASIC METABOLIC PNL TOTAL CA: CPT

## 2023-08-30 PROCEDURE — 6370000000 HC RX 637 (ALT 250 FOR IP): Performed by: HOSPITALIST

## 2023-08-30 PROCEDURE — 6360000004 HC RX CONTRAST MEDICATION: Performed by: RADIOLOGY

## 2023-08-30 PROCEDURE — 6370000000 HC RX 637 (ALT 250 FOR IP): Performed by: NURSE PRACTITIONER

## 2023-08-30 PROCEDURE — 6370000000 HC RX 637 (ALT 250 FOR IP): Performed by: INTERNAL MEDICINE

## 2023-08-30 PROCEDURE — 6360000002 HC RX W HCPCS: Performed by: HOSPITALIST

## 2023-08-30 PROCEDURE — 2060000000 HC ICU INTERMEDIATE R&B

## 2023-08-30 PROCEDURE — 99232 SBSQ HOSP IP/OBS MODERATE 35: CPT | Performed by: NURSE PRACTITIONER

## 2023-08-30 RX ORDER — BISACODYL 10 MG
10 SUPPOSITORY, RECTAL RECTAL DAILY PRN
Status: DISCONTINUED | OUTPATIENT
Start: 2023-08-30 | End: 2023-08-31 | Stop reason: HOSPADM

## 2023-08-30 RX ORDER — METRONIDAZOLE 250 MG/1
500 TABLET ORAL EVERY 6 HOURS SCHEDULED
Status: DISCONTINUED | OUTPATIENT
Start: 2023-08-30 | End: 2023-08-31 | Stop reason: HOSPADM

## 2023-08-30 RX ORDER — DOCUSATE SODIUM 100 MG/1
100 CAPSULE, LIQUID FILLED ORAL DAILY
Status: DISCONTINUED | OUTPATIENT
Start: 2023-08-30 | End: 2023-08-31 | Stop reason: HOSPADM

## 2023-08-30 RX ADMIN — METRONIDAZOLE 500 MG: 250 TABLET ORAL at 17:59

## 2023-08-30 RX ADMIN — DOCUSATE SODIUM 100 MG: 100 CAPSULE, LIQUID FILLED ORAL at 17:58

## 2023-08-30 RX ADMIN — OXYCODONE HYDROCHLORIDE 5 MG: 5 TABLET ORAL at 17:58

## 2023-08-30 RX ADMIN — PIPERACILLIN AND TAZOBACTAM 3375 MG: 3; .375 INJECTION, POWDER, LYOPHILIZED, FOR SOLUTION INTRAVENOUS at 00:19

## 2023-08-30 RX ADMIN — BACLOFEN 10 MG: 10 TABLET ORAL at 23:55

## 2023-08-30 RX ADMIN — SODIUM CHLORIDE, PRESERVATIVE FREE 10 ML: 5 INJECTION INTRAVENOUS at 10:48

## 2023-08-30 RX ADMIN — SODIUM CHLORIDE, PRESERVATIVE FREE 10 ML: 5 INJECTION INTRAVENOUS at 21:29

## 2023-08-30 RX ADMIN — PIPERACILLIN AND TAZOBACTAM 3375 MG: 3; .375 INJECTION, POWDER, LYOPHILIZED, FOR SOLUTION INTRAVENOUS at 16:02

## 2023-08-30 RX ADMIN — IOHEXOL 20 ML: 180 INJECTION INTRAVENOUS at 11:18

## 2023-08-30 RX ADMIN — PIPERACILLIN AND TAZOBACTAM 3375 MG: 3; .375 INJECTION, POWDER, LYOPHILIZED, FOR SOLUTION INTRAVENOUS at 07:12

## 2023-08-30 RX ADMIN — METRONIDAZOLE 500 MG: 250 TABLET ORAL at 23:56

## 2023-08-30 RX ADMIN — OXYCODONE HYDROCHLORIDE 5 MG: 5 TABLET ORAL at 10:47

## 2023-08-30 RX ADMIN — METRONIDAZOLE 500 MG: 250 TABLET ORAL at 10:47

## 2023-08-30 RX ADMIN — PIPERACILLIN AND TAZOBACTAM 3375 MG: 3; .375 INJECTION, POWDER, LYOPHILIZED, FOR SOLUTION INTRAVENOUS at 23:56

## 2023-08-30 RX ADMIN — BACLOFEN 10 MG: 10 TABLET ORAL at 17:58

## 2023-08-30 RX ADMIN — ENOXAPARIN SODIUM 100 MG: 100 INJECTION SUBCUTANEOUS at 11:38

## 2023-08-30 RX ADMIN — VANCOMYCIN HYDROCHLORIDE 1500 MG: 1.5 INJECTION, POWDER, LYOPHILIZED, FOR SOLUTION INTRAVENOUS at 00:19

## 2023-08-30 ASSESSMENT — PAIN DESCRIPTION - LOCATION: LOCATION: ABDOMEN

## 2023-08-30 ASSESSMENT — PAIN SCALES - GENERAL: PAINLEVEL_OUTOF10: 0

## 2023-08-30 NOTE — CARE COORDINATION
Transition of Care Plan:    Home with spouse    Home health SN for IV abx - Referral sent to [employer]    IV Infusions: [facility] has accepted; Pt covered 100%    Transport: Family     RUR: 22%  Prior Level of Functioning: Independent  Disposition: Home health   Follow up appointments: PCP,   DME needed: None  Transportation at discharge: Spouse  Caregiver Contact: Primary mPOA/wife Hieu Ortiz [phone]  Secondary mPOA/daughter Meghan Adkins [phone]  Discharge Caregiver contacted prior to discharge? Care Conference needed? No  Barriers to discharge: Medical, picc needed for dc    ID orders for DC noted. CM met with Pt at bedside to discuss discharge plan and recommendations. Pt in agreement for home health and iv abx. Preference for [employer] for [facility] SN. No preference for infusion providerr. Referral sent to [employer] and [facility]. Pt needs PICC for discharge, port cannot be used per Dr. Paloma Jaimes. 1410: [facility] has accepted Pt for infusions; Pt covered at 100%. OSCAR Lauren.

## 2023-08-30 NOTE — PLAN OF CARE
INFECTIOUS DISEASE discharge plan:        PICC line insertion //or other access for outpatient antibiotic        PICC line removal after last dose 09/09/23 ( if PICC used )        If oncology agrees  may deliver antibiotic via port . 2. PICC care per infusion protocol if used    3. Zosyn 3.375 Gm Q 8 H/ 18 Gm     4.    Weekly labs CBC w/diff, Chem 7 with results sent to PMD            5.   Follow up with PMD/ oncology as scheduled      For ID Questions Only    Arabella   Call  [phone]

## 2023-08-31 VITALS
SYSTOLIC BLOOD PRESSURE: 131 MMHG | TEMPERATURE: 97.8 F | OXYGEN SATURATION: 96 % | WEIGHT: 155 LBS | HEART RATE: 74 BPM | HEIGHT: 70 IN | DIASTOLIC BLOOD PRESSURE: 63 MMHG | RESPIRATION RATE: 14 BRPM | BODY MASS INDEX: 22.19 KG/M2

## 2023-08-31 LAB
ANION GAP SERPL CALC-SCNC: 4 MMOL/L (ref 5–15)
BACTERIA SPEC CULT: ABNORMAL
BACTERIA SPEC CULT: ABNORMAL
BUN SERPL-MCNC: 6 MG/DL (ref 6–20)
BUN/CREAT SERPL: 9 (ref 12–20)
CALCIUM SERPL-MCNC: 8.4 MG/DL (ref 8.5–10.1)
CHLORIDE SERPL-SCNC: 111 MMOL/L (ref 97–108)
CO2 SERPL-SCNC: 27 MMOL/L (ref 21–32)
COMMENT:: NORMAL
CREAT SERPL-MCNC: 0.68 MG/DL (ref 0.7–1.3)
GLUCOSE SERPL-MCNC: 114 MG/DL (ref 65–100)
POTASSIUM SERPL-SCNC: 3.4 MMOL/L (ref 3.5–5.1)
SERVICE CMNT-IMP: ABNORMAL
SODIUM SERPL-SCNC: 142 MMOL/L (ref 136–145)
SPECIMEN HOLD: NORMAL

## 2023-08-31 PROCEDURE — 80048 BASIC METABOLIC PNL TOTAL CA: CPT

## 2023-08-31 PROCEDURE — 6370000000 HC RX 637 (ALT 250 FOR IP): Performed by: NURSE PRACTITIONER

## 2023-08-31 PROCEDURE — 6370000000 HC RX 637 (ALT 250 FOR IP): Performed by: HOSPITALIST

## 2023-08-31 PROCEDURE — 2709999900 HC NON-CHARGEABLE SUPPLY

## 2023-08-31 PROCEDURE — 76937 US GUIDE VASCULAR ACCESS: CPT

## 2023-08-31 PROCEDURE — 36415 COLL VENOUS BLD VENIPUNCTURE: CPT

## 2023-08-31 PROCEDURE — 02HV33Z INSERTION OF INFUSION DEVICE INTO SUPERIOR VENA CAVA, PERCUTANEOUS APPROACH: ICD-10-PCS | Performed by: INTERNAL MEDICINE

## 2023-08-31 PROCEDURE — 2580000003 HC RX 258: Performed by: NURSE PRACTITIONER

## 2023-08-31 PROCEDURE — C1751 CATH, INF, PER/CENT/MIDLINE: HCPCS

## 2023-08-31 PROCEDURE — 6360000002 HC RX W HCPCS: Performed by: NURSE PRACTITIONER

## 2023-08-31 PROCEDURE — 6360000002 HC RX W HCPCS: Performed by: HOSPITALIST

## 2023-08-31 PROCEDURE — 6370000000 HC RX 637 (ALT 250 FOR IP): Performed by: INTERNAL MEDICINE

## 2023-08-31 RX ORDER — OXYCODONE HYDROCHLORIDE 5 MG/1
5 TABLET ORAL EVERY 6 HOURS PRN
Qty: 28 TABLET | Refills: 0 | Status: SHIPPED | OUTPATIENT
Start: 2023-08-31 | End: 2023-09-07

## 2023-08-31 RX ADMIN — OXYCODONE HYDROCHLORIDE 5 MG: 5 TABLET ORAL at 08:29

## 2023-08-31 RX ADMIN — METRONIDAZOLE 500 MG: 250 TABLET ORAL at 12:42

## 2023-08-31 RX ADMIN — BACLOFEN 10 MG: 10 TABLET ORAL at 14:31

## 2023-08-31 RX ADMIN — OXYCODONE HYDROCHLORIDE 5 MG: 5 TABLET ORAL at 14:31

## 2023-08-31 RX ADMIN — SODIUM CHLORIDE, PRESERVATIVE FREE 10 ML: 5 INJECTION INTRAVENOUS at 12:43

## 2023-08-31 RX ADMIN — DOCUSATE SODIUM 100 MG: 100 CAPSULE, LIQUID FILLED ORAL at 08:13

## 2023-08-31 RX ADMIN — PIPERACILLIN AND TAZOBACTAM 3375 MG: 3; .375 INJECTION, POWDER, LYOPHILIZED, FOR SOLUTION INTRAVENOUS at 07:30

## 2023-08-31 RX ADMIN — METRONIDAZOLE 500 MG: 250 TABLET ORAL at 07:30

## 2023-08-31 RX ADMIN — ENOXAPARIN SODIUM 100 MG: 100 INJECTION SUBCUTANEOUS at 08:13

## 2023-08-31 RX ADMIN — BACLOFEN 10 MG: 10 TABLET ORAL at 08:29

## 2023-08-31 ASSESSMENT — PAIN SCALES - GENERAL
PAINLEVEL_OUTOF10: 6
PAINLEVEL_OUTOF10: 6

## 2023-08-31 ASSESSMENT — PAIN DESCRIPTION - DESCRIPTORS
DESCRIPTORS: ACHING
DESCRIPTORS: ACHING

## 2023-08-31 ASSESSMENT — PAIN DESCRIPTION - ORIENTATION
ORIENTATION: ANTERIOR
ORIENTATION: MID

## 2023-08-31 ASSESSMENT — PAIN DESCRIPTION - LOCATION
LOCATION: ABDOMEN
LOCATION: ABDOMEN

## 2023-08-31 NOTE — DISCHARGE INSTRUCTIONS
falling, weakness, bleeding. Severe pain or pain not relieved by medications. Or, any other signs or symptoms that you may have questions about. DISPOSITION:  X  Home With:   OT  PT  HH  RN       SNF/Inpatient Rehab/LTAC    Independent/assisted living    Hospice    Other:     CDMP Checked: Yes X     PROBLEM LIST Updated:   Yes X       Signed:   Yung Montero MD  8/31/2023  8:38 AM

## 2023-08-31 NOTE — DISCHARGE SUMMARY
tablet  Commonly known as: MEDROL     warfarin 5 MG tablet  Commonly known as: COUMADIN                NOTIFY YOUR PHYSICIAN FOR ANY OF THE FOLLOWING:   Fever over 101 degrees for 24 hours. Chest pain, shortness of breath, fever, chills, nausea, vomiting, diarrhea, change in mentation, falling, weakness, bleeding. Severe pain or pain not relieved by medications. Or, any other signs or symptoms that you may have questions about.     DISPOSITION:   x Home With:   OT  PT  HH  RN       Long term SNF/Inpatient Rehab    Independent/assisted living    Hospice    Other:       PATIENT CONDITION AT DISCHARGE:     Functional status    Poor     Deconditioned    x Independent      Cognition    x Lucid     Forgetful     Dementia      Catheters/lines (plus indication)    Phelan     PICC     Cholecystostomy tube   x      Code status    x Full code     DNR      PHYSICAL EXAMINATION AT DISCHARGE:    General : alert x 3, awake, no acute distress,   HEENT: PEERL, EOMI, moist mucus membrane, TM clear  Neck: supple, no JVD, no meningeal signs  Chest: Clear to auscultation bilaterally   CVS: S1 S2 heard, Capillary refill less than 2 seconds  Abd: soft/ Non tender, non distended, BS physiological,   Ext: no clubbing, no cyanosis, no edema, brisk 2+ DP pulses  Neuro/Psych: pleasant mood and affect, CN 2-12 grossly intact  Skin: warm     CHRONIC MEDICAL DIAGNOSES:      Greater than 31 minutes were spent with the patient on counseling and coordination of care    Signed:   Clarita Brown MD  8/31/2023  9:02 AM

## 2023-08-31 NOTE — CARE COORDINATION
Transition of Care Plan:    Home with spouse    Home health SN for IV abx - [facility]    IV Infusions: [facility] has accepted; Pt covered 100%; Teaching completed with Pt and spouse    Transport: Family     RUR: 19%  Prior Level of Functioning: Independent  Disposition: Home health   Follow up appointments: PCP,   DME needed: None  Transportation at discharge: Spouse  Caregiver Contact: Primary mPOA/wife Jhonathan Becerra [phone]  Secondary mPOA/daughter Yasmin Troncoso [phone]  Discharge Caregiver contacted prior to discharge? Care Conference needed? No  Barriers to discharge: Medical, picc needed for dc    Pt has order for PICC and likely discharge today. CM notified [facility] liaison, Kimberly Christine, who plans to schedule teaching with Pt for infusions today. [facility] home health SN referral showing as authorized. 1510: Teaching completed at bedside. Pt is OK for DC from CM standpoint. OSCAR Pantoja.

## 2023-09-01 ENCOUNTER — HOME CARE VISIT (OUTPATIENT)
Facility: HOME HEALTH | Age: 63
End: 2023-09-01
Payer: COMMERCIAL

## 2023-09-01 ENCOUNTER — CARE COORDINATION (OUTPATIENT)
Dept: OTHER | Facility: CLINIC | Age: 63
End: 2023-09-01

## 2023-09-01 VITALS
BODY MASS INDEX: 22.24 KG/M2 | WEIGHT: 155 LBS | OXYGEN SATURATION: 97 % | SYSTOLIC BLOOD PRESSURE: 96 MMHG | RESPIRATION RATE: 18 BRPM | HEART RATE: 61 BPM | DIASTOLIC BLOOD PRESSURE: 60 MMHG | TEMPERATURE: 97.4 F

## 2023-09-01 LAB
BACTERIA SPEC CULT: NORMAL
BACTERIA SPEC CULT: NORMAL
SERVICE CMNT-IMP: NORMAL
SERVICE CMNT-IMP: NORMAL

## 2023-09-01 PROCEDURE — G0299 HHS/HOSPICE OF RN EA 15 MIN: HCPCS

## 2023-09-01 ASSESSMENT — ENCOUNTER SYMPTOMS
DYSPNEA ACTIVITY LEVEL: AFTER AMBULATING MORE THAN 20 FT
CONSTIPATION: 1
STOOL DESCRIPTION: FORMED
PAIN LOCATION - PAIN QUALITY: ACHE

## 2023-09-01 NOTE — CARE COORDINATION
Current episode closed due to readmission 8/25/23 to 8/31/23 due to Sepsis, Bacteremia, Elevated liver enzymes and Cholecystitis. New Program opened for recent readmission.

## 2023-09-01 NOTE — CARE COORDINATION
[facility] [facility] Care Transitions Initial Follow Up Call    Call within 2 business days of discharge: Yes    LPN Care Coordinator contacted the patient and spouse  by telephone to perform post hospital discharge assessment. No answer, left HIPPA compliant VM requesting a return call. Patient: Camilla Avendaño Patient : 1960   MRN: [id number]  Reason for Admission: Sepsis,Bacteremia, Cholecystitis  Discharge Date: 23 RARS: Readmission Risk Score: 19.1      Last Discharge [address] [facility] [facility]       Date Complaint Diagnosis Description Type Department Provider    23 Fever Severe sepsis ([address] ) . .. ED to Hosp-Admission (Discharged) (ADMITTED) Joseph Romano MD; Jm Arellano. Was this an external facility discharge? No Discharge Facility: [facility]    Follow Up  Future Appointments   Date Time Provider [address]   2023  3:00 PM Valorie Ribeiro RN [address] [address]   2023  8:00 AM F1 [place] [facility] PSYCHIATRIC [place]   2023  4:00 PM H1 [facility]T [place] [facility]   2023 11:00 AM Tashia Starr MD [address] [facility] BS AMB   10/3/2023  8:30 AM E1 [place] [facility]   10/5/2023  3:30 PM H2 [facility] [place] [facility]   10/17/2023  8:30 AM E1 [place] [facility]   10/18/2023  3:40 PM MD ABBIE Hunter BS AMB   10/19/2023  3:30 PM H1 [facility] [place] [facility]   2024  3:00 PM BSC SEPULVEDA ECHO 1 ABBIE BS AMB   2024  3:40 PM MD ABBIE Hunter BS AMB       LPN Care Coordinator provided contact information. Plan for follow-up call in 1-2 days based on severity of symptoms and risk factors.   Plan for next call:  Second Enrollment Outreach Attempt  Radha Bermudez LPN

## 2023-09-01 NOTE — HOME HEALTH
Skilled reason for admission/summary of clinical condition: sepsis rt cholecystitis admitted to home care for [facility]. Nursing needed for med management, iv management, drain management. Diagnosis: sepsis  Subjective: patient reports right hand pain  Caregiver: spouse. Caregiver assists with: Medications, Bathing, Transportation and Housekeeping Caregiver unable to assist with: Meals, ADL, IADL and Wound care. Caregiver is available 24 hours/day Caregiver is  present at this visit and did participate with clinician. Medications reconciled and all medications are available in the home this visit. The following education was provided regarding medications: medication interactions and look alike medications: High alert medication teaching on piperacillin tazobactam, Antibiotic therapy education Purpose, dose, and frequency and Side effects such as nausea, vomiting, diarrhea, fungal (yeast) vaginal infections or oral thrush. High alert medication teaching on oxycodone, Opioid medication education Purpose, dose, and frequency, Proper storage Store in the original packaging, Proper disposal Return unused medication to the pharmacy, if possible. and Side effects such as dizziness, sleepiness, constipation, nausea, vomiting, itchiness, and dry mouth High alert medication teaching on lovenox, anticoagulant therapy education; purpose, dose, and frequency and signs and symptoms of abnormal bleeding such as unexplained bruising, dizziness/lightheadedness, red or tarry looking stool, blood in urine, blood in vomit   Patient/CG able to demonstrate knowledge through teach back with 80 percent accuracy. Medications are effective at this time. MD notified of any discrepancies/medication interactions na. A list of reconciled medications has been given to the patient/caregiver  and uploaded to media.     Home health supplies by type and quantity ordered/delivered this visit include: wound supplies  Patient/caregiver instructed on

## 2023-09-06 ENCOUNTER — CARE COORDINATION (OUTPATIENT)
Dept: OTHER | Facility: CLINIC | Age: 63
End: 2023-09-06

## 2023-09-06 ENCOUNTER — HOME CARE VISIT (OUTPATIENT)
Facility: HOME HEALTH | Age: 63
End: 2023-09-06
Payer: COMMERCIAL

## 2023-09-06 ENCOUNTER — TELEPHONE (OUTPATIENT)
Facility: HOSPITAL | Age: 63
End: 2023-09-06

## 2023-09-06 ENCOUNTER — TELEPHONE (OUTPATIENT)
Age: 63
End: 2023-09-06

## 2023-09-06 VITALS
RESPIRATION RATE: 16 BRPM | DIASTOLIC BLOOD PRESSURE: 62 MMHG | OXYGEN SATURATION: 98 % | SYSTOLIC BLOOD PRESSURE: 110 MMHG | HEART RATE: 100 BPM | TEMPERATURE: 98 F

## 2023-09-06 PROCEDURE — G0300 HHS/HOSPICE OF LPN EA 15 MIN: HCPCS

## 2023-09-06 NOTE — TELEPHONE ENCOUNTER
1400:    Called patient's spouse Nica Recinos)  PHI confirmed     Spouse wished to make sure office was aware of IV antibiotics and recent discharge from the hospital and if MD Daryn Aguirre had everything she needed for upcoming appointments     Informed [id number] [facility] office has everything on file and thanked for double-checking     No new questions or concerns at this time

## 2023-09-06 NOTE — CARE COORDINATION
Ambulatory Care Coordination Note    LPN CC attempted 2nd outreach to patient and spouse for introduction to Associate Care Management related to recent admission. HIPAA compliant message left requesting a return phone call at patient and spouse convenience. Unable to Reach Letter sent to patient via chart. Will continue to outreach.       Future Appointments   Date Time Provider Department Center   9/8/2023 To Be Determined Coy العلي, PARAGN [address] [address]   9/12/2023  7:30 AM F3 [place] [facility]YCHIATRIC [place]   9/12/2023  8:15 AM Samira Guan MD [address] BS AMB   9/13/2023 To Be Determined Praneeth JUDITH Sanchez [address] [address]   9/14/2023 11:00 AM KARON Cooper -  [address] BS AMB   9/14/2023  3:00 PM G1 [facility] [place]   9/25/2023 11:00 AM Margareth Franks MD [address] BS AMB   9/26/2023  7:30 AM F3 [place] [facility]   9/28/2023  3:00 PM H1 [facility] [place]   10/10/2023  7:30 AM F3 [place] [facility] [place]   10/12/2023  3:00 PM G2 [facility] [place]   10/18/2023  3:40 PM MD ABBIE Hamlin BS AMB   10/24/2023  7:30 AM F3 [place] [facility] [place]   10/26/2023  3:00 PM H2 [facility] [place]   11/7/2023  7:30 AM F3 [place] [facility] [place]   11/9/2023  3:00 PM H2 [facility] [facility] [place]   1/31/2024  3:00 PM BSC SEPULVEDA ECHO 1 ABBIE BS AMB   1/31/2024  3:40 PM MD ABBIE Hamlin BS AMB

## 2023-09-06 NOTE — HOME HEALTH
Subjective: \"The wound care supplies aren't here yet. \"  Falls since last visit No(if yes complete the Fall Tracking Form and include bsrifallreport):   Caregiver involvement changes: No  Home health supplies by type and quantity ordered/delivered this visit include: n/a    Clinician asked if patient has had any physician contact since last home care visit and patient states: NO  Clinician asked if patient has any new or changed medications and patient states:  NO   If Yes, were medications reconciled? N/A   Was the certifying physician notified of changes in medications? N/A     Clinical assessment (what this visit means for the patient overall and need for ongoing skilled care) and progress or lack of progress towards SPECIFIC goals: Pt at risk for rehospitalization r/t fall risk, infection, wound exacerbation. Wound healing goals not met. Written Teaching Material Utilized: N/A    Interdisciplinary communication with: Navjot Paul RN Supervisor for the purpose of notification of need for PRN visit for wound care after supplies arrive    Discharge planning as follows:  When goals are met    Specific plan for next visit: Assessment, wound care, PICC assessment, education as needed

## 2023-09-07 ENCOUNTER — APPOINTMENT (OUTPATIENT)
Facility: HOSPITAL | Age: 63
End: 2023-09-07
Payer: COMMERCIAL

## 2023-09-07 LAB
BASOPHILS # BLD AUTO: 0.1 X10E3/UL (ref 0–0.2)
BASOPHILS NFR BLD AUTO: 2 %
BUN SERPL-MCNC: 10 MG/DL (ref 8–27)
BUN/CREAT SERPL: 12 (ref 10–24)
CALCIUM SERPL-MCNC: 8.6 MG/DL (ref 8.6–10.2)
CHLORIDE SERPL-SCNC: 105 MMOL/L (ref 96–106)
CO2 SERPL-SCNC: 20 MMOL/L (ref 20–29)
CREAT SERPL-MCNC: 0.86 MG/DL (ref 0.76–1.27)
EGFRCR SERPLBLD CKD-EPI 2021: 97 ML/MIN/1.73
EOSINOPHIL # BLD AUTO: 0.1 X10E3/UL (ref 0–0.4)
EOSINOPHIL NFR BLD AUTO: 2 %
ERYTHROCYTE [DISTWIDTH] IN BLOOD BY AUTOMATED COUNT: 14.9 % (ref 11.6–15.4)
GLUCOSE SERPL-MCNC: 133 MG/DL (ref 70–99)
HCT VFR BLD AUTO: 33.2 % (ref 37.5–51)
HGB BLD-MCNC: 10.8 G/DL (ref 13–17.7)
IMM GRANULOCYTES # BLD AUTO: 0 X10E3/UL (ref 0–0.1)
IMM GRANULOCYTES NFR BLD AUTO: 0 %
LYMPHOCYTES # BLD AUTO: 1.5 X10E3/UL (ref 0.7–3.1)
LYMPHOCYTES NFR BLD AUTO: 20 %
MCH RBC QN AUTO: 30 PG (ref 26.6–33)
MCHC RBC AUTO-ENTMCNC: 32.5 G/DL (ref 31.5–35.7)
MCV RBC AUTO: 92 FL (ref 79–97)
MONOCYTES # BLD AUTO: 0.8 X10E3/UL (ref 0.1–0.9)
MONOCYTES NFR BLD AUTO: 11 %
NEUTROPHILS # BLD AUTO: 5 X10E3/UL (ref 1.4–7)
NEUTROPHILS NFR BLD AUTO: 65 %
PLATELET # BLD AUTO: 329 X10E3/UL (ref 150–450)
POTASSIUM SERPL-SCNC: 4.6 MMOL/L (ref 3.5–5.2)
RBC # BLD AUTO: 3.6 X10E6/UL (ref 4.14–5.8)
SODIUM SERPL-SCNC: 142 MMOL/L (ref 134–144)
WBC # BLD AUTO: 7.5 X10E3/UL (ref 3.4–10.8)

## 2023-09-07 NOTE — TELEPHONE ENCOUNTER
Spoke with patient and his wife. 2 patient identifiers used. Let them know that Dr. Bucky Lombard would like to prescribe a medication called Imdur 30mg daily to take on the days he has chemo. Per wife patient had chest pain his 1st round but his second round he was fine and he has not had any other random episodes of chest pain. They would like to know how to proceed. Let them know I would send message to Dr. Bucky Lombard and give them a call back.

## 2023-09-07 NOTE — TELEPHONE ENCOUNTER
Merced Higgins, APRN - CNP  Sania Le, RN  Caller: Unspecified (3 weeks ago)  Let's hold off then, thx   [employer] with patient. 2 patient identifiers used. Updated patient on NP response. Patient verbalized understanding and was appreciative of call. Let patient know if he has any questions, concerns, or issues to please give our office a call.

## 2023-09-08 ENCOUNTER — HOME CARE VISIT (OUTPATIENT)
Facility: HOME HEALTH | Age: 63
End: 2023-09-08
Payer: COMMERCIAL

## 2023-09-08 VITALS
OXYGEN SATURATION: 98 % | SYSTOLIC BLOOD PRESSURE: 134 MMHG | TEMPERATURE: 98 F | RESPIRATION RATE: 16 BRPM | DIASTOLIC BLOOD PRESSURE: 66 MMHG | HEART RATE: 94 BPM

## 2023-09-08 PROBLEM — E86.0 DEHYDRATION: Status: RESOLVED | Noted: 2023-08-09 | Resolved: 2023-09-08

## 2023-09-08 PROCEDURE — G0300 HHS/HOSPICE OF LPN EA 15 MIN: HCPCS

## 2023-09-08 NOTE — HOME HEALTH
Subjective: \"I'm doing alright. \"  Falls since last visit No(if yes complete the Fall Tracking Form and include bsrifallreport):   Caregiver involvement changes: No  Home health supplies by type and quantity ordered/delivered this visit include: n/a    Clinician asked if patient has had any physician contact since last home care visit and patient states: NO  Clinician asked if patient has any new or changed medications and patient states:  NO   If Yes, were medications reconciled? N/A   Was the certifying physician notified of changes in medications? N/A     Clinical assessment (what this visit means for the patient overall and need for ongoing skilled care) and progress or lack of progress towards SPECIFIC goals: Pt at risk for rehospitalization r/t fall risk, infection, wound exacerbation. Written Teaching Material Utilized: N/A    Interdisciplinary communication with: N/A for the purpose of n/a    Discharge planning as follows:  When goals are met    Specific plan for next visit: Assessment, PICC assessment/care, wound care, blood draw, education as needed

## 2023-09-11 NOTE — H&P
CRITICAL CARE NOTE      Name: Malinda Gonsales   : 1960   MRN: [id number]   Date: 2023      REASON FOR ICU ADMISSION:  Sepsis     PRINCIPAL ICU DIAGNOSIS   Sepsis  Asymptomatic Cholelithiasis  Lactic Acidosis  Mild hyponatremia  Elevated LFTS, Alk Phos    BRIEF PATIENT SUMMARY   57 y/o male PMHx of Afib, CAD s/p Stent,  AV replacement, thoracic aneurysm s/p repair HTN, recent dx of metastatic pancreatic adenocarcinoma admitted  for sepsis of unclear etiology in a immunocompromised host.  Hx per chart review, presented to ED via EMS c/o fever, rigors, confusion. ED work- up Tachycardia, Hypotension, febrile and lactic acidosis. CXR with no evidence of pneumonia, CT A/P demonstrated cholelithiasis, no pericholecystic fluid and/or gallbladder thickening, evaluated by surgery-> asymptomatic cholelithiasis. Received greater then 30 ml/kg sepsis fluid resuscitation, levophed ordered d/t ongoing hypotension.  ICU consulted for admission    COMPREHENSIVE ASSESSMENT & PLAN:SYSTEM BASED     24 HOUR EVENTS:   Seema tube placed  Pt had some pain, says feeling better    NEUROLOGICAL:     Routine neurological monitoring, delirium precautions    PULMONOLOGY:   CXR: No evidence of pneumonia  Supplemental oxygen for goal saturation > 94 %  HOB Elevated, Aspiration precautions    CARDIOVASCULAR:  Hx of Afib s/p DCCV (2017) mechanical AV replacement, HTN, CAD s/p stent to LAD, ascending aortic dissection s/p repair ()   ST on telemetry, no acute ST changes  Sepsis IVF fluid resuscitated (greater than 30 mL/kg)      GASTROINTESTINAL   Asymptomatic cholelithiasis  Surgery consulted-no indication for surgery at this time  Tube placement done    Hx of obstructive jaundice  Secondary to pancreatic head mass and malignant stricture s/p ERCP and metallic stent placed     RENAL/ELECTROLYTE/FLUIDS:   Goal urine output > 0.5 cc/kg/hr, Strict I/Os, avoid nephrotoxins  Replenish Electrolytes as indicated  IVF Telephone call placed to follow-up on effectiveness of antiemetic regimen, which was adjusted on Friday.  No answer - - unidentified voicemail.  Will attempt again this afternoon.
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
## 2023-09-12 ENCOUNTER — APPOINTMENT (OUTPATIENT)
Facility: HOSPITAL | Age: 63
End: 2023-09-12
Payer: COMMERCIAL

## 2023-09-12 ENCOUNTER — CARE COORDINATION (OUTPATIENT)
Dept: OTHER | Facility: CLINIC | Age: 63
End: 2023-09-12

## 2023-09-12 ENCOUNTER — HOSPITAL ENCOUNTER (OUTPATIENT)
Facility: HOSPITAL | Age: 63
Setting detail: INFUSION SERIES
End: 2023-09-12
Payer: COMMERCIAL

## 2023-09-12 ENCOUNTER — OFFICE VISIT (OUTPATIENT)
Age: 63
End: 2023-09-12
Payer: COMMERCIAL

## 2023-09-12 VITALS
RESPIRATION RATE: 16 BRPM | OXYGEN SATURATION: 98 % | WEIGHT: 146 LBS | BODY MASS INDEX: 20.95 KG/M2 | TEMPERATURE: 98.3 F | HEART RATE: 91 BPM | DIASTOLIC BLOOD PRESSURE: 69 MMHG | SYSTOLIC BLOOD PRESSURE: 127 MMHG

## 2023-09-12 VITALS
WEIGHT: 146.8 LBS | OXYGEN SATURATION: 98 % | BODY MASS INDEX: 21.06 KG/M2 | TEMPERATURE: 98.3 F | DIASTOLIC BLOOD PRESSURE: 69 MMHG | RESPIRATION RATE: 16 BRPM | HEART RATE: 91 BPM | SYSTOLIC BLOOD PRESSURE: 127 MMHG

## 2023-09-12 DIAGNOSIS — C25.9 MALIGNANT NEOPLASM OF PANCREAS, UNSPECIFIED LOCATION OF MALIGNANCY (HCC): ICD-10-CM

## 2023-09-12 DIAGNOSIS — C25.9 MALIGNANT NEOPLASM OF PANCREAS, UNSPECIFIED LOCATION OF MALIGNANCY (HCC): Primary | ICD-10-CM

## 2023-09-12 DIAGNOSIS — C25.9 MALIGNANT NEOPLASM OF PANCREAS (HCC): Primary | ICD-10-CM

## 2023-09-12 LAB
ALBUMIN SERPL-MCNC: 2.8 G/DL (ref 3.5–5)
ALBUMIN/GLOB SERPL: 0.8 (ref 1.1–2.2)
ALP SERPL-CCNC: 418 U/L (ref 45–117)
ALT SERPL-CCNC: 42 U/L (ref 12–78)
ANION GAP SERPL CALC-SCNC: 2 MMOL/L (ref 5–15)
AST SERPL-CCNC: 35 U/L (ref 15–37)
BASO+EOS+MONOS # BLD AUTO: 0.8 K/UL (ref 0.2–1.2)
BASO+EOS+MONOS NFR BLD AUTO: 11 % (ref 3.2–16.9)
BILIRUB SERPL-MCNC: 0.5 MG/DL (ref 0.2–1)
BUN SERPL-MCNC: 9 MG/DL (ref 6–20)
BUN/CREAT SERPL: 12 (ref 12–20)
CALCIUM SERPL-MCNC: 8.8 MG/DL (ref 8.5–10.1)
CHLORIDE SERPL-SCNC: 110 MMOL/L (ref 97–108)
CO2 SERPL-SCNC: 28 MMOL/L (ref 21–32)
CREAT SERPL-MCNC: 0.74 MG/DL (ref 0.7–1.3)
DIFFERENTIAL METHOD BLD: ABNORMAL
ERYTHROCYTE [DISTWIDTH] IN BLOOD BY AUTOMATED COUNT: 16.3 % (ref 11.8–15.8)
GLOBULIN SER CALC-MCNC: 3.4 G/DL (ref 2–4)
GLUCOSE SERPL-MCNC: 151 MG/DL (ref 65–100)
HCT VFR BLD AUTO: 35.3 % (ref 36.6–50.3)
HGB BLD-MCNC: 11.5 G/DL (ref 12.1–17)
INR PPP: 1 (ref 0.9–1.1)
LYMPHOCYTES # BLD: 1.8 K/UL (ref 0.8–3.5)
LYMPHOCYTES NFR BLD: 25 % (ref 12–49)
MCH RBC QN AUTO: 29.8 PG (ref 26–34)
MCHC RBC AUTO-ENTMCNC: 32.6 G/DL (ref 30–36.5)
MCV RBC AUTO: 91.5 FL (ref 80–99)
NEUTS SEG # BLD: 4.6 K/UL (ref 1.8–8)
NEUTS SEG NFR BLD: 64 % (ref 32–75)
PLATELET # BLD AUTO: 268 K/UL (ref 150–400)
POTASSIUM SERPL-SCNC: 4.6 MMOL/L (ref 3.5–5.1)
PROT SERPL-MCNC: 6.2 G/DL (ref 6.4–8.2)
PROTHROMBIN TIME: 10.9 SEC (ref 9–11.1)
RBC # BLD AUTO: 3.86 M/UL (ref 4.1–5.7)
SODIUM SERPL-SCNC: 140 MMOL/L (ref 136–145)
WBC # BLD AUTO: 7.2 K/UL (ref 4.1–11.1)

## 2023-09-12 PROCEDURE — 6360000002 HC RX W HCPCS: Performed by: INTERNAL MEDICINE

## 2023-09-12 PROCEDURE — 86301 IMMUNOASSAY TUMOR CA 19-9: CPT

## 2023-09-12 PROCEDURE — 85025 COMPLETE CBC W/AUTO DIFF WBC: CPT

## 2023-09-12 PROCEDURE — 3078F DIAST BP <80 MM HG: CPT | Performed by: INTERNAL MEDICINE

## 2023-09-12 PROCEDURE — 96367 TX/PROPH/DG ADDL SEQ IV INF: CPT

## 2023-09-12 PROCEDURE — 2580000003 HC RX 258: Performed by: INTERNAL MEDICINE

## 2023-09-12 PROCEDURE — 96413 CHEMO IV INFUSION 1 HR: CPT

## 2023-09-12 PROCEDURE — 85610 PROTHROMBIN TIME: CPT

## 2023-09-12 PROCEDURE — 36415 COLL VENOUS BLD VENIPUNCTURE: CPT

## 2023-09-12 PROCEDURE — 96415 CHEMO IV INFUSION ADDL HR: CPT

## 2023-09-12 PROCEDURE — 80053 COMPREHEN METABOLIC PANEL: CPT

## 2023-09-12 PROCEDURE — 96375 TX/PRO/DX INJ NEW DRUG ADDON: CPT

## 2023-09-12 PROCEDURE — 99215 OFFICE O/P EST HI 40 MIN: CPT | Performed by: INTERNAL MEDICINE

## 2023-09-12 PROCEDURE — 3074F SYST BP LT 130 MM HG: CPT | Performed by: INTERNAL MEDICINE

## 2023-09-12 PROCEDURE — 96416 CHEMO PROLONG INFUSE W/PUMP: CPT

## 2023-09-12 RX ORDER — SODIUM CHLORIDE 0.9 % (FLUSH) 0.9 %
5-40 SYRINGE (ML) INJECTION PRN
Status: DISCONTINUED | OUTPATIENT
Start: 2023-09-12 | End: 2023-09-13 | Stop reason: HOSPADM

## 2023-09-12 RX ORDER — SODIUM CHLORIDE 9 MG/ML
5-250 INJECTION, SOLUTION INTRAVENOUS PRN
Status: DISCONTINUED | OUTPATIENT
Start: 2023-09-12 | End: 2023-09-13 | Stop reason: HOSPADM

## 2023-09-12 RX ORDER — HEPARIN 100 UNIT/ML
500 SYRINGE INTRAVENOUS PRN
Status: DISCONTINUED | OUTPATIENT
Start: 2023-09-12 | End: 2023-09-13 | Stop reason: HOSPADM

## 2023-09-12 RX ORDER — PALONOSETRON 0.05 MG/ML
0.25 INJECTION, SOLUTION INTRAVENOUS ONCE
Status: COMPLETED | OUTPATIENT
Start: 2023-09-12 | End: 2023-09-12

## 2023-09-12 RX ORDER — DEXTROSE MONOHYDRATE 50 MG/ML
5-250 INJECTION, SOLUTION INTRAVENOUS PRN
Status: DISCONTINUED | OUTPATIENT
Start: 2023-09-12 | End: 2023-09-13 | Stop reason: HOSPADM

## 2023-09-12 RX ADMIN — FLUOROURACIL 3000 MG: 50 INJECTION, SOLUTION INTRAVENOUS at 13:05

## 2023-09-12 RX ADMIN — PALONOSETRON 0.25 MG: 0.05 INJECTION, SOLUTION INTRAVENOUS at 09:51

## 2023-09-12 RX ADMIN — SODIUM CHLORIDE 150 MG: 900 INJECTION, SOLUTION INTRAVENOUS at 10:00

## 2023-09-12 RX ADMIN — DEXTROSE MONOHYDRATE 50 ML/HR: 50 INJECTION, SOLUTION INTRAVENOUS at 09:51

## 2023-09-12 RX ADMIN — METHYLPREDNISOLONE SODIUM SUCCINATE 62.5 MG: 125 INJECTION, POWDER, FOR SOLUTION INTRAMUSCULAR; INTRAVENOUS at 09:55

## 2023-09-12 RX ADMIN — OXALIPLATIN 110 MG: 5 INJECTION, SOLUTION INTRAVENOUS at 10:55
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
## 2023-09-12 NOTE — PROGRESS NOTES
Malinda Gonsales is a 58 y.o. male    Chief Complaint   Patient presents with    Follow-up      stage IV Pancreatic adenocarcinoma       1. Have you been to the ER, urgent care clinic since your last visit? Hospitalized since your last visit? Yes, [facility]  2. Have you seen or consulted any other health care providers outside of the [address] [place] [address] since your last visit? Include any pap smears or colon screening.  No

## 2023-09-13 ENCOUNTER — HOME CARE VISIT (OUTPATIENT)
Facility: HOME HEALTH | Age: 63
End: 2023-09-13
Payer: COMMERCIAL

## 2023-09-13 ENCOUNTER — TELEPHONE (OUTPATIENT)
Facility: CLINIC | Age: 63
End: 2023-09-13

## 2023-09-13 VITALS
HEART RATE: 71 BPM | SYSTOLIC BLOOD PRESSURE: 140 MMHG | TEMPERATURE: 97.9 F | DIASTOLIC BLOOD PRESSURE: 62 MMHG | OXYGEN SATURATION: 97 % | RESPIRATION RATE: 16 BRPM

## 2023-09-13 PROCEDURE — G0300 HHS/HOSPICE OF LPN EA 15 MIN: HCPCS

## 2023-09-13 NOTE — HOME HEALTH
Subjective: Nicole Mtz took out my PICC line yesterday, and they're supposed to take out the drain tomorrow. \"  Falls since last visit No(if yes complete the Fall Tracking Form and include bsrifallreport):   Caregiver involvement changes: No  Home health supplies by type and quantity ordered/delivered this visit include: n/a    Clinician asked if patient has had any physician contact since last home care visit and patient states: YES  Clinician asked if patient has any new or changed medications and patient states:  NO   If Yes, were medications reconciled? N/A   Was the certifying physician notified of changes in medications? N/A     Clinical assessment (what this visit means for the patient overall and need for ongoing skilled care) and progress or lack of progress towards SPECIFIC goals: Pt at risk for rehospitalization r/t fall risk, infection, wound exacerbation. Written Teaching Material Utilized: N/A    Interdisciplinary communication with: N/A for the purpose of n/a    Discharge planning as follows:  When goals are met    Specific plan for next visit: Assessment, drain assessment/care, education as needed

## 2023-09-13 NOTE — TELEPHONE ENCOUNTER
Mateo with [facility] is calling to see if labs still need to be done weekly, patient is done the picc which was removed yesterday. He is headed over to the patient now.      [facility] [phone]

## 2023-09-14 ENCOUNTER — APPOINTMENT (OUTPATIENT)
Facility: HOSPITAL | Age: 63
End: 2023-09-14
Payer: COMMERCIAL

## 2023-09-14 ENCOUNTER — HOSPITAL ENCOUNTER (OUTPATIENT)
Facility: HOSPITAL | Age: 63
Setting detail: INFUSION SERIES
End: 2023-09-14
Payer: COMMERCIAL

## 2023-09-14 ENCOUNTER — OFFICE VISIT (OUTPATIENT)
Age: 63
End: 2023-09-14

## 2023-09-14 VITALS
RESPIRATION RATE: 16 BRPM | BODY MASS INDEX: 21.33 KG/M2 | HEIGHT: 70 IN | HEART RATE: 60 BPM | WEIGHT: 149 LBS | SYSTOLIC BLOOD PRESSURE: 147 MMHG | OXYGEN SATURATION: 98 % | DIASTOLIC BLOOD PRESSURE: 60 MMHG | TEMPERATURE: 97.9 F

## 2023-09-14 VITALS — DIASTOLIC BLOOD PRESSURE: 70 MMHG | SYSTOLIC BLOOD PRESSURE: 134 MMHG

## 2023-09-14 DIAGNOSIS — Z09 POSTOPERATIVE EXAMINATION: Primary | ICD-10-CM

## 2023-09-14 DIAGNOSIS — K82.8 THICKENING OF WALL OF GALLBLADDER WITH PERICHOLECYSTIC FLUID: ICD-10-CM

## 2023-09-14 LAB — CANCER AG19-9 SERPL-ACNC: 77 U/ML (ref 0–35)

## 2023-09-14 PROCEDURE — 96523 IRRIG DRUG DELIVERY DEVICE: CPT

## 2023-09-14 PROCEDURE — 99024 POSTOP FOLLOW-UP VISIT: CPT | Performed by: NURSE PRACTITIONER

## 2023-09-14 NOTE — PROGRESS NOTES
[facility] Short Note                       Date: 2023    Name: Shasha Kirby    MRN: [id number]         : 1960      1200 Pt admit to [facility] for Pump DC ambulatory in stable condition. Assessment completed. No new concerns voiced. Mr. Celio Andrade vitals were reviewed   Patient Vitals for the past 12 hrs:   BP   23 1145 134/70         Medications given:       Port flushed, and de-accessed per protocol. Mr. Shanta Grijalva tolerated the infusion, and had no complaints. Mr. Shanta Grijalva was discharged from [address] [place] [address] in stable condition.  Pt aware of next appt    Future Appointments   Date Time Provider [address]   2023  3:00 PM G1 LIZETT FASTRACK [facility] [address]   2023 To Be Determined Jocelynn Hinojosa LPN [address] [address]   2023  9:00 AM Terrell Isaac  [address]  [facility]   2023 11:00 AM Malu Ortez MD [facility]   2023  7:30 AM F3 LIZETT LONG [address]   2023  8:00 AM Ochoa Rincon MD [facility]   2023  3:00 PM H1 LIZETT FASTRACK [facility] [address]   10/10/2023  7:30 AM F3 LIZETT LONG [place] [facility] [address]   10/12/2023  3:00 PM G2 LIZETT FASTRACK [facility] [address]   10[facility]18[facility]2023  3[facility]40 [facility]   10/24/2023  7:30 AM F3 LIZETT LONG [place] [facility] [address]   10/26/2023  3:00 PM H2 LIZETT FASTRACK [facility] [address]   2023  7:30 AM F3 LIZETT LONG [place] [facility] [address]   2023  3:00 PM H2 LIZETT FASTRACK [facility] [address]   2024  3:00 PM BSC COCO ECHO 1 ABBIE BS AMB   2024  3:40 PM MD ABBIE Luis RN  2023  12:04 PM

## 2023-09-15 ENCOUNTER — TELEPHONE (OUTPATIENT)
Age: 63
End: 2023-09-15

## 2023-09-15 NOTE — TELEPHONE ENCOUNTER
Patients wife, Antonette Camargo, called stating that since being seen yesterday the draining into the bag has decreased some but the incision is \"seeping\" more around the bag. Antonette Camargo stated that home health only let them enough supplies for one more change. Patient would like to know if patient needs to come back in to be seen or if we are able to call some supplies for care at home.

## 2023-09-15 NOTE — TELEPHONE ENCOUNTER
Patient identified with two patient identifiers with wife Alice Sun listed  on PHI form states patient is draining more around the tubing since yesterday, they currently have enough supplies for one more dressing change. Reviewing chart [address] [place] [address],[address] next scheduled visit will be 9/20/23. Informed wife I will reach out to [address] [place] [address],[address] regarding additional supplies. Due to them having [address] [place] [address],[address] currently insurance may not cover medical supply company to send over supplies needed. They were changing the dressings once a week, until her started to have drainage around the tubing. They are currently using Tegaderm and drain sponges. Informed her I will discuss increased drainage with provider also and follow up. Spoke with Rimma Parra nurse with [facility] [address] [place] [address],[address] informed her additional supplies needed for patient drain was not removed at visit on yesterday. His wife states she only has enough for 1 dressing change left. Per Rimma Parra she will inform  additional supplies needed before next scheduled visit. Alice Sun informed of above. Confirmed she has number to [address] [place] [address],[address] if needed. She will return call if needed.

## 2023-09-16 ENCOUNTER — HOME CARE VISIT (OUTPATIENT)
Dept: HOME HEALTH SERVICES | Facility: HOME HEALTH | Age: 63
End: 2023-09-16
Payer: COMMERCIAL

## 2023-09-16 ENCOUNTER — HOME CARE VISIT (OUTPATIENT)
Facility: HOME HEALTH | Age: 63
End: 2023-09-16
Payer: COMMERCIAL

## 2023-09-16 VITALS
SYSTOLIC BLOOD PRESSURE: 110 MMHG | RESPIRATION RATE: 16 BRPM | TEMPERATURE: 97.6 F | OXYGEN SATURATION: 100 % | HEART RATE: 90 BPM | DIASTOLIC BLOOD PRESSURE: 62 MMHG

## 2023-09-16 PROCEDURE — G0299 HHS/HOSPICE OF RN EA 15 MIN: HCPCS

## 2023-09-16 ASSESSMENT — ENCOUNTER SYMPTOMS: PAIN LOCATION - PAIN QUALITY: ACHE

## 2023-09-16 NOTE — HOME HEALTH
dSubjective: \"My drain is leaking. \"  Falls since last visit No(if yes complete the Fall Tracking Form and include bsrifallreport):   Caregiver involvement changes: No  Home health supplies by type and quantity ordered/delivered this visit include: Order Number : [id number]    Clinician asked if patient has had any physician contact since last home care visit and patient states: NO  Clinician asked if patient has any new or changed medications and patient states:  NO   If Yes, were medications reconciled? N/A   Was the certifying physician notified of changes in medications? N/A     Clinical assessment (what this visit means for the patient overall and need for ongoing skilled care) and progress or lack of progress towards SPECIFIC goals: Wound care provided, assessment complete, vital signs within range, all questions answered for this visit. 13:25 message left for Dr. Kevin Kelley who returned call regarding leaking cholecystosomy drain, no new orders at this time  Surgeon [facility]  [phone] message left 13:32 regarding leaking drain. 14:31 Dr. Didi Hdz returned call regarding drainage around leaking cholecysostomy drain, no new orders at this time, recommended patient go in office to see Dr. Anabela Kaur on a weekday. 14:33 SN called patient, no answer message left. 14:38 Patient returned phone call, discussed doctor recommendations to follow up next week in office, patient verbalized understanding and planning on making doctor appointment. Written Teaching Material Utilized: Wound care supplies    Interdisciplinary communication with: Physician, see above for the purpose of POC collaboration    Discharge planning as follows:  When goals are met    Specific plan for next visit: Assess drainage

## 2023-09-18 ENCOUNTER — TELEPHONE (OUTPATIENT)
Age: 63
End: 2023-09-18

## 2023-09-18 NOTE — TELEPHONE ENCOUNTER
Pt called to ask what Dr. Hernandez Roque wants Dr. Colette Tinoco to do for the visit. Wants clarity on whether they want to remove the drain that day or just discuss the decision. Please call patient to inform on the plan moving forward.

## 2023-09-18 NOTE — TELEPHONE ENCOUNTER
Returned call to patient. Two patient identifiers used. Made patient aware of Dr. Sheng Brown message on reasoning on why he would like for him to come in to see Dr. Anastasia Castro. Patient verbalized understanding and thanked for the call.

## 2023-09-18 NOTE — TELEPHONE ENCOUNTER
[phone]  From: Jina Vázquez  RE: Tye Enriquez  1960  ptn of dr Joaquina Mccoy. has a drain in the gallbladder and the fluid is leaking out of the skin. the tube is still in place. we don't have enough supplies to redress the wound anymore.

## 2023-09-18 NOTE — TELEPHONE ENCOUNTER
Returned call to patient. Two patient identifiers used. Made spouse aware I spoke with Nurse Erin Luis from [facility], Per Erin Luis supplies has been ordered just has not came in yet, but she will reach out to other nurses to see if they have anything on hand and she will call patient with update. Also made patient aware of Dr. Shan Franklin message. I also confirmed appt with spouse, which spouse stated she would like to know if patient can come in sooner to see Dr. Shan Franklin because they would like to get drain out as soon as possible because patient be in pain. Spouse stated if a patient ends up cancelling soon to call and let them know. Also made patient aware Dr. Shan Franklin would like for patient to come in to see Dr. Vidal Plummer for pancreatic mass, spouse stated patient already sees oncology Dr. Santhosh Barker which he will be having some chemotherapy and is not quite understanding why patient needs to see Dr. Vidal Plummer. Made spouse ware I will speak with Dr. Shan Franklin to make aware of concerns and will return call.  `

## 2023-09-18 NOTE — TELEPHONE ENCOUNTER
Attempt to call patient no answer. Left message to return call to office. Dr. Jocelyne Snellen would like for the patient to see Dr. Billie Queen to discuss other options, per Dr. Jocelyne Snellen the tube would need to stay in for 6 weeks but would need to see Dr. Billie Queen to discuss other options concerning the tube.

## 2023-09-18 NOTE — TELEPHONE ENCOUNTER
Called patient to schedule new appointment. Patient answered. Appointment scheduled for Wednesday, 9/27/23 at 9:20 with Dr. Seng Lind    [11:52 [facility]    Dr. Suzanne Novak would like for this patient to schedule a appt to come in to see Dr. Seng Lind can someone call spouse to schedule appt.  thanks     [11:52 AM] Lilibeth Rockwellinter    [phone]    [11:53 AM] Lilibeth Rockwellinter    Patient is scheduled to see Dr. Suzanne Novak on 09/21 but that can be cancelled per Dr. Suzanne Novak

## 2023-09-18 NOTE — TELEPHONE ENCOUNTER
[address] spoke with . Two patient identifiers used,  staff transferred me to a nurse,     Jasper Burt answered the phone, made her aware one of our nurses called last week in regards to patient needing supplies and I was checking on the status of that. Jasper Burt stated the supplies has been ordered but it can take a little longer to come in but stated she will reach out to some nurses to see if they have any supplies on hand, asked Jasper Burt if she could please call the patient and spouse to update, Jasper Burt stated she will call the patient once she hears back from the other nurses. I also made Dr. Beto Evans aware of the drainage, patient is scheduled to see Dr. Beto Evans on 09/21/2023. Per Dr. Beto Evans continue to pack wound and keep covered, and would like for patient to be scheduled to come in to see Dr. Isatu Caruso for pancreatic mass. Will call patient and spouse to make aware.

## 2023-09-18 NOTE — TELEPHONE ENCOUNTER
Spoke with Dr. Espinoza Friend on spouse concerns. Per Dr. Espinoza Friend he would like for the patient to come see Dr. Fannie Kerns to see if the tube could be fixed to be more comfortable for the patient and also to discuss other options and tube would need to stay placed for 6 weeks. Returned call to patient. Two patient identifiers used. Made spouse aware of message above, she stated having the tube in place for 6 weeks us not good news but understands why they should see Dr. Fannie Kerns. Spouse took down provider name,  spouse stated she would just cancel appt on 09/21/2023 for Dr. Olga Antonio and schedule to see Dr. Fannie Kerns. Made spouse aware I'll make the  staff aware to call to schedule appt. Made Dr. Olga Antonio aware of message above.

## 2023-09-19 RX ORDER — SODIUM CHLORIDE 9 MG/ML
INJECTION, SOLUTION INTRAVENOUS CONTINUOUS
Status: CANCELLED | OUTPATIENT
Start: 2023-09-26

## 2023-09-19 RX ORDER — ALBUTEROL SULFATE 90 UG/1
4 AEROSOL, METERED RESPIRATORY (INHALATION) PRN
Status: CANCELLED | OUTPATIENT
Start: 2023-09-26

## 2023-09-19 RX ORDER — ONDANSETRON 2 MG/ML
8 INJECTION INTRAMUSCULAR; INTRAVENOUS
Status: CANCELLED | OUTPATIENT
Start: 2023-09-26

## 2023-09-19 RX ORDER — SODIUM CHLORIDE 0.9 % (FLUSH) 0.9 %
5-40 SYRINGE (ML) INJECTION PRN
Status: CANCELLED | OUTPATIENT
Start: 2023-09-28

## 2023-09-19 RX ORDER — DIPHENHYDRAMINE HYDROCHLORIDE 50 MG/ML
50 INJECTION INTRAMUSCULAR; INTRAVENOUS
Status: CANCELLED | OUTPATIENT
Start: 2023-09-26

## 2023-09-19 RX ORDER — EPINEPHRINE 1 MG/ML
0.3 INJECTION, SOLUTION, CONCENTRATE INTRAVENOUS PRN
Status: CANCELLED | OUTPATIENT
Start: 2023-09-26

## 2023-09-19 RX ORDER — MEPERIDINE HYDROCHLORIDE 25 MG/ML
12.5 INJECTION INTRAMUSCULAR; INTRAVENOUS; SUBCUTANEOUS PRN
Status: CANCELLED | OUTPATIENT
Start: 2023-09-26

## 2023-09-19 RX ORDER — HEPARIN 100 UNIT/ML
500 SYRINGE INTRAVENOUS PRN
Status: CANCELLED | OUTPATIENT
Start: 2023-09-28

## 2023-09-19 RX ORDER — SODIUM CHLORIDE 9 MG/ML
5-250 INJECTION, SOLUTION INTRAVENOUS PRN
Status: CANCELLED | OUTPATIENT
Start: 2023-09-28

## 2023-09-19 RX ORDER — ACETAMINOPHEN 325 MG/1
650 TABLET ORAL
Status: CANCELLED | OUTPATIENT
Start: 2023-09-26

## 2023-09-20 ENCOUNTER — APPOINTMENT (OUTPATIENT)
Facility: HOSPITAL | Age: 63
End: 2023-09-20
Payer: COMMERCIAL

## 2023-09-20 ENCOUNTER — CARE COORDINATION (OUTPATIENT)
Dept: OTHER | Facility: CLINIC | Age: 63
End: 2023-09-20

## 2023-09-20 NOTE — CARE COORDINATION
Ambulatory Care Coordination Note    ACM attempted to reach patient for follow up call regarding any new concerns. HIPAA compliant message left requesting a return phone call at patient convenience.

## 2023-09-21 ENCOUNTER — TELEPHONE (OUTPATIENT)
Age: 63
End: 2023-09-21

## 2023-09-21 ENCOUNTER — OFFICE VISIT (OUTPATIENT)
Age: 63
End: 2023-09-21
Payer: COMMERCIAL

## 2023-09-21 VITALS
HEIGHT: 70 IN | SYSTOLIC BLOOD PRESSURE: 125 MMHG | OXYGEN SATURATION: 98 % | TEMPERATURE: 97.7 F | DIASTOLIC BLOOD PRESSURE: 69 MMHG | HEART RATE: 98 BPM | RESPIRATION RATE: 15 BRPM | WEIGHT: 145.8 LBS | BODY MASS INDEX: 20.87 KG/M2

## 2023-09-21 DIAGNOSIS — K81.0 ACUTE CHOLECYSTITIS: Primary | ICD-10-CM

## 2023-09-21 PROCEDURE — 3074F SYST BP LT 130 MM HG: CPT | Performed by: STUDENT IN AN ORGANIZED HEALTH CARE EDUCATION/TRAINING PROGRAM

## 2023-09-21 PROCEDURE — 3078F DIAST BP <80 MM HG: CPT | Performed by: STUDENT IN AN ORGANIZED HEALTH CARE EDUCATION/TRAINING PROGRAM

## 2023-09-21 PROCEDURE — 99204 OFFICE O/P NEW MOD 45 MIN: CPT | Performed by: STUDENT IN AN ORGANIZED HEALTH CARE EDUCATION/TRAINING PROGRAM

## 2023-09-21 ASSESSMENT — PATIENT HEALTH QUESTIONNAIRE - PHQ9
SUM OF ALL RESPONSES TO PHQ9 QUESTIONS 1 & 2: 0
SUM OF ALL RESPONSES TO PHQ QUESTIONS 1-9: 0
1. LITTLE INTEREST OR PLEASURE IN DOING THINGS: 0
2. FEELING DOWN, DEPRESSED OR HOPELESS: 0

## 2023-09-21 NOTE — TELEPHONE ENCOUNTER
Agatha Ruiz, a nurse from [facility], called stating that patients wife, Jeffrey Stapleton, called stating that the bag has an odor to it and wants to know if it can be replaced or cleaned. Agatha Ruiz stated that patients wife expressed they are having family come over this weekend and the patient is self conscious about the smell.

## 2023-09-21 NOTE — TELEPHONE ENCOUNTER
Called patient to advise/confirm upcoming appt with Dr. Jose De Jesus Kellogg on 9/25/2023 at 11:00  at  [facility]. Got voicemail. . Left message.

## 2023-09-21 NOTE — TELEPHONE ENCOUNTER
Returned call to patient. Two patient identifiers used. Patient stated he wanted to put his wife on the phone as well, offered patient a appt to come in to see Dr. Colette Tinoco to have her look at the tube and get her thoughts. Patient and spouse agreed. Patient will come in office to see Dr. Colette Tinoco at 140 pm today 09/21/2023. Made  staff aware.

## 2023-09-22 ENCOUNTER — APPOINTMENT (OUTPATIENT)
Facility: HOSPITAL | Age: 63
End: 2023-09-22
Payer: COMMERCIAL

## 2023-09-22 ENCOUNTER — HOME CARE VISIT (OUTPATIENT)
Facility: HOME HEALTH | Age: 63
End: 2023-09-22
Payer: COMMERCIAL

## 2023-09-22 NOTE — HOME HEALTH
Pt states he will not be home during available visit time today, but that his wife has been able to perform his wound care when he has breakthrough drainage.

## 2023-09-25 ENCOUNTER — TELEPHONE (OUTPATIENT)
Age: 63
End: 2023-09-25

## 2023-09-25 ENCOUNTER — OFFICE VISIT (OUTPATIENT)
Age: 63
End: 2023-09-25
Payer: COMMERCIAL

## 2023-09-25 VITALS
OXYGEN SATURATION: 99 % | HEART RATE: 74 BPM | DIASTOLIC BLOOD PRESSURE: 62 MMHG | SYSTOLIC BLOOD PRESSURE: 111 MMHG | BODY MASS INDEX: 21.52 KG/M2 | RESPIRATION RATE: 18 BRPM | WEIGHT: 150 LBS

## 2023-09-25 DIAGNOSIS — Z71.89 ADVANCED CARE PLANNING/COUNSELING DISCUSSION: ICD-10-CM

## 2023-09-25 DIAGNOSIS — G89.3 CANCER RELATED PAIN: Primary | ICD-10-CM

## 2023-09-25 DIAGNOSIS — R64 MALIGNANT CACHEXIA (HCC): ICD-10-CM

## 2023-09-25 DIAGNOSIS — C25.0 ADENOCARCINOMA OF HEAD OF PANCREAS (HCC): ICD-10-CM

## 2023-09-25 DIAGNOSIS — T85.520S BILIARY DRAIN DISPLACEMENT, SEQUELA: ICD-10-CM

## 2023-09-25 DIAGNOSIS — Z95.2 H/O MECHANICAL AORTIC VALVE REPLACEMENT: ICD-10-CM

## 2023-09-25 PROCEDURE — 99497 ADVNCD CARE PLAN 30 MIN: CPT | Performed by: INTERNAL MEDICINE

## 2023-09-25 PROCEDURE — 99214 OFFICE O/P EST MOD 30 MIN: CPT | Performed by: INTERNAL MEDICINE

## 2023-09-25 PROCEDURE — 3074F SYST BP LT 130 MM HG: CPT | Performed by: INTERNAL MEDICINE

## 2023-09-25 PROCEDURE — 3078F DIAST BP <80 MM HG: CPT | Performed by: INTERNAL MEDICINE

## 2023-09-25 RX ORDER — OXYCODONE HYDROCHLORIDE 5 MG/1
5 TABLET ORAL EVERY 4 HOURS PRN
COMMUNITY
End: 2023-09-25 | Stop reason: SDUPTHER

## 2023-09-25 RX ORDER — SODIUM CHLORIDE 0.9 % (FLUSH) 0.9 %
10 SYRINGE (ML) INJECTION 2 TIMES DAILY
Qty: 30 EACH | Refills: 3 | Status: SHIPPED | OUTPATIENT
Start: 2023-09-25 | End: 2023-10-04 | Stop reason: SDUPTHER

## 2023-09-25 RX ORDER — OXYCODONE HYDROCHLORIDE 5 MG/1
5 TABLET ORAL 4 TIMES DAILY PRN
Qty: 60 TABLET | Refills: 0 | Status: SHIPPED | OUTPATIENT
Start: 2023-09-25 | End: 2023-10-10

## 2023-09-25 NOTE — PROGRESS NOTES
Palliative Medicine Office Visit  Palliative Medicine Nurse Check In  [phone]    Patient Name: Padmaja Sim  YOB: 1960      Date of Office Visit: 9/25/23    Patient states: \"F/U  \"    From Check In Sheet (scanned in Media):  Has a medical provider talked with you about cardiopulmonary resuscitation (CPR)? [] Yes   [x] No   [] Unable to obtain    Nurse reminder to complete or update ACP FlowSheet:    Is ACP on the Problem List?    [x] Yes    [] No  IF ACP Document is ON FILE; Nurse to place ACP on Problem List     Is there an ACP Note in Chart Review/Note? [x] Yes    [] No   If NO: ALERT PROVIDER         9/25/2023    11:00 AM   Demographics   Marital Status        Is there anything that we should know about you as a person in order to provide you the best care possible? Have you been to the ER, urgent care clinic since your last visit? [] Yes   [x] No   [] Unable to obtain    Have you been hospitalized since your last visit? [] Yes   [x] No   [] Unable to obtain    Have you seen or consulted any other health care providers outside of the [address] [place] [address] since your last visit? [] Yes   [x] No   [] Unable to obtain    Functional status (describe):   independent      Last BM: Sanjeev Christensen     accessed (date):      Bottle review (for opioid pain medication):  Medication 1:   Date filled:   Directions:   # filled:   # left:   # pills taking per day:  Last dose taken:    Medication 2:   Date filled:   Directions:   # filled:   # left:   # pills taking per day:  Last dose taken:    Medication 3:   Date filled:   Directions:   # filled:   # left:   # pills taking per day:  Last dose taken:    Medication 4:   Date filled:   Directions:   # filled:   # left:   # pills taking per day:  Last dose taken:

## 2023-09-25 NOTE — PROGRESS NOTES
Palliative Medicine Outpatient Services  [facility]: [phone]    Patient Name: Miko Da Silva  YOB: 1960    Date of Current Visit: 09/26/23  Location of Current Visit:    [x] [facility] [place] Office  [] [facility] Office  [] [facility] Office  [] Home  [] Synchronous real-time A/V virtual visit    Date of Initial Palliative Medicine Visit: 8/11/23 Yoselin Mukherjee NP), 8/14/23 (clinic)   Referral from: Dr. Mani Segura:   Establish care for stage IV pancreatic cancer related symptoms and supportive care. FOLLOW UP:   Return in about 4 weeks (around 10/23/2023) for symptom management and support. ASSESSMENT AND PLAN:     Cancer related pain: controlled  - has midabdominal pain related to pancreatic head mass  - leg pain related to DVT resolved. - continue Creon as prescribed  - continue oxycodone 5 mg every 4 hrs as needed- current use is twice daily. Pancreatic adenocarcinoma- Poorly differentiated ,stage IV  - Multiple liver lesions,  maged hepatic lymph nodes. - He is in receipt of palliative chemotherapy with Dr. Shayne Farley; reduced to FOLFOX at C3 due hospitalization and frailty     Obstructive Jaundice- resolved; PCT in place  - Secondary to pancreatic head mass and malignant stricture. - Status post ERCP and full fully covered metallic stent on 6/2/4298-ELISE Kramer.  - PCT biliary drain placement during 8/25-8/31 hospitalization for sepsis. The tube is burdensome, we discussed his options for care including the risk of recurrent sepsis, hyperbilirubinemia post removal if he were to choose this, versus ongoing quality of life change with it remaining. He is contemplating. Very much enjoys being active and social, feels this is limiting him. Discussed ways his lifestyle may adjust, such as riding in the cart with friends, vs playing golf.   Can still strive to be active but certainly having the tube is life changing.    - reached out to Dr. Ana Damon today who will assist with
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
## 2023-09-25 NOTE — TELEPHONE ENCOUNTER
Called patient to confirm his appointment for Wednesday, 9/27/23, with Dr. Colette Tinoco at 9:20a. Patient did not answer. Left voicemail and advised patient to return call if he is unable to keep the appointment.

## 2023-09-26 ENCOUNTER — HOSPITAL ENCOUNTER (OUTPATIENT)
Facility: HOSPITAL | Age: 63
Setting detail: INFUSION SERIES
End: 2023-09-26
Payer: COMMERCIAL

## 2023-09-26 ENCOUNTER — APPOINTMENT (OUTPATIENT)
Facility: HOSPITAL | Age: 63
End: 2023-09-26
Payer: COMMERCIAL

## 2023-09-26 ENCOUNTER — OFFICE VISIT (OUTPATIENT)
Age: 63
End: 2023-09-26
Payer: COMMERCIAL

## 2023-09-26 VITALS
SYSTOLIC BLOOD PRESSURE: 115 MMHG | HEIGHT: 70 IN | DIASTOLIC BLOOD PRESSURE: 49 MMHG | HEART RATE: 69 BPM | BODY MASS INDEX: 21.88 KG/M2 | RESPIRATION RATE: 18 BRPM | TEMPERATURE: 97.5 F | WEIGHT: 152.8 LBS

## 2023-09-26 VITALS
HEART RATE: 65 BPM | BODY MASS INDEX: 21.81 KG/M2 | WEIGHT: 152 LBS | DIASTOLIC BLOOD PRESSURE: 71 MMHG | OXYGEN SATURATION: 99 % | SYSTOLIC BLOOD PRESSURE: 121 MMHG | RESPIRATION RATE: 18 BRPM

## 2023-09-26 DIAGNOSIS — C25.9 MALIGNANT NEOPLASM OF PANCREAS, UNSPECIFIED LOCATION OF MALIGNANCY (HCC): Primary | ICD-10-CM

## 2023-09-26 DIAGNOSIS — Z71.89 DNR (DO NOT RESUSCITATE) DISCUSSION: ICD-10-CM

## 2023-09-26 LAB
ALBUMIN SERPL-MCNC: 2.9 G/DL (ref 3.5–5)
ALBUMIN/GLOB SERPL: 1 (ref 1.1–2.2)
ALP SERPL-CCNC: 460 U/L (ref 45–117)
ALT SERPL-CCNC: 80 U/L (ref 12–78)
ANION GAP SERPL CALC-SCNC: 1 MMOL/L (ref 5–15)
AST SERPL-CCNC: 60 U/L (ref 15–37)
BASO+EOS+MONOS # BLD AUTO: 0.6 K/UL (ref 0.2–1.2)
BASO+EOS+MONOS NFR BLD AUTO: 7 % (ref 3.2–16.9)
BILIRUB SERPL-MCNC: 0.4 MG/DL (ref 0.2–1)
BUN SERPL-MCNC: 13 MG/DL (ref 6–20)
BUN/CREAT SERPL: 19 (ref 12–20)
CALCIUM SERPL-MCNC: 8.9 MG/DL (ref 8.5–10.1)
CHLORIDE SERPL-SCNC: 108 MMOL/L (ref 97–108)
CO2 SERPL-SCNC: 29 MMOL/L (ref 21–32)
CREAT SERPL-MCNC: 0.68 MG/DL (ref 0.7–1.3)
DIFFERENTIAL METHOD BLD: ABNORMAL
ERYTHROCYTE [DISTWIDTH] IN BLOOD BY AUTOMATED COUNT: 15.8 % (ref 11.8–15.8)
GLOBULIN SER CALC-MCNC: 3 G/DL (ref 2–4)
GLUCOSE SERPL-MCNC: 178 MG/DL (ref 65–100)
HCT VFR BLD AUTO: 34.1 % (ref 36.6–50.3)
HGB BLD-MCNC: 11.2 G/DL (ref 12.1–17)
INR PPP: 1 (ref 0.9–1.1)
LYMPHOCYTES # BLD: 1.8 K/UL (ref 0.8–3.5)
LYMPHOCYTES NFR BLD: 23 % (ref 12–49)
MCH RBC QN AUTO: 30.2 PG (ref 26–34)
MCHC RBC AUTO-ENTMCNC: 32.8 G/DL (ref 30–36.5)
MCV RBC AUTO: 91.9 FL (ref 80–99)
NEUTS SEG # BLD: 5.4 K/UL (ref 1.8–8)
NEUTS SEG NFR BLD: 70 % (ref 32–75)
PLATELET # BLD AUTO: 191 K/UL (ref 150–400)
POTASSIUM SERPL-SCNC: 4 MMOL/L (ref 3.5–5.1)
PROT SERPL-MCNC: 5.9 G/DL (ref 6.4–8.2)
PROTHROMBIN TIME: 10.5 SEC (ref 9–11.1)
RBC # BLD AUTO: 3.71 M/UL (ref 4.1–5.7)
SODIUM SERPL-SCNC: 138 MMOL/L (ref 136–145)
WBC # BLD AUTO: 7.8 K/UL (ref 4.1–11.1)

## 2023-09-26 PROCEDURE — 86301 IMMUNOASSAY TUMOR CA 19-9: CPT

## 2023-09-26 PROCEDURE — 96415 CHEMO IV INFUSION ADDL HR: CPT

## 2023-09-26 PROCEDURE — 3074F SYST BP LT 130 MM HG: CPT | Performed by: INTERNAL MEDICINE

## 2023-09-26 PROCEDURE — 80053 COMPREHEN METABOLIC PANEL: CPT

## 2023-09-26 PROCEDURE — 96375 TX/PRO/DX INJ NEW DRUG ADDON: CPT

## 2023-09-26 PROCEDURE — 99215 OFFICE O/P EST HI 40 MIN: CPT | Performed by: INTERNAL MEDICINE

## 2023-09-26 PROCEDURE — 3078F DIAST BP <80 MM HG: CPT | Performed by: INTERNAL MEDICINE

## 2023-09-26 PROCEDURE — 96367 TX/PROPH/DG ADDL SEQ IV INF: CPT

## 2023-09-26 PROCEDURE — 36415 COLL VENOUS BLD VENIPUNCTURE: CPT

## 2023-09-26 PROCEDURE — 6360000002 HC RX W HCPCS: Performed by: INTERNAL MEDICINE

## 2023-09-26 PROCEDURE — 85025 COMPLETE CBC W/AUTO DIFF WBC: CPT

## 2023-09-26 PROCEDURE — 96416 CHEMO PROLONG INFUSE W/PUMP: CPT

## 2023-09-26 PROCEDURE — 96413 CHEMO IV INFUSION 1 HR: CPT

## 2023-09-26 PROCEDURE — 2580000003 HC RX 258: Performed by: INTERNAL MEDICINE

## 2023-09-26 PROCEDURE — 85610 PROTHROMBIN TIME: CPT

## 2023-09-26 RX ORDER — ENOXAPARIN SODIUM 100 MG/ML
100 INJECTION SUBCUTANEOUS DAILY
Qty: 30 EACH | Refills: 1 | Status: SHIPPED | OUTPATIENT
Start: 2023-09-26

## 2023-09-26 RX ORDER — SODIUM CHLORIDE 9 MG/ML
5-250 INJECTION, SOLUTION INTRAVENOUS PRN
Status: DISCONTINUED | OUTPATIENT
Start: 2023-09-26 | End: 2023-09-27 | Stop reason: HOSPADM

## 2023-09-26 RX ORDER — HEPARIN 100 UNIT/ML
500 SYRINGE INTRAVENOUS PRN
Status: DISCONTINUED | OUTPATIENT
Start: 2023-09-26 | End: 2023-09-27 | Stop reason: HOSPADM

## 2023-09-26 RX ORDER — SODIUM CHLORIDE 0.9 % (FLUSH) 0.9 %
5-40 SYRINGE (ML) INJECTION PRN
Status: DISCONTINUED | OUTPATIENT
Start: 2023-09-26 | End: 2023-09-27 | Stop reason: HOSPADM

## 2023-09-26 RX ORDER — PALONOSETRON 0.05 MG/ML
0.25 INJECTION, SOLUTION INTRAVENOUS ONCE
Status: COMPLETED | OUTPATIENT
Start: 2023-09-26 | End: 2023-09-26

## 2023-09-26 RX ORDER — DEXTROSE MONOHYDRATE 50 MG/ML
5-250 INJECTION, SOLUTION INTRAVENOUS PRN
Status: DISCONTINUED | OUTPATIENT
Start: 2023-09-26 | End: 2023-09-27 | Stop reason: HOSPADM

## 2023-09-26 RX ADMIN — SODIUM CHLORIDE, PRESERVATIVE FREE 10 ML: 5 INJECTION INTRAVENOUS at 12:26

## 2023-09-26 RX ADMIN — FLUOROURACIL 3000 MG: 50 INJECTION, SOLUTION INTRAVENOUS at 12:26

## 2023-09-26 RX ADMIN — OXALIPLATIN 110 MG: 5 INJECTION, SOLUTION INTRAVENOUS at 10:17

## 2023-09-26 RX ADMIN — PALONOSETRON 0.25 MG: 0.05 INJECTION, SOLUTION INTRAVENOUS at 09:49

## 2023-09-26 RX ADMIN — METHYLPREDNISOLONE SODIUM SUCCINATE 62.5 MG: 125 INJECTION, POWDER, FOR SOLUTION INTRAMUSCULAR; INTRAVENOUS at 09:51

## 2023-09-26 RX ADMIN — SODIUM CHLORIDE 150 MG: 900 INJECTION, SOLUTION INTRAVENOUS at 09:10

## 2023-09-26 RX ADMIN — DEXTROSE MONOHYDRATE 25 ML/HR: 50 INJECTION, SOLUTION INTRAVENOUS at 09:10

## 2023-09-26 NOTE — PROGRESS NOTES
Jason Dudley is a 58 y.o. male     Chief Complaint   Patient presents with    Follow-up     stage IV Pancreatic adenocarcinoma       1. Have you been to the ER, urgent care clinic since your last visit? Hospitalized since your last visit? No    2. Have you seen or consulted any other health care providers outside of the [address] [place] [address] Avenue since your last visit? Include any pap smears or colon screening.  No

## 2023-09-27 ENCOUNTER — OFFICE VISIT (OUTPATIENT)
Age: 63
End: 2023-09-27
Payer: COMMERCIAL

## 2023-09-27 ENCOUNTER — HOME CARE VISIT (OUTPATIENT)
Facility: HOME HEALTH | Age: 63
End: 2023-09-27
Payer: COMMERCIAL

## 2023-09-27 ENCOUNTER — TELEPHONE (OUTPATIENT)
Age: 63
End: 2023-09-27

## 2023-09-27 VITALS
SYSTOLIC BLOOD PRESSURE: 150 MMHG | TEMPERATURE: 98.1 F | HEIGHT: 70 IN | RESPIRATION RATE: 20 BRPM | DIASTOLIC BLOOD PRESSURE: 56 MMHG | HEART RATE: 65 BPM | BODY MASS INDEX: 22.12 KG/M2 | WEIGHT: 154.5 LBS | OXYGEN SATURATION: 98 %

## 2023-09-27 DIAGNOSIS — K81.0 ACUTE CHOLECYSTITIS: Primary | ICD-10-CM

## 2023-09-27 PROBLEM — R52 PAIN: Status: RESOLVED | Noted: 2023-08-28 | Resolved: 2023-09-27

## 2023-09-27 LAB — CANCER AG19-9 SERPL-ACNC: 45 U/ML (ref 0–35)

## 2023-09-27 PROCEDURE — 99214 OFFICE O/P EST MOD 30 MIN: CPT | Performed by: STUDENT IN AN ORGANIZED HEALTH CARE EDUCATION/TRAINING PROGRAM

## 2023-09-27 PROCEDURE — 3077F SYST BP >= 140 MM HG: CPT | Performed by: STUDENT IN AN ORGANIZED HEALTH CARE EDUCATION/TRAINING PROGRAM

## 2023-09-27 PROCEDURE — 3078F DIAST BP <80 MM HG: CPT | Performed by: STUDENT IN AN ORGANIZED HEALTH CARE EDUCATION/TRAINING PROGRAM

## 2023-09-27 ASSESSMENT — PATIENT HEALTH QUESTIONNAIRE - PHQ9
SUM OF ALL RESPONSES TO PHQ9 QUESTIONS 1 & 2: 0
SUM OF ALL RESPONSES TO PHQ QUESTIONS 1-9: 0
SUM OF ALL RESPONSES TO PHQ QUESTIONS 1-9: 0
1. LITTLE INTEREST OR PLEASURE IN DOING THINGS: 0
SUM OF ALL RESPONSES TO PHQ QUESTIONS 1-9: 0
SUM OF ALL RESPONSES TO PHQ QUESTIONS 1-9: 0
2. FEELING DOWN, DEPRESSED OR HOPELESS: 0

## 2023-09-27 NOTE — PROGRESS NOTES
[facility] at [address] [place] [address] Dr. Teague, [address] [facility]  [address]  W: [phone]  F: [phone]    Reason for Visit:   Deena Wells is a 58 y.o. male with stage IV PDAC who is seen in follow-up for acute cholecystitis s/p IR cholecystostomy tube placement. Oncologic Treatment History:   8/1/2023: palliative FOLFIRINOX , poor tolerance, cycle 3 and beyond FOLFOX    History of Present Illness:   Deena Wells is a pleasant 58 y.o. male who presents today for follow-up with questions on cholecystostomy tube. The patient states he has yet to be able to obtain flushes for the tube. They were given some flushes at the infusion center yesterday and started flushing the tube last night. The tube flushes well but leaks occassionally. They asked about the care of the tube and if he could shower with it. Lastly, they are concerned about a foul smell eminating from the bile bag. I had previously discussed the case with Dr. Perez Shrestha and the initial plan was to leave the cholecystostomy tube in place to prevent chemo cessation. Patient and wife inquired more about this including future steps and timing of cholecystectomy. The patient denies significant pain from the tube. He denies fever/chills.     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    Aortic root replacement    Atrial fibrillation ([address]) 02/2017    Post surgery, s/p DCCV 4/4/17     CAD (coronary artery disease) 04/21/2021    s/p stent to LAD    Dyslipidemia, goal LDL below 79     Family history of colon cancer     Family history of diabetes mellitus (DM) 06/08/2010    Family history of early CAD 06/08/2010    HLD (hyperlipidemia)     HTN (hypertension)     PVD (peripheral vascular disease) ([address]) 02/2017    Ischemic R leg, s/p fem-fem bypass    S/P AVR (aortic valve replacement) 03/2017    Seborrheic dermatitis 06/08/2010     Past Surgical History:   Procedure Laterality Date

## 2023-09-27 NOTE — HOME HEALTH
Pt had an in-person appointment w/ surgeon today, where his wound care was performed. Pt declined to have visit rescheduled for later in the week.

## 2023-09-27 NOTE — ACP (ADVANCE CARE PLANNING)
Advance Care Planning     Palliative Medicine Advance Care Planning (ACP) Conversation        Date of Conversation: 9/25/2023  The patient and/or authorized decision maker consented to a voluntary Advance Care Planning conversation. Individuals present for the conversation:  Patient with Decision Making Capacity  Others present:    Healthcare Decision Maker:    Primary Decision Maker: Sherrie Dejesus Spouse - [phone]    Secondary Decision Maker: Charlene Thomas Child - [phone]    Primary Palliative Diagnosis:  Metastatic pancreatic cancer    Conversation Summary:  Follow up conversation today regarding resuscitation preferences. Pt remains clear/steadfast that although he would accept ongoing hospitalization and acute interventions for reversible conditions at this time, such as the treatment for sepsis he recently went through, he would not want resuscitative efforts performed at time of death nor utilization of life support. This is congruent with his AMD.  Reviewed durable DNR form with pt and spouse in detail. Signed document with patient with spouse present. Original and copies were provided to him. Outcomes / Completed Documentation:    Resuscitation Status:   Code Status: DNR     [] Advance Directive  [x] Portable DNR  [] POLST  [] [place] MOST  [] No new documents completed      I spent 18 minutes providing separately identifiable ACP services with the patient and/or surrogate decision maker in a voluntary, in-person conversation discussing the patient's wishes and goals as detailed in the above note.        Nica Dougherty MD

## 2023-09-27 NOTE — TELEPHONE ENCOUNTER
Attempted to call patient for Dr. Carmencita Donohue in regards to appt today. Stated she just saw the patient and they already discussed the drain tube and decided to keep the drain tube in at the moment. Stated does need to see the patient unless they would like to come in.

## 2023-09-28 ENCOUNTER — APPOINTMENT (OUTPATIENT)
Facility: HOSPITAL | Age: 63
End: 2023-09-28
Payer: COMMERCIAL

## 2023-09-28 ENCOUNTER — HOSPITAL ENCOUNTER (OUTPATIENT)
Facility: HOSPITAL | Age: 63
Setting detail: INFUSION SERIES
End: 2023-09-28
Payer: COMMERCIAL

## 2023-09-28 VITALS — SYSTOLIC BLOOD PRESSURE: 131 MMHG | HEART RATE: 68 BPM | TEMPERATURE: 97.6 F | DIASTOLIC BLOOD PRESSURE: 66 MMHG

## 2023-09-28 DIAGNOSIS — C25.9 MALIGNANT NEOPLASM OF PANCREAS, UNSPECIFIED LOCATION OF MALIGNANCY (HCC): Primary | ICD-10-CM

## 2023-09-28 PROCEDURE — 96523 IRRIG DRUG DELIVERY DEVICE: CPT

## 2023-09-28 RX ORDER — SODIUM CHLORIDE 0.9 % (FLUSH) 0.9 %
5-40 SYRINGE (ML) INJECTION PRN
Status: DISCONTINUED | OUTPATIENT
Start: 2023-09-28 | End: 2023-09-29 | Stop reason: HOSPADM

## 2023-10-02 ENCOUNTER — TELEPHONE (OUTPATIENT)
Age: 63
End: 2023-10-02

## 2023-10-02 NOTE — TELEPHONE ENCOUNTER
Returned call to patient. Two patient identifiers used. Asked patient if they can call around to other pharmacies near them and ask if they carry prefilled saline syringes. Patient verbalized understanding and stated they can do that. Made patient aware to give our office a call back to let us know. Patient verbalized understanding.

## 2023-10-02 NOTE — TELEPHONE ENCOUNTER
Returned call to patient. Two patient identifiers used. Spouse stated she was told by the pharmacist they do not carry prefilled saline syringes and was given a valve Made patient and spouse aware, syringes would need to be ordered. Patient and wife verbalized understanding.

## 2023-10-02 NOTE — TELEPHONE ENCOUNTER
Patient's wife called and stated the pharmacy gave them saline vials but not syringes to flush his drain. Please call and let them know how to proceed.

## 2023-10-03 ENCOUNTER — TELEPHONE (OUTPATIENT)
Age: 63
End: 2023-10-03

## 2023-10-03 ENCOUNTER — APPOINTMENT (OUTPATIENT)
Facility: HOSPITAL | Age: 63
End: 2023-10-03
Payer: COMMERCIAL

## 2023-10-03 RX ORDER — SODIUM CHLORIDE 9 MG/ML
5-250 INJECTION, SOLUTION INTRAVENOUS PRN
Status: CANCELLED | OUTPATIENT
Start: 2023-10-12

## 2023-10-03 RX ORDER — ALBUTEROL SULFATE 90 UG/1
4 AEROSOL, METERED RESPIRATORY (INHALATION) PRN
Status: CANCELLED | OUTPATIENT
Start: 2023-10-10

## 2023-10-03 RX ORDER — SODIUM CHLORIDE 9 MG/ML
INJECTION, SOLUTION INTRAVENOUS CONTINUOUS
Status: CANCELLED | OUTPATIENT
Start: 2023-10-10

## 2023-10-03 RX ORDER — DIPHENHYDRAMINE HYDROCHLORIDE 50 MG/ML
50 INJECTION INTRAMUSCULAR; INTRAVENOUS
Status: CANCELLED | OUTPATIENT
Start: 2023-10-10

## 2023-10-03 RX ORDER — ONDANSETRON 2 MG/ML
8 INJECTION INTRAMUSCULAR; INTRAVENOUS
Status: CANCELLED | OUTPATIENT
Start: 2023-10-10

## 2023-10-03 RX ORDER — MEPERIDINE HYDROCHLORIDE 25 MG/ML
12.5 INJECTION INTRAMUSCULAR; INTRAVENOUS; SUBCUTANEOUS PRN
Status: CANCELLED | OUTPATIENT
Start: 2023-10-10

## 2023-10-03 RX ORDER — HEPARIN 100 UNIT/ML
500 SYRINGE INTRAVENOUS PRN
Status: CANCELLED | OUTPATIENT
Start: 2023-10-12

## 2023-10-03 RX ORDER — SODIUM CHLORIDE 0.9 % (FLUSH) 0.9 %
5-40 SYRINGE (ML) INJECTION PRN
Status: CANCELLED | OUTPATIENT
Start: 2023-10-12

## 2023-10-03 RX ORDER — EPINEPHRINE 1 MG/ML
0.3 INJECTION, SOLUTION, CONCENTRATE INTRAVENOUS PRN
Status: CANCELLED | OUTPATIENT
Start: 2023-10-10

## 2023-10-03 RX ORDER — ACETAMINOPHEN 325 MG/1
650 TABLET ORAL
Status: CANCELLED | OUTPATIENT
Start: 2023-10-10

## 2023-10-03 NOTE — TELEPHONE ENCOUNTER
Returned call to patient. Two patient identifiers used. Made patient aware I'll update provider. Patient verbalized understanding. Pharmacy    Marvin Cantu, [address] [facility].

## 2023-10-03 NOTE — TELEPHONE ENCOUNTER
Patient wife contacted the office in regards to the patient sodium chloride flush 0.9 % injection , requesting it be sent to the [facility], Pharmacy info has been added.

## 2023-10-04 ENCOUNTER — CARE COORDINATION (OUTPATIENT)
Dept: OTHER | Facility: CLINIC | Age: 63
End: 2023-10-04

## 2023-10-04 ENCOUNTER — HOME CARE VISIT (OUTPATIENT)
Facility: HOME HEALTH | Age: 63
End: 2023-10-04
Payer: COMMERCIAL

## 2023-10-04 VITALS
RESPIRATION RATE: 16 BRPM | SYSTOLIC BLOOD PRESSURE: 134 MMHG | HEART RATE: 62 BPM | TEMPERATURE: 98.4 F | DIASTOLIC BLOOD PRESSURE: 62 MMHG | OXYGEN SATURATION: 98 %

## 2023-10-04 PROCEDURE — G0300 HHS/HOSPICE OF LPN EA 15 MIN: HCPCS

## 2023-10-04 RX ORDER — SODIUM CHLORIDE 0.9 % (FLUSH) 0.9 %
10 SYRINGE (ML) INJECTION 2 TIMES DAILY
Qty: 30 EACH | Refills: 3 | Status: SHIPPED | OUTPATIENT
Start: 2023-10-04

## 2023-10-04 NOTE — CARE COORDINATION
Care Transitions Follow Up Call    Patient Current Location:  Home: [address] [place] [id number]    [facility] Care Coordinator contacted the patient by telephone to follow up after admission on 23. Verified name and  with patient as identifiers. Patient: Yuan Guerra  Patient : 1960   MRN: [id number]  Reason for Admission: Sepsis,Cholecystitis, Stage IV Pancreatic Cancer  Discharge Date: 23 RARS: Readmission Risk Score: 19.1      Needs to be reviewed by the provider   Additional needs identified to be addressed with provider: No  none             Method of communication with provider: none. Patient reports he is doing well. Continues with palliative Chemo. Next scheduled 10/10/23. Now s/p IR cholecystostomy tube placement during last admission. Patient has frequent follow up with Oncology,Surgery and Palliative care. Denies any new concerns or questions at this time, but states spouse may have some HR related questions, he will have her call if needed.        Follow Up  Future Appointments   Date Time Provider [address]   10/10/2023  7:30 AM F3 LIZETT LONG [place] [facility]   10/10/2023  8:00 AM Magan Lindsay MD 3655 Sanjeev Castillo BS AMB   10/11/2023 To Be Determined Ruiz Bruno LPN [id number] [address]   10/12/2023  3:00 PM G2 LIZETT FASTRACK [facility]   10/18/2023 To Be Determined Ruiz Bruno LPN [address] [address]   10/18/2023  3:40 PM Flint Scheuermann, MD CAVREY  AMB   10/23/2023 11:00 AM Sonny Crook MD [address]  BS AMB   10/24/2023  7:30 AM F3 LIZETT LONG [place] [facility]   10/25/2023 To Be Determined Rosendo Vizcaino RN [address] [address]   10/26/2023  3:00 PM H2 LIZETT FASTRACK [facility]   2023  7:30 AM F3 LIZETT LONG [place] [facility]   2023  3:00 PM H2 LIZETT FASTRACK [facility]   2024  3:00 PM LATA SEPULVEDA ECHO 1 ABBIE CONCE[facility]   2024  3:40 PM Flint Scheuermann, MD CAVREY BS AMB       LPN Care Coordinator reviewed red flags with patient and discussed any barriers to

## 2023-10-04 NOTE — HOME HEALTH
Subjective: \"The drain is flowing fine, especially when I move around. \"  Falls since last visit No(if yes complete the Fall Tracking Form and include bsrifallreport):   Caregiver involvement changes: No  Home health supplies by type and quantity ordered/delivered this visit include: n/a    Clinician asked if patient has had any physician contact since last home care visit and patient states: YES  Clinician asked if patient has any new or changed medications and patient states:  NO   If Yes, were medications reconciled? N/A   Was the certifying physician notified of changes in medications? N/A     Clinical assessment (what this visit means for the patient overall and need for ongoing skilled care) and progress or lack of progress towards SPECIFIC goals: Pt at risk for rehospitalization r/t infection, drain exacerbation. Written Teaching Material Utilized: N/A    Interdisciplinary communication with: N/A for the purpose of n/a    Discharge planning as follows:  When goals are met    Specific plan for next visit: Assessment, drain care, setup pt w/ Medline in prep for d/c, education as needed

## 2023-10-04 NOTE — TELEPHONE ENCOUNTER
Attempted to call patient no answer, left message that the saline flushes has been sent to [facility], and to call the office with any questions.

## 2023-10-05 ENCOUNTER — APPOINTMENT (OUTPATIENT)
Facility: HOSPITAL | Age: 63
End: 2023-10-05
Payer: COMMERCIAL
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
## 2023-10-10 ENCOUNTER — HOSPITAL ENCOUNTER (OUTPATIENT)
Facility: HOSPITAL | Age: 63
Setting detail: INFUSION SERIES
End: 2023-10-10
Payer: COMMERCIAL

## 2023-10-10 ENCOUNTER — APPOINTMENT (OUTPATIENT)
Facility: HOSPITAL | Age: 63
End: 2023-10-10
Payer: COMMERCIAL

## 2023-10-10 ENCOUNTER — OFFICE VISIT (OUTPATIENT)
Age: 63
End: 2023-10-10
Payer: COMMERCIAL

## 2023-10-10 VITALS
HEART RATE: 80 BPM | SYSTOLIC BLOOD PRESSURE: 107 MMHG | BODY MASS INDEX: 21.82 KG/M2 | RESPIRATION RATE: 16 BRPM | HEIGHT: 70 IN | TEMPERATURE: 98 F | DIASTOLIC BLOOD PRESSURE: 57 MMHG | OXYGEN SATURATION: 100 % | WEIGHT: 152.4 LBS

## 2023-10-10 VITALS
RESPIRATION RATE: 16 BRPM | TEMPERATURE: 98 F | SYSTOLIC BLOOD PRESSURE: 137 MMHG | DIASTOLIC BLOOD PRESSURE: 75 MMHG | WEIGHT: 152 LBS | HEART RATE: 50 BPM | BODY MASS INDEX: 21.81 KG/M2 | OXYGEN SATURATION: 100 %

## 2023-10-10 DIAGNOSIS — C25.9 MALIGNANT NEOPLASM OF PANCREAS, UNSPECIFIED LOCATION OF MALIGNANCY (HCC): Primary | ICD-10-CM

## 2023-10-10 LAB
ALBUMIN SERPL-MCNC: 3.1 G/DL (ref 3.5–5)
ALBUMIN/GLOB SERPL: 0.9 (ref 1.1–2.2)
ALP SERPL-CCNC: 484 U/L (ref 45–117)
ALT SERPL-CCNC: 78 U/L (ref 12–78)
ANION GAP SERPL CALC-SCNC: 5 MMOL/L (ref 5–15)
AST SERPL-CCNC: 53 U/L (ref 15–37)
BASOPHILS # BLD: 0.1 K/UL (ref 0–0.1)
BASOPHILS NFR BLD: 1 % (ref 0–1)
BILIRUB SERPL-MCNC: 0.4 MG/DL (ref 0.2–1)
BUN SERPL-MCNC: 14 MG/DL (ref 6–20)
BUN/CREAT SERPL: 19 (ref 12–20)
CALCIUM SERPL-MCNC: 8.9 MG/DL (ref 8.5–10.1)
CHLORIDE SERPL-SCNC: 107 MMOL/L (ref 97–108)
CO2 SERPL-SCNC: 27 MMOL/L (ref 21–32)
COMMENT:: NORMAL
CREAT SERPL-MCNC: 0.73 MG/DL (ref 0.7–1.3)
DIFFERENTIAL METHOD BLD: ABNORMAL
EOSINOPHIL # BLD: 0.2 K/UL (ref 0–0.4)
EOSINOPHIL NFR BLD: 2 % (ref 0–7)
ERYTHROCYTE [DISTWIDTH] IN BLOOD BY AUTOMATED COUNT: 14.7 % (ref 11.5–14.5)
GLOBULIN SER CALC-MCNC: 3.5 G/DL (ref 2–4)
GLUCOSE SERPL-MCNC: 183 MG/DL (ref 65–100)
HCT VFR BLD AUTO: 34.7 % (ref 36.6–50.3)
HGB BLD-MCNC: 11.1 G/DL (ref 12.1–17)
IMM GRANULOCYTES # BLD AUTO: 0 K/UL (ref 0–0.04)
IMM GRANULOCYTES NFR BLD AUTO: 0 % (ref 0–0.5)
INR PPP: 1 (ref 0.9–1.1)
LYMPHOCYTES # BLD: 1.5 K/UL (ref 0.8–3.5)
LYMPHOCYTES NFR BLD: 21 % (ref 12–49)
MCH RBC QN AUTO: 30 PG (ref 26–34)
MCHC RBC AUTO-ENTMCNC: 32 G/DL (ref 30–36.5)
MCV RBC AUTO: 93.8 FL (ref 80–99)
MONOCYTES # BLD: 0.9 K/UL (ref 0–1)
MONOCYTES NFR BLD: 12 % (ref 5–13)
NEUTS SEG # BLD: 4.5 K/UL (ref 1.8–8)
NEUTS SEG NFR BLD: 64 % (ref 32–75)
NRBC # BLD: 0 K/UL (ref 0–0.01)
NRBC BLD-RTO: 0 PER 100 WBC
PLATELET # BLD AUTO: 180 K/UL (ref 150–400)
PMV BLD AUTO: 10.9 FL (ref 8.9–12.9)
POTASSIUM SERPL-SCNC: 4.2 MMOL/L (ref 3.5–5.1)
PROT SERPL-MCNC: 6.6 G/DL (ref 6.4–8.2)
PROTHROMBIN TIME: 10.7 SEC (ref 9–11.1)
RBC # BLD AUTO: 3.7 M/UL (ref 4.1–5.7)
SODIUM SERPL-SCNC: 139 MMOL/L (ref 136–145)
SPECIMEN HOLD: NORMAL
WBC # BLD AUTO: 7.2 K/UL (ref 4.1–11.1)

## 2023-10-10 PROCEDURE — 96413 CHEMO IV INFUSION 1 HR: CPT

## 2023-10-10 PROCEDURE — 3075F SYST BP GE 130 - 139MM HG: CPT | Performed by: INTERNAL MEDICINE

## 2023-10-10 PROCEDURE — 2580000003 HC RX 258: Performed by: INTERNAL MEDICINE

## 2023-10-10 PROCEDURE — 80053 COMPREHEN METABOLIC PANEL: CPT

## 2023-10-10 PROCEDURE — 3078F DIAST BP <80 MM HG: CPT | Performed by: INTERNAL MEDICINE

## 2023-10-10 PROCEDURE — 96415 CHEMO IV INFUSION ADDL HR: CPT

## 2023-10-10 PROCEDURE — 85025 COMPLETE CBC W/AUTO DIFF WBC: CPT

## 2023-10-10 PROCEDURE — 96367 TX/PROPH/DG ADDL SEQ IV INF: CPT

## 2023-10-10 PROCEDURE — 36415 COLL VENOUS BLD VENIPUNCTURE: CPT

## 2023-10-10 PROCEDURE — 99215 OFFICE O/P EST HI 40 MIN: CPT | Performed by: INTERNAL MEDICINE

## 2023-10-10 PROCEDURE — 6360000002 HC RX W HCPCS: Performed by: INTERNAL MEDICINE

## 2023-10-10 PROCEDURE — 86301 IMMUNOASSAY TUMOR CA 19-9: CPT

## 2023-10-10 PROCEDURE — 85610 PROTHROMBIN TIME: CPT

## 2023-10-10 PROCEDURE — 96375 TX/PRO/DX INJ NEW DRUG ADDON: CPT

## 2023-10-10 PROCEDURE — 96416 CHEMO PROLONG INFUSE W/PUMP: CPT

## 2023-10-10 RX ORDER — SODIUM CHLORIDE 0.9 % (FLUSH) 0.9 %
5-40 SYRINGE (ML) INJECTION PRN
Status: DISCONTINUED | OUTPATIENT
Start: 2023-10-10 | End: 2023-10-11 | Stop reason: HOSPADM

## 2023-10-10 RX ORDER — PALONOSETRON 0.05 MG/ML
0.25 INJECTION, SOLUTION INTRAVENOUS ONCE
Status: COMPLETED | OUTPATIENT
Start: 2023-10-10 | End: 2023-10-10

## 2023-10-10 RX ORDER — DEXTROSE MONOHYDRATE 50 MG/ML
5-250 INJECTION, SOLUTION INTRAVENOUS PRN
Status: DISCONTINUED | OUTPATIENT
Start: 2023-10-10 | End: 2023-10-11 | Stop reason: HOSPADM

## 2023-10-10 RX ORDER — SODIUM CHLORIDE 9 MG/ML
5-250 INJECTION, SOLUTION INTRAVENOUS PRN
Status: DISCONTINUED | OUTPATIENT
Start: 2023-10-10 | End: 2023-10-11 | Stop reason: HOSPADM

## 2023-10-10 RX ORDER — HEPARIN 100 UNIT/ML
500 SYRINGE INTRAVENOUS PRN
Status: DISCONTINUED | OUTPATIENT
Start: 2023-10-10 | End: 2023-10-11 | Stop reason: HOSPADM

## 2023-10-10 RX ADMIN — PALONOSETRON 0.25 MG: 0.05 INJECTION, SOLUTION INTRAVENOUS at 09:39

## 2023-10-10 RX ADMIN — OXALIPLATIN 110 MG: 5 INJECTION, SOLUTION INTRAVENOUS at 10:02

## 2023-10-10 RX ADMIN — DEXTROSE MONOHYDRATE 25 ML/HR: 50 INJECTION, SOLUTION INTRAVENOUS at 09:10

## 2023-10-10 RX ADMIN — SODIUM CHLORIDE 150 MG: 900 INJECTION, SOLUTION INTRAVENOUS at 09:10

## 2023-10-10 RX ADMIN — METHYLPREDNISOLONE SODIUM SUCCINATE 62.5 MG: 125 INJECTION, POWDER, FOR SOLUTION INTRAMUSCULAR; INTRAVENOUS at 09:42

## 2023-10-10 RX ADMIN — FLUOROURACIL 3000 MG: 50 INJECTION, SOLUTION INTRAVENOUS at 12:15

## 2023-10-10 NOTE — PROGRESS NOTES
Tad Irizarry is a 58 y.o. male    Chief Complaint   Patient presents with    Follow-up      stage IV Pancreatic adenocarcinoma     1. Have you been to the ER, urgent care clinic since your last visit? Hospitalized since your last visit? No    2. Have you seen or consulted any other health care providers outside of the [address] [place] [address] since your last visit? Include any pap smears or colon screening.  No

## 2023-10-11 ENCOUNTER — HOME CARE VISIT (OUTPATIENT)
Facility: HOME HEALTH | Age: 63
End: 2023-10-11
Payer: COMMERCIAL

## 2023-10-11 VITALS
RESPIRATION RATE: 16 BRPM | HEART RATE: 61 BPM | TEMPERATURE: 98.1 F | DIASTOLIC BLOOD PRESSURE: 64 MMHG | OXYGEN SATURATION: 98 % | SYSTOLIC BLOOD PRESSURE: 138 MMHG

## 2023-10-11 PROCEDURE — G0300 HHS/HOSPICE OF LPN EA 15 MIN: HCPCS

## 2023-10-11 NOTE — HOME HEALTH
Subjective: \"My wife isn't here right now. \"  Falls since last visit No(if yes complete the Fall Tracking Form and include bsrifallreport):   Caregiver involvement changes: No  Home health supplies by type and quantity ordered/delivered this visit include: n/a    Clinician asked if patient has had any physician contact since last home care visit and patient states: YES  Clinician asked if patient has any new or changed medications and patient states:  NO   If Yes, were medications reconciled? N/A   Was the certifying physician notified of changes in medications? N/A     Clinical assessment (what this visit means for the patient overall and need for ongoing skilled care) and progress or lack of progress towards SPECIFIC goals: Pt at risk for rehospitalization r/t fall risk, infection, complications r/t chemotherapy. Written Teaching Material Utilized: N/A    Interdisciplinary communication with: N/A for the purpose of n/a    Discharge planning as follows:  When goals are met    Specific plan for next visit: Assessment, drain care/assessment, prep for discharge, education as needed

## 2023-10-12 ENCOUNTER — APPOINTMENT (OUTPATIENT)
Facility: HOSPITAL | Age: 63
End: 2023-10-12
Payer: COMMERCIAL

## 2023-10-12 ENCOUNTER — HOSPITAL ENCOUNTER (OUTPATIENT)
Facility: HOSPITAL | Age: 63
Setting detail: INFUSION SERIES
End: 2023-10-12
Payer: COMMERCIAL

## 2023-10-12 VITALS
DIASTOLIC BLOOD PRESSURE: 61 MMHG | HEART RATE: 66 BPM | TEMPERATURE: 97.9 F | RESPIRATION RATE: 18 BRPM | SYSTOLIC BLOOD PRESSURE: 135 MMHG

## 2023-10-12 DIAGNOSIS — C25.9 MALIGNANT NEOPLASM OF PANCREAS, UNSPECIFIED LOCATION OF MALIGNANCY (HCC): Primary | ICD-10-CM

## 2023-10-12 LAB — CANCER AG19-9 SERPL-ACNC: 37 U/ML (ref 0–35)

## 2023-10-12 PROCEDURE — 96523 IRRIG DRUG DELIVERY DEVICE: CPT

## 2023-10-12 PROCEDURE — 2580000003 HC RX 258: Performed by: INTERNAL MEDICINE

## 2023-10-12 RX ORDER — SODIUM CHLORIDE 0.9 % (FLUSH) 0.9 %
5-40 SYRINGE (ML) INJECTION PRN
Status: DISCONTINUED | OUTPATIENT
Start: 2023-10-12 | End: 2023-10-13 | Stop reason: HOSPADM

## 2023-10-12 RX ORDER — HEPARIN 100 UNIT/ML
500 SYRINGE INTRAVENOUS PRN
Status: DISCONTINUED | OUTPATIENT
Start: 2023-10-12 | End: 2023-10-13 | Stop reason: HOSPADM

## 2023-10-12 RX ORDER — SODIUM CHLORIDE 9 MG/ML
5-250 INJECTION, SOLUTION INTRAVENOUS PRN
Status: DISCONTINUED | OUTPATIENT
Start: 2023-10-12 | End: 2023-10-13 | Stop reason: HOSPADM

## 2023-10-12 RX ADMIN — SODIUM CHLORIDE, PRESERVATIVE FREE 20 ML: 5 INJECTION INTRAVENOUS at 14:50

## 2023-10-12 ASSESSMENT — PAIN SCALES - GENERAL: PAINLEVEL_OUTOF10: 2

## 2023-10-16 DIAGNOSIS — G89.3 CANCER RELATED PAIN: ICD-10-CM

## 2023-10-16 RX ORDER — OXYCODONE HYDROCHLORIDE 5 MG/1
5 TABLET ORAL 4 TIMES DAILY PRN
Qty: 60 TABLET | Refills: 0 | Status: SHIPPED | OUTPATIENT
Start: 2023-10-16 | End: 2023-10-31

## 2023-10-16 NOTE — TELEPHONE ENCOUNTER
Triage for Controlled Substance Refill Request    Pain Diagnosis: _Cancer related pain [G89.3]    Last Outpatient Visit: _9/25/2023    Next Outpatient Visit: _10/23/2023    Reason for refill needed outside of office visit? -Appointment not scheduled prior to need for scheduled refill      Pharmacy: Lorraine Fan, [facility] [phone] Axel Gomez [phone]   [address] [place] [address]   Phone:  [phone]  Fax:  [phone]      Medication:oxyCODONE (ROXICODONE) 5 MG     Dose and directions: Take 1 tablet by mouth 4 times daily   Number dispensed:60  Date filled ( or Pharmacy):9/26/2023  #left:     reviewed: _yes    Date of Urine Drug Screen:  _    Opioid Safety Handout given:  _yes    Appropriate for refill:  _yes    Action:  _ please refill

## 2023-10-16 NOTE — TELEPHONE ENCOUNTER
Received call from wife requesting Oxycodone refill. Patient has 4 pills remaining. To be called in to [facility] pharmacy [id number]  [address] [facility],[address], [place] [address] [place].  [phone]

## 2023-10-17 ENCOUNTER — APPOINTMENT (OUTPATIENT)
Facility: HOSPITAL | Age: 63
End: 2023-10-17
Payer: COMMERCIAL

## 2023-10-17 ENCOUNTER — TELEPHONE (OUTPATIENT)
Age: 63
End: 2023-10-17

## 2023-10-17 NOTE — TELEPHONE ENCOUNTER
Spoke with patient and advised on new date / time for Chemo Treatment on 10/26. Patient was grateful for the call. Will call with any additional questions.

## 2023-10-17 NOTE — TELEPHONE ENCOUNTER
----- [facility] Colby Ash, SUSAN sent at 10/10/2023  1:42 PM EDT -----  I spoke with Dr. Michael Edwards and I dont think they are able to move his procedure   So I would suggest we move his next chemo to 10/26   Please let patient know     ----- Message -----  From: Jaime Rice MD  Sent: 10/10/2023   8:29 AM EDT  To: Heather Ash, SUSAN    Kerry Elizabeth  Looks like sherman was sent  Any way to also have them do his genetic germline test? If not then janeth you get pancnext Jordan Burris on the day of pump disconnect?

## 2023-10-18 ENCOUNTER — OFFICE VISIT (OUTPATIENT)
Age: 63
End: 2023-10-18

## 2023-10-18 VITALS
SYSTOLIC BLOOD PRESSURE: 124 MMHG | BODY MASS INDEX: 21.05 KG/M2 | WEIGHT: 147 LBS | OXYGEN SATURATION: 98 % | HEIGHT: 70 IN | HEART RATE: 100 BPM | DIASTOLIC BLOOD PRESSURE: 60 MMHG

## 2023-10-18 DIAGNOSIS — I48.91 ATRIAL FIBRILLATION, UNSPECIFIED TYPE (HCC): ICD-10-CM

## 2023-10-18 DIAGNOSIS — I71.011 AORTIC ARCH DISSECTION (HCC): ICD-10-CM

## 2023-10-18 DIAGNOSIS — D68.69 SECONDARY HYPERCOAGULABLE STATE (HCC): ICD-10-CM

## 2023-10-18 DIAGNOSIS — I25.10 CORONARY ARTERY DISEASE INVOLVING NATIVE CORONARY ARTERY OF NATIVE HEART WITHOUT ANGINA PECTORIS: Primary | ICD-10-CM

## 2023-10-18 ASSESSMENT — PATIENT HEALTH QUESTIONNAIRE - PHQ9
SUM OF ALL RESPONSES TO PHQ QUESTIONS 1-9: 0
1. LITTLE INTEREST OR PLEASURE IN DOING THINGS: 0
SUM OF ALL RESPONSES TO PHQ9 QUESTIONS 1 & 2: 0
2. FEELING DOWN, DEPRESSED OR HOPELESS: 0
SUM OF ALL RESPONSES TO PHQ QUESTIONS 1-9: 0

## 2023-10-18 NOTE — PROGRESS NOTES
No     Lack of Transportation (Non-Medical): No     Patient Unable or Declines to Respond: No   Physical Activity: Not on file   Stress: Not on file   Social Connections: Feeling Socially Integrated (9/1/2023)    OASIS : Social Isolation     Frequency of experiencing loneliness or isolation: Never   Intimate Partner Violence: Not on file   Housing Stability: Not on file       Physical Exam        ATTENTION:    This medical record was transcribed using an electronic medical records/speech recognition system. Although proofread, it may and can contain electronic, spelling and other errors. Corrections may be executed at a later time. Please feel free to  contact us for any clarifications as needed.       [facility] heart and [facility] [place]   [facility], [place], [place]. [phone]

## 2023-10-19 ENCOUNTER — TELEPHONE (OUTPATIENT)
Age: 63
End: 2023-10-19

## 2023-10-19 ENCOUNTER — APPOINTMENT (OUTPATIENT)
Facility: HOSPITAL | Age: 63
End: 2023-10-19
Payer: COMMERCIAL

## 2023-10-19 RX ORDER — DEXTROSE MONOHYDRATE 50 MG/ML
5-250 INJECTION, SOLUTION INTRAVENOUS PRN
OUTPATIENT
Start: 2023-11-07

## 2023-10-19 RX ORDER — EPINEPHRINE 1 MG/ML
0.3 INJECTION, SOLUTION, CONCENTRATE INTRAVENOUS PRN
OUTPATIENT
Start: 2023-11-07

## 2023-10-19 RX ORDER — SODIUM CHLORIDE 9 MG/ML
5-250 INJECTION, SOLUTION INTRAVENOUS PRN
OUTPATIENT
Start: 2023-11-09

## 2023-10-19 RX ORDER — MEPERIDINE HYDROCHLORIDE 25 MG/ML
12.5 INJECTION INTRAMUSCULAR; INTRAVENOUS; SUBCUTANEOUS PRN
OUTPATIENT
Start: 2023-11-07

## 2023-10-19 RX ORDER — DIPHENHYDRAMINE HYDROCHLORIDE 50 MG/ML
50 INJECTION INTRAMUSCULAR; INTRAVENOUS
OUTPATIENT
Start: 2023-11-07

## 2023-10-19 RX ORDER — HEPARIN 100 UNIT/ML
500 SYRINGE INTRAVENOUS PRN
OUTPATIENT
Start: 2023-11-09

## 2023-10-19 RX ORDER — ACETAMINOPHEN 325 MG/1
650 TABLET ORAL
OUTPATIENT
Start: 2023-11-07

## 2023-10-19 RX ORDER — SODIUM CHLORIDE 9 MG/ML
INJECTION, SOLUTION INTRAVENOUS CONTINUOUS
OUTPATIENT
Start: 2023-11-07

## 2023-10-19 RX ORDER — ONDANSETRON 2 MG/ML
8 INJECTION INTRAMUSCULAR; INTRAVENOUS
OUTPATIENT
Start: 2023-11-07

## 2023-10-19 RX ORDER — SODIUM CHLORIDE 0.9 % (FLUSH) 0.9 %
5-40 SYRINGE (ML) INJECTION PRN
OUTPATIENT
Start: 2023-11-09

## 2023-10-19 RX ORDER — PALONOSETRON 0.05 MG/ML
0.25 INJECTION, SOLUTION INTRAVENOUS ONCE
OUTPATIENT
Start: 2023-11-07 | End: 2023-10-26

## 2023-10-19 RX ORDER — ALBUTEROL SULFATE 90 UG/1
4 AEROSOL, METERED RESPIRATORY (INHALATION) PRN
OUTPATIENT
Start: 2023-11-07

## 2023-10-19 NOTE — TELEPHONE ENCOUNTER
Called patient to advise/confirm upcoming appt with Dr. Abbi Collazo on 10/23/23 at 11:00  at [address] [facility]. Got voicemail. Left message for a call back.

## 2023-10-20 ENCOUNTER — HOME CARE VISIT (OUTPATIENT)
Facility: HOME HEALTH | Age: 63
End: 2023-10-20
Payer: COMMERCIAL

## 2023-10-20 ENCOUNTER — TELEPHONE (OUTPATIENT)
Facility: HOSPITAL | Age: 63
End: 2023-10-20

## 2023-10-20 PROBLEM — D68.69 SECONDARY HYPERCOAGULABLE STATE (HCC): Status: ACTIVE | Noted: 2023-10-20

## 2023-10-20 PROCEDURE — G0300 HHS/HOSPICE OF LPN EA 15 MIN: HCPCS

## 2023-10-21 VITALS
RESPIRATION RATE: 16 BRPM | HEART RATE: 70 BPM | OXYGEN SATURATION: 100 % | DIASTOLIC BLOOD PRESSURE: 64 MMHG | SYSTOLIC BLOOD PRESSURE: 140 MMHG | TEMPERATURE: 97.5 F

## 2023-10-23 ENCOUNTER — APPOINTMENT (OUTPATIENT)
Facility: HOSPITAL | Age: 63
End: 2023-10-23
Attending: INTERNAL MEDICINE
Payer: COMMERCIAL

## 2023-10-23 ENCOUNTER — ANESTHESIA (OUTPATIENT)
Facility: HOSPITAL | Age: 63
End: 2023-10-23
Payer: COMMERCIAL

## 2023-10-23 ENCOUNTER — ANESTHESIA EVENT (OUTPATIENT)
Facility: HOSPITAL | Age: 63
End: 2023-10-23
Payer: COMMERCIAL

## 2023-10-23 ENCOUNTER — HOSPITAL ENCOUNTER (OUTPATIENT)
Facility: HOSPITAL | Age: 63
Setting detail: OUTPATIENT SURGERY
Discharge: HOME | End: 2023-10-23
Attending: INTERNAL MEDICINE | Admitting: INTERNAL MEDICINE
Payer: COMMERCIAL

## 2023-10-23 ENCOUNTER — OFFICE VISIT (OUTPATIENT)
Age: 63
End: 2023-10-23
Payer: COMMERCIAL

## 2023-10-23 VITALS
WEIGHT: 150.5 LBS | HEART RATE: 70 BPM | HEIGHT: 71 IN | OXYGEN SATURATION: 100 % | SYSTOLIC BLOOD PRESSURE: 130 MMHG | BODY MASS INDEX: 21.07 KG/M2 | RESPIRATION RATE: 18 BRPM | TEMPERATURE: 98.4 F | DIASTOLIC BLOOD PRESSURE: 67 MMHG

## 2023-10-23 VITALS — RESPIRATION RATE: 18 BRPM

## 2023-10-23 DIAGNOSIS — R64 MALIGNANT CACHEXIA (HCC): Primary | ICD-10-CM

## 2023-10-23 DIAGNOSIS — Z95.2 H/O MECHANICAL AORTIC VALVE REPLACEMENT: ICD-10-CM

## 2023-10-23 DIAGNOSIS — G89.3 CANCER RELATED PAIN: ICD-10-CM

## 2023-10-23 DIAGNOSIS — C25.0 ADENOCARCINOMA OF HEAD OF PANCREAS (HCC): ICD-10-CM

## 2023-10-23 PROCEDURE — 3600007513: Performed by: INTERNAL MEDICINE

## 2023-10-23 PROCEDURE — 2709999900 HC NON-CHARGEABLE SUPPLY: Performed by: INTERNAL MEDICINE

## 2023-10-23 PROCEDURE — 3600007503: Performed by: INTERNAL MEDICINE

## 2023-10-23 PROCEDURE — 6360000002 HC RX W HCPCS

## 2023-10-23 PROCEDURE — 3700000000 HC ANESTHESIA ATTENDED CARE: Performed by: INTERNAL MEDICINE

## 2023-10-23 PROCEDURE — 88305 TISSUE EXAM BY PATHOLOGIST: CPT

## 2023-10-23 PROCEDURE — 2580000003 HC RX 258: Performed by: INTERNAL MEDICINE

## 2023-10-23 PROCEDURE — 7100000011 HC PHASE II RECOVERY - ADDTL 15 MIN: Performed by: INTERNAL MEDICINE

## 2023-10-23 PROCEDURE — 2500000003 HC RX 250 WO HCPCS

## 2023-10-23 PROCEDURE — 99213 OFFICE O/P EST LOW 20 MIN: CPT | Performed by: INTERNAL MEDICINE

## 2023-10-23 PROCEDURE — 7100000010 HC PHASE II RECOVERY - FIRST 15 MIN: Performed by: INTERNAL MEDICINE

## 2023-10-23 PROCEDURE — 3700000001 HC ADD 15 MINUTES (ANESTHESIA): Performed by: INTERNAL MEDICINE

## 2023-10-23 RX ORDER — LIDOCAINE HYDROCHLORIDE 20 MG/ML
INJECTION, SOLUTION EPIDURAL; INFILTRATION; INTRACAUDAL; PERINEURAL PRN
Status: DISCONTINUED | OUTPATIENT
Start: 2023-10-23 | End: 2023-10-23 | Stop reason: SDUPTHER

## 2023-10-23 RX ORDER — SUCCINYLCHOLINE CHLORIDE 20 MG/ML
INJECTION INTRAMUSCULAR; INTRAVENOUS PRN
Status: DISCONTINUED | OUTPATIENT
Start: 2023-10-23 | End: 2023-10-23 | Stop reason: SDUPTHER

## 2023-10-23 RX ORDER — SODIUM CHLORIDE 9 MG/ML
INJECTION, SOLUTION INTRAVENOUS CONTINUOUS
Status: DISCONTINUED | OUTPATIENT
Start: 2023-10-23 | End: 2023-10-23 | Stop reason: HOSPADM

## 2023-10-23 RX ORDER — SODIUM CHLORIDE 0.9 % (FLUSH) 0.9 %
5-40 SYRINGE (ML) INJECTION PRN
Status: DISCONTINUED | OUTPATIENT
Start: 2023-10-23 | End: 2023-10-23 | Stop reason: HOSPADM

## 2023-10-23 RX ORDER — DEXAMETHASONE SODIUM PHOSPHATE 4 MG/ML
INJECTION, SOLUTION INTRA-ARTICULAR; INTRALESIONAL; INTRAMUSCULAR; INTRAVENOUS; SOFT TISSUE PRN
Status: DISCONTINUED | OUTPATIENT
Start: 2023-10-23 | End: 2023-10-23 | Stop reason: SDUPTHER

## 2023-10-23 RX ORDER — ROCURONIUM BROMIDE 10 MG/ML
INJECTION, SOLUTION INTRAVENOUS PRN
Status: DISCONTINUED | OUTPATIENT
Start: 2023-10-23 | End: 2023-10-23 | Stop reason: SDUPTHER

## 2023-10-23 RX ORDER — ONDANSETRON 2 MG/ML
INJECTION INTRAMUSCULAR; INTRAVENOUS PRN
Status: DISCONTINUED | OUTPATIENT
Start: 2023-10-23 | End: 2023-10-23 | Stop reason: SDUPTHER

## 2023-10-23 RX ORDER — PHENYLEPHRINE HCL IN 0.9% NACL 0.4MG/10ML
SYRINGE (ML) INTRAVENOUS PRN
Status: DISCONTINUED | OUTPATIENT
Start: 2023-10-23 | End: 2023-10-23 | Stop reason: SDUPTHER

## 2023-10-23 RX ORDER — SODIUM CHLORIDE 9 MG/ML
25 INJECTION, SOLUTION INTRAVENOUS PRN
Status: DISCONTINUED | OUTPATIENT
Start: 2023-10-23 | End: 2023-10-23 | Stop reason: HOSPADM

## 2023-10-23 RX ORDER — SODIUM CHLORIDE 0.9 % (FLUSH) 0.9 %
5-40 SYRINGE (ML) INJECTION EVERY 12 HOURS SCHEDULED
Status: DISCONTINUED | OUTPATIENT
Start: 2023-10-23 | End: 2023-10-23 | Stop reason: HOSPADM

## 2023-10-23 RX ADMIN — ROCURONIUM BROMIDE 5 MG: 10 SOLUTION INTRAVENOUS at 16:35

## 2023-10-23 RX ADMIN — SODIUM CHLORIDE: 9 INJECTION, SOLUTION INTRAVENOUS at 16:33

## 2023-10-23 RX ADMIN — Medication 80 MCG: at 16:48

## 2023-10-23 RX ADMIN — PROPOFOL 100 MG: 10 INJECTION, EMULSION INTRAVENOUS at 16:36

## 2023-10-23 RX ADMIN — Medication 120 MCG: at 16:36

## 2023-10-23 RX ADMIN — SUCCINYLCHOLINE CHLORIDE 140 MG: 20 INJECTION, SOLUTION INTRAMUSCULAR; INTRAVENOUS at 16:37

## 2023-10-23 RX ADMIN — LIDOCAINE HYDROCHLORIDE 80 MG: 20 INJECTION, SOLUTION EPIDURAL; INFILTRATION; INTRACAUDAL; PERINEURAL at 16:35

## 2023-10-23 RX ADMIN — DEXAMETHASONE SODIUM PHOSPHATE 4 MG: 4 INJECTION, SOLUTION INTRAMUSCULAR; INTRAVENOUS at 16:47

## 2023-10-23 RX ADMIN — ONDANSETRON 4 MG: 2 INJECTION INTRAMUSCULAR; INTRAVENOUS at 16:47

## 2023-10-23 RX ADMIN — PROPOFOL 30 MG: 10 INJECTION, EMULSION INTRAVENOUS at 17:01

## 2023-10-23 ASSESSMENT — PAIN - FUNCTIONAL ASSESSMENT: PAIN_FUNCTIONAL_ASSESSMENT: NONE - DENIES PAIN

## 2023-10-23 NOTE — PROGRESS NOTES
Palliative Medicine Office Visit  Palliative Medicine Nurse Check In  [phone]    Patient Name: Sheyla Buckner  YOB: 1960      Date of Office Visit: 10/23/23    Patient states: \"F/U \"    From Check In Sheet (scanned in Media):  Has a medical provider talked with you about cardiopulmonary resuscitation (CPR)? [x] Yes   [] No   [] Unable to obtain    Nurse reminder to complete or update ACP FlowSheet:    Is ACP on the Problem List?    [x] Yes    [] No  IF ACP Document is ON FILE; Nurse to place ACP on Problem List     Is there an ACP Note in Chart Review/Note? [x] Yes    [] No   If NO: ALERT PROVIDER         10/23/2023    11:00 AM   Demographics   Marital Status        Is there anything that we should know about you as a person in order to provide you the best care possible? Have you been to the ER, urgent care clinic since your last visit? [] Yes   [x] No   [] Unable to obtain    Have you been hospitalized since your last visit? [] Yes   [x] No   [] Unable to obtain    Have you seen or consulted any other health care providers outside of the [address] [place] [address] since your last visit? [] Yes   [x] No   [] Unable to obtain    Functional status (describe):   Functional      Last BM: Today     accessed (date):      Bottle review (for opioid pain medication):  Medication 1:  Oxycodone 5 mg  Date filled:   Directions: 1 every 4 prn  # filled:   # left: 45-50  # pills taking per day: 2.5 a day  Last dose taken:    Medication 2:   Date filled:   Directions:   # filled:   # left:   # pills taking per day:  Last dose taken:
07/17/2023    ENDOSCOPIC ULTRASOUND performed by Olivier Lomax MD at [address] N/A 07/17/2023    EGD ESOPHAGOGASTRODUODENOSCOPY performed by Olivier Lomax MD at [facility]  07/18/2023    US GUIDED LIVER BIOPSY PERCUTANEOUS 7/18/2023 Bart Barnhart, APRN - NP [facility]       Tobacco:    Tobacco Use: Low Risk  (10/23/2023)    Patient History     Smoking Tobacco Use: Never     Smokeless Tobacco Use: Never     Passive Exposure: Not on file       Alcohol:    Alcohol Use: Not on file     Illicit substance use: None    Fam Hx:    Sister had melanoma- cured  Father had colon cancer  Mother multiple heart conditions     REVIEW OF SYSTEMS:     Modified-[place] Symptom Assessment Scale (ESAS)  Tiredness Score: Not tired  Drowsiness Score: Not drowsy  Depression Score: Not depressed  Pain Score: 1  Anxiety Score: Not anxious  Nausea Score: Not nauseated  Appetite Score: 1  Dyspnea Score: No shortness of breath  Constipation: No  Wellbeing Score: Best feeling of wellbeing     FUNCTIONAL ASSESSMENT:     Use of assist devices:    [x] None       [] Cane     [] Walker    [] Wheelchair    [] Bed bound      Palliative Performance Scale (PPS)  PPS: 70         PHYSICAL EXAM:     Wt Readings from Last 3 Encounters:   10/23/23 68.3 kg (150 lb 8 oz)   10/18/23 66.7 kg (147 lb)   10/10/23 68.9 kg (152 lb)     Resp. rate 18.   Last bowel movement: See Nursing Note    Constitutional: Thin age appropriate male; sitting up comfortably in chair in NAD  Eyes: sclerae anicteric  ENMT: no nasal discharge, moist mucous membranes  Cardiovascular: irregular, AV click, distal pulses intact, no peripheral edema  Respiratory: breathing not labored at rest on RA, symmetric bs throughout  Gastrointestinal: soft non-tender, +bowel sounds  Musculoskeletal: no deformity, no tenderness to palpation  Skin: warm, dry  Neurologic: cognition intact  Psychiatric: full

## 2023-10-23 NOTE — ANESTHESIA PRE PROCEDURE
Department of Anesthesiology  Preprocedure Note       Name:  Padmaja Sim   Age:  58 y.o.  :  1960                                          MRN:  [id number]         Date:  10/23/2023      Surgeon: Petra Bey):  James Ho MD    Procedure: Procedure(s):  ERCP ENDOSCOPIC RETROGRADE CHOLANGIOPANCREATOGRAPHY    Medications prior to admission:   Prior to Admission medications    Medication Sig Start Date End Date Taking? Authorizing Provider   sodium chloride flush 0.9 % injection 10 mLs by IntraCATHeter route 2 times daily 10/4/23   Carl Buckley MD   enoxaparin (LOVENOX) 100 MG/ML Inject 1 mL into the skin daily 23   Olivier Ballard MD   baclofen (LIORESAL) 10 MG tablet Take 1 tablet by mouth 3 times daily 3 times daily as needed for hiccups    ProviderBharat MD   oxyCODONE (ROXICODONE) 5 MG immediate release tablet Take 1 tablet by mouth every 6 hours as needed for Pain for up to 15 days. Max Daily Amount: 20 mg 8/18/23 10/15/24  Adam Wilson MD   Pancrelipase, Lip-Prot-Amyl, (ZENPEP) 13440-25209 units CPEP Take 2 capsules with first bite of full meal, 1 with first bite of snacks. Up to 8 per day. 23   Kinjal Saucedo MD   ondansetron (ZOFRAN-ODT) 8 MG TBDP disintegrating tablet Place 1 tablet under the tongue every 8 hours as needed for Nausea or Vomiting 23   Kinjal Saucedo MD   prochlorperazine (COMPAZINE) 5 MG tablet Take 1 tablet by mouth every 6 hours as needed for Nausea  Patient not taking: Reported on 10/23/2023 8/1/23   Olivier Ballard MD   Multiple Vitamins-Minerals (MULTIVITAMIN ADULTS 50+ PO) Take 1 tablet by mouth daily    ProviderBharat MD   vitamin D (CHOLECALCIFEROL) 25 MCG (1000 UT) TABS tablet Take by mouth daily    Automatic Reconciliation, Ar       Current medications:    No current facility-administered medications for this encounter.      Facility-Administered Medications Ordered in Other Encounters   Medication Dose Route Frequency

## 2023-10-23 NOTE — OP NOTE
[address]  [phone]                            NAME:  Pasha Veronica   :   1960   MRN:   [id number]     Date/Time:  10/23/2023 5:01 PM    Esophagogastroduodenoscopy (EGD) Procedure Note    :  Mauricio Galindo MD    Staff: Circulator: Alia Lassiter RN; Binta Vaughn RN  Endoscopy Technician: Ermelinda Sorto     Implants: none    Referring Provider:  Radha Montano MD    Anethesia/Sedation:  [place]    Procedure Details     After infom consent was obtained for the procedure, with all risks and benefits of procedure explained the patient was taken to the endoscopy suite and placed in the left lateral decubitus position. Following sequential administration of sedation as per above, the [id number] gastroscope was inserted into the mouth and advanced under direct vision to second portion of the duodenum. A careful inspection was made as the gastroscope was withdrawn, including a retroflexed view of the proximal stomach; findings and interventions are described below. Findings:  Esophagus:normal  Stomach:Copious amount of solid and liquid food in stomach. Distended stomach. Duodenum/jejunum: Duodenal bulb showed infiltration of mass into the distal bulb. Duodenal stricture was noted that could not be traversed with the duodenoscope to do ERCP. This was traversed with gastroscope. Therapies:  biopsy of duodenal distal bulb    Specimens: biopsy of duodenal distal bulb           EBL: None    Complications:   None; patient tolerated the procedure well. Impression:    See Postoperative diagnosis above    Recommendations:  -Await pathology. , -Follow symptoms. , -Liquid diet.  -Would need Duodenal stent and will plan after th biopsy is available    Discharge disposition:  Home in the company of  when able to ambulate    Mauricio Galindo MD

## 2023-10-23 NOTE — H&P
given the options of postponing their procedure. All of the risks, benefits, and alternatives were discussed. The patient does wish to proceed with the procedure.       Assessment:   Bile duct obstruction  Pancreas head AdenoCa    Plan:   Endoscopic procedure  [place]

## 2023-10-23 NOTE — PROGRESS NOTES

## 2023-10-23 NOTE — DISCHARGE INSTRUCTIONS
Patient would like communication of their results via:      Cell Phone:   Telephone Information:   Mobile [phone]     Okay to leave a message containing results? Yes     Health Maintenance Due   Topic Date Due   • Varicella Vaccine (1 of 2 - 2-dose childhood series) Never done   • COVID-19 Vaccine (1) Never done   • Depression Screening  Never done   • DTaP/Tdap/Td Vaccine (1 - Tdap) 10/08/2004   • Influenza Vaccine (1) Never done     Patient is up to date, no discussion needed..               your healthcare professional. [address] any warranty or liability for your use of this information.

## 2023-10-24 ENCOUNTER — APPOINTMENT (OUTPATIENT)
Facility: HOSPITAL | Age: 63
End: 2023-10-24
Payer: COMMERCIAL

## 2023-10-24 ENCOUNTER — CARE COORDINATION (OUTPATIENT)
Dept: OTHER | Facility: CLINIC | Age: 63
End: 2023-10-24

## 2023-10-24 NOTE — CARE COORDINATION
CARLENE   4/17/2024 11:20 AM MD ABBIE Bruce BS CARLENE       LPN Care Coordinator reviewed medical action plan and red flags with patient and discussed any barriers to care and/or understanding of plan of care after discharge. Discussed appropriate site of care based on symptoms and resources available to patient including: PCP  Specialist  When to call Lisbeth Benavidez. The patient agrees to contact the PCP office for questions related to their healthcare. Advance Care Planning:   reviewed and current. Patients top risk factors for readmission: medical condition-   Interventions to address risk factors:  Continued close follow up with physicians and reporting any new symptoms asap. LPN Care Coordinator provided contact information for future needs. Plan for follow-up call in 10-14 days based on severity of symptoms and risk factors.   Plan for next call:  biopsy results and further treatment plan    Marcio Preciado LPN

## 2023-10-25 ENCOUNTER — HOME CARE VISIT (OUTPATIENT)
Facility: HOME HEALTH | Age: 63
End: 2023-10-25
Payer: COMMERCIAL

## 2023-10-25 VITALS
OXYGEN SATURATION: 99 % | TEMPERATURE: 97.8 F | SYSTOLIC BLOOD PRESSURE: 117 MMHG | RESPIRATION RATE: 18 BRPM | DIASTOLIC BLOOD PRESSURE: 75 MMHG | HEART RATE: 89 BPM

## 2023-10-25 PROCEDURE — G0299 HHS/HOSPICE OF RN EA 15 MIN: HCPCS

## 2023-10-25 ASSESSMENT — ENCOUNTER SYMPTOMS: STOOL DESCRIPTION: FORMED

## 2023-10-25 NOTE — PLAN OF CARE
Problem: Pain  Goal: Verbalizes/displays adequate comfort level or baseline comfort level  7/17/2023 0928 by Cathy Goldberg RN  Outcome: Progressing  7/17/2023 0638 by Rogelio Mcgee RN  Outcome: Progressing     Problem: ABCDS Injury Assessment  Goal: Absence of physical injury  7/17/2023 0638 by Rogelio Mcgee RN  Outcome: Progressing     Problem: Safety - Adult  Goal: Free from fall injury  7/17/2023 0928 by Cathy Goldberg RN  Outcome: Progressing  7/17/2023 0638 by Rogelio Mcgee RN  Outcome: Progressing Wire advanced in the left anterior descending first diagonal. . Number 2

## 2023-10-26 ENCOUNTER — HOSPITAL ENCOUNTER (OUTPATIENT)
Facility: HOSPITAL | Age: 63
Discharge: HOME | End: 2023-10-26
Payer: COMMERCIAL

## 2023-10-26 ENCOUNTER — HOSPITAL ENCOUNTER (OUTPATIENT)
Facility: HOSPITAL | Age: 63
Setting detail: INFUSION SERIES
Discharge: HOME | End: 2023-10-26
Payer: COMMERCIAL

## 2023-10-26 ENCOUNTER — OFFICE VISIT (OUTPATIENT)
Age: 63
End: 2023-10-26
Payer: COMMERCIAL

## 2023-10-26 ENCOUNTER — CLINICAL DOCUMENTATION (OUTPATIENT)
Age: 63
End: 2023-10-26

## 2023-10-26 VITALS
BODY MASS INDEX: 21 KG/M2 | HEIGHT: 71 IN | DIASTOLIC BLOOD PRESSURE: 63 MMHG | SYSTOLIC BLOOD PRESSURE: 135 MMHG | HEART RATE: 71 BPM | WEIGHT: 150 LBS | OXYGEN SATURATION: 96 % | TEMPERATURE: 97.3 F | RESPIRATION RATE: 16 BRPM

## 2023-10-26 VITALS
RESPIRATION RATE: 16 BRPM | DIASTOLIC BLOOD PRESSURE: 63 MMHG | TEMPERATURE: 97.3 F | HEART RATE: 71 BPM | WEIGHT: 150 LBS | BODY MASS INDEX: 20.92 KG/M2 | SYSTOLIC BLOOD PRESSURE: 135 MMHG | OXYGEN SATURATION: 96 %

## 2023-10-26 DIAGNOSIS — C78.7 CANCER, METASTATIC TO LIVER (HCC): ICD-10-CM

## 2023-10-26 DIAGNOSIS — Z51.11 ENCOUNTER FOR ANTINEOPLASTIC CHEMOTHERAPY: ICD-10-CM

## 2023-10-26 DIAGNOSIS — C25.0 ADENOCARCINOMA OF HEAD OF PANCREAS (HCC): ICD-10-CM

## 2023-10-26 DIAGNOSIS — C25.9 MALIGNANT NEOPLASM OF PANCREAS, UNSPECIFIED LOCATION OF MALIGNANCY (HCC): Primary | ICD-10-CM

## 2023-10-26 DIAGNOSIS — C25.9 MALIGNANT NEOPLASM OF PANCREAS, UNSPECIFIED LOCATION OF MALIGNANCY (HCC): ICD-10-CM

## 2023-10-26 LAB
ALBUMIN SERPL-MCNC: 3.2 G/DL (ref 3.5–5)
ALBUMIN/GLOB SERPL: 1.1 (ref 1.1–2.2)
ALP SERPL-CCNC: 468 U/L (ref 45–117)
ALT SERPL-CCNC: 113 U/L (ref 12–78)
ANION GAP SERPL CALC-SCNC: 6 MMOL/L (ref 5–15)
AST SERPL-CCNC: 56 U/L (ref 15–37)
BASO+EOS+MONOS # BLD AUTO: 0.6 K/UL (ref 0.2–1.2)
BASO+EOS+MONOS NFR BLD AUTO: 9 % (ref 3.2–16.9)
BILIRUB SERPL-MCNC: 0.4 MG/DL (ref 0.2–1)
BUN SERPL-MCNC: 13 MG/DL (ref 6–20)
BUN/CREAT SERPL: 18 (ref 12–20)
CALCIUM SERPL-MCNC: 9.1 MG/DL (ref 8.5–10.1)
CHLORIDE SERPL-SCNC: 106 MMOL/L (ref 97–108)
CO2 SERPL-SCNC: 27 MMOL/L (ref 21–32)
CREAT SERPL-MCNC: 0.74 MG/DL (ref 0.7–1.3)
DIFFERENTIAL METHOD BLD: ABNORMAL
ERYTHROCYTE [DISTWIDTH] IN BLOOD BY AUTOMATED COUNT: 14.8 % (ref 11.8–15.8)
GLOBULIN SER CALC-MCNC: 3 G/DL (ref 2–4)
GLUCOSE SERPL-MCNC: 177 MG/DL (ref 65–100)
HCT VFR BLD AUTO: 35 % (ref 36.6–50.3)
HGB BLD-MCNC: 11.4 G/DL (ref 12.1–17)
INR PPP: 1 (ref 0.9–1.1)
LYMPHOCYTES # BLD: 1.7 K/UL (ref 0.8–3.5)
LYMPHOCYTES NFR BLD: 29 % (ref 12–49)
MCH RBC QN AUTO: 30.4 PG (ref 26–34)
MCHC RBC AUTO-ENTMCNC: 32.6 G/DL (ref 30–36.5)
MCV RBC AUTO: 93.3 FL (ref 80–99)
NEUTS SEG # BLD: 3.7 K/UL (ref 1.8–8)
NEUTS SEG NFR BLD: 62 % (ref 32–75)
PLATELET # BLD AUTO: 172 K/UL (ref 150–400)
POTASSIUM SERPL-SCNC: 4.4 MMOL/L (ref 3.5–5.1)
PROT SERPL-MCNC: 6.2 G/DL (ref 6.4–8.2)
PROTHROMBIN TIME: 10.5 SEC (ref 9–11.1)
RBC # BLD AUTO: 3.75 M/UL (ref 4.1–5.7)
SODIUM SERPL-SCNC: 139 MMOL/L (ref 136–145)
WBC # BLD AUTO: 6 K/UL (ref 4.1–11.1)

## 2023-10-26 PROCEDURE — 85025 COMPLETE CBC W/AUTO DIFF WBC: CPT

## 2023-10-26 PROCEDURE — 36591 DRAW BLOOD OFF VENOUS DEVICE: CPT

## 2023-10-26 PROCEDURE — 3075F SYST BP GE 130 - 139MM HG: CPT | Performed by: INTERNAL MEDICINE

## 2023-10-26 PROCEDURE — 99215 OFFICE O/P EST HI 40 MIN: CPT | Performed by: INTERNAL MEDICINE

## 2023-10-26 PROCEDURE — 74177 CT ABD & PELVIS W/CONTRAST: CPT

## 2023-10-26 PROCEDURE — 6360000004 HC RX CONTRAST MEDICATION: Performed by: RADIOLOGY

## 2023-10-26 PROCEDURE — 85610 PROTHROMBIN TIME: CPT

## 2023-10-26 PROCEDURE — 3078F DIAST BP <80 MM HG: CPT | Performed by: INTERNAL MEDICINE

## 2023-10-26 PROCEDURE — 80053 COMPREHEN METABOLIC PANEL: CPT

## 2023-10-26 PROCEDURE — 36415 COLL VENOUS BLD VENIPUNCTURE: CPT

## 2023-10-26 RX ORDER — HEPARIN SODIUM (PORCINE) LOCK FLUSH IV SOLN 100 UNIT/ML 100 UNIT/ML
500 SOLUTION INTRAVENOUS PRN
OUTPATIENT
Start: 2023-11-02

## 2023-10-26 RX ORDER — HEPARIN 100 UNIT/ML
500 SYRINGE INTRAVENOUS PRN
Status: DISCONTINUED | OUTPATIENT
Start: 2023-10-26 | End: 2023-10-27 | Stop reason: HOSPADM

## 2023-10-26 RX ORDER — SODIUM CHLORIDE 0.9 % (FLUSH) 0.9 %
5-40 SYRINGE (ML) INJECTION PRN
OUTPATIENT
Start: 2023-11-02

## 2023-10-26 RX ORDER — SODIUM CHLORIDE 9 MG/ML
5-250 INJECTION, SOLUTION INTRAVENOUS PRN
OUTPATIENT
Start: 2023-11-02

## 2023-10-26 RX ORDER — SODIUM CHLORIDE 9 MG/ML
5-250 INJECTION, SOLUTION INTRAVENOUS PRN
Status: DISCONTINUED | OUTPATIENT
Start: 2023-10-26 | End: 2023-10-27 | Stop reason: HOSPADM

## 2023-10-26 RX ORDER — SODIUM CHLORIDE 0.9 % (FLUSH) 0.9 %
5-40 SYRINGE (ML) INJECTION PRN
Status: DISCONTINUED | OUTPATIENT
Start: 2023-10-26 | End: 2023-10-27 | Stop reason: HOSPADM

## 2023-10-26 RX ADMIN — IOPAMIDOL 100 ML: 755 INJECTION, SOLUTION INTRAVENOUS at 16:19

## 2023-10-26 ASSESSMENT — PAIN SCALES - GENERAL: PAINLEVEL_OUTOF10: 0

## 2023-10-26 NOTE — PROGRESS NOTES
Cancer [place] at [facility]   [address] [facility], [address], [address] [place] [address] Avenue: [phone]  F: [phone]      Medical Nutrition Therapy  Nutrition Encounter    Met with patient and wife. He is currently on a liquid diet. EGD on 10/23 showed copious amount of solid and liquid food in stomach, distended stomach, mass found into distal bulb, duodenal stricture noted. As a result, he is on a liquid diet. He had soup last night for dinner. Had a bowel movement- normal for him, firmed. Ht Readings from Last 1 Encounters:   10/26/23 1.803 m (5' 11\")       Wt Readings from Last 5 Encounters:   10/26/23 68 kg (150 lb)   10/26/23 68 kg (150 lb)   10/23/23 68.3 kg (150 lb 8 oz)   10/18/23 66.7 kg (147 lb)   10/10/23 69.1 kg (152 lb 6.4 oz)     Estimated Nutrition Needs:   Calorie Range: 1775-2136kcal/day      Protein Range: 71-85g/day      Fluid Needs: 2000ml     Plan:  - Continue with zenpep as prescribed. - Discussed liquid diet and strategies to make it more calorically dense.            Signed By: Анна He, [address] [facility]

## 2023-10-27 ENCOUNTER — TELEPHONE (OUTPATIENT)
Age: 63
End: 2023-10-27

## 2023-10-27 DIAGNOSIS — G89.3 CANCER RELATED PAIN: Primary | ICD-10-CM

## 2023-10-27 DIAGNOSIS — C25.0 ADENOCARCINOMA OF HEAD OF PANCREAS (HCC): ICD-10-CM

## 2023-10-27 RX ORDER — OXYCODONE HYDROCHLORIDE 5 MG/1
5 TABLET ORAL EVERY 4 HOURS PRN
Qty: 60 TABLET | Refills: 0 | Status: SHIPPED | OUTPATIENT
Start: 2023-10-27 | End: 2023-11-11

## 2023-10-27 NOTE — TELEPHONE ENCOUNTER
Palliative Medicine  Nursing Note  [phone]  Fax [phone]      Telephone Call  Patient Name: Ladan Joseph  YOB: 1960    10/27/2023        Primary Decision Maker: Ankita oMncada - Spouse - [phone]    Secondary Decision Maker: Sergey Mccullough - Child - [phone]       10/27/2023     1:20 PM   Demographics   Marital Status      Patient states he lost his bottle of Oxycodone and is requesting a new prescription to be sent to [facility].     Triage for Controlled Substance Refill Request    Pain Diagnosis: Pancreatic cancer    Last Outpatient Visit: 10/23/23    Next Outpatient Visit: 12/4/23    Reason for refill needed outside of office visit-  Appointment not scheduled prior to need for scheduled refill,     Any Reported Side effects: none    Last dose taken:     Pharmacy: [facility]    Medication: Oxy IR 5 mg  Dose and directions: 1 every 4 prn  Number dispensed: 60 for 15 day supply  Date filled ( or Pharmacy): 10/17/23  # left: Lost Bottle       reviewed: Yes    Date of Urine Drug Screen:      Opioid Safety Handout given:  Yes    Appropriate for refill:  Lost bottle    Action: Pend for Dr. Oleg Cobos, RN  Palliative Medicine

## 2023-10-27 NOTE — TELEPHONE ENCOUNTER
The patient has lost/misplaced Oxycodone somewhere between his driveway and [facility] to get chemo. He is asking for another script to be called in to [facility] on [address].

## 2023-10-27 NOTE — HOME HEALTH
Subjective: Patient states he is doing well and is ready to get back to his normal routine. Falls since last visit No(if yes complete the Fall Tracking Form and include bsrifallreport):   Caregiver involvement changes: N/a  Home health supplies by type and quantity ordered/delivered this visit include: N/A    Clinician asked if patient has had any physician contact since last home care visit and patient states: NO  Clinician asked if patient has any new or changed medications and patient states:  N/A   If Yes, were medications reconciled? N/A   Was the certifying physician notified of changes in medications? N/A no    Clinical assessment (what this visit means for the patient overall and need for ongoing skilled care) and progress or lack of progress towards SPECIFIC goals: All goals met- patient discharged from [facility]. All concerns/questions addressed. Written Teaching Material Utilized: N/A    Interdisciplinary communication with: N/A    Discharge planning as follows: Discharged.
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
## 2023-11-02 ENCOUNTER — OFFICE VISIT (OUTPATIENT)
Age: 63
End: 2023-11-02
Payer: COMMERCIAL

## 2023-11-02 VITALS
TEMPERATURE: 98.4 F | OXYGEN SATURATION: 98 % | SYSTOLIC BLOOD PRESSURE: 122 MMHG | HEART RATE: 68 BPM | WEIGHT: 151.6 LBS | DIASTOLIC BLOOD PRESSURE: 78 MMHG | BODY MASS INDEX: 21.7 KG/M2 | HEIGHT: 70 IN | RESPIRATION RATE: 17 BRPM

## 2023-11-02 DIAGNOSIS — C25.9 MALIGNANT NEOPLASM OF PANCREAS, UNSPECIFIED LOCATION OF MALIGNANCY (HCC): ICD-10-CM

## 2023-11-02 DIAGNOSIS — Z85.07 HISTORY OF PANCREATIC CANCER: Primary | ICD-10-CM

## 2023-11-02 DIAGNOSIS — K81.0 ACUTE CHOLECYSTITIS: Primary | ICD-10-CM

## 2023-11-02 PROCEDURE — 3074F SYST BP LT 130 MM HG: CPT | Performed by: INTERNAL MEDICINE

## 2023-11-02 PROCEDURE — 99215 OFFICE O/P EST HI 40 MIN: CPT | Performed by: INTERNAL MEDICINE

## 2023-11-02 PROCEDURE — 3078F DIAST BP <80 MM HG: CPT | Performed by: INTERNAL MEDICINE

## 2023-11-02 ASSESSMENT — PATIENT HEALTH QUESTIONNAIRE - PHQ9
2. FEELING DOWN, DEPRESSED OR HOPELESS: 0
SUM OF ALL RESPONSES TO PHQ QUESTIONS 1-9: 0
SUM OF ALL RESPONSES TO PHQ QUESTIONS 1-9: 0
SUM OF ALL RESPONSES TO PHQ9 QUESTIONS 1 & 2: 0
SUM OF ALL RESPONSES TO PHQ QUESTIONS 1-9: 0
1. LITTLE INTEREST OR PLEASURE IN DOING THINGS: 0
SUM OF ALL RESPONSES TO PHQ QUESTIONS 1-9: 0

## 2023-11-07 ENCOUNTER — HOSPITAL ENCOUNTER (OUTPATIENT)
Facility: HOSPITAL | Age: 63
Setting detail: INFUSION SERIES
Discharge: HOME | End: 2023-11-07
Payer: COMMERCIAL

## 2023-11-07 VITALS
RESPIRATION RATE: 16 BRPM | BODY MASS INDEX: 22.21 KG/M2 | HEART RATE: 70 BPM | TEMPERATURE: 97.7 F | OXYGEN SATURATION: 98 % | DIASTOLIC BLOOD PRESSURE: 67 MMHG | WEIGHT: 154.8 LBS | SYSTOLIC BLOOD PRESSURE: 112 MMHG

## 2023-11-07 DIAGNOSIS — C25.9 MALIGNANT NEOPLASM OF PANCREAS, UNSPECIFIED LOCATION OF MALIGNANCY (HCC): Primary | ICD-10-CM

## 2023-11-07 LAB
ALBUMIN SERPL-MCNC: 3.2 G/DL (ref 3.5–5)
ALBUMIN/GLOB SERPL: 1 (ref 1.1–2.2)
ALP SERPL-CCNC: 650 U/L (ref 45–117)
ALT SERPL-CCNC: 186 U/L (ref 12–78)
ANION GAP SERPL CALC-SCNC: 6 MMOL/L (ref 5–15)
AST SERPL-CCNC: 113 U/L (ref 15–37)
BASOPHILS # BLD: 0.1 K/UL (ref 0–0.1)
BASOPHILS NFR BLD: 1 % (ref 0–1)
BILIRUB SERPL-MCNC: 0.6 MG/DL (ref 0.2–1)
BUN SERPL-MCNC: 8 MG/DL (ref 6–20)
BUN/CREAT SERPL: 11 (ref 12–20)
CALCIUM SERPL-MCNC: 8.8 MG/DL (ref 8.5–10.1)
CHLORIDE SERPL-SCNC: 106 MMOL/L (ref 97–108)
CO2 SERPL-SCNC: 27 MMOL/L (ref 21–32)
CREAT SERPL-MCNC: 0.71 MG/DL (ref 0.7–1.3)
DIFFERENTIAL METHOD BLD: ABNORMAL
EOSINOPHIL # BLD: 0.2 K/UL (ref 0–0.4)
EOSINOPHIL NFR BLD: 3 % (ref 0–7)
ERYTHROCYTE [DISTWIDTH] IN BLOOD BY AUTOMATED COUNT: 13.9 % (ref 11.5–14.5)
GLOBULIN SER CALC-MCNC: 3.1 G/DL (ref 2–4)
GLUCOSE SERPL-MCNC: 137 MG/DL (ref 65–100)
HCT VFR BLD AUTO: 36.7 % (ref 36.6–50.3)
HGB BLD-MCNC: 11.7 G/DL (ref 12.1–17)
IMM GRANULOCYTES # BLD AUTO: 0 K/UL (ref 0–0.04)
IMM GRANULOCYTES NFR BLD AUTO: 0 % (ref 0–0.5)
INR PPP: 1 (ref 0.9–1.1)
LYMPHOCYTES # BLD: 1.6 K/UL (ref 0.8–3.5)
LYMPHOCYTES NFR BLD: 23 % (ref 12–49)
MCH RBC QN AUTO: 30 PG (ref 26–34)
MCHC RBC AUTO-ENTMCNC: 31.9 G/DL (ref 30–36.5)
MCV RBC AUTO: 94.1 FL (ref 80–99)
MONOCYTES # BLD: 1.1 K/UL (ref 0–1)
MONOCYTES NFR BLD: 16 % (ref 5–13)
NEUTS SEG # BLD: 3.8 K/UL (ref 1.8–8)
NEUTS SEG NFR BLD: 57 % (ref 32–75)
NRBC # BLD: 0 K/UL (ref 0–0.01)
NRBC BLD-RTO: 0 PER 100 WBC
PLATELET # BLD AUTO: 165 K/UL (ref 150–400)
PMV BLD AUTO: 11.5 FL (ref 8.9–12.9)
POTASSIUM SERPL-SCNC: 4.5 MMOL/L (ref 3.5–5.1)
PROT SERPL-MCNC: 6.3 G/DL (ref 6.4–8.2)
PROTHROMBIN TIME: 10.8 SEC (ref 9–11.1)
RBC # BLD AUTO: 3.9 M/UL (ref 4.1–5.7)
SODIUM SERPL-SCNC: 139 MMOL/L (ref 136–145)
WBC # BLD AUTO: 6.6 K/UL (ref 4.1–11.1)

## 2023-11-07 PROCEDURE — 36415 COLL VENOUS BLD VENIPUNCTURE: CPT

## 2023-11-07 PROCEDURE — 96375 TX/PRO/DX INJ NEW DRUG ADDON: CPT

## 2023-11-07 PROCEDURE — 96366 THER/PROPH/DIAG IV INF ADDON: CPT

## 2023-11-07 PROCEDURE — 86301 IMMUNOASSAY TUMOR CA 19-9: CPT

## 2023-11-07 PROCEDURE — 96367 TX/PROPH/DG ADDL SEQ IV INF: CPT

## 2023-11-07 PROCEDURE — 85610 PROTHROMBIN TIME: CPT

## 2023-11-07 PROCEDURE — 6360000002 HC RX W HCPCS: Performed by: INTERNAL MEDICINE

## 2023-11-07 PROCEDURE — 2580000003 HC RX 258: Performed by: INTERNAL MEDICINE

## 2023-11-07 PROCEDURE — 96413 CHEMO IV INFUSION 1 HR: CPT

## 2023-11-07 PROCEDURE — 96416 CHEMO PROLONG INFUSE W/PUMP: CPT

## 2023-11-07 PROCEDURE — 85025 COMPLETE CBC W/AUTO DIFF WBC: CPT

## 2023-11-07 PROCEDURE — 80053 COMPREHEN METABOLIC PANEL: CPT

## 2023-11-07 PROCEDURE — 96415 CHEMO IV INFUSION ADDL HR: CPT

## 2023-11-07 RX ORDER — DEXTROSE MONOHYDRATE 50 MG/ML
5-250 INJECTION, SOLUTION INTRAVENOUS PRN
Status: DISCONTINUED | OUTPATIENT
Start: 2023-11-07 | End: 2023-11-08 | Stop reason: HOSPADM

## 2023-11-07 RX ORDER — HEPARIN 100 UNIT/ML
500 SYRINGE INTRAVENOUS PRN
Status: DISCONTINUED | OUTPATIENT
Start: 2023-11-07 | End: 2023-11-08 | Stop reason: HOSPADM

## 2023-11-07 RX ORDER — PALONOSETRON 0.05 MG/ML
0.25 INJECTION, SOLUTION INTRAVENOUS ONCE
Status: COMPLETED | OUTPATIENT
Start: 2023-11-07 | End: 2023-11-07

## 2023-11-07 RX ORDER — SODIUM CHLORIDE 0.9 % (FLUSH) 0.9 %
5-40 SYRINGE (ML) INJECTION PRN
Status: DISCONTINUED | OUTPATIENT
Start: 2023-11-07 | End: 2023-11-08 | Stop reason: HOSPADM

## 2023-11-07 RX ORDER — SODIUM CHLORIDE 9 MG/ML
5-250 INJECTION, SOLUTION INTRAVENOUS PRN
Status: DISCONTINUED | OUTPATIENT
Start: 2023-11-07 | End: 2023-11-08 | Stop reason: HOSPADM

## 2023-11-07 RX ADMIN — DEXTROSE MONOHYDRATE 25 ML/HR: 50 INJECTION, SOLUTION INTRAVENOUS at 10:15

## 2023-11-07 RX ADMIN — OXALIPLATIN 110 MG: 5 INJECTION, SOLUTION INTRAVENOUS at 11:21

## 2023-11-07 RX ADMIN — FLUOROURACIL 3000 MG: 50 INJECTION, SOLUTION INTRAVENOUS at 14:05

## 2023-11-07 RX ADMIN — SODIUM CHLORIDE, PRESERVATIVE FREE 20 ML: 5 INJECTION INTRAVENOUS at 14:16

## 2023-11-07 RX ADMIN — PALONOSETRON 0.25 MG: 0.05 INJECTION, SOLUTION INTRAVENOUS at 10:17

## 2023-11-07 RX ADMIN — SODIUM CHLORIDE 150 MG: 900 INJECTION, SOLUTION INTRAVENOUS at 10:19

## 2023-11-07 RX ADMIN — METHYLPREDNISOLONE SODIUM SUCCINATE 62.5 MG: 125 INJECTION, POWDER, FOR SOLUTION INTRAMUSCULAR; INTRAVENOUS at 10:15

## 2023-11-07 ASSESSMENT — PAIN SCALES - GENERAL: PAINLEVEL_OUTOF10: 0

## 2023-11-08 ENCOUNTER — CARE COORDINATION (OUTPATIENT)
Dept: OTHER | Facility: CLINIC | Age: 63
End: 2023-11-08

## 2023-11-08 ENCOUNTER — TELEPHONE (OUTPATIENT)
Age: 63
End: 2023-11-08

## 2023-11-08 ENCOUNTER — CLINICAL DOCUMENTATION (OUTPATIENT)
Age: 63
End: 2023-11-08

## 2023-11-08 LAB — CANCER AG19-9 SERPL-ACNC: 23 U/ML (ref 0–35)

## 2023-11-08 NOTE — PROGRESS NOTES
Spoke with Dr. Castro Reasons  For now holding off on duodenal stent as scans were better and he has no symptoms  I let him know that some liver numbers are rising and he will reach out to the patient to look into addressing the bile duct stent  Please make the patient aware Clara

## 2023-11-08 NOTE — CARE COORDINATION
Care Transitions Follow Up Call    ACM attempted to reach patient for Care Transitions follow up call. HIPAA compliant message left requesting a return phone call at patient convenience.      Plan for follow-up call in 10-14 days    Future Appointments   Date Time Provider [address]   11/9/2023  3:00 PM H2 LIZETT FASTRACK RCH[facility] [address]   11/16/2023 10:00  [address] ANGIO [address] [place] [address]   11/21/2023  7:30 AM B2 LIZETT LONG [place] [facility] [address]   11/21/2023  8:15 AM Sharifa Saucedo MD [facility] BS AMB   11/24/2023 10:00 AM G2 LIZETT FASTRACK [facility] [address]   12/4/2023 11:00 AM Dimple Dalton MD [facility] BS AMB   12/5/2023  7:30 AM F3 LIZETT LONG [place] [facility] [address]   12/7/2023  3:00 PM H1 LIZETT FASTRACK R[facility] [address]   12/19/2023  7:30 AM F3 LIZETT LONG [place] R[facility] [address]   12/21/2023  3:00 PM H1 LIZETT FASTRACK R[facility] [address]   1/2/2024  7:30 AM F3 LIZETT LONG [place] [facility] [address]   1/4/2024  3:00 PM G2 LIZETT FASTRACK R[facility] [address]   1/16/2024  8:00 AM C2 LIZETT LONG [place] R[facility] [address]   1/18/2024  3:00 PM H1 LIZETT FASTRACK R[facility] [address]   1/30/2024  7:30 AM A2 LIZETT LONG [place] R[facility] [address]   1/31/2024  3:00 PM BSC COCO ECHO 1 ANIBALREY BS AMB   1/31/2024  3:40 PM MD ABBIE Bruce BS AMB   2/1/2024  3:00 PM H2 LIZETT FASTRACK R[facility] [address]   4/17/2024 11:20 AM MD ABBIE Bruce AMB

## 2023-11-08 NOTE — TELEPHONE ENCOUNTER
6711:    Called patient per MD Saucedo request to provide the following updates:    Spoke with Dr. Dayron Parikh  For now holding off on duodenal stent as scans were better and he has no symptoms  MD Saucedo did let him know that some liver numbers are rising and he will reach out to the patient to look into addressing the bile duct stent    No answer   LVM to call office back     [id number]:  Call returned to patient   HIPPA verified   Updates provided     Patient verbalized understanding and agreed with plan   No new questions or concerns at this time

## 2023-11-09 ENCOUNTER — HOSPITAL ENCOUNTER (OUTPATIENT)
Facility: HOSPITAL | Age: 63
Setting detail: INFUSION SERIES
Discharge: HOME | End: 2023-11-09
Payer: COMMERCIAL

## 2023-11-09 VITALS
TEMPERATURE: 98.8 F | HEART RATE: 71 BPM | RESPIRATION RATE: 16 BRPM | DIASTOLIC BLOOD PRESSURE: 54 MMHG | SYSTOLIC BLOOD PRESSURE: 121 MMHG

## 2023-11-09 DIAGNOSIS — C25.9 MALIGNANT NEOPLASM OF PANCREAS, UNSPECIFIED LOCATION OF MALIGNANCY (HCC): Primary | ICD-10-CM

## 2023-11-09 PROCEDURE — 96523 IRRIG DRUG DELIVERY DEVICE: CPT

## 2023-11-09 PROCEDURE — 2580000003 HC RX 258: Performed by: INTERNAL MEDICINE

## 2023-11-09 RX ORDER — SODIUM CHLORIDE 9 MG/ML
5-250 INJECTION, SOLUTION INTRAVENOUS PRN
Status: DISCONTINUED | OUTPATIENT
Start: 2023-11-09 | End: 2023-11-10 | Stop reason: HOSPADM

## 2023-11-09 RX ORDER — SODIUM CHLORIDE 0.9 % (FLUSH) 0.9 %
5-40 SYRINGE (ML) INJECTION PRN
Status: DISCONTINUED | OUTPATIENT
Start: 2023-11-09 | End: 2023-11-10 | Stop reason: HOSPADM

## 2023-11-09 RX ORDER — HEPARIN 100 UNIT/ML
500 SYRINGE INTRAVENOUS PRN
Status: DISCONTINUED | OUTPATIENT
Start: 2023-11-09 | End: 2023-11-10 | Stop reason: HOSPADM

## 2023-11-09 RX ADMIN — SODIUM CHLORIDE, PRESERVATIVE FREE 20 ML: 5 INJECTION INTRAVENOUS at 15:09

## 2023-11-09 ASSESSMENT — PAIN SCALES - GENERAL: PAINLEVEL_OUTOF10: 0

## 2023-11-13 RX ORDER — MEPERIDINE HYDROCHLORIDE 25 MG/ML
12.5 INJECTION INTRAMUSCULAR; INTRAVENOUS; SUBCUTANEOUS PRN
Status: CANCELLED | OUTPATIENT
Start: 2023-11-21

## 2023-11-13 RX ORDER — SODIUM CHLORIDE 9 MG/ML
INJECTION, SOLUTION INTRAVENOUS CONTINUOUS
Status: CANCELLED | OUTPATIENT
Start: 2023-11-21

## 2023-11-13 RX ORDER — DIPHENHYDRAMINE HYDROCHLORIDE 50 MG/ML
50 INJECTION INTRAMUSCULAR; INTRAVENOUS
Status: CANCELLED | OUTPATIENT
Start: 2023-11-21

## 2023-11-13 RX ORDER — EPINEPHRINE 1 MG/ML
0.3 INJECTION, SOLUTION, CONCENTRATE INTRAVENOUS PRN
Status: CANCELLED | OUTPATIENT
Start: 2023-11-21

## 2023-11-13 RX ORDER — ONDANSETRON 2 MG/ML
8 INJECTION INTRAMUSCULAR; INTRAVENOUS
Status: CANCELLED | OUTPATIENT
Start: 2023-11-21

## 2023-11-13 RX ORDER — SODIUM CHLORIDE 0.9 % (FLUSH) 0.9 %
5-40 SYRINGE (ML) INJECTION PRN
OUTPATIENT
Start: 2023-11-24

## 2023-11-13 RX ORDER — ACETAMINOPHEN 325 MG/1
650 TABLET ORAL
Status: CANCELLED | OUTPATIENT
Start: 2023-11-21

## 2023-11-13 RX ORDER — ALBUTEROL SULFATE 90 UG/1
4 AEROSOL, METERED RESPIRATORY (INHALATION) PRN
Status: CANCELLED | OUTPATIENT
Start: 2023-11-21

## 2023-11-13 RX ORDER — SODIUM CHLORIDE 9 MG/ML
5-250 INJECTION, SOLUTION INTRAVENOUS PRN
OUTPATIENT
Start: 2023-11-24

## 2023-11-13 RX ORDER — HEPARIN 100 UNIT/ML
500 SYRINGE INTRAVENOUS PRN
OUTPATIENT
Start: 2023-11-24

## 2023-11-15 ENCOUNTER — ANESTHESIA EVENT (OUTPATIENT)
Facility: HOSPITAL | Age: 63
End: 2023-11-15
Payer: COMMERCIAL

## 2023-11-15 ENCOUNTER — ANESTHESIA (OUTPATIENT)
Facility: HOSPITAL | Age: 63
End: 2023-11-15
Payer: COMMERCIAL

## 2023-11-15 ENCOUNTER — HOSPITAL ENCOUNTER (OUTPATIENT)
Facility: HOSPITAL | Age: 63
Setting detail: OUTPATIENT SURGERY
Discharge: HOME | End: 2023-11-15
Attending: INTERNAL MEDICINE | Admitting: INTERNAL MEDICINE
Payer: COMMERCIAL

## 2023-11-15 ENCOUNTER — APPOINTMENT (OUTPATIENT)
Facility: HOSPITAL | Age: 63
End: 2023-11-15
Attending: INTERNAL MEDICINE
Payer: COMMERCIAL

## 2023-11-15 VITALS
HEART RATE: 67 BPM | SYSTOLIC BLOOD PRESSURE: 136 MMHG | BODY MASS INDEX: 20.76 KG/M2 | DIASTOLIC BLOOD PRESSURE: 71 MMHG | HEIGHT: 70 IN | OXYGEN SATURATION: 100 % | RESPIRATION RATE: 15 BRPM | WEIGHT: 145 LBS | TEMPERATURE: 96.6 F

## 2023-11-15 PROCEDURE — C1726 CATH, BAL DIL, NON-VASCULAR: HCPCS | Performed by: INTERNAL MEDICINE

## 2023-11-15 PROCEDURE — 2720000010 HC SURG SUPPLY STERILE: Performed by: INTERNAL MEDICINE

## 2023-11-15 PROCEDURE — C1769 GUIDE WIRE: HCPCS | Performed by: INTERNAL MEDICINE

## 2023-11-15 PROCEDURE — 3700000001 HC ADD 15 MINUTES (ANESTHESIA): Performed by: INTERNAL MEDICINE

## 2023-11-15 PROCEDURE — 3700000000 HC ANESTHESIA ATTENDED CARE: Performed by: INTERNAL MEDICINE

## 2023-11-15 PROCEDURE — 2500000003 HC RX 250 WO HCPCS: Performed by: NURSE ANESTHETIST, CERTIFIED REGISTERED

## 2023-11-15 PROCEDURE — 2580000003 HC RX 258: Performed by: NURSE ANESTHETIST, CERTIFIED REGISTERED

## 2023-11-15 PROCEDURE — 88305 TISSUE EXAM BY PATHOLOGIST: CPT

## 2023-11-15 PROCEDURE — 3600007503: Performed by: INTERNAL MEDICINE

## 2023-11-15 PROCEDURE — C1874 STENT, COATED/COV W/DEL SYS: HCPCS | Performed by: INTERNAL MEDICINE

## 2023-11-15 PROCEDURE — 3600007513: Performed by: INTERNAL MEDICINE

## 2023-11-15 PROCEDURE — 6360000002 HC RX W HCPCS: Performed by: NURSE ANESTHETIST, CERTIFIED REGISTERED

## 2023-11-15 PROCEDURE — 7100000011 HC PHASE II RECOVERY - ADDTL 15 MIN: Performed by: INTERNAL MEDICINE

## 2023-11-15 PROCEDURE — 6360000004 HC RX CONTRAST MEDICATION: Performed by: INTERNAL MEDICINE

## 2023-11-15 PROCEDURE — 7100000010 HC PHASE II RECOVERY - FIRST 15 MIN: Performed by: INTERNAL MEDICINE

## 2023-11-15 PROCEDURE — 2709999900 HC NON-CHARGEABLE SUPPLY: Performed by: INTERNAL MEDICINE

## 2023-11-15 DEVICE — STENT SYSTEM RMV
Type: IMPLANTABLE DEVICE | Status: FUNCTIONAL
Brand: WALLFLEX BILIARY

## 2023-11-15 RX ORDER — SODIUM CHLORIDE 9 MG/ML
INJECTION, SOLUTION INTRAVENOUS CONTINUOUS
Status: DISCONTINUED | OUTPATIENT
Start: 2023-11-15 | End: 2023-11-15 | Stop reason: HOSPADM

## 2023-11-15 RX ORDER — ONDANSETRON 2 MG/ML
INJECTION INTRAMUSCULAR; INTRAVENOUS PRN
Status: DISCONTINUED | OUTPATIENT
Start: 2023-11-15 | End: 2023-11-15 | Stop reason: SDUPTHER

## 2023-11-15 RX ORDER — SODIUM CHLORIDE 9 MG/ML
INJECTION, SOLUTION INTRAVENOUS CONTINUOUS PRN
Status: DISCONTINUED | OUTPATIENT
Start: 2023-11-15 | End: 2023-11-15 | Stop reason: SDUPTHER

## 2023-11-15 RX ORDER — DEXAMETHASONE SODIUM PHOSPHATE 4 MG/ML
INJECTION, SOLUTION INTRA-ARTICULAR; INTRALESIONAL; INTRAMUSCULAR; INTRAVENOUS; SOFT TISSUE PRN
Status: DISCONTINUED | OUTPATIENT
Start: 2023-11-15 | End: 2023-11-15 | Stop reason: SDUPTHER

## 2023-11-15 RX ORDER — SUCCINYLCHOLINE CHLORIDE 20 MG/ML
INJECTION INTRAMUSCULAR; INTRAVENOUS PRN
Status: DISCONTINUED | OUTPATIENT
Start: 2023-11-15 | End: 2023-11-15 | Stop reason: SDUPTHER

## 2023-11-15 RX ORDER — LIDOCAINE HYDROCHLORIDE 20 MG/ML
INJECTION, SOLUTION EPIDURAL; INFILTRATION; INTRACAUDAL; PERINEURAL PRN
Status: DISCONTINUED | OUTPATIENT
Start: 2023-11-15 | End: 2023-11-15 | Stop reason: SDUPTHER

## 2023-11-15 RX ORDER — SODIUM CHLORIDE 0.9 % (FLUSH) 0.9 %
5-40 SYRINGE (ML) INJECTION EVERY 12 HOURS SCHEDULED
Status: DISCONTINUED | OUTPATIENT
Start: 2023-11-15 | End: 2023-11-15 | Stop reason: HOSPADM

## 2023-11-15 RX ORDER — SODIUM CHLORIDE 9 MG/ML
25 INJECTION, SOLUTION INTRAVENOUS PRN
Status: DISCONTINUED | OUTPATIENT
Start: 2023-11-15 | End: 2023-11-15 | Stop reason: HOSPADM

## 2023-11-15 RX ORDER — SODIUM CHLORIDE 0.9 % (FLUSH) 0.9 %
5-40 SYRINGE (ML) INJECTION PRN
Status: DISCONTINUED | OUTPATIENT
Start: 2023-11-15 | End: 2023-11-15 | Stop reason: HOSPADM

## 2023-11-15 RX ORDER — ROCURONIUM BROMIDE 10 MG/ML
INJECTION, SOLUTION INTRAVENOUS PRN
Status: DISCONTINUED | OUTPATIENT
Start: 2023-11-15 | End: 2023-11-15 | Stop reason: SDUPTHER

## 2023-11-15 RX ADMIN — PROPOFOL 200 MG: 10 INJECTION, EMULSION INTRAVENOUS at 10:28

## 2023-11-15 RX ADMIN — LIDOCAINE HYDROCHLORIDE 50 MG: 20 INJECTION, SOLUTION EPIDURAL; INFILTRATION; INTRACAUDAL; PERINEURAL at 10:28

## 2023-11-15 RX ADMIN — ROCURONIUM BROMIDE 5 MG: 10 SOLUTION INTRAVENOUS at 10:28

## 2023-11-15 RX ADMIN — SODIUM CHLORIDE: 9 INJECTION, SOLUTION INTRAVENOUS at 10:16

## 2023-11-15 RX ADMIN — DEXAMETHASONE SODIUM PHOSPHATE 8 MG: 4 INJECTION, SOLUTION INTRAMUSCULAR; INTRAVENOUS at 10:28

## 2023-11-15 RX ADMIN — PHENYLEPHRINE HYDROCHLORIDE 40 MCG/MIN: 10 INJECTION INTRAVENOUS at 10:28

## 2023-11-15 RX ADMIN — PROPOFOL 30 MG: 10 INJECTION, EMULSION INTRAVENOUS at 11:15

## 2023-11-15 RX ADMIN — SUCCINYLCHOLINE CHLORIDE 120 MG: 20 INJECTION, SOLUTION INTRAMUSCULAR; INTRAVENOUS at 10:28

## 2023-11-15 RX ADMIN — PROPOFOL 50 MG: 10 INJECTION, EMULSION INTRAVENOUS at 11:13

## 2023-11-15 RX ADMIN — SODIUM CHLORIDE: 9 INJECTION, SOLUTION INTRAVENOUS at 10:38

## 2023-11-15 RX ADMIN — ONDANSETRON HYDROCHLORIDE 4 MG: 2 INJECTION, SOLUTION INTRAMUSCULAR; INTRAVENOUS at 10:28

## 2023-11-15 ASSESSMENT — PAIN - FUNCTIONAL ASSESSMENT: PAIN_FUNCTIONAL_ASSESSMENT: NONE - DENIES PAIN

## 2023-11-15 NOTE — H&P
HEENT: NC, Atraumatic. PERRLA, EOMI. Anicteric sclerae. Lungs:  CTA Bilaterally. No Wheezing/Rhonchi/Rales. Heart:  Regular  rhythm,  No murmur, No Rubs, No Gallops  Abdomen: Soft, Non distended, Non tender. +Bowel sounds, no HSM  Extremities: No c/c/e  Neurologic:  CN 2-12 gi, Alert and oriented X 3. No acute neurological distress   Psych:   Good insight. Not anxious nor agitated. The heart, lungs and mental status were satisfactory for the administration of MAC sedation and for the procedure. Mallampati score: 2     The patient was counseled at length about the risks of paris Covid-19 in the joana-operative and post-operative states including the recovery window of their procedure. The patient was made aware that paris Covid-19 after a surgical procedure may worsen their prognosis for recovering from the virus and lend to a higher morbidity and or mortality risk. The patient was given the options of postponing their procedure. All of the risks, benefits, and alternatives were discussed. The patient does wish to proceed with the procedure.       Assessment:   Bile duct stricture    Plan:   Endoscopic procedure  MAC sedation

## 2023-11-15 NOTE — ANESTHESIA POSTPROCEDURE EVALUATION
Department of Anesthesiology  Postprocedure Note    Patient: Sadia Padilla  MRN: [id number]  YOB: 1960  Date of evaluation: 11/15/2023      Procedure Summary     Date: 11/15/23 Room / Location: [address] ENDO 06 / [address] ENDOSCOPY    Anesthesia Start: 3595 Anesthesia Stop: 1304    Procedure: ERCP ENDOSCOPIC RETROGRADE CHOLANGIOPANCREATOGRAPHY (Upper GI Region) Diagnosis:       Malignant neoplasm of pancreas, unspecified location of malignancy ([address])      (Malignant neoplasm of pancreas, unspecified location of malignancy ([address]) [C25.9])    Surgeons: Adonis Ramirez MD Responsible Provider: Sally Yuan MD    Anesthesia Type: General ASA Status: 3          Anesthesia Type: General    Rambo Phase I: Rambo Score: 10    Rambo Phase II: Rambo Score: 9      Anesthesia Post Evaluation    Patient location during evaluation: PACU  Patient participation: complete - patient participated  Level of consciousness: awake  Pain score: 0  Airway patency: patent  Nausea & Vomiting: no nausea  Complications: no  Cardiovascular status: blood pressure returned to baseline and hemodynamically stable  Respiratory status: acceptable  Hydration status: stable  Pain management: adequate and satisfactory to patient

## 2023-11-15 NOTE — ANESTHESIA PRE PROCEDURE
Department of Anesthesiology  Preprocedure Note       Name:  Radha Figueroa   Age:  58 y.o.  :  1960                                          MRN:  [id number]         Date:  11/15/2023      Surgeon: Narinder Beckford):  Giselle Adams MD    Procedure: Procedure(s):  ERCP ENDOSCOPIC RETROGRADE CHOLANGIOPANCREATOGRAPHY    Medications prior to admission:   Prior to Admission medications    Medication Sig Start Date End Date Taking? Authorizing Provider   oxyCODONE (ROXICODONE) 5 MG immediate release tablet Take 1 tablet by mouth every 4 hours as needed for Pain for up to 15 days. Max Daily Amount: 30 mg 10/27/23 11/11/23  Kelby Servin MD   sodium chloride flush 0.9 % injection 10 mLs by IntraCATHeter route 2 times daily 10/4/23   Jose Kerns MD   enoxaparin (LOVENOX) 100 MG/ML Inject 1 mL into the skin daily 23   John Rios MD   baclofen (LIORESAL) 10 MG tablet Take 1 tablet by mouth 3 times daily 3 times daily as needed for hiccups    Provider, MD Bharat   Pancrelipase, Lip-Prot-Amyl, (ZENPEP) 60778-44363 units CPEP Take 2 capsules with first bite of full meal, 1 with first bite of snacks. Up to 8 per day. Patient not taking: Reported on 11/15/2023 8/2/23   John Rios MD   ondansetron (ZOFRAN-ODT) 8 MG TBDP disintegrating tablet Place 1 tablet under the tongue every 8 hours as needed for Nausea or Vomiting 23   John Rios MD   prochlorperazine (COMPAZINE) 5 MG tablet Take 1 tablet by mouth every 6 hours as needed for Nausea 23   John Rios MD   Multiple Vitamins-Minerals (MULTIVITAMIN ADULTS 50+ PO) Take 1 tablet by mouth daily    Provider, MD Bharat   vitamin D (CHOLECALCIFEROL) 25 MCG (1000 UT) TABS tablet Take by mouth daily    Automatic Reconciliation, Ar       Current medications:    No current facility-administered medications for this visit. No current outpatient medications on file.      Facility-Administered Medications Ordered in Other

## 2023-11-15 NOTE — OP NOTE
[address]  [phone]                   ERCP NOTE    NAME:  Padmaja Sim   :   1960   MRN:   [id number]     Date/Time:  11/15/2023 11:15 AM    Procedure Type:   ERCP with biliary sludge removal, biliary stent change, Duodenal biopsy, duodenal stricture     Indications: biliary obstruction, pancreatic mass, duodenal stricture  Pre-operative Diagnosis: see indication above  Post-operative Diagnosis:  See findings below  : James Ho MD    Staff: Circulator: Litzy Brooks RN  Endoscopy Technician: Albino Barker     Implants: 1 fully covered metallic biliary stent 10 mm x 60 mm was placed in the common bile duct ([address])    Referring Provider:    Olivier Ballard MD, Matthias Howard MD    Sedation:  General anesthesia    Procedure Details:  After informed consent was obtained with all risks and benefits of procedure explained, the patient was taken to the fluoroscopy suite and placed in the prone position. Upon sequential sedation as per above, the Olympus duodenoscope [id number]   was inserted via the mouthpeice and carefully advanced to the second portion of the duodenum. The quality of visualization was good. The duodenoscope was withdrawn into a short position. Findings:   Esophagus:normal  Stomach: Hugely distended with copious amount of solid and liquid food residue. Pylorus was normal.  Duodenum/jejunum:  A malignant appearing duodenal stricture was noted in the distal bulb. Dianelys Moras was present. This was traversed after dilation. Second portion of the duodenum was normal.  Ampulla:previous sphincterotomy  protruding stent  Cholangiogram: -1 metallic biliary stent was removed using rat-tooth forceps. Bile duct was cannulated using dream wire. Contrast was injected. Occlusion cholangiogram was performed using extraction balloon 9 mm x 12 mm injection below starting at bili bifurcation.   A

## 2023-11-15 NOTE — DISCHARGE INSTRUCTIONS
[address]  [id number]  1960    DISCOMFORT:  Sore throat- throat lozenges or warm salt water gargle  redness at IV site- apply warm compress to area; if redness or soreness persist- contact your physician  Gaseous discomfort- walking, belching will help relieve any discomfort    DIET  You may eat and drink after you leave. You may resume your regular diet - however -  remember your colon is empty and a heavy meal will produce gas. Avoid these foods:  vegetables, fried / greasy foods, carbonated drinks   You may not drink alcoholic beverages for at least 12 hours    ACTIVITY  You may resume your normal daily activities   Spend the remainder of the day resting -  avoid any strenuous activity. You may not operate a vehicle for 12 hours  You may not engage in an occupation involving machinery or appliances for rest of today  Avoid making any critical decisions for at least 24 hour    CALL M.D. ANY SIGN OF   Increasing pain, nausea, vomiting  Abdominal distension (swelling)  New increased bleeding (oral or rectal)  Fever (chills)  Pain in chest area  Bloody discharge from nose or mouth  Shortness of breath    Follow-up Instructions:   Call Dr. Mike Street for any questions or problems. If we took a biopsy please call the office within 2 weeks to discuss your pathology results. Telephone # [phone]       ENDOSCOPY FINDINGS:   Duodenal stricture-biopsy done dilated  Bile duct stricture-stent placed  Gastric outlet obstruction     Post-procedure recommendations:   -Soft diet.    -Resume normal medication(s). -ERCP with stent change in 4-6 months  -May need duodenal stent if he develops symptoms of gastric outlet obstruction despite dilation      Learning About Coronavirus (COVID-19)  Coronavirus (COVID-19): Overview  What is coronavirus (COVID-19)?   The coronavirus

## 2023-11-15 NOTE — PROGRESS NOTES
Initial RN admission and assessment performed and documented in Endoscopy navigator. Patient evaluated by anesthesia in pre-procedure holding. All procedural vital signs, airway assessment, and level of consciousness information monitored and recorded by anesthesia staff on the anesthesia record. Report received from CRNA post procedure. Patient transported to recovery area by RN. Specimen labeled and reviewed with physician. Endoscope was pre-cleaned at bedside immediately following procedure by . Bobby Cruz.

## 2023-11-16 ENCOUNTER — HOSPITAL ENCOUNTER (OUTPATIENT)
Facility: HOSPITAL | Age: 63
Discharge: HOME | End: 2023-11-16
Attending: STUDENT IN AN ORGANIZED HEALTH CARE EDUCATION/TRAINING PROGRAM | Admitting: STUDENT IN AN ORGANIZED HEALTH CARE EDUCATION/TRAINING PROGRAM
Payer: COMMERCIAL

## 2023-11-16 ENCOUNTER — CARE COORDINATION (OUTPATIENT)
Dept: OTHER | Facility: CLINIC | Age: 63
End: 2023-11-16

## 2023-11-16 DIAGNOSIS — K81.0 ACUTE CHOLECYSTITIS: ICD-10-CM

## 2023-11-16 PROCEDURE — C1713 ANCHOR/SCREW BN/BN,TIS/BN: HCPCS

## 2023-11-16 NOTE — DISCHARGE INSTRUCTIONS
We now want to trial you     Learning About Biliary Drain Care  What is a biliary drain? A biliary drain allows bile to flow out from a blocked bile duct into a collection bag outside the body. Bile is a liquid made by the liver. It helps digest fats. Blocked or narrowed bile ducts can stop the flow of bile and cause yellowing of the skin (jaundice) or an infection of the liver. The drain is a thin plastic tube (catheter) that the doctor places in the bile duct. From there the tube passes out through a drain site on your skin and into a collection bag. The bag may be attached to a belt or strapped to the leg. About 2 to 4 cups of bile will collect in the bag each day. The amount should be fairly constant from day to day. The bile that comes out of the drain may look bloody at first, but it will soon change to its normal yellow-green color. How long the drain stays in place depends on what caused the problem with your bile duct. Your doctor will discuss this with you. How can you care for your biliary drain at home? Your doctor or ostomy nurse can answer any questions you have about caring for your drain or bag. Learn to change the bandage. You may have a bandage on your skin where the tube comes out of your body. Your doctor will tell you how often to change it. To change the bandage:  Wash your hands with soap and water. Take off the bandage from around the drain. Use gauze or a cotton swab to clean the drain site and the skin around it with soap and water. When the site is dry, you can put on a new bandage. First, cut a slit in the bandage, and then fit it around the drain site. Keep the tube clear. To clear the tube, flush it with sterile saline. Your doctor will tell you how and when to do this. Empty the bag as needed. Empty the bile from your bag when it's about 2/3 full, or at least once a day. Wash your hands with soap and water.   If your doctor requested it, make a note of the amount of

## 2023-11-16 NOTE — H&P
Never   Intimate Partner Violence: Not on file   Housing Stability: Not on file       FAMILY HISTORY  Family History   Problem Relation Age of Onset    Diabetes Mother     Stroke Maternal Grandfather     Hypertension Sister     Cancer Father         colon    Heart Disease Mother        CURRENT MEDICATIONS  No current facility-administered medications for this encounter.        ALLERGIES  No Known Allergies    DIAGNOSTIC STUDIES   IMAGING STUDIES  Relevant Imaging studies reviewed    LABS  Lab Results   Component Value Date/Time    WBC 6.6 11/07/2023 07:42 AM    HGB 11.7 11/07/2023 07:42 AM    HCT 36.7 11/07/2023 07:42 AM     11/07/2023 07:42 AM    MCV 94.1 11/07/2023 07:42 AM     Lab Results   Component Value Date/Time     11/07/2023 07:42 AM    K 4.5 11/07/2023 07:42 AM     11/07/2023 07:42 AM    CO2 27 11/07/2023 07:42 AM    BUN 8 11/07/2023 07:42 AM    GFRAA >60 07/09/2021 12:47 PM     Lab Results   Component Value Date/Time    INR 1.0 11/07/2023 07:42 AM    INR 1.6 05/03/2023 02:49 PM       PHYSICAL EXAM   @VS1@  General:  NAD  Heart:  RRR  Lungs:  NWOB  Neurological:  AAOX3    PLAN   Procedure to be performed:  Cholangiogram via indwelling cholecystostomy   Plan for sedation:  None  NPO status: Patient not NPO    Post procedure plan:  observation per protocol  Informed consent:  risks, benefits, and alternatives reviewed with the patient / family who agree to proceed  Code status:  [unfilled]      Beryle Carwin, MD  [facility]

## 2023-11-16 NOTE — CARE COORDINATION
Ambulatory Care Coordination Note    [facility] attempted to reach patient for follow up call regarding dx and now S/P Endoscopy on 11/5/23. HIPAA compliant message left requesting a return phone call at patient and spouse convenience. Will plan follow up call in about 10 to 14 days. [facility] ended Care Transition episode and will continue to follow patient under [facility] episode related to dx of Stage IV Pancreatic Cancer and receiving palliative Chemo. Patient now with duodenal mass and S/P ERCP with biliary sludge removal, biliary stent change, Duodenal biopsy, duodenal stricture dilation. Per chart review, patient scheduled for procedure in IR today.

## 2023-11-17 ENCOUNTER — CARE COORDINATION (OUTPATIENT)
Dept: OTHER | Facility: CLINIC | Age: 63
End: 2023-11-17

## 2023-11-17 NOTE — CARE COORDINATION
LPN CC contacted the patient to follow up on progress, discuss new issues or concerns, and reinforce/provide patient education. Patient is 59 yo male with dx of Stage IV Pancreatic Cancer, with multipl liver lesions and now new mass in duodenum receiving Palliative FOLFIRINOX. Patient is S/P ERCP on 11/15/23 with biliary stent exchange, duodenal stent dilation and biopsies. Also had Cholangiogram via indwelling cholecystostomy tube on 11/16/23. Patient reports today he is ok. States awaiting results of testing. Patient and spouse are very knowledgeable of diagnosis and treatment plan. Patient compliant with medications, treatment and diet. States he is tolerating the soft diet but misses chewing and solid food. States he is drinking protein smoothies to ensure adequate protein. Patient continues to manage activities and denies any new concerns or complaints. Will continue to follow under [facility] episode. Reinforced/Provided Education:  Discussed red flags and appropriate site of care based on symptoms and resources available to patient including: PCP  Specialist  When to call [address]. Importance and benefits of: Follow up with PCP and specialist, medication adherence, self monitoring and reporting of symptoms. Plan:  Plan for follow-up call in 10-14 days based on severity of symptoms and risk factors. Plan for next call: symptom management-   self management-   follow-up appointment-   medication management-     Patient  verbalized understanding and is agreeable to follow up call.

## 2023-11-20 ENCOUNTER — TELEPHONE (OUTPATIENT)
Age: 63
End: 2023-11-20

## 2023-11-20 NOTE — TELEPHONE ENCOUNTER
----- Message from Jolie Vigil LPN sent at 21/81/6433  3:51 PM EST -----    ----- Message -----  From: Abilio Tomlinson MD  Sent: 11/20/2023   3:49 PM EST  To: Nisa Gipson At [facility] Clinical Staff    Please have patient follow-up with me to discuss cholecystostomy tube management.      [facility]
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
## 2023-11-21 ENCOUNTER — OFFICE VISIT (OUTPATIENT)
Age: 63
End: 2023-11-21
Payer: COMMERCIAL

## 2023-11-21 ENCOUNTER — HOSPITAL ENCOUNTER (OUTPATIENT)
Facility: HOSPITAL | Age: 63
Setting detail: INFUSION SERIES
Discharge: HOME | End: 2023-11-21
Payer: COMMERCIAL

## 2023-11-21 VITALS
DIASTOLIC BLOOD PRESSURE: 50 MMHG | RESPIRATION RATE: 18 BRPM | OXYGEN SATURATION: 97 % | TEMPERATURE: 97.7 F | BODY MASS INDEX: 21.09 KG/M2 | SYSTOLIC BLOOD PRESSURE: 115 MMHG | HEART RATE: 85 BPM | WEIGHT: 147 LBS

## 2023-11-21 VITALS
HEIGHT: 70 IN | SYSTOLIC BLOOD PRESSURE: 117 MMHG | OXYGEN SATURATION: 98 % | RESPIRATION RATE: 18 BRPM | WEIGHT: 147.4 LBS | DIASTOLIC BLOOD PRESSURE: 64 MMHG | TEMPERATURE: 98 F | BODY MASS INDEX: 21.1 KG/M2 | HEART RATE: 83 BPM

## 2023-11-21 DIAGNOSIS — C78.7 CANCER, METASTATIC TO LIVER (HCC): ICD-10-CM

## 2023-11-21 DIAGNOSIS — Z85.07 HISTORY OF PANCREATIC CANCER: Primary | ICD-10-CM

## 2023-11-21 DIAGNOSIS — C25.9 MALIGNANT NEOPLASM OF PANCREAS, UNSPECIFIED LOCATION OF MALIGNANCY (HCC): Primary | ICD-10-CM

## 2023-11-21 LAB
ALBUMIN SERPL-MCNC: 3.4 G/DL (ref 3.5–5)
ALBUMIN/GLOB SERPL: 1.1 (ref 1.1–2.2)
ALP SERPL-CCNC: 645 U/L (ref 45–117)
ALT SERPL-CCNC: 102 U/L (ref 12–78)
ANION GAP SERPL CALC-SCNC: 6 MMOL/L (ref 5–15)
AST SERPL-CCNC: 54 U/L (ref 15–37)
BASO+EOS+MONOS # BLD AUTO: 0.7 K/UL (ref 0.2–1.2)
BASO+EOS+MONOS NFR BLD AUTO: 10 % (ref 3.2–16.9)
BILIRUB SERPL-MCNC: 0.5 MG/DL (ref 0.2–1)
BUN SERPL-MCNC: 13 MG/DL (ref 6–20)
BUN/CREAT SERPL: 20 (ref 12–20)
CALCIUM SERPL-MCNC: 8.9 MG/DL (ref 8.5–10.1)
CHLORIDE SERPL-SCNC: 107 MMOL/L (ref 97–108)
CO2 SERPL-SCNC: 27 MMOL/L (ref 21–32)
CREAT SERPL-MCNC: 0.66 MG/DL (ref 0.7–1.3)
DIFFERENTIAL METHOD BLD: ABNORMAL
ERYTHROCYTE [DISTWIDTH] IN BLOOD BY AUTOMATED COUNT: 13.7 % (ref 11.8–15.8)
GLOBULIN SER CALC-MCNC: 3 G/DL (ref 2–4)
GLUCOSE SERPL-MCNC: 122 MG/DL (ref 65–100)
HCT VFR BLD AUTO: 36.8 % (ref 36.6–50.3)
HGB BLD-MCNC: 11.9 G/DL (ref 12.1–17)
INR PPP: 1 (ref 0.9–1.1)
LYMPHOCYTES # BLD: 1.3 K/UL (ref 0.8–3.5)
LYMPHOCYTES NFR BLD: 18 % (ref 12–49)
MCH RBC QN AUTO: 29.9 PG (ref 26–34)
MCHC RBC AUTO-ENTMCNC: 32.3 G/DL (ref 30–36.5)
MCV RBC AUTO: 92.5 FL (ref 80–99)
NEUTS SEG # BLD: 5.3 K/UL (ref 1.8–8)
NEUTS SEG NFR BLD: 71 % (ref 32–75)
PLATELET # BLD AUTO: 163 K/UL (ref 150–400)
POTASSIUM SERPL-SCNC: 4.4 MMOL/L (ref 3.5–5.1)
PROT SERPL-MCNC: 6.4 G/DL (ref 6.4–8.2)
PROTHROMBIN TIME: 10.9 SEC (ref 9–11.1)
RBC # BLD AUTO: 3.98 M/UL (ref 4.1–5.7)
SODIUM SERPL-SCNC: 140 MMOL/L (ref 136–145)
WBC # BLD AUTO: 7.3 K/UL (ref 4.1–11.1)

## 2023-11-21 PROCEDURE — 96367 TX/PROPH/DG ADDL SEQ IV INF: CPT

## 2023-11-21 PROCEDURE — 2580000003 HC RX 258: Performed by: INTERNAL MEDICINE

## 2023-11-21 PROCEDURE — 96416 CHEMO PROLONG INFUSE W/PUMP: CPT

## 2023-11-21 PROCEDURE — 96415 CHEMO IV INFUSION ADDL HR: CPT

## 2023-11-21 PROCEDURE — 3078F DIAST BP <80 MM HG: CPT | Performed by: INTERNAL MEDICINE

## 2023-11-21 PROCEDURE — 85025 COMPLETE CBC W/AUTO DIFF WBC: CPT

## 2023-11-21 PROCEDURE — 80053 COMPREHEN METABOLIC PANEL: CPT

## 2023-11-21 PROCEDURE — 99215 OFFICE O/P EST HI 40 MIN: CPT | Performed by: INTERNAL MEDICINE

## 2023-11-21 PROCEDURE — 36415 COLL VENOUS BLD VENIPUNCTURE: CPT

## 2023-11-21 PROCEDURE — 3074F SYST BP LT 130 MM HG: CPT | Performed by: INTERNAL MEDICINE

## 2023-11-21 PROCEDURE — 85610 PROTHROMBIN TIME: CPT

## 2023-11-21 PROCEDURE — 86301 IMMUNOASSAY TUMOR CA 19-9: CPT

## 2023-11-21 PROCEDURE — 96413 CHEMO IV INFUSION 1 HR: CPT

## 2023-11-21 PROCEDURE — 96375 TX/PRO/DX INJ NEW DRUG ADDON: CPT

## 2023-11-21 PROCEDURE — 6360000002 HC RX W HCPCS: Performed by: INTERNAL MEDICINE

## 2023-11-21 RX ORDER — PALONOSETRON 0.05 MG/ML
0.25 INJECTION, SOLUTION INTRAVENOUS ONCE
Status: COMPLETED | OUTPATIENT
Start: 2023-11-21 | End: 2023-11-21

## 2023-11-21 RX ORDER — SODIUM CHLORIDE 0.9 % (FLUSH) 0.9 %
5-40 SYRINGE (ML) INJECTION PRN
Status: DISCONTINUED | OUTPATIENT
Start: 2023-11-21 | End: 2023-11-22 | Stop reason: HOSPADM

## 2023-11-21 RX ORDER — DEXTROSE MONOHYDRATE 50 MG/ML
5-250 INJECTION, SOLUTION INTRAVENOUS PRN
Status: DISCONTINUED | OUTPATIENT
Start: 2023-11-21 | End: 2023-11-22 | Stop reason: HOSPADM

## 2023-11-21 RX ORDER — HEPARIN 100 UNIT/ML
500 SYRINGE INTRAVENOUS PRN
Status: DISCONTINUED | OUTPATIENT
Start: 2023-11-21 | End: 2023-11-22 | Stop reason: HOSPADM

## 2023-11-21 RX ORDER — SODIUM CHLORIDE 9 MG/ML
5-250 INJECTION, SOLUTION INTRAVENOUS PRN
Status: DISCONTINUED | OUTPATIENT
Start: 2023-11-21 | End: 2023-11-22 | Stop reason: HOSPADM

## 2023-11-21 RX ADMIN — DEXTROSE MONOHYDRATE 25 ML/HR: 50 INJECTION, SOLUTION INTRAVENOUS at 10:25

## 2023-11-21 RX ADMIN — SODIUM CHLORIDE, PRESERVATIVE FREE 10 ML: 5 INJECTION INTRAVENOUS at 09:45

## 2023-11-21 RX ADMIN — SODIUM CHLORIDE 150 MG: 900 INJECTION, SOLUTION INTRAVENOUS at 09:48

## 2023-11-21 RX ADMIN — OXALIPLATIN 110 MG: 5 INJECTION, SOLUTION INTRAVENOUS at 10:25

## 2023-11-21 RX ADMIN — PALONOSETRON 0.25 MG: 0.05 INJECTION, SOLUTION INTRAVENOUS at 10:12

## 2023-11-21 RX ADMIN — METHYLPREDNISOLONE SODIUM SUCCINATE 62.5 MG: 125 INJECTION, POWDER, FOR SOLUTION INTRAMUSCULAR; INTRAVENOUS at 10:14

## 2023-11-21 RX ADMIN — FLUOROURACIL 3000 MG: 50 INJECTION, SOLUTION INTRAVENOUS at 12:40

## 2023-11-21 RX ADMIN — SODIUM CHLORIDE 25 ML/HR: 9 INJECTION, SOLUTION INTRAVENOUS at 09:45

## 2023-11-21 ASSESSMENT — PAIN SCALES - GENERAL: PAINLEVEL_OUTOF10: 0

## 2023-11-21 NOTE — PROGRESS NOTES
Amanda Nolasco is a 58 y.o. male    Chief Complaint   Patient presents with    Follow-up     stage IV Pancreatic adenocarcinoma       1. Have you been to the ER, urgent care clinic since your last visit? Hospitalized since your last visit? No    2. Have you seen or consulted any other health care providers outside of the [address] [place] [address] Avenue since your last visit? Include any pap smears or colon screening.  No

## 2023-11-24 ENCOUNTER — HOSPITAL ENCOUNTER (OUTPATIENT)
Facility: HOSPITAL | Age: 63
Setting detail: INFUSION SERIES
Discharge: HOME | End: 2023-11-24
Payer: COMMERCIAL

## 2023-11-24 VITALS
TEMPERATURE: 98.2 F | RESPIRATION RATE: 16 BRPM | DIASTOLIC BLOOD PRESSURE: 68 MMHG | SYSTOLIC BLOOD PRESSURE: 145 MMHG | HEART RATE: 62 BPM

## 2023-11-24 LAB — CANCER AG19-9 SERPL-ACNC: 18 U/ML (ref 0–35)

## 2023-11-24 PROCEDURE — 96523 IRRIG DRUG DELIVERY DEVICE: CPT

## 2023-11-26 DIAGNOSIS — G89.3 CANCER RELATED PAIN: ICD-10-CM

## 2023-11-26 DIAGNOSIS — C25.0 ADENOCARCINOMA OF HEAD OF PANCREAS (HCC): ICD-10-CM

## 2023-11-26 DIAGNOSIS — C25.9 MALIGNANT NEOPLASM OF PANCREAS, UNSPECIFIED LOCATION OF MALIGNANCY (HCC): Primary | ICD-10-CM

## 2023-11-27 ENCOUNTER — HOSPITAL ENCOUNTER (INPATIENT)
Facility: HOSPITAL | Age: 63
LOS: 4 days | Discharge: HOME | End: 2023-12-01
Attending: STUDENT IN AN ORGANIZED HEALTH CARE EDUCATION/TRAINING PROGRAM | Admitting: STUDENT IN AN ORGANIZED HEALTH CARE EDUCATION/TRAINING PROGRAM
Payer: COMMERCIAL

## 2023-11-27 ENCOUNTER — APPOINTMENT (OUTPATIENT)
Facility: HOSPITAL | Age: 63
End: 2023-11-27
Payer: COMMERCIAL

## 2023-11-27 ENCOUNTER — HOSPITAL ENCOUNTER (EMERGENCY)
Facility: HOSPITAL | Age: 63
End: 2023-11-27
Payer: COMMERCIAL

## 2023-11-27 DIAGNOSIS — I63.9 CEREBROVASCULAR ACCIDENT (CVA), UNSPECIFIED MECHANISM (HCC): ICD-10-CM

## 2023-11-27 DIAGNOSIS — J06.9 ACUTE UPPER RESPIRATORY INFECTION: ICD-10-CM

## 2023-11-27 DIAGNOSIS — R50.9 FEVER, UNSPECIFIED FEVER CAUSE: Primary | ICD-10-CM

## 2023-11-27 DIAGNOSIS — R65.10 SIRS (SYSTEMIC INFLAMMATORY RESPONSE SYNDROME) (HCC): ICD-10-CM

## 2023-11-27 LAB
ALBUMIN SERPL-MCNC: 3.5 G/DL (ref 3.5–5)
ALBUMIN/GLOB SERPL: 1 (ref 1.1–2.2)
ALP SERPL-CCNC: 474 U/L (ref 45–117)
ALT SERPL-CCNC: 68 U/L (ref 12–78)
ANION GAP SERPL CALC-SCNC: 4 MMOL/L (ref 5–15)
APPEARANCE UR: CLEAR
AST SERPL-CCNC: 46 U/L (ref 15–37)
B PERT DNA SPEC QL NAA+PROBE: NOT DETECTED
BACTERIA URNS QL MICRO: NEGATIVE /HPF
BASOPHILS # BLD: 0 K/UL (ref 0–0.1)
BASOPHILS NFR BLD: 0 % (ref 0–1)
BILIRUB SERPL-MCNC: 0.7 MG/DL (ref 0.2–1)
BILIRUB UR QL: NEGATIVE
BORDETELLA PARAPERTUSSIS BY PCR: NOT DETECTED
BUN SERPL-MCNC: 18 MG/DL (ref 6–20)
BUN/CREAT SERPL: 20 (ref 12–20)
C PNEUM DNA SPEC QL NAA+PROBE: NOT DETECTED
CALCIUM SERPL-MCNC: 9 MG/DL (ref 8.5–10.1)
CHLORIDE SERPL-SCNC: 100 MMOL/L (ref 97–108)
CO2 SERPL-SCNC: 29 MMOL/L (ref 21–32)
COLOR UR: ABNORMAL
COMMENT:: NORMAL
CREAT SERPL-MCNC: 0.91 MG/DL (ref 0.7–1.3)
CRP SERPL-MCNC: 6.88 MG/DL (ref 0–0.6)
DIFFERENTIAL METHOD BLD: ABNORMAL
EOSINOPHIL # BLD: 0 K/UL (ref 0–0.4)
EOSINOPHIL NFR BLD: 0 % (ref 0–7)
EPITH CASTS URNS QL MICRO: ABNORMAL /LPF
ERYTHROCYTE [DISTWIDTH] IN BLOOD BY AUTOMATED COUNT: 13.2 % (ref 11.5–14.5)
ERYTHROCYTE [SEDIMENTATION RATE] IN BLOOD: 12 MM/HR (ref 0–20)
FLUAV SUBTYP SPEC NAA+PROBE: NOT DETECTED
FLUBV RNA SPEC QL NAA+PROBE: NOT DETECTED
GLOBULIN SER CALC-MCNC: 3.4 G/DL (ref 2–4)
GLUCOSE BLD STRIP.AUTO-MCNC: 148 MG/DL (ref 65–117)
GLUCOSE SERPL-MCNC: 149 MG/DL (ref 65–100)
GLUCOSE UR STRIP.AUTO-MCNC: NEGATIVE MG/DL
HADV DNA SPEC QL NAA+PROBE: NOT DETECTED
HCOV 229E RNA SPEC QL NAA+PROBE: NOT DETECTED
HCOV HKU1 RNA SPEC QL NAA+PROBE: NOT DETECTED
HCOV NL63 RNA SPEC QL NAA+PROBE: NOT DETECTED
HCOV OC43 RNA SPEC QL NAA+PROBE: NOT DETECTED
HCT VFR BLD AUTO: 37.1 % (ref 36.6–50.3)
HGB BLD-MCNC: 12.5 G/DL (ref 12.1–17)
HGB UR QL STRIP: NEGATIVE
HMPV RNA SPEC QL NAA+PROBE: NOT DETECTED
HPIV1 RNA SPEC QL NAA+PROBE: NOT DETECTED
HPIV2 RNA SPEC QL NAA+PROBE: NOT DETECTED
HPIV3 RNA SPEC QL NAA+PROBE: NOT DETECTED
HPIV4 RNA SPEC QL NAA+PROBE: NOT DETECTED
HYALINE CASTS URNS QL MICRO: ABNORMAL /LPF (ref 0–5)
IMM GRANULOCYTES # BLD AUTO: 0.1 K/UL (ref 0–0.04)
IMM GRANULOCYTES NFR BLD AUTO: 1 % (ref 0–0.5)
KETONES UR QL STRIP.AUTO: NEGATIVE MG/DL
LACTATE BLD-SCNC: 0.74 MMOL/L (ref 0.4–2)
LEUKOCYTE ESTERASE UR QL STRIP.AUTO: NEGATIVE
LYMPHOCYTES # BLD: 0.3 K/UL (ref 0.8–3.5)
LYMPHOCYTES NFR BLD: 6 % (ref 12–49)
M PNEUMO DNA SPEC QL NAA+PROBE: NOT DETECTED
MCH RBC QN AUTO: 30.1 PG (ref 26–34)
MCHC RBC AUTO-ENTMCNC: 33.7 G/DL (ref 30–36.5)
MCV RBC AUTO: 89.4 FL (ref 80–99)
MONOCYTES # BLD: 0.4 K/UL (ref 0–1)
MONOCYTES NFR BLD: 7 % (ref 5–13)
NEUTS SEG # BLD: 4.8 K/UL (ref 1.8–8)
NEUTS SEG NFR BLD: 86 % (ref 32–75)
NITRITE UR QL STRIP.AUTO: NEGATIVE
NRBC # BLD: 0 K/UL (ref 0–0.01)
NRBC BLD-RTO: 0 PER 100 WBC
PH UR STRIP: 7 (ref 5–8)
PLATELET # BLD AUTO: 144 K/UL (ref 150–400)
PMV BLD AUTO: 10.9 FL (ref 8.9–12.9)
POTASSIUM SERPL-SCNC: 4 MMOL/L (ref 3.5–5.1)
PROCALCITONIN SERPL-MCNC: 1.04 NG/ML
PROT SERPL-MCNC: 6.9 G/DL (ref 6.4–8.2)
PROT UR STRIP-MCNC: ABNORMAL MG/DL
RBC # BLD AUTO: 4.15 M/UL (ref 4.1–5.7)
RBC #/AREA URNS HPF: ABNORMAL /HPF (ref 0–5)
RBC MORPH BLD: ABNORMAL
RSV RNA SPEC QL NAA+PROBE: DETECTED
RV+EV RNA SPEC QL NAA+PROBE: NOT DETECTED
SARS-COV-2 RNA RESP QL NAA+PROBE: DETECTED
SERVICE CMNT-IMP: ABNORMAL
SODIUM SERPL-SCNC: 133 MMOL/L (ref 136–145)
SP GR UR REFRACTOMETRY: <1.005 (ref 1–1.03)
SPECIMEN HOLD: NORMAL
SPECIMEN HOLD: NORMAL
UROBILINOGEN UR QL STRIP.AUTO: 1 EU/DL (ref 0.2–1)
WBC # BLD AUTO: 5.6 K/UL (ref 4.1–11.1)
WBC URNS QL MICRO: ABNORMAL /HPF (ref 0–4)

## 2023-11-27 PROCEDURE — 2580000003 HC RX 258: Performed by: STUDENT IN AN ORGANIZED HEALTH CARE EDUCATION/TRAINING PROGRAM

## 2023-11-27 PROCEDURE — 70498 CT ANGIOGRAPHY NECK: CPT

## 2023-11-27 PROCEDURE — 71045 X-RAY EXAM CHEST 1 VIEW: CPT

## 2023-11-27 PROCEDURE — APPNB30 APP NON BILLABLE TIME 0-30 MINS

## 2023-11-27 PROCEDURE — 96367 TX/PROPH/DG ADDL SEQ IV INF: CPT

## 2023-11-27 PROCEDURE — 2060000000 HC ICU INTERMEDIATE R&B

## 2023-11-27 PROCEDURE — 0202U NFCT DS 22 TRGT SARS-COV-2: CPT

## 2023-11-27 PROCEDURE — 96366 THER/PROPH/DIAG IV INF ADDON: CPT

## 2023-11-27 PROCEDURE — 84145 PROCALCITONIN (PCT): CPT

## 2023-11-27 PROCEDURE — 82962 GLUCOSE BLOOD TEST: CPT

## 2023-11-27 PROCEDURE — 99285 EMERGENCY DEPT VISIT HI MDM: CPT

## 2023-11-27 PROCEDURE — 70450 CT HEAD/BRAIN W/O DYE: CPT

## 2023-11-27 PROCEDURE — 85652 RBC SED RATE AUTOMATED: CPT

## 2023-11-27 PROCEDURE — 85025 COMPLETE CBC W/AUTO DIFF WBC: CPT

## 2023-11-27 PROCEDURE — 86140 C-REACTIVE PROTEIN: CPT

## 2023-11-27 PROCEDURE — 36415 COLL VENOUS BLD VENIPUNCTURE: CPT

## 2023-11-27 PROCEDURE — 6370000000 HC RX 637 (ALT 250 FOR IP): Performed by: STUDENT IN AN ORGANIZED HEALTH CARE EDUCATION/TRAINING PROGRAM

## 2023-11-27 PROCEDURE — APPSS15 APP SPLIT SHARED TIME 0-15 MINUTES: Performed by: NURSE PRACTITIONER

## 2023-11-27 PROCEDURE — 96365 THER/PROPH/DIAG IV INF INIT: CPT

## 2023-11-27 PROCEDURE — 80053 COMPREHEN METABOLIC PANEL: CPT

## 2023-11-27 PROCEDURE — 93005 ELECTROCARDIOGRAM TRACING: CPT | Performed by: STUDENT IN AN ORGANIZED HEALTH CARE EDUCATION/TRAINING PROGRAM

## 2023-11-27 PROCEDURE — 81001 URINALYSIS AUTO W/SCOPE: CPT

## 2023-11-27 PROCEDURE — 83605 ASSAY OF LACTIC ACID: CPT

## 2023-11-27 PROCEDURE — 6360000002 HC RX W HCPCS: Performed by: STUDENT IN AN ORGANIZED HEALTH CARE EDUCATION/TRAINING PROGRAM

## 2023-11-27 PROCEDURE — 0042T CT BRAIN PERFUSION: CPT

## 2023-11-27 PROCEDURE — 87040 BLOOD CULTURE FOR BACTERIA: CPT

## 2023-11-27 PROCEDURE — 4A03X5D MEASUREMENT OF ARTERIAL FLOW, INTRACRANIAL, EXTERNAL APPROACH: ICD-10-PCS | Performed by: RADIOLOGY

## 2023-11-27 PROCEDURE — 6360000004 HC RX CONTRAST MEDICATION: Performed by: STUDENT IN AN ORGANIZED HEALTH CARE EDUCATION/TRAINING PROGRAM

## 2023-11-27 RX ORDER — ONDANSETRON 4 MG/1
4 TABLET, ORALLY DISINTEGRATING ORAL EVERY 8 HOURS PRN
Status: DISCONTINUED | OUTPATIENT
Start: 2023-11-27 | End: 2023-12-01 | Stop reason: HOSPADM

## 2023-11-27 RX ORDER — POTASSIUM CHLORIDE 750 MG/1
40 TABLET, FILM COATED, EXTENDED RELEASE ORAL PRN
Status: DISCONTINUED | OUTPATIENT
Start: 2023-11-27 | End: 2023-12-01 | Stop reason: HOSPADM

## 2023-11-27 RX ORDER — ONDANSETRON 2 MG/ML
4 INJECTION INTRAMUSCULAR; INTRAVENOUS EVERY 6 HOURS PRN
Status: DISCONTINUED | OUTPATIENT
Start: 2023-11-27 | End: 2023-12-01 | Stop reason: HOSPADM

## 2023-11-27 RX ORDER — SODIUM CHLORIDE 9 MG/ML
INJECTION, SOLUTION INTRAVENOUS CONTINUOUS
Status: DISCONTINUED | OUTPATIENT
Start: 2023-11-27 | End: 2023-11-28

## 2023-11-27 RX ORDER — OXYCODONE HYDROCHLORIDE 5 MG/1
5 TABLET ORAL EVERY 4 HOURS PRN
Status: DISCONTINUED | OUTPATIENT
Start: 2023-11-27 | End: 2023-11-28

## 2023-11-27 RX ORDER — BACLOFEN 10 MG/1
10 TABLET ORAL 3 TIMES DAILY
Status: DISCONTINUED | OUTPATIENT
Start: 2023-11-27 | End: 2023-11-28 | Stop reason: DRUGHIGH

## 2023-11-27 RX ORDER — MAGNESIUM SULFATE IN WATER 40 MG/ML
2000 INJECTION, SOLUTION INTRAVENOUS PRN
Status: DISCONTINUED | OUTPATIENT
Start: 2023-11-27 | End: 2023-12-01 | Stop reason: HOSPADM

## 2023-11-27 RX ORDER — SODIUM CHLORIDE, SODIUM LACTATE, POTASSIUM CHLORIDE, AND CALCIUM CHLORIDE .6; .31; .03; .02 G/100ML; G/100ML; G/100ML; G/100ML
30 INJECTION, SOLUTION INTRAVENOUS ONCE
Status: COMPLETED | OUTPATIENT
Start: 2023-11-27 | End: 2023-11-28

## 2023-11-27 RX ORDER — ACETAMINOPHEN 650 MG/1
650 SUPPOSITORY RECTAL EVERY 6 HOURS PRN
Status: DISCONTINUED | OUTPATIENT
Start: 2023-11-27 | End: 2023-11-29

## 2023-11-27 RX ORDER — SODIUM CHLORIDE 9 MG/ML
INJECTION, SOLUTION INTRAVENOUS PRN
Status: DISCONTINUED | OUTPATIENT
Start: 2023-11-27 | End: 2023-12-01 | Stop reason: HOSPADM

## 2023-11-27 RX ORDER — SODIUM CHLORIDE 0.9 % (FLUSH) 0.9 %
5-40 SYRINGE (ML) INJECTION EVERY 12 HOURS SCHEDULED
Status: DISCONTINUED | OUTPATIENT
Start: 2023-11-27 | End: 2023-12-01 | Stop reason: HOSPADM

## 2023-11-27 RX ORDER — ACETAMINOPHEN 325 MG/1
650 TABLET ORAL EVERY 6 HOURS PRN
Status: DISCONTINUED | OUTPATIENT
Start: 2023-11-27 | End: 2023-11-29

## 2023-11-27 RX ORDER — POLYETHYLENE GLYCOL 3350 17 G/17G
17 POWDER, FOR SOLUTION ORAL DAILY PRN
Status: DISCONTINUED | OUTPATIENT
Start: 2023-11-27 | End: 2023-12-01 | Stop reason: HOSPADM

## 2023-11-27 RX ORDER — SODIUM CHLORIDE 0.9 % (FLUSH) 0.9 %
5-40 SYRINGE (ML) INJECTION PRN
Status: DISCONTINUED | OUTPATIENT
Start: 2023-11-27 | End: 2023-12-01 | Stop reason: HOSPADM

## 2023-11-27 RX ORDER — ACETAMINOPHEN 325 MG/1
650 TABLET ORAL
Status: COMPLETED | OUTPATIENT
Start: 2023-11-27 | End: 2023-11-27

## 2023-11-27 RX ORDER — POTASSIUM CHLORIDE 7.45 MG/ML
10 INJECTION INTRAVENOUS PRN
Status: DISCONTINUED | OUTPATIENT
Start: 2023-11-27 | End: 2023-12-01 | Stop reason: HOSPADM

## 2023-11-27 RX ORDER — ACETAMINOPHEN 500 MG
1000 TABLET ORAL
Status: CANCELLED | OUTPATIENT
Start: 2023-11-27 | End: 2023-11-27

## 2023-11-27 RX ORDER — ENOXAPARIN SODIUM 100 MG/ML
100 INJECTION SUBCUTANEOUS DAILY
Status: DISCONTINUED | OUTPATIENT
Start: 2023-11-28 | End: 2023-12-01 | Stop reason: HOSPADM

## 2023-11-27 RX ADMIN — PIPERACILLIN AND TAZOBACTAM 4500 MG: 4; .5 INJECTION, POWDER, LYOPHILIZED, FOR SOLUTION INTRAVENOUS at 20:00

## 2023-11-27 RX ADMIN — ACETAMINOPHEN 650 MG: 325 TABLET ORAL at 20:00

## 2023-11-27 RX ADMIN — SODIUM CHLORIDE: 9 INJECTION, SOLUTION INTRAVENOUS at 23:00

## 2023-11-27 RX ADMIN — IOPAMIDOL 40 ML: 755 INJECTION, SOLUTION INTRAVENOUS at 19:24

## 2023-11-27 RX ADMIN — IOPAMIDOL 80 ML: 755 INJECTION, SOLUTION INTRAVENOUS at 19:28

## 2023-11-27 RX ADMIN — VANCOMYCIN HYDROCHLORIDE 1750 MG: 10 INJECTION, POWDER, LYOPHILIZED, FOR SOLUTION INTRAVENOUS at 20:45

## 2023-11-27 RX ADMIN — SODIUM CHLORIDE, POTASSIUM CHLORIDE, SODIUM LACTATE AND CALCIUM CHLORIDE 2040 ML: 600; 310; 30; 20 INJECTION, SOLUTION INTRAVENOUS at 19:14

## 2023-11-27 ASSESSMENT — PAIN SCALES - GENERAL
PAINLEVEL_OUTOF10: 6
PAINLEVEL_OUTOF10: 0

## 2023-11-27 ASSESSMENT — PAIN DESCRIPTION - ONSET: ONSET: GRADUAL

## 2023-11-27 ASSESSMENT — PAIN DESCRIPTION - FREQUENCY: FREQUENCY: CONTINUOUS

## 2023-11-27 ASSESSMENT — PAIN DESCRIPTION - LOCATION: LOCATION: CHEST

## 2023-11-27 ASSESSMENT — PAIN DESCRIPTION - PAIN TYPE: TYPE: ACUTE PAIN

## 2023-11-27 ASSESSMENT — PAIN - FUNCTIONAL ASSESSMENT
PAIN_FUNCTIONAL_ASSESSMENT: 0-10

## 2023-11-27 ASSESSMENT — PAIN DESCRIPTION - DESCRIPTORS: DESCRIPTORS: DISCOMFORT

## 2023-11-27 ASSESSMENT — PAIN DESCRIPTION - ORIENTATION: ORIENTATION: MID

## 2023-11-27 NOTE — ED TRIAGE NOTES
Pt comes to ED from home with reports of cough and fever. Tylenol administered at home for fever earlier of 103.0 per wife. Pt has right chest port for pancreatic CA and a gall-bladder drain.

## 2023-11-28 ENCOUNTER — APPOINTMENT (OUTPATIENT)
Facility: HOSPITAL | Age: 63
End: 2023-11-28
Payer: COMMERCIAL

## 2023-11-28 LAB
ALBUMIN SERPL-MCNC: 2.7 G/DL (ref 3.5–5)
ALBUMIN/GLOB SERPL: 0.9 (ref 1.1–2.2)
ALP SERPL-CCNC: 374 U/L (ref 45–117)
ALT SERPL-CCNC: 59 U/L (ref 12–78)
ANION GAP SERPL CALC-SCNC: 7 MMOL/L (ref 5–15)
AST SERPL-CCNC: 44 U/L (ref 15–37)
BASOPHILS # BLD: 0 K/UL (ref 0–0.1)
BASOPHILS NFR BLD: 0 % (ref 0–1)
BILIRUB SERPL-MCNC: 0.7 MG/DL (ref 0.2–1)
BUN SERPL-MCNC: 16 MG/DL (ref 6–20)
BUN/CREAT SERPL: 17 (ref 12–20)
CALCIUM SERPL-MCNC: 8.3 MG/DL (ref 8.5–10.1)
CHLORIDE SERPL-SCNC: 103 MMOL/L (ref 97–108)
CO2 SERPL-SCNC: 25 MMOL/L (ref 21–32)
COMMENT:: NORMAL
CREAT SERPL-MCNC: 0.92 MG/DL (ref 0.7–1.3)
DIFFERENTIAL METHOD BLD: ABNORMAL
EKG ATRIAL RATE: 117 BPM
EKG DIAGNOSIS: NORMAL
EKG P-R INTERVAL: 204 MS
EKG Q-T INTERVAL: 308 MS
EKG QRS DURATION: 94 MS
EKG QTC CALCULATION (BAZETT): 429 MS
EKG R AXIS: -59 DEGREES
EKG T AXIS: 107 DEGREES
EKG VENTRICULAR RATE: 117 BPM
EOSINOPHIL # BLD: 0 K/UL (ref 0–0.4)
EOSINOPHIL NFR BLD: 0 % (ref 0–7)
ERYTHROCYTE [DISTWIDTH] IN BLOOD BY AUTOMATED COUNT: 13.5 % (ref 11.5–14.5)
GLOBULIN SER CALC-MCNC: 2.9 G/DL (ref 2–4)
GLUCOSE SERPL-MCNC: 132 MG/DL (ref 65–100)
HCT VFR BLD AUTO: 31.5 % (ref 36.6–50.3)
HGB BLD-MCNC: 10.6 G/DL (ref 12.1–17)
IMM GRANULOCYTES # BLD AUTO: 0.1 K/UL (ref 0–0.04)
IMM GRANULOCYTES NFR BLD AUTO: 1 % (ref 0–0.5)
LYMPHOCYTES # BLD: 1.2 K/UL (ref 0.8–3.5)
LYMPHOCYTES NFR BLD: 11 % (ref 12–49)
MCH RBC QN AUTO: 30 PG (ref 26–34)
MCHC RBC AUTO-ENTMCNC: 33.7 G/DL (ref 30–36.5)
MCV RBC AUTO: 89.2 FL (ref 80–99)
MONOCYTES # BLD: 1.8 K/UL (ref 0–1)
MONOCYTES NFR BLD: 16 % (ref 5–13)
NEUTS SEG # BLD: 8.3 K/UL (ref 1.8–8)
NEUTS SEG NFR BLD: 73 % (ref 32–75)
NRBC # BLD: 0 K/UL (ref 0–0.01)
NRBC BLD-RTO: 0 PER 100 WBC
PLATELET # BLD AUTO: 126 K/UL (ref 150–400)
PMV BLD AUTO: 11 FL (ref 8.9–12.9)
POTASSIUM SERPL-SCNC: 3.8 MMOL/L (ref 3.5–5.1)
PROT SERPL-MCNC: 5.6 G/DL (ref 6.4–8.2)
RBC # BLD AUTO: 3.53 M/UL (ref 4.1–5.7)
SODIUM SERPL-SCNC: 135 MMOL/L (ref 136–145)
SPECIMEN HOLD: NORMAL
TROPONIN I SERPL HS-MCNC: 125 NG/L (ref 0–76)
TROPONIN I SERPL HS-MCNC: 125 NG/L (ref 0–76)
TROPONIN I SERPL HS-MCNC: 453 NG/L (ref 0–76)
WBC # BLD AUTO: 11.4 K/UL (ref 4.1–11.1)

## 2023-11-28 PROCEDURE — 97530 THERAPEUTIC ACTIVITIES: CPT

## 2023-11-28 PROCEDURE — 6360000002 HC RX W HCPCS: Performed by: STUDENT IN AN ORGANIZED HEALTH CARE EDUCATION/TRAINING PROGRAM

## 2023-11-28 PROCEDURE — 2580000003 HC RX 258: Performed by: NURSE PRACTITIONER

## 2023-11-28 PROCEDURE — 84484 ASSAY OF TROPONIN QUANT: CPT

## 2023-11-28 PROCEDURE — 2580000003 HC RX 258: Performed by: STUDENT IN AN ORGANIZED HEALTH CARE EDUCATION/TRAINING PROGRAM

## 2023-11-28 PROCEDURE — P9045 ALBUMIN (HUMAN), 5%, 250 ML: HCPCS | Performed by: STUDENT IN AN ORGANIZED HEALTH CARE EDUCATION/TRAINING PROGRAM

## 2023-11-28 PROCEDURE — 2500000003 HC RX 250 WO HCPCS: Performed by: NURSE PRACTITIONER

## 2023-11-28 PROCEDURE — 6370000000 HC RX 637 (ALT 250 FOR IP): Performed by: NURSE PRACTITIONER

## 2023-11-28 PROCEDURE — 6370000000 HC RX 637 (ALT 250 FOR IP): Performed by: STUDENT IN AN ORGANIZED HEALTH CARE EDUCATION/TRAINING PROGRAM

## 2023-11-28 PROCEDURE — 36415 COLL VENOUS BLD VENIPUNCTURE: CPT

## 2023-11-28 PROCEDURE — 76705 ECHO EXAM OF ABDOMEN: CPT

## 2023-11-28 PROCEDURE — 2000000000 HC ICU R&B

## 2023-11-28 PROCEDURE — 97161 PT EVAL LOW COMPLEX 20 MIN: CPT

## 2023-11-28 PROCEDURE — 85025 COMPLETE CBC W/AUTO DIFF WBC: CPT

## 2023-11-28 PROCEDURE — 97165 OT EVAL LOW COMPLEX 30 MIN: CPT

## 2023-11-28 PROCEDURE — 80053 COMPREHEN METABOLIC PANEL: CPT

## 2023-11-28 RX ORDER — BACLOFEN 10 MG/1
10 TABLET ORAL 3 TIMES DAILY PRN
Status: DISCONTINUED | OUTPATIENT
Start: 2023-11-28 | End: 2023-12-01 | Stop reason: HOSPADM

## 2023-11-28 RX ORDER — MIDODRINE HYDROCHLORIDE 5 MG/1
10 TABLET ORAL
Status: DISCONTINUED | OUTPATIENT
Start: 2023-11-28 | End: 2023-12-01 | Stop reason: HOSPADM

## 2023-11-28 RX ORDER — ALBUMIN, HUMAN INJ 5% 5 %
25 SOLUTION INTRAVENOUS ONCE
Status: COMPLETED | OUTPATIENT
Start: 2023-11-28 | End: 2023-11-28

## 2023-11-28 RX ORDER — ENOXAPARIN SODIUM 100 MG/ML
100 INJECTION SUBCUTANEOUS DAILY
Qty: 30 EACH | Refills: 1 | Status: ON HOLD | OUTPATIENT
Start: 2023-11-28 | End: 2023-12-01 | Stop reason: HOSPADM

## 2023-11-28 RX ORDER — MIDODRINE HYDROCHLORIDE 5 MG/1
5 TABLET ORAL
Status: DISCONTINUED | OUTPATIENT
Start: 2023-11-28 | End: 2023-11-28

## 2023-11-28 RX ORDER — NOREPINEPHRINE BITARTRATE 0.06 MG/ML
.5-2 INJECTION, SOLUTION INTRAVENOUS CONTINUOUS
Status: DISPENSED | OUTPATIENT
Start: 2023-11-28 | End: 2023-11-29

## 2023-11-28 RX ORDER — OXYCODONE HYDROCHLORIDE 5 MG/1
5 TABLET ORAL EVERY 6 HOURS PRN
Status: DISCONTINUED | OUTPATIENT
Start: 2023-11-28 | End: 2023-12-01 | Stop reason: HOSPADM

## 2023-11-28 RX ORDER — 0.9 % SODIUM CHLORIDE 0.9 %
1000 INTRAVENOUS SOLUTION INTRAVENOUS ONCE
Status: COMPLETED | OUTPATIENT
Start: 2023-11-28 | End: 2023-11-28

## 2023-11-28 RX ADMIN — SODIUM CHLORIDE, PRESERVATIVE FREE 10 ML: 5 INJECTION INTRAVENOUS at 09:22

## 2023-11-28 RX ADMIN — SODIUM CHLORIDE: 9 INJECTION, SOLUTION INTRAVENOUS at 17:53

## 2023-11-28 RX ADMIN — SODIUM CHLORIDE: 9 INJECTION, SOLUTION INTRAVENOUS at 09:15

## 2023-11-28 RX ADMIN — OXYCODONE HYDROCHLORIDE 5 MG: 5 TABLET ORAL at 17:52

## 2023-11-28 RX ADMIN — MIDODRINE HYDROCHLORIDE 5 MG: 5 TABLET ORAL at 05:33

## 2023-11-28 RX ADMIN — ACETAMINOPHEN 650 MG: 325 TABLET ORAL at 20:08

## 2023-11-28 RX ADMIN — OXYCODONE HYDROCHLORIDE 5 MG: 5 TABLET ORAL at 01:12

## 2023-11-28 RX ADMIN — SODIUM CHLORIDE, PRESERVATIVE FREE 10 ML: 5 INJECTION INTRAVENOUS at 01:02

## 2023-11-28 RX ADMIN — MIDODRINE HYDROCHLORIDE 10 MG: 5 TABLET ORAL at 11:06

## 2023-11-28 RX ADMIN — PIPERACILLIN AND TAZOBACTAM 3375 MG: 3; .375 INJECTION, POWDER, LYOPHILIZED, FOR SOLUTION INTRAVENOUS at 09:17

## 2023-11-28 RX ADMIN — ALBUMIN (HUMAN) 25 G: 12.5 INJECTION, SOLUTION INTRAVENOUS at 05:33

## 2023-11-28 RX ADMIN — NIRMATRELVIR AND RITONAVIR 3 TABLET: KIT at 11:07

## 2023-11-28 RX ADMIN — PHENYLEPHRINE HYDROCHLORIDE 30 MCG/MIN: 10 INJECTION INTRAVENOUS at 07:30

## 2023-11-28 RX ADMIN — OXYCODONE HYDROCHLORIDE 5 MG: 5 TABLET ORAL at 09:14

## 2023-11-28 RX ADMIN — SODIUM CHLORIDE, PRESERVATIVE FREE 10 ML: 5 INJECTION INTRAVENOUS at 20:15

## 2023-11-28 RX ADMIN — PIPERACILLIN AND TAZOBACTAM 3375 MG: 3; .375 INJECTION, POWDER, LYOPHILIZED, FOR SOLUTION INTRAVENOUS at 17:35

## 2023-11-28 RX ADMIN — NIRMATRELVIR AND RITONAVIR 3 TABLET: KIT at 21:30

## 2023-11-28 RX ADMIN — NOREPINEPHRINE BITARTRATE 4 MCG/MIN: 1 INJECTION, SOLUTION, CONCENTRATE INTRAVENOUS at 11:04

## 2023-11-28 RX ADMIN — NOREPINEPHRINE BITARTRATE 4 MCG/MIN: 1 INJECTION, SOLUTION, CONCENTRATE INTRAVENOUS at 21:52

## 2023-11-28 RX ADMIN — ENOXAPARIN SODIUM 100 MG: 100 INJECTION SUBCUTANEOUS at 09:21

## 2023-11-28 RX ADMIN — MIDODRINE HYDROCHLORIDE 10 MG: 5 TABLET ORAL at 17:35

## 2023-11-28 RX ADMIN — SODIUM CHLORIDE 1000 ML: 9 INJECTION, SOLUTION INTRAVENOUS at 05:33

## 2023-11-28 ASSESSMENT — PAIN SCALES - GENERAL
PAINLEVEL_OUTOF10: 6
PAINLEVEL_OUTOF10: 0
PAINLEVEL_OUTOF10: 0
PAINLEVEL_OUTOF10: 4
PAINLEVEL_OUTOF10: 0
PAINLEVEL_OUTOF10: 3
PAINLEVEL_OUTOF10: 6
PAINLEVEL_OUTOF10: 3
PAINLEVEL_OUTOF10: 5
PAINLEVEL_OUTOF10: 0
PAINLEVEL_OUTOF10: 4
PAINLEVEL_OUTOF10: 6
PAINLEVEL_OUTOF10: 3
PAINLEVEL_OUTOF10: 5

## 2023-11-28 ASSESSMENT — PAIN DESCRIPTION - DESCRIPTORS
DESCRIPTORS: ACHING
DESCRIPTORS: ACHING
DESCRIPTORS: DISCOMFORT
DESCRIPTORS: ACHING;DISCOMFORT
DESCRIPTORS: DISCOMFORT

## 2023-11-28 ASSESSMENT — PAIN - FUNCTIONAL ASSESSMENT
PAIN_FUNCTIONAL_ASSESSMENT: ACTIVITIES ARE NOT PREVENTED
PAIN_FUNCTIONAL_ASSESSMENT: ACTIVITIES ARE NOT PREVENTED
PAIN_FUNCTIONAL_ASSESSMENT: 0-10
PAIN_FUNCTIONAL_ASSESSMENT: ACTIVITIES ARE NOT PREVENTED
PAIN_FUNCTIONAL_ASSESSMENT: ACTIVITIES ARE NOT PREVENTED
PAIN_FUNCTIONAL_ASSESSMENT: 0-10

## 2023-11-28 ASSESSMENT — PAIN DESCRIPTION - ORIENTATION
ORIENTATION: LEFT;RIGHT
ORIENTATION: MID
ORIENTATION: UPPER
ORIENTATION: MID

## 2023-11-28 ASSESSMENT — PAIN DESCRIPTION - FREQUENCY: FREQUENCY: INTERMITTENT

## 2023-11-28 ASSESSMENT — PAIN DESCRIPTION - LOCATION
LOCATION: ABDOMEN
LOCATION: CHEST

## 2023-11-28 ASSESSMENT — PAIN DESCRIPTION - ONSET: ONSET: ON-GOING

## 2023-11-28 ASSESSMENT — PAIN DESCRIPTION - PAIN TYPE: TYPE: CHRONIC PAIN

## 2023-11-28 NOTE — TELEPHONE ENCOUNTER
Oncology Pharmacist Note    Maribel Poon is a  58 y.o.male  diagnosed with pancreatic cancer. . Mr. Aicha Kurtz is being treated with mFOLFOX    Refill for lovenox sent to pharmacy on file    Vineet Early, PharmD, [facility], [address]    Program: Medical Group  CPA in place:  Yes  Recommendation Provided To: Patient/Caregiver: 1 via Telephone  Intervention Detail: Refill(s) Provided  Intervention Accepted By: Patient/Caregiver: 1    Time Spent (min): 10

## 2023-11-28 NOTE — PROGRESS NOTES
friends/relatives and nurses are the usual sources, but direct observation and common sense are also important. However direct testing is not needed. 5. Usually the patient's performance over the preceding 24-48 hours is important, but occasionally longer periods will be relevant. 6. Middle categories imply that the patient supplies over 50 per cent of the effort. 7. Use of aids to be independent is allowed. Score Interpretation (from [address] [place] [address])    Independent   60-79 Minimally independent   40-59 Partially dependent   20-39 Very dependent   <20 Totally dependent     -Jett Mckinley., Barthel DAdanW. (1965). Functional evaluation: the Barthel Index. [address] ([address] (). The Barthel activities of daily living index: self-reporting versus actual performance in the old (> or = 75 years). Journal of [facility]0 [facility] 45(7), [address], JSUZEF, Sriram Tobar., Zaire Quintero. (). Measuring the change in disability after inpatient rehabilitation; comparison of the responsiveness of the Barthel Index and Functional [place] Measure. Journal of Neurology, Neurosurgery, and Psychiatry, 66(4), 286-756. Maryellen Conn, N.J.A, Mayo Gautam,  HARI, & Christopher Westbrook M.A. (2004) Assessment of post-stroke quality of life in cost-effectiveness studies: The usefulness of the Barthel Index and the EuroQoL-5D.  Quality of Life Research, 13, 427-43                                                                                                                                                                                                                                 Pain Ratin/10     Pain Intervention(s):   pain is at a level acceptable to the patient    Activity Tolerance:   Fair  and asymptomatic hypotension    After treatment:   Patient left in no apparent distress in bed, Call bell within reach, Side rails x3, and RN aware    COMMUNICATION/EDUCATION:   The patient's plan of care was discussed with: physical therapist and registered nurse    Patient Education  Education Given To: Patient  Education Provided: Role of Therapy  Education Method: Verbal  Barriers to Learning: None  Education Outcome: Verbalized understanding;Demonstrated understanding    Thank you for this referral.  Juliet Hernandez OT  Minutes: 22    Occupational Therapy Evaluation Charge Determination   History Examination Decision-Making   LOW Complexity : Brief history review  LOW Complexity: 1-3 Performance deficits relating to physical, cognitive, or psychosocial skills that result in activity limitations and/or participation restrictions LOW Complexity: No comorbidities that affect functional and  no verbal  or physical assist needed to complete eval tasks   Based on the above components, the patient evaluation is determined to be of the following complexity level: Low

## 2023-11-28 NOTE — ED NOTES
Bedside and Verbal shift change report given to Hiram Santiago RN (oncoming nurse) by Andrea Shipman RN (offgoing nurse). Report included the following information ED SBAR, MAR, Recent Results, and Med Rec Status.         Meir Candelario RN  11/28/23 9718

## 2023-11-28 NOTE — PROGRESS NOTES
Hospitalist Progress Note  KARON Acosta NP  Answering service: [phone] from in house phone        Date of Service:  2023  NAME:  Hiral Orantes  :  1960  MRN:  [id number]      Admission Summary:   Per H&P, Hiral Orantes is a 58 y.o. male with hx of pancreatic cancer on palliative chemo,  biliary obstruction, pancreatic mass, duodenal stricture s/p cholecystostomy tube placement, atrial fibrillation, CAD status post PCI, hypertension, PVD, aortic stenosis status post aortic valve repair on chronic anticoagulation, hx of dvt on warfarin, history of aortic arch dissection with aortic root replacement who presents to hospital from home with multiple complaints. Patient wife at bedside states he has had increasing malaise, fatigue, nonproductive cough and fever over the last 48 hours. Additionally, wife states prior to ED presentation, patient had some word finding difficulty. Code stroke was activated in the ED upon presentation. The patient denies any, chest or abdominal pain, nausea, vomiting, congestion, recent illness, palpitations, or dysuria. Remarkable vitals on ER Presentation: Temperature 103.3  Labs Remarkable for: CRP 6.88  ER Images: CT head, CTA of/CTP: Showed no acute process. ER Rx: 2l ns bolus, vanc + zosyn       Interval history / Subjective:   I saw the patient today on rounds. With pain currently controlled. Family members at the bedside at the moment of the encounter.   Hypotension noted     Assessment & Plan:         Sepsis with septic shock   COVID and RSV  Respiratory virus panel positive for COVID as well as RSV  Has received fluid volume resuscitation, midodrine, albumin, with persistent hypotension  Mejia-Synephrine has been started  I have consulted with intensive care  Has been started on PIP Elvis, will continue with this       Dysarthria  Head CT

## 2023-11-28 NOTE — ED NOTES
TRANSFER - OUT REPORT:    Verbal report given to Padmini Parson RN on Caryle Beards  being transferred to ICU for routine progression of patient care       Report consisted of patient's Situation, Background, Assessment and   Recommendations(SBAR). Information from the following report(s) Nurse Handoff Report, Index, ED Encounter Summary, ED SBAR, Adult Overview, MAR, and Recent Results was reviewed with the receiving nurse. Petrolia Fall Assessment:    Presents to emergency department  because of falls (Syncope, seizure, or loss of consciousness): No  Age > 70: No  Altered Mental Status, Intoxication with alcohol or substance confusion (Disorientation, impaired judgment, poor safety awaremess, or inability to follow instructions): No  Impaired Mobility: Ambulates or transfers with assistive devices or assistance; Unable to ambulate or transer.: Yes  Nursing Judgement: Yes          Lines:   Single Lumen Implantable Port 10/10/23 (Active)       Peripheral IV 11/27/23 Left Antecubital (Active)   Site Assessment Clean, dry & intact 11/28/23 1200   Line Status Infusing 11/28/23 1200   Phlebitis Assessment No symptoms 11/28/23 1200   Infiltration Assessment 0 11/28/23 1200   Alcohol Cap Used No 11/28/23 1200   Dressing Status Clean, dry & intact 11/28/23 1200   Dressing Type Transparent 11/28/23 1200       Peripheral IV 11/27/23 Right; Anterior Forearm (Active)   Site Assessment Clean, dry & intact 11/28/23 1200   Line Status Infusing 11/28/23 1200   Phlebitis Assessment No symptoms 11/28/23 1200   Infiltration Assessment 0 11/28/23 1200   Dressing Status Clean, dry & intact 11/28/23 1200   Dressing Type Transparent 11/28/23 1200   Dressing Intervention New 11/27/23 2220        Opportunity for questions and clarification was provided.       Patient transported with:  Monitor and Registered Nurse          Viji Selby, [address] [place] [address]  11/28/23 1641

## 2023-11-28 NOTE — ED NOTES
Bedside and Verbal shift change report given to [facility], RN (oncoming nurse) by Jose Guadalupe Aldana RN (offgoing nurse). Report included the following information Nurse Handoff Report, Index, ED Encounter Summary, ED SBAR, Adult Overview, MAR, and Recent Results.        Kimberly Weaver RN  11/28/23 9956

## 2023-11-28 NOTE — ED NOTES
Code Stroke called - pt's wife at bedside and shared with Dr Benjamin Abel pt has been having word finding difficulty since 1600. Pt is on lovenox. Pt to CT. Triage report given to Estephanie James in [address] [address].       Jasmin Shabazz RN  11/27/23 1916

## 2023-11-28 NOTE — ED NOTES
Patient noted to be hypotensive with MAP lower than 65. No complaints of dizziness or AMS. Pt A&Ox4. RN Jenni Pinto MD via [employer] regarding this. No orders received at this time. RN will continue to monitor.       Clif Chapin RN  11/28/23 9708

## 2023-11-28 NOTE — ED NOTES
Bedside and Verbal shift change report given to Teodora Buckner RN (oncoming nurse) by Jez Resendez RN (offgoing nurse). Report included the following information Nurse Handoff Report, Index, ED Encounter Summary, ED SBAR, Adult Overview, MAR, and Recent Results.        Maude Blank RN  11/28/23 8170

## 2023-11-28 NOTE — ED NOTES
MD Karoline to bedside. Orders received to give patient 2nd 500cc bolus of NS and start Mejia-synephrine drip.       Felix Parson RN  11/28/23 3936

## 2023-11-28 NOTE — CONSULTS
CRITICAL CARE NOTE      Name: Maribel Poon   : 1960   MRN: [id number]   Date: 2023      REASON FOR ICU ADMISSION:  Sepsis     PRINCIPAL ICU DIAGNOSIS   Septic shock  Hypotension  COVID19  RSV  Pancreatic CA  CAD with stent  HTN  HLD  PVD  AS  AVR on AC  VTE  Biliary obstruction  Thrombocytopenia  Hyponatremia  Leukocytosis  Normocytic anemia    BRIEF PATIENT SUMMARY   Mr. Aicha Kurtz presented to the ER with complaints of fever and generalized malaise in the setting of pancreatic CA and active chemotherapy. There was some question of word finding difficulty and code stroke activated at one point in addition to sepsis alert. ABX were initiated and the patient received IVF, no indication for TPA per neurology colleagues and sx's have resolved but the patient is requiring vasopressor support for hypotension with concern for septic shock. However, wife is at bedside and indicates that the patient has baseline low BP since initiating chemotherapy. Biliary drain in place with scant drainage over past few days; US ordered. Spoke with ID they will see patient. Will continue Zosyn and also MRSA swab pending. There is no hypoxia or abnormalities noted on CXR; Paxlovid initiated as the patient is at high risk of severe COVID19 disease. COMPREHENSIVE ASSESSMENT & PLAN:SYSTEM BASED     24 HOUR EVENTS: Seen in ER, admitted to hospitalist, transitioned to ICU status.     NEUROLOGICAL:   Serial neurological exams  Avoid deliriogenic agents    PULMONOLOGY:   Continuous SPO2  Maintain SPO2 >92%  Supplemental O2 PRN  Paxlovid  Standard COVID19 therapy/protocol should he deteriorate    CARDIOVASCULAR:   Maintain MAP >65  Echo pending  Continuous EKG    GASTROINTESTINAL   Serial ABD exams  US pending  Monitor biliary drain     RENAL/ELECTROLYTE/FLUIDS:   Trend renal function and electrolytes  Replete electrolytes by protocol  Dose medications for CrCl  IVF  I&O  Avoid nephrotoxins    ENDOCRINE:   Target managed or directed the management of the following life and organ supporting interventions that required my frequent assessment to treat or prevent imminent deterioration. I personally spent 48 minutes of critical care time. This is time spent at this critically ill patient's bedside actively involved in patient care as well as the coordination of care. This does not include any procedural time which has been billed separately.     KARON Farrar - NP   Critical Care Medicine  [facility]

## 2023-11-28 NOTE — PLAN OF CARE
Problem: Physical Therapy - Adult  Goal: By Discharge: Performs mobility at highest level of function for planned discharge setting. See evaluation for individualized goals. Description: FUNCTIONAL STATUS PRIOR TO ADMISSION: Patient was independent without use of DME.    HOME SUPPORT PRIOR TO ADMISSION: The patient lived with his wife. Physical Therapy Goals  Initiated 11/28/2023  1. Patient will move from supine to sit and sit to supine in bed with independence within 7 day(s). 2.  Patient will perform sit to stand with independence within 7 day(s). 3.  Patient will ambulate with independence for 300 feet with the least restrictive device within 7 day(s). 4.  Patient will ascend/descend 4 stairs with  handrail(s) with modified independence within 7 day(s). Outcome: Progressing     PHYSICAL THERAPY EVALUATION    Patient: Quinton Martínez (14 y.o. male)  Date: 11/28/2023  Primary Diagnosis: SIRS (systemic inflammatory response syndrome) ([facility]) [R65.10]       Precautions: Fall Risk                    ASSESSMENT :   The patient presents close to his functional baseline s/p admission w/ SIRS, found to have COVID-19 & RSV. At baseline pt is independent, lives with his wife, receiving palliative chemo for CA. Received pt in the ER on RA w/ SpO2 >94% and hypotensive (90/45). RN arrived into the room to adjust meds w/improvement to 106/53 sitting. BP dropped to 86/59 standing. Pt asymptomatic at the time. Functionally pt transitions positions independently. Unable to assess gait d/t orthostatic BP and fixed IV w/ meds running. Although pt is likely at his baseline functional status, acute therapy will follow to assess activity tolerance and gait functional distances when appropriate. Anticipate no therapy needs at discharge. Functional Outcome Measure: The patient scored 21/24 on the [facility] outcome measure.        PLAN :  Recommendations and Planned Interventions:   gait training,

## 2023-11-28 NOTE — ED NOTES
Bedside and Verbal shift change report given to Lizzette Pope RN (oncoming nurse) by Raquel Hawk RN (offgoing nurse). Report included the following information ED SBAR, MAR, Recent Results, and Med Rec Status.       Donte Kingsley RN  11/28/23 1510

## 2023-11-28 NOTE — PROGRESS NOTES
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    Aortic root replacement    Atrial fibrillation ([address]) 02/2017    Post surgery, s/p DCCV 4/4/17     CAD (coronary artery disease) 04/21/2021    s/p stent to LAD    Cancer ([address])     pancreatic    Dyslipidemia, goal LDL below 70     Family history of colon cancer     Family history of diabetes mellitus (DM) 06/08/2010    Family history of early CAD 06/08/2010    HLD (hyperlipidemia)     HTN (hypertension)     PVD (peripheral vascular disease) ([address]) 02/2017    Ischemic R leg, s/p fem-fem bypass    S/P AVR (aortic valve replacement) 03/2017    Seborrheic dermatitis 06/08/2010      Anticoagulation: Lovenox     Patient obtaining CT scans. Night shift neuro NP Sandra Muller will assume care of patient and obtain full NIHSS and evaluate patient with teleneuro. *Perform dysphagia screening prior to any PO intake*      Arrival time: 1908 met patient in CT scanner. Time spent: 30 minutes.      KARON Stone - CNP  Neurocritical Care Nurse Practitioner

## 2023-11-28 NOTE — PROGRESS NOTES
NIHSS  1a  Level of consciousness: 0=alert; keenly responsive   1b. LOC questions:  0=Answers both questions correctly   1c. LOC commands: 0=Performs both tasks correctly   2. Best Gaze: 0=normal   3. Visual: 0=No visual loss   4. Facial Palsy: 0=Normal symmetric movement   5a. Motor left arm: 0=No drift, limb holds 90 (or 45) degrees for full 10 seconds   5b. Motor right arm: 0=No drift, limb holds 90 (or 45) degrees for full 10 seconds   6a. Motor left le=No drift; leg holds 30-degree position for full 5 seconds. 6b  Motor right le=No drift; leg holds 30-degree position for full 5 seconds. 7. Limb Ataxia: 0=Absent   8. Sensory: 0=Normal; no sensory loss   9. Best Language:  1=Mild to moderate aphasia; some obvious loss of fluency or facility of comprehension without significant limitation on ideas expressed or form of expression. 10. Dysarthria: 0=Normal   11. Extinction and Inattention: 0=No abnormality    Total:    1     Premorbid mRS: 0     CTP suggested area of ischemia in the right frontal lobe is felt to be  artifactual in nature. CTA H/N showed no acute intracranial process. No major vessel occlusion. There is no hemodynamically significant stenosis, aneurysm or dissection  identified.  Type a aortic dissection is redemonstrated, not changed since CT chest 10/26/2023    D/w tele-doc Dr. Deepa Perla, ED physician Dr. Ben Nieto, primary nurse, Christen Rosas, Selina Batres, and pt    Jourdan Loredo, APRN - NP

## 2023-11-28 NOTE — ED NOTES
RN spoke with Marcy Dooley MD regarding patient's BP of 87/52. Orders received for albumin, midodrine, and NS bolus. RN will continue to monitor.      Hubert Sal RN  11/28/23 7650

## 2023-11-28 NOTE — H&P
History & Physical    Primary Care Provider: Parviz Guerrero MD  Source of Information: Patient and chart review review    History of Presenting Illness:   Stephanie Husbands is a 58 y.o. male with hx of pancreatic cancer on palliative chemo,  biliary obstruction, pancreatic mass, duodenal stricture s/p cholecystostomy tube placement, atrial fibrillation, CAD status post PCI, hypertension, PVD, aortic stenosis status post aortic valve repair on chronic anticoagulation, hx of dvt on warfarin, history of aortic arch dissection with aortic root replacement who presents to hospital from home with multiple complaints. Patient wife at bedside states he has had increasing malaise, fatigue, nonproductive cough and fever over the last 48 hours. Additionally, wife states prior to ED presentation, patient had some word finding difficulty. Code stroke was activated in the ED upon presentation. The patient denies any, chest or abdominal pain, nausea, vomiting, congestion, recent illness, palpitations, or dysuria. Remarkable vitals on ER Presentation: Temperature 103.3  Labs Remarkable for: CRP 6.88  ER Images: CT head, CTA of/CTP: Showed no acute process. ER Rx: 2l ns bolus, vanc + zosyn     Review of Systems:  Pertinent items are noted in the History of Present Illness.      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    Aortic root replacement    Atrial fibrillation ([address]) 02/2017    Post surgery, s/p DCCV 4/4/17     CAD (coronary artery disease) 04/21/2021    s/p stent to LAD    Cancer [facility])     pancreatic    Dyslipidemia, goal LDL below 70     Family history of colon cancer     Family history of diabetes mellitus (DM) 06/08/2010    Family history of early CAD 06/08/2010    HLD (hyperlipidemia)     HTN (hypertension)     PVD (peripheral vascular disease) ([address]) 02/2017    Ischemic R leg, s/p fem-fem bypass    S/P AVR (aortic valve replacement) 03/2017    Seborrheic dermatitis 06/08/2010 Past Surgical History:   Procedure Laterality Date    AORTIC VALVE REPLACEMENT  02/09/2017    CORONARY ANGIOPLASTY WITH STENT PLACEMENT      ERCP N/A 07/17/2023    ERCP ENDOSCOPIC RETROGRADE CHOLANGIOPANCREATOGRAPHY performed by Earline Hobson MD at [facility] ENDOSCOPY    ERCP N/A 07/17/2023    ERCP SPHINCTER/PAPILLOTOMY performed by Earline Hobson MD at [facility] ENDOSCOPY    ERCP N/A 10/23/2023    ERCP ENDOSCOPIC RETROGRADE CHOLANGIOPANCREATOGRAPHY performed by Earline Hobson MD at [facility] ENDOSCOPY    ERCP N/A 11/15/2023    ERCP ENDOSCOPIC RETROGRADE CHOLANGIOPANCREATOGRAPHY performed by Earline Hobson MD at [facility] ENDOSCOPY    IR [address]  08/26/2023    IR CHOLECYSTOSTOMY PERCUTANEOUS COMPLETE 8/26/2023 [facility] RAD ANGIO IR    IR PORT PLACEMENT EQUAL OR GREATER THAN 5 YEARS  07/28/2023    IR PORT PLACEMENT EQUAL OR GREATER THAN 5 YEARS 7/28/2023 [facility] RAD ANGIO IR    OTHER SURGICAL HISTORY  02/11/2017    right compartment fasciotomy    OTHER SURGICAL HISTORY  02/09/2017    L to R fem/fem bypass graft    OTHER SURGICAL HISTORY  02/09/2017    Aortic dissection repair    UPPER GASTROINTESTINAL ENDOSCOPY N/A 07/17/2023    ENDOSCOPIC ULTRASOUND performed by Earline Hobson MD at [address] [place] [address] N/A 07/17/2023    EGD ESOPHAGOGASTRODUODENOSCOPY performed by Earline Hobson MD at [facility]  07/18/2023    US GUIDED LIVER BIOPSY PERCUTANEOUS 7/18/2023 Kilo Singh, APRN - NP [facility] RAD US     Prior to Admission medications    Medication Sig Start Date End Date Taking? Authorizing Provider   oxyCODONE (ROXICODONE) 5 MG immediate release tablet Take 1 tablet by mouth every 4 hours as needed for Pain for up to 15 days.  Max Daily Amount: 30 mg 10/27/23 11/11/23  Liz Sinha MD   sodium chloride flush 0.9 % injection 10 mLs by IntraCATHeter route 2 times daily 10/4/23   Abilio Tomlinson MD   enoxaparin

## 2023-11-28 NOTE — ED NOTES
Patient remains hypotensive despite albumin, midodrine, and 500cc fluid bolus. THANH Phoenix notified via [employer].       Meir Candelario RN  11/28/23 8722

## 2023-11-28 NOTE — ED NOTES
Patient given urinal and had 600cc output. Patient returned to bed and given warm blanket.      Dejan Barrow RN  11/28/23 0496

## 2023-11-29 LAB
ANION GAP SERPL CALC-SCNC: 5 MMOL/L (ref 5–15)
BUN SERPL-MCNC: 13 MG/DL (ref 6–20)
BUN/CREAT SERPL: 20 (ref 12–20)
CALCIUM SERPL-MCNC: 8.3 MG/DL (ref 8.5–10.1)
CHLORIDE SERPL-SCNC: 109 MMOL/L (ref 97–108)
CO2 SERPL-SCNC: 25 MMOL/L (ref 21–32)
CREAT SERPL-MCNC: 0.64 MG/DL (ref 0.7–1.3)
ERYTHROCYTE [DISTWIDTH] IN BLOOD BY AUTOMATED COUNT: 13.6 % (ref 11.5–14.5)
GLUCOSE SERPL-MCNC: 103 MG/DL (ref 65–100)
HCT VFR BLD AUTO: 35.8 % (ref 36.6–50.3)
HGB BLD-MCNC: 11.8 G/DL (ref 12.1–17)
MAGNESIUM SERPL-MCNC: 1.9 MG/DL (ref 1.6–2.4)
MCH RBC QN AUTO: 30.5 PG (ref 26–34)
MCHC RBC AUTO-ENTMCNC: 33 G/DL (ref 30–36.5)
MCV RBC AUTO: 92.5 FL (ref 80–99)
NRBC # BLD: 0 K/UL (ref 0–0.01)
NRBC BLD-RTO: 0 PER 100 WBC
PHOSPHATE SERPL-MCNC: 2.3 MG/DL (ref 2.6–4.7)
PLATELET # BLD AUTO: 145 K/UL (ref 150–400)
PMV BLD AUTO: 11 FL (ref 8.9–12.9)
POTASSIUM SERPL-SCNC: 3.7 MMOL/L (ref 3.5–5.1)
RBC # BLD AUTO: 3.87 M/UL (ref 4.1–5.7)
SODIUM SERPL-SCNC: 139 MMOL/L (ref 136–145)
WBC # BLD AUTO: 15.6 K/UL (ref 4.1–11.1)

## 2023-11-29 PROCEDURE — 80048 BASIC METABOLIC PNL TOTAL CA: CPT

## 2023-11-29 PROCEDURE — 84100 ASSAY OF PHOSPHORUS: CPT

## 2023-11-29 PROCEDURE — 6360000002 HC RX W HCPCS: Performed by: STUDENT IN AN ORGANIZED HEALTH CARE EDUCATION/TRAINING PROGRAM

## 2023-11-29 PROCEDURE — 85027 COMPLETE CBC AUTOMATED: CPT

## 2023-11-29 PROCEDURE — 2580000003 HC RX 258: Performed by: STUDENT IN AN ORGANIZED HEALTH CARE EDUCATION/TRAINING PROGRAM

## 2023-11-29 PROCEDURE — 6370000000 HC RX 637 (ALT 250 FOR IP): Performed by: NURSE PRACTITIONER

## 2023-11-29 PROCEDURE — 36415 COLL VENOUS BLD VENIPUNCTURE: CPT

## 2023-11-29 PROCEDURE — 83735 ASSAY OF MAGNESIUM: CPT

## 2023-11-29 PROCEDURE — 99222 1ST HOSP IP/OBS MODERATE 55: CPT | Performed by: STUDENT IN AN ORGANIZED HEALTH CARE EDUCATION/TRAINING PROGRAM

## 2023-11-29 PROCEDURE — 2000000000 HC ICU R&B

## 2023-11-29 RX ORDER — ACETAMINOPHEN 650 MG/1
650 SUPPOSITORY RECTAL EVERY 6 HOURS PRN
Status: DISCONTINUED | OUTPATIENT
Start: 2023-11-29 | End: 2023-12-01 | Stop reason: HOSPADM

## 2023-11-29 RX ORDER — OXYCODONE HYDROCHLORIDE 5 MG/1
5 TABLET ORAL EVERY 4 HOURS PRN
Qty: 60 TABLET | Refills: 0 | Status: SHIPPED | OUTPATIENT
Start: 2023-11-29 | End: 2023-11-30 | Stop reason: SDUPTHER

## 2023-11-29 RX ORDER — ACETAMINOPHEN 325 MG/1
650 TABLET ORAL EVERY 6 HOURS PRN
Status: DISCONTINUED | OUTPATIENT
Start: 2023-11-29 | End: 2023-11-29

## 2023-11-29 RX ORDER — ACETAMINOPHEN 650 MG/1
650 SUPPOSITORY RECTAL EVERY 6 HOURS PRN
Status: DISCONTINUED | OUTPATIENT
Start: 2023-11-29 | End: 2023-11-29

## 2023-11-29 RX ORDER — ACETAMINOPHEN 325 MG/1
650 TABLET ORAL EVERY 6 HOURS PRN
Status: DISCONTINUED | OUTPATIENT
Start: 2023-11-29 | End: 2023-12-01 | Stop reason: HOSPADM

## 2023-11-29 RX ADMIN — OXYCODONE HYDROCHLORIDE 5 MG: 5 TABLET ORAL at 16:19

## 2023-11-29 RX ADMIN — OXYCODONE HYDROCHLORIDE 5 MG: 5 TABLET ORAL at 21:19

## 2023-11-29 RX ADMIN — PIPERACILLIN AND TAZOBACTAM 3375 MG: 3; .375 INJECTION, POWDER, LYOPHILIZED, FOR SOLUTION INTRAVENOUS at 09:57

## 2023-11-29 RX ADMIN — DIBASIC SODIUM PHOSPHATE, MONOBASIC POTASSIUM PHOSPHATE AND MONOBASIC SODIUM PHOSPHATE 2 TABLET: 852; 155; 130 TABLET ORAL at 08:20

## 2023-11-29 RX ADMIN — MIDODRINE HYDROCHLORIDE 10 MG: 5 TABLET ORAL at 08:20

## 2023-11-29 RX ADMIN — MIDODRINE HYDROCHLORIDE 10 MG: 5 TABLET ORAL at 16:19

## 2023-11-29 RX ADMIN — PIPERACILLIN AND TAZOBACTAM 3375 MG: 3; .375 INJECTION, POWDER, LYOPHILIZED, FOR SOLUTION INTRAVENOUS at 18:15

## 2023-11-29 RX ADMIN — SODIUM CHLORIDE, PRESERVATIVE FREE 10 ML: 5 INJECTION INTRAVENOUS at 10:03

## 2023-11-29 RX ADMIN — ENOXAPARIN SODIUM 100 MG: 100 INJECTION SUBCUTANEOUS at 08:34

## 2023-11-29 RX ADMIN — NIRMATRELVIR AND RITONAVIR 3 TABLET: KIT at 21:20

## 2023-11-29 RX ADMIN — PIPERACILLIN AND TAZOBACTAM 3375 MG: 3; .375 INJECTION, POWDER, LYOPHILIZED, FOR SOLUTION INTRAVENOUS at 02:03

## 2023-11-29 RX ADMIN — MIDODRINE HYDROCHLORIDE 10 MG: 5 TABLET ORAL at 12:27

## 2023-11-29 RX ADMIN — NIRMATRELVIR AND RITONAVIR 3 TABLET: KIT at 09:55

## 2023-11-29 RX ADMIN — OXYCODONE HYDROCHLORIDE 5 MG: 5 TABLET ORAL at 00:05

## 2023-11-29 ASSESSMENT — PAIN DESCRIPTION - ORIENTATION
ORIENTATION: UPPER
ORIENTATION: MID
ORIENTATION: UPPER

## 2023-11-29 ASSESSMENT — PAIN DESCRIPTION - LOCATION
LOCATION: ABDOMEN

## 2023-11-29 ASSESSMENT — PAIN SCALES - GENERAL
PAINLEVEL_OUTOF10: 4
PAINLEVEL_OUTOF10: 7
PAINLEVEL_OUTOF10: 0
PAINLEVEL_OUTOF10: 0
PAINLEVEL_OUTOF10: 2
PAINLEVEL_OUTOF10: 7
PAINLEVEL_OUTOF10: 0

## 2023-11-29 ASSESSMENT — PAIN DESCRIPTION - DESCRIPTORS
DESCRIPTORS: ACHING
DESCRIPTORS: ACHING

## 2023-11-29 NOTE — PROGRESS NOTES
Physical Therapy Note  11/29/2023    Chart reviewed in prep for PT tx session; spoke with RN who reports pt has been up ad john doing there ex in his room without difficulty or concern. Will continue to follow peripherally as needed.     Thank you,  Devonte Stephens, PT, DPT

## 2023-11-29 NOTE — CARE COORDINATION
Care Management Initial Assessment       RUR: 20% High  Readmission? No     11/29/23 0923   Service Assessment   Patient Orientation Alert and Oriented;Person;Place;Situation;Self   Cognition Alert   History Provided By Medical Record   Primary Caregiver Self   Support Systems Spouse/Significant Other  (Wife Leatha Garcias: [phone], H: [phone])   [address] is: Named in [facility]   PCP Verified by CM Yes  (Dr Elicia Estevez)   Last Visit to PCP Within last 6 months   Prior Functional Level Independent in ADLs/IADLs   Current Functional Level Assistance with the following:;Bathing;Dressing; Toileting;Mobility   Can patient return to prior living arrangement Unknown at present   Ability to make needs known: Good   Family able to assist with home care needs: Yes   Would you like for me to discuss the discharge plan with any other family members/significant others, and if so, who? Yes  (Wife)   Financial Resources Other (Comment)  (Justin is employeed)   Community Resources None   Social/Functional History   Lives With Spouse   Type of [address] [place] [address] Dr Luzma fitzgerald   [address] to enter with rails   Entrance Stairs -  [address]   Ambulation Assistance Independent   Transfer Assistance Independent   Occupation Full time employment   Discharge Planning   Living Arrangements Spouse/Significant Other   Current Services Prior To Admission None   Potential Assistance Purchasing Medications No   One/Two Story Residence Two story, live on first floor   Lift Chair Available No   History of falls? 0     Patient has a history of Pancreatic cancer receiving Palliative chemo. Admitted to ICU with sepsis r/t Covid and RSV. Per EMR patient lives independently with his wife Leatha Garcias: [phone].  He was discharged in October with

## 2023-11-29 NOTE — TELEPHONE ENCOUNTER
[address]: [address] [place] [address] ([id number])    Patient Name: Ely Rodríguez  YOB: 1960    Medication Refill Request    Patient is scheduled for follow up per PSR note:  [x]  YES  []   NO    PDMP reviewed:  [x] YES   []  System down / Pablo Whittington  []  NO- Patient fills out of state    Medication: Oxy IR 5 mg  Dose and directions: 1 every 4 prn  Number dispensed: 60 for 15 days  Date filled ([address] [place] [address] or Pharmacy): 11/1/23  # left:      Appropriate for refill:  [x]  YES  []  Early Request - Requires MD/NP Review      Other pertinent information for prescriber:

## 2023-11-29 NOTE — PLAN OF CARE
Problem: Physical Therapy - Adult  Goal: By Discharge: Performs mobility at highest level of function for planned discharge setting. See evaluation for individualized goals. Description: FUNCTIONAL STATUS PRIOR TO ADMISSION: Patient was independent without use of DME.    HOME SUPPORT PRIOR TO ADMISSION: The patient lived with his wife. Physical Therapy Goals  Initiated 11/28/2023  1. Patient will move from supine to sit and sit to supine in bed with independence within 7 day(s). 2.  Patient will perform sit to stand with independence within 7 day(s). 3.  Patient will ambulate with independence for 300 feet with the least restrictive device within 7 day(s). 4.  Patient will ascend/descend 4 stairs with  handrail(s) with modified independence within 7 day(s).     11/28/2023 1243 by Clearance PAMELA Denise  Outcome: Progressing     Problem: Discharge Planning  Goal: Discharge to home or other facility with appropriate resources  Outcome: Progressing  Flowsheets (Taken 11/28/2023 2000)  Discharge to home or other facility with appropriate resources: Identify barriers to discharge with patient and caregiver     Problem: Safety - Adult  Goal: Free from fall injury  Outcome: Progressing  Flowsheets (Taken 11/28/2023 2000)  Free From Fall Injury:   Instruct family/caregiver on patient safety   Based on caregiver fall risk screen, instruct family/caregiver to ask for assistance with transferring infant if caregiver noted to have fall risk factors     Problem: Pain  Goal: Verbalizes/displays adequate comfort level or baseline comfort level  Outcome: Progressing  Flowsheets (Taken 11/28/2023 2000)  Verbalizes/displays adequate comfort level or baseline comfort level:   Encourage patient to monitor pain and request assistance   Assess pain using appropriate pain scale

## 2023-11-29 NOTE — CONSULTS
Infectious Disease Consult Note    Reason for Consult: COVID/RSV pneumonia  Date of Consultation: November 29, 2023  Date of Admission: 11/27/2023  Referring Physician: Marlyn Izaguirre      HPI:    The patient was personally evaluated and examined by me at bedside in the ICU where he is in no acute distress and is feeling greatly improved. He states that he feels himself this morning. He denies headache, fever, sweats or chills. There is no shortness of breath, cough, sputum, chest pain or chest pressure. He states that he is back to his baseline and is eating without any distress. With no nausea, vomiting, diarrhea or constipation. The patient will be evaluated today possibility of transfer to lower level of care under the hospitalist team.      The patient was admitted by way of the emergency room 11/27/2023 with a chief complaint of fever and chest pain. The patient was evaluated in the ED and was found to be with fever of 103.3 pulse of 123, and was found to be positive for RSV as well as COVID. The patient was pancultured. Antibiotic with piperacillin-tazobactam and vancomycin were started. Chest x-ray was done and was found to be clear. The patient is immune compromised as he has pancreatic cancer and is on palliative chemotherapy, and additionally has chronic cholecystotomy tube and multiple medical comorbidities. The patient was hypotensive and required pressor support and as a result was placed in ICU critical care. Paxlovid was initiated in the setting of immunocompromise and possibility of increased risk for COVID disease. The patient was placed on nasal cannula oxygen to maintain oxygen saturation 92% or greater.     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    Aortic root replacement thrush  CV: S1, S2 heard regularly  Lungs: Clear to auscultation bilaterally  Abdomen: soft, non distended, non tender, no organomegaly appreciated, no CVA tenderness   Genitourinary: deferred  Extremities: no edema,good ROM  Neuro: Alert, oriented to time, place and situation, moves all extremities to commands, verbal   Skin: no rash  Psych: normal affect, good eye contact,pleasant, conversant        Labs:   Recent Results (from the past 24 hour(s))   Troponin    Collection Time: 11/28/23  8:32 PM   Result Value Ref Range    Troponin, High Sensitivity 125 (HH) 0 - 76 ng/L   CBC    Collection Time: 11/29/23  4:22 AM   Result Value Ref Range    WBC 15.6 (H) 4.1 - 11.1 K/uL    RBC 3.87 (L) 4.10 - 5.70 M/uL    Hemoglobin 11.8 (L) 12.1 - 17.0 g/dL    Hematocrit 35.8 (L) 36.6 - 50.3 %    MCV 92.5 80.0 - 99.0 FL    MCH 30.5 26.0 - 34.0 PG    MCHC 33.0 30.0 - 36.5 g/dL    RDW 13.6 11.5 - 14.5 %    Platelets 309 (L) 578 - 400 K/uL    MPV 11.0 8.9 - 12.9 FL    Nucleated RBCs 0.0 0  WBC    nRBC 0.00 0.00 - 0.01 K/uL   Basic Metabolic Panel    Collection Time: 11/29/23  4:22 AM   Result Value Ref Range    Sodium 139 136 - 145 mmol/L    Potassium 3.7 3.5 - 5.1 mmol/L    Chloride 109 (H) 97 - 108 mmol/L    CO2 25 21 - 32 mmol/L    Anion Gap 5 5 - 15 mmol/L    Glucose 103 (H) 65 - 100 mg/dL    BUN 13 6 - 20 MG/DL    Creatinine 0.64 (L) 0.70 - 1.30 MG/DL    Bun/Cre Ratio 20 12 - 20      Est, Glom Filt Rate >60 >60 ml/min/1.73m2    Calcium 8.3 (L) 8.5 - 10.1 MG/DL   Magnesium    Collection Time: 11/29/23  4:22 AM   Result Value Ref Range    Magnesium 1.9 1.6 - 2.4 mg/dL   Phosphorus    Collection Time: 11/29/23  4:22 AM   Result Value Ref Range    Phosphorus 2.3 (L) 2.6 - 4.7 MG/DL       Microbiology Data:       Blood: no positive blood cultures       Urine: U/A without suggestion of UTI     Synovial/Joint fluid:     Sputum/BAL/Pleural:     Peritoneal:    Pathology Results:    Duodenal biopsy with focal adenocarcinoma

## 2023-11-29 NOTE — PROGRESS NOTES
1930: Bedside and Verbal shift change report given to 1100 Alexy Shaffer (oncoming nurse) by Debbie Hester RN (offgoing nurse). Report included the following information Nurse Handoff Report, Adult Overview, Intake/Output, MAR, Recent Results, Cardiac Rhythm NSR, Alarm Parameters, Neuro Assessment, and Event Log.     2150: MAP sustaining below 60, started levo gtt    0730: Bedside and Verbal shift change report given to [facility] [facility] (oncoming nurse) by Hillary Justice RN (offgoing nurse). Report included the following information Nurse Handoff Report, Adult Overview, Intake/Output, MAR, Recent Results, Cardiac Rhythm sinus liudmila, Alarm Parameters, Neuro Assessment, and Event Log.

## 2023-11-29 NOTE — H&P
Justyna Thompson MD at [facility] ENDOSCOPY    ERCP N/A 10/23/2023    ERCP ENDOSCOPIC RETROGRADE CHOLANGIOPANCREATOGRAPHY performed by Oralia Moyer MD at [facility] ENDOSCOPY    ERCP N/A 11/15/2023    ERCP ENDOSCOPIC RETROGRADE CHOLANGIOPANCREATOGRAPHY performed by Oralia Moyer MD at [facility]  08/26/2023    IR CHOLECYSTOSTOMY PERCUTANEOUS COMPLETE 8/26/2023 [facility] RAD ANGIO IR    IR PORT PLACEMENT EQUAL OR GREATER THAN 5 YEARS  07/28/2023    IR PORT PLACEMENT EQUAL OR GREATER THAN 5 YEARS 7/28/2023 [facility] RAD ANGIO IR    OTHER SURGICAL HISTORY  02/11/2017    right compartment fasciotomy    OTHER SURGICAL HISTORY  02/09/2017    L to R fem/fem bypass graft    OTHER SURGICAL HISTORY  02/09/2017    Aortic dissection repair    UPPER GASTROINTESTINAL ENDOSCOPY N/A 07/17/2023    ENDOSCOPIC ULTRASOUND performed by Oralia Moyer MD at [address] [place] [address] N/A 07/17/2023    EGD ESOPHAGOGASTRODUODENOSCOPY performed by Oralia Moyer MD at [facility]  07/18/2023    [address] LIVER BIOPSY PERCUTANEOUS 7/18/2023 Efrain Elder, APRN - NP [facility] RAD US      Medications Prior to Admission: enoxaparin (LOVENOX) 100 MG/ML, Inject 1 mL into the skin daily  oxyCODONE (ROXICODONE) 5 MG immediate release tablet, Take 1 tablet by mouth every 4 hours as needed for Pain for up to 15 days. Max Daily Amount: 30 mg  sodium chloride flush 0.9 % injection, 10 mLs by IntraCATHeter route 2 times daily  baclofen (LIORESAL) 10 MG tablet, Take 1 tablet by mouth 3 times daily 3 times daily as needed for hiccups  Pancrelipase, Lip-Prot-Amyl, (ZENPEP) 09078-88952 units CPEP, Take 2 capsules with first bite of full meal, 1 with first bite of snacks. Up to 8 per day.   ondansetron (ZOFRAN-ODT) 8 MG TBDP disintegrating tablet, Place 1 tablet under the tongue every 8 hours as needed for Nausea or Vomiting  prochlorperazine (COMPAZINE) 5 MG tablet, Take 1 tablet by mouth every 6 hours as needed for Nausea  Multiple Vitamins-Minerals (MULTIVITAMIN ADULTS 50+ PO), Take 1 tablet by mouth daily  vitamin D (CHOLECALCIFEROL) 25 MCG (1000 UT) TABS tablet, Take by mouth daily  No Known Allergies   Social History     Tobacco Use    Smoking status: Never    Smokeless tobacco: Never   Substance Use Topics    Alcohol use: Not Currently     Comment: Once a weekend        Family History  Reviewed and non-contributory to the presenting problem. Review of Systems  A comprehensive review of systems was negative except for the items listed in the HPI. Physical Exam:   Vital Signs:   Vitals:    11/29/23 1800   BP: (!) 127/48   Pulse: 69   Resp:    Temp:    SpO2: 96%        General:  The patient is lying comfortably in bed. Head:  Head atraumatic, normocelphalic. Eyes: Vision grossly normal, sclera anicteric. Cardiovascular:  Regular rate and rhythm. No peripheral edema. Respiratory:  No increased work of breathing. Abdomen:  Soft, non-distended. No rebound. No guarding. Seema tube in place with clearish output. Extremities:  Warm and well perfused. Skin: No rash. No jaundice. Musculoskeletal: No gross defects noted. Equal strength bilaterally. Neurological: Alert and oriented. No focal deficits noted. Psych: Mood and affect appropriate. Diagnostic studies: Available data and images were reviewed personally. See reports. Significant results and findings are addressed in the Assessment and Plan.     Bigg Guerrero MD

## 2023-11-30 ENCOUNTER — APPOINTMENT (OUTPATIENT)
Facility: HOSPITAL | Age: 63
End: 2023-11-30
Payer: COMMERCIAL

## 2023-11-30 DIAGNOSIS — G89.3 CANCER RELATED PAIN: ICD-10-CM

## 2023-11-30 DIAGNOSIS — C25.0 ADENOCARCINOMA OF HEAD OF PANCREAS (HCC): ICD-10-CM

## 2023-11-30 LAB
ANION GAP SERPL CALC-SCNC: 6 MMOL/L (ref 5–15)
BACTERIA SPEC CULT: NORMAL
BACTERIA SPEC CULT: NORMAL
BASOPHILS # BLD: 0 K/UL (ref 0–0.1)
BASOPHILS NFR BLD: 0 % (ref 0–1)
BUN SERPL-MCNC: 11 MG/DL (ref 6–20)
BUN/CREAT SERPL: 15 (ref 12–20)
CALCIUM SERPL-MCNC: 8.8 MG/DL (ref 8.5–10.1)
CHLORIDE SERPL-SCNC: 110 MMOL/L (ref 97–108)
CO2 SERPL-SCNC: 26 MMOL/L (ref 21–32)
CREAT SERPL-MCNC: 0.72 MG/DL (ref 0.7–1.3)
DIFFERENTIAL METHOD BLD: ABNORMAL
ECHO AR MAX VEL PISA: 4.4 M/S
ECHO AV AREA PEAK VELOCITY: 2.8 CM2
ECHO AV AREA VTI: 2.7 CM2
ECHO AV AREA/BSA PEAK VELOCITY: 1.5 CM2/M2
ECHO AV AREA/BSA VTI: 1.5 CM2/M2
ECHO AV MEAN GRADIENT: 7 MMHG
ECHO AV MEAN VELOCITY: 1.2 M/S
ECHO AV PEAK GRADIENT: 12 MMHG
ECHO AV PEAK VELOCITY: 1.7 M/S
ECHO AV REGURGITANT PHT: 585.8 MILLISECOND
ECHO AV VELOCITY RATIO: 0.47
ECHO AV VTI: 36.6 CM
ECHO BSA: 1.85 M2
ECHO LV FRACTIONAL SHORTENING: 18 % (ref 28–44)
ECHO LV INTERNAL DIMENSION DIASTOLE INDEX: 3.49 CM/M2
ECHO LV INTERNAL DIMENSION DIASTOLIC: 6.5 CM (ref 4.2–5.9)
ECHO LV INTERNAL DIMENSION SYSTOLIC INDEX: 2.85 CM/M2
ECHO LV INTERNAL DIMENSION SYSTOLIC: 5.3 CM
ECHO LVOT AREA: 6.2 CM2
ECHO LVOT AV VTI INDEX: 0.45
ECHO LVOT DIAM: 2.8 CM
ECHO LVOT MEAN GRADIENT: 1 MMHG
ECHO LVOT PEAK GRADIENT: 2 MMHG
ECHO LVOT PEAK VELOCITY: 0.8 M/S
ECHO LVOT STROKE VOLUME INDEX: 53.9 ML/M2
ECHO LVOT SV: 100.3 ML
ECHO LVOT VTI: 16.3 CM
ECHO PV MAX VELOCITY: 0.7 M/S
ECHO PV PEAK GRADIENT: 2 MMHG
ECHO TV REGURGITANT MAX VELOCITY: 2.34 M/S
ECHO TV REGURGITANT PEAK GRADIENT: 22 MMHG
EOSINOPHIL # BLD: 0.1 K/UL (ref 0–0.4)
EOSINOPHIL NFR BLD: 2 % (ref 0–7)
ERYTHROCYTE [DISTWIDTH] IN BLOOD BY AUTOMATED COUNT: 13.5 % (ref 11.5–14.5)
GLUCOSE SERPL-MCNC: 103 MG/DL (ref 65–100)
HCT VFR BLD AUTO: 34.3 % (ref 36.6–50.3)
HGB BLD-MCNC: 11.3 G/DL (ref 12.1–17)
IMM GRANULOCYTES # BLD AUTO: 0 K/UL (ref 0–0.04)
IMM GRANULOCYTES NFR BLD AUTO: 0 % (ref 0–0.5)
LYMPHOCYTES # BLD: 2.1 K/UL (ref 0.8–3.5)
LYMPHOCYTES NFR BLD: 26 % (ref 12–49)
MAGNESIUM SERPL-MCNC: 1.9 MG/DL (ref 1.6–2.4)
MCH RBC QN AUTO: 30.5 PG (ref 26–34)
MCHC RBC AUTO-ENTMCNC: 32.9 G/DL (ref 30–36.5)
MCV RBC AUTO: 92.7 FL (ref 80–99)
MONOCYTES # BLD: 0.7 K/UL (ref 0–1)
MONOCYTES NFR BLD: 8 % (ref 5–13)
NEUTS SEG # BLD: 5.3 K/UL (ref 1.8–8)
NEUTS SEG NFR BLD: 64 % (ref 32–75)
NRBC # BLD: 0 K/UL (ref 0–0.01)
NRBC BLD-RTO: 0 PER 100 WBC
PHOSPHATE SERPL-MCNC: 3 MG/DL (ref 2.6–4.7)
PLATELET # BLD AUTO: 161 K/UL (ref 150–400)
PMV BLD AUTO: 11.6 FL (ref 8.9–12.9)
POTASSIUM SERPL-SCNC: 3.8 MMOL/L (ref 3.5–5.1)
RBC # BLD AUTO: 3.7 M/UL (ref 4.1–5.7)
SERVICE CMNT-IMP: NORMAL
SODIUM SERPL-SCNC: 142 MMOL/L (ref 136–145)
WBC # BLD AUTO: 8.3 K/UL (ref 4.1–11.1)

## 2023-11-30 PROCEDURE — 83735 ASSAY OF MAGNESIUM: CPT

## 2023-11-30 PROCEDURE — 84100 ASSAY OF PHOSPHORUS: CPT

## 2023-11-30 PROCEDURE — A4216 STERILE WATER/SALINE, 10 ML: HCPCS | Performed by: NURSE PRACTITIONER

## 2023-11-30 PROCEDURE — 6360000002 HC RX W HCPCS: Performed by: STUDENT IN AN ORGANIZED HEALTH CARE EDUCATION/TRAINING PROGRAM

## 2023-11-30 PROCEDURE — 6370000000 HC RX 637 (ALT 250 FOR IP): Performed by: NURSE PRACTITIONER

## 2023-11-30 PROCEDURE — 80048 BASIC METABOLIC PNL TOTAL CA: CPT

## 2023-11-30 PROCEDURE — 2060000000 HC ICU INTERMEDIATE R&B

## 2023-11-30 PROCEDURE — 99254 IP/OBS CNSLTJ NEW/EST MOD 60: CPT | Performed by: INTERNAL MEDICINE

## 2023-11-30 PROCEDURE — 36415 COLL VENOUS BLD VENIPUNCTURE: CPT

## 2023-11-30 PROCEDURE — 93308 TTE F-UP OR LMTD: CPT

## 2023-11-30 PROCEDURE — 2580000003 HC RX 258: Performed by: STUDENT IN AN ORGANIZED HEALTH CARE EDUCATION/TRAINING PROGRAM

## 2023-11-30 PROCEDURE — 2500000003 HC RX 250 WO HCPCS: Performed by: NURSE PRACTITIONER

## 2023-11-30 PROCEDURE — 2580000003 HC RX 258: Performed by: NURSE PRACTITIONER

## 2023-11-30 PROCEDURE — 85025 COMPLETE CBC W/AUTO DIFF WBC: CPT

## 2023-11-30 RX ORDER — OXYCODONE HYDROCHLORIDE 5 MG/1
5 TABLET ORAL EVERY 4 HOURS PRN
Qty: 60 TABLET | Refills: 0 | Status: SHIPPED | OUTPATIENT
Start: 2023-11-30 | End: 2023-12-15

## 2023-11-30 RX ADMIN — MIDODRINE HYDROCHLORIDE 10 MG: 5 TABLET ORAL at 08:06

## 2023-11-30 RX ADMIN — PIPERACILLIN AND TAZOBACTAM 3375 MG: 3; .375 INJECTION, POWDER, LYOPHILIZED, FOR SOLUTION INTRAVENOUS at 09:30

## 2023-11-30 RX ADMIN — MIDODRINE HYDROCHLORIDE 10 MG: 5 TABLET ORAL at 16:32

## 2023-11-30 RX ADMIN — SODIUM CHLORIDE, PRESERVATIVE FREE 20 ML: 5 INJECTION INTRAVENOUS at 21:00

## 2023-11-30 RX ADMIN — PIPERACILLIN AND TAZOBACTAM 3375 MG: 3; .375 INJECTION, POWDER, LYOPHILIZED, FOR SOLUTION INTRAVENOUS at 02:49

## 2023-11-30 RX ADMIN — FAMOTIDINE 20 MG: 10 INJECTION, SOLUTION INTRAVENOUS at 10:11

## 2023-11-30 RX ADMIN — NIRMATRELVIR AND RITONAVIR 3 TABLET: KIT at 20:15

## 2023-11-30 RX ADMIN — MIDODRINE HYDROCHLORIDE 10 MG: 5 TABLET ORAL at 12:39

## 2023-11-30 RX ADMIN — OXYCODONE HYDROCHLORIDE 5 MG: 5 TABLET ORAL at 20:15

## 2023-11-30 RX ADMIN — ENOXAPARIN SODIUM 100 MG: 100 INJECTION SUBCUTANEOUS at 09:22

## 2023-11-30 RX ADMIN — SODIUM CHLORIDE, PRESERVATIVE FREE 10 ML: 5 INJECTION INTRAVENOUS at 08:07

## 2023-11-30 RX ADMIN — NIRMATRELVIR AND RITONAVIR 3 TABLET: KIT at 09:22

## 2023-11-30 ASSESSMENT — PAIN DESCRIPTION - ORIENTATION: ORIENTATION: RIGHT

## 2023-11-30 ASSESSMENT — PAIN SCALES - GENERAL
PAINLEVEL_OUTOF10: 5
PAINLEVEL_OUTOF10: 0

## 2023-11-30 ASSESSMENT — ENCOUNTER SYMPTOMS
CHEST TIGHTNESS: 0
ABDOMINAL DISTENTION: 0
SHORTNESS OF BREATH: 0
ABDOMINAL PAIN: 0

## 2023-11-30 ASSESSMENT — PAIN DESCRIPTION - LOCATION: LOCATION: ABDOMEN

## 2023-11-30 ASSESSMENT — PAIN DESCRIPTION - DESCRIPTORS: DESCRIPTORS: SORE

## 2023-11-30 NOTE — PROGRESS NOTES
4 Eyes Skin Assessment     NAME:  Ely Rodríguez  YOB: 1960  MEDICAL RECORD NUMBER:  [id number]    The patient is being assessed for  Other routine    I agree that at least one RN has performed a thorough Head to Toe Skin Assessment on the patient. ALL assessment sites listed below have been assessed. Areas assessed by both nurses:    Head, Face, Ears, Shoulders, Back, Chest, Arms, Elbows, Hands, Sacrum. Buttock, Coccyx, Ischium, Legs. Feet and Heels, and Under Medical Devices         Does the Patient have a Wound?  No noted wound(s)       Shukri Prevention initiated by RN: Yes  Wound Care Orders initiated by RN: No    Pressure Injury (Stage 3,4, Unstageable, DTI, NWPT, and Complex wounds) if present, place Wound referral order by RN under : No    New Ostomies, if present place, Ostomy referral order under : No     Nurse 1 eSignature: Electronically signed by Casa Jade RN on 11/30/23 at 9:03 AM EST    **SHARE this note so that the co-signing nurse can place an eSignature**    Nurse 2 eSignature: {Esignature:[id number]}

## 2023-11-30 NOTE — PROGRESS NOTES
[facility] Surgery Brief Note    Patient evaluated at bedside. Overall seems to be doing well. Currently asymptomatic. He has no GI complaints. Will continue to follow along socially while patient in-house. Ongoing discussions on correct timing for cholecystectomy (to be done as an outpatient).      Lindsey Crain MD

## 2023-11-30 NOTE — PROGRESS NOTES
Renal Dosing/Monitoring  Medication: famotidine   Current regimen:  20mg every 24 hr  Recent Labs     11/28/23  0119 11/29/23  0422 11/30/23  0624   CREATININE 0.92 0.64* 0.72   BUN 16 13 11     Estimated CrCl:  >100 ml/min  Plan: Change to 20mg q12h for Crcl >50 ml/min  per protocol

## 2023-11-30 NOTE — PROGRESS NOTES
Adan Jaye Sellers sent [employer] message informing that [facility] is no longer accepted by their insurance and is requesting that earlier Oxy IR script be sent to [facility] instead. Script canceled to [facility] and pended for Dr. Cody Bentley to send to [facility].      Katie Fonseca RN  Palliative Medicine

## 2023-12-01 VITALS
WEIGHT: 153 LBS | SYSTOLIC BLOOD PRESSURE: 132 MMHG | OXYGEN SATURATION: 97 % | HEART RATE: 82 BPM | BODY MASS INDEX: 21.9 KG/M2 | HEIGHT: 70 IN | DIASTOLIC BLOOD PRESSURE: 70 MMHG | RESPIRATION RATE: 17 BRPM | TEMPERATURE: 97.6 F

## 2023-12-01 LAB
ANION GAP SERPL CALC-SCNC: 3 MMOL/L (ref 5–15)
BASOPHILS # BLD: 0 K/UL (ref 0–0.1)
BASOPHILS NFR BLD: 1 % (ref 0–1)
BUN SERPL-MCNC: 7 MG/DL (ref 6–20)
BUN/CREAT SERPL: 11 (ref 12–20)
CALCIUM SERPL-MCNC: 9.1 MG/DL (ref 8.5–10.1)
CHLORIDE SERPL-SCNC: 110 MMOL/L (ref 97–108)
CO2 SERPL-SCNC: 27 MMOL/L (ref 21–32)
CREAT SERPL-MCNC: 0.63 MG/DL (ref 0.7–1.3)
DIFFERENTIAL METHOD BLD: ABNORMAL
EOSINOPHIL # BLD: 0.1 K/UL (ref 0–0.4)
EOSINOPHIL NFR BLD: 2 % (ref 0–7)
ERYTHROCYTE [DISTWIDTH] IN BLOOD BY AUTOMATED COUNT: 13.8 % (ref 11.5–14.5)
GLUCOSE SERPL-MCNC: 100 MG/DL (ref 65–100)
HCT VFR BLD AUTO: 36.5 % (ref 36.6–50.3)
HGB BLD-MCNC: 12 G/DL (ref 12.1–17)
IMM GRANULOCYTES # BLD AUTO: 0 K/UL (ref 0–0.04)
IMM GRANULOCYTES NFR BLD AUTO: 1 % (ref 0–0.5)
LYMPHOCYTES # BLD: 2.5 K/UL (ref 0.8–3.5)
LYMPHOCYTES NFR BLD: 35 % (ref 12–49)
MCH RBC QN AUTO: 29.9 PG (ref 26–34)
MCHC RBC AUTO-ENTMCNC: 32.9 G/DL (ref 30–36.5)
MCV RBC AUTO: 90.8 FL (ref 80–99)
MONOCYTES # BLD: 0.7 K/UL (ref 0–1)
MONOCYTES NFR BLD: 10 % (ref 5–13)
NEUTS SEG # BLD: 3.7 K/UL (ref 1.8–8)
NEUTS SEG NFR BLD: 51 % (ref 32–75)
NRBC # BLD: 0 K/UL (ref 0–0.01)
NRBC BLD-RTO: 0 PER 100 WBC
PLATELET # BLD AUTO: 161 K/UL (ref 150–400)
PMV BLD AUTO: 10.7 FL (ref 8.9–12.9)
POTASSIUM SERPL-SCNC: 3.8 MMOL/L (ref 3.5–5.1)
RBC # BLD AUTO: 4.02 M/UL (ref 4.1–5.7)
SODIUM SERPL-SCNC: 140 MMOL/L (ref 136–145)
WBC # BLD AUTO: 7 K/UL (ref 4.1–11.1)

## 2023-12-01 PROCEDURE — 80048 BASIC METABOLIC PNL TOTAL CA: CPT

## 2023-12-01 PROCEDURE — 2580000003 HC RX 258: Performed by: STUDENT IN AN ORGANIZED HEALTH CARE EDUCATION/TRAINING PROGRAM

## 2023-12-01 PROCEDURE — 85025 COMPLETE CBC W/AUTO DIFF WBC: CPT

## 2023-12-01 PROCEDURE — 6370000000 HC RX 637 (ALT 250 FOR IP): Performed by: NURSE PRACTITIONER

## 2023-12-01 PROCEDURE — 36415 COLL VENOUS BLD VENIPUNCTURE: CPT

## 2023-12-01 PROCEDURE — 6360000002 HC RX W HCPCS: Performed by: STUDENT IN AN ORGANIZED HEALTH CARE EDUCATION/TRAINING PROGRAM

## 2023-12-01 RX ORDER — MIDODRINE HYDROCHLORIDE 10 MG/1
10 TABLET ORAL
Qty: 90 TABLET | Refills: 3 | Status: SHIPPED | OUTPATIENT
Start: 2023-12-01

## 2023-12-01 RX ADMIN — MIDODRINE HYDROCHLORIDE 10 MG: 5 TABLET ORAL at 12:59

## 2023-12-01 RX ADMIN — MIDODRINE HYDROCHLORIDE 10 MG: 5 TABLET ORAL at 08:05

## 2023-12-01 RX ADMIN — ENOXAPARIN SODIUM 100 MG: 100 INJECTION SUBCUTANEOUS at 08:13

## 2023-12-01 RX ADMIN — NIRMATRELVIR AND RITONAVIR 3 TABLET: KIT at 13:00

## 2023-12-01 RX ADMIN — MIDODRINE HYDROCHLORIDE 10 MG: 5 TABLET ORAL at 17:15

## 2023-12-01 RX ADMIN — SODIUM CHLORIDE, PRESERVATIVE FREE 10 ML: 5 INJECTION INTRAVENOUS at 08:06

## 2023-12-01 NOTE — CARE COORDINATION
Transition of Care Plan:    RUR: 16% Moderate   Prior Level of Functioning: Independent  Disposition: Home  Follow up appointments: PCP , Oncology Surgery ,patient will need Cholecystectomy as OP  DME needed: NA  Transportation at discharge: Family  IM/IMM Medicare/ letter given: NA  Is patient a [place] and connected with VA? No  Caregiver Contact: wife Cecily Villagomez: [phone]. Discharge Caregiver contacted prior to discharge? Yes  Care Conference needed? No  Barriers to discharge:    Isolation for Covid, RSV  Medical stability   ID following   Per therapy, no skilled needs identified.   Philippe Levy RN,Care Management

## 2023-12-01 NOTE — DISCHARGE SUMMARY
KARON Osborne NP        Or    acetaminophen (TYLENOL) suppository 650 mg  650 mg Rectal Q6H PRN Kirit Montgomery APRN - NP        baclofen (LIORESAL) tablet 10 mg  10 mg Oral TID PRN Karoline Brownlee MD        nirmatrelvir/ritonavir 300/100 (PAXLOVID) 3 tablet  3 tablet Oral Q12H Kirit Montgomery APRN - NP   3 tablet at 12/01/23 1300    midodrine (PROAMATINE) tablet 10 mg  10 mg Oral TID WC Kirit Montgomery APRN - NP   10 mg at 12/01/23 1259    oxyCODONE (ROXICODONE) immediate release tablet 5 mg  5 mg Oral Q6H PRN KARON Chaidez - NP   5 mg at 11/30/23 2015    enoxaparin (LOVENOX) injection 100 mg  100 mg SubCUTAneous Daily Karoline Browlnee MD   100 mg at 12/01/23 0813    sodium chloride flush 0.9 % injection 5-40 mL  5-40 mL IntraVENous 2 times per day Karoline Brownlee MD   10 mL at 12/01/23 0806    sodium chloride flush 0.9 % injection 5-40 mL  5-40 mL IntraVENous PRN Karoline Brownlee MD        0.9 % sodium chloride infusion   IntraVENous PRN Karoline Brownlee MD        potassium chloride (KLOR-CON) extended release tablet 40 mEq  40 mEq Oral PRN Karoline Brownlee MD        Or    potassium bicarb-citric acid (EFFER-K) effervescent tablet 40 mEq  40 mEq Oral PRN Karoline Brownlee MD        Or    potassium chloride 10 mEq/100 mL IVPB (Peripheral Line)  10 mEq IntraVENous PRN Karoline Brownlee MD        magnesium sulfate 2000 mg in 50 mL IVPB premix  2,000 mg IntraVENous PRN Karoline Brownlee MD        ondansetron (ZOFRAN-ODT) disintegrating tablet 4 mg  4 mg Oral Q8H PRN Karoline Brownlee MD        Or    ondansetron (ZOFRAN) injection 4 mg  4 mg IntraVENous Q6H PRN Karoline Brownlee MD        polyethylene glycol (GLYCOLAX) packet 17 g  17 g Oral Daily PRN Karoline Brownlee MD          Current Discharge Medication List        Current Discharge Medication List        CONTINUE these medications which have NOT CHANGED    Details   oxyCODONE (ROXICODONE) 5 MG immediate release tablet Take 1 tablet by

## 2023-12-01 NOTE — PROGRESS NOTES
ID Progress Note          Admission Summary:   58 y.o. 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 The patient was evaluated in the ED and was found to be with fever of 103.3 pulse of 123, and was found to be positive for RSV as well as COVID. The patient was pancultured. Antibiotic with piperacillin-tazobactam and vancomycin were started. Chest x-ray was done and was found to be clear. The patient is immune compromised as he has pancreatic cancer and is on palliative chemotherapy, and additionally has chronic cholecystotomy tube and multiple medical comorbidities. The patient was hypotensive and required pressor support and as a result was placed in ICU critical care. Paxlovid was initiated in the setting of immunocompromise and possibility of increased risk for COVID disease. The patient was placed on nasal cannula oxygen to maintain oxygen saturation 92% or greater.      Altered mental status noted in the ED with difficulty word finding with patient sent  for CNS imaging to assess for CVA/metastatic cancer     Interval history    Consult date (11/29) COVID/RSV/PNA      Antimicrobial therapy history      IV zosyn 11/27-11/30  IV vancomycin 11/27  Assessment and Plan   COVID 19/RSV  - afebrile, wbc 8.3    Blood cx (11/27) no growth so far    CXR (-)    Complete paxlovid    Supportive care    Recommend to get COVID 19 vaccine after 2 months; CDC guidelines    May receive influenza vaccine during next follow up visit with PCP    Liver metastases  Stage IV pancreatic cancer  [facility]  (11/28) percutaneous Screening of patient nares for MRSA is for surveillance purposes and, if positive, to facilitate isolation considerations in high risk settings. It is not intended for automatic decolonization interventions per se as regimens are not sufficiently effective to warrant routine use. Urine Culture Hold Sample [[id number]] Collected: 11/27/23 2241    Order Status: Completed Specimen: Urine Updated: 11/27/23 2247     Specimen HOld       Urine on hold in Microbiology dept for 2 days. If unpreserved urine is submitted, it cannot be used for addtional testing after 24 hours, recollection will be required. Respiratory Panel, Molecular, with COVID-19 (Restricted: peds pts or suitable admitted adults) [[id number]]  (Abnormal) Collected: 11/27/23 2139    Order Status: Completed Specimen: Nasopharyngeal Updated: 11/27/23 2309     Adenovirus by PCR Not detected        Coronavirus 229E by PCR Not detected        Coronavirus HKU1 by PCR Not detected        Coronavirus NL63 by PCR Not detected        Coronavirus OC43 by PCR Not detected        SARS-CoV-2, PCR Detected        Human Metapneumovirus by PCR Not detected        Rhinovirus Enterovirus PCR Not detected        Influenza A by PCR Not detected        Influenza B PCR Not detected        Parainfluenza 1 PCR Not detected        Parainfluenza 2 PCR Not detected        Parainfluenza 3 PCR Not detected        Parainfluenza 4 PCR Not detected        Respiratory Syncytial Virus by PCR Detected        Comment: CALLED TO AND READ BACK BY  STEPHANIE DAVIS 11/27/23 AT 2309.            Bordetella parapertussis by PCR Not detected        Bordetella pertussis by PCR Not detected        Chlamydophila Pneumonia PCR Not detected        Mycoplasma pneumo by PCR Not detected       Culture, Blood 2 [[id number]] Collected: 11/27/23 1910    Order Status: Completed Specimen: Blood Updated: 11/30/23 1918     Special Requests --        NO SPECIAL REQUESTS  RIGHT  Antecubital       Culture

## 2023-12-01 NOTE — DISCHARGE INSTRUCTIONS
DISCHARGE INSTRUCTIONS    Patient: Ladan Joseph MRN: [id number]  SSN: [id number]    YOB: 1960  Age: 58 y.o. Sex: male      Primary Diagnosis: covid, RSV    You came into the hospital with severe infection of COVID and RSV. You had an event in which you had difficulty speaking, so you had a CT scan of your head to make sure you did not have a stroke, and this was unremarkable. They did not complete this evaluation with an MRI because you are not stable enough to undergo it. You needed to be on midodrine for your blood pressure. Please take your blood pressure at home several times a day and write it down to review with your physician. You should be able to wean off this medication soon. Please get your flu shot as soon as possible and get your COVID vaccine after 2 months. You will need to follow-up with general surgery for the cholecystectomy, to remove your gallbladder    Your ultrasound of your heart, your echocardiogram showed that your ejection fraction was low, 35 to 40%. Please follow-up with cardiology to manage this. Discharge Instructions/Special Treatment/Home Care Needs:   During your hospital stay you were cared for by a hospitalist to provide you the best care. After discharge, your care is transferred back to your outpatient/clinic doctor. Contact your physician for worsening of your symptoms or if the symptoms that brought you in the hospital happen again. Please call your physician with any other concerns or questions.     Please ensure you have upcoming appointments with: oncology, cardiology    Signed By: Sanna Wall MD Time: 3:52 PM

## 2023-12-03 ENCOUNTER — CLINICAL DOCUMENTATION (OUTPATIENT)
Age: 63
End: 2023-12-03

## 2023-12-04 ENCOUNTER — TELEPHONE (OUTPATIENT)
Age: 63
End: 2023-12-04

## 2023-12-04 ENCOUNTER — CARE COORDINATION (OUTPATIENT)
Dept: OTHER | Facility: CLINIC | Age: 63
End: 2023-12-04

## 2023-12-04 NOTE — CARE COORDINATION
midodrine, albumin and IV fluid, patient hemodynamically did not improve and subsequently admitted to ICU on 11/28/2023. Patient was started on vasopressors and patient hemodynamically improved. He was transferred back to floor 11/20/23. He was started on Paxlovid and continued to show improvement. Patient was discharged back home on 12/1/23 with continued follow up as previously scheduled. AC spoke with patient today, who states he is doing well. Patient reports he had several out of town guests staying in his home over the Thanksgiving Holiday and feels this may have been the source for Covid and RSV infection. However no one had been diagnosed with either at time of visit. Patient states he is back to his usual routine, trying to rest a little more and get extra hydration. States he was notified by Oncologist this am that the scheduled Folfox infusion for 12/5/23 will be rescheduled for 1 week to allow for more recovery time. Also was seen by surgeon in hospital and will follow up regarding Cholecystectomy. Drain still in place with no complications. Patient denies any concerns or questions at this time and continues to have a positive attitude and approach to diagnosis and treatment plan. Patient and spouse very knowledgeable and involved in care. All care needs in place. LPN Care Coordinator reviewed discharge instructions, medical action plan, and red flags with patient who verbalized understanding. The patient was given an opportunity to ask questions and does not have any further questions or concerns at this time. Were discharge instructions available to patient? Yes. Reviewed appropriate site of care based on symptoms and resources available to patient including: PCP  Specialist  When to call Lisbeth Benavidez. The patient agrees to contact the PCP/Oncologist office for questions related to their healthcare.      Advance Care Planning:   Does patient have an Advance Directive:  not addressed

## 2023-12-04 NOTE — TELEPHONE ENCOUNTER
[address] [facility] pt HIPAA verified x2  Made pt aware due to being discharged for a severe COVID infection Dr. Roxi Bobo feels it would be best to delay chemotherapy by atleast a week. Tomorrow's appointment will be canceled and scheduling will contact pt with an updated appt date/time. Pt voiced understanding and has no questions or concerns at this time.

## 2023-12-06 DIAGNOSIS — C25.9 MALIGNANT NEOPLASM OF PANCREAS, UNSPECIFIED LOCATION OF MALIGNANCY (HCC): Primary | ICD-10-CM

## 2023-12-06 PROBLEM — K80.20 GALLSTONE: Status: ACTIVE | Noted: 2023-12-06

## 2023-12-06 RX ORDER — HEPARIN SODIUM (PORCINE) LOCK FLUSH IV SOLN 100 UNIT/ML 100 UNIT/ML
500 SOLUTION INTRAVENOUS PRN
OUTPATIENT
Start: 2023-12-12

## 2023-12-06 RX ORDER — ONDANSETRON 2 MG/ML
8 INJECTION INTRAMUSCULAR; INTRAVENOUS
OUTPATIENT
Start: 2023-12-12

## 2023-12-06 RX ORDER — ACETAMINOPHEN 325 MG/1
650 TABLET ORAL
OUTPATIENT
Start: 2023-12-12

## 2023-12-06 RX ORDER — EPINEPHRINE 1 MG/ML
0.3 INJECTION, SOLUTION, CONCENTRATE INTRAVENOUS PRN
OUTPATIENT
Start: 2023-12-12

## 2023-12-06 RX ORDER — SODIUM CHLORIDE 0.9 % (FLUSH) 0.9 %
5-40 SYRINGE (ML) INJECTION PRN
OUTPATIENT
Start: 2023-12-12

## 2023-12-06 RX ORDER — ALBUTEROL SULFATE 90 UG/1
4 AEROSOL, METERED RESPIRATORY (INHALATION) PRN
OUTPATIENT
Start: 2023-12-12

## 2023-12-06 RX ORDER — DEXTROSE MONOHYDRATE 50 MG/ML
5-250 INJECTION, SOLUTION INTRAVENOUS PRN
OUTPATIENT
Start: 2023-12-12

## 2023-12-06 RX ORDER — SODIUM CHLORIDE 9 MG/ML
INJECTION, SOLUTION INTRAVENOUS CONTINUOUS
OUTPATIENT
Start: 2023-12-12

## 2023-12-06 RX ORDER — SODIUM CHLORIDE 9 MG/ML
5-250 INJECTION, SOLUTION INTRAVENOUS PRN
OUTPATIENT
Start: 2023-12-12

## 2023-12-06 RX ORDER — DIPHENHYDRAMINE HYDROCHLORIDE 50 MG/ML
50 INJECTION INTRAMUSCULAR; INTRAVENOUS
OUTPATIENT
Start: 2023-12-12

## 2023-12-06 RX ORDER — FAMOTIDINE 10 MG/ML
20 INJECTION, SOLUTION INTRAVENOUS
OUTPATIENT
Start: 2023-12-12

## 2023-12-06 RX ORDER — MEPERIDINE HYDROCHLORIDE 50 MG/ML
12.5 INJECTION INTRAMUSCULAR; INTRAVENOUS; SUBCUTANEOUS PRN
OUTPATIENT
Start: 2023-12-12

## 2023-12-06 RX ORDER — SODIUM CHLORIDE 0.9 % (FLUSH) 0.9 %
5-40 SYRINGE (ML) INJECTION PRN
OUTPATIENT
Start: 2023-12-14

## 2023-12-06 RX ORDER — SODIUM CHLORIDE 9 MG/ML
5-250 INJECTION, SOLUTION INTRAVENOUS PRN
OUTPATIENT
Start: 2023-12-14

## 2023-12-06 RX ORDER — HEPARIN SODIUM (PORCINE) LOCK FLUSH IV SOLN 100 UNIT/ML 100 UNIT/ML
500 SOLUTION INTRAVENOUS PRN
OUTPATIENT
Start: 2023-12-14

## 2023-12-06 RX ORDER — PALONOSETRON 0.05 MG/ML
0.25 INJECTION, SOLUTION INTRAVENOUS ONCE
OUTPATIENT
Start: 2023-12-12 | End: 2023-12-12

## 2023-12-07 ENCOUNTER — TELEPHONE (OUTPATIENT)
Age: 63
End: 2023-12-07

## 2023-12-07 ENCOUNTER — PATIENT MESSAGE (OUTPATIENT)
Age: 63
End: 2023-12-07

## 2023-12-07 ENCOUNTER — HOSPITAL ENCOUNTER (OUTPATIENT)
Facility: HOSPITAL | Age: 63
Setting detail: INFUSION SERIES
End: 2023-12-07
Payer: COMMERCIAL

## 2023-12-07 NOTE — TELEPHONE ENCOUNTER
Pt and his wife were given the fax # to the office for his Cardiac clearance to be faxed from his Cardio doctor which is needed before his surgery.     Patient surgery date is 12//28/23

## 2023-12-07 NOTE — TELEPHONE ENCOUNTER
Pre-Procedure Cardiac Clearance Request:    Facility: [facility]    Procedure Date: 12-28-23    Procedure: LAPAROSCOPIC CHOLECYSTECTOMY WITH INTRAOPERATIVE     Surgeon: 12-28-23    Anesthesia : General    Is patient cleared from a cardiac standpoint to proceed with upcoming procedure. Please advise.

## 2023-12-07 NOTE — TELEPHONE ENCOUNTER
----- Message from Brittany Leader sent at 12/7/2023  3:15 PM EST -----  Regarding: Marcus Black- cardiologist clearance  Contact: [phone]  Good afternoon, I am scheduled to have my gallbladder removed on 12/28/23 at [facility] by Dr. Michela Alvarado. Her office has asked for Dr. [facility] to please fax a cardiology clearance note to [phone] as soon as possible. Thank you for your help.      Marcus Black
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
## 2023-12-11 ENCOUNTER — TELEPHONE (OUTPATIENT)
Age: 63
End: 2023-12-11

## 2023-12-11 DIAGNOSIS — G89.3 CANCER RELATED PAIN: Primary | ICD-10-CM

## 2023-12-11 RX ORDER — OXYCODONE HYDROCHLORIDE 10 MG/1
10 TABLET ORAL EVERY 4 HOURS PRN
Qty: 90 TABLET | Refills: 0 | Status: SHIPPED | OUTPATIENT
Start: 2023-12-11 | End: 2023-12-12 | Stop reason: SDUPTHER

## 2023-12-11 NOTE — TELEPHONE ENCOUNTER
Palliative Medicine  Nursing Note  [phone])  Fax [phone]      Telephone Call  Patient Name: Fransico Gracia  YOB: 1960    12/11/2023        Primary Decision Maker: Keven Jackson - Spouse - [phone]    Secondary Decision Maker: Husam Boo - Child - [phone]       12/11/2023     2:34 PM   Demographics   Marital Status      Call returned to Mr. Mehul Gomes and left VM message to please call palliative to help clarify his pain medication needs. Message also sent via [address] [place] [address].      Laya Clarke RN  Palliative Medicine

## 2023-12-12 ENCOUNTER — TELEPHONE (OUTPATIENT)
Age: 63
End: 2023-12-12

## 2023-12-12 ENCOUNTER — OFFICE VISIT (OUTPATIENT)
Age: 63
End: 2023-12-12
Payer: COMMERCIAL

## 2023-12-12 ENCOUNTER — HOSPITAL ENCOUNTER (OUTPATIENT)
Facility: HOSPITAL | Age: 63
Setting detail: INFUSION SERIES
Discharge: HOME | End: 2023-12-12
Payer: COMMERCIAL

## 2023-12-12 VITALS
BODY MASS INDEX: 20.62 KG/M2 | WEIGHT: 144 LBS | DIASTOLIC BLOOD PRESSURE: 70 MMHG | TEMPERATURE: 98 F | OXYGEN SATURATION: 100 % | RESPIRATION RATE: 18 BRPM | HEIGHT: 70 IN | HEART RATE: 85 BPM | SYSTOLIC BLOOD PRESSURE: 126 MMHG

## 2023-12-12 VITALS
WEIGHT: 144 LBS | OXYGEN SATURATION: 100 % | TEMPERATURE: 98 F | DIASTOLIC BLOOD PRESSURE: 85 MMHG | SYSTOLIC BLOOD PRESSURE: 122 MMHG | HEIGHT: 70 IN | RESPIRATION RATE: 18 BRPM | BODY MASS INDEX: 20.62 KG/M2 | HEART RATE: 85 BPM

## 2023-12-12 DIAGNOSIS — C25.9 MALIGNANT NEOPLASM OF PANCREAS, UNSPECIFIED LOCATION OF MALIGNANCY (HCC): Primary | ICD-10-CM

## 2023-12-12 DIAGNOSIS — G89.3 CANCER RELATED PAIN: ICD-10-CM

## 2023-12-12 DIAGNOSIS — C25.9 MALIGNANT NEOPLASM OF PANCREAS, UNSPECIFIED LOCATION OF MALIGNANCY (HCC): ICD-10-CM

## 2023-12-12 LAB
ALBUMIN SERPL-MCNC: 3.5 G/DL (ref 3.5–5)
ALBUMIN/GLOB SERPL: 1.1 (ref 1.1–2.2)
ALP SERPL-CCNC: 381 U/L (ref 45–117)
ALT SERPL-CCNC: 38 U/L (ref 12–78)
ANION GAP SERPL CALC-SCNC: 4 MMOL/L (ref 5–15)
AST SERPL-CCNC: 39 U/L (ref 15–37)
BASOPHILS # BLD: 0 K/UL (ref 0–0.1)
BASOPHILS NFR BLD: 1 % (ref 0–1)
BILIRUB SERPL-MCNC: 0.7 MG/DL (ref 0.2–1)
BUN SERPL-MCNC: 20 MG/DL (ref 6–20)
BUN/CREAT SERPL: 26 (ref 12–20)
CALCIUM SERPL-MCNC: 8.9 MG/DL (ref 8.5–10.1)
CHLORIDE SERPL-SCNC: 104 MMOL/L (ref 97–108)
CO2 SERPL-SCNC: 29 MMOL/L (ref 21–32)
CREAT SERPL-MCNC: 0.76 MG/DL (ref 0.7–1.3)
DIFFERENTIAL METHOD BLD: ABNORMAL
EOSINOPHIL # BLD: 0.1 K/UL (ref 0–0.4)
EOSINOPHIL NFR BLD: 1 % (ref 0–7)
ERYTHROCYTE [DISTWIDTH] IN BLOOD BY AUTOMATED COUNT: 13.7 % (ref 11.5–14.5)
GLOBULIN SER CALC-MCNC: 3.1 G/DL (ref 2–4)
GLUCOSE SERPL-MCNC: 117 MG/DL (ref 65–100)
HCT VFR BLD AUTO: 35.8 % (ref 36.6–50.3)
HGB BLD-MCNC: 11.7 G/DL (ref 12.1–17)
IMM GRANULOCYTES # BLD AUTO: 0 K/UL (ref 0–0.04)
IMM GRANULOCYTES NFR BLD AUTO: 0 % (ref 0–0.5)
INR PPP: 1.1 (ref 0.9–1.1)
LYMPHOCYTES # BLD: 1.5 K/UL (ref 0.8–3.5)
LYMPHOCYTES NFR BLD: 27 % (ref 12–49)
MCH RBC QN AUTO: 30 PG (ref 26–34)
MCHC RBC AUTO-ENTMCNC: 32.7 G/DL (ref 30–36.5)
MCV RBC AUTO: 91.8 FL (ref 80–99)
MONOCYTES # BLD: 1 K/UL (ref 0–1)
MONOCYTES NFR BLD: 18 % (ref 5–13)
NEUTS SEG # BLD: 3 K/UL (ref 1.8–8)
NEUTS SEG NFR BLD: 53 % (ref 32–75)
NRBC # BLD: 0 K/UL (ref 0–0.01)
NRBC BLD-RTO: 0 PER 100 WBC
PLATELET # BLD AUTO: 168 K/UL (ref 150–400)
PMV BLD AUTO: 11.5 FL (ref 8.9–12.9)
POTASSIUM SERPL-SCNC: 4.4 MMOL/L (ref 3.5–5.1)
PROT SERPL-MCNC: 6.6 G/DL (ref 6.4–8.2)
PROTHROMBIN TIME: 11.4 SEC (ref 9–11.1)
RBC # BLD AUTO: 3.9 M/UL (ref 4.1–5.7)
SODIUM SERPL-SCNC: 137 MMOL/L (ref 136–145)
WBC # BLD AUTO: 5.7 K/UL (ref 4.1–11.1)

## 2023-12-12 PROCEDURE — 99215 OFFICE O/P EST HI 40 MIN: CPT | Performed by: INTERNAL MEDICINE

## 2023-12-12 PROCEDURE — 85610 PROTHROMBIN TIME: CPT

## 2023-12-12 PROCEDURE — 6360000002 HC RX W HCPCS

## 2023-12-12 PROCEDURE — 85025 COMPLETE CBC W/AUTO DIFF WBC: CPT

## 2023-12-12 PROCEDURE — 96367 TX/PROPH/DG ADDL SEQ IV INF: CPT

## 2023-12-12 PROCEDURE — 96415 CHEMO IV INFUSION ADDL HR: CPT

## 2023-12-12 PROCEDURE — 36415 COLL VENOUS BLD VENIPUNCTURE: CPT

## 2023-12-12 PROCEDURE — 3079F DIAST BP 80-89 MM HG: CPT | Performed by: INTERNAL MEDICINE

## 2023-12-12 PROCEDURE — 96413 CHEMO IV INFUSION 1 HR: CPT

## 2023-12-12 PROCEDURE — 3074F SYST BP LT 130 MM HG: CPT | Performed by: INTERNAL MEDICINE

## 2023-12-12 PROCEDURE — 80053 COMPREHEN METABOLIC PANEL: CPT

## 2023-12-12 PROCEDURE — 2580000003 HC RX 258

## 2023-12-12 PROCEDURE — 96375 TX/PRO/DX INJ NEW DRUG ADDON: CPT

## 2023-12-12 PROCEDURE — 86301 IMMUNOASSAY TUMOR CA 19-9: CPT

## 2023-12-12 RX ORDER — ACETAMINOPHEN 325 MG/1
TABLET ORAL
Status: DISPENSED
Start: 2023-12-12 | End: 2023-12-12

## 2023-12-12 RX ORDER — PALONOSETRON 0.05 MG/ML
0.25 INJECTION, SOLUTION INTRAVENOUS ONCE
Status: COMPLETED | OUTPATIENT
Start: 2023-12-12 | End: 2023-12-12

## 2023-12-12 RX ORDER — SODIUM CHLORIDE 0.9 % (FLUSH) 0.9 %
5-40 SYRINGE (ML) INJECTION PRN
Status: DISCONTINUED | OUTPATIENT
Start: 2023-12-12 | End: 2023-12-13 | Stop reason: HOSPADM

## 2023-12-12 RX ORDER — DEXTROSE MONOHYDRATE 50 MG/ML
5-250 INJECTION, SOLUTION INTRAVENOUS PRN
Status: DISCONTINUED | OUTPATIENT
Start: 2023-12-12 | End: 2023-12-13 | Stop reason: HOSPADM

## 2023-12-12 RX ORDER — ONDANSETRON 8 MG/1
8 TABLET, ORALLY DISINTEGRATING ORAL EVERY 8 HOURS PRN
Qty: 30 TABLET | Refills: 2 | Status: SHIPPED | OUTPATIENT
Start: 2023-12-12

## 2023-12-12 RX ORDER — OXYCODONE HYDROCHLORIDE 10 MG/1
10 TABLET ORAL EVERY 4 HOURS PRN
Qty: 90 TABLET | Refills: 0 | Status: SHIPPED | OUTPATIENT
Start: 2023-12-12 | End: 2023-12-14 | Stop reason: SDUPTHER

## 2023-12-12 RX ADMIN — METHYLPREDNISOLONE SODIUM SUCCINATE 62.5 MG: 125 INJECTION, POWDER, FOR SOLUTION INTRAMUSCULAR; INTRAVENOUS at 11:10

## 2023-12-12 RX ADMIN — DEXTROSE MONOHYDRATE 25 ML/HR: 50 INJECTION, SOLUTION INTRAVENOUS at 10:43

## 2023-12-12 RX ADMIN — PALONOSETRON 0.25 MG: 0.05 INJECTION, SOLUTION INTRAVENOUS at 11:07

## 2023-12-12 RX ADMIN — FLUOROURACIL 3000 MG: 50 INJECTION, SOLUTION INTRAVENOUS at 13:46

## 2023-12-12 RX ADMIN — OXALIPLATIN 110 MG: 5 INJECTION, SOLUTION INTRAVENOUS at 11:33

## 2023-12-12 RX ADMIN — SODIUM CHLORIDE 150 MG: 900 INJECTION, SOLUTION INTRAVENOUS at 10:41

## 2023-12-12 ASSESSMENT — PATIENT HEALTH QUESTIONNAIRE - PHQ9
SUM OF ALL RESPONSES TO PHQ9 QUESTIONS 1 & 2: 0
SUM OF ALL RESPONSES TO PHQ QUESTIONS 1-9: 0
SUM OF ALL RESPONSES TO PHQ QUESTIONS 1-9: 0
2. FEELING DOWN, DEPRESSED OR HOPELESS: 0
SUM OF ALL RESPONSES TO PHQ QUESTIONS 1-9: 0
SUM OF ALL RESPONSES TO PHQ QUESTIONS 1-9: 0
1. LITTLE INTEREST OR PLEASURE IN DOING THINGS: 0

## 2023-12-12 ASSESSMENT — PAIN DESCRIPTION - LOCATION: LOCATION: ABDOMEN

## 2023-12-12 ASSESSMENT — PAIN SCALES - GENERAL: PAINLEVEL_OUTOF10: 3

## 2023-12-12 NOTE — TELEPHONE ENCOUNTER
Clearance note, recent progress note and EKG faxed to Dr. Michela Alvarado at [phone]. Fax number was verified calling physicians office.

## 2023-12-12 NOTE — TELEPHONE ENCOUNTER
Oncology Pharmacist Note    Sheyla Buckner is a  58 y.o.male  diagnosed with pancreatic cancer. Mr. Clarence Delatorre is being treated with mFOLFOX. Refill for ondansetron sent to pharmacy on file.     Last OV 12/12/23    Margo Harrell, PharmD, BCOP, BCPS    For Pharmacy Admin Tracking Only    Program: Medical Group  CPA in place:  Yes  Recommendation Provided To: Patient/Caregiver: 1 via Telephone  Intervention Detail: Refill(s) Provided  Intervention Accepted By: Patient/Caregiver: 1    Time Spent (min): 10

## 2023-12-12 NOTE — TELEPHONE ENCOUNTER
Oxycodone increased from 5 to 10 mg every 4 hrs as needed. Message sent through [address] [place] [address].

## 2023-12-12 NOTE — TELEPHONE ENCOUNTER
Re-routed oxycodone to [facility] based on his ClipCard message. Informed him via ClipCard.    Dr Severo Locke

## 2023-12-13 NOTE — TELEPHONE ENCOUNTER
This nurse attempted to contact patient's wife. Phone out of service  \"subscriber you have dial is not in service\".   Clearance sent to dr. Malena Au

## 2023-12-14 ENCOUNTER — CARE COORDINATION (OUTPATIENT)
Dept: OTHER | Facility: CLINIC | Age: 63
End: 2023-12-14

## 2023-12-14 ENCOUNTER — HOSPITAL ENCOUNTER (OUTPATIENT)
Facility: HOSPITAL | Age: 63
Setting detail: INFUSION SERIES
Discharge: HOME | End: 2023-12-14
Payer: COMMERCIAL

## 2023-12-14 DIAGNOSIS — G89.3 CANCER RELATED PAIN: ICD-10-CM

## 2023-12-14 LAB — CANCER AG19-9 SERPL-ACNC: 18 U/ML (ref 0–35)

## 2023-12-14 PROCEDURE — 96523 IRRIG DRUG DELIVERY DEVICE: CPT

## 2023-12-14 RX ORDER — OXYCODONE HYDROCHLORIDE 10 MG/1
10 TABLET ORAL EVERY 4 HOURS PRN
Qty: 90 TABLET | Refills: 0 | Status: SHIPPED | OUTPATIENT
Start: 2023-12-14 | End: 2023-12-29

## 2023-12-14 NOTE — PROGRESS NOTES
[facility]IC Short Note                       Date: 2023    Name: Sadia Padilla    MRN: [id number]         : 1960     Pt admit to [facility] for Pump D/C ambulatory in stable condition. Assessment completed. No new concerns voiced. Port had positive blood. Port was flushed and de accessed. Pt tolerated treatment well. D/c home ambulatory in no distress.    Future Appointments   Date Time Provider [address]   2023  9:00 AM SPT CT 1 SPTRCT [place]   2023  9:00 AM Kyara Vu MD [address] BS AMB   2023  9:30 AM [facility] PAT EXAM RM 6 SMHORPAT [facility]   2024  7:30 AM A2 [place] [facility]   2024  8:00 AM Spenser Saucedo MD [address] BS AMB   2024 10:40 AM Hardin Spurling, APRN -  [address] BS AMB   2024  3:00 PM G2 LIZETT FASTRACK [facility]   2024  7:30 AM A2 LIZETT LONG [address] [facility]   2024  3:00 PM H2 LIZETT FASTRACK [facility]   2024  3:00 PM BSC SEPULVEDA ECHO 1 ABBIE BS AMB   2024  3:40 PM MD ABBIE Simon BS AMB   2024  3:00 [facility]   2024  7:30 AM A2 [place] [facility]   2024 11:20 AM MD ABBIE Simon BS AMB       Estefania Soto RN  2023  4:06 PM

## 2023-12-14 NOTE — CARE COORDINATION
Care Transitions Follow Up Call    ACM attempted to reach patient for Care Transitions follow up call. HIPAA compliant message left requesting a return phone call at patient convenience. Per chart review, patient having increased pain since holding Chemo after Covid. Scheduled for Infusion today. Scheduled for CT scan to check for progression of cancer and may need duodenal stent, also planning for Cholecystectomy on 12/28/23.      Plan for follow-up call in 10-14 days    Future Appointments   Date Time Provider [address]   12/14/2023  3:00 PM H2 LIZETT FASTRACK RCHICB [address]   12/18/2023  9:00 AM SPT CT 1 SPTRCT [place] [facility]   12/21/2023  9:00 AM Ana M Lopez MD [address] [facility] BS AMB   12/21/2023  9:30  [address] PAT EXAM RM 6 SMHORPAT [address]   1/9/2024  7:30 AM A2 LI[place] LONG [place] R[facility]ICB [address]   1/9/2024  8:00 AM Nehemiah Saucedo MD [facility] [facility] BS AMB   1/11/2024 10:40 AM Evgeny Bell, APRN -  [address]  BS AMB   1/11/2024  3:00 PM G2 LIZETT FASTRACK RCHICB [address]   1/23/2024  7:30 AM A2 LIZETT LONG [address]   1/25/2024  3:00 PM H2 LIZETT FASTRACK RCHICB [address]   1/31/2024  3:00 PM BSC COCO ECHO 1 ABBIE BS AMB   1/31/2024  3:40 PM MD ABBIE Ryder BS AMB   2/8/2024  3:00 PM H2 LIZETT FASTRACK RCHICB [address]   2/13/2024  7:30 AM A2 LIZETT LONG [address]   4/17/2024 11:20 AM MD ABBIE Ryder BS AMB

## 2023-12-19 ENCOUNTER — APPOINTMENT (OUTPATIENT)
Facility: HOSPITAL | Age: 63
End: 2023-12-19
Payer: COMMERCIAL

## 2023-12-21 ENCOUNTER — HOSPITAL ENCOUNTER (OUTPATIENT)
Facility: HOSPITAL | Age: 63
Discharge: HOME | End: 2023-12-24

## 2023-12-21 VITALS
HEIGHT: 70 IN | BODY MASS INDEX: 20.3 KG/M2 | HEART RATE: 100 BPM | SYSTOLIC BLOOD PRESSURE: 141 MMHG | WEIGHT: 141.8 LBS | DIASTOLIC BLOOD PRESSURE: 72 MMHG | TEMPERATURE: 98.1 F

## 2023-12-21 RX ORDER — ENOXAPARIN SODIUM 100 MG/ML
100 INJECTION SUBCUTANEOUS DAILY
COMMUNITY

## 2023-12-22 NOTE — PERIOP NOTE
CARDIOLOGY NOTES FROM DR. MAGANA NOTED IN CC FROM 10/18/23. CBC W/ DIFF, CMP AND PT/INR PRINTED AND PLACED ON CHART FROM 12/12/23. WHEN REVIEWING MEDICATIONS, PT IS UNSURE OF EXACT DOSE HE TAKES OF LOVENOX INJECTIONS. PAT # TO RM  2 GIVEN TO PT AND HE OR WIFE WILL CALL BACK WITH HIS EXACT DOSE. PT'S WIFE CALLED BACK. PT TAKES LOVENOX 100MG SUBCUTANEOUSLY ONCE A DAY. MED LIST UPDATED. 2 EKG'S NOTED IN CC DATED 8/25/23 AND 11/27/23 PRINTED AND PLACED ON CHART. BOTH WERE REVIEWED BY Serina Viveros NP.  NO NEED TO REPEAT AN EKG AT PAT APPT PER NP.    CARDIAC CLEARANCE NOTED IN CC FROM 12/13/23.
THE FOLLOWING MSG SENT TO LEO NEELY LPN:    PLEASE NOTE THE FOLLOWING:    PT 11.4  ALK PHOS 381    SURGERY IS SCHEDULED FOR 12/28/23.     THANK YOU,    Kristin Prieto RN      RESULTS ROUTED TO PCP VIA EPIC
special attention to your underarms and from your belly button to your feet. Turn the shower back on and rinse well to get CHG soap off your body. Pat your skin dry with a clean, dry towel. Do not apply lotions or moisturizer. Put on clean clothes and sleep on fresh bed sheets and do not allow pets to sleep with you. Shower with CHG soap 2 times before your surgery  The evening before your surgery  The morning of your surgery      Tips to help prevent infections after your surgery:  Protect your surgical wound from germs:  Hand washing is the most important thing you and your caregivers can do to prevent infections. Keep your bandage clean and dry! Do not touch your surgical wound. Use clean, freshly washed towels and washcloths every time you shower; do not share bath linens with others. Until your surgical wound is healed, wear clothing and sleep on bed linens each day that are clean and freshly washed. Do not allow pets to sleep in your bed with you or touch your surgical wound. Do not smoke - smoking delays wound healing. This may be a good time to stop smoking. If you have diabetes, it is important for you to manage your blood sugar levels properly before your surgery as well as after your surgery. Poorly managed blood sugar levels slow down wound healing and prevent you from healing completely. Day of Procedure    Please park in the parking deck or any designated visitor parking lot. Enter the facility through the Main Entrance of the hospital.  On the day of surgery, please provide the cell phone number of the person who will be waiting for you to the Patient Access representative at the time of registration. Masks are highly recommended in the hospital, but not required.   Once your procedure and the immediate recovery period is completed, a nurse in the recovery area will contact your designated visitor to inform them of your room number or to otherwise review other pertinent

## 2023-12-27 ENCOUNTER — ANESTHESIA EVENT (OUTPATIENT)
Facility: HOSPITAL | Age: 63
End: 2023-12-27
Payer: COMMERCIAL

## 2023-12-28 ENCOUNTER — ANESTHESIA (OUTPATIENT)
Facility: HOSPITAL | Age: 63
End: 2023-12-28
Payer: COMMERCIAL

## 2023-12-28 ENCOUNTER — HOSPITAL ENCOUNTER (OUTPATIENT)
Facility: HOSPITAL | Age: 63
Setting detail: OBSERVATION
Discharge: HOME | End: 2023-12-29
Attending: STUDENT IN AN ORGANIZED HEALTH CARE EDUCATION/TRAINING PROGRAM | Admitting: STUDENT IN AN ORGANIZED HEALTH CARE EDUCATION/TRAINING PROGRAM
Payer: COMMERCIAL

## 2023-12-28 DIAGNOSIS — G89.3 CANCER RELATED PAIN: ICD-10-CM

## 2023-12-28 PROBLEM — Z90.49 S/P LAPAROSCOPIC CHOLECYSTECTOMY: Status: ACTIVE | Noted: 2023-12-28

## 2023-12-28 PROBLEM — Z41.9 SURGERY, ELECTIVE: Status: ACTIVE | Noted: 2023-12-28

## 2023-12-28 LAB
ERYTHROCYTE [DISTWIDTH] IN BLOOD BY AUTOMATED COUNT: 14.6 % (ref 11.5–14.5)
HCT VFR BLD AUTO: 32.5 % (ref 36.6–50.3)
HGB BLD-MCNC: 10.7 G/DL (ref 12.1–17)
MCH RBC QN AUTO: 29.8 PG (ref 26–34)
MCHC RBC AUTO-ENTMCNC: 32.9 G/DL (ref 30–36.5)
MCV RBC AUTO: 90.5 FL (ref 80–99)
NRBC # BLD: 0 K/UL (ref 0–0.01)
NRBC BLD-RTO: 0 PER 100 WBC
PLATELET # BLD AUTO: 138 K/UL (ref 150–400)
PMV BLD AUTO: 10.7 FL (ref 8.9–12.9)
RBC # BLD AUTO: 3.59 M/UL (ref 4.1–5.7)
WBC # BLD AUTO: 7.4 K/UL (ref 4.1–11.1)

## 2023-12-28 PROCEDURE — 6360000002 HC RX W HCPCS: Performed by: ANESTHESIOLOGY

## 2023-12-28 PROCEDURE — 2720000010 HC SURG SUPPLY STERILE: Performed by: STUDENT IN AN ORGANIZED HEALTH CARE EDUCATION/TRAINING PROGRAM

## 2023-12-28 PROCEDURE — 3700000000 HC ANESTHESIA ATTENDED CARE: Performed by: STUDENT IN AN ORGANIZED HEALTH CARE EDUCATION/TRAINING PROGRAM

## 2023-12-28 PROCEDURE — 6360000002 HC RX W HCPCS: Performed by: STUDENT IN AN ORGANIZED HEALTH CARE EDUCATION/TRAINING PROGRAM

## 2023-12-28 PROCEDURE — 7100000001 HC PACU RECOVERY - ADDTL 15 MIN: Performed by: STUDENT IN AN ORGANIZED HEALTH CARE EDUCATION/TRAINING PROGRAM

## 2023-12-28 PROCEDURE — 36415 COLL VENOUS BLD VENIPUNCTURE: CPT

## 2023-12-28 PROCEDURE — 2709999900 HC NON-CHARGEABLE SUPPLY: Performed by: STUDENT IN AN ORGANIZED HEALTH CARE EDUCATION/TRAINING PROGRAM

## 2023-12-28 PROCEDURE — 3600000004 HC SURGERY LEVEL 4 BASE: Performed by: STUDENT IN AN ORGANIZED HEALTH CARE EDUCATION/TRAINING PROGRAM

## 2023-12-28 PROCEDURE — 3600000014 HC SURGERY LEVEL 4 ADDTL 15MIN: Performed by: STUDENT IN AN ORGANIZED HEALTH CARE EDUCATION/TRAINING PROGRAM

## 2023-12-28 PROCEDURE — 7100000000 HC PACU RECOVERY - FIRST 15 MIN: Performed by: STUDENT IN AN ORGANIZED HEALTH CARE EDUCATION/TRAINING PROGRAM

## 2023-12-28 PROCEDURE — 85027 COMPLETE CBC AUTOMATED: CPT

## 2023-12-28 PROCEDURE — 2580000003 HC RX 258: Performed by: STUDENT IN AN ORGANIZED HEALTH CARE EDUCATION/TRAINING PROGRAM

## 2023-12-28 PROCEDURE — 88300 SURGICAL PATH GROSS: CPT

## 2023-12-28 PROCEDURE — 2580000003 HC RX 258: Performed by: ANESTHESIOLOGY

## 2023-12-28 PROCEDURE — 3700000001 HC ADD 15 MINUTES (ANESTHESIA): Performed by: STUDENT IN AN ORGANIZED HEALTH CARE EDUCATION/TRAINING PROGRAM

## 2023-12-28 PROCEDURE — 6370000000 HC RX 637 (ALT 250 FOR IP): Performed by: STUDENT IN AN ORGANIZED HEALTH CARE EDUCATION/TRAINING PROGRAM

## 2023-12-28 PROCEDURE — 2500000003 HC RX 250 WO HCPCS: Performed by: ANESTHESIOLOGY

## 2023-12-28 PROCEDURE — G0378 HOSPITAL OBSERVATION PER HR: HCPCS

## 2023-12-28 PROCEDURE — 88304 TISSUE EXAM BY PATHOLOGIST: CPT

## 2023-12-28 RX ORDER — PROPOFOL 10 MG/ML
INJECTION, EMULSION INTRAVENOUS PRN
Status: DISCONTINUED | OUTPATIENT
Start: 2023-12-28 | End: 2023-12-28 | Stop reason: SDUPTHER

## 2023-12-28 RX ORDER — BUPIVACAINE HYDROCHLORIDE 2.5 MG/ML
INJECTION, SOLUTION EPIDURAL; INFILTRATION; INTRACAUDAL PRN
Status: DISCONTINUED | OUTPATIENT
Start: 2023-12-28 | End: 2023-12-28 | Stop reason: HOSPADM

## 2023-12-28 RX ORDER — HYDROMORPHONE HYDROCHLORIDE 1 MG/ML
0.5 INJECTION, SOLUTION INTRAMUSCULAR; INTRAVENOUS; SUBCUTANEOUS
Status: DISCONTINUED | OUTPATIENT
Start: 2023-12-28 | End: 2023-12-30 | Stop reason: HOSPADM

## 2023-12-28 RX ORDER — SODIUM CHLORIDE, SODIUM LACTATE, POTASSIUM CHLORIDE, CALCIUM CHLORIDE 600; 310; 30; 20 MG/100ML; MG/100ML; MG/100ML; MG/100ML
INJECTION, SOLUTION INTRAVENOUS CONTINUOUS
Status: DISCONTINUED | OUTPATIENT
Start: 2023-12-28 | End: 2023-12-28 | Stop reason: HOSPADM

## 2023-12-28 RX ORDER — HYDROMORPHONE HYDROCHLORIDE 1 MG/ML
INJECTION, SOLUTION INTRAMUSCULAR; INTRAVENOUS; SUBCUTANEOUS PRN
Status: DISCONTINUED | OUTPATIENT
Start: 2023-12-28 | End: 2023-12-28 | Stop reason: SDUPTHER

## 2023-12-28 RX ORDER — OXYCODONE HYDROCHLORIDE 5 MG/1
10 TABLET ORAL EVERY 4 HOURS PRN
Status: DISCONTINUED | OUTPATIENT
Start: 2023-12-28 | End: 2023-12-30 | Stop reason: HOSPADM

## 2023-12-28 RX ORDER — HYDRALAZINE HYDROCHLORIDE 20 MG/ML
10 INJECTION INTRAMUSCULAR; INTRAVENOUS
Status: DISCONTINUED | OUTPATIENT
Start: 2023-12-28 | End: 2023-12-28 | Stop reason: HOSPADM

## 2023-12-28 RX ORDER — PHENYLEPHRINE HCL IN 0.9% NACL 0.4MG/10ML
SYRINGE (ML) INTRAVENOUS PRN
Status: DISCONTINUED | OUTPATIENT
Start: 2023-12-28 | End: 2023-12-28 | Stop reason: SDUPTHER

## 2023-12-28 RX ORDER — LIDOCAINE HYDROCHLORIDE 20 MG/ML
INJECTION, SOLUTION EPIDURAL; INFILTRATION; INTRACAUDAL; PERINEURAL PRN
Status: DISCONTINUED | OUTPATIENT
Start: 2023-12-28 | End: 2023-12-28 | Stop reason: SDUPTHER

## 2023-12-28 RX ORDER — SUCCINYLCHOLINE/SOD CL,ISO/PF 200MG/10ML
SYRINGE (ML) INTRAVENOUS PRN
Status: DISCONTINUED | OUTPATIENT
Start: 2023-12-28 | End: 2023-12-28 | Stop reason: SDUPTHER

## 2023-12-28 RX ORDER — MIDAZOLAM HYDROCHLORIDE 1 MG/ML
INJECTION INTRAMUSCULAR; INTRAVENOUS PRN
Status: DISCONTINUED | OUTPATIENT
Start: 2023-12-28 | End: 2023-12-28 | Stop reason: SDUPTHER

## 2023-12-28 RX ORDER — SODIUM CHLORIDE 9 MG/ML
INJECTION, SOLUTION INTRAVENOUS PRN
Status: DISCONTINUED | OUTPATIENT
Start: 2023-12-28 | End: 2023-12-30 | Stop reason: HOSPADM

## 2023-12-28 RX ORDER — ACETAMINOPHEN 500 MG
1000 TABLET ORAL ONCE
Status: COMPLETED | OUTPATIENT
Start: 2023-12-28 | End: 2023-12-28

## 2023-12-28 RX ORDER — FENTANYL CITRATE 50 UG/ML
100 INJECTION, SOLUTION INTRAMUSCULAR; INTRAVENOUS
Status: DISCONTINUED | OUTPATIENT
Start: 2023-12-28 | End: 2023-12-28 | Stop reason: HOSPADM

## 2023-12-28 RX ORDER — ONDANSETRON 4 MG/1
8 TABLET, ORALLY DISINTEGRATING ORAL EVERY 8 HOURS PRN
Status: DISCONTINUED | OUTPATIENT
Start: 2023-12-28 | End: 2023-12-28 | Stop reason: SDUPTHER

## 2023-12-28 RX ORDER — DEXAMETHASONE SODIUM PHOSPHATE 4 MG/ML
INJECTION, SOLUTION INTRA-ARTICULAR; INTRALESIONAL; INTRAMUSCULAR; INTRAVENOUS; SOFT TISSUE PRN
Status: DISCONTINUED | OUTPATIENT
Start: 2023-12-28 | End: 2023-12-28 | Stop reason: SDUPTHER

## 2023-12-28 RX ORDER — ONDANSETRON 2 MG/ML
4 INJECTION INTRAMUSCULAR; INTRAVENOUS EVERY 6 HOURS PRN
Status: DISCONTINUED | OUTPATIENT
Start: 2023-12-28 | End: 2023-12-30 | Stop reason: HOSPADM

## 2023-12-28 RX ORDER — SODIUM CHLORIDE 0.9 % (FLUSH) 0.9 %
5-40 SYRINGE (ML) INJECTION PRN
Status: DISCONTINUED | OUTPATIENT
Start: 2023-12-28 | End: 2023-12-30 | Stop reason: HOSPADM

## 2023-12-28 RX ORDER — SODIUM CHLORIDE 0.9 % (FLUSH) 0.9 %
5-40 SYRINGE (ML) INJECTION PRN
Status: DISCONTINUED | OUTPATIENT
Start: 2023-12-28 | End: 2023-12-28 | Stop reason: HOSPADM

## 2023-12-28 RX ORDER — OXYCODONE HYDROCHLORIDE 5 MG/1
5 TABLET ORAL
Status: DISCONTINUED | OUTPATIENT
Start: 2023-12-28 | End: 2023-12-28 | Stop reason: HOSPADM

## 2023-12-28 RX ORDER — 0.9 % SODIUM CHLORIDE 0.9 %
INTRAVENOUS SOLUTION INTRAVENOUS CONTINUOUS PRN
Status: DISCONTINUED | OUTPATIENT
Start: 2023-12-28 | End: 2023-12-28 | Stop reason: SDUPTHER

## 2023-12-28 RX ORDER — SODIUM CHLORIDE 9 MG/ML
INJECTION, SOLUTION INTRAVENOUS PRN
Status: DISCONTINUED | OUTPATIENT
Start: 2023-12-28 | End: 2023-12-28 | Stop reason: HOSPADM

## 2023-12-28 RX ORDER — ONDANSETRON 2 MG/ML
4 INJECTION INTRAMUSCULAR; INTRAVENOUS
Status: DISCONTINUED | OUTPATIENT
Start: 2023-12-28 | End: 2023-12-28 | Stop reason: HOSPADM

## 2023-12-28 RX ORDER — SODIUM CHLORIDE 0.9 % (FLUSH) 0.9 %
5-40 SYRINGE (ML) INJECTION EVERY 12 HOURS SCHEDULED
Status: DISCONTINUED | OUTPATIENT
Start: 2023-12-28 | End: 2023-12-28 | Stop reason: HOSPADM

## 2023-12-28 RX ORDER — ROCURONIUM BROMIDE 10 MG/ML
INJECTION, SOLUTION INTRAVENOUS PRN
Status: DISCONTINUED | OUTPATIENT
Start: 2023-12-28 | End: 2023-12-28 | Stop reason: SDUPTHER

## 2023-12-28 RX ORDER — SODIUM CHLORIDE 0.9 % (FLUSH) 0.9 %
5-40 SYRINGE (ML) INJECTION EVERY 12 HOURS SCHEDULED
Status: DISCONTINUED | OUTPATIENT
Start: 2023-12-28 | End: 2023-12-30 | Stop reason: HOSPADM

## 2023-12-28 RX ORDER — FENTANYL CITRATE 50 UG/ML
25 INJECTION, SOLUTION INTRAMUSCULAR; INTRAVENOUS EVERY 5 MIN PRN
Status: DISCONTINUED | OUTPATIENT
Start: 2023-12-28 | End: 2023-12-28 | Stop reason: HOSPADM

## 2023-12-28 RX ORDER — FENTANYL CITRATE 50 UG/ML
INJECTION, SOLUTION INTRAMUSCULAR; INTRAVENOUS PRN
Status: DISCONTINUED | OUTPATIENT
Start: 2023-12-28 | End: 2023-12-28 | Stop reason: SDUPTHER

## 2023-12-28 RX ORDER — ONDANSETRON 4 MG/1
4 TABLET, ORALLY DISINTEGRATING ORAL EVERY 8 HOURS PRN
Status: DISCONTINUED | OUTPATIENT
Start: 2023-12-28 | End: 2023-12-30 | Stop reason: HOSPADM

## 2023-12-28 RX ORDER — MIDAZOLAM HYDROCHLORIDE 2 MG/2ML
2 INJECTION, SOLUTION INTRAMUSCULAR; INTRAVENOUS
Status: DISCONTINUED | OUTPATIENT
Start: 2023-12-28 | End: 2023-12-28 | Stop reason: HOSPADM

## 2023-12-28 RX ORDER — GLYCOPYRROLATE 0.2 MG/ML
INJECTION, SOLUTION INTRAMUSCULAR; INTRAVENOUS PRN
Status: DISCONTINUED | OUTPATIENT
Start: 2023-12-28 | End: 2023-12-28 | Stop reason: SDUPTHER

## 2023-12-28 RX ORDER — ONDANSETRON 2 MG/ML
INJECTION INTRAMUSCULAR; INTRAVENOUS PRN
Status: DISCONTINUED | OUTPATIENT
Start: 2023-12-28 | End: 2023-12-28 | Stop reason: SDUPTHER

## 2023-12-28 RX ORDER — SODIUM CHLORIDE 9 MG/ML
INJECTION, SOLUTION INTRAVENOUS CONTINUOUS
Status: DISCONTINUED | OUTPATIENT
Start: 2023-12-28 | End: 2023-12-29

## 2023-12-28 RX ORDER — HYDROMORPHONE HYDROCHLORIDE 1 MG/ML
0.5 INJECTION, SOLUTION INTRAMUSCULAR; INTRAVENOUS; SUBCUTANEOUS EVERY 5 MIN PRN
Status: DISCONTINUED | OUTPATIENT
Start: 2023-12-28 | End: 2023-12-28 | Stop reason: HOSPADM

## 2023-12-28 RX ORDER — ACETAMINOPHEN 325 MG/1
650 TABLET ORAL EVERY 6 HOURS
Status: DISCONTINUED | OUTPATIENT
Start: 2023-12-28 | End: 2023-12-30 | Stop reason: HOSPADM

## 2023-12-28 RX ORDER — NEOSTIGMINE METHYLSULFATE 1 MG/ML
INJECTION, SOLUTION INTRAVENOUS PRN
Status: DISCONTINUED | OUTPATIENT
Start: 2023-12-28 | End: 2023-12-28 | Stop reason: SDUPTHER

## 2023-12-28 RX ORDER — SODIUM CHLORIDE, SODIUM LACTATE, POTASSIUM CHLORIDE, CALCIUM CHLORIDE 600; 310; 30; 20 MG/100ML; MG/100ML; MG/100ML; MG/100ML
INJECTION, SOLUTION INTRAVENOUS CONTINUOUS PRN
Status: DISCONTINUED | OUTPATIENT
Start: 2023-12-28 | End: 2023-12-28 | Stop reason: SDUPTHER

## 2023-12-28 RX ORDER — CEFAZOLIN SODIUM 1 G/3ML
INJECTION, POWDER, FOR SOLUTION INTRAMUSCULAR; INTRAVENOUS PRN
Status: DISCONTINUED | OUTPATIENT
Start: 2023-12-28 | End: 2023-12-28 | Stop reason: SDUPTHER

## 2023-12-28 RX ORDER — LIDOCAINE HYDROCHLORIDE 10 MG/ML
1 INJECTION, SOLUTION EPIDURAL; INFILTRATION; INTRACAUDAL; PERINEURAL
Status: DISCONTINUED | OUTPATIENT
Start: 2023-12-28 | End: 2023-12-28 | Stop reason: HOSPADM

## 2023-12-28 RX ORDER — HYDROMORPHONE HYDROCHLORIDE 1 MG/ML
1 INJECTION, SOLUTION INTRAMUSCULAR; INTRAVENOUS; SUBCUTANEOUS
Status: DISCONTINUED | OUTPATIENT
Start: 2023-12-28 | End: 2023-12-30 | Stop reason: HOSPADM

## 2023-12-28 RX ORDER — BACLOFEN 10 MG/1
10 TABLET ORAL PRN
Status: DISCONTINUED | OUTPATIENT
Start: 2023-12-28 | End: 2023-12-30 | Stop reason: HOSPADM

## 2023-12-28 RX ORDER — OXYCODONE HYDROCHLORIDE 5 MG/1
5 TABLET ORAL EVERY 4 HOURS PRN
Status: DISCONTINUED | OUTPATIENT
Start: 2023-12-28 | End: 2023-12-30 | Stop reason: HOSPADM

## 2023-12-28 RX ORDER — PROCHLORPERAZINE EDISYLATE 5 MG/ML
5 INJECTION INTRAMUSCULAR; INTRAVENOUS
Status: DISCONTINUED | OUTPATIENT
Start: 2023-12-28 | End: 2023-12-28 | Stop reason: HOSPADM

## 2023-12-28 RX ADMIN — Medication 80 MCG: at 08:58

## 2023-12-28 RX ADMIN — Medication 120 MG: at 08:41

## 2023-12-28 RX ADMIN — OXYCODONE HYDROCHLORIDE 10 MG: 5 TABLET ORAL at 17:51

## 2023-12-28 RX ADMIN — SODIUM CHLORIDE, POTASSIUM CHLORIDE, SODIUM LACTATE AND CALCIUM CHLORIDE: 600; 310; 30; 20 INJECTION, SOLUTION INTRAVENOUS at 07:30

## 2023-12-28 RX ADMIN — Medication 80 MCG: at 08:46

## 2023-12-28 RX ADMIN — SODIUM CHLORIDE, PRESERVATIVE FREE 10 ML: 5 INJECTION INTRAVENOUS at 21:20

## 2023-12-28 RX ADMIN — SODIUM CHLORIDE 5 ML/KG/HR: 9 INJECTION, SOLUTION INTRAVENOUS at 09:39

## 2023-12-28 RX ADMIN — HYDROMORPHONE HYDROCHLORIDE 0.5 MG: 1 INJECTION, SOLUTION INTRAMUSCULAR; INTRAVENOUS; SUBCUTANEOUS at 11:12

## 2023-12-28 RX ADMIN — FENTANYL CITRATE 50 MCG: 50 INJECTION, SOLUTION INTRAMUSCULAR; INTRAVENOUS at 09:45

## 2023-12-28 RX ADMIN — GLYCOPYRROLATE 0.6 MG: 0.2 INJECTION, SOLUTION INTRAMUSCULAR; INTRAVENOUS at 11:43

## 2023-12-28 RX ADMIN — CEFAZOLIN 2 G: 330 INJECTION, POWDER, FOR SOLUTION INTRAMUSCULAR; INTRAVENOUS at 08:55

## 2023-12-28 RX ADMIN — SODIUM CHLORIDE, POTASSIUM CHLORIDE, SODIUM LACTATE AND CALCIUM CHLORIDE: 600; 310; 30; 20 INJECTION, SOLUTION INTRAVENOUS at 07:49

## 2023-12-28 RX ADMIN — ACETAMINOPHEN 650 MG: 325 TABLET ORAL at 17:51

## 2023-12-28 RX ADMIN — ROCURONIUM BROMIDE 10 MG: 10 INJECTION, SOLUTION INTRAVENOUS at 08:41

## 2023-12-28 RX ADMIN — ONDANSETRON 4 MG: 2 INJECTION INTRAMUSCULAR; INTRAVENOUS at 11:36

## 2023-12-28 RX ADMIN — SODIUM CHLORIDE 5 ML/KG/HR: 9 INJECTION, SOLUTION INTRAVENOUS at 10:50

## 2023-12-28 RX ADMIN — ROCURONIUM BROMIDE 30 MG: 10 INJECTION, SOLUTION INTRAVENOUS at 08:58

## 2023-12-28 RX ADMIN — ACETAMINOPHEN 650 MG: 325 TABLET ORAL at 21:20

## 2023-12-28 RX ADMIN — SODIUM CHLORIDE 3 ML/KG/HR: 9 INJECTION, SOLUTION INTRAVENOUS at 11:44

## 2023-12-28 RX ADMIN — SODIUM CHLORIDE: 9 INJECTION, SOLUTION INTRAVENOUS at 12:56

## 2023-12-28 RX ADMIN — ROCURONIUM BROMIDE 10 MG: 10 INJECTION, SOLUTION INTRAVENOUS at 11:04

## 2023-12-28 RX ADMIN — PROPOFOL 150 MG: 10 INJECTION, EMULSION INTRAVENOUS at 08:41

## 2023-12-28 RX ADMIN — FENTANYL CITRATE 25 MCG: 50 INJECTION, SOLUTION INTRAMUSCULAR; INTRAVENOUS at 08:41

## 2023-12-28 RX ADMIN — Medication 3 MG: at 11:43

## 2023-12-28 RX ADMIN — SODIUM CHLORIDE: 9 INJECTION, SOLUTION INTRAVENOUS at 21:25

## 2023-12-28 RX ADMIN — ACETAMINOPHEN 1000 MG: 500 TABLET ORAL at 07:54

## 2023-12-28 RX ADMIN — FENTANYL CITRATE 25 MCG: 50 INJECTION, SOLUTION INTRAMUSCULAR; INTRAVENOUS at 10:00

## 2023-12-28 RX ADMIN — ROCURONIUM BROMIDE 10 MG: 10 INJECTION, SOLUTION INTRAVENOUS at 09:48

## 2023-12-28 RX ADMIN — LIDOCAINE HYDROCHLORIDE 50 MG: 20 INJECTION, SOLUTION EPIDURAL; INFILTRATION; INTRACAUDAL; PERINEURAL at 08:41

## 2023-12-28 RX ADMIN — HYDROMORPHONE HYDROCHLORIDE 0.5 MG: 1 INJECTION, SOLUTION INTRAMUSCULAR; INTRAVENOUS; SUBCUTANEOUS at 10:28

## 2023-12-28 RX ADMIN — DEXAMETHASONE SODIUM PHOSPHATE 4 MG: 4 INJECTION INTRA-ARTICULAR; INTRALESIONAL; INTRAMUSCULAR; INTRAVENOUS; SOFT TISSUE at 11:36

## 2023-12-28 RX ADMIN — OXYCODONE HYDROCHLORIDE 10 MG: 5 TABLET ORAL at 21:48

## 2023-12-28 RX ADMIN — MIDAZOLAM 2 MG: 1 INJECTION INTRAMUSCULAR; INTRAVENOUS at 08:28

## 2023-12-28 ASSESSMENT — PAIN SCALES - GENERAL
PAINLEVEL_OUTOF10: 8
PAINLEVEL_OUTOF10: 8
PAINLEVEL_OUTOF10: 3
PAINLEVEL_OUTOF10: 6
PAINLEVEL_OUTOF10: 10
PAINLEVEL_OUTOF10: 0

## 2023-12-28 ASSESSMENT — PAIN DESCRIPTION - LOCATION
LOCATION: ABDOMEN

## 2023-12-28 ASSESSMENT — PAIN DESCRIPTION - DESCRIPTORS
DESCRIPTORS: ACHING
DESCRIPTORS: ACHING
DESCRIPTORS: SORE
DESCRIPTORS: SORE

## 2023-12-28 ASSESSMENT — PAIN DESCRIPTION - ORIENTATION: ORIENTATION: RIGHT

## 2023-12-28 ASSESSMENT — PAIN - FUNCTIONAL ASSESSMENT: PAIN_FUNCTIONAL_ASSESSMENT: 0-10

## 2023-12-28 NOTE — BRIEF OP NOTE
Brief Postoperative Note      Patient: Taiwo Bustamante  YOB: 1960  MRN: [id number]    Date of Procedure: 12/28/2023    Pre-Op Diagnosis: Acute Cholecystitis   S/P cholecystectomy tube    Post-Op Diagnosis: Same       Procedure(s):  LAPAROSCOPIC  SUB TOTAL CHOLECYSTECTOMY WITH INTRAOPERATIVE ULTRASOUND    Surgeon(s):  Shereen Eaton MD    Assistant:  Surgical Assistant: Hyacinth REGAN    Anesthesia: General    Estimated Blood Loss (mL): 307- ml     Complications: None    Specimens:   ID Type Source Tests Collected by Time Destination   1 : GALLBLADDER DRAIN Hardware Abdomen SURGICAL PATHOLOGY Shereen Eaton MD 12/28/2023 1046    2 : SUB TOTAL CHOLECYSTECTOMY Tissue Gallbladder SURGICAL PATHOLOGY Shereen Eaton MD 12/28/2023 1135        Implants:  * No implants in log *      Drains:   Closed/Suction Drain Lateral;Right RUQ;Abdomen Bulb (Active)   Site Description Intact 12/28/23 1157   Dressing Status Dry 12/28/23 1157   Drain Status Compressed 12/28/23 1157       Open Drain 08/26/23 Right;Lateral Chest (Active)       Urinary Catheter 12/28/23 2 Way; Phelan (Active)   $ Urethral catheter insertion Inserted for procedure 12/28/23 1152   Catheter Indications Perioperative use for selected surgical procedures 12/28/23 1152   Urine Color Kasie 12/28/23 1152   Urine Appearance Clear 12/28/23 1152   Collection Container Standard 12/28/23 1152   Securement Method Securing device (Describe) 12/28/23 1152   Catheter Care  Other (comment) 12/28/23 1152   Catheter Best Practices  Drainage tube clipped to bed;Catheter secured to thigh; Tamper seal intact; Bag below bladder;Bag not on floor; Lack of dependent loop in tubing;Drainage bag less than half full 12/28/23 1152   Status Draining 12/28/23 1152       [REMOVED] Biliary Tube 11/28/23 RLQ (Removed)   Site Description Other (Comment) 12/01/23 1600   Dressing Status Clean, dry & intact 12/01/23 0800   Dressing Type Split

## 2023-12-28 NOTE — H&P
Update History & Physical    The patient's History and Physical of November 29, 2023 was reviewed with the patient and I examined the patient. There was no change. The surgical site was confirmed by the patient and me. Plan: The risks, benefits, expected outcome, and alternative to the recommended procedure have been discussed with the patient. Patient understands and wants to proceed with the procedure.      Electronically signed by Karissa Becerra MD on 12/28/2023 at 8:27 AM

## 2023-12-28 NOTE — PERIOP NOTE
PATIENT INTERVIEWED IN PREOP. NAME BAND VISIBLE AND CORRECT PER PATIENT. PATIENT HAS UNDERSTANDING OF PROCEDURE AND SURGICAL SITE. EDUCATIONAL NEEDS MET. PATIENT STATES RIGHT SIDE PAIN AT 3.

## 2023-12-29 VITALS
RESPIRATION RATE: 20 BRPM | DIASTOLIC BLOOD PRESSURE: 54 MMHG | HEART RATE: 69 BPM | SYSTOLIC BLOOD PRESSURE: 113 MMHG | TEMPERATURE: 98.4 F | OXYGEN SATURATION: 97 %

## 2023-12-29 LAB
ALBUMIN SERPL-MCNC: 2.2 G/DL (ref 3.5–5)
ALBUMIN/GLOB SERPL: 0.7 (ref 1.1–2.2)
ALP SERPL-CCNC: 300 U/L (ref 45–117)
ALT SERPL-CCNC: 54 U/L (ref 12–78)
ANION GAP SERPL CALC-SCNC: 2 MMOL/L (ref 5–15)
AST SERPL-CCNC: 67 U/L (ref 15–37)
BASOPHILS # BLD: 0 K/UL (ref 0–0.1)
BASOPHILS NFR BLD: 0 % (ref 0–1)
BILIRUB SERPL-MCNC: 1 MG/DL (ref 0.2–1)
BUN SERPL-MCNC: 14 MG/DL (ref 6–20)
BUN/CREAT SERPL: 25 (ref 12–20)
CALCIUM SERPL-MCNC: 7.7 MG/DL (ref 8.5–10.1)
CHLORIDE SERPL-SCNC: 108 MMOL/L (ref 97–108)
CO2 SERPL-SCNC: 28 MMOL/L (ref 21–32)
CREAT SERPL-MCNC: 0.56 MG/DL (ref 0.7–1.3)
DIFFERENTIAL METHOD BLD: ABNORMAL
EOSINOPHIL # BLD: 0 K/UL (ref 0–0.4)
EOSINOPHIL NFR BLD: 0 % (ref 0–7)
ERYTHROCYTE [DISTWIDTH] IN BLOOD BY AUTOMATED COUNT: 14.5 % (ref 11.5–14.5)
GLOBULIN SER CALC-MCNC: 3.2 G/DL (ref 2–4)
GLUCOSE SERPL-MCNC: 111 MG/DL (ref 65–100)
HCT VFR BLD AUTO: 30 % (ref 36.6–50.3)
HGB BLD-MCNC: 9.8 G/DL (ref 12.1–17)
IMM GRANULOCYTES # BLD AUTO: 0 K/UL (ref 0–0.04)
IMM GRANULOCYTES NFR BLD AUTO: 1 % (ref 0–0.5)
INR PPP: 1.2 (ref 0.9–1.1)
LYMPHOCYTES # BLD: 1.3 K/UL (ref 0.8–3.5)
LYMPHOCYTES NFR BLD: 19 % (ref 12–49)
MCH RBC QN AUTO: 30.1 PG (ref 26–34)
MCHC RBC AUTO-ENTMCNC: 32.7 G/DL (ref 30–36.5)
MCV RBC AUTO: 92 FL (ref 80–99)
MONOCYTES # BLD: 1.1 K/UL (ref 0–1)
MONOCYTES NFR BLD: 16 % (ref 5–13)
NEUTS SEG # BLD: 4.4 K/UL (ref 1.8–8)
NEUTS SEG NFR BLD: 64 % (ref 32–75)
NRBC # BLD: 0 K/UL (ref 0–0.01)
NRBC BLD-RTO: 0 PER 100 WBC
PLATELET # BLD AUTO: 143 K/UL (ref 150–400)
PMV BLD AUTO: 11.1 FL (ref 8.9–12.9)
POTASSIUM SERPL-SCNC: 4.2 MMOL/L (ref 3.5–5.1)
PROT SERPL-MCNC: 5.4 G/DL (ref 6.4–8.2)
PROTHROMBIN TIME: 12.3 SEC (ref 9–11.1)
RBC # BLD AUTO: 3.26 M/UL (ref 4.1–5.7)
SODIUM SERPL-SCNC: 138 MMOL/L (ref 136–145)
WBC # BLD AUTO: 6.8 K/UL (ref 4.1–11.1)

## 2023-12-29 PROCEDURE — 97161 PT EVAL LOW COMPLEX 20 MIN: CPT

## 2023-12-29 PROCEDURE — G0378 HOSPITAL OBSERVATION PER HR: HCPCS

## 2023-12-29 PROCEDURE — 85025 COMPLETE CBC W/AUTO DIFF WBC: CPT

## 2023-12-29 PROCEDURE — 2580000003 HC RX 258: Performed by: STUDENT IN AN ORGANIZED HEALTH CARE EDUCATION/TRAINING PROGRAM

## 2023-12-29 PROCEDURE — 36415 COLL VENOUS BLD VENIPUNCTURE: CPT

## 2023-12-29 PROCEDURE — 99024 POSTOP FOLLOW-UP VISIT: CPT

## 2023-12-29 PROCEDURE — 85610 PROTHROMBIN TIME: CPT

## 2023-12-29 PROCEDURE — 6370000000 HC RX 637 (ALT 250 FOR IP): Performed by: STUDENT IN AN ORGANIZED HEALTH CARE EDUCATION/TRAINING PROGRAM

## 2023-12-29 PROCEDURE — 80053 COMPREHEN METABOLIC PANEL: CPT

## 2023-12-29 PROCEDURE — 97116 GAIT TRAINING THERAPY: CPT

## 2023-12-29 RX ORDER — OXYCODONE HYDROCHLORIDE 5 MG/1
5 TABLET ORAL EVERY 6 HOURS PRN
Qty: 12 TABLET | Refills: 0 | Status: CANCELLED | OUTPATIENT
Start: 2023-12-29 | End: 2024-01-01

## 2023-12-29 RX ORDER — OXYCODONE HYDROCHLORIDE 10 MG/1
10 TABLET ORAL EVERY 4 HOURS PRN
Qty: 30 TABLET | Refills: 0 | Status: SHIPPED | OUTPATIENT
Start: 2023-12-29 | End: 2024-01-05 | Stop reason: SDUPTHER

## 2023-12-29 RX ADMIN — OXYCODONE HYDROCHLORIDE 10 MG: 5 TABLET ORAL at 03:45

## 2023-12-29 RX ADMIN — OXYCODONE HYDROCHLORIDE 5 MG: 5 TABLET ORAL at 09:51

## 2023-12-29 RX ADMIN — OXYCODONE HYDROCHLORIDE 10 MG: 5 TABLET ORAL at 17:54

## 2023-12-29 RX ADMIN — ACETAMINOPHEN 650 MG: 325 TABLET ORAL at 03:45

## 2023-12-29 RX ADMIN — ACETAMINOPHEN 650 MG: 325 TABLET ORAL at 18:00

## 2023-12-29 RX ADMIN — ACETAMINOPHEN 650 MG: 325 TABLET ORAL at 09:46

## 2023-12-29 RX ADMIN — OXYCODONE HYDROCHLORIDE 5 MG: 5 TABLET ORAL at 13:45

## 2023-12-29 RX ADMIN — SODIUM CHLORIDE: 9 INJECTION, SOLUTION INTRAVENOUS at 09:53

## 2023-12-29 ASSESSMENT — PAIN DESCRIPTION - LOCATION
LOCATION: ABDOMEN

## 2023-12-29 ASSESSMENT — PAIN DESCRIPTION - DESCRIPTORS
DESCRIPTORS: ACHING
DESCRIPTORS: ACHING;SHARP
DESCRIPTORS: ACHING;SHARP
DESCRIPTORS: ACHING

## 2023-12-29 ASSESSMENT — PAIN DESCRIPTION - ORIENTATION
ORIENTATION: ANTERIOR
ORIENTATION: ANTERIOR

## 2023-12-29 ASSESSMENT — PAIN SCALES - GENERAL
PAINLEVEL_OUTOF10: 4
PAINLEVEL_OUTOF10: 6
PAINLEVEL_OUTOF10: 5
PAINLEVEL_OUTOF10: 5
PAINLEVEL_OUTOF10: 7

## 2023-12-29 NOTE — DISCHARGE SUMMARY
Physician Discharge Summary     Patient ID:  Yony Patel  [id number]  63 y.o.  1960    Admit Date: 12/28/2023    Discharge Date: 12/29/2023    Admission Diagnoses: Gallstone [K80.20]  Surgery, elective [Z41.9]  S/P laparoscopic cholecystectomy [Z90.49]    Discharge Diagnoses:  Principal Problem:    Gallstone  Active Problems:    Surgery, elective    S/P laparoscopic cholecystectomy  Resolved Problems:    * No resolved hospital problems. *       Admission Condition: Serious    Discharge Condition: Good    Last Procedure: Procedure(s):  LAPAROSCOPIC  SUB TOTAL CHOLECYSTECTOMY WITH INTRAOPERATIVE ULTRASOUND      Hospital Course:   Hx: Patient is a 63 y.o. male with a history aortic dissection, atrial fibrillation, AVR, HTN and stage IV PDAC currently under the care of Dr Louis (initially on palliative FOLFIRINOX but later de-escalated  to FOLFOX for C3 and beyond due to poorly tolerated treatments and frailty. Chemotherapy treatments complicated by acute cholecystitis on 8/2023 s/p perc asaf tube placement.  Initially, there were plans to remove the GB tube if the cystic duct was patent, however, cholangiogram done as OP showed an occluded duct and required an elective cholecystectomy in order to have the GB tube taken out.  Unfortunately, operative intervention was delayed due to COVID and RSV infection requiring hospitalization and was admitted to the ICU for sepsis (11/27/23-12/1/23).      Pt returns this admission for elective laparoscopic cholecystomy, however, due to excessive adhesions complicating surgery the decision was made to do a laparoscopic sub total cholecystectomy with intraoperative ultrasound.  SHEA drain was placed intra-op to monitor for leak.  Patient did well postoperatively; Was tolerating diet without n/v, +flatus, OOB without issues, and pain was well managed with pain medication. Phelan was discontinued with free void prior to discharge. SHEA drain w/SS output, no bilious drainage noted.   Drain was discontinued prior to discharge. Pt was D/C'd on POD#1 w/ f/up in office in 10-14 days. Consults: None    Disposition: home    Patient Instructions:   Current Discharge Medication List        CONTINUE these medications which have NOT CHANGED    Details   enoxaparin (LOVENOX) 100 MG/ML Inject 1 mL into the skin daily      oxyCODONE HCl (OXY-IR) 10 MG immediate release tablet Take 1 tablet by mouth every 4 hours as needed for Pain for up to 15 days. Intended supply: 30 days Max Daily Amount: 60 mg  Qty: 90 tablet, Refills: 0    Comments: Reduce doses taken as pain becomes manageable  Associated Diagnoses: Cancer related pain      ondansetron (ZOFRAN-ODT) 8 MG TBDP disintegrating tablet Place 1 tablet under the tongue every 8 hours as needed for Nausea or Vomiting  Qty: 30 tablet, Refills: 2    Associated Diagnoses: Malignant neoplasm of pancreas, unspecified location of malignancy (HCC)      sodium chloride flush 0.9 % injection 10 mLs by IntraCATHeter route 2 times daily  Qty: 30 each, Refills: 3      baclofen (LIORESAL) 10 MG tablet Take 1 tablet by mouth as needed 3 times daily as needed for hiccups      prochlorperazine (COMPAZINE) 5 MG tablet Take 1 tablet by mouth every 6 hours as needed for Nausea  Qty: 30 tablet, Refills: 2    Associated Diagnoses: Malignant neoplasm of pancreas, unspecified location of malignancy (HCC)      Multiple Vitamins-Minerals (MULTIVITAMIN ADULTS 50+ PO) Take 1 tablet by mouth daily      vitamin D (CHOLECALCIFEROL) 25 MCG (1000 UT) TABS tablet Take 1 tablet by mouth daily           Activity: No lifting, pushing or pulling anything heavier than 10 lbs x 2 weeks. Walking as tolerated. No driving while you are taking narcotics. Diet: Regular low fat Diet. If you have cramps or nausea, try eating smaller light meals more frequently. You may find it helpful to take an over-the-counter stool softener such as colace if you feel constipated.     Wound Care: Keep clean and

## 2023-12-29 NOTE — PROGRESS NOTES
Bedside shift change report given to Stephen Lemus RN (oncoming nurse) by [facility] (offgoing nurse). Report included the following information Nurse Handoff Report, Index, ED Encounter Summary, ED SBAR, Adult Overview, Surgery Report, Intake/Output, MAR, Recent Results, Med Rec Status, Alarm Parameters, Quality Measures, and Neuro Assessment.

## 2023-12-29 NOTE — PROGRESS NOTES
07/18/2023    US GUIDED LIVER BIOPSY PERCUTANEOUS 7/18/2023 Vance Weiss., APRN -  [address] RAD US    WISDOM TOOTH EXTRACTION         Home Situation and Prior Level of Function: lives with wife, independent with mobility and ADLs     Strength:    Strength: Generally decreased, functional    Tone & Sensation:   Tone: Normal       Coordination:  Coordination: Within functional limits    Range Of Motion:  AROM: Generally decreased, functional       Functional Mobility:  Bed Mobility:     Bed Mobility Training  Supine to Sit: Supervision; Additional time  Sit to Supine: Supervision; Additional time  Scooting: Supervision; Additional time  Transfers:     Transfer Training  Sit to Stand: Supervision; Additional time  Stand to Sit: Supervision; Additional time  Balance:               Balance  Sitting: Intact  Standing: Impaired  Standing - Static: Good  Standing - Dynamic: Good  Ambulation/Gait Training:                       Gait  Overall Level of Assistance: Supervision; Additional time  Distance (ft): 300 Feet  Assistive Device:  (pt pushing IV pole)  Interventions: Safety awareness training;Verbal cues  Base of Support: Narrowed  Speed/Sylvia: Slow;Shuffled  Step Length: Left shortened;Right shortened  Gait Abnormalities: Antalgic; Shuffling gait    Pain Intervention(s):   nursing notified and addressing    Activity Tolerance:   Fair     After treatment:   Patient left in no apparent distress in bed, Call bell within reach, and Side rails x3      COMMUNICATION/EDUCATION:   The patient's plan of care was discussed with: registered nurse    Thank you for this referral.  Severa Guitar, PT, DPT  Minutes: 20

## 2023-12-29 NOTE — DISCHARGE INSTRUCTIONS
[address] [facility] Surgery at [facility]   Post Operative Instructions    Procedure: Procedure(s):  LAPAROSCOPIC  SUB TOTAL CHOLECYSTECTOMY WITH INTRAOPERATIVE ULTRASOUND     Please call your surgeons  office for a 2 week follow-up appointment. Office address is: [facility]  At [address] [place] [address], [facility]  [phone]      Discharge Instructions:    Please hold off on taking lovenox injections (blood thinner) until December 1. You may take 650 mg of tylenol every 6 hours (do not exceed >1000 mg tylenol in 24/hrs) and may alternate with 600 mg of ibuprofen every 6 hours for pain. You may resume you usual diet as well as your usual medications. Cover former drain sites with dry gauze and change as needed until it is healed over. You are not cleared to drive if you are taking narcotic pain medication. If you are only using tylenol for pain and are comfortable sitting in a car, you may drive. No heavy lifting >10lbs . No strenuous exercise. You are cleared to go up and down stairs. You may shower but no baths or swimming. Your incisions dont need any special care. You can wash them gently with  warm soap and water daily and pat them dry. Your stitches are under the skin and dont need to be removed. Ask you doctor when you can return to work. Call our office at  [phone] to make a 2 week follow up appointment. Call our office with any problems, questions or concerns. Call our office if you have any     Fevers of 101 or higher   Worsening pain  Nausea/Vomiting  Difficulty eating or drinking  Incisions that are red, open, draining or tender.

## 2023-12-29 NOTE — PLAN OF CARE
Problem: Pain  Goal: Verbalizes/displays adequate comfort level or baseline comfort level  12/28/2023 2353 by Emma Walker RN  Outcome: Progressing  12/28/2023 2353 by Laz Senior RN  Outcome: Progressing     Problem: Discharge Planning  Goal: Discharge to home or other facility with appropriate resources  12/28/2023 2353 by Emma Walker RN  Outcome: Progressing  12/28/2023 2353 by Emma Walker RN  Outcome: Progressing     Problem: Safety - Adult  Goal: Free from fall injury  12/28/2023 2353 by Emma Walker RN  Outcome: Progressing  12/28/2023 2353 by Emma Walker RN  Outcome: Progressing

## 2023-12-29 NOTE — PROGRESS NOTES
Patient ambulated one whole lap of unit/5e with the help of standard walker. No c/o dizziness while walking.

## 2023-12-30 NOTE — PROGRESS NOTES
I have reviewed discharge instructions with the patient and spouse. The patient and spouse verbalized understanding. Patient's dressing over drain site was assessed and had no drainage. Gauze, tegaderm, and paper tape were sent with patient just in case of a need to change bandage over next few days. Reached out to Dr. Smita Osorio office on-call physician Dr. Sarita Gant for patient's refill of Oxycodone which he sent to patient's preferred pharmacy electronically. AVS printed with updated medication administration times for oxycodone and tylenol. Assisted patient into vehicle at main entrance.

## 2024-01-01 ENCOUNTER — CARE COORDINATION (OUTPATIENT)
Dept: OTHER | Facility: CLINIC | Age: 64
End: 2024-01-01

## 2024-01-01 ENCOUNTER — APPOINTMENT (OUTPATIENT)
Facility: HOSPITAL | Age: 64
DRG: 871 | End: 2024-01-01
Payer: COMMERCIAL

## 2024-01-01 ENCOUNTER — HOSPITAL ENCOUNTER (INPATIENT)
Facility: HOSPITAL | Age: 64
LOS: 1 days | Discharge: HOME | DRG: 871 | End: 2024-01-08
Attending: EMERGENCY MEDICINE | Admitting: STUDENT IN AN ORGANIZED HEALTH CARE EDUCATION/TRAINING PROGRAM
Payer: COMMERCIAL

## 2024-01-01 ENCOUNTER — HOSPICE ADMISSION (OUTPATIENT)
Age: 64
End: 2024-01-01
Payer: COMMERCIAL

## 2024-01-01 ENCOUNTER — HOSPITAL ENCOUNTER (OUTPATIENT)
Facility: HOSPITAL | Age: 64
Setting detail: INFUSION SERIES
End: 2024-01-01

## 2024-01-01 VITALS
WEIGHT: 145.06 LBS | HEIGHT: 70 IN | BODY MASS INDEX: 20.77 KG/M2 | TEMPERATURE: 98.4 F | RESPIRATION RATE: 13 BRPM | SYSTOLIC BLOOD PRESSURE: 132 MMHG | OXYGEN SATURATION: 92 % | HEART RATE: 103 BPM | DIASTOLIC BLOOD PRESSURE: 75 MMHG

## 2024-01-01 DIAGNOSIS — C25.9 METASTASIS FROM PANCREATIC CANCER (HCC): ICD-10-CM

## 2024-01-01 DIAGNOSIS — A41.9 SEPSIS WITH ACUTE RENAL FAILURE WITHOUT SEPTIC SHOCK, DUE TO UNSPECIFIED ORGANISM, UNSPECIFIED ACUTE RENAL FAILURE TYPE (HCC): Primary | ICD-10-CM

## 2024-01-01 DIAGNOSIS — R65.20 SEPSIS WITH ACUTE RENAL FAILURE WITHOUT SEPTIC SHOCK, DUE TO UNSPECIFIED ORGANISM, UNSPECIFIED ACUTE RENAL FAILURE TYPE (HCC): Primary | ICD-10-CM

## 2024-01-01 DIAGNOSIS — C79.9 METASTASIS FROM PANCREATIC CANCER (HCC): ICD-10-CM

## 2024-01-01 DIAGNOSIS — N17.9 SEPSIS WITH ACUTE RENAL FAILURE WITHOUT SEPTIC SHOCK, DUE TO UNSPECIFIED ORGANISM, UNSPECIFIED ACUTE RENAL FAILURE TYPE (HCC): Primary | ICD-10-CM

## 2024-01-01 DIAGNOSIS — C25.9 MALIGNANT NEOPLASM OF PANCREAS, UNSPECIFIED LOCATION OF MALIGNANCY (HCC): Primary | ICD-10-CM

## 2024-01-01 DIAGNOSIS — K75.0 HEPATIC ABSCESS: ICD-10-CM

## 2024-01-01 DIAGNOSIS — D73.5 SPLENIC INFARCT: ICD-10-CM

## 2024-01-01 LAB
ABO + RH BLD: NORMAL
ALBUMIN SERPL-MCNC: 2.1 G/DL (ref 3.5–5)
ALBUMIN/GLOB SERPL: 0.5 (ref 1.1–2.2)
ALP SERPL-CCNC: 577 U/L (ref 45–117)
ALT SERPL-CCNC: 32 U/L (ref 12–78)
ANION GAP SERPL CALC-SCNC: 11 MMOL/L (ref 5–15)
ANION GAP SERPL CALC-SCNC: 9 MMOL/L (ref 5–15)
APPEARANCE UR: ABNORMAL
AST SERPL-CCNC: 39 U/L (ref 15–37)
BACTERIA SPEC CULT: NORMAL
BACTERIA SPEC CULT: NORMAL
BACTERIA URNS QL MICRO: NEGATIVE /HPF
BASOPHILS # BLD: 0 K/UL (ref 0–0.1)
BASOPHILS NFR BLD: 0 % (ref 0–1)
BILIRUB SERPL-MCNC: 1.4 MG/DL (ref 0.2–1)
BILIRUB UR QL: NEGATIVE
BLOOD GROUP ANTIBODIES SERPL: NORMAL
BUN SERPL-MCNC: 43 MG/DL (ref 6–20)
BUN SERPL-MCNC: 43 MG/DL (ref 6–20)
BUN/CREAT SERPL: 27 (ref 12–20)
BUN/CREAT SERPL: 29 (ref 12–20)
CALCIUM SERPL-MCNC: 7.6 MG/DL (ref 8.5–10.1)
CALCIUM SERPL-MCNC: 8.4 MG/DL (ref 8.5–10.1)
CHLORIDE SERPL-SCNC: 103 MMOL/L (ref 97–108)
CHLORIDE SERPL-SCNC: 107 MMOL/L (ref 97–108)
CO2 SERPL-SCNC: 21 MMOL/L (ref 21–32)
CO2 SERPL-SCNC: 22 MMOL/L (ref 21–32)
COLOR UR: ABNORMAL
COMMENT:: NORMAL
COMMENT:: NORMAL
CREAT SERPL-MCNC: 1.47 MG/DL (ref 0.7–1.3)
CREAT SERPL-MCNC: 1.61 MG/DL (ref 0.7–1.3)
DIFFERENTIAL METHOD BLD: ABNORMAL
EKG ATRIAL RATE: 102 BPM
EKG DIAGNOSIS: NORMAL
EKG P AXIS: 16 DEGREES
EKG P-R INTERVAL: 176 MS
EKG Q-T INTERVAL: 372 MS
EKG QRS DURATION: 102 MS
EKG QTC CALCULATION (BAZETT): 484 MS
EKG R AXIS: -49 DEGREES
EKG T AXIS: 78 DEGREES
EKG VENTRICULAR RATE: 102 BPM
EOSINOPHIL # BLD: 0.3 K/UL (ref 0–0.4)
EOSINOPHIL NFR BLD: 1 % (ref 0–7)
EPITH CASTS URNS QL MICRO: ABNORMAL /LPF
ERYTHROCYTE [DISTWIDTH] IN BLOOD BY AUTOMATED COUNT: 15.9 % (ref 11.5–14.5)
GLOBULIN SER CALC-MCNC: 4.1 G/DL (ref 2–4)
GLUCOSE BLD STRIP.AUTO-MCNC: 132 MG/DL (ref 65–117)
GLUCOSE SERPL-MCNC: 127 MG/DL (ref 65–100)
GLUCOSE SERPL-MCNC: 141 MG/DL (ref 65–100)
GLUCOSE UR STRIP.AUTO-MCNC: NEGATIVE MG/DL
HCT VFR BLD AUTO: 41.7 % (ref 36.6–50.3)
HGB BLD-MCNC: 13.8 G/DL (ref 12.1–17)
HGB UR QL STRIP: NEGATIVE
IMM GRANULOCYTES # BLD AUTO: 0.3 K/UL (ref 0–0.04)
IMM GRANULOCYTES NFR BLD AUTO: 1 % (ref 0–0.5)
INR PPP: 1.5 (ref 0.9–1.1)
KETONES UR QL STRIP.AUTO: NEGATIVE MG/DL
LACTATE SERPL-SCNC: 3.4 MMOL/L (ref 0.4–2)
LACTATE SERPL-SCNC: 4.3 MMOL/L (ref 0.4–2)
LEUKOCYTE ESTERASE UR QL STRIP.AUTO: NEGATIVE
LIPASE SERPL-CCNC: 6 U/L (ref 13–75)
LYMPHOCYTES # BLD: 1 K/UL (ref 0.8–3.5)
LYMPHOCYTES NFR BLD: 3 % (ref 12–49)
MAGNESIUM SERPL-MCNC: 2 MG/DL (ref 1.6–2.4)
MCH RBC QN AUTO: 30.1 PG (ref 26–34)
MCHC RBC AUTO-ENTMCNC: 33.1 G/DL (ref 30–36.5)
MCV RBC AUTO: 91 FL (ref 80–99)
MONOCYTES # BLD: 1.9 K/UL (ref 0–1)
MONOCYTES NFR BLD: 6 % (ref 5–13)
NEUTS SEG # BLD: 28.3 K/UL (ref 1.8–8)
NEUTS SEG NFR BLD: 89 % (ref 32–75)
NITRITE UR QL STRIP.AUTO: NEGATIVE
NRBC # BLD: 0 K/UL (ref 0–0.01)
NRBC BLD-RTO: 0 PER 100 WBC
PH UR STRIP: 5.5 (ref 5–8)
PLATELET # BLD AUTO: 188 K/UL (ref 150–400)
PLATELET COMMENT: ABNORMAL
PMV BLD AUTO: 10.8 FL (ref 8.9–12.9)
POTASSIUM SERPL-SCNC: 4.9 MMOL/L (ref 3.5–5.1)
POTASSIUM SERPL-SCNC: 5.1 MMOL/L (ref 3.5–5.1)
PROT SERPL-MCNC: 6.2 G/DL (ref 6.4–8.2)
PROT UR STRIP-MCNC: 30 MG/DL
PROTHROMBIN TIME: 15.6 SEC (ref 9–11.1)
RBC # BLD AUTO: 4.58 M/UL (ref 4.1–5.7)
RBC #/AREA URNS HPF: ABNORMAL /HPF (ref 0–5)
RBC MORPH BLD: ABNORMAL
RBC MORPH BLD: ABNORMAL
SARS-COV-2 RDRP RESP QL NAA+PROBE: NOT DETECTED
SERVICE CMNT-IMP: ABNORMAL
SERVICE CMNT-IMP: NORMAL
SODIUM SERPL-SCNC: 134 MMOL/L (ref 136–145)
SODIUM SERPL-SCNC: 139 MMOL/L (ref 136–145)
SOURCE: NORMAL
SP GR UR REFRACTOMETRY: >1.03
SPECIMEN EXP DATE BLD: NORMAL
SPECIMEN HOLD: NORMAL
SPECIMEN HOLD: NORMAL
TROPONIN I SERPL HS-MCNC: 9 NG/L (ref 0–76)
URINE CULTURE IF INDICATED: ABNORMAL
UROBILINOGEN UR QL STRIP.AUTO: 1 EU/DL (ref 0.2–1)
WBC # BLD AUTO: 31.8 K/UL (ref 4.1–11.1)
WBC URNS QL MICRO: ABNORMAL /HPF (ref 0–4)

## 2024-01-01 PROCEDURE — 0656 HSPC GENERAL INPATIENT

## 2024-01-01 PROCEDURE — 2580000003 HC RX 258: Performed by: EMERGENCY MEDICINE

## 2024-01-01 PROCEDURE — 83690 ASSAY OF LIPASE: CPT

## 2024-01-01 PROCEDURE — 96361 HYDRATE IV INFUSION ADD-ON: CPT

## 2024-01-01 PROCEDURE — 6360000002 HC RX W HCPCS: Performed by: STUDENT IN AN ORGANIZED HEALTH CARE EDUCATION/TRAINING PROGRAM

## 2024-01-01 PROCEDURE — 86901 BLOOD TYPING SEROLOGIC RH(D): CPT

## 2024-01-01 PROCEDURE — 99223 1ST HOSP IP/OBS HIGH 75: CPT | Performed by: INTERNAL MEDICINE

## 2024-01-01 PROCEDURE — 1100000000 HC RM PRIVATE

## 2024-01-01 PROCEDURE — 51798 US URINE CAPACITY MEASURE: CPT

## 2024-01-01 PROCEDURE — 80048 BASIC METABOLIC PNL TOTAL CA: CPT

## 2024-01-01 PROCEDURE — 86850 RBC ANTIBODY SCREEN: CPT

## 2024-01-01 PROCEDURE — 96375 TX/PRO/DX INJ NEW DRUG ADDON: CPT

## 2024-01-01 PROCEDURE — 84484 ASSAY OF TROPONIN QUANT: CPT

## 2024-01-01 PROCEDURE — 2580000003 HC RX 258: Performed by: STUDENT IN AN ORGANIZED HEALTH CARE EDUCATION/TRAINING PROGRAM

## 2024-01-01 PROCEDURE — 36415 COLL VENOUS BLD VENIPUNCTURE: CPT

## 2024-01-01 PROCEDURE — 2700000000 HC OXYGEN THERAPY PER DAY

## 2024-01-01 PROCEDURE — 2580000003 HC RX 258: Performed by: SURGERY

## 2024-01-01 PROCEDURE — 87635 SARS-COV-2 COVID-19 AMP PRB: CPT

## 2024-01-01 PROCEDURE — 93005 ELECTROCARDIOGRAM TRACING: CPT | Performed by: EMERGENCY MEDICINE

## 2024-01-01 PROCEDURE — 93010 ELECTROCARDIOGRAM REPORT: CPT | Performed by: SPECIALIST

## 2024-01-01 PROCEDURE — 83605 ASSAY OF LACTIC ACID: CPT

## 2024-01-01 PROCEDURE — 83735 ASSAY OF MAGNESIUM: CPT

## 2024-01-01 PROCEDURE — 6360000002 HC RX W HCPCS: Performed by: INTERNAL MEDICINE

## 2024-01-01 PROCEDURE — 85610 PROTHROMBIN TIME: CPT

## 2024-01-01 PROCEDURE — 6370000000 HC RX 637 (ALT 250 FOR IP): Performed by: NURSE PRACTITIONER

## 2024-01-01 PROCEDURE — 6360000002 HC RX W HCPCS: Performed by: SURGERY

## 2024-01-01 PROCEDURE — 99285 EMERGENCY DEPT VISIT HI MDM: CPT

## 2024-01-01 PROCEDURE — 94760 N-INVAS EAR/PLS OXIMETRY 1: CPT

## 2024-01-01 PROCEDURE — 82962 GLUCOSE BLOOD TEST: CPT

## 2024-01-01 PROCEDURE — 6360000004 HC RX CONTRAST MEDICATION: Performed by: RADIOLOGY

## 2024-01-01 PROCEDURE — 80053 COMPREHEN METABOLIC PANEL: CPT

## 2024-01-01 PROCEDURE — 6360000002 HC RX W HCPCS: Performed by: FAMILY MEDICINE

## 2024-01-01 PROCEDURE — 86900 BLOOD TYPING SEROLOGIC ABO: CPT

## 2024-01-01 PROCEDURE — 74178 CT ABD&PLV WO CNTR FLWD CNTR: CPT

## 2024-01-01 PROCEDURE — 6360000002 HC RX W HCPCS: Performed by: NURSE PRACTITIONER

## 2024-01-01 PROCEDURE — 6360000002 HC RX W HCPCS: Performed by: EMERGENCY MEDICINE

## 2024-01-01 PROCEDURE — 96374 THER/PROPH/DIAG INJ IV PUSH: CPT

## 2024-01-01 PROCEDURE — 85025 COMPLETE CBC W/AUTO DIFF WBC: CPT

## 2024-01-01 PROCEDURE — 6370000000 HC RX 637 (ALT 250 FOR IP): Performed by: INTERNAL MEDICINE

## 2024-01-01 PROCEDURE — 81001 URINALYSIS AUTO W/SCOPE: CPT

## 2024-01-01 PROCEDURE — 96376 TX/PRO/DX INJ SAME DRUG ADON: CPT

## 2024-01-01 PROCEDURE — 71045 X-RAY EXAM CHEST 1 VIEW: CPT

## 2024-01-01 PROCEDURE — 87040 BLOOD CULTURE FOR BACTERIA: CPT

## 2024-01-01 RX ORDER — POTASSIUM CHLORIDE 7.45 MG/ML
10 INJECTION INTRAVENOUS PRN
Status: DISCONTINUED | OUTPATIENT
Start: 2024-01-01 | End: 2024-01-01 | Stop reason: HOSPADM

## 2024-01-01 RX ORDER — ONDANSETRON 2 MG/ML
4 INJECTION INTRAMUSCULAR; INTRAVENOUS ONCE
Status: COMPLETED | OUTPATIENT
Start: 2024-01-01 | End: 2024-01-01

## 2024-01-01 RX ORDER — POLYETHYLENE GLYCOL 3350 17 G/17G
17 POWDER, FOR SOLUTION ORAL DAILY PRN
Status: DISCONTINUED | OUTPATIENT
Start: 2024-01-01 | End: 2024-01-01 | Stop reason: HOSPADM

## 2024-01-01 RX ORDER — ONDANSETRON 2 MG/ML
4 INJECTION INTRAMUSCULAR; INTRAVENOUS EVERY 6 HOURS PRN
Status: DISCONTINUED | OUTPATIENT
Start: 2024-01-01 | End: 2024-01-01 | Stop reason: HOSPADM

## 2024-01-01 RX ORDER — SODIUM CHLORIDE 9 MG/ML
INJECTION, SOLUTION INTRAVENOUS PRN
Status: DISCONTINUED | OUTPATIENT
Start: 2024-01-01 | End: 2024-01-01 | Stop reason: HOSPADM

## 2024-01-01 RX ORDER — LIDOCAINE HYDROCHLORIDE 20 MG/ML
JELLY TOPICAL ONCE
Status: COMPLETED | OUTPATIENT
Start: 2024-01-01 | End: 2024-01-01

## 2024-01-01 RX ORDER — SODIUM CHLORIDE 9 MG/ML
INJECTION, SOLUTION INTRAVENOUS CONTINUOUS
Status: DISCONTINUED | OUTPATIENT
Start: 2024-01-01 | End: 2024-01-01 | Stop reason: HOSPADM

## 2024-01-01 RX ORDER — NALOXONE HYDROCHLORIDE 0.4 MG/ML
0.4 INJECTION, SOLUTION INTRAMUSCULAR; INTRAVENOUS; SUBCUTANEOUS PRN
Status: DISCONTINUED | OUTPATIENT
Start: 2024-01-01 | End: 2024-01-01 | Stop reason: HOSPADM

## 2024-01-01 RX ORDER — 0.9 % SODIUM CHLORIDE 0.9 %
1000 INTRAVENOUS SOLUTION INTRAVENOUS ONCE
Status: COMPLETED | OUTPATIENT
Start: 2024-01-01 | End: 2024-01-01

## 2024-01-01 RX ORDER — POTASSIUM CHLORIDE 750 MG/1
40 TABLET, FILM COATED, EXTENDED RELEASE ORAL PRN
Status: DISCONTINUED | OUTPATIENT
Start: 2024-01-01 | End: 2024-01-01 | Stop reason: HOSPADM

## 2024-01-01 RX ORDER — MAGNESIUM SULFATE IN WATER 40 MG/ML
2000 INJECTION, SOLUTION INTRAVENOUS PRN
Status: DISCONTINUED | OUTPATIENT
Start: 2024-01-01 | End: 2024-01-01 | Stop reason: HOSPADM

## 2024-01-01 RX ORDER — SODIUM CHLORIDE 0.9 % (FLUSH) 0.9 %
5-40 SYRINGE (ML) INJECTION PRN
Status: DISCONTINUED | OUTPATIENT
Start: 2024-01-01 | End: 2024-01-01 | Stop reason: HOSPADM

## 2024-01-01 RX ORDER — HYDROMORPHONE HYDROCHLORIDE 1 MG/ML
1 INJECTION, SOLUTION INTRAMUSCULAR; INTRAVENOUS; SUBCUTANEOUS
Status: DISCONTINUED | OUTPATIENT
Start: 2024-01-01 | End: 2024-01-01 | Stop reason: SDUPTHER

## 2024-01-01 RX ORDER — HYDROMORPHONE HYDROCHLORIDE 1 MG/ML
1 INJECTION, SOLUTION INTRAMUSCULAR; INTRAVENOUS; SUBCUTANEOUS EVERY 30 MIN PRN
Status: DISCONTINUED | OUTPATIENT
Start: 2024-01-01 | End: 2024-01-01 | Stop reason: HOSPADM

## 2024-01-01 RX ORDER — MORPHINE SULFATE 2 MG/ML
2 INJECTION, SOLUTION INTRAMUSCULAR; INTRAVENOUS
Status: COMPLETED | OUTPATIENT
Start: 2024-01-01 | End: 2024-01-01

## 2024-01-01 RX ORDER — MORPHINE SULFATE 2 MG/ML
1 INJECTION, SOLUTION INTRAMUSCULAR; INTRAVENOUS
Status: COMPLETED | OUTPATIENT
Start: 2024-01-01 | End: 2024-01-01

## 2024-01-01 RX ORDER — ONDANSETRON 2 MG/ML
4 INJECTION INTRAMUSCULAR; INTRAVENOUS EVERY 4 HOURS PRN
Status: DISCONTINUED | OUTPATIENT
Start: 2024-01-01 | End: 2024-01-01 | Stop reason: HOSPADM

## 2024-01-01 RX ORDER — MIDAZOLAM HYDROCHLORIDE 2 MG/2ML
1 INJECTION, SOLUTION INTRAMUSCULAR; INTRAVENOUS
Status: DISCONTINUED | OUTPATIENT
Start: 2024-01-01 | End: 2024-01-01 | Stop reason: HOSPADM

## 2024-01-01 RX ORDER — HYDROMORPHONE HYDROCHLORIDE 1 MG/ML
1 INJECTION, SOLUTION INTRAMUSCULAR; INTRAVENOUS; SUBCUTANEOUS ONCE
Status: COMPLETED | OUTPATIENT
Start: 2024-01-01 | End: 2024-01-01

## 2024-01-01 RX ORDER — ACETAMINOPHEN 650 MG/1
650 SUPPOSITORY RECTAL EVERY 6 HOURS PRN
Status: DISCONTINUED | OUTPATIENT
Start: 2024-01-01 | End: 2024-01-01 | Stop reason: HOSPADM

## 2024-01-01 RX ORDER — ACETAMINOPHEN 325 MG/1
650 TABLET ORAL EVERY 6 HOURS PRN
Status: DISCONTINUED | OUTPATIENT
Start: 2024-01-01 | End: 2024-01-01 | Stop reason: SDUPTHER

## 2024-01-01 RX ORDER — MIDAZOLAM HYDROCHLORIDE 2 MG/2ML
1 INJECTION, SOLUTION INTRAMUSCULAR; INTRAVENOUS
Status: COMPLETED | OUTPATIENT
Start: 2024-01-01 | End: 2024-01-01

## 2024-01-01 RX ORDER — GLYCOPYRROLATE 0.2 MG/ML
0.1 INJECTION INTRAMUSCULAR; INTRAVENOUS ONCE
Status: COMPLETED | OUTPATIENT
Start: 2024-01-01 | End: 2024-01-01

## 2024-01-01 RX ORDER — OXYCODONE HYDROCHLORIDE 5 MG/1
10 TABLET ORAL EVERY 4 HOURS PRN
Status: DISCONTINUED | OUTPATIENT
Start: 2024-01-01 | End: 2024-01-01 | Stop reason: HOSPADM

## 2024-01-01 RX ORDER — ONDANSETRON 4 MG/1
4 TABLET, ORALLY DISINTEGRATING ORAL EVERY 8 HOURS PRN
Status: DISCONTINUED | OUTPATIENT
Start: 2024-01-01 | End: 2024-01-01 | Stop reason: HOSPADM

## 2024-01-01 RX ORDER — LORAZEPAM 2 MG/ML
0.5 CONCENTRATE ORAL ONCE
Status: COMPLETED | OUTPATIENT
Start: 2024-01-01 | End: 2024-01-01

## 2024-01-01 RX ORDER — SODIUM CHLORIDE 0.9 % (FLUSH) 0.9 %
5-40 SYRINGE (ML) INJECTION EVERY 12 HOURS SCHEDULED
Status: DISCONTINUED | OUTPATIENT
Start: 2024-01-01 | End: 2024-01-01 | Stop reason: HOSPADM

## 2024-01-01 RX ORDER — ACETAMINOPHEN 325 MG/1
650 TABLET ORAL EVERY 6 HOURS PRN
Status: DISCONTINUED | OUTPATIENT
Start: 2024-01-01 | End: 2024-01-01 | Stop reason: HOSPADM

## 2024-01-01 RX ORDER — PROCHLORPERAZINE EDISYLATE 5 MG/ML
10 INJECTION INTRAMUSCULAR; INTRAVENOUS EVERY 6 HOURS PRN
Status: DISCONTINUED | OUTPATIENT
Start: 2024-01-01 | End: 2024-01-01 | Stop reason: HOSPADM

## 2024-01-01 RX ADMIN — HYDROMORPHONE HYDROCHLORIDE 1 MG: 1 INJECTION, SOLUTION INTRAMUSCULAR; INTRAVENOUS; SUBCUTANEOUS at 06:07

## 2024-01-01 RX ADMIN — HYDROMORPHONE HYDROCHLORIDE 1 MG: 1 INJECTION, SOLUTION INTRAMUSCULAR; INTRAVENOUS; SUBCUTANEOUS at 09:44

## 2024-01-01 RX ADMIN — Medication 0.5 MG: at 14:01

## 2024-01-01 RX ADMIN — ONDANSETRON 4 MG: 2 INJECTION INTRAMUSCULAR; INTRAVENOUS at 22:20

## 2024-01-01 RX ADMIN — PIPERACILLIN SODIUM AND TAZOBACTAM SODIUM 3375 MG: 3; .375 INJECTION, POWDER, LYOPHILIZED, FOR SOLUTION INTRAVENOUS at 06:07

## 2024-01-01 RX ADMIN — VANCOMYCIN HYDROCHLORIDE 1750 MG: 10 INJECTION, POWDER, LYOPHILIZED, FOR SOLUTION INTRAVENOUS at 02:41

## 2024-01-01 RX ADMIN — SODIUM CHLORIDE 1000 ML: 9 INJECTION, SOLUTION INTRAVENOUS at 22:20

## 2024-01-01 RX ADMIN — HYDROMORPHONE HYDROCHLORIDE 1 MG: 1 INJECTION, SOLUTION INTRAMUSCULAR; INTRAVENOUS; SUBCUTANEOUS at 02:42

## 2024-01-01 RX ADMIN — MIDAZOLAM HYDROCHLORIDE 1 MG: 1 INJECTION, SOLUTION INTRAMUSCULAR; INTRAVENOUS at 14:59

## 2024-01-01 RX ADMIN — LIDOCAINE HYDROCHLORIDE: 20 JELLY TOPICAL at 09:44

## 2024-01-01 RX ADMIN — Medication 10 ML: at 12:16

## 2024-01-01 RX ADMIN — MORPHINE SULFATE 2 MG: 2 INJECTION, SOLUTION INTRAMUSCULAR; INTRAVENOUS at 00:15

## 2024-01-01 RX ADMIN — HYDROMORPHONE HYDROCHLORIDE 1 MG: 1 INJECTION, SOLUTION INTRAMUSCULAR; INTRAVENOUS; SUBCUTANEOUS at 14:00

## 2024-01-01 RX ADMIN — SODIUM CHLORIDE 1000 ML: 9 INJECTION, SOLUTION INTRAVENOUS at 00:23

## 2024-01-01 RX ADMIN — MORPHINE SULFATE 1 MG: 2 INJECTION, SOLUTION INTRAMUSCULAR; INTRAVENOUS at 22:35

## 2024-01-01 RX ADMIN — IOPAMIDOL 100 ML: 755 INJECTION, SOLUTION INTRAVENOUS at 23:15

## 2024-01-01 RX ADMIN — GLYCOPYRROLATE 0.1 MG: 0.2 INJECTION INTRAMUSCULAR; INTRAVENOUS at 15:03

## 2024-01-01 RX ADMIN — HYDROMORPHONE HYDROCHLORIDE 1 MG: 1 INJECTION, SOLUTION INTRAMUSCULAR; INTRAVENOUS; SUBCUTANEOUS at 15:01

## 2024-01-01 RX ADMIN — PIPERACILLIN AND TAZOBACTAM 4500 MG: 4; .5 INJECTION, POWDER, LYOPHILIZED, FOR SOLUTION INTRAVENOUS at 00:15

## 2024-01-01 RX ADMIN — SODIUM CHLORIDE: 9 INJECTION, SOLUTION INTRAVENOUS at 02:41

## 2024-01-01 ASSESSMENT — ENCOUNTER SYMPTOMS
VOMITING: 1
BACK PAIN: 0
SHORTNESS OF BREATH: 0
ABDOMINAL PAIN: 1
NAUSEA: 1
CONSTIPATION: 0
COLOR CHANGE: 0
DIARRHEA: 0
BLOOD IN STOOL: 1

## 2024-01-01 ASSESSMENT — PAIN DESCRIPTION - DESCRIPTORS
DESCRIPTORS: TENDER
DESCRIPTORS: TENDER
DESCRIPTORS: SHARP;TENDER

## 2024-01-01 ASSESSMENT — PAIN - FUNCTIONAL ASSESSMENT: PAIN_FUNCTIONAL_ASSESSMENT: 0-10

## 2024-01-01 ASSESSMENT — PAIN SCALES - GENERAL
PAINLEVEL_OUTOF10: 10
PAINLEVEL_OUTOF10: 8
PAINLEVEL_OUTOF10: 10

## 2024-01-01 ASSESSMENT — PAIN DESCRIPTION - LOCATION
LOCATION: ABDOMEN

## 2024-01-01 ASSESSMENT — PAIN DESCRIPTION - PAIN TYPE: TYPE: ACUTE PAIN

## 2024-01-01 ASSESSMENT — PAIN DESCRIPTION - ORIENTATION: ORIENTATION: MID

## 2024-01-02 ENCOUNTER — CARE COORDINATION (OUTPATIENT)
Dept: OTHER | Facility: CLINIC | Age: 64
End: 2024-01-02

## 2024-01-02 NOTE — CARE COORDINATION
Care Transitions Follow Up Call    ACM attempted to reach patient for Care Transitions follow up call. HIPAA compliant message left requesting a return phone call at patient convenience. Will plan next call in about 2 weeks.     Patient now S/P planned laparoscopic subtotal cholecystectomy on 12/28/23.   Patient had uneventful recovery and was discharged 12/29/23 home with spouse. Post op scheduled for 2 weeks.     Plan for follow-up call in 10-14 days    Future Appointments   Date Time Provider Department Center   1/5/2024 12:00 PM Kim Tripathi MD [facility] BS AMB   1/9/2024  7:30 AM A2 LI[place] [facility]   1/9/2024  8:00 AM Leyla Saucedo MD [facility] AMB   1/11/2024 10:40 AM Elida Lew APRN - NP [facility]   1/11/2024  3:00 PM G2 LIZETT FASTRACK [facility]   1/23/2024  7:30 AM A2 [place] LONG [place] [facility]   1/25/2024  3:00 PM H2 LIZETT [place] [facility]   1/31/2024  3:00 PM BSC COCO ECHO 1 ABBIE  AMB   1/31/2024  3:40 PM Christopher Vera MD [facility]  AMB   2/8/2024  3:00 PM H2 LIZETT FASTRACK [facility]   2/13/2024  7:30 AM A2 LI[place]ETT LONG [place] [facility]   4/17/2024 11:20 AM Christopher Vera MD [facility]  AMB

## 2024-01-03 ENCOUNTER — TELEPHONE (OUTPATIENT)
Age: 64
End: 2024-01-03

## 2024-01-03 RX ORDER — ENOXAPARIN SODIUM 100 MG/ML
1.5 INJECTION SUBCUTANEOUS DAILY
Qty: 30 EACH | Refills: 3 | Status: SHIPPED | OUTPATIENT
Start: 2024-01-03

## 2024-01-03 NOTE — TELEPHONE ENCOUNTER
Called patient to advise/confirm upcoming appt with Dr. Tripathi on 1/5/24 at 12:00  at [facility]. Left a voicemail

## 2024-01-04 NOTE — OP NOTE
adhesions with the use of the Ligasure. I was careful to do this in layers and stay as lateral as possible to prevent injury to the maged hepatis or duodenum. The intra-op US was used to evaluate biliary anatomy during my dissection. This part of the case took significantly longer than normal given the degree of prior inflammation from stent placements (dudoenal and biliary), cholecystitis and cholecystostomy tube placement. Once I was able to reliably identify the fundus/body of the gallbladder, I began mobilizing the gallbladder medially and laterally without taking its top-most attachments. By doing this, I was able to lift the upper body of the gallbladder off the liver edge. I attempted to carry my mobilization down further, however, after careful inspection it was clear that the gallbladder infundibulum was intimately adherent to adjacent duodenum. The area was hard and foreshortened. This was likely in part due to recently placed stent but more importantly, tumor burden. Seeing as I could not carry my dissection down further due to anatomic restraints, I opted to perform a subtotal cholecystectomy. I first removed the cholecystostomy tube. The gallbladder was then opened with the use of electrocautery. The gallbladder wall was then taken circumferentially with the use of the Ligasure. There was no bile noted coming from the gallbladder infundibulum. There were multiple intra-luminal gallstones which were suctioned in there entirety. The gallbladder was then passed  into an endocatch bag and removed through the 12 mm supra-umbilical site.  The abdomen was thoroughly irrigated. Hemostasis was assured. A15 Fr. Round hubless matt drain was placed in the RUQ overlying the gallbladder and secured with a 3-0 nylon. The 12 mm supra-umbilical site was then closed using an 0-vicryl suture on a suture passer to re-approximate the fascia.    The abdomen was then allowed to desufflate and all remaining trocars were

## 2024-01-05 ENCOUNTER — OFFICE VISIT (OUTPATIENT)
Age: 64
End: 2024-01-05
Payer: COMMERCIAL

## 2024-01-05 VITALS — HEART RATE: 114 BPM | WEIGHT: 145.9 LBS | OXYGEN SATURATION: 99 % | BODY MASS INDEX: 20.93 KG/M2

## 2024-01-05 DIAGNOSIS — C78.6 PERITONEAL CARCINOMATOSIS (HCC): ICD-10-CM

## 2024-01-05 DIAGNOSIS — R53.0 NEOPLASTIC (MALIGNANT) RELATED FATIGUE: ICD-10-CM

## 2024-01-05 DIAGNOSIS — G89.3 CANCER RELATED PAIN: Primary | ICD-10-CM

## 2024-01-05 DIAGNOSIS — C25.0 ADENOCARCINOMA OF HEAD OF PANCREAS (HCC): ICD-10-CM

## 2024-01-05 DIAGNOSIS — R64 MALIGNANT CACHEXIA (HCC): ICD-10-CM

## 2024-01-05 DIAGNOSIS — Z71.89 ADVANCED CARE PLANNING/COUNSELING DISCUSSION: ICD-10-CM

## 2024-01-05 PROCEDURE — 99497 ADVNCD CARE PLAN 30 MIN: CPT | Performed by: INTERNAL MEDICINE

## 2024-01-05 PROCEDURE — 99214 OFFICE O/P EST MOD 30 MIN: CPT | Performed by: INTERNAL MEDICINE

## 2024-01-05 RX ORDER — OXYCODONE HYDROCHLORIDE 10 MG/1
10 TABLET ORAL EVERY 4 HOURS PRN
Qty: 90 TABLET | Refills: 0 | Status: SHIPPED | OUTPATIENT
Start: 2024-01-05 | End: 2024-01-20

## 2024-01-05 NOTE — PROGRESS NOTES
Palliative Medicine Office Visit  Palliative Medicine Nurse Check In  [phone]    Patient Name: Yony Patel  YOB: 1960      Date of Office Visit: 1/5/24    Patient states: \" F/U \"    From Check In Sheet (scanned in Media):  Has a medical provider talked with you about cardiopulmonary resuscitation (CPR)?   [] Yes   [x] No   [] Unable to obtain    Nurse reminder to complete or update ACP FlowSheet:    Is ACP on the Problem List?    [x] Yes    [] No  IF ACP Document is ON FILE; Nurse to place ACP on Problem List     Is there an ACP Note in Chart Review/Note?    [x] Yes    [] No   If NO: ALERT PROVIDER         1/5/2024    12:00 PM   Demographics   Marital Status        Is there anything that we should know about you as a person in order to provide you the best care possible?     Have you been to the ER, urgent care clinic since your last visit?   [x] Yes   [] No   [] Unable to obtain    Have you been hospitalized since your last visit?   [x] Yes   [] No   [] Unable to obtain    Have you seen or consulted any other health care providers outside of the [facility] since your last visit?   [] Yes   [x] No   [] Unable to obtain    Functional status (describe):   INdependent      Last BM:      accessed (date):     Bottle review (for opioid pain medication):  Medication 1: Oxycodone 10 mg  Date filled:   Directions:   # filled:   # left:   # pills taking per day:   Last dose taken:                          
includes time spent prior to the visit and after the visit in direct care of the patient.  This time does not include time spent in any separately reportable services.

## 2024-01-06 NOTE — ACP (ADVANCE CARE PLANNING)
Advance Care Planning     Palliative Medicine Advance Care Planning (ACP) Conversation        Date of Conversation: 1/5/2024  The patient and/or authorized decision maker consented to a voluntary Advance Care Planning conversation.   Individuals present for the conversation:  Patient with Decision Making Capacity  Others present: Spouse Sandra    Healthcare Decision Maker:    Primary Decision Maker: Sandra Patel - Spouse - [phone]    Secondary Decision Maker: Prerna Davis - Child - [phone]    ACP documentation already in place:  [x] Advance Directive  [x] Portable DNR  [] POLST  [] [place] MOST     Primary Palliative Diagnosis:  Metastatic pancreatic cancer    Conversation Summary:  Discussion today with patient and spouse regarding his end of life care plan, recognizing a trajectory of decline over the last two months and worsening frailty.  He remains at high risk of hospitalization.  Upon request, discussed prognosis of weeks to short months in the setting of extensive peritoneal disease and malignant ascites.   Framed conversation around whether or not he would want to be hospitalized near end of life.  Thoughtfully he reflected, and then shared with Sandra that he would like to die at home when the time comes.  He recalls his Father ill with cancer and how difficult it was getting him home from the hospital in his final days.      Darshana meets with Dr. Saucedo this upcoming week to discuss next steps and options for treatment.  Encouraged both Yony and Sandra to ask thoughtful questions about his options at the visit.   Both were appreciative of the discussion today as they had not been discussing previously.      Outcomes / Completed Documentation:    Resuscitation Status:   Code Status: DNR     [] Advance Directive  [] Portable DNR  [] POLST  [] [place] MOST  [x] No new documents completed      I spent 19 minutes providing separately identifiable ACP services with the patient and/or surrogate decision

## 2024-01-08 PROBLEM — G89.3 CANCER ASSOCIATED PAIN: Status: ACTIVE | Noted: 2023-01-01

## 2024-01-08 PROBLEM — C25.9 PANCREATIC CANCER (HCC): Status: ACTIVE | Noted: 2024-01-01

## 2024-01-08 PROBLEM — Z51.5 HOSPICE CARE: Status: ACTIVE | Noted: 2023-01-01

## 2024-01-08 PROBLEM — L02.91 ABSCESS: Status: ACTIVE | Noted: 2024-01-01

## 2024-01-08 NOTE — ED TRIAGE NOTES
Patient arrives from home via EMS with a CC of new onset AMS and vomiting. Patient has a hx of pancreatic CA. Per EMS, patient's family stated he was vomiting bright red blood.

## 2024-01-08 NOTE — CARE COORDINATION
9:51 AM    CM consult received for Hospice.  Chart reviewed.  Referral placed to [facility] for review.  CM will continue to follow and assist with VERITO needs as they arise.    12:02 PM    CM received notification patient to be admitted into [facility].  Transport arranged with [facility] [phone].  ETA: 2:00 pm.  RN provided with PCS form and DNR for patient transport.  No other CM needs at this time.    MARILU Giles/AMILCAR

## 2024-01-08 NOTE — PALLIATIVE CARE
Palliative Medicine  Patient Name: Yony Patel  YOB: 1960  MRN: [id number]  Age: 63 y.o.  Gender: male    Date of Initial Consult: 1/8/23  Date of Service: 1/8/2024  Time: 9:54 AM  Provider: Kim Tripathi MD  Hospital Day: 2  Admit Date: 1/7/2024  Referring Provider: Dr. Dai     Reasons for Consultation:  Goals of Care and End Stage Disease    HISTORY OF PRESENT ILLNESS (HPI):   Yony Patel is a 63 y.o. male with history of aortic arch repair and mechanical AV replacement following an acute ascending aortic dissection in 2017, who was found to have pancreatic cancer, stage IV at time of diagnosis in early July 2023 with hepatobiliary mets and cholestatic jaundice.  Disease has progressed through palliative chemotherapy which he received under the care of Dr. Saucedo.  On 12/20 he underwent duodenal stent placement dt narrowing caused by tumor and on 12/28 he underwent subtotal cholecystectomy to remove the burden of the cholecystostomy tube.  Intraoperative findings that date notable for extensive omental and peritoneal disease.   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  His pain had escalated this last week some, but had been relieved by low dose oxycodone until last night when he developed worsening RUQ pain, was confused and had coughed up dark brown sputum.  Spouse Sandra brought him to the ED for evaluation where labs showed a new leukocytosis of 30 and imaging showed a subcapsular abscess with multiple areas of air loculation.  He was initiated on IV abx, and IR guided drainage ordered.  He is not a surgical candidate.    Palliative medicine was consulted considering his end stage underlying illness and for continuity of care.     Psychosocial:    to Sandra Patel.  Has a chemical engineering degree.  Was an  until 2017 when he had the aortic dissection.  Recently has been working as a  for [employer].  Sandra is an

## 2024-01-08 NOTE — PROGRESS NOTES
[facility]  Good Help to Those in Need  [phone]     Patient Name: Yony Patel  YOB: 1960  Age: 63 y.o.    [facility] RN Note:  Phone call received from staff member in the ER concerning Hospice consult order. [facility] has not received this consult from case management.    Please have .    Will follow up with Unit Nurse and Care Manager to discuss plan of care, patient status and discharge disposition within the hour.     Thank you for the opportunity to be of service to this patient.       10:00: Hospice consult received. [facility] team reviewing chart. [facility] hospice intake has verified insurance with private insurance company. Pt does not currently have Medicare. Plan to verify this before meeting family.    Reviewed patient with Dr. Costa.           Socorro Gloria RN, [facility]  Hospice Nurse Liaison  [phone] Mobile  [phone] Office  Available on Perfect Serve

## 2024-01-08 NOTE — CARE COORDINATION
Care Management Initial Assessment       RUR:21%  Readmission? No  1st IM letter given? No  1st  letter given: No     01/08/24 1235   Service Assessment   Patient Orientation Unable to Assess   Cognition Alert   History Provided By Significant Other   Primary Caregiver Family   Support Systems Spouse/Significant Other;Friends/Neighbors;Family Members   Patient's Healthcare Decision Maker is: Named in Scanned ACP Document   PCP Verified by CM Yes   Can patient return to prior living arrangement No   CM/SW Referral End of life   Social/Functional History   Lives With Spouse   Occupation Part time employment   Discharge Planning   Type of Residence (!) Hospice Facility   Living Arrangements Spouse/Significant Other   Potential Assistance Needed Other (Comment)   DME Ordered? No   Type of Home Care Services None   Patient expects to be discharged to: Hospice (comment)   History of falls? 1   Services At/After Discharge   Transition of Care Consult (CM Consult) Hospice House   Services At/After Discharge Hospice   Mode of Transport at Discharge MARILU Ortega/AMILCAR

## 2024-01-08 NOTE — ED NOTES
TRANSFER - OUT REPORT:    Verbal report given to Светлана DAVIS on Yony Patel  being transferred to [facility] for routine progression of patient care       Report consisted of patient's Situation, Background, Assessment and   Recommendations(SBAR).     Information from the following report(s) Nurse Handoff Report, ED SBAR, Intake/Output, Cardiac Rhythm Sinus Tach, and Neuro Assessment was reviewed with the receiving nurse.    [place] Assessment:    Presents to emergency department  because of falls (Syncope, seizure, or loss of consciousness): No  Age > 70: No  Altered Mental Status, Intoxication with alcohol or substance confusion (Disorientation, impaired judgment, poor safety awaremess, or inability to follow instructions): Yes  Impaired Mobility: Ambulates or transfers with assistive devices or assistance; Unable to ambulate or transer.: Yes  Nursing Judgement: Yes          Lines:   Single Lumen Implantable Port 10/10/23 (Active)       Peripheral IV 01/08/24 Right Antecubital (Active)       Peripheral IV 01/08/24 Left Cephalic (Active)        Opportunity for questions and clarification was provided.

## 2024-01-08 NOTE — CONSULTS
Requesting Provider: Kulwant Baptiste, * - Reason for Consultation: \"urinary retention, difficult damian\"  Pre-existing [facility] Patient:   No                Patient: Yony Patel MRN: [id number]  SSN: [id number]    YOB: 1960  Age: 63 y.o.  Sex: male     Location: [place] [address]       Code Status: DNR   PCP: Tanvir Alaniz MD  - [phone]   Emergency Contact:  Primary Emergency Contact: Sandra Patel, Home Phone: [phone]   Race/Denominational/Language: White (non-) / Confucianist / Speaks English   Payor: Payor: [facility] / Plan: [employer] EMPLOYEES / Product Type: *No Product type* /    Prior Admission Data: 12/29/23 [facility] 5E1 SURGICAL UNIT Kai Norma Mancia   Hospitalized:  Hospital Day: 2 - Admitted 1/7/2024 10:00 PM     CONSULTANTS  IP CONSULT TO GENERAL SURGERY  IP CONSULT TO HOSPITALIST  IP CONSULT TO PALLIATIVE CARE  IP CONSULT TO PHARMACY  IP CONSULT TO HOSPICE  IP CONSULT TO UROLOGY   ADMISSION DIAGNOSES  [unfilled]      Assessment/Plan:       64 yo M with difficult damian placement     - Damian placed for comfort per request of the Palliative team. Only 100 cc urine output, no retention, bladder scanner reading ascites. Continue damian with monthly cath changes. Call if we can be of further assistance.      CC: [unfilled]   HPI: He is a 63 y.o. male hx of stage iv pancreatic cancer, recent laparoscopic subtotal cholecystectomy 12/28/23, cad s/p pci, htn, dyslipidemia, afib, aortic root replacement who presents to ed with complaints of abdominal pain. He is a poor historian, his wife is present to aid in history collection. Urology is consulted for urinary retention and difficult damian placement. He has never been seen by a Urologist. His spouse denies hx of voiding difficulties. She reports poor oral intake and low urine output over the last several days. Staff bladder scanned the patient with reading >385 cc and attempted to straight cath and place coude catheter without success.  01/07/24 ===    CT ABDOMEN PELVIS W WO IV CONTRAST    - Narrative -  EXAM: CT ABDOMEN PELVIS W WO CONTRAST    INDICATION: GI bleed, pancreatitc cancer    COMPARISON: 12/18/2023    IV CONTRAST: 100 mL of Isovue-370.    ORAL CONTRAST: None    TECHNIQUE:  Multislice helical CT was performed from the diaphragm to the iliac crest prior  to intravenous contrast administration and from the diaphragm to the symphysis  pubis following intravenous contrast administration.  Contiguous 5 mm axial  images were reconstructed and lung and soft tissue windows were generated.  Coronal and sagittal reformations were generated.  CT dose reduction was  achieved through use of a standardized protocol tailored for this examination  and automatic exposure control for dose modulation.    FINDINGS:  LOWER THORAX: No significant abnormality in the incidentally imaged lower chest.  LIVER: Diffuse heterogeneous enhancement of the liver suspicious for diffuse  liver metastases. There is a subcapsular fluid collection measuring 15.1 x 6.2 x  11.4 cm with locules of air most consistent with an abscess. Fluid collection  extends around the subhepatic space.  BILIARY TREE: Gallbladder is not visualized. Biliary stent in place  SPLEEN: There is interval diffuse decreased enhancement of the spleen. The  splenic artery is not visualized.  PANCREAS: Evaluation of pancreas is limited. Infiltrative tumor is suspected.  ADRENALS: Unremarkable.  KIDNEYS: No mass, calculus, or hydronephrosis. Grossly unremarkable  STOMACH: Duodenal stent  SMALL BOWEL: Multiple thickened loops of small bowel throughout the abdomen and  pelvis  COLON: No dilatation or wall thickening.  APPENDIX: Unremarkable  PERITONEUM: Diffuse ascites and peritoneal enhancement.. No nodular implants  throughout the abdomen and pelvis  RETROPERITONEUM: Aortic dissection extending into the bilateral iliac arteries  unchanged  REPRODUCTIVE ORGANS: Unremarkable  URINARY BLADDER:

## 2024-01-08 NOTE — DISCHARGE SUMMARY
anticoagulation / Hx of DVT  Hx of aortic arch dissection with aortic root replacement  -Home meds  -Hospice consult as requested by spouse      Patient was seen throughout the morning. AMS/confused, tachycardic, and hypoxic. Put on NRB and satting better. Attempted high flow but he took it off. Hospice consult was placed per family wishes. Hospice has seen and gthe patient will be discharged to the hospice house.         PENDING TEST RESULTS:   At the time of discharge the following test results are still pending: none    FOLLOW UP APPOINTMENTS:    [unfilled]     ADDITIONAL CARE RECOMMENDATIONS: none    DIET:  comfort feeding  as able    ACTIVITY: activity as tolerated    WOUND CARE: none    EQUIPMENT needed: none      DISCHARGE MEDICATIONS:     Medication List        CONTINUE taking these medications      enoxaparin 100 MG/ML  Commonly known as: LOVENOX  Inject 1 mL into the skin daily     MULTIVITAMIN ADULTS 50+ PO     oxyCODONE HCl 10 MG immediate release tablet  Commonly known as: OXY-IR  Take 1 tablet by mouth every 4 hours as needed for Pain for up to 15 days. Intended supply: 30 days Max Daily Amount: 60 mg     sodium chloride flush 0.9 % injection  10 mLs by IntraCATHeter route 2 times daily     vitamin D 25 MCG (1000 UT) Tabs tablet  Commonly known as: CHOLECALCIFEROL            STOP taking these medications      ondansetron 8 MG Tbdp disintegrating tablet  Commonly known as: ZOFRAN-ODT     prochlorperazine 5 MG tablet  Commonly known as: COMPAZINE                NOTIFY YOUR PHYSICIAN FOR ANY OF THE FOLLOWING:   Fever over 101 degrees for 24 hours.   Chest pain, shortness of breath, fever, chills, nausea, vomiting, diarrhea, change in mentation, falling, weakness, bleeding. Severe pain or pain not relieved by medications.  Or, any other signs or symptoms that you may have questions about.    DISPOSITION:    Home With:   OT  PT  HH  RN       Long term SNF/Inpatient Rehab    Independent/assisted living   X  Hospice    Other:       PATIENT CONDITION AT DISCHARGE:     Functional status   X Poor     Deconditioned     Independent      Cognition     Lucid     Forgetful    X Dementia      Catheters/lines (plus indication)    Phelan     PICC     PEG    X None      Code status     Full code    X DNR      PHYSICAL EXAMINATION AT DISCHARGE:    General : disoriented and confused, cachectic   HEENT: PEERL, EOMI, moist mucus membrane  Neck: supple, no JVD, no meningeal signs  Chest: Clear to auscultation bilaterally   CVS: S1 S2 heard, Capillary refill less than 2 seconds  Abd: soft/ Non tender, non distended, BS physiological,   Ext: no clubbing, no cyanosis, no edema, brisk 2+ DP pulses  Neuro/Psych: confused. Moving extremities. Unable to participate.   Skin: warm     CHRONIC MEDICAL DIAGNOSES:      Greater than 31 minutes were spent with the patient on counseling and coordination of care    Signed:   Kulwant Herrera MD  1/8/2024  12:09 PM

## 2024-01-08 NOTE — PROGRESS NOTES
Pharmacist Note - Vancomycin Dosing    Consult provided for this 63 y.o. male for indication of intra-abdominal infection.  Antibiotic regimen(s): Zosyn and Vancomycin  Patient on vancomycin PTA? NO     Recent Labs     24  2222   WBC 31.8*   CREATININE 1.61*   BUN 43*     Frequency of BMP: daily  Height: 177.8 cm  Weight: 65.8 kg  Est CrCl:  43 ml/min  Temp (24hrs), Av.5 °F (36.4 °C), Min:97.5 °F (36.4 °C), Max:97.5 °F (36.4 °C)    Cultures:blood      MRSA Swab ordered (if applicable)? YES    The plan below is expected to result in a target range of AUC/ENRIQUE 400-600    Therapy will be initiated with a loading dose of 1750 mg IV x 1 to be followed by a maintenance dose of 1000 mg IV every 24 hours.  Pharmacy to follow patient daily and order levels / make dose adjustments as appropriate.    *Vancomycin has been dosed used Bayesian kinetics software to target an AUC/ENRIQUE of 400-600, which provides adequate exposure for an assumed infection due to MRSA with an ENRIQUE of 1 or less while reducing the risk of nephrotoxicity as seen with traditional trough based dosing goals.

## 2024-01-08 NOTE — H&P
History & Physical    Primary Care Provider: Tanvir Alaniz MD  Source of Information: Patient and chart review    History of Presenting Illness:   Yony Patel is a 63 y.o. male with hx of stage iv pancreatic cancer, recent laparoscopic subtotal cholecystectomy, cad s/p pci, htn, dyslipidemia, hx of afib, hx of aortic root replacement who presents to ed with complaints of abdominal pain.  Underwent his surgical procedure on December 28th and had been doing well until two days ago when he developed symptoms.  Describes generalized abdominal pain, nausea and vomiting.  Wife was concerned about coffee ground emesis.  The patient denies any fever, chills, chest paincough, congestion, recent illness, palpitations, or dysuria.    Remarkable vitals on ER Presentation: hr to 108, bp to 80s/50s  Labs Remarkable for: lactic acid 4.3, wbc 31.8, cr 1.6  ER Images: ct abd et pelvis:  Interval development of a subcapsular abscess around the liver with multiple locules of air.  ER Rx: 2l ns bolus, zosyn, morphine     Review of Systems:  Pertinent items are noted in the History of Present Illness.     Past Medical History:   Diagnosis Date    Aortic arch dissection (HCC) 03/2017    Aortic root replacement    Atrial fibrillation (HCC) 02/2017    Post surgery, s/p DCCV 4/4/17     CAD (coronary artery disease) 04/21/2021    s/p stent to LAD    Cancer (HCC) 07/2023    PANCREATIC    Dyslipidemia, goal LDL below 70     Family history of colon cancer     Family history of diabetes mellitus (DM) 06/08/2010    Family history of early CAD 06/08/2010    HLD (hyperlipidemia)     HTN (hypertension)     PVD (peripheral vascular disease) (HCC) 02/2017    Ischemic R leg, s/p fem-fem bypass    S/P AVR (aortic valve replacement) 03/2017    Seborrheic dermatitis 06/08/2010      Past Surgical History:   Procedure Laterality Date    AORTIC VALVE REPLACEMENT  02/09/2017    CHOLECYSTECTOMY, LAPAROSCOPIC N/A 12/28/2023    LAPAROSCOPIC  SUB TOTAL

## 2024-01-08 NOTE — PROGRESS NOTES
Hepatobiliary Surgery Note    Patient evaluated at bedside.  Mr. Patel is status post subtotal laparoscopic cholecystectomy on 12/28/2023.  He initially did well and was discharged home on post operative day 1.  Unfortunately, he came in yesterday with worsening abdominal pain, nausea, and vomiting.  On ED evaluation patient found to be septic with leukocytosis, tachycardia, initial low BPs.  CT imaging reviewed and concerning for worsening malignant ascites, infected loculated sub- diaphragmatic ascites.  The patient was admitted to the hospital medicine service and started on IV antibiotics.    On physical exam he remains tachycardic and delirious.  His abdomen shows a fluid wave and tender to palpation throughout.  He has no peritoneal signs.  His incisions are clean dry and intact.    I have attempted to contact patient's wife but have been unsuccessful.  It is unclear to me based on notes if he has officially transferred over to hospice care.  From a surgical perspective we can certainly place a drain in the infected ascites through interventional radiology and attempt to temporize him that way, however, the patient has a high disease burden with malignant ascites.  It is unclear to me whether or not we will be able to clear this infection enough to get him back onto chemotherapy.  Even so he has shown progression of disease on chemotherapy with high disease burden.  I think a discussion of hospice care is unfortunate but appropriate in this situation.  I remain available to answer any questions or discuss concerns.  I will continue to attempt to reach out to the wife throughout the day.  If ongoing medical measures planned, I would suggest IR drain placement to the subdiaphragmatic collection.    EUGENIO JEONG MD

## 2024-01-08 NOTE — CONSULTS
Chief Complaint:  Subcapsular hepatic abscess with sepsis    HPI:  62 yo man with hx stage IV pancreatic cancer with carcinomatosis who underwent laparoscopic subtotal cholecystectomy by Dr. Quinn on December 28 presented to ER with worsening abdominal pain, nausea and vomiting.  Pt reportedly was doing okay after gallbladder operation but pain worsened over past two day and he developed nausea and vomiting on Sunday.  Pt was noted to be tachycardic with significant leukocytosis concerning for sepsis.  CT scan showed subcapsular liver abscess.    Past Medical History:   Diagnosis Date    Aortic arch dissection (HCC) 03/2017    Aortic root replacement    Atrial fibrillation (HCC) 02/2017    Post surgery, s/p DCCV 4/4/17     CAD (coronary artery disease) 04/21/2021    s/p stent to LAD    Cancer (HCC) 07/2023    PANCREATIC    Dyslipidemia, goal LDL below 70     Family history of colon cancer     Family history of diabetes mellitus (DM) 06/08/2010    Family history of early CAD 06/08/2010    HLD (hyperlipidemia)     HTN (hypertension)     PVD (peripheral vascular disease) (HCC) 02/2017    Ischemic R leg, s/p fem-fem bypass    S/P AVR (aortic valve replacement) 03/2017    Seborrheic dermatitis 06/08/2010       Past Surgical History:   Procedure Laterality Date    AORTIC VALVE REPLACEMENT  02/09/2017    CHOLECYSTECTOMY, LAPAROSCOPIC N/A 12/28/2023    LAPAROSCOPIC  SUB TOTAL CHOLECYSTECTOMY WITH INTRAOPERATIVE ULTRASOUND performed by Norma Colbert MD at [facility] MAIN OR    COLONOSCOPY      CORONARY ANGIOPLASTY WITH STENT PLACEMENT      ERCP N/A 07/17/2023    ERCP ENDOSCOPIC RETROGRADE CHOLANGIOPANCREATOGRAPHY performed by Casey Kramer MD at [facility] ENDOSCOPY    ERCP N/A 07/17/2023    ERCP SPHINCTER/PAPILLOTOMY performed by Casey Kramer MD at [facility] ENDOSCOPY    ERCP N/A 10/23/2023    ERCP ENDOSCOPIC RETROGRADE CHOLANGIOPANCREATOGRAPHY performed by Casey Kramer MD at [facility] ENDOSCOPY    ERCP N/A 11/15/2023  liver suspicious for infiltrative metastatic disease. 4.  Interval development of hypoenhancement of the spleen suspicious for occlusion of the splenic artery and splenic infarct. 5.  Interval placement of a duodenal stent which is grossly patent. 6.  Persistent aortic dissection unchanged. 7.  Diffuse peritoneal carcinomatosis with increased ascites. 8.  Findings were discussed with Dr. Dai at the time of dictation.    XR CHEST PORTABLE    Result Date: 1/7/2024  EXAM:  XR CHEST PORTABLE INDICATION: Sepsis COMPARISON: 11/27/2023 TECHNIQUE: Upright portable chest AP view FINDINGS: Median sternotomy changes and right IJ Port-A-Cath and cardiac monitoring leads The cardiac silhouette is within normal limits. The pulmonary vasculature is within normal limits. The lungs and pleural spaces are clear. The visualized bones and upper abdomen are age-appropriate.     No acute process on portable chest.     AP:  64 yo man with stage IV pancreatic cancer s/p lap subtotal cholecystectomy consulted for sepsis and subcapsular liver abscess    - Sepsis:  Likely secondary to subcapsular liver abscess.  I suspect liver abscess developed from seeding bacteria from cholecystostomy tube removal through liver bed.  Pt is not a candidate for surgical drainage of the subcapsular liver abscess given poor performance status and peritoneal carcinomatosis.  - Recommend medical abscess for IV antibiotic therapy and fluid resuscitation  - Will consult IR for percutaneous drainage of liver abscess  - Clear liquids as tolerated  - Recommend palliative consult for pain control and next steps which is likely going to be comfort care    FACE TO FACE time including review of any indicated imaging, discussion with patient, and other providers, exam and discussion with patient: 30 minutes

## 2024-01-08 NOTE — ED PROVIDER NOTES
[facility] EMERGENCY DEP  EMERGENCY DEPARTMENT ENCOUNTER      Pt Name: Yony Patel  MRN: [id number]  Birthdate 1960  Date of evaluation: 1/7/2024  Provider: Maxime Dai MD    CHIEF COMPLAINT     No chief complaint on file.        HISTORY OF PRESENT ILLNESS   (Location/Symptom, Timing/Onset, Context/Setting, Quality, Duration, Modifying Factors, Severity)  Note limiting factors.   Yony Patel is a 63 y.o. male who presents to the emergency department      The history is provided by the patient and the EMS personnel.   Nausea & Vomiting  Severity:  Moderate  Timing:  Intermittent  Quality:  Coffee grounds  Progression:  Unchanged  Chronicity:  New  Ineffective treatments:  None tried  Associated symptoms: abdominal pain    Associated symptoms: no chills, no diarrhea, no fever and no headaches        Nursing Notes were reviewed.    REVIEW OF SYSTEMS    (2-9 systems for level 4, 10 or more for level 5)     Review of Systems   Constitutional:  Positive for fatigue. Negative for activity change, chills and fever.   HENT:  Negative for nosebleeds.    Eyes:  Negative for visual disturbance.   Respiratory:  Negative for shortness of breath.    Cardiovascular:  Negative for chest pain and palpitations.   Gastrointestinal:  Positive for abdominal pain, blood in stool, nausea and vomiting. Negative for constipation and diarrhea.   Genitourinary:  Negative for difficulty urinating, dysuria, hematuria and urgency.   Musculoskeletal:  Negative for back pain, neck pain and neck stiffness.   Skin:  Negative for color change.   Allergic/Immunologic: Negative for immunocompromised state.   Neurological:  Negative for dizziness, seizures, syncope, weakness, light-headedness, numbness and headaches.   Psychiatric/Behavioral:  Negative for behavioral problems, confusion, hallucinations, self-injury and suicidal ideas.        Except as noted above the remainder of the review of systems was reviewed and negative.       PAST MEDICAL  Mental Status: He is alert and oriented to person, place, and time.   Psychiatric:         Mood and Affect: Mood normal.         Behavior: Behavior normal.         Thought Content: Thought content normal.         Judgment: Judgment normal.         DIAGNOSTIC RESULTS     EKG: All EKG's are interpreted by the Emergency Department Physician who either signs or Co-signs this chart in the absence of a cardiologist.        RADIOLOGY:   Non-plain film images such as CT, Ultrasound and MRI are read by the radiologist. Plain radiographic images are visualized and preliminarily interpreted by the emergency physician with the below findings:        Interpretation per the Radiologist below, if available at the time of this note:    No orders to display         ED BEDSIDE ULTRASOUND:   Performed by ED Physician - none    LABS:  Labs Reviewed - No data to display    All other labs were within normal range or not returned as of this dictation.    EMERGENCY DEPARTMENT COURSE and DIFFERENTIAL DIAGNOSIS/MDM:   Vitals:  There were no vitals filed for this visit.        Medical Decision Making  Amount and/or Complexity of Data Reviewed  Labs: ordered.  Radiology: ordered.  ECG/medicine tests: ordered.    Risk  Prescription drug management.  Decision regarding hospitalization.      This is a 63-year-old male with past medical history, review of systems, physical exam as above, presenting via EMS for complaints of weakness, abdominal pain, nausea and vomiting, hematemesis, blood per rectum.  Patient states vomiting today, with dark red blood, states he noted dark red blood associated with bowel movements this morning, complaints of generalized fatigue, per EMS patient with soft blood pressures and tachycardia in the field.  Chart review indicates patient is undergoing treatment for pancreatic cancer, chart review indicating several calls to palliative care today with hospice being considered.  Upon arrival, vital signs notable for

## 2024-01-08 NOTE — ED NOTES
This RN performed a bladder check. Patient's bladder was retaining >385 mL. This RN attempted to straight cath. Was unsuccessful with 2 attempts one with a coude catheter. MD notified.

## 2024-01-08 NOTE — PROGRESS NOTES
Please leave Phelan and IV access for transfer to the [facility]  [facility] Help to Those in Need  [phone]    Patient Name: Yony Patel  YOB: 1960  Age: 63 y.o.    [facility] RN Note:  Hospice consult noted. Chart reviewed. Plan of care discussed with patients nurse & care manager.   In to meet with patient and his wife, Sandra Patel. Discussed Hospice philosophy, general plan of care, levels of care, services and on call procedures.  Family information packet provided & reviewed with Sandra.    During bedside assessment, patient is alert, confused, restless. He is wearing a Non-rebeather mask that he is pulling off. He is pulling at his recently placed Phelan.    Reviewed with Dr. Costa. Pt meets criteria for [facility] inpatient hospice care for the CTI diagnosis of Pancreatic cancer.  Family are in agreement with [facility] LOC and request to move patient to the [facility] House:  [facility] [phone]  [address] [place] [address]    Per the [facility], a bed is being held for this patient.  Pt wife will complete Hospice consents. Rapid COVID for screening ordered.     Thank you for the opportunity to be of service to this patient.     Plan to request  arrange patient transportation for 2:00 pm today.    Family plan to use [facility]'s  home. Request [facility]  assist with process.    11:48: Call to [facility]. Spoke to RNJenny with update. Offered ER phone number [phone] and bedside RN is Maryellen.    11:55: Update from . Hospital to Home will transport patient this afternoon at 14:00.    14:30:  updated that transport has been delayed due to patient's increased oxygen need from 6 liters to 10 liters.    Hospice RN bedside. Patient appears in distress; restless, secretions are noticed during this assessment.    Reviewed with Dr. Costa. New orders received:  IV Versed 1 mg Now and Q 15 minutes PRN  IV Dilaudid 1 mg  PRN Q 15 minutes  IV Robinul  0.1 mg Now    Reviewed with bedside RN.    16:15: Hospital to Home transportation arrived [facility] ER. Pt restlessness and difficulty breathing has improved with recent IV symptom medications.     Reviewed risks of transporting patient who is having more distress over the past few hours. Included wife, son, Aide and daughter, Prerna all bedside. Discussed the possibility of patient death while in ambulance. Discussed pt remaining at [facility] and admit into hospice services inpatient at [facility] vs [facility].    Family all state agreement to move patient to the [facility], and state understanding of risks. Pt wife, Sandra, plans to ride with patient in ambulance, and Aide and Prerna will meet them at the [facility].    Updated [facility] of patient being transported now.        Socorro Gloria RN, [facility]  Hospice Nurse Liaison  [phone] Mobile  [phone] Office  Available on [employer]

## 2024-01-11 ENCOUNTER — HOSPITAL ENCOUNTER (OUTPATIENT)
Facility: HOSPITAL | Age: 64
Setting detail: INFUSION SERIES
End: 2024-01-11

## 2024-07-20 NOTE — PROGRESS NOTES
0730: Bedside and Verbal shift change report given to Noreen (oncoming nurse) by Meryl Diaz (offgoing nurse). Report included the following information SBAR, Kardex, ED Summary, OR Summary, Intake/Output, MAR, Recent Results and Cardiac Rhythm Aflutter. Problem: Falls - Risk of  Goal: *Absence of falls  Outcome: Progressing Towards Goal  Pt bedside table and call bell within reach. Pt aware to call for assistance as needed. Pt wearing gripper socks. Problem: Aortic Aneurysm Repair: Post op Day 6  Goal: Activity/Safety  Outcome: Progressing Towards Goal  Pt up with assistance to use urinal at bedside. Goal: Diagnostic Test/Procedures  Outcome: Progressing Towards Goal  Monitoring labs for lactic and BUN/creat  Goal: Nutrition/Diet  Outcome: Progressing Towards Goal  Pt has intermittent nausea. Not wanting to take zofran for now. Goal: Medications  Outcome: Progressing Towards Goal  On BB for aflutter and coumadin  Goal: Respiratory  Outcome: Progressing Towards Goal  Pt on RA, using IS independently   Goal: Treatments/Interventions/Procedures  Outcome: Progressing Towards Goal  Wound care performed early due to saturated dressing. Goal: Psychosocial  Outcome: Progressing Towards Goal  Pt calm and cooperative. Problem: Surgical Wound Care  Goal: *Non-infected Wound: Absence of infection signs and symptoms  Infection control procedures (eg: clean dressings, clean gloves, hand washing, precautions to isolate wound from contamination, sterile instruments used for wound debridement) should be implemented. Outcome: Progressing Towards Goal  Right leg wound is red with serous colored drainage. Tolerating wound care well. Boot in place for foot drop. Call back from ortho received.   Md updated on case, all questions answered.  Will see pt tomorrow. No new orders

## 2024-09-13 NOTE — DISCHARGE INSTRUCTIONS
Re: H&P and EKG     Dear Dr Rodriges and office staff, we are missing information from your office on patient Maxi Linder, MRN: [id number], : 1970. We are unable to optimize your patient for surgery without the order being completed.    Please fax the following missing items as noted below:    [] Labs within past 90 days from the surgical date.  [x] EKGTracing within the past 6 months read and signed addressing any abnormalities, per anesthesiologist request. Thank you.  [] Cardiac Clearance  [] Pulmonary Clearance  [x] History and Physical per hospital by-laws NP/PA’s H&P’s must be co-signed by MD. Must be within 30 days of surgical date.  [] Chest X-ray within the past 6 months.   [] Other. Please address and advise patient when to hold blood thinners and GLP-1 & SGLP-2 medications, if applicable.      Please fax back as soon as possible to [phone].  DO NOT USE THIS FORM AS AN ORDER. If you have any questions, please contact the [facility] Preadmitting Department at [phone] as soon as possible to avoid any delay in service for your patient.    [facility] Preadmitting Department  (P) [phone]  (F) [phone]      [facility] Order Requirements state:  ALL ORDERS MUST BE DATED, LEGIBLE AND CONTAIN:  Full Patient Legal Name (as appears on ’s license)  Patient’s Date of Birth (as appears on ’s license)  Pre-admission testing as per anesthesia guidelines    All documentation (ie. History and Physical, labs) MUST contain name and date of birth on EACH page.    Thank you for helping us provide you and your patient with the best possible experience!   Patient Education        Coronary Angioplasty: What to Expect at Home  Your Recovery     Coronary angioplasty is a procedure that is used to open a narrowed or blocked coronary artery. It may also be called a percutaneous coronary intervention (PCI). The doctor opened your narrowed or blocked artery by putting a thin tube, called a catheter, into your heart through a blood vessel. The catheter was inserted into the blood vessel in your groin or arm. The doctor may have placed a small tube, called a stent, in the artery. Your groin or arm may have a bruise and feel sore for a day or two after the procedure. You can do light activities around the house. But don't do anything strenuous for a day or two. This care sheet gives you a general idea about how long it will take for you to recover. But each person recovers at a different pace. Follow the steps below to get better as quickly as possible. How can you care for yourself at home? Activity    · If the doctor gave you a sedative:  ? For 24 hours, don't do anything that requires attention to detail, such as going to work, making important decisions, or signing any legal documents. It takes time for the medicine's effects to completely wear off.  ? For your safety, do not drive or operate any machinery that could be dangerous. Wait until the medicine wears off and you can think clearly and react easily.     · Do not do strenuous exercise and do not lift, pull, or push anything heavy until your doctor says it is okay. This may be for a day or two. You can walk around the house and do light activity, such as cooking.     · If the catheter was placed in your groin, try not to walk up stairs for the first couple of days.     · If the catheter was placed in your arm near your wrist, do not bend your wrist deeply for the first couple of days.  Be careful using your hand to get into and out of a chair or bed.     · Carry your stent identification card with you at all times.     · If your doctor recommends it, get more exercise. Walking is a good choice. Bit by bit, increase the amount you walk every day. Try for at least 30 minutes on most days of the week.     · If you haven't been set up with a cardiac rehab program, talk to your doctor about whether rehab is right for you. Cardiac rehab includes supervised exercise. It also includes help with diet and lifestyle changes and emotional support. Diet    · Drink plenty of fluids to help your body flush out the dye. If you have kidney, heart, or liver disease and have to limit fluids, talk with your doctor before you increase the amount of fluids you drink.     · Keep eating a heart-healthy diet that has lots of fruits, vegetables, and whole grains. If you have not been eating this way, talk to your doctor. You also may want to talk to a dietitian. This expert can help you to learn about healthy foods and plan meals. Medicines    · Your doctor will tell you if and when you can restart your medicines. Your doctor will also give you instructions about taking any new medicines.     · If you take aspirin or some other blood thinner, ask your doctor if and when to start taking it again. Make sure that you understand exactly what your doctor wants you to do.     · Your doctor will prescribe blood-thinning medicines. You will likely take aspirin plus another antiplatelet, such as clopidogrel (Plavix). It is very important that you take these medicines exactly as directed. These medicines help keep the coronary artery open and reduce your risk of a heart attack.     · Call your doctor if you think you are having a problem with your medicine. Care of the catheter site    · For 1 or 2 days, keep a bandage over the spot where the catheter was inserted. The bandage probably will fall off in this time.     · Put ice or a cold pack on the area for 10 to 20 minutes at a time to help with soreness or swelling.  Put a thin cloth between the ice and your skin.     · You may shower 24 to 48 hours after the procedure, if your doctor okays it. Pat the incision dry.     · Do not soak the catheter site until it is healed. Don't take a bath for 1 week, or until your doctor tells you it is okay.     · Watch for bleeding from the site. A small amount of blood (up to the size of a quarter) on the bandage can be normal.     · If you are bleeding, lie down and press on the area for 15 minutes to try to make it stop. If the bleeding does not stop, call your doctor or seek immediate medical care. Follow-up care is a key part of your treatment and safety. Be sure to make and go to all appointments, and call your doctor if you are having problems. It's also a good idea to know your test results and keep a list of the medicines you take. When should you call for help? Call 911 anytime you think you may need emergency care. For example, call if:    · You passed out (lost consciousness).     · You have severe trouble breathing.     · You have sudden chest pain and shortness of breath, or you cough up blood.     · You have symptoms of a heart attack, such as:  ? Chest pain or pressure. ? Sweating. ? Shortness of breath. ? Nausea or vomiting. ? Pain that spreads from the chest to the neck, jaw, or one or both shoulders or arms. ? Dizziness or lightheadedness. ? A fast or uneven pulse. After calling 911, chew 1 adult-strength aspirin. Wait for an ambulance. Do not try to drive yourself.     · You have been diagnosed with angina, and you have angina symptoms that do not go away with rest or are not getting better within 5 minutes after you take one dose of nitroglycerin.    Call your doctor now or seek immediate medical care if:    · You are bleeding from the area where the catheter was put in your artery.     · You have a fast-growing, painful lump at the catheter site.     · You have signs of infection, such as:  ? Increased pain, swelling, warmth, or redness. ? Red streaks leading from the catheter site. ? Pus draining from the catheter site. ? A fever.     · Your leg, arm, or hand is painful, looks blue, or feels cold, numb, or tingly. Watch closely for changes in your health, and be sure to contact your doctor if you have any problems. Where can you learn more? Go to http://www.gray.com/  Enter G420 in the search box to learn more about \"Coronary Angioplasty: What to Expect at Home. \"  Current as of: August 31, 2020               Content Version: 12.8  © 4628-2535 [employer]. Care instructions adapted under license by [employer] (which disclaims liability or warranty for this information). If you have questions about a medical condition or this instruction, always ask your healthcare professional. [address] any warranty or liability for your use of this information.

## 2025-06-20 NOTE — PROGRESS NOTES
CSS Floor Progress Note    Admit Date: 2017  POD:  6 Day Post-Op    Procedure:  Procedure(s):  ASCENDING AORTIC DISSECTION, AORTIC ROOT REPLACEMENT, 29MM MECHANICAL VALVE, FEM-FEM BYPASS BY DR Shannan Corona. ECC, JOSE BY DR LEO. Subjective:   Pt seen with Dr. Oj Hearn. Afebrile, on RA. Biggest issue currently is nausea and vomiting, + BM's. On heparin gtt. Pacer on backup. EKG appears NSR vs A flutter  BP stable. Objective:   Vitals:  Blood pressure 106/53, pulse 78, temperature 97.6 °F (36.4 °C), resp. rate 16, height 5' 11\" (1.803 m), weight 217 lb 6 oz (98.6 kg), SpO2 100 %. Temp (24hrs), Av.3 °F (36.8 °C), Min:97.5 °F (36.4 °C), Max:99.2 °F (37.3 °C)    EKG/Rhythm:  SR? Oxygen Therapy:  Oxygen Therapy  O2 Sat (%): 100 % (02/15/17 0857)  Pulse via Oximetry: 82 beats per minute (17 1500)  O2 Device: Room air (02/15/17 0857)  O2 Flow Rate (L/min): 1 l/min (17 0810)  FIO2 (%): 40 % (02/10/17 0722)    CXR: Pending        Admission Weight: Last Weight   Weight: 222 lb (100.7 kg) Weight: 217 lb 6 oz (98.6 kg)     Intake / Output / Drain:  Current Shift:    Last 24 hrs.:     Intake/Output Summary (Last 24 hours) at 02/15/17 0911  Last data filed at 02/15/17 0306   Gross per 24 hour   Intake          1185.73 ml   Output             1050 ml   Net           135.73 ml       EXAM:  General:  Awake, appears uncomfortable/nauseated                                                                                           Lungs:   Clear to auscultation bilaterally. Incision:  Drs C/D/I. Including R LE. Heart:  Regular rate and rhythm   Abdomen:   Soft, non-tender. Bowel sounds normal.    Extremities:  No edema. PPP. Neurologic:  Gross motor and sensory apparatus intact.      Labs:   Recent Labs      02/15/17   0305   17   0711   WBC  17.2*   --    --    HGB  9.0*   --    --    HCT  26.7*   --    --    PLT  207   --    --    NA  132*   < >   --    K  4.0   < >   --    BUN  30*   < > --    CREA  1.75*   < >   --    GLU  169*   < >   --    GLUCPOC   --    --   111*   INR  1.9*   < >   --     < > = values in this interval not displayed. Assessment:     Active Problems:    Ascending aortic dissection (HCC) (2017)      Overview: AORTIC ROOT REPLACEMENT USING A 29 MM [facility] VALVED CONDUIT, ECC VIA       LEFT FEMORAL ARTERIAL CANNULATION      2. LEFT TO RIGHT FEM FEM BYPASS GRAFT            Right anterior compartment fasciotomy.--Dr. Mila Heard 17         Plan/Recommendations/Medical Decision Makin. S/p Aortic root replacement w/ AVR mechanical valve & L to R fem fem bypass graft: Strict BP control SBP < 120. On asa, cont coumadin tonight--cont Heparin gtt until INR therapeutic. INR 1.9.   2. Atelectasis: Increase IS and activity as tolerated. 3. Postop anemia s/t acute blood loss: Hgb stable   4. Thrombocytopenia:  Plts 207, resolved. Cont asa.  5. Elevated transaminases: elevated again this AM. Monitor. 6. Elevated Cr 1.75 (1.2) Follow  6. Postop afib & Varying heart block: Rhythm now A flutter vs NSR, will start BB.   7. HTN:  Cont  Lasix. Start BB  8. Constipation: cont pericolace, PRN bisacodyl. 9. R compartment fasciotomy:  Vascular following, cont PT/OT. ? wound vac  10. Nausea- d/c percocet, change to ultram. Continue zofram PRN. Not on any other meds likely to cause N/V. Possible viral GI syndrome. 11. Dispo: PT/OT. Dispo plan TBD.       Signed By: Bonnie Solano PA-C [employer]eglasses

## (undated) LAB
INR PPP: 2.5 (ref 0.9–1.1)
PROTHROMBIN TIME: 24.6 SEC (ref 9–11.1)

## (undated) DEVICE — RESCUE COMBO FORCEPS

## (undated) DEVICE — KIT HND CTRL 3 W STPCOCK ROT END 54IN PREM HI PRSS TBNG AT

## (undated) DEVICE — Device: Brand: PADPRO

## (undated) DEVICE — THIN-FLEX SINGLE STAGE VENOUS DRAINAGE CANNULA: Brand: THIN-FLEX SINGLE STAGE VENOUS DRAINAGE CANNULA

## (undated) DEVICE — SUMP PERICARD AD 20FR WT TIP VERSATILE DSGN MULT PRT VENT

## (undated) DEVICE — INTENDED FOR TISSUE SEPARATION, AND OTHER PROCEDURES THAT REQUIRE A SHARP SURGICAL BLADE TO PUNCTURE OR CUT.: Brand: BARD-PARKER ® CARBON RIB-BACK BLADES

## (undated) DEVICE — CUSTOM KT PTCA INFL DEV K05 00052M

## (undated) DEVICE — CAUTERY BTTRY HI FN -- ACCU-TEMP

## (undated) DEVICE — KERLIX BANDAGE ROLL: Brand: KERLIX

## (undated) DEVICE — INSERT SUT HLD F/OCTBS RETRCTR --

## (undated) DEVICE — DRAPE,EXTREMITY,89X128,STERILE: Brand: MEDLINE

## (undated) DEVICE — TR BAND RADIAL ARTERY COMPRESSION DEVICE: Brand: TR BAND

## (undated) DEVICE — SUTURE MCRYL SZ 3-0 L27IN ABSRB UD L24MM PS-1 3/8 CIR PRIM Y936H

## (undated) DEVICE — Device

## (undated) DEVICE — RETURN SOLID PADS DISPOSABLE BX/50: Brand: BOVIE

## (undated) DEVICE — RUNTHROUGH NS EXTRA FLOPPY PTCA GUIDEWIRE: Brand: RUNTHROUGH

## (undated) DEVICE — SOLUTION IV 1000ML 0.9% SOD CHL PH 5 INJ USP VIAFLX PLAS

## (undated) DEVICE — KIT MED IMAG CNTRST AGNT W/ IOPAMIDOL REUSE

## (undated) DEVICE — SYSTEM TISS GLUE 5ML CONTAIN SYR PLUNG STD SYR TIP 12MM 5PKS/EA

## (undated) DEVICE — GARMENT,MEDLINE,DVT,INT,CALF,LG, GEN2: Brand: MEDLINE

## (undated) DEVICE — Device: Brand: EAGLE EYE PLATINUM RX DIGITAL IVUS CATHETER

## (undated) DEVICE — STERILE POLYISOPRENE POWDER-FREE SURGICAL GLOVES: Brand: PROTEXIS

## (undated) DEVICE — CATH ANGI BLLN DIL 2.5X12MM -- NC EUPHORA

## (undated) DEVICE — CONN SUC TUBE PARA Y 3/8IN

## (undated) DEVICE — SPHINCTEROTOME: Brand: DREAMTOME™ RX 44

## (undated) DEVICE — 72" ARTERIAL PRESSURE TUBING: Brand: ICU MEDICAL

## (undated) DEVICE — PACK PROCEDURE SURG HRT CATH

## (undated) DEVICE — ROCKER SWITCH PENCIL BLADE ELECTRODE, HOLSTER: Brand: EDGE

## (undated) DEVICE — CANNULA CARD VENT DIA18FR L HRT PVC MAL BODY BULL TIP DEPTH

## (undated) DEVICE — PLEDGET SUT SFT OVL 3 8X5 16IN

## (undated) DEVICE — 1010 S-DRAPE TOWEL DRAPE 10/BX: Brand: STERI-DRAPE™

## (undated) DEVICE — BASIC PACK: Brand: CONVERTORS

## (undated) DEVICE — DRAIN SURG 24FR L5/16IN DIA8MM SIL RND HUBLESS FULL FLUT

## (undated) DEVICE — SOLUTION IV 1000ML 0.9% SOD CHL

## (undated) DEVICE — SYSTEM BX DIA22GA FN W/ PRE LD NDL SHARKCORE

## (undated) DEVICE — HYPODERMIC SAFETY NEEDLE: Brand: MAGELLAN

## (undated) DEVICE — TROCAR: Brand: KII® OPTICAL ACCESS SYSTEM

## (undated) DEVICE — ANGIOGRAPHIC CATHETER: Brand: IMPULSE™

## (undated) DEVICE — KIT PERF CANN 21FR L18CM FEM ART PERC 3/8IN VENT CONN

## (undated) DEVICE — 40418 TRENDELENBURG ONE-STEP ARM PROTECTORS LARGE (1 PAIR): Brand: 40418 TRENDELENBURG ONE-STEP ARM PROTECTORS LARGE (1 PAIR)

## (undated) DEVICE — Device: Brand: PROWATER

## (undated) DEVICE — (D)SYR 10ML 1/5ML GRAD NSAF -- PKGING CHANGE USE ITEM 338027

## (undated) DEVICE — LEAD PCMKR MYOCARDL BPLR TEMP. --

## (undated) DEVICE — SPECIAL PROCEDURE DRAPE 32" X 34": Brand: SPECIAL PROCEDURE DRAPE

## (undated) DEVICE — 6 FOOT DISPOSABLE EXTENSION CABLE WITH SAFE CONNECT / SCREW-DOWN

## (undated) DEVICE — HEARTRAIL III GUIDING CATHETER: Brand: HEARTRAIL

## (undated) DEVICE — (D)PREP SKN CHLRAPRP APPL 26ML -- CONVERT TO ITEM 371833

## (undated) DEVICE — COR-KNOT® QUICK LOAD® SINGLES: Brand: COR-KNOT® QUICK LOAD®

## (undated) DEVICE — COVER,MAYO STAND,STERILE: Brand: MEDLINE

## (undated) DEVICE — GENERAL LAPAROSCOPY - SMH: Brand: MEDLINE INDUSTRIES, INC.

## (undated) DEVICE — Z INACTIVE NO USAGE TURNOVER KIT RM CLEANOP

## (undated) DEVICE — 3M™ TEGADERM™ TRANSPARENT FILM DRESSING FRAME STYLE, 1626W, 4 IN X 4-3/4 IN (10 CM X 12 CM), 50/CT 4CT/CASE: Brand: 3M™ TEGADERM™

## (undated) DEVICE — DRESSING AQUACEL ADVANTAGE ALG W4XL5IN RECT GREATER ABSRB HYDRFBR TECHNOLOGY

## (undated) DEVICE — SUTURE SZ 7 L18IN NONABSORBABLE SIL CCS L48MM 1/2 CIR STRNM M655G

## (undated) DEVICE — CATHETER URETH 18FR RED RUB INTMIT ALL PURP

## (undated) DEVICE — DEVON™ KNEE AND BODY STRAP 60" X 3" (1.5 M X 7.6 CM): Brand: DEVON

## (undated) DEVICE — DRAPE PRB US TRNSDCR 6X96IN --

## (undated) DEVICE — LIQUIBAND RAPID ADHESIVE 36/CS 0.8ML: Brand: MEDLINE

## (undated) DEVICE — APPLICATOR SURG L38CM RIG ATOMIZING SGL USE XL-R FLEXITIP

## (undated) DEVICE — GLIDESHEATH SLENDER ACCESS KIT: Brand: GLIDESHEATH SLENDER

## (undated) DEVICE — SPLINT WR POS F/ARTERIAL ACC -- BX/10

## (undated) DEVICE — WASTE KIT - ST MARY: Brand: MEDLINE INDUSTRIES, INC.

## (undated) DEVICE — SCISSORS ENDOSCP DIA5MM CRV MPLR CAUT W/ RATCH HNDL

## (undated) DEVICE — CATH BLLN ANGIO 5X12MM NC EUPHORIA RX

## (undated) DEVICE — DERMABOND SKIN ADH 0.7ML -- DERMABOND ADVANCED 12/BX

## (undated) DEVICE — SYR 50ML LR LCK 1ML GRAD NSAF --

## (undated) DEVICE — TEMP PACING WIRE: Brand: MYO/WIRE

## (undated) DEVICE — Z DISCONTINUED NO SUB IDED TAPE UMB W1/8XL36IN WHT COT STRND NONRADIOPAQUE

## (undated) DEVICE — PRESSURE MONITORING SET: Brand: TRUWAVE

## (undated) DEVICE — SOLUTION ANTIFOG VIS SYS CLEARIFY LAPSCP

## (undated) DEVICE — ZINACTIVE USE 2641837 CLIP LIG M BLU TI HRT SHP WIRE HORZ 600 PER BX

## (undated) DEVICE — NEEDLE HYPO 18GA L1.5IN PNK S STL HUB POLYPR SHLD REG BVL

## (undated) DEVICE — LIGHT HANDLE: Brand: DEVON

## (undated) DEVICE — GLOVE SURG SZ 6 L12IN FNGR THK79MIL GRN LTX FREE

## (undated) DEVICE — 4-PORT MANIFOLD: Brand: NEPTUNE 2

## (undated) DEVICE — FOGARTY - HYDRAGRIP SURGICAL - CLAMP INSERTS: Brand: FOGARTY SOFTJAW

## (undated) DEVICE — CATH BLLN DIL 4.0 X15MM RX -- EUPHORA

## (undated) DEVICE — SURGICAL PROCEDURE PACK BASIN MAJ SET CUST NO CAUT

## (undated) DEVICE — MEDI-VAC NON-CONDUCTIVE SUCTION TUBING: Brand: CARDINAL HEALTH

## (undated) DEVICE — ANGIOGRAPHY KIT

## (undated) DEVICE — BAG RED 3PLY 2MIL 30X40 IN

## (undated) DEVICE — AGENT HEMSTAT 3GM PURIFIED PLNT STARCH PWD ABSRB ARISTA AH

## (undated) DEVICE — BAG SPEC RETRV 275ML 10ML DISPOSABLE RELIACATCH

## (undated) DEVICE — WRAP SURG W1.31XL1.34M CARD FOR PT 165-172CM THERMOWRP

## (undated) DEVICE — SOLUTION IV 500ML 0.9% SOD CHL FLX CONT

## (undated) DEVICE — ESOPHAGEAL/PYLORIC/COLONIC/BILIARY WIREGUIDED BALLOON DILATATION CATHETER: Brand: CRE™ PRO

## (undated) DEVICE — CATH GUID COR JL5.0 6FR 100CM -- LAUNCHER

## (undated) DEVICE — STOPCOCK IV 3W LUER

## (undated) DEVICE — SUTURE PROL SZ 4-0 L36IN NONABSORBABLE BLU L26MM SH 1/2 CIR 8521H

## (undated) DEVICE — RETRIEVAL BALLOON CATHETER: Brand: EXTRACTOR™ PRO RX

## (undated) DEVICE — LIMB HOLDER, WRIST/ANKLE: Brand: DEROYAL

## (undated) DEVICE — GARMENT,MEDLINE,DVT,INT,CALF,MED, GEN2: Brand: MEDLINE

## (undated) DEVICE — GAUZE SPONGES,12 PLY: Brand: CURITY

## (undated) DEVICE — SOLUTION IV 1000ML PH 7.4 INJ NRMSOL R

## (undated) DEVICE — LAPAROSCOPIC TROCAR SLEEVE/SINGLE USE: Brand: KII® OPTICAL ACCESS SYSTEM

## (undated) DEVICE — SUTURE TICRON DBL ARMED 2 0 CV 305 42IN BLU N ABSRB BRAID 8886303551

## (undated) DEVICE — SUTURE VCRL SZ 3-0 L27IN ABSRB UD L24MM FS-1 3/8 CIR REV J442H

## (undated) DEVICE — SUTURE SZ 0 27IN 5/8 CIR UR-6  TAPER PT VIOLET ABSRB VICRYL J603H

## (undated) DEVICE — SYR 3ML LL TIP 1/10ML GRAD --

## (undated) DEVICE — DRAPE FLD WRM W44XL66IN C6L FOR INTRATEMP SYS THERMABASIN

## (undated) DEVICE — SOLUTION IRRIG 1000ML H2O STRL BLT

## (undated) DEVICE — DRAPE,U/ SHT,SPLIT,PLAS,STERIL: Brand: MEDLINE

## (undated) DEVICE — STERILE POLYISOPRENE POWDER-FREE SURGICAL GLOVES WITH EMOLLIENT COATING: Brand: PROTEXIS

## (undated) DEVICE — GOWN,SIRUS,NONRNF,SETINSLV,XL,20/CS: Brand: MEDLINE

## (undated) DEVICE — SUTURE PROL SZ 5-0 L30IN NONABSORBABLE BLU L13MM RB-2 1/2 8710H

## (undated) DEVICE — ABDOMINAL PAD: Brand: DERMACEA

## (undated) DEVICE — APPLIER CLP M/L SHFT DIA5MM 15 LIG LIGAMAX 5

## (undated) DEVICE — SUT PROL 4-0 36IN RB1 DA BLU --

## (undated) DEVICE — STERILE LATEX POWDER-FREE SURGICAL GLOVESWITH NITRILE COATING: Brand: PROTEXIS

## (undated) DEVICE — GUIDEWIRE VASC L260CM DIA0.035IN TIP L3MM STD EXCHG PTFE J

## (undated) DEVICE — REM POLYHESIVE ADULT PATIENT RETURN ELECTRODE: Brand: VALLEYLAB

## (undated) DEVICE — SUMP INTCARD W/ 1/4INCH CONN 20FRENCH 15INCH DLP

## (undated) DEVICE — SUTURE PROL SZ 6-0 L30IN NONABSORBABLE BLU L13MM RB-2 1/2 8711H

## (undated) DEVICE — SEALER LATCHING 37CM LIGASURE MARYLAND XP

## (undated) DEVICE — DRAPE,REIN 53X77,STERILE: Brand: MEDLINE

## (undated) DEVICE — GUIDEWIRE VASC L185CM DIA0.014IN HYDRPHLC PTFE STR TIP

## (undated) DEVICE — COR-KNOT MINI® COMBO KITBASE PACKAGE TYPE - KITEACH STERILE PACKAGE KIT CONTAINS (2) SINGLE PATIENT USE COR-KNOT MINI® DEVICES AND (12) COR-KNOT® QUICK LOADS®.: Brand: COR-KNOT MINI®

## (undated) DEVICE — TROCAR: Brand: KII® SLEEVE

## (undated) DEVICE — INTENDED TO STANDARDIZE OR CAMERAS TO ALLOW FOR THE USE OF THE OR CAMERA COVER: Brand: ASPEN® O.R. CAMERA COVER

## (undated) DEVICE — SUTURE MCRYL SZ 4-0 L27IN ABSRB UD L19MM PS-2 1/2 CIR PRIM Y426H

## (undated) DEVICE — CATH GUID COR JL4.0 6FR 100CM -- LAUNCHER

## (undated) DEVICE — KIT MFLD ISOLATN NACL CNTRST PRT TBNG SPIK W/ PRSS TRNSDUC

## (undated) DEVICE — GLOVE SURG SZ 7.5 L11.2IN THK9.8MIL STRW LTX POLYMER BEAD

## (undated) DEVICE — SYSTEM EVAC SMOKE LAPARSCOPIC

## (undated) DEVICE — 1200 GUARD II KIT W/5MM TUBE W/O VAC TUBE: Brand: GUARDIAN

## (undated) DEVICE — NEEDLE INSUF L150MM DIA2MM DISP FOR PNEUMOPERI ENDOPATH

## (undated) DEVICE — SUT PROL 4-0 30IN SH1 DA BLU --

## (undated) DEVICE — SINGLE STAGE VENOUS RETURN CANNULA: Brand: THIN-FLEX SINGLE STAGE VENOUS DRAINAGE CANNULA

## (undated) DEVICE — GLOVE SURG SZ 6 THK91MIL LTX FREE SYN POLYISOPRENE ANTI

## (undated) DEVICE — SENSOR TEMP SKIN DISP

## (undated) DEVICE — COPILOT BLEEDBACK CONTROL VALVE: Brand: COPILOT

## (undated) DEVICE — TOWEL,OR,DSP,ST,BLUE,STD,2/PK,40PK/CS: Brand: MEDLINE

## (undated) DEVICE — SUTURE VCRL SZ 0 L18IN ABSRB VLT L40MM CT 1/2 CIR J752D